# Patient Record
Sex: MALE | Race: WHITE | Employment: OTHER | URBAN - METROPOLITAN AREA
[De-identification: names, ages, dates, MRNs, and addresses within clinical notes are randomized per-mention and may not be internally consistent; named-entity substitution may affect disease eponyms.]

---

## 2017-04-13 ENCOUNTER — OFFICE VISIT (OUTPATIENT)
Dept: NEUROLOGY | Age: 72
End: 2017-04-13

## 2017-04-13 VITALS
SYSTOLIC BLOOD PRESSURE: 110 MMHG | DIASTOLIC BLOOD PRESSURE: 72 MMHG | HEIGHT: 71 IN | TEMPERATURE: 96.6 F | BODY MASS INDEX: 36.68 KG/M2 | RESPIRATION RATE: 18 BRPM | HEART RATE: 80 BPM | WEIGHT: 262 LBS | OXYGEN SATURATION: 98 %

## 2017-04-13 DIAGNOSIS — F79 MR (MENTAL RETARDATION): ICD-10-CM

## 2017-04-13 DIAGNOSIS — G40.209 PARTIAL SYMPTOMATIC EPILEPSY WITH COMPLEX PARTIAL SEIZURES, NOT INTRACTABLE, WITHOUT STATUS EPILEPTICUS (HCC): ICD-10-CM

## 2017-04-13 DIAGNOSIS — R56.9 SEIZURE (HCC): Primary | ICD-10-CM

## 2017-04-13 RX ORDER — ASPIRIN 325 MG
TABLET, DELAYED RELEASE (ENTERIC COATED) ORAL
COMMUNITY
End: 2018-01-23 | Stop reason: SDUPTHER

## 2017-04-13 RX ORDER — PHENYTOIN SODIUM 100 MG/1
CAPSULE, EXTENDED RELEASE ORAL
COMMUNITY
End: 2017-04-13 | Stop reason: SDUPTHER

## 2017-04-13 RX ORDER — LANOLIN ALCOHOL/MO/W.PET/CERES
400 CREAM (GRAM) TOPICAL DAILY
Status: ON HOLD | COMMUNITY
End: 2017-12-22

## 2017-04-13 NOTE — PROGRESS NOTES
Neurology Progress Note    NAME:  Nichelle Robison   :   1945   MRN:   [id number]     Date/Time:  2017  Subjective:      Kimberly Lane is a 81 y.o. male here today for follow up. No seizure reported. Patient still on multiple antiepileptic drug even when he has been seizure free for more than 2 years. Review of Systems:  Per HPI - Otherwise 12 point ROS was negative     []Unable to obtain  ROS due to  []mental status change  []sedated   []intubated    Medications reviewed:  Current Outpatient Prescriptions   Medication Sig Dispense Refill    magnesium oxide (MAG-OX) 400 mg tablet Take 400 mg by mouth daily.  Cholecalciferol, Vitamin D3, 50,000 unit cap Take  by mouth every thirty (30) days.  lacosamide (VIMPAT) 100 mg tab tablet Take 1 Tab by mouth two (2) times a day. Max Daily Amount: 200 mg. 60 Tab 1    metoprolol (LOPRESSOR) 25 mg tablet Take 0.5 Tabs by mouth two (2) times a day. 60 Tab 3    bumetanide (BUMEX) 2 mg tablet Take 2 mg by mouth daily.  EUCALYP/ME-SALICYLATE/MEN/THYM (LISTERINE ANTISEPTIC MM) by Mucous Membrane route two (2) times a day.  acetaminophen (TYLENOL) 325 mg tablet Take 650 mg by mouth every eight (8) hours as needed for Pain.  potassium chloride (K-DUR, KLOR-CON) 20 mEq tablet Take 40 mEq by mouth two (2) times a day.  oxyCODONE-acetaminophen (PERCOCET) 5-325 mg per tablet Take 1 Tab by mouth every four (4) hours as needed. Max Daily Amount: 4 Tabs. 20 Tab 0        Objective:   Vitals:  Vitals:    17 1123   BP: 110/72   Pulse: 80   Resp: 18   Temp: 96.6 °F (35.9 °C)   TempSrc: Oral   SpO2: 98%   Weight: 262 lb (118.8 kg)   Height: 5' 11\" (1.803 m)   PainSc:   0 - No pain       PHYSICAL EXAM:  General:    Alert, cooperative, no distress, appears stated age. Head:   Normocephalic, without obvious abnormality, atraumatic. Eyes:   Conjunctivae/corneas clear.   PERRLA  Nose:  Nares normal. No drainage or sinus tenderness. Throat:    Lips, mucosa, and tongue normal.  No Thrush  Neck:  Supple, symmetrical,  no adenopathy, thyroid: non tender    no carotid bruit and no JVD. Back:    Symmetric,  No CVA tenderness. Lungs:   Clear to auscultation bilaterally. No Wheezing or Rhonchi. No rales. Chest wall:  No tenderness or deformity. No Accessory muscle use. Heart:   Regular rate and rhythm,  no murmur, rub or gallop. Abdomen:   Soft, non-tender. Not distended. Bowel sounds normal. No masses  Extremities: Extremities normal, atraumatic, No cyanosis. No edema. No clubbing  Skin:     Texture, turgor normal. No rashes or lesions. Not Jaundiced  Lymph nodes: Cervical, supraclavicular normal.  Psych:  Good insight. Not depressed. Not anxious or agitated. NEUROLOGICAL EXAM:  Appearance: The patient is well developed, well nourished,  and is in no acute distress. Mental Status: Oriented to time, place and person. Mood and affect appropriate. Cranial Nerves:   Intact visual fields. Fundi are benign. ANGELINA, EOM's full, no nystagmus, no ptosis. Facial sensation is normal. Corneal reflexes are intact. Facial movement is symmetric. Hearing is normal bilaterally. Palate is midline with normal sternocleidomastoid and trapezius muscles are normal. Tongue is midline. Motor:  5/5 strength in upper and lower proximal and distal muscles. Normal bulk and tone. No fasciculations. Reflexes:   Deep tendon reflexes 2+/4 and symmetrical.   Sensory:   Normal to touch, pinprick and vibration. Gait:  Slightly unsteady gait. Tremor:   Mild tremor noted. Cerebellar:  No cerebellar signs present. Neurovascular:  Normal heart sounds and regular rhythm, peripheral pulses intact, and no carotid bruits. Lab Data Reviewed:    No visits with results within 3 Month(s) from this visit.   Latest known visit with results is:    Admission on 11/14/2015, Discharged on 11/14/2015   Component Date Value Ref Range Status    Levetiracetam (Keppra) 11/14/2015 None detected  10.0 - 40.0 ug/mL Final    Comment: (NOTE)  Performed At: [address] [id number]  Paco Velasquez MD [id number]      Sodium 11/14/2015 136  136 - 145 mmol/L Final    Potassium 11/14/2015 3.3* 3.5 - 5.1 mmol/L Final    Chloride 11/14/2015 102  97 - 108 mmol/L Final    CO2 11/14/2015 28  21 - 32 mmol/L Final    Anion gap 11/14/2015 6  5 - 15 mmol/L Final    Glucose 11/14/2015 107* 65 - 100 mg/dL Final    BUN 11/14/2015 16  6 - 20 MG/DL Final    Creatinine 11/14/2015 0.86  0.70 - 1.30 MG/DL Final    ** Note new reference range and method **    BUN/Creatinine ratio 11/14/2015 19  12 - 20   Final    GFR est AA 11/14/2015 >60  >60 ml/min/1.73m2 Final    GFR est non-AA 11/14/2015 >60  >60 ml/min/1.73m2 Final    Comment: Estimated GFR is calculated using the IDMS-traceable Modification of Diet in Renal Disease (MDRD) Study equation, reported for both  Americans (GFRAA) and non- Americans (GFRNA), and normalized to 1.73m2 body surface area. The physician must decide which value applies to the patient. The MDRD study equation should only be used in individuals age 25 or older. It has not been validated for the following: pregnant women, patients with serious comorbid conditions, or on certain medications, or persons with extremes of body size, muscle mass, or nutritional status.  Calcium 11/14/2015 8.0* 8.5 - 10.1 MG/DL Final    Bilirubin, total 11/14/2015 0.4  0.2 - 1.0 MG/DL Final    ALT (SGPT) 11/14/2015 12  12 - 78 U/L Final    AST (SGOT) 11/14/2015 12* 15 - 37 U/L Final    Alk.  phosphatase 11/14/2015 123* 45 - 117 U/L Final    Protein, total 11/14/2015 7.4  6.4 - 8.2 g/dL Final    Albumin 11/14/2015 3.2* 3.5 - 5.0 g/dL Final    Globulin 11/14/2015 4.2* 2.0 - 4.0 g/dL Final    A-G Ratio 11/14/2015 0.8* 1.1 - 2.2   Final    Color 11/14/2015 YELLOW/STRAW    Final    Color Reference Range: Straw, Yellow or Dark Yellow    Appearance 11/14/2015 CLEAR  CLEAR   Final    Specific gravity 11/14/2015 1.013  1.003 - 1.030   Final    pH (UA) 11/14/2015 6.0  5.0 - 8.0   Final    Protein 11/14/2015 NEGATIVE   NEG mg/dL Final    Glucose 11/14/2015 NEGATIVE   NEG mg/dL Final    Ketone 11/14/2015 NEGATIVE   NEG mg/dL Final    Bilirubin 11/14/2015 NEGATIVE   NEG   Final    Blood 11/14/2015 TRACE* NEG   Final    Urobilinogen 11/14/2015 0.2  0.2 - 1.0 EU/dL Final    Nitrites 11/14/2015 NEGATIVE   NEG   Final    Leukocyte Esterase 11/14/2015 NEGATIVE   NEG   Final    WBC 11/14/2015 5-10  0 - 4 /hpf Final    RBC 11/14/2015 0-5  0 - 5 /hpf Final    Epithelial cells 11/14/2015 FEW  FEW /lpf Final    Epithelial cell category consists of squamous cells and /or transitional urothelial cells. Renal tubular cells, if present, are separately identified as such.  Bacteria 11/14/2015 1+* NEG /hpf Final    Spermatozoa 11/14/2015 PRESENT    Final    Ventricular Rate 11/14/2015 107  BPM Final    Atrial Rate 11/14/2015 107  BPM Final    P-R Interval 11/14/2015 134  ms Final    QRS Duration 11/14/2015 190  ms Final    Q-T Interval 11/14/2015 420  ms Final    QTC Calculation (Bezet) 11/14/2015 560  ms Final    Calculated P Axis 11/14/2015 59  degrees Final    Calculated R Axis 11/14/2015 -96  degrees Final    Calculated T Axis 11/14/2015 80  degrees Final    Diagnosis 11/14/2015    Final                    Value:Atrial-sensed ventricular-paced rhythm  When compared with ECG of 24-OCT-2015 05:32,  Electronic ventricular pacemaker has replaced Sinus rhythm  No obvious pacer malfunction   Confirmed by Wanda Putnam ([id number]) on 11/15/2015 4:53:55 PM         CT Results (recent):    Results from [place] encounter on 10/17/15   CT MAXILLOFACIAL WO CONT   Narrative **Final Report**      ICD Codes / Adm. Diagnosis: 97  276.1 / Seizure  Seizure  Examination:  CT MAXILLOFACIAL WO CON  - [id number] - Oct 17 2015 10: 33AM  Accession No:  [id number]  Reason:  head trauma after seizure      REPORT:  INDICATION:  head trauma after seizure    EXAM: CT MAXILLOFACIAL STRUCTURES WITHOUT CONTRAST. Comparison: CT head 10/22/2008. PROCEDURE: Sequential axial images of the maxillofacial bones were   performed, using bone algorithm. Soft tissue and bone windows were examined. Post-processing for coronal reformatting was performed. Contrast was not   administered. FINDINGS: No fracture is obvious. The orbital rims and floors of orbits are   intact. The nasal bone is intact, with a flattened configuration which is   stable since older head CTs. . The bony structures of the mandible and   maxilla show no acute abnormality. There are no air-fluid levels in the   paranasal sinuses, and no soft tissue swelling is noted focally. Minimal   mucoperiosteal thickening. No obvious mass or abscess. Normal sized lymph   nodes scattered throughout the cervical chains. IMPRESSION: No acute bony abnormality of the maxillofacial structures. Signing/Reading Doctor: Julieta Almanzar ([id number])    Approved: Julieta Almanzar ([id number])  Oct 17 2015 10:53AM                                MRI Results (recent):  No results found for this or any previous visit. IR Results (recent):  No results found for this or any previous visit. VAS/US Results (recent):  No results found for this or any previous visit.           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 Call if there is any problem. Will continue with Vimpat. ICD-10-CM ICD-9-CM    1. Seizure ([facility] R56.9 780.39    2. Partial symptomatic epilepsy with complex partial seizures, not intractable, without status epilepticus ([facility] G40.209 345.40    3. MR (mental retardation) F79 319      Follow-up Disposition:  Return in about 1 year (around 4/13/2018).          ___________________________________________________    Total time spent with patient:  []15   []25   []35   [] __ minutes    Care Plan discussed with:    []Patient   []Family    []Care Manager   []Consultant/Specialist :    ___________________________________________________    Attending Physician: Lyndsey Fischer MD

## 2017-04-13 NOTE — MR AVS SNAPSHOT
Visit Information Date & Time Provider Department Dept. Phone Encounter #  
 4/13/2017 11:00 AM Flynn Puga MD [facility] at [address] [place] [address] Dr [id number] Follow-up Instructions Return in about 1 year (around 4/13/2018). Upcoming Health Maintenance Date Due Hepatitis C Screening 1945 FOBT Q 1 YEAR AGE 50-75 1/16/1997 ZOSTER VACCINE AGE 60> 1/16/2007 GLAUCOMA SCREENING Q2Y 1/16/2012 MEDICARE YEARLY EXAM 1/16/2012 INFLUENZA AGE 9 TO ADULT 8/1/2016 Pneumococcal 65+ Low/Medium Risk (2 of 2 - PCV13) 10/18/2016 DTaP/Tdap/Td series (2 - Td) 10/17/2025 Allergies as of 4/13/2017  Review Complete On: 4/13/2017 By: Deyanira Wong LPN No Known Allergies Current Immunizations  Never Reviewed Name Date Pneumococcal Polysaccharide (PPSV-23) 10/18/2015 10:22 AM  
 Tdap 10/17/2015 11:04 AM  
  
 Not reviewed this visit You Were Diagnosed With   
  
 Codes Comments Seizure ([facility]    -  Primary ICD-10-CM: R56.9 ICD-9-CM: 780.39 Partial symptomatic epilepsy with complex partial seizures, not intractable, without status epilepticus ([facility]     ICD-10-CM: U48.499 ICD-9-CM: 345.40 MR (mental retardation)     ICD-10-CM: B06 
ICD-9-CM: 033 Vitals BP Pulse Temp Resp Height(growth percentile) Weight(growth percentile) 110/72 (BP 1 Location: Right arm, BP Patient Position: Sitting) 80 96.6 °F (35.9 °C) (Oral) 18 5' 11\" (1.803 m) 262 lb (118.8 kg) SpO2 BMI Smoking Status 98% 36.54 kg/m2 Never Smoker Vitals History BMI and BSA Data Body Mass Index Body Surface Area  
 36.54 kg/m 2 2.44 m 2 Preferred Pharmacy Pharmacy Name Phone [facility] [phone] Your Updated Medication List  
  
   
This list is accurate as of: 4/13/17 11:50 AM.  Always use your most recent med list.  
  
  
  
  
 acetaminophen 325 mg tablet Commonly known as:  TYLENOL Take 650 mg by mouth every eight (8) hours as needed for Pain. bumetanide 2 mg tablet Commonly known as:  Benítez Abide Take 2 mg by mouth daily. Cholecalciferol (Vitamin D3) 50,000 unit Cap Take  by mouth every thirty (30) days. lacosamide 100 mg Tab tablet Commonly known as:  VIMPAT Take 1 Tab by mouth two (2) times a day. Max Daily Amount: 200 mg. LISTERINE ANTISEPTIC MM  
by Mucous Membrane route two (2) times a day. magnesium oxide 400 mg tablet Commonly known as:  MAG-OX Take 400 mg by mouth daily. metoprolol tartrate 25 mg tablet Commonly known as:  LOPRESSOR Take 0.5 Tabs by mouth two (2) times a day. oxyCODONE-acetaminophen 5-325 mg per tablet Commonly known as:  PERCOCET Take 1 Tab by mouth every four (4) hours as needed. Max Daily Amount: 4 Tabs. potassium chloride 20 mEq tablet Commonly known as:  K-DUR, KLOR-CON Take 40 mEq by mouth two (2) times a day. Follow-up Instructions Return in about 1 year (around 4/13/2018). Introducing [facility]! [facility] introduces [employer] patient portal. Now you can access parts of your medical record, email your doctor's office, and request medication refills online. 1. In your internet browser, go to [email or web address] 2. Click on the First Time User? Click Here link in the Sign In box. You will see the New Member Sign Up page. 3. Enter your [employer] Access Code exactly as it appears below. You will not need to use this code after youve completed the sign-up process. If you do not sign up before the expiration date, you must request a new code. · [employer] Access Code: [email or web address] Expires: 7/12/2017 11:18 AM 
 
4. Enter the last four digits of your Social Security Number (xxxx) and Date of Birth (mm/dd/yyyy) as indicated and click Submit.  You will be taken to the next sign-up page. 5. Create a dentaZOOM ID. This will be your [employer]aZOOM login ID and cannot be changed, so think of one that is secure and easy to remember. 6. Create a [employer]ZOOM password. You can change your password at any time. 7. Enter your Password Reset Question and Answer. This can be used at a later time if you forget your password. 8. Enter your e-mail address. You will receive e-mail notification when new information is available in [address] 9. Click Sign Up. You can now view and download portions of your medical record. 10. Click the Download Summary menu link to download a portable copy of your medical information. If you have questions, please visit the Frequently Asked Questions section of the [employer]ZO[employer] website. Remember, [employer] is NOT to be used for urgent needs. For medical emergencies, dial 911. Now available from your iPhone and Android! Please provide this summary of care documentation to your next provider. Your primary care clinician is listed as Prabhakar Oconnell. If you have any questions after today's visit, please call [phone].

## 2017-12-13 ENCOUNTER — APPOINTMENT (OUTPATIENT)
Dept: GENERAL RADIOLOGY | Age: 72
End: 2017-12-13
Attending: EMERGENCY MEDICINE
Payer: MEDICARE

## 2017-12-13 ENCOUNTER — HOSPITAL ENCOUNTER (EMERGENCY)
Age: 72
Discharge: HOME | End: 2017-12-13
Attending: EMERGENCY MEDICINE | Admitting: EMERGENCY MEDICINE
Payer: MEDICARE

## 2017-12-13 VITALS
HEIGHT: 76 IN | SYSTOLIC BLOOD PRESSURE: 134 MMHG | RESPIRATION RATE: 18 BRPM | OXYGEN SATURATION: 96 % | HEART RATE: 85 BPM | BODY MASS INDEX: 34.7 KG/M2 | TEMPERATURE: 98.2 F | DIASTOLIC BLOOD PRESSURE: 63 MMHG | WEIGHT: 285 LBS

## 2017-12-13 DIAGNOSIS — S52.531A CLOSED COLLES' FRACTURE OF RIGHT RADIUS, INITIAL ENCOUNTER: Primary | ICD-10-CM

## 2017-12-13 DIAGNOSIS — S52.614A CLOSED NONDISPLACED FRACTURE OF STYLOID PROCESS OF RIGHT ULNA, INITIAL ENCOUNTER: ICD-10-CM

## 2017-12-13 PROCEDURE — 75810000053 HC SPLINT APPLICATION

## 2017-12-13 PROCEDURE — 99283 EMERGENCY DEPT VISIT LOW MDM: CPT

## 2017-12-13 PROCEDURE — 73110 X-RAY EXAM OF WRIST: CPT

## 2017-12-13 PROCEDURE — 73130 X-RAY EXAM OF HAND: CPT

## 2017-12-13 PROCEDURE — 73090 X-RAY EXAM OF FOREARM: CPT

## 2017-12-13 PROCEDURE — 74011250637 HC RX REV CODE- 250/637: Performed by: EMERGENCY MEDICINE

## 2017-12-13 RX ORDER — PHENOBARBITAL 30 MG/1
30 TABLET ORAL 2 TIMES DAILY
Status: ON HOLD | COMMUNITY
End: 2017-12-22

## 2017-12-13 RX ORDER — IBUPROFEN 600 MG/1
600 TABLET ORAL
Status: COMPLETED | OUTPATIENT
Start: 2017-12-13 | End: 2017-12-13

## 2017-12-13 RX ORDER — HYDROCODONE BITARTRATE AND ACETAMINOPHEN 5; 325 MG/1; MG/1
1 TABLET ORAL
Qty: 8 TAB | Refills: 0 | Status: SHIPPED | OUTPATIENT
Start: 2017-12-13 | End: 2017-12-22

## 2017-12-13 RX ORDER — NAPROXEN 500 MG/1
500 TABLET ORAL 2 TIMES DAILY WITH MEALS
Qty: 20 TAB | Refills: 0 | Status: SHIPPED | OUTPATIENT
Start: 2017-12-13 | End: 2019-06-06

## 2017-12-13 RX ORDER — LEVETIRACETAM 1000 MG/1
1000 TABLET ORAL 2 TIMES DAILY
COMMUNITY
End: 2017-12-28 | Stop reason: CLARIF

## 2017-12-13 RX ADMIN — IBUPROFEN 600 MG: 600 TABLET, FILM COATED ORAL at 15:15

## 2017-12-13 NOTE — DISCHARGE INSTRUCTIONS
Colles Fracture: Care Instructions  Your Care Instructions    A Colles (say \"CALL-us\" or \"CALL-eez\") fracture is a specific type of broken wrist. In this type of fracture, the broken bone in the wrist tilts upward when the hand is palm down. The break may happen when you throw out a hand to protect yourself in a fall. Your treatment depends on how bad the break is. Your doctor may have put your wrist in a cast or splint. This will help keep your wrist stable and keep the bone in the right position. Sometimes surgery is needed to help keep the bone in position for good healing. It will take several weeks to a few months for your wrist to heal. You can help it heal with care at home. You heal best when you take good care of yourself. Eat a variety of healthy foods, and don't smoke. You may have had a sedative to help you relax. You may be unsteady after having sedation. It can take a few hours for the medicine's effects to wear off. Common side effects include nausea, vomiting, and feeling sleepy or tired. The doctor has checked you carefully, but problems can develop later. If you notice any problems or new symptoms, get medical treatment right away. Follow-up care is a key part of your treatment and safety. Be sure to make and go to all appointments, and call your doctor if you are having problems. It's also a good idea to know your test results and keep a list of the medicines you take. How can you care for yourself at home? · If your doctor gave you a sedative:  ¨ For 24 hours, don't do anything that requires attention to detail. It takes time for the medicine's effects to completely wear off. ¨ For your safety, do not drive or operate any machinery that could be dangerous. Wait until the medicine wears off. You need to be able to think clearly and react easily. · Be safe with medicines. Take pain medicines exactly as directed.   ¨ If the doctor gave you a prescription medicine for pain, take it as prescribed. ¨ If you are not taking a prescription pain medicine, ask your doctor if you can take an over-the-counter medicine. · Put ice or a cold pack on your wrist for 10 to 20 minutes at a time. Try to do this every 1 to 2 hours for the next 3 days (when you are awake). Put a thin cloth between the ice and your cast or splint. Keep your cast or splint dry. · Follow the splint or cast care instructions that your doctor gives you. If you have a splint, do not take it off unless your doctor tells you to. Be careful not to put the splint on too tight. · Prop up your arm on pillows when you sit or lie down in the first few days after the injury. Keep your wrist higher than the level of your heart. This will help reduce swelling. · Move your fingers often to reduce swelling and stiffness. But don't use that hand to grab or carry anything. · Follow instructions for exercises to keep your arm strong. When should you call for help? Call 911 anytime you think you may need emergency care. For example, call if:  ? · You have trouble breathing. ? · You passed out (lost consciousness). ?Call your doctor now or seek immediate medical care if:  ? · You have new or worse nausea or vomiting. ? · You have new or worse pain. ? · Your hand or fingers are cool or pale or change color. ? · Your cast or splint feels too tight. ? · You have tingling, weakness, or numbness in your hand or fingers. ? Watch closely for changes in your health, and be sure to contact your doctor if:  ? · You have problems with your cast or splint. ? · You do not get better as expected. Where can you learn more? Go to http://mandi-marylou.info/. Enter F496 in the search box to learn more about \"Colles Fracture: Care Instructions. \"  Current as of: March 21, 2017  Content Version: 11.4  © 4557-5705 [employer].  Care instructions adapted under license by [employer] (which disclaims liability or warranty for this information). If you have questions about a medical condition or this instruction, always ask your healthcare professional. [place] [address] any warranty or liability for your use of this information.

## 2017-12-13 NOTE — ED PROVIDER NOTES
EMERGENCY DEPARTMENT HISTORY AND PHYSICAL EXAM      Date: 12/13/2017  Patient Name: Ki Quinn    History of Presenting Illness     Chief Complaint   Patient presents with    Fall      30 min prior to arrive    Hand Pain     from fall       History Provided By: Patient and caregiver from home    HPI: Mariah Mccurdy, 81 y.o. male with PMHx significant for htn, mild mental retardation, and epilepsy, presents ambulatory to the ED with cc of fall 30 min PTA. Pt reports tripping over left foot and landing on right hand. [address] Denies numbness/tingling. Reports pain and swelling. PCP: Marya Ramirez MD    There are no other complaints, changes, or physical findings at this time. Current Outpatient Prescriptions   Medication Sig Dispense Refill    PHENYTOIN PO Take  by mouth.  levETIRAcetam 1,000 mg tablet Take 1,000 mg by mouth two (2) times a day.  HYDROcodone-acetaminophen (NORCO) 5-325 mg per tablet Take 1 Tab by mouth every six (6) hours as needed for Pain. Max Daily Amount: 4 Tabs. 8 Tab 0    naproxen (NAPROSYN) 500 mg tablet Take 1 Tab by mouth two (2) times daily (with meals). 20 Tab 0    magnesium oxide (MAG-OX) 400 mg tablet Take 400 mg by mouth daily.  Cholecalciferol, Vitamin D3, 50,000 unit cap Take  by mouth every thirty (30) days.  lacosamide (VIMPAT) 100 mg tab tablet Take 1 Tab by mouth two (2) times a day. Max Daily Amount: 200 mg. 60 Tab 1    metoprolol (LOPRESSOR) 25 mg tablet Take 0.5 Tabs by mouth two (2) times a day. 60 Tab 3    bumetanide (BUMEX) 2 mg tablet Take 2 mg by mouth daily.  potassium chloride (K-DUR, KLOR-CON) 20 mEq tablet Take 20 mEq by mouth two (2) times a day.  PHENobarbital (LUMINAL) 30 mg tablet Take 30 mg by mouth two (2) times a day.  acetaminophen (TYLENOL) 325 mg tablet Take 650 mg by mouth every eight (8) hours as needed for Pain.          Past History     Past Medical History:  Past Medical History:   Diagnosis Date    Epile[facility] [address]     Heart disease     Hypertension     Incontinence     Leg swelling     Mental retardation, 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)     Other ill-defined conditions(799.89)     edema legs    S/P biventricular cardiac pacemaker procedure 10/23/2015    10/23/15 [employer] biventricular pacemaker inmplant    Screen for colon cancer 9/27/2012       Past Surgical History:  Past Surgical History:   Procedure Laterality Date    HX COLONOSCOPY  04/05/2017       Family History:  Family History   Problem Relation Age of Onset    Other Other      unable to obtain due to mental status    No Known Problems Sister        Social History:  Social History   Substance Use Topics    Smoking status: Never Smoker    Smokeless tobacco: Never Used    Alcohol use No       Allergies:  No Known Allergies      Review of Systems   Review of Systems   Constitutional: Negative for activity change, appetite change, chills and fever. HENT: Negative for facial swelling. Eyes: Negative for photophobia and visual disturbance. Respiratory: Negative for chest tightness and shortness of breath. Cardiovascular: Negative for chest pain. Gastrointestinal: Negative for abdominal pain, nausea and vomiting. Genitourinary: Negative for flank pain. Musculoskeletal: Negative for back pain, myalgias and neck pain. Right hand swelling and pain   Skin: Negative for color change, pallor, rash and wound. Neurological: Negative for dizziness, weakness, light-headedness and headaches. All other systems reviewed and are negative. Physical Exam   Physical Exam   Constitutional: He is oriented to person, place, and time. He appears well-developed and well-nourished. No distress. HENT:   Head: Normocephalic and atraumatic. Eyes: Conjunctivae are normal.   Cardiovascular: Normal rate, regular rhythm and normal heart sounds. Pulmonary/Chest: Effort normal and breath sounds normal. No respiratory distress. Abdominal: Soft. Bowel sounds are normal. He exhibits no distension. There is no tenderness. Musculoskeletal:        Right elbow: Normal.       Right wrist: He exhibits tenderness, bony tenderness (radial styloid) and swelling. He exhibits normal range of motion and no effusion. Right forearm: He exhibits tenderness and bony tenderness (radius). He exhibits no swelling, no edema, no deformity and no laceration. Arms:       Right hand: He exhibits tenderness, bony tenderness and swelling. He exhibits normal range of motion, normal two-point discrimination, normal capillary refill, no deformity and no laceration. Normal sensation noted. Normal strength noted. Hands:  Neurological: He is alert and oriented to person, place, and time. No cranial nerve deficit. Skin: Skin is warm. No rash noted. No erythema. Psychiatric: He has a normal mood and affect. His behavior is normal.   Nursing note and vitals reviewed. Diagnostic Study Results     Labs -   No results found for this or any previous visit (from the past 12 hour(s)). Radiologic Studies -   XR HAND RT MIN 3 V   Final Result      XR FOREARM RT AP/LAT   Final Result      XR WRIST RT AP/LAT/OBL MIN 3V   Final Result        CT Results  (Last 48 hours)    None        CXR Results  (Last 48 hours)    None            Medical Decision Making   I am the first provider for this patient. I reviewed the vital signs, available nursing notes, past medical history, past surgical history, family history and social history. Vital Signs-Reviewed the patient's vital signs. Patient Vitals for the past 12 hrs:   Temp Pulse Resp BP SpO2   12/13/17 1453 98.2 °F (36.8 °C) 85 18 134/63 96 %       Records Reviewed: Nursing Notes and Old Medical Records    Provider Notes (Medical Decision Making):   DDx: Hand fracture vs sprain, radius fracture vs sprain, ulnar fracture vs sprain      ED Course:   Initial assessment performed.  The patients presenting problems have been discussed, and they are in agreement with the care plan formulated and outlined with them. I have encouraged them to ask questions as they arise throughout their visit. Splint evaluated. Pt continues to have < 3 sec cap refill. Good skin color and tone. Discussed signs and sx of compartment syndrome. Return if experience those sx. Disposition:  4:38 PM  Discussed lab and imaging results with pt along with dx and treatment plan. Discussed importance of prompt ortho follow up. All questions answered. Pt voiced they understood. Return if sx worsen. PLAN:  1. Current Discharge Medication List      START taking these medications    Details   HYDROcodone-acetaminophen (NORCO) 5-325 mg per tablet Take 1 Tab by mouth every six (6) hours as needed for Pain. Max Daily Amount: 4 Tabs. Qty: 8 Tab, Refills: 0      naproxen (NAPROSYN) 500 mg tablet Take 1 Tab by mouth two (2) times daily (with meals). Qty: 20 Tab, Refills: 0           2. Follow-up Information     Follow up With Details Comments Vincent Santos Schedule an appointment as soon as possible for a visit today for wrist fracture follow up [address]  [address] [address]    Yessica Lehman MD Schedule an appointment as soon as possible for a visit today for wrist fracture follow up [address] [facility]  [address] [phone]          Return to ED if worse     Diagnosis     Clinical Impression:   1. Closed Colles' fracture of right radius, initial encounter    2.  Closed nondisplaced fracture of styloid process of right ulna, initial encounter

## 2017-12-13 NOTE — ED NOTES
PA at bedside updating patient and staff member from [address] [place] [address] on x-ray results, necessity of follow up with orthopedic within a week and possible need for surgery.

## 2017-12-13 NOTE — ED NOTES
Emergency Department Nursing Plan of Care       The Nursing Plan of Care is developed from the Nursing assessment and Emergency Department Attending provider initial evaluation. The plan of care may be reviewed in the ED Provider note. The Plan of Care was developed with the following considerations:   Patient / Family readiness to learn indicated by:verbalized understanding  Persons(s) to be included in education: patient and staff member from group home. Barriers to Learning/Limitations: patient is mildly mentally retarded and is here with a staff member from [address] [place] [address].     Signed     Issa Kuo RN    12/13/2017   4:20 PM

## 2017-12-13 NOTE — ED TRIAGE NOTES
Pt fell at work prior to 30 mins on arrival. Denies hit his head,loc. Hans Mates on his rt hand,c/o rt hand pain from fall.

## 2017-12-13 NOTE — ED NOTES
Discharged by provider. Provider wrote specific instructions in addition to discharge paperwork for staff at group home, at the request of the attentive staff member that is here with him today.

## 2017-12-21 ENCOUNTER — ANESTHESIA EVENT (OUTPATIENT)
Dept: SURGERY | Age: 72
End: 2017-12-21
Payer: MEDICARE

## 2017-12-21 NOTE — PERIOP NOTES
Patient with PMH of HTN, heart disease, pacemaker, and epilepsy has been added to the surgery schedule for tomorrow, 12/22/2017. The last encounter with a cardiologist in our system was back October of 2015. Left a VM keshav Thibodeaux at Dr. Martinez Level office informing her that this patient should come through PAT, but since surgery is tomorrow a pacemaker plan has been faxed to there office requesting that they follow up on this issue. Requested she return my call regarding this. Faxed the pacemaker plan to Dr. Michelle Grace office as urgent.   DOS: 12/22/2017

## 2017-12-22 ENCOUNTER — ANESTHESIA (OUTPATIENT)
Dept: SURGERY | Age: 72
End: 2017-12-22
Payer: MEDICARE

## 2017-12-22 ENCOUNTER — APPOINTMENT (OUTPATIENT)
Dept: GENERAL RADIOLOGY | Age: 72
End: 2017-12-22
Attending: ORTHOPAEDIC SURGERY
Payer: MEDICARE

## 2017-12-22 ENCOUNTER — HOSPITAL ENCOUNTER (OUTPATIENT)
Age: 72
Setting detail: OUTPATIENT SURGERY
Discharge: HOME | End: 2017-12-22
Attending: ORTHOPAEDIC SURGERY | Admitting: ORTHOPAEDIC SURGERY
Payer: MEDICARE

## 2017-12-22 VITALS
SYSTOLIC BLOOD PRESSURE: 121 MMHG | HEART RATE: 83 BPM | WEIGHT: 274.69 LBS | TEMPERATURE: 99.1 F | HEIGHT: 75 IN | BODY MASS INDEX: 34.15 KG/M2 | OXYGEN SATURATION: 97 % | RESPIRATION RATE: 17 BRPM | DIASTOLIC BLOOD PRESSURE: 65 MMHG

## 2017-12-22 DIAGNOSIS — S52.531A CLOSED COLLES' FRACTURE OF RIGHT RADIUS, INITIAL ENCOUNTER: Primary | ICD-10-CM

## 2017-12-22 PROCEDURE — 76030000020 HC AMB SURG 1.5 TO 2 HR INTENSV-TIER 1: Performed by: ORTHOPAEDIC SURGERY

## 2017-12-22 PROCEDURE — 77030003601 HC NDL NRV BLK BBMI -A

## 2017-12-22 PROCEDURE — 77030035360 HC BIT DRL SKEL -B: Performed by: ORTHOPAEDIC SURGERY

## 2017-12-22 PROCEDURE — 77030031388 HC WRE K SKEL -B: Performed by: ORTHOPAEDIC SURGERY

## 2017-12-22 PROCEDURE — 77030000032 HC CUF TRNQT ZIMM -B: Performed by: ORTHOPAEDIC SURGERY

## 2017-12-22 PROCEDURE — C1713 ANCHOR/SCREW BN/BN,TIS/BN: HCPCS | Performed by: ORTHOPAEDIC SURGERY

## 2017-12-22 PROCEDURE — 77030032435

## 2017-12-22 PROCEDURE — 76210000034 HC AMBSU PH I REC 0.5 TO 1 HR: Performed by: ORTHOPAEDIC SURGERY

## 2017-12-22 PROCEDURE — 74011250636 HC RX REV CODE- 250/636

## 2017-12-22 PROCEDURE — 85018 HEMOGLOBIN: CPT

## 2017-12-22 PROCEDURE — 77030020782 HC GWN BAIR PAWS FLX 3M -B

## 2017-12-22 PROCEDURE — 74011250636 HC RX REV CODE- 250/636: Performed by: ORTHOPAEDIC SURGERY

## 2017-12-22 PROCEDURE — 76060000063 HC AMB SURG ANES 1.5 TO 2 HR: Performed by: ORTHOPAEDIC SURGERY

## 2017-12-22 PROCEDURE — 77030035361 HC BIT DRL SLD PA GEMNS SKEL -B: Performed by: ORTHOPAEDIC SURGERY

## 2017-12-22 PROCEDURE — 77030031139 HC SUT VCRL2 J&J -A: Performed by: ORTHOPAEDIC SURGERY

## 2017-12-22 PROCEDURE — L4398 FOOT DROP SPLINT PRE OTS: HCPCS | Performed by: ORTHOPAEDIC SURGERY

## 2017-12-22 PROCEDURE — 76000 FLUOROSCOPY <1 HR PHYS/QHP: CPT

## 2017-12-22 PROCEDURE — 77030002933 HC SUT MCRYL J&J -A: Performed by: ORTHOPAEDIC SURGERY

## 2017-12-22 PROCEDURE — 76210000046 HC AMBSU PH II REC FIRST 0.5 HR: Performed by: ORTHOPAEDIC SURGERY

## 2017-12-22 PROCEDURE — 77030021122 HC SPLNT MAT FST BSNM -A: Performed by: ORTHOPAEDIC SURGERY

## 2017-12-22 PROCEDURE — 74011250636 HC RX REV CODE- 250/636: Performed by: ANESTHESIOLOGY

## 2017-12-22 PROCEDURE — 77030013079 HC BLNKT BAIR HGGR 3M -A: Performed by: ANESTHESIOLOGY

## 2017-12-22 DEVICE — SCREW BONE L15MM DIA3.5MM CORT DSTL RAD VOLAR TI NONLOCKING: Type: IMPLANTABLE DEVICE | Site: WRIST | Status: FUNCTIONAL

## 2017-12-22 DEVICE — SCREW BNE L13MM DIA3.5MM CORT DST RAD VOLAR TI NONLOCKING: Type: IMPLANTABLE DEVICE | Site: WRIST | Status: FUNCTIONAL

## 2017-12-22 DEVICE — PEG BNE FIX L21MM DIA2MM DST RAD TI SMOOTH FOR VOLAR: Type: IMPLANTABLE DEVICE | Site: WRIST | Status: FUNCTIONAL

## 2017-12-22 DEVICE — SCREW BNE L14MM DIA3.5MM CORT DST RAD VOLAR TI NONLOCKING: Type: IMPLANTABLE DEVICE | Site: WRIST | Status: FUNCTIONAL

## 2017-12-22 DEVICE — PLATE BNE 3 H R DST RAD VOLAR TI STD FOR 3.5MM SCR GEMINUS: Type: IMPLANTABLE DEVICE | Site: WRIST | Status: FUNCTIONAL

## 2017-12-22 DEVICE — PEG BNE FIX L22MM DIA2.3MM DST RAD TI THRD LOK FOR VOLAR: Type: IMPLANTABLE DEVICE | Site: WRIST | Status: FUNCTIONAL

## 2017-12-22 DEVICE — IMPLANTABLE DEVICE: Type: IMPLANTABLE DEVICE | Site: WRIST | Status: FUNCTIONAL

## 2017-12-22 RX ORDER — LIDOCAINE HYDROCHLORIDE 10 MG/ML
0.1 INJECTION, SOLUTION EPIDURAL; INFILTRATION; INTRACAUDAL; PERINEURAL AS NEEDED
Status: DISCONTINUED | OUTPATIENT
Start: 2017-12-22 | End: 2017-12-22 | Stop reason: HOSPADM

## 2017-12-22 RX ORDER — SODIUM CHLORIDE 0.9 % (FLUSH) 0.9 %
5-10 SYRINGE (ML) INJECTION AS NEEDED
Status: DISCONTINUED | OUTPATIENT
Start: 2017-12-22 | End: 2017-12-22 | Stop reason: HOSPADM

## 2017-12-22 RX ORDER — SODIUM CHLORIDE 0.9 % (FLUSH) 0.9 %
5-10 SYRINGE (ML) INJECTION EVERY 8 HOURS
Status: DISCONTINUED | OUTPATIENT
Start: 2017-12-22 | End: 2017-12-22 | Stop reason: HOSPADM

## 2017-12-22 RX ORDER — DIPHENHYDRAMINE HYDROCHLORIDE 50 MG/ML
12.5 INJECTION, SOLUTION INTRAMUSCULAR; INTRAVENOUS AS NEEDED
Status: DISCONTINUED | OUTPATIENT
Start: 2017-12-22 | End: 2017-12-22 | Stop reason: HOSPADM

## 2017-12-22 RX ORDER — SODIUM CHLORIDE, SODIUM LACTATE, POTASSIUM CHLORIDE, CALCIUM CHLORIDE 600; 310; 30; 20 MG/100ML; MG/100ML; MG/100ML; MG/100ML
100 INJECTION, SOLUTION INTRAVENOUS CONTINUOUS
Status: DISCONTINUED | OUTPATIENT
Start: 2017-12-22 | End: 2017-12-22 | Stop reason: HOSPADM

## 2017-12-22 RX ORDER — ONDANSETRON 8 MG/1
8 TABLET, ORALLY DISINTEGRATING ORAL
Qty: 20 TAB | Refills: 2 | Status: SHIPPED | OUTPATIENT
Start: 2017-12-22 | End: 2019-06-06

## 2017-12-22 RX ORDER — OXYCODONE AND ACETAMINOPHEN 5; 325 MG/1; MG/1
TABLET ORAL
Qty: 80 TAB | Refills: 0 | Status: SHIPPED | OUTPATIENT
Start: 2017-12-22 | End: 2019-06-06

## 2017-12-22 RX ORDER — FENTANYL CITRATE 50 UG/ML
INJECTION, SOLUTION INTRAMUSCULAR; INTRAVENOUS AS NEEDED
Status: DISCONTINUED | OUTPATIENT
Start: 2017-12-22 | End: 2017-12-22 | Stop reason: HOSPADM

## 2017-12-22 RX ORDER — AMMONIUM LACTATE 12 G/100G
LOTION TOPICAL DAILY
COMMUNITY
End: 2019-06-06

## 2017-12-22 RX ORDER — ROPIVACAINE HYDROCHLORIDE 5 MG/ML
INJECTION, SOLUTION EPIDURAL; INFILTRATION; PERINEURAL AS NEEDED
Status: DISCONTINUED | OUTPATIENT
Start: 2017-12-22 | End: 2017-12-22 | Stop reason: HOSPADM

## 2017-12-22 RX ORDER — ONDANSETRON 2 MG/ML
4 INJECTION INTRAMUSCULAR; INTRAVENOUS AS NEEDED
Status: DISCONTINUED | OUTPATIENT
Start: 2017-12-22 | End: 2017-12-22 | Stop reason: HOSPADM

## 2017-12-22 RX ORDER — PROPOFOL 10 MG/ML
INJECTION, EMULSION INTRAVENOUS
Status: DISCONTINUED | OUTPATIENT
Start: 2017-12-22 | End: 2017-12-22 | Stop reason: HOSPADM

## 2017-12-22 RX ORDER — MIDAZOLAM HYDROCHLORIDE 1 MG/ML
INJECTION, SOLUTION INTRAMUSCULAR; INTRAVENOUS AS NEEDED
Status: DISCONTINUED | OUTPATIENT
Start: 2017-12-22 | End: 2017-12-22 | Stop reason: HOSPADM

## 2017-12-22 RX ORDER — ASCORBIC ACID 500 MG
500 TABLET ORAL DAILY
Qty: 42 TAB | Refills: 0 | Status: SHIPPED | OUTPATIENT
Start: 2017-12-22 | End: 2018-02-02

## 2017-12-22 RX ORDER — SODIUM CHLORIDE, SODIUM LACTATE, POTASSIUM CHLORIDE, CALCIUM CHLORIDE 600; 310; 30; 20 MG/100ML; MG/100ML; MG/100ML; MG/100ML
125 INJECTION, SOLUTION INTRAVENOUS CONTINUOUS
Status: DISCONTINUED | OUTPATIENT
Start: 2017-12-22 | End: 2017-12-22 | Stop reason: HOSPADM

## 2017-12-22 RX ORDER — HYDROMORPHONE HYDROCHLORIDE 2 MG/ML
.25-1 INJECTION, SOLUTION INTRAMUSCULAR; INTRAVENOUS; SUBCUTANEOUS
Status: DISCONTINUED | OUTPATIENT
Start: 2017-12-22 | End: 2017-12-22 | Stop reason: HOSPADM

## 2017-12-22 RX ADMIN — FENTANYL CITRATE 50 MCG: 50 INJECTION, SOLUTION INTRAMUSCULAR; INTRAVENOUS at 12:39

## 2017-12-22 RX ADMIN — SODIUM CHLORIDE, POTASSIUM CHLORIDE, SODIUM LACTATE AND CALCIUM CHLORIDE: 600; 310; 30; 20 INJECTION, SOLUTION INTRAVENOUS at 12:14

## 2017-12-22 RX ADMIN — MIDAZOLAM HYDROCHLORIDE 2 MG: 1 INJECTION, SOLUTION INTRAMUSCULAR; INTRAVENOUS at 12:39

## 2017-12-22 RX ADMIN — PROPOFOL 25 MCG/KG/MIN: 10 INJECTION, EMULSION INTRAVENOUS at 13:20

## 2017-12-22 RX ADMIN — MIDAZOLAM HYDROCHLORIDE 1 MG: 1 INJECTION, SOLUTION INTRAMUSCULAR; INTRAVENOUS at 13:14

## 2017-12-22 RX ADMIN — MIDAZOLAM HYDROCHLORIDE 2 MG: 1 INJECTION, SOLUTION INTRAMUSCULAR; INTRAVENOUS at 11:39

## 2017-12-22 RX ADMIN — FENTANYL CITRATE 50 MCG: 50 INJECTION, SOLUTION INTRAMUSCULAR; INTRAVENOUS at 11:39

## 2017-12-22 RX ADMIN — ROPIVACAINE HYDROCHLORIDE 30 ML: 5 INJECTION, SOLUTION EPIDURAL; INFILTRATION; PERINEURAL at 11:45

## 2017-12-22 RX ADMIN — CEFAZOLIN 3 G: 1 INJECTION, POWDER, FOR SOLUTION INTRAMUSCULAR; INTRAVENOUS; PARENTERAL at 12:33

## 2017-12-22 NOTE — H&P
Orthopedic Admission History and Physical        NAME: Jevon Haynes       :  1945       MRN:  [id number]      Subjective:     Patient is a 81 y.o. male who presents with history of R DRF. Presents today for surgical treatment. Patient Active Problem List    Diagnosis Date Noted    S/P biventricular cardiac pacemaker procedure 10/23/2015    Seizure disorder ([facility] [address] 10/20/2015     Class: Chronic    Mental retardation, 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) 10/20/2015     Class: Chronic    Advance care planning 10/20/2015    Hypertension 10/19/2015     Class: Chronic    Mobitz type II incomplete atrioventricular block 10/19/2015    Bilateral lower extremity edema 10/17/2015    Screen for colon cancer 2012    Bradycardia 2012     Past Medical History:   Diagnosis Date    Epilepsy ([facility] [address]     Heart disease     Hypertension     Incontinence     Leg swelling     Mental retardation, 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)     Other ill-defined conditions(799.89)     edema legs    S/P biventricular cardiac pacemaker procedure 10/23/2015    10/23/15 [employer] biventricular pacemaker inmplant    Screen for colon cancer 2012      Past Surgical History:   Procedure Laterality Date    HX COLONOSCOPY  2017    HX PACEMAKER  2015    bi ventricular pacemaker       Prior to Admission medications    Medication Sig Start Date End Date Taking? Authorizing Provider   ammonium lactate (LAC-HYDRIN) 12 % lotion Apply  to affected area daily. rub in to affected area well   Yes Historical Provider   PHENYTOIN PO Take 100 mg by mouth nightly. Yes Mack Harding MD   levETIRAcetam 1,000 mg tablet Take 1,000 mg by mouth two (2) times a day. Yes Mack Harding MD   naproxen (NAPROSYN) 500 mg tablet Take 1 Tab by mouth two (2) times daily (with meals). 17  Yes MONISHA Charles   Cholecalciferol, Vitamin D3, 50,000 unit cap Take  by mouth every thirty (30) days.    Yes Historical Provider   lacosamide (VIMPAT) 100 mg tab tablet Take 1 Tab by mouth two (2) times a day. Max Daily Amount: 200 mg. 11/14/15  Yes Alysia Verde MD   metoprolol (LOPRESSOR) 25 mg tablet Take 0.5 Tabs by mouth two (2) times a day. 10/24/15  Yes Victor Manuel Deshpande MD   bumetanide (BUMEX) 2 mg tablet Take 2 mg by mouth daily. Yes Mack Harding MD   potassium chloride (K-DUR, KLOR-CON) 20 mEq tablet Take 20 mEq by mouth two (2) times a day. Yes Mack Harding MD   HYDROcodone-acetaminophen (NORCO) 5-325 mg per tablet Take 1 Tab by mouth every six (6) hours as needed for Pain. Max Daily Amount: 4 Tabs. 12/13/17   MONISHA Alexander   acetaminophen (TYLENOL) 325 mg tablet Take 650 mg by mouth every eight (8) hours as needed for Pain.     Mack Harding MD     Current Facility-Administered Medications   Medication Dose Route Frequency    lidocaine (PF) (XYLOCAINE) 10 mg/mL (1 %) injection 0.1 mL  0.1 mL SubCUTAneous PRN    lactated Ringers infusion  100 mL/hr IntraVENous CONTINUOUS    sodium chloride (NS) flush 5-10 mL  5-10 mL IntraVENous Q8H    sodium chloride (NS) flush 5-10 mL  5-10 mL IntraVENous PRN    sodium chloride (NS) flush 5-10 mL  5-10 mL IntraVENous Q8H    sodium chloride (NS) flush 5-10 mL  5-10 mL IntraVENous PRN    lidocaine (PF) (XYLOCAINE) 10 mg/mL (1 %) injection 0.1 mL  0.1 mL SubCUTAneous PRN     Facility-Administered Medications Ordered in Other Encounters   Medication Dose Route Frequency    midazolam (VERSED) injection   IntraVENous PRN    fentaNYL citrate (PF) injection    PRN    ropivacaine (PF) (NAROPIN) 5 mg/mL (0.5 %) injection   Peripheral Nerve Block PRN      No Known Allergies   Social History   Substance Use Topics    Smoking status: Never Smoker    Smokeless tobacco: Never Used    Alcohol use No      Family History   Problem Relation Age of Onset    Other Other      unable to obtain due to mental status    No Known Problems Sister         Review of Systems  A comprehensive review of systems was negative except for that written in the HPI. Objective:     Patient Vitals for the past 8 hrs:   BP Temp Pulse Resp SpO2 Height Weight   17 1059 128/42 97.9 °F (36.6 °C) 90 18 98 % 6' 3\" (1.905 m) 124.6 kg (274 lb 11.1 oz)     Temp (24hrs), Av.9 °F (36.6 °C), Min:97.9 °F (36.6 °C), Max:97.9 °F (36.6 °C)      Physical Exam:  General appearance: alert, cooperative, no distress, appears stated age  Lungs: No use of accessory breathing muscles. Breathing unlabored. Cardiac: Regular rate. Abdomen: soft, non-tender, non-distended  Extremities: As per prior exam.       Labs: No results found for this or any previous visit (from the past 24 hour(s)). Assessment:   No medical contraindications to proceeding with planned surgery. Please see initial office note for full discussion of risks, benefits, and alternatives to surgery. Patient Active Problem List    Diagnosis Date Noted    S/P biventricular cardiac pacemaker procedure 10/23/2015    Seizure disorder ([facility] [address] 10/20/2015     Class: Chronic    Mental retardation, 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) 10/20/2015     Class: Chronic    Advance care planning 10/20/2015    Hypertension 10/19/2015     Class: Chronic    Mobitz type II incomplete atrioventricular block 10/19/2015    Bilateral lower extremity edema 10/17/2015    Screen for colon cancer 2012    Bradycardia 2012         Plan:   Proceed with surgery  Pt. stable  Pt.  NPO x meds

## 2017-12-22 NOTE — IP AVS SNAPSHOT
Waldemar Chu 
 
 
 [address] [facility] 
[phone] Patient: Suha Quinn MRN: [id number] TFO:[id number] About your hospitalization You were admitted on:  December 22, 2017 You last received care in the:  [facility] PACU You were discharged on:  December 22, 2017 Why you were hospitalized Your primary diagnosis was:  Not on File Things You Need To Do (next 8 weeks) Follow up with Tere Naik MD  
  
Phone:  [phone] Where:  [address] [facility], [address] [facility] [address] Follow up with Carmen Hernandez MD in 1 week(s) Phone:  [phone] Where:  [address], [address] [facility] Thursday Dec 28, 2017 Follow Up with Sumit Don MD at  8:40 AM  
Where: [facility]) Discharge Orders None A check maggie indicates which time of day the medication should be taken. My Medications STOP taking these medications HYDROcodone-acetaminophen 5-325 mg per tablet Commonly known as:  [address] these medications as instructed Instructions Each Dose to Equal  
 Morning Noon Evening Bedtime  
 acetaminophen 325 mg tablet Commonly known as:  TYLENOL Your last dose was: Your next dose is: Take 650 mg by mouth every eight (8) hours as needed for Pain. 650 mg  
    
   
   
   
  
 ammonium lactate 12 % lotion Commonly known as:  LAC-HYDRIN Your last dose was: Your next dose is:    
   
   
 Apply  to affected area daily. rub in to affected area well  
     
   
   
   
  
 ascorbic acid (vitamin C) 500 mg tablet Commonly known as:  VITAMIN C Your last dose was: Your next dose is: Take 1 Tab by mouth daily for 42 days. 500 mg  
    
   
   
   
  
 bumetanide 2 mg tablet Commonly known as:  Mir Nadeem Your last dose was: Your next dose is: Take 2 mg by mouth daily. 2 mg Cholecalciferol (Vitamin D3) 50,000 unit Cap Your last dose was: Your next dose is: Take  by mouth every thirty (30) days. docusate sodium 50 mg capsule Commonly known as:  Rosa Milligan Your last dose was: Your next dose is: Take two tabs twice daily for two weeks, then twice a day as needed thereafter. Hold for loose stools. lacosamide 100 mg Tab tablet Commonly known as:  VIMPAT Your last dose was: Your next dose is: Take 1 Tab by mouth two (2) times a day. Max Daily Amount: 200 mg.  
 100 mg  
    
   
   
   
  
 levETIRAcetam 1,000 mg tablet Your last dose was: Your next dose is: Take 1,000 mg by mouth two (2) times a day. 1000 mg  
    
   
   
   
  
 metoprolol tartrate 25 mg tablet Commonly known as:  LOPRESSOR Your last dose was: Your next dose is: Take 0.5 Tabs by mouth two (2) times a day. 12.5 mg  
    
   
   
   
  
 naproxen 500 mg tablet Commonly known as:  NAPROSYN Your last dose was: Your next dose is: Take 1 Tab by mouth two (2) times daily (with meals). 500 mg  
    
   
   
   
  
 ondansetron 8 mg disintegrating tablet Commonly known as:  ZOFRAN ODT Your last dose was: Your next dose is: Take 1 Tab by mouth every eight (8) hours as needed for Nausea. 8 mg  
    
   
   
   
  
 oxyCODONE-acetaminophen 5-325 mg per tablet Commonly known as:  PERCOCET Your last dose was: Your next dose is:    
   
   
 1-2 tabs every 4hrs as needed for pain. Maximum daily dose 12 tablets. PHENYTOIN PO Your last dose was: Your next dose is: Take 100 mg by mouth nightly. 100 mg  
    
   
   
   
  
 potassium chloride 20 mEq tablet Commonly known as:  K-DUR, KLOR-CON Your last dose was: Your next dose is: Take 20 mEq by mouth two (2) times a day. 20 mEq Where to Get Your Medications Information on where to get these meds will be given to you by the nurse or doctor. ! Ask your nurse or doctor about these medications  
  ascorbic acid (vitamin C) 500 mg tablet  
 docusate sodium 50 mg capsule  
 ondansetron 8 mg disintegrating tablet  
 oxyCODONE-acetaminophen 5-325 mg per tablet Discharge Instructions Upper Extremity Surgery Discharge Instructions Dr. Brii Rasmussen Please take the time to review the following instructions before you leave the hospital and use them as guidelines during your recovery from surgery. If you have any questions, you may contact my office at [phone]. Wound Care / Dressing Change Do NOT remove your dressing or get them wet. Rola Martinez / Violetta Grayrtshante May bathe/shower as long as dressing/splint/cast is kept dry. Sling You are not required to wear a sling and should do so only as needed for comfort. You may remove your sling once the block wears off, which may be anywhere from 8-48 hrs after surgery. Activity Please begin using fingers immediately after surgery, working to improve motion of straightening and flexing your fingers several times per day. No lifting with your affected hand. Otherwise, you may proceed with activity as tolerated. No driving until you receive further notice otherwise. Ice and Elevation Keep your hand elevated continuously for 48 hours after surgery using the pillow provided. Your hand/wrist should always be above the level of your heart. Sleep with your arm elevated to minimize swelling and discomfort. Continue ice consistently for 48 hours after surgery. After 48 hours, you should ice 3 times per day for 20 minutes at a time for the next 5 days. After 1 week from surgery, you may use ice as needed for pain. Diet You may advance your regular diet as tolerated. Increase your clear liquid intake for the next 2-3 days. Medications 1. You will be given prescriptions for pain medication, and nausea when you are discharged from the hospital. Please use the medications as prescribed. Pain medications may cause constipation  over the counter Colace or Milk of Magnesia may be used as needed. Other possible side effects of pain medications are dizziness, headache, nausea, vomiting, and urinary retention. Discontinue the pain medication if you develop itching, rash, shortness of breath, or difficulties swallowing. If these symptoms become severe or arent relieved by discontinuing the medication, you should seek immediate medical attention. 2. Refills of pain medication are authorized during office hours only (8AM  5PM Monday through Friday) 3. If you were prescribed Percocet/Oxycodone or Dilaudid/Hydromorphone you must have a written prescription. These medications cannot be leagally called into the pharmacy. 4. Do not take Tylenol/Acetaminophen in addition to your pain medication, as most pain medications already contain this. Do not exceed 4000mg of Tylenol/Acetaminophen per day. 5. You may resume the medication you were taking prior to your surgery. Pain medication may change the effects of any antidepressant medication you may be taking. If you have any questions about possible interactions between your regular medication and the pain medication, you should consult the physician who prescribes your regular medications. 6. You were prescribed a nausea medication. It is only necessary to fill this if you are experiencing nausea. Please call the office at [phone] if you have any increasing numbness or tingling, increasing drainage on your dressing, fever greater than 100.5 degrees F or pain not controlled by medications. If you are experiencing chest pain or shortness of breath, please alert your primary care physician immediately. Going Home After A Peripheral Nerve Block Patient Information The anesthesiology team has provided for your pain control through a technique called regional anesthesia. As the name implies, anesthesia (decreased or no pain, sensation, or motor control) has been provided to a specific region, whether that be an arm or a leg. How does this happen?  you might ask. While you were sleepy, the anesthesia provider provided medicine to a specific group of nerves either in the neck/shoulder region or in the groin and/or buttock region, similar to the way a dentist might numb a tooth (teeth.)  They typically use an ultrasound machine to know where the medicine is placed in relation to the nerves they wish to numb up or block.   What this means is that for the next few hours, you should expect to have a numb limb. Below are some things we wish for you to read and be familiar with concerning your anesthetized limb. Caring For a Blocked Body Part General Considerations: ? The numbness may last up to 24 hours ? You must protect your arm or leg. The blocked extremity is numb, weak, and difficult for your brain to locate and communicate with. To do this you should: 
o Pay attention to the position of the blocked limb at all times. o Be very careful when placing hot or cod items on a numb limb. You could cause tissue damage like burns or frostbite if you are unable to feel temperature. Carefully follow your discharge instructions regarding the use of these therapies in you post-operative care. o Carefully pad the affected limb.   Normally we continually move and adjust the position of our bodies without thinking about it. This movement and continuous repositioning helps to prevent injuries from immobility. When a limb is numb it still requires this care 
o Be extremely careful not to bump or hit the numb body part. This can result in an unrecognized injury, at lease until the blocked limb wakes up. It can also result in worse pain of your already post-surgical limb. Arm/Shoulder Blocks: 
? You may experience a droopy eyelid, nasal stuffiness, and redness of the eye after receiving an arm/shoulder block. This is called Adolphs Syndrome, and is very common. There is no need for concern. You may also experience mild hoarseness, but all of these symptoms should resolve within 24 hours. ? Some patients may experience mild shortness of breath. A sitting position will help alleviate this and it should resolve within 24 hours. If you experience significant or progressive worsening of the shortness of breath, seek medical attention immediately. Knee/Foot Blocks: 
? DO NOT use the blocked leg to walk, balance or support yourself. Your leg will not be able to balance your weight properly while any part of your leg is numb. You are at a RISK for [address]. ? Be careful not to drag your foot along the ground or stub your toes. Contact Phone Numbers CALL 911 IN CASE OF AN EMERGENCY. For all other non-emergency inquiries call the [facility]  at [phone] and ask for the anesthesiologist on call to be paged. DISCHARGE SUMMARY from your Nurse PATIENT INSTRUCTIONS After general anesthesia or intravenous sedation, for 24 hours or while taking prescription Narcotics: · Limit your activities · Do not drive and operate hazardous machinery · Do not make important personal or business decisions · Do  not drink alcoholic beverages · If you have not urinated within 8 hours after discharge, please contact your surgeon on call. Report the following to your surgeon: 
· Excessive pain, swelling, redness or odor of or around the surgical area · Temperature over 100.5 · Nausea and vomiting lasting longer than 4 hours or if unable to take medications · Any signs of decreased circulation or nerve impairment to extremity: change in color, persistent  numbness, tingling, coldness or increase pain · Any questions [address] Breathing deeply and coughing are very important exercises to do after surgery. Deep breathing and coughing open the little air tubes and air sacks in your lungs. You take deep breaths every day. You may not even notice - it is just something you do when you sigh or yawn. It is a natural exercise you do to keep these air passages open. After surgery, take deep breaths and cough, on purpose. DIRECTIONS: 
· Take 10 to 15 slow deep breaths every hour while awake. · Breathe in deeply, and hold it for 2 seconds. · Exhale slowly through puckered lips, like blowing up a balloon. · After every 4th or 5th deep breath, hug your pillow to your chest or belly and give a hard, deep cough. Yes, it will probably hurt. But doing this exercise is a very important part of healing after surgery. Take your pain medicine to help you do this exercise without too much pain. Coughing and deep breathing help prevent bronchitis and pneumonia after surgery. If you had chest or belly surgery, use a pillow as a \"hug buddy\" and hold it tightly to your chest or belly when you cough. ANKLE PUMPS Ankle pumps increase the circulation of oxygenated blood to your lower extremities and decrease your risk for circulation problems such as blood clots. They also stretch the muscles, tendons and ligaments in your foot and ankle, and prevent joint contracture in the ankle and foot, especially after surgeries on the legs. It is important to do ankle pump exercises regularly after surgery because immobility increases your risk for developing a blood clot. Your doctor may also have you take an Aspirin for the next few days as well. If your doctor did not ask you to take an Aspirin, consult with him before starting Aspirin therapy on your own. The exercise is quite simple. · Slowly point your foot forward, feeling the muscles on the top of your lower leg stretch, and hold this position for 5 seconds. · Next, pull your foot back toward you as far as possible, stretching the calf muscles, and hold that position for 5 seconds. · Repeat with the other foot. · Perform 10 repetitions every hour while awake for both ankles if possible (down and then up with the foot once is one repetition). You should feel gentle stretching of the muscles in your lower leg when doing this exercise. If you feel pain, or your range of motion is limited, don't push too hard. Only go the limit your joint and muscles will let you go. If you have increasing pain, progressively worsening leg warmth or swelling, STOP the exercise and call your doctor. MEDICATION AND  
SIDE EFFECT GUIDE The [address] [place], [place] [address]d EFFECT GUIDE was provided to the PATIENT AND CARE PROVIDER. Information provided includes instruction about drug purpose and common side effects for the following medications:  
· OXYCODONE/ACETAMINOPHEN 
· DOCUSATE · ZOFRAN ODT · VITAMIN C These are general instructions for a healthy lifestyle: *   Please give a list of your current medications to your Primary Care Provider. *   Please update this list whenever your medications are discontinued, doses are changed, or new medications (including over-the-counter products) are added. *   Please carry medication information at all times in case of emergency situations. About Smoking No smoking / No tobacco products Avoid exposure to second hand smoke Surgeon General's Warning:  Quitting smoking now greatly reduces serious risk to your health. Obesity, smoking, and sedentary lifestyle greatly increases your risk for illness and disease. A healthy diet, regular physical exercise & weight monitoring are important for maintaining a healthy lifestyle. Congestive Heart Failure You may be retaining fluid if you have a history of heart failure or if you experience any of the following symptoms:  Weight gain of 3 pounds or more overnight or 5 pounds in a week, increased swelling in your hands or feet or shortness of breath while lying flat in bed. Please call your doctor as soon as you notice any of these symptoms; do not wait until your next office visit. Recognize signs and symptoms of STROKE: 
F -  Face looks uneven A -  Arms unable to move or move evenly S -  Speech slurred or non-existent T -  Time-call 911 as soon as signs and symptoms begin-DO NOT go  
       back to bed or wait to see if you get better-TIME IS BRAIN. Warning Signs of HEART ATTACK Call 911 if you have these symptoms: 
 
? Chest discomfort. Most heart attacks involve discomfort in the center of the chest that lasts more than a few minutes, or that goes away and comes back. It can feel like uncomfortable pressure, squeezing, fullness, or pain. ? Discomfort in other areas of the upper body. Symptoms can include pain or discomfort in one or both arms, the back, neck, jaw, or stomach. ? Shortness of breath with or without chest discomfort. ? Other signs may include breaking out in a cold sweat, nausea, or lightheadedness. Don't wait more than five minutes to call [address]! Fast action can save your life. Calling 911 is almost always the fastest way to get lifesaving treatment. Emergency Medical Services staff can begin treatment when they arrive  up to an hour sooner than if someone gets to the hospital by car. The discharge information has been reviewed with the patient and caregiver. Any questions and concerns from the patient and caregiver have been addressed. The patient and caregiver verbalized understanding. Other information in your discharge envelope: PRESCRIPTIONS PHYSICAL THERAPY PRESCRIPTION 
     APPOINTMENT CARDS Regional Anesthesia Pamphlet for block or block with On-Q Catheter from   Anesthesia Service Medical device information sheets/pamphlets from their  School/work excuse note. /parent work excuse note. The following personal items collected during your admission are returned to you:  
Dental Appliance: Dental Appliances: None Vision: Visual Aid: None Hearing Aid:   
Jewelry: Jewelry: None Clothing: Clothing: Pants, Undergarments, Shirt, Footwear, Socks Other Valuables: Other Valuables: Other (comment) (sling) Valuables sent to safe: Personal Items Sent to Safe: none Introducing [facility]! Kulwant Mcelroy introduces rel[employer]ality patient portal. Now you can access parts of your medical record, email your doctor's office, and request medication refills online. 1. In your internet browser, go to [email or web address] 2. Click on the First Time User? Click Here link in the Sign In box. You will see the New Member Sign Up page. 3. Enter your relocality Access Code exactly as it appears below. You will not need to use this code after youve completed the sign-up process. If you do not sign up before the expiration date, you must request a new code. · relocality Access Code: [email or web address] Expires: 3/13/2018  4:26 PM 
 
4. Enter the last four digits of your Social Security Number (xxxx) and Date of Birth (mm/dd/yyyy) as indicated and click Submit. You will be taken to the next sign-up page. 5. Create a relocality ID.  This will be your relocality login ID and cannot be changed, so think of one that is secure and easy to remember. 6. Create a [employer] password. You can change your password at any time. 7. Enter your Password Reset Question and Answer. This can be used at a later time if you forget your password. 8. Enter your e-mail address. You will receive e-mail notification when new information is available in [address] 9. Click Sign Up. You can now view and download portions of your medical record. 10. Click the Download Summary menu link to download a portable copy of your medical information. If you have questions, please visit the Frequently Asked Questions section of the [employer] website. Remember, [employer] is NOT to be used for urgent needs. For medical emergencies, dial 911. Now available from your iPhone and Android! Unresulted Labs-Please follow up with your PCP about these lab tests Order Current Status XR FLUOROSCOPY UNDER 60 MINUTES In process Providers Seen During Your Hospitalization Provider Specialty Primary office phone Twila Goldstein MD Orthopedic Surgery [phone] Your Primary Care Physician (PCP) Primary Care Physician Office Phone Office Fax Michael Dejah [phone] You are allergic to the following No active allergies Recent Documentation Height Weight BMI Smoking Status 1.905 m 124.6 kg 34.33 kg/m2 Never Smoker Emergency Contacts Name Discharge Info Relation Home Work Mobile [address] CAREGIVER [3] Sister [23] [phone] Gavino Fields DISCHARGE CAREGIVER [3] Caregiver [13] [phone] Patient Belongings The following personal items are in your possession at time of discharge: 
  Dental Appliances: None  Visual Aid: None      Home Medications: None   Jewelry: None  Clothing: Pants, Undergarments, Shirt, Footwear, Socks    Other Valuables: Other (comment) (sling)  Personal Items Sent to Safe: none Please provide this summary of care documentation to your next provider. Signatures-by signing, you are acknowledging that this After Visit Summary has been reviewed with you and you have received a copy. Patient Signature:  ____________________________________________________________ Date:  ____________________________________________________________  
  
Kim Melissa Provider Signature:  ____________________________________________________________ Date:  ____________________________________________________________

## 2017-12-22 NOTE — ANESTHESIA POSTPROCEDURE EVALUATION
Post-Anesthesia Evaluation and Assessment    Patient: Irlanda Armando MRN: [id number]  SSN: [id number]    YOB: 1945  Age: 81 y.o. Sex: male       Cardiovascular Function/Vital Signs  Visit Vitals    /48    Pulse 68    Temp 37.3 °C (99.1 °F)    Resp 12    Ht 6' 3\" (1.905 m)    Wt 124.6 kg (274 lb 11.1 oz)    SpO2 100%    BMI 34.33 kg/m2       Patient is status post regional anesthesia for Procedure(s):  ORIF RIGHT DISTAL RADIUS  . Nausea/Vomiting: None    Postoperative hydration reviewed and adequate. Pain:  Pain Scale 1: Adult Nonverbal Pain Scale (12/22/17 1406)  Pain Intensity 1: 0 (12/22/17 1406)   Managed    Neurological Status:   Neuro (WDL): Exceptions to WDL (12/22/17 1406)  Neuro  Neurologic State: Drowsy (12/22/17 1406)  LUE Motor Response: Weak (12/22/17 1406)  LLE Motor Response: Weak (12/22/17 1406)  RUE Motor Response: Numbness; No movement to any stimulus (12/22/17 1406)  RLE Motor Response: Weak (12/22/17 1406)   At baseline    Mental Status and Level of Consciousness: Arousable    Pulmonary Status:   O2 Device: CO2 nasal cannula (12/22/17 1406)   Adequate oxygenation and airway patent    Complications related to anesthesia: None    Post-anesthesia assessment completed.  No concerns    Signed By: Win Robison MD     December 22, 2017

## 2017-12-22 NOTE — OP NOTES
OPERATIVE NOTE     PREOPERATIVE DIAGNOSIS:  Displaced right distal radius fracture     POSTOPERATIVE DIAGNOSIS:  Displaced right distal radius fracture     PROCEDURE:   1. Open reduction internal fixation of distal radius fracture, right  2. Intraoperative use of fluoroscopy     IMPLANT: [employer] Geminus system     SURGEON: Saima Gomez MD     ANESTHESIA:  Regional     BLOOD LOSS: Minimal.      COMPLICATIONS: None.      SPECIMENS: None.     INDICATIONS: Chantel Pelayo is a 81 y.o. male who presented with a right distal radius fracture that was unstable based on criteria addressed in history and physical. After discussion of risks and benefits, including risks of bleeding, infection, damage to surrounding structures, failure to heal, further fracture, persistent pain, stiffness, and loss of function, risks of anesthesia, and other risks, the patient  elected to proceed with the above procedure and signed operative consent.     DESCRIPTION OF PROCEDURE:  The patient was seen and identified in the preanesthesia care unit. The operative site was marked. Preoperative questions were invited and answered. Risks and benefits of the procedure were again reviewed. The patient was then evaluated by the anesthesia team and brought to the operative suite on a stretcher and transferred to the operating room table in the supine position. Light sedation was induced. A well padded tourniquet was then placed high on the patient's operative extremity. The patient was then prepped and draped in the usual sterile fashion. Preoperative Ancef was given. A timeout was completed confirming the appropriate site, side, and procedure. DVT prophylaxis was not needed intraoperatively secondary to the duration of the case. The operative extremity was then exsanguinated using an Esmarch bandage, and the tourniquet was inflated to 250 mmHg. A standard volar approach to the wrist was performed.  A longitudinal incision was made over the FCR tendon, angled distally toward the radial styloid. The FCR sheath was identified, and this was divided longitudinally with care taken to place incision over the radial aspect of the tendon sheath. The FCR tendon was retracted ulnarly, and the floor of the sheath was similarly divided. Flexor tendons and median nerve were retracted ulnarly and protected. Pronator quadratus muscle was released from distal radius in standard L fashion. Fracture lines were visualized. Fracture was reduced and provisionally stabilized . Satisfactory reduction was then confirmed using fluoroscopy. An appropriately sized volar locking plate was applied to the volar surface of the distal radius and after appropriate position was confirmed after placement of the distal K wires through the plate/K-wire guide, this was secured to the plate using a cortical screw. After appropriate position and trajectory of the distal locking fixation was confirmed using the distal jig and K-wires, the distal fixation was inserted. The remaining diaphyseal fixation was then inserted. Provisional fixation was then removed. Satisfactory reduction and position of the hardware was then confirmed using fluoroscopy, and multiple views were taken including 20 degree lateral view confirming absence of screws in the radiocarpal and distal radioulnar joints. The distal radial ulnar joint was then assessed clinically and noted to be stable. Tourniquet deflated, and hemostasis was achieved using bipolar cautery. The wound was irrigated. The pronator quadratus was repaired as much as possible with 3-0 Vicryl. Incision closed with Vicryl in the subcutaneous layer and Monocryl in the skin. Sterile dressing was applied. Wrist immobilized in a well padded plaster splint. The patient was transferred to the recovery room in stable condition after all counts were correct.     POSTOPERATIVE PLAN: The patient will return for wound check and transition to a removable splint. Anticipate beginning wrist ROM at one week post-op. In the meantime, the patient will starting aggressive finger range of motion exercises without resistance. Vitamin C for six weeks post-operatively.

## 2017-12-22 NOTE — ANESTHESIA PROCEDURE NOTES
Peripheral Block    Start time: 12/22/2017 11:39 AM  End time: 12/22/2017 11:44 AM  Performed by: Camden Medeiros  Authorized by: Deisy Rodgers:    Indications: at surgeon's request and primary anesthetic    Preanesthetic Checklist: patient identified, risks and benefits discussed, site marked, timeout performed, anesthesia consent given and patient being monitored    Timeout Time: 11:39          Block Type:   Block Type:  Supraclavicular  Laterality:  Right  Monitoring:  Continuous pulse ox, frequent vital sign checks, heart rate, responsive to questions and oxygen  Injection Technique:  Single shot  Procedures: ultrasound guided and nerve stimulator    Patient Position: supine  Prep: chlorhexidine    Location:  Supraclavicular  Needle Type:  Stimuplex  Needle Gauge:  22 G  Needle Localization:  Anatomical landmarks and ultrasound guidance  Medication Injected:  0.5%  ropivacaine  Volume (mL):  30    Assessment:  Number of attempts:  1  Injection Assessment:  Incremental injection every 5 mL, local visualized surrounding nerve on ultrasound, negative aspiration for blood, no paresthesia and no intravascular symptoms  Patient tolerance:  Patient tolerated the procedure well with no immediate complications

## 2017-12-22 NOTE — ANESTHESIA PREPROCEDURE EVALUATION
Anesthetic History   No history of anesthetic complications            Review of Systems / Medical History  Patient summary reviewed, nursing notes reviewed and pertinent labs reviewed    Pulmonary  Within defined limits                 Neuro/Psych     seizures        Comments: Mild MR Cardiovascular  Within defined limits  Hypertension        Dysrhythmias   Pacemaker (BiV paced)         GI/Hepatic/Renal  Within defined limits              Endo/Other  Within defined limits           Other Findings              Physical Exam    Airway  Mallampati: II  TM Distance: 4 - 6 cm  Neck ROM: normal range of motion   Mouth opening: Normal     Cardiovascular    Rhythm: regular  Rate: normal         Dental  No notable dental hx       Pulmonary  Breath sounds clear to auscultation               Abdominal         Other Findings            Anesthetic Plan    ASA: 2  Anesthesia type: regional - supraclavicular block

## 2017-12-22 NOTE — DISCHARGE INSTRUCTIONS
Upper Extremity Surgery Discharge Instructions  Dr. Brii Rasmussen    Please take the time to review the following instructions before you leave the hospital and use them as guidelines during your recovery from surgery. If you have any questions, you may contact my office at [phone]. Wound Care / Dressing Change    Do NOT remove your dressing or get them wet. [employer] / Bathing    May bathe/shower as long as dressing/splint/cast is kept dry. Sling    You are not required to wear a sling and should do so only as needed for comfort. You may remove your sling once the block wears off, which may be anywhere from 8-48 hrs after surgery. Activity    Please begin using fingers immediately after surgery, working to improve motion of straightening and flexing your fingers several times per day. No lifting with your affected hand. Otherwise, you may proceed with activity as tolerated. No driving until you receive further notice otherwise. Ice and Elevation    Keep your hand elevated continuously for 48 hours after surgery using the pillow provided. Your hand/wrist should always be above the level of your heart. Sleep with your arm elevated to minimize swelling and discomfort. Continue ice consistently for 48 hours after surgery. After 48 hours, you should ice 3 times per day for 20 minutes at a time for the next 5 days. After 1 week from surgery, you may use ice as needed for pain. Diet    You may advance your regular diet as tolerated. Increase your clear liquid intake for the next 2-3 days. Medications    1. You will be given prescriptions for pain medication, and nausea when you are discharged from the hospital. Please use the medications as prescribed. Pain medications may cause constipation - over the counter Colace or Milk of Magnesia may be used as needed.  Other possible side effects of pain medications are dizziness, headache, nausea, vomiting, and urinary retention. Discontinue the pain medication if you develop itching, rash, shortness of breath, or difficulties swallowing. If these symptoms become severe or arent relieved by discontinuing the medication, you should seek immediate medical attention. 2. Refills of pain medication are authorized during office hours only (8AM - 5PM Monday through Friday)  3. If you were prescribed Percocet/Oxycodone or Dilaudid/Hydromorphone you must have a written prescription. These medications cannot be leagally called into the pharmacy. 4. Do not take Tylenol/Acetaminophen in addition to your pain medication, as most pain medications already contain this. Do not exceed 4000mg of Tylenol/Acetaminophen per day. 5. You may resume the medication you were taking prior to your surgery. Pain medication may change the effects of any antidepressant medication you may be taking. If you have any questions about possible interactions between your regular medication and the pain medication, you should consult the physician who prescribes your regular medications. 6. You were prescribed a nausea medication. It is only necessary to fill this if you are experiencing nausea. Please call the office at [phone] if you have any increasing numbness or tingling, increasing drainage on your dressing, fever greater than 100.5 degrees F or pain not controlled by medications. If you are experiencing chest pain or shortness of breath, please alert your primary care physician immediately. Going Home After A  Peripheral Nerve Block    Patient Information    The anesthesiology team has provided for your pain control through a technique called regional anesthesia. As the name implies, anesthesia (decreased or no pain, sensation, or motor control) has been provided to a specific region, whether that be an arm or a leg. How does this happen?  you might ask.     While you were sleepy, the anesthesia provider provided medicine to a specific group of nerves either in the neck/shoulder region or in the groin and/or buttock region, similar to the way a dentist might numb a tooth (teeth.)  They typically use an ultrasound machine to know where the medicine is placed in relation to the nerves they wish to numb up or block.   What this means is that for the next few hours, you should expect to have a numb limb. Below are some things we wish for you to read and be familiar with concerning your anesthetized limb. Caring For a Blocked Body Part    General Considerations:   The numbness may last up to 24 hours   You must protect your arm or leg. The blocked extremity is numb, weak, and difficult for your brain to locate and communicate with. To do this you should:  o Pay attention to the position of the blocked limb at all times. o Be very careful when placing hot or cod items on a numb limb. You could cause tissue damage like burns or frostbite if you are unable to feel temperature. Carefully follow your discharge instructions regarding the use of these therapies in you post-operative care. o Carefully pad the affected limb. Normally we continually move and adjust the position of our bodies without thinking about it. This movement and continuous repositioning helps to prevent injuries from immobility. When a limb is numb it still requires this care  o Be extremely careful not to bump or hit the numb body part. This can result in an unrecognized injury, at lease until the blocked limb wakes up. It can also result in worse pain of your already post-surgical limb. Arm/Shoulder Blocks:   You may experience a droopy eyelid, nasal stuffiness, and redness of the eye after receiving an arm/shoulder block. This is called Adolphs Syndrome, and is very common. There is no need for concern. You may also experience mild hoarseness, but all of these symptoms should resolve within 24 hours.    Some patients may experience mild shortness of breath. A sitting position will help alleviate this and it should resolve within 24 hours. If you experience significant or progressive worsening of the shortness of breath, seek medical attention immediately. Knee/Foot Blocks:   DO NOT use the blocked leg to walk, balance or support yourself. Your leg will not be able to balance your weight properly while any part of your leg is numb. You are at a RISK for [address].  Be careful not to drag your foot along the ground or stub your toes. Contact Phone Numbers    CALL 911 IN CASE OF AN EMERGENCY. For all other non-emergency inquiries call the [facility]  at [phone] and ask for the anesthesiologist on call to be paged. DISCHARGE SUMMARY from your Nurse      PATIENT INSTRUCTIONS    After general anesthesia or intravenous sedation, for 24 hours or while taking prescription Narcotics:  · Limit your activities  · Do not drive and operate hazardous machinery  · Do not make important personal or business decisions  · Do  not drink alcoholic beverages  · If you have not urinated within 8 hours after discharge, please contact your surgeon on call. Report the following to your surgeon:  · Excessive pain, swelling, redness or odor of or around the surgical area  · Temperature over 100.5  · Nausea and vomiting lasting longer than 4 hours or if unable to take medications  · Any signs of decreased circulation or nerve impairment to extremity: change in color, persistent  numbness, tingling, coldness or increase pain  · Any questions      COUGH AND DEEP BREATHE    Breathing deeply and coughing are very important exercises to do after surgery. Deep breathing and coughing open the little air tubes and air sacks in your lungs. You take deep breaths every day. You may not even notice - it is just something you do when you sigh or yawn.   It is a natural exercise you do to keep these air passages open. After surgery, take deep breaths and cough, on purpose. DIRECTIONS:  · Take 10 to 15 slow deep breaths every hour while awake. · Breathe in deeply, and hold it for 2 seconds. · Exhale slowly through puckered lips, like blowing up a balloon. · After every 4th or 5th deep breath, hug your pillow to your chest or belly and give a hard, deep cough. Yes, it will probably hurt. But doing this exercise is a very important part of healing after surgery. Take your pain medicine to help you do this exercise without too much pain. Coughing and deep breathing help prevent bronchitis and pneumonia after surgery. If you had chest or belly surgery, use a pillow as a \"hug rcoky\" and hold it tightly to your chest or belly when you cough. ANKLE PUMPS    Ankle pumps increase the circulation of oxygenated blood to your lower extremities and decrease your risk for circulation problems such as blood clots. They also stretch the muscles, tendons and ligaments in your foot and ankle, and prevent joint contracture in the ankle and foot, especially after surgeries on the legs. It is important to do ankle pump exercises regularly after surgery because immobility increases your risk for developing a blood clot. Your doctor may also have you take an Aspirin for the next few days as well. If your doctor did not ask you to take an Aspirin, consult with him before starting Aspirin therapy on your own. The exercise is quite simple. · Slowly point your foot forward, feeling the muscles on the top of your lower leg stretch, and hold this position for 5 seconds. · Next, pull your foot back toward you as far as possible, stretching the calf muscles, and hold that position for 5 seconds. · Repeat with the other foot. · Perform 10 repetitions every hour while awake for both ankles if possible (down and then up with the foot once is one repetition).     You should feel gentle stretching of the muscles in your lower leg when doing this exercise. If you feel pain, or your range of motion is limited, don't push too hard. Only go the limit your joint and muscles will let you go. If you have increasing pain, progressively worsening leg warmth or swelling, STOP the exercise and call your doctor. MEDICATION AND   SIDE EFFECT GUIDE    The [facility] MEDICATION AND SIDE EFFECT GUIDE was provided to the PATIENT AND CARE PROVIDER. Information provided includes instruction about drug purpose and common side effects for the following medications:   · OXYCODONE/ACETAMINOPHEN  · DOCUSATE  · ZOFRAN ODT  · VITAMIN C          These are general instructions for a healthy lifestyle:    *   Please give a list of your current medications to your Primary Care Provider. *   Please update this list whenever your medications are discontinued, doses are changed, or new medications (including over-the-counter products) are added. *   Please carry medication information at all times in case of emergency situations. About Smoking  No smoking / No tobacco products  Avoid exposure to second hand smoke     Surgeon General's Warning:  Quitting smoking now greatly reduces serious risk to your health. Obesity, smoking, and sedentary lifestyle greatly increases your risk for illness and disease. A healthy diet, regular physical exercise & weight monitoring are important for maintaining a healthy lifestyle. Congestive Heart Failure  You may be retaining fluid if you have a history of heart failure or if you experience any of the following symptoms:  Weight gain of 3 pounds or more overnight or 5 pounds in a week, increased swelling in your hands or feet or shortness of breath while lying flat in bed. Please call your doctor as soon as you notice any of these symptoms; do not wait until your next office visit.       Recognize signs and symptoms of STROKE:  F -  Face looks uneven  A - Arms unable to move or move evenly  S -  Speech slurred or non-existent  T -  Time-call 911 as soon as signs and symptoms begin-DO NOT go          back to bed or wait to see if you get better-TIME IS BRAIN. Warning Signs of HEART ATTACK   Call 911 if you have these symptoms:     Chest discomfort. Most heart attacks involve discomfort in the center of the chest that lasts more than a few minutes, or that goes away and comes back. It can feel like uncomfortable pressure, squeezing, fullness, or pain.  Discomfort in other areas of the upper body. Symptoms can include pain or discomfort in one or both arms, the back, neck, jaw, or stomach.  Shortness of breath with or without chest discomfort.  Other signs may include breaking out in a cold sweat, nausea, or lightheadedness. Don't wait more than five minutes to call 911 - MINUTES MATTER! Fast action can save your life. Calling 911 is almost always the fastest way to get lifesaving treatment. Emergency Medical Services staff can begin treatment when they arrive -- up to an hour sooner than if someone gets to the hospital by car. The discharge information has been reviewed with the patient and caregiver. Any questions and concerns from the patient and caregiver have been addressed. The patient and caregiver verbalized understanding. Other information in your discharge envelope:  []     PRESCRIPTIONS  []     PHYSICAL THERAPY PRESCRIPTION  []     APPOINTMENT CARDS  []     Regional Anesthesia Pamphlet for block or block with On-Q Catheter from   Anesthesia Service  []     Medical device information sheets/pamphlets from their    []     School/work excuse note. []     /parent work excuse note.         The following personal items collected during your admission are returned to you:   Dental Appliance: Dental Appliances: None  Vision: Visual Aid: None  Hearing Aid:    Jewelry: Jewelry: None  Clothing: Clothing: Pants, Undergarments, Shirt, Footwear, Socks  Other Valuables: Other Valuables:  Other (comment) (sling)  Valuables sent to safe: Personal Items Sent to Safe: none

## 2017-12-27 LAB — HGB BLD-MCNC: 12.7 G/DL (ref 12.1–17)

## 2017-12-28 ENCOUNTER — OFFICE VISIT (OUTPATIENT)
Dept: NEUROLOGY | Age: 72
End: 2017-12-28

## 2017-12-28 VITALS
DIASTOLIC BLOOD PRESSURE: 72 MMHG | WEIGHT: 271.8 LBS | SYSTOLIC BLOOD PRESSURE: 124 MMHG | HEIGHT: 75 IN | BODY MASS INDEX: 33.8 KG/M2 | OXYGEN SATURATION: 98 % | HEART RATE: 65 BPM

## 2017-12-28 DIAGNOSIS — R20.2 PARESTHESIA: ICD-10-CM

## 2017-12-28 DIAGNOSIS — G40.209 PARTIAL SYMPTOMATIC EPILEPSY WITH COMPLEX PARTIAL SEIZURES, NOT INTRACTABLE, WITHOUT STATUS EPILEPTICUS (HCC): Primary | ICD-10-CM

## 2017-12-28 DIAGNOSIS — F79 MR (MENTAL RETARDATION): ICD-10-CM

## 2017-12-28 RX ORDER — ERGOCALCIFEROL 1.25 MG/1
50000 CAPSULE ORAL
COMMUNITY
End: 2019-02-11 | Stop reason: SDUPTHER

## 2017-12-28 NOTE — PROGRESS NOTES
Neurology Progress Note    NAME:  Leanne Quiñonez   :   1945   MRN:   [id number]     Date/Time:  2017  Subjective:      Leanne Quiñonez is a 81 y.o. male here today for  follow up for seizure and neuropathy. Patient was accompanied by  his  care giver. Patient came in with arm string, according to patient's care giver , he fell at his job and broke his hand, however, there was no loss of consciousness or seizure activity . On reviewing patient's medication, I noticed that he was still taking the anti epileptic medications that I discontinued. As I stated previously , patient did not need the AEDs that he was put on, needs only one medication to maintain his seizure. Apparently , different physicans were prescribing different medications at different times. I am once again discontinuing Keppra,and Dilantin, patient should be only on Vimpat. Patient has not had seizure in a long time.   Review of Systems - General ROS: negative for - fatigue or sleep disturbance  Psychological ROS: negative for - anxiety, depression or sleep disturbances  Ophthalmic ROS: negative for - blurry vision, double vision, loss of vision, photophobia or uses glasses  ENT ROS: positive for - epistaxis, headaches, vertigo and visual changes  negative for - tinnitus  Allergy and Immunology ROS: negative  Hematological and Lymphatic ROS: negative  Endocrine ROS: negative  Respiratory ROS: no cough, shortness of breath, or wheezing  Cardiovascular ROS: no chest pain or dyspnea on exertion  Gastrointestinal ROS: no abdominal pain, change in bowel habits, or black or bloody stools  Genito-Urinary ROS: no dysuria, trouble voiding, or hematuria  Musculoskeletal ROS: positive for - joint pain and joint stiffness  Neurological ROS: positive for - numbness/tingling and tremors  Dermatological ROS: negative    Medications reviewed:  Current Outpatient Prescriptions   Medication Sig Dispense Refill    ergocalciferol (VITAMIN D2) 50,000 unit capsule Take 50,000 Units by mouth.  ammonium lactate (LAC-HYDRIN) 12 % lotion Apply  to affected area daily. rub in to affected area well      docusate sodium (COLACE) 50 mg capsule Take two tabs twice daily for two weeks, then twice a day as needed thereafter. Hold for loose stools. 60 Cap 2    ondansetron (ZOFRAN ODT) 8 mg disintegrating tablet Take 1 Tab by mouth every eight (8) hours as needed for Nausea. 20 Tab 2    oxyCODONE-acetaminophen (PERCOCET) 5-325 mg per tablet 1-2 tabs every 4hrs as needed for pain. Maximum daily dose 12 tablets. 80 Tab 0    ascorbic acid, vitamin C, (VITAMIN C) 500 mg tablet Take 1 Tab by mouth daily for 42 days. 42 Tab 0    PHENYTOIN PO Take 100 mg by mouth nightly.  levETIRAcetam 1,000 mg tablet Take 1,000 mg by mouth two (2) times a day.  lacosamide (VIMPAT) 100 mg tab tablet Take 1 Tab by mouth two (2) times a day. Max Daily Amount: 200 mg. 60 Tab 1    metoprolol (LOPRESSOR) 25 mg tablet Take 0.5 Tabs by mouth two (2) times a day. 60 Tab 3    bumetanide (BUMEX) 2 mg tablet Take 2 mg by mouth daily.  acetaminophen (TYLENOL) 325 mg tablet Take 650 mg by mouth every eight (8) hours as needed for Pain.  potassium chloride (K-DUR, KLOR-CON) 20 mEq tablet Take 20 mEq by mouth two (2) times a day.  naproxen (NAPROSYN) 500 mg tablet Take 1 Tab by mouth two (2) times daily (with meals). 20 Tab 0    Cholecalciferol, Vitamin D3, 50,000 unit cap Take  by mouth every thirty (30) days.           Objective:   Vitals:  Vitals:    12/28/17 0855   BP: 124/72   Pulse: 65   SpO2: 98%   Weight: 271 lb 12.8 oz (123.3 kg)   Height: 6' 3\" (1.905 m)   PainSc:   1               Lab Data Reviewed:  Lab Results   Component Value Date/Time    WBC 5.6 10/23/2015 04:43 AM    HCT 41.2 10/23/2015 04:43 AM    HGB 13.7 10/23/2015 04:43 AM    PLATELET 739 77/04/2643 04:43 AM       Lab Results   Component Value Date/Time    Sodium 136 11/14/2015 10:58 AM    Potassium 3.3 11/14/2015 10:58 AM    Chloride 102 11/14/2015 10:58 AM    CO2 28 11/14/2015 10:58 AM    Glucose 107 11/14/2015 10:58 AM    BUN 16 11/14/2015 10:58 AM    Creatinine 0.86 11/14/2015 10:58 AM    Calcium 8.0 11/14/2015 10:58 AM       No components found for: TROPQUANT    No results found for: JOSE      No results found for: HBA1C, HGBE8, NIQ5XOUH, BPI1DYSV     No results found for: B12LT, FOL, RBCF    No results found for: JOSE, ANARX, ANAIGG, XBANA    No results found for: CHOL, CHOLPOCT, CHOLX, CHLST, CHOLV, HDL, HDLPOC, LDL, LDLCPOC, LDLC, DLDLP, VLDLC, VLDL, TGLX, TRIGL, TRIGP, TGLPOCT, CHHD, CHHDX      CT Results (recent):    Results from Hospital Encounter encounter on 10/17/15   CT MAXILLOFACIAL WO CONT   Narrative **Final Report**      ICD Codes / Adm. Diagnosis: 97  276.1 / Seizure  Seizure  Examination:  CT MAXILLOFACIAL WO CON  - [id number] - Oct 17 2015 10:33AM  Accession No:  [id number]  Reason:  head trauma after seizure      REPORT:  INDICATION:  head trauma after seizure    EXAM: CT MAXILLOFACIAL STRUCTURES WITHOUT CONTRAST. Comparison: CT head 10/22/2008. PROCEDURE: Sequential axial images of the maxillofacial bones were   performed, using bone algorithm. Soft tissue and bone windows were examined. Post-processing for coronal reformatting was performed. Contrast was not   administered. FINDINGS: No fracture is obvious. The orbital rims and floors of orbits are   intact. The nasal bone is intact, with a flattened configuration which is   stable since older head CTs. . The bony structures of the mandible and   maxilla show no acute abnormality. There are no air-fluid levels in the   paranasal sinuses, and no soft tissue swelling is noted focally. Minimal   mucoperiosteal thickening. No obvious mass or abscess. Normal sized lymph   nodes scattered throughout the cervical chains. IMPRESSION: No acute bony abnormality of the maxillofacial structures.            Signing/Reading Doctor: Kim Colon [place] ([id number])    Lexi Au ([id number])  Oct 17 2015 10:53AM                                MRI Results (recent):  No results found for this or any previous visit. IR Results (recent):  No results found for this or any previous visit. VAS/US Results (recent):  No results found for this or any previous visit. PHYSICAL EXAM:  General:    Alert, cooperative, no distress, appears stated age. Head:   Normocephalic, without obvious abnormality, atraumatic. Eyes:   Conjunctivae/corneas clear. PERRLA  Nose:  Nares normal. No drainage or sinus tenderness. Throat:    Lips, mucosa, and tongue normal.  No Thrush  Neck:  Supple, symmetrical,  no adenopathy, thyroid: non tender    no carotid bruit and no JVD. Back:    Symmetric,  No CVA tenderness. Lungs:   Clear to auscultation bilaterally. No Wheezing or Rhonchi. No rales. Chest wall:  No tenderness or deformity. No Accessory muscle use. Heart:   Regular rate and rhythm,  no murmur, rub or gallop. Abdomen:   Soft, non-tender. Not distended. Bowel sounds normal. No masses  Extremities: Extremities normal, atraumatic, No cyanosis. No edema. No clubbing. In right arm string. Skin:     Texture, turgor normal. No rashes or lesions. Not Jaundiced  Lymph nodes: Cervical, supraclavicular normal.  Psych:  Good insight. Not depressed. Not anxious or agitated. NEUROLOGICAL EXAM:  Appearance: The patient is well developed, well nourished, provides a coherent history and is in no acute distress. Mental Status: Oriented to time, place and person. Mood and affect appropriate. Cranial Nerves:   Intact visual fields. Fundi are benign. ANGELINA, EOM's full, no nystagmus, no ptosis. Facial sensation is normal. Corneal reflexes are intact. Facial movement is symmetric. Hearing is normal bilaterally. Palate is midline with normal sternocleidomastoid and trapezius muscles are normal. Tongue is midline.    Motor:  5/5 strength in upper and lower proximal and distal muscles. Normal bulk and tone. No fasciculations. Reflexes:   Deep tendon reflexes 2+/4 and symmetrical.   Sensory:   Dysesthesia to touch, pinprick and vibration. Gait:  Normal gait. Tremor:   Tremor noted. Cerebellar:  No cerebellar signs present. Neurovascular:  Normal heart sounds and regular rhythm, peripheral pulses intact, and no carotid bruits. Assesment  1. Partial symptomatic epilepsy with complex partial seizures, not intractable, without status epilepticus ([facility])  Stable  Discontinue Keppra and Dilantin. Continue Vimpat 100mg p.o. bid    2. Paresthesia  Stable    3. MR (mental retardation)  Mild MR    ___________________________________________________  PLAN:D/c Keppra  D/c Dilantin  Continue Vimpat. ICD-10-CM ICD-9-CM    1. Partial symptomatic epilepsy with complex partial seizures, not intractable, without status epilepticus ([facility] [address] G40.209 345.40    2. Paresthesia R20.2 782.0    3. MR (mental retardation) F79 319      Follow-up Disposition:  Return in about 1 year (around 12/28/2018).            ___________________________________________________    Total time spent with patient:  []15   []25   [x]35   [] __ minutes    Care Plan discussed with:    [x]Patient   []Family    [x]Care Manager   []Consultant/Specialist :    ___________________________________________________    Attending Physician: Ricki Libman, MD

## 2017-12-28 NOTE — MR AVS SNAPSHOT
Visit Information Date & Time Provider Department Dept. Phone Encounter #  
 12/28/2017  8:40 AM Flynn Pastor MD [facility] [phone] [id number] Follow-up Instructions Return in about 1 year (around 12/28/2018). Your Appointments 2/13/2018  8:45 AM  
PACEMAKER with PACEMAKER, [facility] Beatrice Mcginnis) Appt Note: . [address]  
[phone] [address]  
  
    
 2/13/2018  9:30 AM  
ANNUAL with MD [facility] Appt Note: . [address]  
[phone] [address] Upcoming Health Maintenance Date Due Hepatitis C Screening 1945 FOBT Q 1 YEAR AGE 50-75 1/16/1997 ZOSTER VACCINE AGE 60> 11/16/2006 GLAUCOMA SCREENING Q2Y 1/16/2012 MEDICARE YEARLY EXAM 1/16/2012 Pneumococcal 65+ Low/Medium Risk (2 of 2 - PCV13) 10/18/2016 Influenza Age 5 to Adult 8/1/2017 DTaP/Tdap/Td series (2 - Td) 10/17/2025 Allergies as of 12/28/2017  Review Complete On: 12/28/2017 By: Cachorro Naidu MD  
 No Known Allergies Current Immunizations  Never Reviewed Name Date Pneumococcal Polysaccharide (PPSV-23) 10/18/2015 10:22 AM  
 Tdap 10/17/2015 11:04 AM  
  
 Not reviewed this visit You Were Diagnosed With   
  
 Codes Comments Partial symptomatic epilepsy with complex partial seizures, not intractable, without status epilepticus ([facility] [address]    -  Primary ICD-10-CM: L31.431 ICD-9-CM: 345.40 Paresthesia     ICD-10-CM: R20.2 ICD-9-CM: 782.0 MR (mental retardation)     ICD-10-CM: C22 
ICD-9-CM: 197 Vitals BP Pulse Height(growth percentile) Weight(growth percentile) SpO2 BMI 124/72 65 6' 3\" (1.905 m) 271 lb 12.8 oz (123.3 kg) 98% 33.97 kg/m2 Smoking Status Never Smoker BMI and BSA Data Body Mass Index Body Surface Area  
 33.97 kg/m 2 2.55 m 2 Preferred Pharmacy Pharmacy Name Phone [facility] [phone] Your Updated Medication List  
  
   
This list is accurate as of: 12/28/17  9:22 AM.  Always use your most recent med list.  
  
  
  
  
 acetaminophen 325 mg tablet Commonly known as:  TYLENOL Take 650 mg by mouth every eight (8) hours as needed for Pain. ammonium lactate 12 % lotion Commonly known as:  LAC-HYDRIN Apply  to affected area daily. rub in to affected area well  
  
 ascorbic acid (vitamin C) 500 mg tablet Commonly known as:  VITAMIN C Take 1 Tab by mouth daily for 42 days. bumetanide 2 mg tablet Commonly known as:  Priest [place] citizen Take 2 mg by mouth daily. Cholecalciferol (Vitamin D3) 50,000 unit Cap Take  by mouth every thirty (30) days. docusate sodium 50 mg capsule Commonly known as:  Bell Dilling Take two tabs twice daily for two weeks, then twice a day as needed thereafter. Hold for loose stools. lacosamide 100 mg Tab tablet Commonly known as:  VIMPAT Take 1 Tab by mouth two (2) times a day. Max Daily Amount: 200 mg.  
  
 metoprolol tartrate 25 mg tablet Commonly known as:  LOPRESSOR Take 0.5 Tabs by mouth two (2) times a day. naproxen 500 mg tablet Commonly known as:  NAPROSYN Take 1 Tab by mouth two (2) times daily (with meals). ondansetron 8 mg disintegrating tablet Commonly known as:  ZOFRAN ODT Take 1 Tab by mouth every eight (8) hours as needed for Nausea. oxyCODONE-acetaminophen 5-325 mg per tablet Commonly known as:  PERCOCET  
1-2 tabs every 4hrs as needed for pain. Maximum daily dose 12 tablets. potassium chloride 20 mEq tablet Commonly known as:  K-DUR, KLOR-CON Take 20 mEq by mouth two (2) times a day. VITAMIN D2 50,000 unit capsule Generic drug:  ergocalciferol Take 50,000 Units by mouth. Follow-up Instructions Return in about 1 year (around 12/28/2018). Patient Instructions A Healthy Lifestyle: Care Instructions Your Care Instructions A healthy lifestyle can help you feel good, stay at a healthy weight, and have plenty of energy for both work and play. A healthy lifestyle is something you can share with your whole family. A healthy lifestyle also can lower your risk for serious health problems, such as high blood pressure, heart disease, and diabetes. You can follow a few steps listed below to improve your health and the health of your family. Follow-up care is a key part of your treatment and safety. Be sure to make and go to all appointments, and call your doctor if you are having problems. It's also a good idea to know your test results and keep a list of the medicines you take. How can you care for yourself at home? · Do not eat too much sugar, fat, or fast foods. You can still have dessert and treats now and then. The goal is moderation. · Start small to improve your eating habits. Pay attention to portion sizes, drink less juice and soda pop, and eat more fruits and vegetables. ¨ Eat a healthy amount of food. A 3-ounce serving of meat, for example, is about the size of a deck of cards. Fill the rest of your plate with vegetables and whole grains. ¨ Limit the amount of soda and sports drinks you have every day. Drink more water when you are thirsty. ¨ Eat at least 5 servings of fruits and vegetables every day. It may seem like a lot, but it is not hard to reach this goal. A serving or helping is 1 piece of fruit, 1 cup of vegetables, or 2 cups of leafy, raw vegetables.  Have an apple or some carrot sticks as an afternoon snack instead of a candy bar. Try to have fruits and/or vegetables at every meal. 
· Make exercise part of your daily routine. You may want to start with simple activities, such as walking, bicycling, or slow swimming. Try to be active 30 to 60 minutes every day. You do not need to do all 30 to 60 minutes all at once. For example, you can exercise 3 times a day for 10 or 20 minutes. Moderate exercise is safe for most people, but it is always a good idea to talk to your doctor before starting an exercise program. 
· Keep moving. Delice Goltz the lawn, work in the garden, or [employer]. Take the stairs instead of the elevator at work. · If you smoke, quit. People who smoke have an increased risk for heart attack, stroke, cancer, and other lung illnesses. Quitting is hard, but there are ways to boost your chance of quitting tobacco for good. ¨ Use nicotine gum, patches, or lozenges. ¨ Ask your doctor about stop-smoking programs and medicines. ¨ Keep trying. In addition to reducing your risk of diseases in the future, you will notice some benefits soon after you stop using tobacco. If you have shortness of breath or asthma symptoms, they will likely get better within a few weeks after you quit. · Limit how much alcohol you drink. Moderate amounts of alcohol (up to 2 drinks a day for men, 1 drink a day for women) are okay. But drinking too much can lead to liver problems, high blood pressure, and other health problems. Family health If you have a family, there are many things you can do together to improve your health. · Eat meals together as a family as often as possible. · Eat healthy foods. This includes fruits, vegetables, lean meats and dairy, and whole grains. · Include your family in your fitness plan. Most people think of activities such as jogging or tennis as the way to fitness, but there are many ways you and your family can be more active.  Anything that makes you breathe hard and gets your heart pumping is exercise. Here are some tips: 
¨ Walk to do errands or to take your child to school or the bus. ¨ Go for a family bike ride after dinner instead of watching TV. Where can you learn more? Go to http://mandi-marylou.info/. Enter B035 in the search box to learn more about \"A Healthy Lifestyle: Care Instructions. \" Current as of: May 12, 2017 Content Version: 11.4 © 2824-1344 [employer]. Care instructions adapted under license by [employer] (which disclaims liability or warranty for this information). If you have questions about a medical condition or this instruction, always ask your healthcare professional. [address] any warranty or liability for your use of this information. Introducing [facility]! [employer] introduces JOOR patient portal. Now you can access parts of your medical record, email your doctor's office, and request medication refills online. 1. In your internet browser, go to [email or web address]! 2. Click on the First Time User? Click Here link in the Sign In box. You will see the New Member Sign Up page. 3. Enter your JOOR Access Code exactly as it appears below. You will not need to use this code after youve completed the sign-up process. If you do not sign up before the expiration date, you must request a new code. · JOOR Access Code: [email or web address] Expires: 3/13/2018  4:26 PM 
 
4. Enter the last four digits of your Social Security Number (xxxx) and Date of Birth (mm/dd/yyyy) as indicated and click Submit. You will be taken to the next sign-up page. 5. Create a JOOR ID. This will be your JOOR login ID and cannot be changed, so think of one that is secure and easy to remember. 6. Create a JOOR password. You can change your password at any time. 7. Enter your Password Reset Question and Answer.  This can be used at a later time if you forget your password. 8. Enter your e-mail address. You will receive e-mail notification when new information is available in [address] 9. Click Sign Up. You can now view and download portions of your medical record. 10. Click the Download Summary menu link to download a portable copy of your medical information. If you have questions, please visit the Frequently Asked Questions section of the [employer] website. Remember, [employer] is NOT to be used for urgent needs. For medical emergencies, dial 911. Now available from your iPhone and Android! Please provide this summary of care documentation to your next provider. Your primary care clinician is listed as Jr Gray. If you have any questions after today's visit, please call [phone].

## 2017-12-28 NOTE — PATIENT INSTRUCTIONS

## 2018-01-02 ENCOUNTER — TELEPHONE (OUTPATIENT)
Dept: NEUROLOGY | Age: 73
End: 2018-01-02

## 2018-01-02 NOTE — TELEPHONE ENCOUNTER
Spoke with [employer] She was made aware patient's information cannot be discussed to due she is not listed on the patient's HPI form. She hung the phone up.

## 2018-01-23 ENCOUNTER — OFFICE VISIT (OUTPATIENT)
Dept: NEUROLOGY | Age: 73
End: 2018-01-23

## 2018-01-23 VITALS
SYSTOLIC BLOOD PRESSURE: 105 MMHG | TEMPERATURE: 97.2 F | WEIGHT: 266.6 LBS | RESPIRATION RATE: 18 BRPM | BODY MASS INDEX: 33.15 KG/M2 | DIASTOLIC BLOOD PRESSURE: 64 MMHG | OXYGEN SATURATION: 98 % | HEIGHT: 75 IN | HEART RATE: 77 BPM

## 2018-01-23 DIAGNOSIS — G40.909 SEIZURE DISORDER (HCC): Primary | Chronic | ICD-10-CM

## 2018-01-23 DIAGNOSIS — G40.919 BREAKTHROUGH SEIZURE (HCC): ICD-10-CM

## 2018-01-23 DIAGNOSIS — F71 MR (MENTAL RETARDATION), MODERATE: ICD-10-CM

## 2018-01-23 RX ORDER — LEVETIRACETAM 1000 MG/1
TABLET ORAL 2 TIMES DAILY
COMMUNITY
End: 2018-01-23 | Stop reason: SDUPTHER

## 2018-01-23 NOTE — MR AVS SNAPSHOT
Riley Jeans [address] 
[phone] Patient: Fran Hernandez Enroughty MRN: [id number] CFT:[id number]/[id number] Visit Information Date & Time Provider Department Dept. Phone Encounter #  
 1/23/2018 10:20 AM MD [facility] [place] [phone] [id number] Follow-up Instructions Return in about 1 year (around 1/23/2019). Your Appointments 2/20/2018  3:45 PM  
PACEMAKER with PACEMAKER, [facility]) Appt Note: .; .  
 [address]  
[phone] [address] [place] [address] [phone] [id number]  
  
    
 2/20/2018  4:00 PM  
ESTABLISHED PATIENT with Aparna Keen MD  
[facility] [facility]) Appt Note: . [address]  
[phone] [address]  
  
    
 12/26/2018  1:40 PM  
Follow Up with MD [facility]) Appt Note: fuv  
 [address] [facility] Upcoming Health Maintenance Date Due Hepatitis C Screening 1945 FOBT Q 1 YEAR AGE 50-75 1/16/1997 ZOSTER VACCINE AGE 60> 11/16/2006 GLAUCOMA SCREENING Q2Y 1/16/2012 MEDICARE YEARLY EXAM 1/16/2012 Pneumococcal 65+ Low/Medium Risk (2 of 2 - PCV13) 10/18/2016 Influenza Age 5 to Adult 8/1/2017 DTaP/Tdap/Td series (2 - Td) 10/17/2025 Allergies as of 1/23/2018  Review Complete On: 1/23/2018 By: Silviano Ambriz MD  
 No Known Allergies Current Immunizations  Never Reviewed Name Date Pneumococcal Polysaccharide (PPSV-23) 10/18/2015 10:22 AM  
 Tdap 10/17/2015 11:04 AM  
  
 Not reviewed this visit You Were Diagnosed With   
  
 Codes Comments Seizure disorder ([facility]    -  Primary ICD-10-CM: G13.067 ICD-9-CM: 345.90 Breakthrough seizure ([facility]     ICD-10-CM: R53.228 ICD-9-CM: 345.91   
 MR (mental retardation), moderate     ICD-10-CM: F71 
ICD-9-CM: 318.0 Vitals BP Pulse Temp Resp Height(growth percentile) 105/64 (BP 1 Location: Left arm, BP Patient Position: Sitting) 77 97.2 °F (36.2 °C) (Temporal) 18 6' 3\" (1.905 m) Weight(growth percentile) SpO2 BMI Smoking Status 266 lb 9.6 oz (120.9 kg) 98% 33.32 kg/m2 Never Smoker BMI and BSA Data Body Mass Index Body Surface Area  
 33.32 kg/m 2 2.53 m 2 Preferred Pharmacy Pharmacy Name Phone [address] [place] [address] [phone] Your Updated Medication List  
  
   
This list is accurate as of: 1/23/18 11:11 AM.  Always use your most recent med list.  
  
  
  
  
 acetaminophen 325 mg tablet Commonly known as:  TYLENOL Take 650 mg by mouth every eight (8) hours as needed for Pain. ammonium lactate 12 % lotion Commonly known as:  LAC-HYDRIN Apply  to affected area daily. rub in to affected area well  
  
 ascorbic acid (vitamin C) 500 mg tablet Commonly known as:  VITAMIN C Take 1 Tab by mouth daily for 42 days. bumetanide 2 mg tablet Commonly known as:  Brinda Slough Take 2 mg by mouth daily. DOC-Q-LACE 100 mg capsule Generic drug:  docusate sodium  
two (2) times daily as needed. lacosamide 100 mg Tab tablet Commonly known as:  VIMPAT Take 1 Tab by mouth two (2) times a day. Max Daily Amount: 200 mg.  
  
 levETIRAcetam 1,000 mg tablet Take  by mouth two (2) times a day. metoprolol tartrate 25 mg tablet Commonly known as:  LOPRESSOR Take 0.5 Tabs by mouth two (2) times a day. naproxen 500 mg tablet Commonly known as:  NAPROSYN Take 1 Tab by mouth two (2) times daily (with meals). ondansetron 8 mg disintegrating tablet Commonly known as:  ZOFRAN ODT Take 1 Tab by mouth every eight (8) hours as needed for Nausea. oxyCODONE-acetaminophen 5-325 mg per tablet Commonly known as:  PERCOCET  
1-2 tabs every 4hrs as needed for pain. Maximum daily dose 12 tablets. potassium chloride 20 mEq tablet Commonly known as:  K-DUR, KLOR-CON Take 20 mEq by mouth two (2) times a day. VITAMIN D2 50,000 unit capsule Generic drug:  ergocalciferol Take 50,000 Units by mouth every thirty (30) days. Follow-up Instructions Return in about 1 year (around 1/23/2019). Introducing [facility]! Carmelita Fothergill introduces [employer] patient portal. Now you can access parts of your medical record, email your doctor's office, and request medication refills online. 1. In your internet browser, go to [email or web address] 2. Click on the First Time User? Click Here link in the Sign In box. You will see the New Member Sign Up page. 3. Enter your [employer] Access Code exactly as it appears below. You will not need to use this code after youve completed the sign-up process. If you do not sign up before the expiration date, you must request a new code. · [employer] Access Code: [email or web address] Expires: 3/13/2018  4:26 PM 
 
4. Enter the last four digits of your Social Security Number (xxxx) and Date of Birth (mm/dd/yyyy) as indicated and click Submit. You will be taken to the next sign-up page. 5. Create a [employer] ID. This will be your [employer] login ID and cannot be changed, so think of one that is secure and easy to remember. 6. Create a [employer] password. You can change your password at any time. 7. Enter your Password Reset Question and Answer. This can be used at a later time if you forget your password. 8. Enter your e-mail address. You will receive e-mail notification when new information is available in [address] 9. Click Sign Up. You can now view and download portions of your medical record. 10. Click the Download Summary menu link to download a portable copy of your medical information. If you have questions, please visit the Frequently Asked Questions section of the [employer] website. Remember, [employer] is NOT to be used for urgent needs. For medical emergencies, dial 911. Now available from your iPhone and Android! Please provide this summary of care documentation to your next provider. Your primary care clinician is listed as Naga Nielson. If you have any questions after today's visit, please call [phone].

## 2018-01-23 NOTE — PROGRESS NOTES
Chief Complaint   Patient presents with    Seizure    Medication Refill     1. Have you been to the ER, urgent care clinic since your last visit? Hospitalized since your last visit? [facility] ED    2. Have you seen or consulted any other health care providers outside of the [address] [place] [address] since your last visit? Include any pap smears or colon screening. [facility] and [facility] need an order to discontinue the Dilantin. [address] [place] [address] , spoke with Be Rivera, verbal order per  Dr. Jenny Melvin discontinue Dilantin.

## 2018-01-31 RX ORDER — LACOSAMIDE 100 MG/1
100 TABLET ORAL 2 TIMES DAILY
Qty: 60 TAB | Refills: 11 | Status: SHIPPED | OUTPATIENT
Start: 2018-01-31 | End: 2018-09-10 | Stop reason: SDUPTHER

## 2018-01-31 RX ORDER — LEVETIRACETAM 1000 MG/1
1000 TABLET ORAL 2 TIMES DAILY
Qty: 60 TAB | Refills: 11 | Status: SHIPPED | OUTPATIENT
Start: 2018-01-31 | End: 2018-12-26

## 2018-02-20 ENCOUNTER — OFFICE VISIT (OUTPATIENT)
Dept: CARDIOLOGY CLINIC | Age: 73
End: 2018-02-20

## 2018-02-20 ENCOUNTER — CLINICAL SUPPORT (OUTPATIENT)
Dept: CARDIOLOGY CLINIC | Age: 73
End: 2018-02-20

## 2018-02-20 DIAGNOSIS — I44.2 CHB (COMPLETE HEART BLOCK) (HCC): ICD-10-CM

## 2018-02-20 DIAGNOSIS — Z95.0 S/P BIVENTRICULAR CARDIAC PACEMAKER PROCEDURE: Primary | ICD-10-CM

## 2018-02-20 DIAGNOSIS — I44.1: ICD-10-CM

## 2018-02-20 DIAGNOSIS — F79 MENTAL RETARDATION: ICD-10-CM

## 2018-02-20 DIAGNOSIS — I10 ESSENTIAL HYPERTENSION: Primary | ICD-10-CM

## 2018-02-20 NOTE — PROGRESS NOTES
Subjective:      Tena Mas is a 72 y.o. male is here for device check. The patient denies chest pain/ shortness of breath, orthopnea, PND, LE edema, palpitations, syncope, presyncope or fatigue.        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)     Other ill-defined conditions(799.89)     edema legs    S/P biventricular cardiac pacemaker procedure 10/23/2015    10/23/15 [employer] biventricular pacemaker inmplant    Screen for colon cancer 9/27/2012      Past Surgical History:   Procedure Laterality Date    HX COLONOSCOPY  04/05/2017    HX ORTHOPAEDIC Right 12/22/2017    ORIF right distal radius    HX PACEMAKER  2015    bi ventricular pacemaker      No Known Allergies   Family History   Problem Relation Age of Onset    Other Other      unable to obtain due to mental status    No Known Problems Sister     negative for cardiac disease  Social History     Social History    Marital status: SINGLE     Spouse name: N/A    Number of children: N/A    Years of education: N/A     Social History Main Topics    Smoking status: Never Smoker    Smokeless tobacco: Never Used    Alcohol use No    Drug use: No    Sexual activity: Not on file     Other Topics Concern    Not on file     Social History Narrative     Current Outpatient Prescriptions   Medication Sig    lacosamide (VIMPAT) 100 mg tab tablet Take 1 Tab by mouth two (2) times a day. Max Daily Amount: 200 mg.  levETIRAcetam 1,000 mg tablet Take 1 Tab by mouth two (2) times a day.  DOC-Q-LACE 100 mg capsule two (2) times daily as needed.  ergocalciferol (VITAMIN D2) 50,000 unit capsule Take 50,000 Units by mouth every thirty (30) days.  ammonium lactate (LAC-HYDRIN) 12 % lotion Apply  to affected area daily. rub in to affected area well    ondansetron (ZOFRAN ODT) 8 mg disintegrating tablet Take 1 Tab by mouth every eight (8) hours as needed for Nausea.  oxyCODONE-acetaminophen (PERCOCET) 5-325 mg per tablet 1-2 tabs every 4hrs as needed for pain. Maximum daily dose 12 tablets.  naproxen (NAPROSYN) 500 mg tablet Take 1 Tab by mouth two (2) times daily (with meals).  metoprolol (LOPRESSOR) 25 mg tablet Take 0.5 Tabs by mouth two (2) times a day.  bumetanide (BUMEX) 2 mg tablet Take 2 mg by mouth daily.  acetaminophen (TYLENOL) 325 mg tablet Take 650 mg by mouth every eight (8) hours as needed for Pain.  potassium chloride (K-DUR, KLOR-CON) 20 mEq tablet Take 20 mEq by mouth two (2) times a day. No current facility-administered medications for this visit. There were no vitals filed for this visit. I have reviewed the nurses notes, vitals, problem list, allergy list, medical history, family, social history and medications. Review of Symptoms:    General: Pt denies excessive weight gain or loss. Pt is able to conduct ADL's  HEENT: Denies blurred vision, headaches, epistaxis and difficulty swallowing. Respiratory: Denies shortness of breath, SCHWARZ, wheezing or stridor.   Cardiovascular: Denies precordial pain, palpitations, edema or PND  Gastrointestinal: Denies poor appetite, indigestion, abdominal pain or blood in stool  Urinary: Denies dysuria, pyuria  Musculoskeletal: Denies pain or swelling from muscles or joints  Neurologic: Denies tremor, paresthesias, or sensory motor disturbance  Skin: Denies rash, itching or texture change. Psych: Denies depression      Physical Exam:      General: Well developed, in no acute distress. HEENT: Eyes - PERRL, no jvd  Heart:  Normal S1/S2 negative S3 or S4. Regular, no murmur, gallop or rub.   Respiratory: Clear bilaterally x 4, no wheezing or rales  Abdomen:   Soft, non-tender, bowel sounds are active.   Extremities:  No edema, normal cap refill, no cyanosis. Musculoskeletal: No clubbing  Neuro: A&Ox3, speech clear, gait stable. Skin: Skin color is normal. No rashes or lesions.  Non diaphoretic  Vascular: 2+ pulses symmetric in all extremities    Cardiographics    Pacer - 90% v paced    Results for orders placed or performed during the hospital encounter of 11/14/15   EKG, 12 LEAD, INITIAL   Result Value Ref Range    Ventricular Rate 107 BPM    Atrial Rate 107 BPM    P-R Interval 134 ms    QRS Duration 190 ms    Q-T Interval 420 ms    QTC Calculation (Bezet) 560 ms    Calculated P Axis 59 degrees    Calculated R Axis -96 degrees    Calculated T Axis 80 degrees    Diagnosis       Atrial-sensed ventricular-paced rhythm  When compared with ECG of 24-OCT-2015 05:32,  Electronic ventricular pacemaker has replaced Sinus rhythm  No obvious pacer malfunction   Confirmed by Kelin Wyatt ([id number]) on 11/15/2015 4:53:55 PM           Lab Results   Component Value Date/Time    WBC 5.6 10/23/2015 04:43 AM    Hemoglobin (POC) 12.7 12/22/2017 11:23 AM    HGB 13.7 10/23/2015 04:43 AM    HCT 41.2 10/23/2015 04:43 AM    PLATELET 230 25/13/1824 04:43 AM    MCV 99.0 10/23/2015 04:43 AM      Lab Results   Component Value Date/Time    Sodium 136 11/14/2015 10:58 AM    Potassium 3.3 (L) 11/14/2015 10:58 AM    Chloride 102 11/14/2015 10:58 AM    CO2 28 11/14/2015 10:58 AM    Anion gap 6 11/14/2015 10:58 AM    Glucose 107 (H) 11/14/2015 10:58 AM    BUN 16 11/14/2015 10:58 AM    Creatinine 0.86 11/14/2015 10:58 AM BUN/Creatinine ratio 19 11/14/2015 10:58 AM    GFR est AA >60 11/14/2015 10:58 AM    GFR est non-AA >60 11/14/2015 10:58 AM    Calcium 8.0 (L) 11/14/2015 10:58 AM    Bilirubin, total 0.4 11/14/2015 10:58 AM    AST (SGOT) 12 (L) 11/14/2015 10:58 AM    Alk. phosphatase 123 (H) 11/14/2015 10:58 AM    Protein, total 7.4 11/14/2015 10:58 AM    Albumin 3.2 (L) 11/14/2015 10:58 AM    Globulin 4.2 (H) 11/14/2015 10:58 AM    A-G Ratio 0.8 (L) 11/14/2015 10:58 AM    ALT (SGPT) 12 11/14/2015 10:58 AM         Assessment:     Assessment:        ICD-10-CM ICD-9-CM    1. Essential hypertension I10 401.9 CANCELED: AMB POC EKG ROUTINE W/ 12 LEADS, INTER & REP   2. CHB (complete heart block) (HCC) I44.2 426.0    3. Mental retardation F79 319      No orders of the defined types were placed in this encounter. Plan:   Ms Jso Guido is V paced 90% for high grade av block. He is on med rx for htn. He will follow up in the device clinic per routine and with me in one year. Continue medical management for htn, av block. Thank you for allowing me to participate in Mireya Vela 's care.     Sara Kimble MD, Cliff Drummond

## 2018-08-21 ENCOUNTER — CLINICAL SUPPORT (OUTPATIENT)
Dept: CARDIOLOGY CLINIC | Age: 73
End: 2018-08-21

## 2018-08-21 DIAGNOSIS — Z95.0 S/P BIVENTRICULAR CARDIAC PACEMAKER PROCEDURE: Primary | ICD-10-CM

## 2018-08-21 DIAGNOSIS — R00.1 BRADYCARDIA: ICD-10-CM

## 2018-08-21 DIAGNOSIS — I44.1: ICD-10-CM

## 2018-09-10 DIAGNOSIS — G40.909 SEIZURE DISORDER (HCC): Chronic | ICD-10-CM

## 2018-09-10 RX ORDER — LACOSAMIDE 100 MG/1
100 TABLET ORAL 2 TIMES DAILY
Qty: 60 TAB | Refills: 11 | Status: CANCELLED | OUTPATIENT
Start: 2018-09-10

## 2018-09-10 RX ORDER — LACOSAMIDE 100 MG/1
100 TABLET ORAL 2 TIMES DAILY
Qty: 60 TAB | Refills: 11 | Status: SHIPPED | OUTPATIENT
Start: 2018-09-10 | End: 2018-09-13 | Stop reason: SDUPTHER

## 2018-09-13 RX ORDER — LACOSAMIDE 100 MG/1
100 TABLET ORAL 2 TIMES DAILY
Qty: 60 TAB | Refills: 11 | Status: SHIPPED | OUTPATIENT
Start: 2018-09-13 | End: 2018-12-26 | Stop reason: SDUPTHER

## 2018-10-08 ENCOUNTER — TELEPHONE (OUTPATIENT)
Dept: NEUROLOGY | Age: 73
End: 2018-10-08

## 2018-10-09 NOTE — TELEPHONE ENCOUNTER
Spoke with Kaushik Pompa at [facility] ID of the patient verified times 2. Seizure protocol will be faxed after signature obtained. Kaushik Pompa at [facility] verbalized understanding.

## 2018-10-15 ENCOUNTER — OFFICE VISIT (OUTPATIENT)
Dept: FAMILY MEDICINE CLINIC | Age: 73
End: 2018-10-15

## 2018-10-15 ENCOUNTER — HOSPITAL ENCOUNTER (OUTPATIENT)
Dept: LAB | Age: 73
Discharge: HOME | End: 2018-10-15
Payer: MEDICARE

## 2018-10-15 VITALS
WEIGHT: 257 LBS | DIASTOLIC BLOOD PRESSURE: 56 MMHG | HEIGHT: 75 IN | BODY MASS INDEX: 31.95 KG/M2 | HEART RATE: 78 BPM | OXYGEN SATURATION: 94 % | SYSTOLIC BLOOD PRESSURE: 97 MMHG | TEMPERATURE: 99.4 F | RESPIRATION RATE: 16 BRPM

## 2018-10-15 DIAGNOSIS — F79 INTELLECTUAL DISABILITY: ICD-10-CM

## 2018-10-15 DIAGNOSIS — Z23 ENCOUNTER FOR IMMUNIZATION: ICD-10-CM

## 2018-10-15 DIAGNOSIS — E53.8 VITAMIN B12 DEFICIENCY: ICD-10-CM

## 2018-10-15 DIAGNOSIS — Z87.81 HISTORY OF CLOSED COLLES' FRACTURE: ICD-10-CM

## 2018-10-15 DIAGNOSIS — G40.909 SEIZURE DISORDER (HCC): ICD-10-CM

## 2018-10-15 DIAGNOSIS — Z86.79 H/O MOBITZ TYPE II BLOCK: ICD-10-CM

## 2018-10-15 DIAGNOSIS — I89.0 LYMPHEDEMA OF BOTH LOWER EXTREMITIES: ICD-10-CM

## 2018-10-15 DIAGNOSIS — Z95.0 PRESENCE OF BIVENTRICULAR CARDIAC PACEMAKER: ICD-10-CM

## 2018-10-15 DIAGNOSIS — E55.9 VITAMIN D DEFICIENCY: ICD-10-CM

## 2018-10-15 DIAGNOSIS — Z13.1 DIABETES MELLITUS SCREENING: ICD-10-CM

## 2018-10-15 DIAGNOSIS — I10 ESSENTIAL HYPERTENSION: Primary | ICD-10-CM

## 2018-10-15 DIAGNOSIS — Z76.89 ENCOUNTER TO ESTABLISH CARE: ICD-10-CM

## 2018-10-15 PROCEDURE — 36415 COLL VENOUS BLD VENIPUNCTURE: CPT

## 2018-10-15 PROCEDURE — 82607 VITAMIN B-12: CPT

## 2018-10-15 PROCEDURE — 82306 VITAMIN D 25 HYDROXY: CPT

## 2018-10-15 PROCEDURE — 83036 HEMOGLOBIN GLYCOSYLATED A1C: CPT

## 2018-10-15 PROCEDURE — 80053 COMPREHEN METABOLIC PANEL: CPT

## 2018-10-15 RX ORDER — FUROSEMIDE 20 MG/1
TABLET ORAL AS NEEDED
COMMUNITY
End: 2019-07-17

## 2018-10-15 NOTE — MR AVS SNAPSHOT
[address] [phone] Patient: Moise Quinn MRN: [id number] Visit Information Date & Time Provider Department Dept. Phone Encounter #  
 10/15/2018  2:00 PM Neto Lilli, [address] [phone] [id number] Follow-up Instructions Return if symptoms worsen or fail to improve. Your Appointments 12/26/2018  1:40 PM  
Follow Up with Flynn Singh MD  
[facility]) Appt Note: fuv  
 [address] [facility]  
  
    
 2/28/2019  2:15 PM  
PROCEDURE with PACEMAKER, [facility] [address] [facility]) Appt Note: BSC BiVpm 6mo check, annual  
 [address] [facility]  
[phone] [address] [facility]  
  
    
 2/28/2019  2:30 PM  
ESTABLISHED PATIENT with Kyra Leiva, ANP [facility] [address] [facility]) Appt Note: BSC BiVpm 6mo check, annual  
 [address] [facility]  
[phone] [address] [facility] Upcoming Health Maintenance Date Due Hepatitis C Screening 1945 Shingrix Vaccine Age 50> (1 of 2) 1/16/1997 FOBT Q 1 YEAR AGE 50-75 1/16/1997 GLAUCOMA SCREENING Q2Y 1/16/2012 Pneumococcal 65+ Low/Medium Risk (2 of 2 - PCV13) 10/18/2016 MEDICARE YEARLY EXAM 3/14/2018 Influenza Age 5 to Adult 8/1/2018 DTaP/Tdap/Td series (2 - Td) 10/17/2025 Allergies as of 10/15/2018  Review Complete On: 10/15/2018 By: Neto Reeder MD  
 No Known Allergies Current Immunizations  Never Reviewed Name Date Influenza Vaccine (Quad) PF 10/15/2018  Pneumococcal Polysaccharide (PPSV-23) 10/18/2015 10:22 AM  
 Tdap 10/17/2015 11:04 AM  
  
 Not reviewed this visit You Were Diagnosed With   
  
 Codes Comments Essential hypertension    -  Primary ICD-10-CM: I10 
ICD-9-CM: 401.9 Seizure disorder ([address]     ICD-10-CM: G40.909 ICD-9-CM: 345.90 Lymphedema of both lower extremities     ICD-10-CM: I89.0 ICD-9-CM: 617.0 Presence of biventricular cardiac pacemaker     ICD-10-CM: Z95.0 ICD-9-CM: V45.01   
 H/O Mobitz type II block     ICD-10-CM: Z86.79 
ICD-9-CM: V12.59 History of closed Colles' fracture     ICD-10-CM: Z87.81 ICD-9-CM: V15.51 Intellectual disability     ICD-10-CM: F79 
ICD-9-CM: 532 Encounter for immunization     ICD-10-CM: D05 ICD-9-CM: V03.89 Vitals BP Pulse Temp Resp Height(growth percentile) Weight(growth percentile) 97/56 78 99.4 °F (37.4 °C) 16 6' 3\" (1.905 m) 257 lb (116.6 kg) SpO2 BMI Smoking Status 94% 32.12 kg/m2 Never Smoker Vitals History BMI and BSA Data Body Mass Index Body Surface Area  
 32.12 kg/m 2 2.48 m 2 Preferred Pharmacy Pharmacy Name Phone [facility], [address] [phone] Your Updated Medication List  
  
   
This list is accurate as of 10/15/18  3:12 PM.  Always use your most recent med list.  
  
  
  
  
 acetaminophen 325 mg tablet Commonly known as:  TYLENOL Take 650 mg by mouth every eight (8) hours as needed for Pain. ammonium lactate 12 % lotion Commonly known as:  LAC-HYDRIN Apply  to affected area daily. rub in to affected area well  
  
 bumetanide 2 mg tablet Commonly known as:  [place] Edward Take 2 mg by mouth daily. DOC-Q-LACE 100 mg capsule Generic drug:  docusate sodium  
two (2) times daily as needed. furosemide 20 mg tablet Commonly known as:  LASIX Take  by mouth daily. lacosamide 100 mg Tab tablet Commonly known as:  VIMPAT Take 1 Tab by mouth two (2) times a day. Max Daily Amount: 200 mg.  
  
 levETIRAcetam 1,000 mg tablet Take 1 Tab by mouth two (2) times a day. metoprolol tartrate 25 mg tablet Commonly known as:  LOPRESSOR Take 0.5 Tabs by mouth two (2) times a day. naproxen 500 mg tablet Commonly known as:  NAPROSYN Take 1 Tab by mouth two (2) times daily (with meals). ondansetron 8 mg disintegrating tablet Commonly known as:  ZOFRAN ODT Take 1 Tab by mouth every eight (8) hours as needed for Nausea. oxyCODONE-acetaminophen 5-325 mg per tablet Commonly known as:  PERCOCET  
1-2 tabs every 4hrs as needed for pain. Maximum daily dose 12 tablets. potassium chloride 20 mEq tablet Commonly known as:  K-DUR, KLOR-CON Take 20 mEq by mouth two (2) times a day. VITAMIN D2 50,000 unit capsule Generic drug:  ergocalciferol Take 50,000 Units by mouth every thirty (30) days. We Performed the Following INFLUENZA VIRUS VAC QUAD,SPLIT,PRESV FREE SYRINGE IM [id number] CPT(R)] AK IMMUNIZ ADMIN,1 SINGLE/COMB VAC/TOXOID [id number] CPT(R)] Follow-up Instructions Return if symptoms worsen or fail to improve. Patient Instructions Well Visit, Over 72: Care Instructions Your Care Instructions Physical exams can help you stay healthy. Your doctor has checked your overall health and may have suggested ways to take good care of yourself. He or she also may have recommended tests. At home, you can help prevent illness with healthy eating, regular exercise, and other steps. Follow-up care is a key part of your treatment and safety. Be sure to make and go to all appointments, and call your doctor if you are having problems. It's also a good idea to know your test results and keep a list of the medicines you take. How can you care for yourself at home? · Reach and stay at a healthy weight.  This will lower your risk for many problems, such as obesity, diabetes, heart disease, and high blood pressure. · Get at least 30 minutes of exercise on most days of the week. Walking is a good choice. You also may want to do other activities, such as running, swimming, cycling, or playing tennis or team sports. · Do not smoke. Smoking can make health problems worse. If you need help quitting, talk to your doctor about stop-smoking programs and medicines. These can increase your chances of quitting for good. · Protect your skin from too much sun. When you're outdoors from 10 a.m. to 4 p.m., stay in the shade or cover up with clothing and a hat with a wide brim. Wear sunglasses that block UV rays. Even when it's cloudy, put broad-spectrum sunscreen (SPF 30 or higher) on any exposed skin. · See a dentist one or two times a year for checkups and to have your teeth cleaned. · Wear a seat belt in the car. · Limit alcohol to 2 drinks a day for men and 1 drink a day for women. Too much alcohol can cause health problems. Follow your doctor's advice about when to have certain tests. These tests can spot problems early. For men and women · Cholesterol. Your doctor will tell you how often to have this done based on your overall health and other things that can increase your risk for heart attack and stroke. · Blood pressure. Have your blood pressure checked during a routine doctor visit. Your doctor will tell you how often to check your blood pressure based on your age, your blood pressure results, and other factors. · Diabetes. Ask your doctor whether you should have tests for diabetes. · Vision. Experts recommend that you have yearly exams for glaucoma and other age-related eye problems. · Hearing. Tell your doctor if you notice any change in your hearing. You can have tests to find out how well you hear. · Colon cancer tests. Keep having colon cancer tests as your doctor recommends. You can have one of several types of tests. · Heart attack and stroke risk. At least every 4 to 6 years, you should have your risk for heart attack and stroke assessed. Your doctor uses factors such as your age, blood pressure, cholesterol, and whether you smoke or have diabetes to show what your risk for a heart attack or stroke is over the next 10 years. · Osteoporosis. Talk to your doctor about whether you should have a bone density test to find out whether you have thinning bones. Also ask your doctor about whether you should take calcium and vitamin D supplements. For women · Pap test and pelvic exam. You may no longer need a Pap test. Talk with your doctor about whether to stop or continue to have Pap tests. · Breast exam and mammogram. Ask how often you should have a mammogram, which is an X-ray of your breasts. A mammogram can spot breast cancer before it can be felt and when it is easiest to treat. · Thyroid disease. Talk to your doctor about whether to have your thyroid checked as part of a regular physical exam. Women have an increased chance of a thyroid problem. For men · Prostate exam. Talk to your doctor about whether you should have a blood test (called a PSA test) for prostate cancer. Experts disagree on whether men should have this test. Some experts recommend that you discuss the benefits and risks of the test with your doctor. · Abdominal aortic aneurysm. Ask your doctor whether you should have a test to check for an aneurysm. You may need a test if you ever smoked or if your parent, brother, sister, or child has had an aneurysm. When should you call for help? Watch closely for changes in your health, and be sure to contact your doctor if you have any problems or symptoms that concern you. Where can you learn more? Go to http://mandi-marylou.info/. Enter A038 in the search box to learn more about \"Well Visit, Over 65: Care Instructions. \" Current as of: March 29, 2018 Content Version: 11.8 © 5914-1417 Healthwise, Incorporated. Care instructions adapted under license by [employer] (which disclaims liability or warranty for this information). If you have questions about a medical condition or this instruction, always ask your healthcare professional. [address] any warranty or liability for your use of this information. Introducing [facility] & HEALTH SERVICES! Austin Tellez introduces [employer] patient portal. Now you can access parts of your medical record, email your doctor's office, and request medication refills online. 1. In your internet browser, go to [email or web address] 2. Click on the First Time User? Click Here link in the Sign In box. You will see the New Member Sign Up page. 3. Enter your [employer] Access Code exactly as it appears below. You will not need to use this code after youve completed the sign-up process. If you do not sign up before the expiration date, you must request a new code. · [employer] Access Code: [email or web address] Expires: 11/19/2018  1:35 PM 
 
4. Enter the last four digits of your Social Security Number (xxxx) and Date of Birth (mm/dd/yyyy) as indicated and click Submit. You will be taken to the next sign-up page. 5. Create a [employer] ID. This will be your [employer] login ID and cannot be changed, so think of one that is secure and easy to remember. 6. Create a [employer] password. You can change your password at any time. 7. Enter your Password Reset Question and Answer. This can be used at a later time if you forget your password. 8. Enter your e-mail address. You will receive e-mail notification when new information is available in [address] 9. Click Sign Up. You can now view and download portions of your medical record. 10. Click the Download Summary menu link to download a portable copy of your medical information.  
 
If you have questions, please visit the Frequently Asked Questions section of the [employer]. Remember, [employer] is NOT to be used for urgent needs. For medical emergencies, dial 911. Now available from your iPhone and Android! Please provide this summary of care documentation to your next provider. Your primary care clinician is listed as Brian Anguiano. If you have any questions after today's visit, please call [phone].

## 2018-10-15 NOTE — PROGRESS NOTES
[address] [facility] Rina Banks    Office [phone], Fax [phone]    Subjective:     Chief Complaint   Patient presents with    New Patient     Presents with no complaints     History provided by patient     HPI:  Frank Cyr is a 72 y.o. WHITE [place]  male presents for establish care. Significant history includes seizure disorder, HTN, lymphedema, morbitz type II s/p bipacer, Vit D/B12 def. Establish care  - Lives at group home at [facility] since 2009. Used to go at [place] but wanted to be seen somewhere closer    HTN   - Metoprolol 25mg BID, Bumex 2mg, lasix 20mg (added for short time), potassium. BP /80s  - Eats balanced diet, minimal salt intake, lots of water   - Denies HA, vision changes    Lymphedema   - Bumex 2 mg and wears daily salma hoses  - Given lasix for 30 days to help with acute swelling at [place] [address] (to follow up with them). Seizure disorder   - Last seizure was last year - happened when Dr Adi Ann (Neurologist) was making changes to his medications. No seizures since then. Due for check up in December 2018. - Currently on Vimpat 100mg BID and keprra 1000mg BID    Hx of closed colles fracture s/p rode  - Treated by Dr Raheem Gannon. No joint complaints. Morbitz type II s/p Pacemaker (2016)  - Last seen sept 2018 for pacemaker check up. Functioning well. - Denies cp, palpitation, sob. Health maintenance  - Eye checkup - this year, has order for glasses  - Dental checkup- due in December   - Cancer screening - colonoscopy in 2017, due in 10 years  - Immunization: tdap/pneumonia unsure (will get records), flu shot today    Medication reviewed. Allergy reviewed.     SocHx  - Smoking: never  - Alcohol: no  - Drugs: no   - Sexually active: no  - Occupation:     ROS (bolded are positive):   General Negative for fever, chills, changes in weight, changes in appetite   HEENT Negative for hearing loss, earache, congestion, sore throat CV Negative for chest pain, palpitations, edema   Respiratory Negative for cough, shortness of breath, wheezing   GI Negative for change in bowel habits, abdominal pain, black or bloody stools, nausea or vomiting    Negative for frequency, dysuria, hematuria, penile discharge   MSK Negative for back pain, joint pain, muscle pain   Skin Negative for itching, rash, hives   Neuro Negative for dizziness, headache, confusion, weakness   Psych Negative for anxiety, depression, change in mood     Objective:   Vitals - reviewed  Visit Vitals    BP 97/56    Pulse 78    Temp 99.4 °F (37.4 °C)    Resp 16    Ht 6' 3\" (1.905 m)    Wt 257 lb (116.6 kg)    SpO2 94%    BMI 32.12 kg/m2        Physical exam:   GEN: NAD. Alert. Obese  EYES:  Conjunctiva clear; PERRLA. extraocular movements are intact. EAR: External ears are normal. Tympanic membranes are clear and without effusion. NOSE: Turbinates are within normal limits. No drainage  OROPHYARYNX: No oral lesions or exudates. NECK:  Supple; no masses; thyroid normal           LUNGS: Respirations unlabored; CTAB. no wheeze, rales, rhonchi   CARDIOVASCULAR: Regular, rate, and rhythm without murmurs, gallops or rubs   ABDOMEN: Soft; NT, ND. +bowel sounds; no rebound tenderness, no guarding. no masses or organomegaly  NEUROLOGIC:  No focal neurologic deficits. Strength and sensation grossly intact. MSK: FROM in all extremities (both passive and active). Good tone. No vertebral tenderness. EXT: Well perfused. B/l moderate lymphedema. No erythema. PSYCH: appropriate mood and affect. Baseline mod intellectual disability, Cooperative. SKIN: No obvious rashes or lesions. Pertinent Labs/Studies: recent labs in [facility] record is from 2015, will get records from [address] [place] [address] and orders:       ICD-10-CM ICD-9-CM    1.  Essential hypertension V98 078.7 METABOLIC PANEL, COMPREHENSIVE      HEMOGLOBIN A1C WITH EAG   2. Seizure disorder ([facility] [address] G40.901 715.56 METABOLIC PANEL, COMPREHENSIVE   3. Lymphedema of both lower extremities I89.0 457.1    4. Presence of biventricular cardiac pacemaker Z95.0 V45.01    5. H/O Mobitz type II block Z86.79 V12.59    6. Vitamin D deficiency E55.9 268.9 VITAMIN D, 25 HYDROXY   7. Vitamin B12 deficiency E53.8 266.2 VITAMIN B12   8. History of closed Colles' fracture Z87.81 V15.51    9. Intellectual disability F79 319    10. Diabetes mellitus screening Z13.1 V77.1 HEMOGLOBIN A1C WITH EAG   11. Encounter for immunization Z23 V03.89 INFLUENZA VIRUS VAC QUAD,SPLIT,PRESV FREE SYRINGE IM      AL IMMUNIZ ADMIN,1 SINGLE/COMB VAC/TOXOID   12. Encounter to establish care Z76.89 V65.8      Diagnoses and all orders for this visit:    1. Essential hypertension. Well controlled. Well balanced diet with low salt intake. Pt is active with [employer] work. Continue metoprolol 25mg BID, bumex 2mg daily.   -     METABOLIC PANEL, COMPREHENSIVE  -     HEMOGLOBIN A1C WITH EAG    2. Seizure disorder ([address] Follows with Dr Lillian Garcia yearly. No seizure for year. Well controlled. -     METABOLIC PANEL, COMPREHENSIVE    3. Lymphedema of both lower extremities. Wears salma hoses. Continue Bumex 2mg daily. Had lasix 20mg for acute swelling, to be stopped soon with follow up with provider at [facility]. 4. Presence of biventricular cardiac pacemaker. Follows with Dr Alondra Armstrong. Recent normal check up for pacemaker functionality. 5. H/O Mobitz type II block. Refer to #4. 6. Vitamin D deficiency. F/u labs. Asymptomatic.   -     VITAMIN D, 25 HYDROXY    7. Vitamin B12 deficiency. F/u labs. Asymptomatic.   -     VITAMIN B12    8. History of closed Colles' fracture. No limitation in activity. 9. Intellectual disability. Lives at the group home, has appropriate supervision. 10. Diabetes mellitus screening. Given hx of HTN and obesity, will screen.   -     HEMOGLOBIN A1C WITH EAG    11. Encounter for immunization.  Getting records for tdap and pneumonia shot. -     INFLUENZA VIRUS VACCINE QUADRIVALENT, PRESERVATIVE FREE SYRINGE (36944)  -     CO IMMUNIZ ADMIN,1 SINGLE/COMB VAC/TOXOID    12. Encounter to establish care. Overall doing well as described above. Will need records for immunization. Uptodate on cancer screening. Unremarkable SocHx. Follow-up Disposition:  Return in about 1 year (around 10/15/2019), or if symptoms worsen or fail to improve. Pt was discussed with Dr Veronica Santiago (attending physician). I have reviewed patient medical and social history and medications. I have reviewed pertinent labs results and other data. I have discussed the diagnosis with the patient and the intended plan as seen in the above orders. The patient has received an after-visit summary and questions were answered concerning future plans. I have discussed medication side effects and warnings with the patient as well.     Letty Han MD  Resident [address] [place] [address]  10/15/18

## 2018-10-15 NOTE — PATIENT INSTRUCTIONS

## 2018-10-15 NOTE — LETTER
10/26/2018 7:02 PM 
 
[facility] [id number] Dear Carlos Quinn: 
 
Please find your most recent results below. Resulted Orders METABOLIC PANEL, COMPREHENSIVE Result Value Ref Range Glucose 118 (H) 65 - 99 mg/dL BUN 20 8 - 27 mg/dL Creatinine 0.87 0.76 - 1.27 mg/dL GFR est non-AA 87 >59 mL/min/1.73 GFR est  >59 mL/min/1.73  
 BUN/Creatinine ratio 23 10 - 24 Sodium 144 134 - 144 mmol/L Potassium 3.7 3.5 - 5.2 mmol/L Chloride 103 96 - 106 mmol/L  
 CO2 21 20 - 29 mmol/L Calcium 9.1 8.6 - 10.2 mg/dL Protein, total 7.3 6.0 - 8.5 g/dL Albumin 3.9 3.5 - 4.8 g/dL GLOBULIN, TOTAL 3.4 1.5 - 4.5 g/dL A-G Ratio 1.1 (L) 1.2 - 2.2 Bilirubin, total 0.2 0.0 - 1.2 mg/dL Alk. phosphatase 115 39 - 117 IU/L  
 AST (SGOT) 18 0 - 40 IU/L  
 ALT (SGPT) 7 0 - 44 IU/L Narrative Performed at:  [address]  [id number] : Pam Jj MD, Phone:  [phone] HEMOGLOBIN A1C WITH EAG Result Value Ref Range Hemoglobin A1c 5.3 4.8 - 5.6 % Comment:  
            Prediabetes: 5.7 - 6.4 Diabetes: >6.4 Glycemic control for adults with diabetes: <7.0 Estimated average glucose 105 mg/dL Narrative Performed at:  [address]  [id number] : Pam Jj MD, Phone:  [phone] VITAMIN D, 25 HYDROXY Result Value Ref Range VITAMIN D, 25-HYDROXY 32.3 30.0 - 100.0 ng/mL Comment:  
   Vitamin D deficiency has been defined by the [facility] practice guideline as a 
level of serum 25-OH vitamin D less than 20 ng/mL (1,2). The Endocrine Society went on to further define vitamin D 
insufficiency as a level between 21 and 29 ng/mL (2). 1. IOM ([place] of Medicine). 2010. Dietary reference 
   intakes for calcium and D. [facility]:  The 
 [employer]. 2. Ny MF, Pool NC, Neetu GARCIA, et al. 
   Evaluation, treatment, and prevention of vitamin D 
   deficiency: an Endocrine Society clinical practice 
   guideline. JCEM. 2011 Jul; 96(7):1911-30. Narrative Performed at:  [address]  [id number] : Arminda Arthur MD, Phone:  [phone] VITAMIN B12 Result Value Ref Range Vitamin B12 771 232 - 1,245 pg/mL Narrative Performed at:  [address]  [id number] : Arminda Arthur MD, Phone:  [phone] RECOMMENDATIONS: 
Your labs look good. Continue to make the efforts to eat healthy and incorporate healthy lifestyle. Please call me if you have any questions: [phone] Sincerely, 
 
 
Pattie Vazquez MD

## 2018-10-15 NOTE — LETTER
10/21/2018 6:50 AM 
 
Mr. Carlos Quinn [place] [id number] Dear Carlos Quinn: 
 
Please find your most recent results below. Resulted Orders METABOLIC PANEL, COMPREHENSIVE Result Value Ref Range Glucose 118 (H) 65 - 99 mg/dL BUN 20 8 - 27 mg/dL Creatinine 0.87 0.76 - 1.27 mg/dL GFR est non-AA 87 >59 mL/min/1.73 GFR est  >59 mL/min/1.73  
 BUN/Creatinine ratio 23 10 - 24 Sodium 144 134 - 144 mmol/L Potassium 3.7 3.5 - 5.2 mmol/L Chloride 103 96 - 106 mmol/L  
 CO2 21 20 - 29 mmol/L Calcium 9.1 8.6 - 10.2 mg/dL Protein, total 7.3 6.0 - 8.5 g/dL Albumin 3.9 3.5 - 4.8 g/dL GLOBULIN, TOTAL 3.4 1.5 - 4.5 g/dL A-G Ratio 1.1 (L) 1.2 - 2.2 Bilirubin, total 0.2 0.0 - 1.2 mg/dL Alk. phosphatase 115 39 - 117 IU/L  
 AST (SGOT) 18 0 - 40 IU/L  
 ALT (SGPT) 7 0 - 44 IU/L Narrative Performed at:  [address]  [id number] : Pam Jj MD, Phone:  [phone] HEMOGLOBIN A1C WITH EAG Result Value Ref Range Hemoglobin A1c 5.3 4.8 - 5.6 % Comment:  
            Prediabetes: 5.7 - 6.4 Diabetes: >6.4 Glycemic control for adults with diabetes: <7.0 Estimated average glucose 105 mg/dL Narrative Performed at:  [address]  [id number] : Pam Jj MD, Phone:  [phone] VITAMIN D, 25 HYDROXY Result Value Ref Range VITAMIN D, 25-HYDROXY 32.3 30.0 - 100.0 ng/mL Comment:  
   Vitamin D deficiency has been defined by the [facility] practice guideline as a 
level of serum 25-OH vitamin D less than 20 ng/mL (1,2). The Endocrine Society went on to further define vitamin D 
insufficiency as a level between 21 and 29 ng/mL (2). 1. IOM ([place] of Medicine). 2010. Dietary reference 
   intakes for calcium and D. [address] [facility]:  The 
 [employer]. 2. Ny MF, Pool CARPIO, Neetu GARCIA, et al. 
   Evaluation, treatment, and prevention of vitamin D 
   deficiency: an Endocrine Society clinical practice 
   guideline. JCEM. 2011 Jul; 96(7):1911-30. Narrative Performed at:  [address]  [id number] : Jenny Meza MD, Phone:  [phone] VITAMIN B12 Result Value Ref Range Vitamin B12 771 232 - 1,245 pg/mL Narrative Performed at:  [address]  [id number] : Jenny Meza MD, Phone:  [phone] RECOMMENDATIONS: 
Your labs were normal. Your kidney and liver functions were normal. You are not diabetic (hemoglobin A1c 5.3). Your Vitamin D and B12 are normal.  
 
Please call me if you have any questions: [phone] Sincerely, 
 
 
Marilee Johnson MD

## 2018-10-16 LAB
25(OH)D3+25(OH)D2 SERPL-MCNC: 32.3 NG/ML (ref 30–100)
ALBUMIN SERPL-MCNC: 3.9 G/DL (ref 3.5–4.8)
ALBUMIN/GLOB SERPL: 1.1 {RATIO} (ref 1.2–2.2)
ALP SERPL-CCNC: 115 IU/L (ref 39–117)
ALT SERPL-CCNC: 7 IU/L (ref 0–44)
AST SERPL-CCNC: 18 IU/L (ref 0–40)
BILIRUB SERPL-MCNC: 0.2 MG/DL (ref 0–1.2)
BUN SERPL-MCNC: 20 MG/DL (ref 8–27)
BUN/CREAT SERPL: 23 (ref 10–24)
CALCIUM SERPL-MCNC: 9.1 MG/DL (ref 8.6–10.2)
CHLORIDE SERPL-SCNC: 103 MMOL/L (ref 96–106)
CO2 SERPL-SCNC: 21 MMOL/L (ref 20–29)
CREAT SERPL-MCNC: 0.87 MG/DL (ref 0.76–1.27)
EST. AVERAGE GLUCOSE BLD GHB EST-MCNC: 105 MG/DL
GLOBULIN SER CALC-MCNC: 3.4 G/DL (ref 1.5–4.5)
GLUCOSE SERPL-MCNC: 118 MG/DL (ref 65–99)
HBA1C MFR BLD: 5.3 % (ref 4.8–5.6)
POTASSIUM SERPL-SCNC: 3.7 MMOL/L (ref 3.5–5.2)
PROT SERPL-MCNC: 7.3 G/DL (ref 6–8.5)
SODIUM SERPL-SCNC: 144 MMOL/L (ref 134–144)
VIT B12 SERPL-MCNC: 771 PG/ML (ref 232–1245)

## 2018-11-05 ENCOUNTER — OFFICE VISIT (OUTPATIENT)
Dept: FAMILY MEDICINE CLINIC | Age: 73
End: 2018-11-05

## 2018-11-05 VITALS
WEIGHT: 255 LBS | HEIGHT: 75 IN | DIASTOLIC BLOOD PRESSURE: 61 MMHG | TEMPERATURE: 97.6 F | OXYGEN SATURATION: 98 % | SYSTOLIC BLOOD PRESSURE: 121 MMHG | BODY MASS INDEX: 31.71 KG/M2 | RESPIRATION RATE: 16 BRPM | HEART RATE: 63 BPM

## 2018-11-05 DIAGNOSIS — Z00.00 MEDICARE ANNUAL WELLNESS VISIT, SUBSEQUENT: Primary | ICD-10-CM

## 2018-11-05 DIAGNOSIS — Z13.220 SCREENING, LIPID: ICD-10-CM

## 2018-11-05 DIAGNOSIS — Z23 NEED FOR SHINGLES VACCINE: ICD-10-CM

## 2018-11-05 DIAGNOSIS — Z11.1 SCREENING-PULMONARY TB: ICD-10-CM

## 2018-11-05 NOTE — PATIENT INSTRUCTIONS
TB gold vs ppd     Learning About Frailty  What is frailty? Older adults may tire quickly and move more slowly as they age. Everyday activities, like shopping or even getting dressed, can become hard to do. This may be a health problem called frailty. Experts think frailty develops because of changes in how your body works. These changes can be caused by aging, a disease, or both. Your organs may not do their jobs as well, and you may lose muscle. Frailty also involves:  · Changes in your body's endocrine system, which releases the hormones, or chemicals, that affect how your body functions. · Inflammation, or damage to the cells and tissues in your body. What are the symptoms? You may be frail if you have lost weight, are weak, or feel like you have low energy. The way you feel when you're frail may make you more likely to have depression. What happens when you become frail? When you are frail, you may have trouble doing everyday tasks, like getting dressed, eating, bathing, getting in or out of bed, and using the toilet. You may feel weak and off balance and worry about falling. If you also have another health problem, your frailty may get worse quickly. How can you care for yourself? If you think you are becoming frail, see your doctor. There are things you and your doctor can do to prevent frailty or slow it down. If frailty is caused or made worse by another health problem, you and your doctor can treat the problem. Talk to your doctor about any medicines you're taking that might be making you feel tired. Many medicines, such as cold and allergy medicines, often cause fatigue. Eat healthy  Food gives you calories, which provide energy and can help stop weight loss. Here are some tips for eating well:  · Eat more. Getting calories may be more important than avoiding fat for other reasons. But talk to your doctor about any changes you'd like to make in what you eat. · Eat more protein.  This may help you keep your muscles strong. · Try high-energy drinks, such as Boost, Ensure, or instant breakfast drinks. Smoothies, milk shakes, and milk also may help you limit weight loss. · If it's easier for you, eat smaller meals several times a day, rather than larger meals 3 times a day. Do not skip meals, especially breakfast.  Stay active  Talk to your doctor about exercises to help build your strength and balance. Examples include:  · Resistance exercises, such as pushing against a wall or sitting in a chair and raising your legs. These are an easy way to tone your muscles. · Walking, which can be good for your body. But ask your doctor how often and how much walking is best for you. · Rickey chi, which is moving in a slow, rhythmic way. This can help with muscle tone and balance. Prevent falls  If you are worried about falling, here are some things you can do:  · Keep your bones strong. Talk to your doctor to be sure you are getting enough vitamin D and calcium. · Take out raised doorway thresholds, and get rid of throw rugs. · If stairs are a problem, put in handrails or a ramp. · Arrange your furniture and electric cords to keep them out of walking paths. · Keep your house well lit, especially Alissa Kerry, and outside walkways. Use night-lights in hallways and bathrooms. · Use shower chairs and bath benches. Stay connected  When you feel tired, it's often easier to stay home and not see people. But it is important to connect with others and stay positive. Being with other people can help you feel good and may help you stay healthier as you age. Where can you learn more? Go to http://mandi-marylou.info/. Enter J677 in the search box to learn more about \"Learning About Frailty. \"  Current as of: March 16, 2018  Content Version: 11.8  © 1199-8082 [employer], [employer].  Care instructions adapted under license by [employer] (which disclaims liability or warranty for this information). If you have questions about a medical condition or this instruction, always ask your healthcare professional. [place] [address] any warranty or liability for your use of this information.

## 2018-11-05 NOTE — PROGRESS NOTES
[id number]  [address] [facility] Rina [place]   [phone]             Date of visit: 11/5/2018       This is a Subsequent Medicare Annual Wellness Visit (AWV), (Performed more than 12 months after effective date of Medicare Part B enrollment and 12 months after last preventive visit, Once in a lifetime)    I have reviewed the patient's medical history in detail and updated the computerized patient record. Miriam Gan is a 72 y.o. male   History obtained from: manager of [facility] [place] and the patient. Concerns today   (Patient understands that medical problems addressed today may incur additional cost as this is a preventive visit)    Significant history includes seizure disorder, HTN, lymphedema, morbitz type II s/p bipacer, Vit D/B12 def. Lives at [facility] since [facility]. Used to go at [facility] but wanted to be seen somewhere closer    No concerns today. Need PPD. [facility] unsure if pt has obtained PCV 13 or shingrix.  Need to contact PCP for medical records.       History     Patient Active Problem List   Diagnosis Code    Bradycardia R00.1    Screen for colon cancer Z12.11    Seizure disorder ([facility] [address] G40.909    Bilateral lower extremity edema R60.0    Hypertension I10    Mobitz type II incomplete atrioventricular block I44.1    Intellectual disability F79    Advance care planning Z71.89    S/P biventricular cardiac pacemaker procedure Z95.0    Lymphedema of both lower extremities I89.0    Vitamin D deficiency E55.9    Vitamin B12 deficiency E53.8    History of closed Colles' fracture Z87.81    H/O Mobitz type II block Z86.79     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)     Other ill-defined conditions(799.89)     edema legs    S/P biventricular cardiac pacemaker procedure 10/23/2015 10/23/15 [employer] biventricular pacemaker inmplant    Screen for colon cancer 9/27/2012      Past Surgical History:   Procedure Laterality Date    HX COLONOSCOPY  04/05/2017    HX ORTHOPAEDIC Right 12/22/2017    ORIF right distal radius    HX PACEMAKER  2015    bi ventricular pacemaker      No Known Allergies  Current Outpatient Medications   Medication Sig Dispense Refill    varicella-zoster recombinant, PF, (SHINGRIX, PF,) 50 mcg/0.5 mL susr injection 0.5 mL by IntraMUSCular route once for 1 dose. 0.5 mL 0    furosemide (LASIX) 20 mg tablet Take  by mouth daily.  lacosamide (VIMPAT) 100 mg tab tablet Take 1 Tab by mouth two (2) times a day. Max Daily Amount: 200 mg. 60 Tab 11    levETIRAcetam 1,000 mg tablet Take 1 Tab by mouth two (2) times a day. 60 Tab 11    DOC-Q-LACE 100 mg capsule two (2) times daily as needed.  ergocalciferol (VITAMIN D2) 50,000 unit capsule Take 50,000 Units by mouth every thirty (30) days.  ammonium lactate (LAC-HYDRIN) 12 % lotion Apply  to affected area daily. rub in to affected area well      ondansetron (ZOFRAN ODT) 8 mg disintegrating tablet Take 1 Tab by mouth every eight (8) hours as needed for Nausea. 20 Tab 2    oxyCODONE-acetaminophen (PERCOCET) 5-325 mg per tablet 1-2 tabs every 4hrs as needed for pain. Maximum daily dose 12 tablets. 80 Tab 0    naproxen (NAPROSYN) 500 mg tablet Take 1 Tab by mouth two (2) times daily (with meals). 20 Tab 0    metoprolol (LOPRESSOR) 25 mg tablet Take 0.5 Tabs by mouth two (2) times a day. 60 Tab 3    bumetanide (BUMEX) 2 mg tablet Take 2 mg by mouth daily.  acetaminophen (TYLENOL) 325 mg tablet Take 650 mg by mouth every eight (8) hours as needed for Pain.  potassium chloride (K-DUR, KLOR-CON) 20 mEq tablet Take 20 mEq by mouth two (2) times a day.        Family History   Problem Relation Age of Onset    Other Other         unable to obtain due to mental status    No Known Problems Sister Social History     Tobacco Use    Smoking status: Never Smoker    Smokeless tobacco: Never Used   Substance Use Topics    Alcohol use: No       Specialists/Care Team   Bulmaro Zamora has established care with the following healthcare providers:  Patient Care Team:  Andra Pinto MD as PCP - General (Family Practice)  Agustin Hi MD as Physician (Cardiology)     Health Risk Assessment     Demographics   male  72 y.o. General Health Questions   -During the past 4 weeks:   -how would you rate your health in general? Good   -how often have you been bothered by feeling dizzy when standing up? never   -how much have you been bothered by bodily pain? not   -Have you noticed any hearing difficulties? no   -has your physical and emotional health limited your social activities with family or friends? no    Emotional Health Questions   -Do you have a history of depression, anxiety, or emotional problems? no  -Over the past 2 weeks, have you felt down, depressed or hopeless? no  -Over the past 2 weeks, have you felt little interest or pleasure in doing things? no    Health Habits   Please describe your diet habits: well balanced  Do you get 5 servings of fruits or vegetables daily? yes  Do you exercise regularly? yes    Activities of Daily Living and Functional Status   -Do you need help with eating, walking, dressing, bathing, toileting, the phone, transportation, shopping, preparing meals, housework, laundry, medications or managing money? no  -In the past four weeks, was someone available to help you if you needed and wanted help with anything? yes  -Are you confident are you that you can control and manage most of your health problems? yes  -Have you been given information to help you keep track of your medications? yes  -How often do you have trouble taking your medications as prescribed? never    Fall Risk and Home Safety   Have you fallen 2 or more times in the past year?  no  Does your home have rugs in the hallway, lack grab bars in the bathroom, lack handrails on the stairs or have poor lighting? no  Do you have smoke detectors and check them regularly? yes  Do you have difficulties driving a car? no  Do you always fasten your seat belt when you are in a car? yes    Physical Examination     Vitals:    11/05/18 1311   BP: 121/61   Pulse: 63   Resp: 16   Temp: 97.6 °F (36.4 °C)   SpO2: 98%   Weight: 255 lb (115.7 kg)   Height: 6' 3\" (1.905 m)     Body mass index is 31.87 kg/m². No exam data present  Was the patient's timed Up & Go test unsteady or longer than 30 seconds? no    Evaluation of Cognitive Function   Mood/affect:  happy  Orientation: Person, Place, Time and Situation  Appearance: age appropriate  Family member/caregiver input: yes, no concern. He is the healthiest person in group home    Additional exam if indicated for problems addressed today:  NAD  RRR no m/r/g  CTABl  1+ pitting edema b/l LE    Advice/Referrals/Counseling (as indicated)   Education and counseling provided for any problems identified above: pt needs to wear his LE compression stocking    Preventive Services     Discussed today Done Previously Not Needed    Unsure if had from last PCP   Pneumococcal vaccines    X 10/15/2015  Flu vaccine    No risks for hep B  Hepatitis B vaccine (if at risk)   Unsure if had from last PCP   Shingles vaccine    X 2015  TDAP vaccine    X done by urology 5/23 negativ  Digital rectal exam    X 2015 next in 10 years  Colorectal cancer screening     x Low-dose CT for lung cancer screening     x Bone density test    X 5/22/18   Negative. Needs readuing glasses  Glaucoma screening   x   Cholesterol test    X A1C 5.3 10/15  Diabetes screening test    x X today  PPD     Discussion of Advance Directive   Discussed with Kristen Quinn his ability to prepare and advance directive in the case that an injury or illness causes him to be unable to make health care decisions.      Gallo Raphael Everton Matute  [phone]  Full code    Assessment/Plan       ICD-10-CM ICD-9-CM    1. Medicare annual wellness visit, subsequent Z00.00 V70.0 CBC W/O DIFF   2. Screening, lipid Z13.220 V77.91 LIPID PANEL   3. Need for shingles vaccine Z23 V04.89 varicella-zoster recombinant, PF, (SHINGRIX, PF,) 50 mcg/0.5 mL susr injection   4. Screening-pulmonary TB Z11.1 V74.1 AMB POC TUBERCULOSIS, INTRADERMAL (SKIN TEST)     -PPD today. RTC for reading.  -Pt to contact old PCP for updated vaccine schedule. Will update based on the records.   -discussed diet and exercise.  -Lipid panel ordered.  -completed paperwork for group home. Orders Placed This Encounter    LIPID PANEL    CBC W/O DIFF    AMB POC TUBERCULOSIS, INTRADERMAL (SKIN TEST)    varicella-zoster recombinant, PF, (SHINGRIX, PF,) 50 mcg/0.5 mL susr injection       Follow-up Disposition:  Return in about 2 days (around 11/7/2018) for PPD reading.     Nanette Moreno,

## 2018-11-05 NOTE — PROGRESS NOTES
I reviewed with the resident the medical history and the resident's findings on the physical examination. I discussed with the resident the patient's diagnosis and concur with the plan. Medicare wellness visit completed this visit. No concerns today.

## 2018-11-07 ENCOUNTER — CLINICAL SUPPORT (OUTPATIENT)
Dept: FAMILY MEDICINE CLINIC | Age: 73
End: 2018-11-07

## 2018-11-07 DIAGNOSIS — Z11.1 ENCOUNTER FOR PPD SKIN TEST READING: Primary | ICD-10-CM

## 2018-11-07 LAB
MM INDURATION POC: 0 MM (ref 0–5)
PPD POC: NEGATIVE NEGATIVE

## 2018-11-07 NOTE — PROGRESS NOTES
Chief Complaint   Patient presents with    PPD Reading     PPD Negative     1. Have you been to the ER, urgent care clinic since your last visit? Hospitalized since your last visit? No    2. Have you seen or consulted any other health care providers outside of the [address] [place] [address] since your last visit? Include any pap smears or colon screening. No     Reviewed record in preparation for visit and have obtained necessary documentation.

## 2018-11-19 NOTE — TELEPHONE ENCOUNTER
[phone]  John Rojas, of the residential group home, called for this refill. She did not know if an appointment was required at this time or not.

## 2018-11-21 RX ORDER — FUROSEMIDE 20 MG/1
TABLET ORAL DAILY
OUTPATIENT
Start: 2018-11-21

## 2018-11-22 NOTE — TELEPHONE ENCOUNTER
Pt was given lasix for a month supply by his prior PCP. Will need to access clinically, if still needs to be on it.

## 2018-12-05 NOTE — PROGRESS NOTES
Subjective:      Regina Kaba is a 72 y.o. male who presents for medication refill for lymphedema. Comes in with [facility] representative. Does also wear daily salma hoses. He is currently on Bumex 2 mg daily. He has not missed any does. Tolerating medication well, has not had any side effects. In the past he had been given a 30 day supply for Lasix due to worsening swelling per chart review. Per [facility] representative, swelling is at baseline. Needs labs today. Review of Systems   Constitutional: Negative for chills and fever. Respiratory: Negative for cough and shortness of breath. Cardiovascular: Negative for chest pain and palpitations. Gastrointestinal: Negative for nausea and vomiting. PMHx:  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)     Other ill-defined conditions(799.89)     edema legs    S/P biventricular cardiac pacemaker procedure 10/23/2015    10/23/15 [employer] biventricular pacemaker inmplant    Screen for colon cancer 9/27/2012       Meds:   Current Outpatient Medications   Medication Sig Dispense Refill    lacosamide (VIMPAT) 100 mg tab tablet Take 1 Tab by mouth two (2) times a day. Max Daily Amount: 200 mg. 60 Tab 11    levETIRAcetam 1,000 mg tablet Take 1 Tab by mouth two (2) times a day. 60 Tab 11    DOC-Q-LACE 100 mg capsule two (2) times daily as needed.  ergocalciferol (VITAMIN D2) 50,000 unit capsule Take 50,000 Units by mouth every thirty (30) days.  ondansetron (ZOFRAN ODT) 8 mg disintegrating tablet Take 1 Tab by mouth every eight (8) hours as needed for Nausea. 20 Tab 2    metoprolol (LOPRESSOR) 25 mg tablet Take 0.5 Tabs by mouth two (2) times a day. 60 Tab 3    bumetanide (BUMEX) 2 mg tablet Take 2 mg by mouth daily.       acetaminophen (TYLENOL) 325 mg tablet Take 650 mg by mouth every eight (8) hours as needed for Pain.  potassium chloride (K-DUR, KLOR-CON) 20 mEq tablet Take 20 mEq by mouth two (2) times a day.  furosemide (LASIX) 20 mg tablet Take  by mouth daily.  ammonium lactate (LAC-HYDRIN) 12 % lotion Apply  to affected area daily. rub in to affected area well      oxyCODONE-acetaminophen (PERCOCET) 5-325 mg per tablet 1-2 tabs every 4hrs as needed for pain. Maximum daily dose 12 tablets. 80 Tab 0    naproxen (NAPROSYN) 500 mg tablet Take 1 Tab by mouth two (2) times daily (with meals). 20 Tab 0       Allergies:   No Known Allergies    Smoker:  Social History     Tobacco Use   Smoking Status Never Smoker   Smokeless Tobacco Never Used       ETOH:   Social History     Substance and Sexual Activity   Alcohol Use No       FH:   Family History   Problem Relation Age of Onset    Other Other         unable to obtain due to mental status    No Known Problems Sister          Objective:     Visit Vitals  /60 (BP 1 Location: Right arm, BP Patient Position: Sitting)   Pulse 71   Temp 97.5 °F (36.4 °C) (Oral)   Ht 6' 3\" (1.905 m)   Wt 256 lb (116.1 kg)   SpO2 97%   BMI 32.00 kg/m²     Physical Exam   Constitutional: He is well-developed, well-nourished, and in no distress. Cardiovascular: Normal rate, regular rhythm and normal heart sounds. Bilateral moderate lymphedema   Pulmonary/Chest: Effort normal and breath sounds normal. No respiratory distress. He has no wheezes. Neurological: He is alert. Skin: Skin is warm and dry. Assessment:       ICD-10-CM ICD-9-CM    1.  Lymphedema of both lower extremities I89.0 457.1 CBC W/O DIFF      METABOLIC PANEL, BASIC      LIPID PANEL   2. Class 1 obesity with body mass index (BMI) of 32.0 to 32.9 in adult, unspecified obesity type, unspecified whether serious comorbidity present  E66.9 278.00 LIPID PANEL    Z68.32 V85.32            Plan:     Continue Bumex 2mg daily   Continue salma hoses and leg elevation   Follow up labs including CMP and lipid panel   Counseled on DASH diet and exercise  Follow up as scheduled     Patient is counseled to return to the office if symptoms do not improve as expected. Urgent consultation with the nearest Emergency Department is strongly recommended if condition worsens. Patient is counseled to follow up as recommended and to inform the office if any changes in treatment are recommended.         Signed By:  Amy Castellanos MD    Family Medicine Resident

## 2018-12-06 ENCOUNTER — HOSPITAL ENCOUNTER (OUTPATIENT)
Dept: LAB | Age: 73
Discharge: HOME | End: 2018-12-06
Payer: MEDICARE

## 2018-12-06 ENCOUNTER — OFFICE VISIT (OUTPATIENT)
Dept: FAMILY MEDICINE CLINIC | Age: 73
End: 2018-12-06

## 2018-12-06 VITALS
DIASTOLIC BLOOD PRESSURE: 60 MMHG | HEART RATE: 71 BPM | HEIGHT: 75 IN | SYSTOLIC BLOOD PRESSURE: 107 MMHG | BODY MASS INDEX: 31.83 KG/M2 | TEMPERATURE: 97.5 F | OXYGEN SATURATION: 97 % | WEIGHT: 256 LBS

## 2018-12-06 DIAGNOSIS — E66.9 CLASS 1 OBESITY WITH BODY MASS INDEX (BMI) OF 32.0 TO 32.9 IN ADULT, UNSPECIFIED OBESITY TYPE, UNSPECIFIED WHETHER SERIOUS COMORBIDITY PRESENT: ICD-10-CM

## 2018-12-06 DIAGNOSIS — I89.0 LYMPHEDEMA OF BOTH LOWER EXTREMITIES: Primary | ICD-10-CM

## 2018-12-06 PROCEDURE — 80048 BASIC METABOLIC PNL TOTAL CA: CPT

## 2018-12-06 PROCEDURE — 85027 COMPLETE CBC AUTOMATED: CPT

## 2018-12-06 PROCEDURE — 80061 LIPID PANEL: CPT

## 2018-12-06 PROCEDURE — 36415 COLL VENOUS BLD VENIPUNCTURE: CPT

## 2018-12-06 NOTE — PROGRESS NOTES
Chief Complaint   Patient presents with    Medication Refill     lasix     1. Have you been to the ER, urgent care clinic since your last visit? Hospitalized since your last visit? No    2. Have you seen or consulted any other health care providers outside of the [address] [place] [address] since your last visit? Include any pap smears or colon screening.  No

## 2018-12-07 LAB
BUN SERPL-MCNC: 21 MG/DL (ref 8–27)
BUN/CREAT SERPL: 18 (ref 10–24)
CALCIUM SERPL-MCNC: 9.2 MG/DL (ref 8.6–10.2)
CHLORIDE SERPL-SCNC: 99 MMOL/L (ref 96–106)
CHOLEST SERPL-MCNC: 158 MG/DL (ref 100–199)
CO2 SERPL-SCNC: 27 MMOL/L (ref 20–29)
CREAT SERPL-MCNC: 1.14 MG/DL (ref 0.76–1.27)
ERYTHROCYTE [DISTWIDTH] IN BLOOD BY AUTOMATED COUNT: 13.3 % (ref 12.3–15.4)
GLUCOSE SERPL-MCNC: 81 MG/DL (ref 65–99)
HCT VFR BLD AUTO: 39.5 % (ref 37.5–51)
HDLC SERPL-MCNC: 66 MG/DL
HGB BLD-MCNC: 13.3 G/DL (ref 13–17.7)
INTERPRETATION, 910389: NORMAL
LDLC SERPL CALC-MCNC: 76 MG/DL (ref 0–99)
MCH RBC QN AUTO: 33.1 PG (ref 26.6–33)
MCHC RBC AUTO-ENTMCNC: 33.7 G/DL (ref 31.5–35.7)
MCV RBC AUTO: 98 FL (ref 79–97)
PLATELET # BLD AUTO: 182 X10E3/UL (ref 150–379)
POTASSIUM SERPL-SCNC: 4.3 MMOL/L (ref 3.5–5.2)
RBC # BLD AUTO: 4.02 X10E6/UL (ref 4.14–5.8)
SODIUM SERPL-SCNC: 144 MMOL/L (ref 134–144)
TRIGL SERPL-MCNC: 82 MG/DL (ref 0–149)
VLDLC SERPL CALC-MCNC: 16 MG/DL (ref 5–40)
WBC # BLD AUTO: 5.2 X10E3/UL (ref 3.4–10.8)

## 2018-12-26 ENCOUNTER — OFFICE VISIT (OUTPATIENT)
Dept: NEUROLOGY | Age: 73
End: 2018-12-26

## 2018-12-26 VITALS
RESPIRATION RATE: 12 BRPM | OXYGEN SATURATION: 98 % | HEART RATE: 84 BPM | DIASTOLIC BLOOD PRESSURE: 70 MMHG | WEIGHT: 264 LBS | SYSTOLIC BLOOD PRESSURE: 118 MMHG | BODY MASS INDEX: 33 KG/M2

## 2018-12-26 DIAGNOSIS — E53.8 VITAMIN B12 DEFICIENCY: ICD-10-CM

## 2018-12-26 DIAGNOSIS — G40.909 SEIZURE DISORDER (HCC): Primary | ICD-10-CM

## 2018-12-26 DIAGNOSIS — F79 INTELLECTUAL DISABILITY: ICD-10-CM

## 2018-12-26 RX ORDER — LACOSAMIDE 100 MG/1
100 TABLET ORAL 2 TIMES DAILY
Qty: 60 TAB | Refills: 11 | Status: SHIPPED | OUTPATIENT
Start: 2018-12-26 | End: 2019-06-28 | Stop reason: SDUPTHER

## 2018-12-26 RX ORDER — LEVETIRACETAM 1000 MG/1
1000 TABLET ORAL 2 TIMES DAILY
Qty: 60 TAB | Refills: 11 | Status: SHIPPED | OUTPATIENT
Start: 2018-12-26 | End: 2019-12-05 | Stop reason: SDUPTHER

## 2018-12-26 RX ORDER — LEVETIRACETAM 1000 MG/1
TABLET ORAL
COMMUNITY
End: 2018-12-26 | Stop reason: SDUPTHER

## 2018-12-26 NOTE — PROGRESS NOTES
Neurology Progress Note    NAME:  Shaji Cadena   :   1945   MRN:   [id number]     Date/Time:  2018  Subjective:    Shaji Cadena is a 72 y.o. male here today for  follow up for seizure and neuropathy. Patient was accompanied by  his  care giver. No seizure has been reported since the last visit. Patient had a breakthrough seizure about a year ago when I tried to keep patient on monotherapy as he was on multiple medications after the breakthrough Keppra was reintroduced and since then patient has not had any more seizure. He is said to be doing fairly well. I will keep this patient on Vimpat and Keppra as he has done well for about a year  Review of Systems - General ROS: negative for - fatigue or sleep disturbance  Psychological ROS: negative for - anxiety, depression or sleep disturbances  Ophthalmic ROS: negative for - blurry vision, double vision, loss of vision, photophobia or uses glasses  ENT ROS: positive for - epistaxis, headaches, vertigo and visual changes  negative for - tinnitus  Allergy and Immunology ROS: negative  Hematological and Lymphatic ROS: negative  Endocrine ROS: negative  Respiratory ROS: no cough, shortness of breath, or wheezing  Cardiovascular ROS: no chest pain or dyspnea on exertion  Gastrointestinal ROS: no abdominal pain, change in bowel habits, or black or bloody stools  Genito-Urinary ROS: no dysuria, trouble voiding, or hematuria  Musculoskeletal ROS: positive for - joint pain and joint stiffness  Neurological ROS: positive for - numbness/tingling and tremors  Dermatological ROS: negative      Medications reviewed:  Current Outpatient Medications   Medication Sig Dispense Refill    levETIRAcetam 1,000 mg tablet levetiracetam 1,000 mg tablet   Take 1 tablet twice a day by oral route.  lacosamide (VIMPAT) 100 mg tab tablet Take 1 Tab by mouth two (2) times a day.  Max Daily Amount: 200 mg. 60 Tab 11    ergocalciferol (VITAMIN D2) 50,000 unit capsule Take 50,000 Units by mouth every thirty (30) days.  metoprolol (LOPRESSOR) 25 mg tablet Take 0.5 Tabs by mouth two (2) times a day. 60 Tab 3    bumetanide (BUMEX) 2 mg tablet Take 2 mg by mouth daily.  acetaminophen (TYLENOL) 325 mg tablet Take 650 mg by mouth every eight (8) hours as needed for Pain.  potassium chloride (K-DUR, KLOR-CON) 20 mEq tablet Take 20 mEq by mouth two (2) times a day.  furosemide (LASIX) 20 mg tablet Take  by mouth daily.  DOC-Q-LACE 100 mg capsule two (2) times daily as needed.  ammonium lactate (LAC-HYDRIN) 12 % lotion Apply  to affected area daily. rub in to affected area well      ondansetron (ZOFRAN ODT) 8 mg disintegrating tablet Take 1 Tab by mouth every eight (8) hours as needed for Nausea. 20 Tab 2    oxyCODONE-acetaminophen (PERCOCET) 5-325 mg per tablet 1-2 tabs every 4hrs as needed for pain. Maximum daily dose 12 tablets. 80 Tab 0    naproxen (NAPROSYN) 500 mg tablet Take 1 Tab by mouth two (2) times daily (with meals).  20 Tab 0        Objective:   Vitals:  Vitals:    12/26/18 1332   BP: 118/70   Pulse: 84   Resp: 12   SpO2: 98%   Weight: 264 lb (119.7 kg)   PainSc:   0 - No pain               Lab Data Reviewed:  Lab Results   Component Value Date/Time    WBC 5.2 12/06/2018 04:06 PM    HCT 39.5 12/06/2018 04:06 PM    HGB 13.3 12/06/2018 04:06 PM    PLATELET 392 48/94/3955 04:06 PM       Lab Results   Component Value Date/Time    Sodium 144 12/06/2018 04:06 PM    Potassium 4.3 12/06/2018 04:06 PM    Chloride 99 12/06/2018 04:06 PM    CO2 27 12/06/2018 04:06 PM    Glucose 81 12/06/2018 04:06 PM    BUN 21 12/06/2018 04:06 PM    Creatinine 1.14 12/06/2018 04:06 PM    Calcium 9.2 12/06/2018 04:06 PM       No components found for: TROPQUANT    No results found for: JOSE      Lab Results   Component Value Date/Time    Hemoglobin A1c 5.3 10/15/2018 03:48 PM        Lab Results   Component Value Date/Time    Vitamin B12 771 10/15/2018 03:48 PM       No results found for: Paco GARCIA Reasons    Lab Results   Component Value Date/Time    Cholesterol, total 158 12/06/2018 04:06 PM    HDL Cholesterol 66 12/06/2018 04:06 PM    LDL, calculated 76 12/06/2018 04:06 PM    VLDL, calculated 16 12/06/2018 04:06 PM    Triglyceride 82 12/06/2018 04:06 PM         CT Results (recent):  Results from [place] encounter on 10/17/15   CT MAXILLOFACIAL WO CONT    Narrative **Final Report**       ICD Codes / Adm. Diagnosis: 97  276.1 / Seizure  Seizure  Examination:  CT MAXILLOFACIAL WO CON  - [id number] - Oct 17 2015 10:33AM  Accession No:  [id number]  Reason:  head trauma after seizure      REPORT:  INDICATION:  head trauma after seizure    EXAM: CT MAXILLOFACIAL STRUCTURES WITHOUT CONTRAST. Comparison: CT head 10/22/2008. PROCEDURE: Sequential axial images of the maxillofacial bones were   performed, using bone algorithm. Soft tissue and bone windows were examined. Post-processing for coronal reformatting was performed. Contrast was not   administered. FINDINGS: No fracture is obvious. The orbital rims and floors of orbits are   intact. The nasal bone is intact, with a flattened configuration which is   stable since older head CTs. . The bony structures of the mandible and   maxilla show no acute abnormality. There are no air-fluid levels in the   paranasal sinuses, and no soft tissue swelling is noted focally. Minimal   mucoperiosteal thickening. No obvious mass or abscess. Normal sized lymph   nodes scattered throughout the cervical chains. IMPRESSION: No acute bony abnormality of the maxillofacial structures. Signing/Reading Doctor: Josephine Blanchard ([id number])    Approved: Josephine Blanchard ([id number])  Oct 17 2015 10:53AM                                MRI Results (recent):  No results found for this or any previous visit. IR Results (recent):  No results found for this or any previous visit.     VAS/US Results (recent):  No results found for this or any previous visit. PHYSICAL EXAM:  General:    Alert, cooperative, no distress, appears stated age. Head:   Normocephalic, without obvious abnormality, atraumatic. Eyes:   Conjunctivae/corneas clear. PERRLA  Nose:  Nares normal. No drainage or sinus tenderness. Throat:    Lips, mucosa, and tongue normal.  No Thrush  Neck:  Supple, symmetrical,  no adenopathy, thyroid: non tender    no carotid bruit and no JVD. Back:    Symmetric,  No CVA tenderness. Lungs:   Clear to auscultation bilaterally. No Wheezing or Rhonchi. No rales. Chest wall:  No tenderness or deformity. No Accessory muscle use. Heart:   Regular rate and rhythm,  no murmur, rub or gallop. Abdomen:   Soft, non-tender. Not distended. Bowel sounds normal. No masses  Extremities: Extremities normal, atraumatic, No cyanosis. No edema. No clubbing  Skin:     Texture, turgor normal. No rashes or lesions. Not Jaundiced  Lymph nodes: Cervical, supraclavicular normal.  Psych:  Good insight. Not depressed. Not anxious or agitated. NEUROLOGICAL EXAM:  Appearance: The patient is well developed, well nourished, provides a coherent history and is in no acute distress. Mental Status: Oriented to time, place and person. Mood and affect appropriate. Cranial Nerves:   Intact visual fields. Fundi are benign. ANGELINA, EOM's full, no nystagmus, no ptosis. Facial sensation is normal. Corneal reflexes are intact. Facial movement is symmetric. Hearing is normal bilaterally. Palate is midline with normal sternocleidomastoid and trapezius muscles are normal. Tongue is midline. Motor:  5/5 strength in upper and lower proximal and distal muscles. Normal bulk and tone. No fasciculations. Reflexes:   Deep tendon reflexes 2+/4 and symmetrical.   Sensory:    Dysesthesia to touch, pinprick and vibration. Gait:   Unsteady gait. Tremor:   No tremor noted. Cerebellar:  No cerebellar signs present.    Neurovascular:  Normal heart sounds and regular rhythm, peripheral pulses intact, and no carotid bruits. Assesment  1. Seizure disorder ([address]  Stable    2. Intellectual disability  Stable    3. Vitamin B12 deficiency  Lab for B12 level    ___________________________________________________  PLAN: Medication and plan reviewed with patient and caregiver      ICD-10-CM ICD-9-CM    1. Seizure disorder ([facility] [address] G40.909 345.90    2. Intellectual disability F79 319    3.  Vitamin B12 deficiency E53.8 266.2      Follow-up Disposition:  Return in about 1 year (around 12/26/2019).         ___________________________________________________    Total time spent with patient:  []15   []25   []35   [] __ minutes    Care Plan discussed with:    [x]Patient   []Family    [x]Care Manager   []Consultant/Specialist :    ___________________________________________________    Attending Physician: Guanaco Isaac MD

## 2018-12-26 NOTE — PROGRESS NOTES
No seizures in over a year, meds updated in system. Needs form filled out for adult services, and medication refills. No new sx.

## 2019-02-19 RX ORDER — ERGOCALCIFEROL 1.25 MG/1
50000 CAPSULE ORAL
Qty: 1 CAP | Refills: 6 | Status: SHIPPED | OUTPATIENT
Start: 2019-02-19 | End: 2019-08-29 | Stop reason: SDUPTHER

## 2019-04-10 NOTE — PROGRESS NOTES
Subjective:  
  
Samira Royal is a 69 y.o. male who presents for *** 
 
ROS: 
General/Constitutional:   No headache, fever, fatigue, weight loss or weight gain Eyes:   No redness, pruritis, pain, visual changes, swelling, or discharge Ears:    No pain, loss or changes in hearing Neck:   No swelling, masses, stiffness, pain, or limited movement Cardiac:    No chest pain Respiratory:   No cough or shortness of breath GI:   No nausea/vomiting, diarrhea, abdominal pain, bloody or dark stools :   No dysuria or  hematuria Neurological:   No loss of consciousness, dizziness, seizures, dysarthria, cognitive changes, memory changes,  problems with balance, or unilateral weakness Skin: No rash PMHx: 
Past Medical History:  
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   
 edema legs  S/P biventricular cardiac pacemaker procedure 10/23/2015  
 10/23/15 [facility] [employer] biventricular pacemaker inmplant  Screen for colon cancer 9/27/2012 Meds:  
Current Outpatient Medications Medication Sig Dispense Refill  ergocalciferol (VITAMIN D2) 50,000 unit capsule Take 1 Cap by mouth every thirty (30) days. 1 Cap 6  levETIRAcetam 1,000 mg tablet Take 1 Tab by mouth two (2) times a day. 60 Tab 11  
 lacosamide (VIMPAT) 100 mg tab tablet Take 1 Tab by mouth two (2) times a day. Max Daily Amount: 200 mg. 60 Tab 11  
 furosemide (LASIX) 20 mg tablet Take  by mouth daily.  DOC-Q-LACE 100 mg capsule two (2) times daily as needed.  ammonium lactate (LAC-HYDRIN) 12 % lotion Apply  to affected area daily. rub in to affected area well  ondansetron (ZOFRAN ODT) 8 mg disintegrating tablet Take 1 Tab by mouth every eight (8) hours as needed for Nausea.  20 Tab 2  
  oxyCODONE-acetaminophen (PERCOCET) 5-325 mg per tablet 1-2 tabs every 4hrs as needed for pain. Maximum daily dose 12 tablets. 80 Tab 0  
 naproxen (NAPROSYN) 500 mg tablet Take 1 Tab by mouth two (2) times daily (with meals). 20 Tab 0  
 metoprolol (LOPRESSOR) 25 mg tablet Take 0.5 Tabs by mouth two (2) times a day. 60 Tab 3  
 bumetanide (BUMEX) 2 mg tablet Take 2 mg by mouth daily.  acetaminophen (TYLENOL) 325 mg tablet Take 650 mg by mouth every eight (8) hours as needed for Pain.  potassium chloride (K-DUR, KLOR-CON) 20 mEq tablet Take 20 mEq by mouth two (2) times a day. Allergies:  
No Known Allergies Smoker: 
Social History Tobacco Use Smoking Status Never Smoker Smokeless Tobacco Never Used ETOH:  
Social History Substance and Sexual Activity Alcohol Use No  
 
 
FH:  
Family History Problem Relation Age of Onset  Other Other   
     unable to obtain due to mental status  No Known Problems Sister Objective: There were no vitals taken for this visit. Physical Examination:  
GEN: No apparent distress. Alert and oriented and responds to all questions appropriately. EYES:  Conjunctiva clear; pupils round and reactive to light; extraocular movements are intact. EAR: External ears are normal.  Tympanic membranes are clear and without effusion. NOSE: Turbinates are within normal limits. No drainage OROPHYARYNX: No oral lesions or exudates. NECK:  Supple; no masses; thyroid normal          
LUNGS: Respirations unlabored; clear to auscultation bilaterally CARDIOVASCULAR: Regular, rate, and rhythm without murmurs, gallops or rubs ABDOMEN: Soft; nontender; nondistended; normoactive bowel sounds; no masses or organomegaly NEUROLOGIC:  No focal neurologic deficits. Strength and sensation grossly intact. Coordination and gait grossly intact. EXT: Well perfused. No edema. SKIN: No obvious rashes. Assessment:  
{No Diagnosis Found} *** 
 
Plan:  
 
*** Patient is counseled to return to the office if symptoms do not improve as expected. Urgent consultation with the nearest Emergency Department is strongly recommended if condition worsens. Patient is counseled to follow up as recommended and to inform the office if any changes in treatment are recommended. Patient *** with Dr. Darren Mcdonald Signed By:  Fili Knight MD  
 Family Medicine Resident

## 2019-04-11 ENCOUNTER — OFFICE VISIT (OUTPATIENT)
Dept: FAMILY MEDICINE CLINIC | Age: 74
End: 2019-04-11

## 2019-04-11 VITALS
BODY MASS INDEX: 31.83 KG/M2 | OXYGEN SATURATION: 100 % | HEART RATE: 59 BPM | RESPIRATION RATE: 18 BRPM | TEMPERATURE: 97.6 F | SYSTOLIC BLOOD PRESSURE: 120 MMHG | HEIGHT: 75 IN | WEIGHT: 256 LBS | DIASTOLIC BLOOD PRESSURE: 76 MMHG

## 2019-04-11 DIAGNOSIS — I89.0 LYMPHEDEMA OF BOTH LOWER EXTREMITIES: Primary | ICD-10-CM

## 2019-04-11 DIAGNOSIS — E66.9 CLASS 1 OBESITY WITH BODY MASS INDEX (BMI) OF 32.0 TO 32.9 IN ADULT, UNSPECIFIED OBESITY TYPE, UNSPECIFIED WHETHER SERIOUS COMORBIDITY PRESENT: ICD-10-CM

## 2019-04-11 NOTE — PROGRESS NOTES
1. Have you been to the ER, urgent care clinic since your last visit? Hospitalized since your last visit? No    2. Have you seen or consulted any other health care providers outside of the [address] [place] [address] since your last visit? Include any pap smears or colon screening. No    Chief Complaint   Patient presents with    Foot Swelling     bilateral     Patient accompanied by caregiver. Caregiver states swelling is worse in left foot. Blood pressure 120/76, pulse (!) 59, temperature 97.6 °F (36.4 °C), temperature source Oral, resp. rate 18, height 6' 3\" (1.905 m), weight 256 lb (116.1 kg), SpO2 100 %.

## 2019-04-11 NOTE — PROGRESS NOTES
Subjective:      Marianna Wing is a 69 y.o. male who presents for follow up of lymphedema. Comes in with caregiver from residential home. Reports lymphedema is at baseline. Does wear compression stockings and keeps legs elevated after work. Denies any redness, pain or new rashes of his skin. Does not need any medication refills. Has no other concerns today. Review of Systems   Constitutional: Negative for chills and fever. Respiratory: Negative for cough and shortness of breath. Cardiovascular: Positive for leg swelling. Negative for chest pain and palpitations. 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 1 Cap 6    levETIRAcetam 1,000 mg tablet Take 1 Tab by mouth two (2) times a day. 60 Tab 11    lacosamide (VIMPAT) 100 mg tab tablet Take 1 Tab by mouth two (2) times a day. Max Daily Amount: 200 mg. 60 Tab 11    furosemide (LASIX) 20 mg tablet Take  by mouth as needed.  metoprolol (LOPRESSOR) 25 mg tablet Take 0.5 Tabs by mouth two (2) times a day. 60 Tab 3    bumetanide (BUMEX) 2 mg tablet Take 2 mg by mouth daily.  acetaminophen (TYLENOL) 325 mg tablet Take 650 mg by mouth every eight (8) hours as needed for Pain.  potassium chloride (K-DUR, KLOR-CON) 20 mEq tablet Take 20 mEq by mouth two (2) times a day.  DOC-Q-LACE 100 mg capsule two (2) times daily as needed.       ammonium lactate (LAC-HYDRIN) 12 % lotion Apply  to affected area daily. rub in to affected area well      ondansetron (ZOFRAN ODT) 8 mg disintegrating tablet Take 1 Tab by mouth every eight (8) hours as needed for Nausea. 20 Tab 2    oxyCODONE-acetaminophen (PERCOCET) 5-325 mg per tablet 1-2 tabs every 4hrs as needed for pain. Maximum daily dose 12 tablets. 80 Tab 0    naproxen (NAPROSYN) 500 mg tablet Take 1 Tab by mouth two (2) times daily (with meals). 20 Tab 0       Allergies: Allergies   Allergen Reactions    Sulfa (Sulfonamide Antibiotics) Rash       Smoker:  Social History     Tobacco Use   Smoking Status Never Smoker   Smokeless Tobacco Never Used       ETOH:   Social History     Substance and Sexual Activity   Alcohol Use No       FH:   Family History   Problem Relation Age of Onset    Other Other         unable to obtain due to mental status    No Known Problems Sister          Objective:     Visit Vitals  /76 (BP 1 Location: Right arm, BP Patient Position: Sitting)   Pulse (!) 59   Temp 97.6 °F (36.4 °C) (Oral)   Resp 18   Ht 6' 3\" (1.905 m)   Wt 256 lb (116.1 kg)   SpO2 100%   BMI 32.00 kg/m²       Physical Examination:   GEN: No apparent distress. Alert and oriented and responds to questions. LUNGS: Respirations unlabored; clear to auscultation bilaterally  CARDIOVASCULAR: Regular, rate, and rhythm without murmurs, gallops or rubs   EXT: Moderate edema of lower extremiies bilaterally. Scab noted on left dorsum, No erythema or tenderness to palpation         Assessment:       ICD-10-CM ICD-9-CM    1. Lymphedema of both lower extremities I89.0 457.1    2.  Class 1 obesity with body mass index (BMI) of 32.0 to 32.9 in adult, unspecified obesity type, unspecified whether serious comorbidity present  E66.9 278.00     Z68.32 V85.32          Plan:     Continue compression stockings   Avoid prolonged dependent positioning   Continue applying emollients to skin  Encourage wt loss    Counseled on diet and exercise   Follow up for annual physical   Filled out [address] form     Patient is counseled to return to the office if symptoms do not improve as expected. Urgent consultation with the nearest Emergency Department is strongly recommended if condition worsens. Patient is counseled to follow up as recommended and to inform the office if any changes in treatment are recommended.           Signed By:  Heraclio Conrad MD    Family Medicine Resident

## 2019-06-06 ENCOUNTER — CLINICAL SUPPORT (OUTPATIENT)
Dept: CARDIOLOGY CLINIC | Age: 74
End: 2019-06-06

## 2019-06-06 ENCOUNTER — OFFICE VISIT (OUTPATIENT)
Dept: CARDIOLOGY CLINIC | Age: 74
End: 2019-06-06

## 2019-06-06 VITALS
HEIGHT: 75 IN | WEIGHT: 257 LBS | DIASTOLIC BLOOD PRESSURE: 78 MMHG | SYSTOLIC BLOOD PRESSURE: 120 MMHG | OXYGEN SATURATION: 97 % | BODY MASS INDEX: 31.95 KG/M2 | HEART RATE: 70 BPM

## 2019-06-06 DIAGNOSIS — I10 ESSENTIAL HYPERTENSION: ICD-10-CM

## 2019-06-06 DIAGNOSIS — I44.2 CHB (COMPLETE HEART BLOCK) (HCC): ICD-10-CM

## 2019-06-06 DIAGNOSIS — Z95.0 S/P BIVENTRICULAR CARDIAC PACEMAKER PROCEDURE: ICD-10-CM

## 2019-06-06 DIAGNOSIS — R00.1 BRADYCARDIA: ICD-10-CM

## 2019-06-06 DIAGNOSIS — Z45.018 PACEMAKER REPROGRAMMING/CHECK: ICD-10-CM

## 2019-06-06 DIAGNOSIS — I48.19 PERSISTENT ATRIAL FIBRILLATION (HCC): Primary | ICD-10-CM

## 2019-06-06 DIAGNOSIS — I44.1: ICD-10-CM

## 2019-06-06 DIAGNOSIS — Z95.0 CARDIAC PACEMAKER IN SITU: Primary | ICD-10-CM

## 2019-06-06 RX ORDER — METOPROLOL TARTRATE 25 MG/1
12.5 TABLET, FILM COATED ORAL 2 TIMES DAILY
Qty: 60 TAB | Refills: 3 | Status: CANCELLED | OUTPATIENT
Start: 2019-06-06

## 2019-06-06 NOTE — PROGRESS NOTES
Subjective:      Minh Martinez is a 69 y.o. male is here for EP follow up. The patient denies chest pain/ shortness of breath, orthopnea, PND,  palpitations, syncope, presyncope or fatigue. Reports lower extremity edema which is unchanged.        Patient Active Problem List    Diagnosis Date Noted    Hypertension 10/19/2015     Priority: 3 - Three     Class: Chronic    Lymphedema of both lower extremities 10/15/2018    Vitamin D deficiency 10/15/2018    Vitamin B12 deficiency 10/15/2018    History of closed Colles' fracture 10/15/2018    H/O Mobitz type II block 10/15/2018    S/P biventricular cardiac pacemaker procedure 10/23/2015    Seizure disorder ([facility] 10/20/2015     Class: Chronic    Intellectual disability 10/20/2015     Class: Chronic    Advance care planning 10/20/2015    Mobitz type II incomplete atrioventricular block 10/19/2015    Bilateral lower extremity edema 10/17/2015    Screen for colon cancer 09/27/2012    Bradycardia 08/27/2012      Mahesh Edwards MD  Past Medical History:   Diagnosis Date    Epilepsy ([facility]     Heart disease     Hypertension     Incontinence     Leg swelling     Mental retardation, 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)     Other ill-defined conditions(799.89)     edema legs    S/P biventricular cardiac pacemaker procedure 10/23/2015    10/23/15 [employer] biventricular pacemaker inmplant    Screen for colon cancer 9/27/2012      Past Surgical History:   Procedure Laterality Date    HX COLONOSCOPY  04/05/2017    HX ORTHOPAEDIC Right 12/22/2017    ORIF right distal radius    HX PACEMAKER  2015    bi ventricular pacemaker      Allergies   Allergen Reactions    Sulfa (Sulfonamide Antibiotics) Rash      Family History   Problem Relation Age of Onset    Other Other         unable to obtain due to mental status    No Known Problems Sister     negative for cardiac disease  Social History     Socioeconomic History    Marital status: SINGLE     Spouse name: Not on file    Number of children: Not on file    Years of education: Not on file    Highest education level: Not on file   Tobacco Use    Smoking status: Never Smoker    Smokeless tobacco: Never Used   Substance and Sexual Activity    Alcohol use: No    Drug use: No     Current Outpatient Medications   Medication Sig    apixaban (ELIQUIS) 5 mg tablet Take 1 Tab by mouth two (2) times a day.  ergocalciferol (VITAMIN D2) 50,000 unit capsule Take 1 Cap by mouth every thirty (30) days.  levETIRAcetam 1,000 mg tablet Take 1 Tab by mouth two (2) times a day.  lacosamide (VIMPAT) 100 mg tab tablet Take 1 Tab by mouth two (2) times a day. Max Daily Amount: 200 mg.    metoprolol (LOPRESSOR) 25 mg tablet Take 0.5 Tabs by mouth two (2) times a day.  bumetanide (BUMEX) 2 mg tablet Take 2 mg by mouth daily.  furosemide (LASIX) 20 mg tablet Take  by mouth as needed. No current facility-administered medications for this visit. Vitals:    06/06/19 1314   BP: 120/78   Pulse: 70   SpO2: 97%   Weight: 257 lb (116.6 kg)   Height: 6' 3\" (1.905 m)       I have reviewed the nurses notes, vitals, problem list, allergy list, medical history, family, social history and medications. Review of Symptoms:    General: Pt denies excessive weight gain or loss. Pt is able to conduct ADL's  HEENT: Denies blurred vision, headaches, epistaxis and difficulty swallowing. Respiratory: Denies shortness of breath, SCHWARZ, wheezing or stridor. Cardiovascular: Denies precordial pain, palpitations, Reports edema. Gastrointestinal: Denies poor appetite, indigestion, abdominal pain or blood in stool  Urinary: Denies dysuria, pyuria  Musculoskeletal: Denies pain or swelling from muscles or joints  Neurologic: Denies tremor, paresthesias, or sensory motor disturbance  Skin: Denies rash, itching or texture change. Psych: Denies depression      Physical Exam:      General: Well developed, in no acute distress.   HEENT: Eyes - PERRL, no jvd  Heart:  irreg irreg, S1/S2 negative S3 or S4. Regular, no murmur, gallop or rub. Respiratory: Clear bilaterally x 4, no wheezing or rales  Extremities: ++ edema, normal cap refill, no cyanosis. Musculoskeletal: No clubbing  Neuro: A&Ox3, speech clear, gait stable. Skin: Skin color is normal. No rashes or lesions. Non diaphoretic  Vascular: 2+ pulses symmetric in all extremities    Cardiographics    Ekg: atrial fib,LBBB    Results for orders placed or performed during the hospital encounter of 11/14/15   EKG, 12 LEAD, INITIAL   Result Value Ref Range    Ventricular Rate 107 BPM    Atrial Rate 107 BPM    P-R Interval 134 ms    QRS Duration 190 ms    Q-T Interval 420 ms    QTC Calculation (Bezet) 560 ms    Calculated P Axis 59 degrees    Calculated R Axis -96 degrees    Calculated T Axis 80 degrees    Diagnosis       Atrial-sensed ventricular-paced rhythm  When compared with ECG of 24-OCT-2015 05:32,  Electronic ventricular pacemaker has replaced Sinus rhythm  No obvious pacer malfunction   Confirmed by Chey Bryan ([id number]) on 11/15/2015 4:53:55 PM           Lab Results   Component Value Date/Time    WBC 5.2 12/06/2018 04:06 PM    Hemoglobin (POC) 12.7 12/22/2017 11:23 AM    HGB 13.3 12/06/2018 04:06 PM    HCT 39.5 12/06/2018 04:06 PM    PLATELET 340 26/45/3831 04:06 PM    MCV 98 (H) 12/06/2018 04:06 PM      Lab Results   Component Value Date/Time    Sodium 144 12/06/2018 04:06 PM    Potassium 4.3 12/06/2018 04:06 PM    Chloride 99 12/06/2018 04:06 PM    CO2 27 12/06/2018 04:06 PM    Anion gap 6 11/14/2015 10:58 AM    Glucose 81 12/06/2018 04:06 PM    BUN 21 12/06/2018 04:06 PM    Creatinine 1.14 12/06/2018 04:06 PM    BUN/Creatinine ratio 18 12/06/2018 04:06 PM    GFR est AA 74 12/06/2018 04:06 PM    GFR est non-AA 64 12/06/2018 04:06 PM    Calcium 9.2 12/06/2018 04:06 PM    Bilirubin, total 0.2 10/15/2018 03:48 PM    AST (SGOT) 18 10/15/2018 03:48 PM    Alk.  phosphatase 115 10/15/2018 03:48 PM    Protein, total 7.3 10/15/2018 03:48 PM    Albumin 3.9 10/15/2018 03:48 PM    Globulin 4.2 (H) 11/14/2015 10:58 AM    A-G Ratio 1.1 (L) 10/15/2018 03:48 PM    ALT (SGPT) 7 10/15/2018 03:48 PM      Lab Results   Component Value Date/Time    TSH 0.50 10/18/2015 03:29 AM        Assessment:           ICD-10-CM ICD-9-CM    1. Persistent atrial fibrillation (HCC) I48.1 427.31 ECHO ADULT COMPLETE   2. Pacemaker reprogramming/check Z45.018 V53.31 AMB POC EKG ROUTINE W/ 12 LEADS, INTER & REP      ECHO ADULT COMPLETE   3. Bradycardia R00.1 427.89 ECHO ADULT COMPLETE   4. CHB (complete heart block) (HCC) I44.2 426.0 ECHO ADULT COMPLETE   5. Essential hypertension I10 401.9 ECHO ADULT COMPLETE   6. S/P biventricular cardiac pacemaker procedure Z95.0 V45.01 ECHO ADULT COMPLETE     Orders Placed This Encounter    AMB POC EKG ROUTINE W/ 12 LEADS, INTER & REP     Order Specific Question:   Reason for Exam:     Answer:   routine    apixaban (ELIQUIS) 5 mg tablet     Sig: Take 1 Tab by mouth two (2) times a day. Dispense:  60 Tab     Refill:  12        Plan:     Mr Eric Rodriguez is  Here for annual follow up and device check. He is V paced 92% for high grade av block and 18% AP. Last echo 2015 with EF 60%. Will repeat. He is on med rx for htn. Normotensive this visit. New onset AF since early May, in progress. Asymptomatic. Will initiate eliquis and have him follow up in 2 weeks with Dr Bridget Major. Discussed [facility], AF ablation vs AAD. He will follow up in the device clinic remotely and with me in one year.     Continue medical management for htn, av block. Thank you for allowing me to participate in Chelsey Oliva 's care.       Bandar Yi NP

## 2019-06-06 NOTE — PROGRESS NOTES
1. Have you been to the ER, urgent care clinic since your last visit? Hospitalized since your last visit? No    2. Have you seen or consulted any other health care providers outside of the [address] [place] [address] since your last visit? Include any pap smears or colon screening. No    Chief Complaint   Patient presents with    Pacemaker Check     6 mo appt. Denied cardiac symptoms.

## 2019-06-06 NOTE — TELEPHONE ENCOUNTER
----- Message from Kelsi Course sent at 6/6/2019  9:42 AM EDT -----  Regarding: Dr. Kay Petit: [phone]  Ms. Woodard Litten, from [facility], is requesting a refill on the following prescriptions: Bumetanide 2 mg, Mutoprolol 25 mg, Potassium 20 MEQ.       [phone] Ext [phone]     Fax: [phone]

## 2019-06-08 RX ORDER — BUMETANIDE 2 MG/1
2 TABLET ORAL DAILY
Qty: 90 TAB | Refills: 0 | Status: SHIPPED | OUTPATIENT
Start: 2019-06-08 | End: 2019-08-28 | Stop reason: SDUPTHER

## 2019-06-08 RX ORDER — POTASSIUM CHLORIDE 20 MEQ/1
20 TABLET, EXTENDED RELEASE ORAL 2 TIMES DAILY
Qty: 180 TAB | Refills: 0 | Status: SHIPPED | OUTPATIENT
Start: 2019-06-08 | End: 2019-06-11 | Stop reason: SDUPTHER

## 2019-06-10 NOTE — TELEPHONE ENCOUNTER
Deisy Wood from [address] called stating the Potassium 20 MEQ is normally taken 1 daily but was sent to them as 2x daily and they would like to make sure the dosage was supposed to be changed. She also states they did not received the refill for Mutoprolol 25 mg 2x a day.     Deisy Wood Phone [phone] Ext

## 2019-06-11 RX ORDER — POTASSIUM CHLORIDE 20 MEQ/1
20 TABLET, EXTENDED RELEASE ORAL 2 TIMES DAILY
Qty: 180 TAB | Refills: 0 | Status: SHIPPED | OUTPATIENT
Start: 2019-06-11 | End: 2019-08-29 | Stop reason: SDUPTHER

## 2019-06-11 RX ORDER — METOPROLOL TARTRATE 25 MG/1
12.5 TABLET, FILM COATED ORAL 2 TIMES DAILY
Qty: 60 TAB | Refills: 3 | Status: SHIPPED | OUTPATIENT
Start: 2019-06-11 | End: 2019-09-30 | Stop reason: SDUPTHER

## 2019-06-11 NOTE — TELEPHONE ENCOUNTER
----- Message from Tiffanie Kuo sent at 6/11/2019  3:27 PM EDT -----  Regarding: Dr. Lorenzo Thrasher [facility]) is requesting a call back regarding resident hasn't received his refills for Potassium 20 MICHAEL, Metoprolol 25mg at [facility] on file). Resident will be out of Rx as of 06/14/2019. Best contact is [phone].

## 2019-06-12 ENCOUNTER — TELEPHONE (OUTPATIENT)
Dept: FAMILY MEDICINE CLINIC | Age: 74
End: 2019-06-12

## 2019-06-12 NOTE — TELEPHONE ENCOUNTER
----- Message from Ruddy Castle sent at 6/12/2019  8:23 AM EDT -----  Regarding: Ageze/telephone  Nena Russo with [id number] is requesting for you to call in regard to two different Rx for potassium and she wanted to make sure it was right. [facility] number is [phone].

## 2019-06-13 DIAGNOSIS — I10 ESSENTIAL HYPERTENSION: Primary | ICD-10-CM

## 2019-06-13 DIAGNOSIS — E87.6 HYPOKALEMIA: ICD-10-CM

## 2019-06-13 NOTE — TELEPHONE ENCOUNTER
Michell Wallace with [facility] is now calling about this same message and asking this be addressed asap

## 2019-06-13 NOTE — TELEPHONE ENCOUNTER
[facility] with [address] is calling again and states this is her 3rd time calling and notes if issue not addressed today then patient will be out of medication. Please call as there is issues with directions on medication which call for clarification.     Call her at [phone]  Ext [phone]    thanks

## 2019-06-13 NOTE — TELEPHONE ENCOUNTER
Talked to Sonya Renee, patient is taking it differently. Clarified and gave orders to be on the same dose he was (metoprolol 1mg BID and potassium 20 daily). Pt will need to come for lab testing for BMP. Staff to assist in calling.

## 2019-06-28 ENCOUNTER — DOCUMENTATION ONLY (OUTPATIENT)
Dept: NEUROLOGY | Age: 74
End: 2019-06-28

## 2019-06-28 DIAGNOSIS — G40.909 SEIZURE DISORDER (HCC): ICD-10-CM

## 2019-06-28 RX ORDER — LACOSAMIDE 100 MG/1
100 TABLET ORAL 2 TIMES DAILY
Qty: 60 TAB | Refills: 11 | Status: SHIPPED | OUTPATIENT
Start: 2019-06-28 | End: 2019-12-02 | Stop reason: SDUPTHER

## 2019-06-28 NOTE — PROGRESS NOTES
lacosamide (VIMPAT) 100 mg tab tablet [[id number]]     Order Details   Dose: 100 mg Route: Oral Frequency: 2 TIMES DAILY   Dispense Quantity: 60 Tab Refills: 11 Fills remaining: --           Sig: Take 1 Tab by mouth two (2) times a day.  Max Daily Amount: 200 mg.          Written Date: 06/28/19 Expiration Date: --     Start Date: 06/28/19 End Date: --                 Faxed script for Vimpat to [address] [place] [address]

## 2019-07-17 ENCOUNTER — HOSPITAL ENCOUNTER (OUTPATIENT)
Dept: VASCULAR SURGERY | Age: 74
Discharge: HOME | End: 2019-07-17
Attending: STUDENT IN AN ORGANIZED HEALTH CARE EDUCATION/TRAINING PROGRAM
Payer: MEDICARE

## 2019-07-17 ENCOUNTER — OFFICE VISIT (OUTPATIENT)
Dept: FAMILY MEDICINE CLINIC | Age: 74
End: 2019-07-17

## 2019-07-17 VITALS
HEART RATE: 74 BPM | SYSTOLIC BLOOD PRESSURE: 109 MMHG | HEIGHT: 75 IN | BODY MASS INDEX: 32.08 KG/M2 | TEMPERATURE: 98.5 F | WEIGHT: 258 LBS | DIASTOLIC BLOOD PRESSURE: 68 MMHG | RESPIRATION RATE: 20 BRPM | OXYGEN SATURATION: 95 %

## 2019-07-17 DIAGNOSIS — M79.89 SWELLING OF RIGHT LOWER EXTREMITY: ICD-10-CM

## 2019-07-17 DIAGNOSIS — I89.0 LYMPHEDEMA OF BOTH LOWER EXTREMITIES: Primary | ICD-10-CM

## 2019-07-17 PROCEDURE — 93971 EXTREMITY STUDY: CPT

## 2019-07-17 NOTE — PROGRESS NOTES
Chief Complaint   Patient presents with    Follow-up     lymphedema BLE     1. Have you been to the ER, urgent care clinic since your last visit? Hospitalized since your last visit? No    2. Have you seen or consulted any other health care providers outside of the [address] [place] [address] since your last visit? Include any pap smears or colon screening.  No     Health Maintenance Due   Topic Date Due    Hepatitis C Screening  1945    Shingrix Vaccine Age 50> (1 of 2) 01/16/1997    FOBT Q 1 YEAR AGE 50-75  01/16/1997    GLAUCOMA SCREENING Q2Y  01/16/2012    Pneumococcal 65+ years (2 of 2 - PCV13) 10/18/2016

## 2019-07-17 NOTE — PATIENT INSTRUCTIONS
Lymphedema: Care Instructions  Your Care Instructions    Lymphedema is fluid that builds up in the arms or legs. It is often caused by surgery to remove lymph nodes during cancer treatment, especially breast cancer surgery, which can cause fluid to build up in the arm. It can happen after radiation treatment to an area that involves lymph nodes. It also can be caused by a fractured bone or surgery to fix a fracture. And some medicines also can cause lymphedema. Some people get it for unknown reasons. Normally, lymph nodes trap bacteria and other substances as fluid flows through them. Then, the white cells in the body's defense, or immune, system can destroy the substances. But if there are few or no lymph nodes--or if the lymph system in an arm or leg has been damaged--fluid can build up in the affected arm or leg. You can take simple steps at home to help treat or prevent fluid buildup. Treatment may include raising the arm or leg to let gravity drain the fluid. You also can wear compression stockings or sleeves. Follow-up care is a key part of your treatment and safety. Be sure to make and go to all appointments, and call your doctor if you are having problems. It's also a good idea to know your test results and keep a list of the medicines you take. How can you care for yourself at home? · Wear a compression stocking or sleeve as your doctor suggests. It can help keep fluid from pooling in an arm or leg. Wear it during air travel. · Prop up the swollen arm or leg on a pillow anytime you sit or lie down. Try to keep it above the level of your heart. This will help reduce swelling. · Avoid crossing your legs if your legs are swollen. · Get some exercise on most days of the week. Increase the intensity of exercise slowly. Water aerobics can help reduce swelling by helping fluid move around. Wear your compression stocking or sleeve during exercise, but not during water exercise.   · See a physical therapist. He or she can teach you how to do self-massage to help fluid move around. You also can learn what activities would be best for you. · Keep your feet clean and wear clean socks or stockings every day. Check your feet often for signs of infection, such as redness or heat. Do not walk barefoot. · If you have had lymph nodes removed from under your arm:  ? Do not have blood drawn from the arm on the side of the lymph node surgery. ? Do not allow a blood pressure cuff to be placed on that arm. If you are in the hospital, make sure your nurse and other hospital staff know of your condition. ? Wear gloves when gardening or doing other activities that may lead to cuts on your fingers or hands. · If you have had lymph nodes removed from your groin:  ? Bathe your feet daily in lukewarm, not hot, water. Use a mild soap that has a moisturizer, or use a moisturizer separately. ? Check your feet for blisters or cuts. ? Wear comfortable and supportive shoes that fit properly. ? Wear the correct size of panty hose and stockings. Avoid garters or knee-high or thigh-high stockings. · Ask your doctor how to treat any cuts, scratches, insect bites, or other injuries that may occur. · Use sunscreen and insect repellent when outdoors to protect your skin from sunburn and insect bites. · Wear medical alert jewelry that says you have lymphedema. You can buy these at most drugstores and on the Internet. When should you call for help? Call your doctor now or seek immediate medical care if:    · You have signs of infection, such as:  ? Increased pain, swelling, warmth, or redness. ? Red streaks leading from the area. ? Pus draining from the area. ? A fever.    Watch closely for changes in your health, and be sure to contact your doctor if:    · You have new or worse symptoms from lymphedema.     · You do not get better as expected. Where can you learn more?   Go to http://mandi-marylou.info/. Enter V398 in the search box to learn more about \"Lymphedema: Care Instructions. \"  Current as of: March 27, 2018  Content Version: 11.9  © 8948-4542 [employer]. Care instructions adapted under license by [employer] (which disclaims liability or warranty for this information). If you have questions about a medical condition or this instruction, always ask your healthcare professional. [place] [address] any warranty or liability for your use of this information.

## 2019-07-17 NOTE — PROGRESS NOTES
Fozia Parrish is a 69 y.o. male who had concerns including Follow-up (lymphedema BLE). Patient presents today for b/l lower extremity lymphedema. Comes in with caregiver from residential home. States that the swelling  has remained stable from previous clinic visit. Wearing compression stockings and is trying to keep is legs elevated after work. Denies redness, drainage, or rashes. He is followed by cardiology, Chrissy Crow. He is taking Bumex. He has never been to lymphedema clinic. Denies reashes, wounds, or skin break down. ROS: (positive in bold)  General: wt loss, fever, chills, fatigue   Skin: rashes or suspicious skin lesions  HEENT: changes in vision,sore throat, runny nose  Cardiac: chest pain, palpitations, SCHWARZ, edema   Pul: SOB, dyspnea, wheezing, cough, hemoptysis  MS: joint pain, swelling, myalgia, back pain  Psych: anxiety, depression    Past Medical History:  Past Medical History:   Diagnosis Date    Epile[facility]     Heart disease     Hypertension     Incontinence     Leg swelling     Mental retardation, 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)     Other ill-defined conditions(799.89)     edema legs    S/P biventricular cardiac pacemaker procedure 10/23/2015    10/23/15 [employer] biventricular pacemaker inmplant    Screen for colon cancer 9/27/2012       Past Surgical History:  Past Surgical History:   Procedure Laterality Date    HX COLONOSCOPY  04/05/2017    HX ORTHOPAEDIC Right 12/22/2017    ORIF right distal radius    HX PACEMAKER  2015    bi ventricular pacemaker        Family History:  Family History   Problem Relation Age of Onset    Other Other         unable to obtain due to mental status    No Known Problems Sister        Allergies: Allergies   Allergen Reactions    Sulfa (Sulfonamide Antibiotics) Rash       Social History:  Social History     Tobacco Use    Smoking status: Never Smoker    Smokeless tobacco: Never Used   Substance Use Topics    Alcohol use: No    Drug use:  No Current Meds:  Current Outpatient Medications on File Prior to Visit   Medication Sig Dispense Refill    lacosamide (VIMPAT) 100 mg tab tablet Take 1 Tab by mouth two (2) times a day. Max Daily Amount: 200 mg. 60 Tab 11    potassium chloride (K-DUR, KLOR-CON) 20 mEq tablet Take 1 Tab by mouth two (2) times a day. 180 Tab 0    metoprolol tartrate (LOPRESSOR) 25 mg tablet Take 0.5 Tabs by mouth two (2) times a day. 60 Tab 3    bumetanide (BUMEX) 2 mg tablet Take 1 Tab by mouth daily. 90 Tab 0    apixaban (ELIQUIS) 5 mg tablet Take 1 Tab by mouth two (2) times a day. 60 Tab 12    ergocalciferol (VITAMIN D2) 50,000 unit capsule Take 1 Cap by mouth every thirty (30) days. 1 Cap 6    levETIRAcetam 1,000 mg tablet Take 1 Tab by mouth two (2) times a day. 60 Tab 11     No current facility-administered medications on file prior to visit. Visit Vitals  /68 (BP 1 Location: Left arm, BP Patient Position: Sitting)   Pulse 74   Temp 98.5 °F (36.9 °C) (Oral)   Resp 20   Ht 6' 3\" (1.905 m)   Wt 258 lb (117 kg)   SpO2 95%   BMI 32.25 kg/m²       Gen:  Well developed, well nourished male in no acute distress  HEENT: normocephalic/atraumatic;   Skin:  Erythema to right lower extremity. No rashes or wounds. Card:  RRR, no m/r/g  Chest:  CTAB, no w/r/r  Abd:  BS+, Soft, nontender/nondistended  Extr:  2+ pulses BL, 2+ LE edema on left. 3+ LE edema on right. Right calf measurers 53cm. Left calf measures 41 cm. Trace erythema on right. No warmth. Psych:  Nl mood and affect     Assessment/Plan:      ICD-10-CM ICD-9-CM    1. Lymphedema of both lower extremities I89.0 457.1 REFERRAL TO LYMPHEDEMA CLINIC   2. Swelling of right lower extremity M79.89 729.81 DUPLEX LOWER EXT VENOUS RIGHT      B/L lower extremity lymphedema. Right leg is measuring 53cm in diameter on the right  and 41cm on the left. Concern for possible DVT. There is slight erythema on the right w/o warmth. Non TTP.  No obvious wounds or skin breakdown. Will get stat lower extremity doppler to r/o DVT. VSS. Continue compression socks on a daily basis. He has not been evaluated in lymphedema clinic. Will refer to lymphedema clinic. Discussed with group home caregiver and patient who agree and understand above plan. I have discussed the diagnosis with the patient and the intended plan as seen in the above orders. The patient has received an after-visit summary and questions were answered concerning future plans. I have discussed medication side effects and warnings with the patient as well. The patient agrees and understands above plan. Follow-up and Dispositions    · Return in about 2 days (around 7/19/2019) for Leg swelling. Patient discussed with supervising attending, Dr. Garen Duane.     Yesica Blackwell DO

## 2019-07-19 ENCOUNTER — TELEPHONE (OUTPATIENT)
Dept: FAMILY MEDICINE CLINIC | Age: 74
End: 2019-07-19

## 2019-07-19 NOTE — TELEPHONE ENCOUNTER
Called and discussed recent lower extremity duplex with patient caregiver. No evidence of DVT. Multiple large lymphnodes. Patient to follow-up with lymphedema clinic.      Chelsey London DO

## 2019-08-08 ENCOUNTER — HOSPITAL ENCOUNTER (OUTPATIENT)
Dept: LAB | Age: 74
Discharge: HOME | End: 2019-08-08
Payer: MEDICARE

## 2019-08-08 ENCOUNTER — LAB ONLY (OUTPATIENT)
Dept: FAMILY MEDICINE CLINIC | Age: 74
End: 2019-08-08

## 2019-08-08 PROCEDURE — 80048 BASIC METABOLIC PNL TOTAL CA: CPT

## 2019-08-08 PROCEDURE — 36415 COLL VENOUS BLD VENIPUNCTURE: CPT

## 2019-08-09 LAB
BUN SERPL-MCNC: 19 MG/DL (ref 8–27)
BUN/CREAT SERPL: 24 (ref 10–24)
CALCIUM SERPL-MCNC: 9.1 MG/DL (ref 8.6–10.2)
CHLORIDE SERPL-SCNC: 98 MMOL/L (ref 96–106)
CO2 SERPL-SCNC: 26 MMOL/L (ref 20–29)
CREAT SERPL-MCNC: 0.8 MG/DL (ref 0.76–1.27)
GLUCOSE SERPL-MCNC: 61 MG/DL (ref 65–99)
POTASSIUM SERPL-SCNC: 4 MMOL/L (ref 3.5–5.2)
SODIUM SERPL-SCNC: 140 MMOL/L (ref 134–144)

## 2019-08-20 ENCOUNTER — HOSPITAL ENCOUNTER (OUTPATIENT)
Dept: LAB | Age: 74
Discharge: HOME | End: 2019-08-20
Payer: MEDICARE

## 2019-08-20 ENCOUNTER — OFFICE VISIT (OUTPATIENT)
Dept: CARDIOLOGY CLINIC | Age: 74
End: 2019-08-20

## 2019-08-20 VITALS
DIASTOLIC BLOOD PRESSURE: 60 MMHG | WEIGHT: 247.8 LBS | BODY MASS INDEX: 30.81 KG/M2 | HEIGHT: 75 IN | OXYGEN SATURATION: 97 % | HEART RATE: 70 BPM | SYSTOLIC BLOOD PRESSURE: 102 MMHG | RESPIRATION RATE: 16 BRPM

## 2019-08-20 DIAGNOSIS — R00.1 BRADYCARDIA: ICD-10-CM

## 2019-08-20 DIAGNOSIS — I48.19 PERSISTENT ATRIAL FIBRILLATION (HCC): ICD-10-CM

## 2019-08-20 DIAGNOSIS — Z95.0 S/P BIVENTRICULAR CARDIAC PACEMAKER PROCEDURE: ICD-10-CM

## 2019-08-20 DIAGNOSIS — Z45.018 PACEMAKER REPROGRAMMING/CHECK: ICD-10-CM

## 2019-08-20 DIAGNOSIS — I44.2 CHB (COMPLETE HEART BLOCK) (HCC): ICD-10-CM

## 2019-08-20 DIAGNOSIS — I10 ESSENTIAL HYPERTENSION: Primary | ICD-10-CM

## 2019-08-20 DIAGNOSIS — I42.0 DILATED CARDIOMYOPATHY (HCC): ICD-10-CM

## 2019-08-20 PROCEDURE — 85027 COMPLETE CBC AUTOMATED: CPT

## 2019-08-20 PROCEDURE — 36415 COLL VENOUS BLD VENIPUNCTURE: CPT

## 2019-08-20 PROCEDURE — 85610 PROTHROMBIN TIME: CPT

## 2019-08-20 PROCEDURE — 80053 COMPREHEN METABOLIC PANEL: CPT

## 2019-08-20 NOTE — PROGRESS NOTES
Subjective:      Erica Smith is a 69 y.o. male is here for EP consult. The patient denies chest pain/ shortness of breath, orthopnea, PND, palpitations, syncope, presyncope or fatigue. He has lymphedema with increasing lower extremity edema recently however his weight is down and is trying to lose weight.       Patient Active Problem List    Diagnosis Date Noted    Hypertension 10/19/2015     Priority: 3 - Three     Class: Chronic    Lymphedema of both lower extremities 10/15/2018    Vitamin D deficiency 10/15/2018    Vitamin B12 deficiency 10/15/2018    History of closed Colles' fracture 10/15/2018    H/O Mobitz type II block 10/15/2018    S/P biventricular cardiac pacemaker procedure 10/23/2015    Seizure disorder ([facility] [address] 10/20/2015     Class: Chronic    Intellectual disability 10/20/2015     Class: Chronic    Advance care planning 10/20/2015    Mobitz type II incomplete atrioventricular block 10/19/2015    Bilateral lower extremity edema 10/17/2015    Screen for colon cancer 09/27/2012    Bradycardia 08/27/2012      Babar Christine MD  Past Medical History:   Diagnosis Date    Epilepsy ([facility] [address]     Heart disease     Hypertension     Incontinence     Leg swelling     Mental retardation, 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)     Other ill-defined conditions(799.89)     edema legs    S/P biventricular cardiac pacemaker procedure 10/23/2015    10/23/15 [employer] biventricular pacemaker inmplant    Screen for colon cancer 9/27/2012      Past Surgical History:   Procedure Laterality Date    HX COLONOSCOPY  04/05/2017    HX ORTHOPAEDIC Right 12/22/2017    ORIF right distal radius    HX PACEMAKER  2015    bi ventricular pacemaker      Allergies   Allergen Reactions    Sulfa (Sulfonamide Antibiotics) Rash      Family History   Problem Relation Age of Onset    Other Other         unable to obtain due to mental status    No Known Problems Sister     negative for cardiac disease  Social History Socioeconomic History    Marital status: SINGLE     Spouse name: Not on file    Number of children: Not on file    Years of education: Not on file    Highest education level: Not on file   Tobacco Use    Smoking status: Never Smoker    Smokeless tobacco: Never Used   Substance and Sexual Activity    Alcohol use: No    Drug use: No     Current Outpatient Medications   Medication Sig    lacosamide (VIMPAT) 100 mg tab tablet Take 1 Tab by mouth two (2) times a day. Max Daily Amount: 200 mg.  potassium chloride (K-DUR, KLOR-CON) 20 mEq tablet Take 1 Tab by mouth two (2) times a day.  metoprolol tartrate (LOPRESSOR) 25 mg tablet Take 0.5 Tabs by mouth two (2) times a day.  bumetanide (BUMEX) 2 mg tablet Take 1 Tab by mouth daily.  apixaban (ELIQUIS) 5 mg tablet Take 1 Tab by mouth two (2) times a day.  ergocalciferol (VITAMIN D2) 50,000 unit capsule Take 1 Cap by mouth every thirty (30) days.  levETIRAcetam 1,000 mg tablet Take 1 Tab by mouth two (2) times a day. No current facility-administered medications for this visit. Vitals:    08/20/19 1302   BP: 102/60   Pulse: 70   Resp: 16   SpO2: 97%   Weight: 247 lb 12.8 oz (112.4 kg)   Height: 6' 3\" (1.905 m)       I have reviewed the nurses notes, vitals, problem list, allergy list, medical history, family, social history and medications. Review of Symptoms:    General: Pt denies excessive weight gain or loss. Pt is able to conduct ADL's  HEENT: Denies blurred vision, headaches, epistaxis and difficulty swallowing. Respiratory: Denies shortness of breath, SCHWARZ, wheezing or stridor.   Cardiovascular: Denies precordial pain, palpitations, edema or PND  Gastrointestinal: Denies poor appetite, indigestion, abdominal pain or blood in stool  Urinary: Denies dysuria, pyuria  Musculoskeletal: Denies pain or swelling from muscles or joints  Neurologic: Denies tremor, paresthesias, or sensory motor disturbance  Skin: Denies rash, itching or texture change. Psych: Denies depression    Physical Exam:      General: Well developed, in no acute distress. HEENT: Eyes - PERRL, no jvd  Heart:  Normal S1/S2 negative S3 or S4. Regular, no murmur, gallop or rub. Respiratory: Clear bilaterally x 4, no wheezing or rales  Extremities:  No edema, normal cap refill, no cyanosis. Musculoskeletal: No clubbing  Neuro: A&Ox3, speech clear, gait stable. Skin: Skin color is normal. No rashes or lesions. Non diaphoretic  Vascular: 2+ pulses symmetric in all extremities    Cardiographics    Ekg: V paced.      Results for orders placed or performed during the hospital encounter of 11/14/15   EKG, 12 LEAD, INITIAL   Result Value Ref Range    Ventricular Rate 107 BPM    Atrial Rate 107 BPM    P-R Interval 134 ms    QRS Duration 190 ms    Q-T Interval 420 ms    QTC Calculation (Bezet) 560 ms    Calculated P Axis 59 degrees    Calculated R Axis -96 degrees    Calculated T Axis 80 degrees    Diagnosis       Atrial-sensed ventricular-paced rhythm  When compared with ECG of 24-OCT-2015 05:32,  Electronic ventricular pacemaker has replaced Sinus rhythm  No obvious pacer malfunction   Confirmed by Favio Fitzgerald ([id number]) on 11/15/2015 4:53:55 PM           Lab Results   Component Value Date/Time    WBC 5.2 12/06/2018 04:06 PM    Hemoglobin (POC) 12.7 12/22/2017 11:23 AM    HGB 13.3 12/06/2018 04:06 PM    HCT 39.5 12/06/2018 04:06 PM    PLATELET 302 42/32/4907 04:06 PM    MCV 98 (H) 12/06/2018 04:06 PM      Lab Results   Component Value Date/Time    Sodium 140 08/08/2019 02:21 PM    Potassium 4.0 08/08/2019 02:21 PM    Chloride 98 08/08/2019 02:21 PM    CO2 26 08/08/2019 02:21 PM    Anion gap 6 11/14/2015 10:58 AM    Glucose 61 (L) 08/08/2019 02:21 PM    BUN 19 08/08/2019 02:21 PM    Creatinine 0.80 08/08/2019 02:21 PM    BUN/Creatinine ratio 24 08/08/2019 02:21 PM    GFR est  08/08/2019 02:21 PM    GFR est non-AA 89 08/08/2019 02:21 PM    Calcium 9.1 08/08/2019 02:21 PM Bilirubin, total 0.2 10/15/2018 03:48 PM    AST (SGOT) 18 10/15/2018 03:48 PM    Alk. phosphatase 115 10/15/2018 03:48 PM    Protein, total 7.3 10/15/2018 03:48 PM    Albumin 3.9 10/15/2018 03:48 PM    Globulin 4.2 (H) 11/14/2015 10:58 AM    A-G Ratio 1.1 (L) 10/15/2018 03:48 PM    ALT (SGPT) 7 10/15/2018 03:48 PM      Lab Results   Component Value Date/Time    TSH 0.50 10/18/2015 03:29 AM        Assessment:             ICD-10-CM ICD-9-CM    1. Essential hypertension I10 401.9 AMB POC EKG ROUTINE W/ 12 LEADS, INTER & REP      METABOLIC PANEL, COMPREHENSIVE      CBC W/O DIFF      PROTHROMBIN TIME + INR   2. Bradycardia V13.4 574.23 METABOLIC PANEL, COMPREHENSIVE      CBC W/O DIFF      PROTHROMBIN TIME + INR   3. Pacemaker reprogramming/check V23.415 J98.08 METABOLIC PANEL, COMPREHENSIVE      CBC W/O DIFF      PROTHROMBIN TIME + INR   4. CHB (complete heart block) (HCC) H54.4 064.8 METABOLIC PANEL, COMPREHENSIVE      CBC W/O DIFF      PROTHROMBIN TIME + INR   5. S/P biventricular cardiac pacemaker procedure F00.9 L24.29 METABOLIC PANEL, COMPREHENSIVE      CBC W/O DIFF      PROTHROMBIN TIME + INR   6. Persistent atrial fibrillation (HCC) I48.1 427.31    7. Dilated cardiomyopathy ([facility] [address] I42.0 425.4      Orders Placed This Encounter    METABOLIC PANEL, COMPREHENSIVE    CBC W/O DIFF    PROTHROMBIN TIME + INR    AMB POC EKG ROUTINE W/ 12 LEADS, INTER & REP     Order Specific Question:   Reason for Exam:     Answer:   ROUTINE        Plan:     Mr Jose E De León is here for results from his last visit in June. At that time he was V paced 92% for high grade av block and 18% AP. Echo 2015 with EF 60%, EF now 45-50%. New onset AF since early May, in progress. on eliquis.     He will follow up in the device clinic remotely and with me in one year. Thank you for allowing me to participate in Remi Cuba 's care. Kelly Bosch NP    Patient seen and examined. All pertinent data reviewed.  I have reviewed detailed note as outlined by Yayo Suarez NP. Case discussed with Nursing/medical assistant staff and Yayo Suarez NP. Plans as outlined. New onset AF on eliquis. Now with increased lower ext edema and a drop in his lvef to 45%. HE is a candidate for an afib ablation. I discussed the risks/benefits/alternatives of the procedure with the patient. Risks include (but are not limited to) bleeding, heart block, infection, cva/mi/tamponade/esophageal perforation/pv stenosis/death. The patient understands that there is a 3-7% major complication rate and agrees to proceed. Thank you for this interesting consultation.       Kraly Gracia MD, Aiyana Piña

## 2019-08-20 NOTE — H&P (VIEW-ONLY)
Subjective:      Anna Shepherd is a 69 y.o. male is here for EP consult. The patient denies chest pain/ shortness of breath, orthopnea, PND, palpitations, syncope, presyncope or fatigue. He has lymphedema with increasing lower extremity edema recently however his weight is down and is trying to lose weight.       Patient Active Problem List    Diagnosis Date Noted    Hypertension 10/19/2015     Priority: 3 - Three     Class: Chronic    Lymphedema of both lower extremities 10/15/2018    Vitamin D deficiency 10/15/2018    Vitamin B12 deficiency 10/15/2018    History of closed Colles' fracture 10/15/2018    H/O Mobitz type II block 10/15/2018    S/P biventricular cardiac pacemaker procedure 10/23/2015    Seizure disorder ([facility] [address] 10/20/2015     Class: Chronic    Intellectual disability 10/20/2015     Class: Chronic    Advance care planning 10/20/2015    Mobitz type II incomplete atrioventricular block 10/19/2015    Bilateral lower extremity edema 10/17/2015    Screen for colon cancer 09/27/2012    Bradycardia 08/27/2012      Gerson Bundy MD  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)     Other ill-defined conditions(799.89)     edema legs    S/P biventricular cardiac pacemaker procedure 10/23/2015    10/23/15 [employer] biventricular pacemaker inmplant    Screen for colon cancer 9/27/2012      Past Surgical History:   Procedure Laterality Date    HX COLONOSCOPY  04/05/2017    HX ORTHOPAEDIC Right 12/22/2017    ORIF right distal radius    HX PACEMAKER  2015    bi ventricular pacemaker      Allergies   Allergen Reactions    Sulfa (Sulfonamide Antibiotics) Rash      Family History   Problem Relation Age of Onset    Other Other         unable to obtain due to mental status    No Known Problems Sister     negative for cardiac disease  Social History Socioeconomic History    Marital status: SINGLE     Spouse name: Not on file    Number of children: Not on file    Years of education: Not on file    Highest education level: Not on file   Tobacco Use    Smoking status: Never Smoker    Smokeless tobacco: Never Used   Substance and Sexual Activity    Alcohol use: No    Drug use: No     Current Outpatient Medications   Medication Sig    lacosamide (VIMPAT) 100 mg tab tablet Take 1 Tab by mouth two (2) times a day. Max Daily Amount: 200 mg.  potassium chloride (K-DUR, KLOR-CON) 20 mEq tablet Take 1 Tab by mouth two (2) times a day.  metoprolol tartrate (LOPRESSOR) 25 mg tablet Take 0.5 Tabs by mouth two (2) times a day.  bumetanide (BUMEX) 2 mg tablet Take 1 Tab by mouth daily.  apixaban (ELIQUIS) 5 mg tablet Take 1 Tab by mouth two (2) times a day.  ergocalciferol (VITAMIN D2) 50,000 unit capsule Take 1 Cap by mouth every thirty (30) days.  levETIRAcetam 1,000 mg tablet Take 1 Tab by mouth two (2) times a day. No current facility-administered medications for this visit. Vitals:    08/20/19 1302   BP: 102/60   Pulse: 70   Resp: 16   SpO2: 97%   Weight: 247 lb 12.8 oz (112.4 kg)   Height: 6' 3\" (1.905 m)       I have reviewed the nurses notes, vitals, problem list, allergy list, medical history, family, social history and medications. Review of Symptoms:    General: Pt denies excessive weight gain or loss. Pt is able to conduct ADL's  HEENT: Denies blurred vision, headaches, epistaxis and difficulty swallowing. Respiratory: Denies shortness of breath, SCHWARZ, wheezing or stridor.   Cardiovascular: Denies precordial pain, palpitations, edema or PND  Gastrointestinal: Denies poor appetite, indigestion, abdominal pain or blood in stool  Urinary: Denies dysuria, pyuria  Musculoskeletal: Denies pain or swelling from muscles or joints  Neurologic: Denies tremor, paresthesias, or sensory motor disturbance  Skin: Denies rash, itching or texture change. Psych: Denies depression    Physical Exam:      General: Well developed, in no acute distress. HEENT: Eyes - PERRL, no jvd  Heart:  Normal S1/S2 negative S3 or S4. Regular, no murmur, gallop or rub. Respiratory: Clear bilaterally x 4, no wheezing or rales  Extremities:  No edema, normal cap refill, no cyanosis. Musculoskeletal: No clubbing  Neuro: A&Ox3, speech clear, gait stable. Skin: Skin color is normal. No rashes or lesions. Non diaphoretic  Vascular: 2+ pulses symmetric in all extremities    Cardiographics    Ekg: V paced.      Results for orders placed or performed during the hospital encounter of 11/14/15   EKG, 12 LEAD, INITIAL   Result Value Ref Range    Ventricular Rate 107 BPM    Atrial Rate 107 BPM    P-R Interval 134 ms    QRS Duration 190 ms    Q-T Interval 420 ms    QTC Calculation (Bezet) 560 ms    Calculated P Axis 59 degrees    Calculated R Axis -96 degrees    Calculated T Axis 80 degrees    Diagnosis       Atrial-sensed ventricular-paced rhythm  When compared with ECG of 24-OCT-2015 05:32,  Electronic ventricular pacemaker has replaced Sinus rhythm  No obvious pacer malfunction   Confirmed by Jairo England ([id number]) on 11/15/2015 4:53:55 PM           Lab Results   Component Value Date/Time    WBC 5.2 12/06/2018 04:06 PM    Hemoglobin (POC) 12.7 12/22/2017 11:23 AM    HGB 13.3 12/06/2018 04:06 PM    HCT 39.5 12/06/2018 04:06 PM    PLATELET 685 32/72/6428 04:06 PM    MCV 98 (H) 12/06/2018 04:06 PM      Lab Results   Component Value Date/Time    Sodium 140 08/08/2019 02:21 PM    Potassium 4.0 08/08/2019 02:21 PM    Chloride 98 08/08/2019 02:21 PM    CO2 26 08/08/2019 02:21 PM    Anion gap 6 11/14/2015 10:58 AM    Glucose 61 (L) 08/08/2019 02:21 PM    BUN 19 08/08/2019 02:21 PM    Creatinine 0.80 08/08/2019 02:21 PM    BUN/Creatinine ratio 24 08/08/2019 02:21 PM    GFR est  08/08/2019 02:21 PM    GFR est non-AA 89 08/08/2019 02:21 PM    Calcium 9.1 08/08/2019 02:21 PM Bilirubin, total 0.2 10/15/2018 03:48 PM    AST (SGOT) 18 10/15/2018 03:48 PM    Alk. phosphatase 115 10/15/2018 03:48 PM    Protein, total 7.3 10/15/2018 03:48 PM    Albumin 3.9 10/15/2018 03:48 PM    Globulin 4.2 (H) 11/14/2015 10:58 AM    A-G Ratio 1.1 (L) 10/15/2018 03:48 PM    ALT (SGPT) 7 10/15/2018 03:48 PM      Lab Results   Component Value Date/Time    TSH 0.50 10/18/2015 03:29 AM        Assessment:             ICD-10-CM ICD-9-CM    1. Essential hypertension I10 401.9 AMB POC EKG ROUTINE W/ 12 LEADS, INTER & REP      METABOLIC PANEL, COMPREHENSIVE      CBC W/O DIFF      PROTHROMBIN TIME + INR   2. Bradycardia S54.5 160.98 METABOLIC PANEL, COMPREHENSIVE      CBC W/O DIFF      PROTHROMBIN TIME + INR   3. Pacemaker reprogramming/check F37.676 Y56.92 METABOLIC PANEL, COMPREHENSIVE      CBC W/O DIFF      PROTHROMBIN TIME + INR   4. CHB (complete heart block) (HCC) H22.4 261.8 METABOLIC PANEL, COMPREHENSIVE      CBC W/O DIFF      PROTHROMBIN TIME + INR   5. S/P biventricular cardiac pacemaker procedure H97.4 O72.32 METABOLIC PANEL, COMPREHENSIVE      CBC W/O DIFF      PROTHROMBIN TIME + INR   6. Persistent atrial fibrillation (HCC) I48.1 427.31    7. Dilated cardiomyopathy ([facility] [address] I42.0 425.4      Orders Placed This Encounter    METABOLIC PANEL, COMPREHENSIVE    CBC W/O DIFF    PROTHROMBIN TIME + INR    AMB POC EKG ROUTINE W/ 12 LEADS, INTER & REP     Order Specific Question:   Reason for Exam:     Answer:   ROUTINE        Plan:     Mr Anika Johnson is here for results from his last visit in June. At that time he was V paced 92% for high grade av block and 18% AP. Echo 2015 with EF 60%, EF now 45-50%. New onset AF since early May, in progress. on eliquis.     He will follow up in the device clinic remotely and with me in one year. Thank you for allowing me to participate in Italia Tarango 's care. Je Em, NP    Patient seen and examined. All pertinent data reviewed.  I have reviewed detailed note as outlined by Lou Mcgrath NP. Case discussed with Nursing/medical assistant staff and Lou Mcgrath NP. Plans as outlined. New onset AF on eliquis. Now with increased lower ext edema and a drop in his lvef to 45%. HE is a candidate for an afib ablation. I discussed the risks/benefits/alternatives of the procedure with the patient. Risks include (but are not limited to) bleeding, heart block, infection, cva/mi/tamponade/esophageal perforation/pv stenosis/death. The patient understands that there is a 3-7% major complication rate and agrees to proceed. Thank you for this interesting consultation.       Brenna Grant MD, Eusebia Delgado

## 2019-08-20 NOTE — PROGRESS NOTES
1. Have you been to the ER, urgent care clinic since your last visit? Hospitalized since your last visit? NO    2. Have you seen or consulted any other health care providers outside of the [address] [place] [address] since your last visit? Include any pap smears or colon screening. NO    NO CARDIAC C/O.

## 2019-08-21 LAB
ALBUMIN SERPL-MCNC: 4.1 G/DL (ref 3.5–4.8)
ALBUMIN/GLOB SERPL: 1.6 {RATIO} (ref 1.2–2.2)
ALP SERPL-CCNC: 93 IU/L (ref 39–117)
ALT SERPL-CCNC: 7 IU/L (ref 0–44)
AST SERPL-CCNC: 18 IU/L (ref 0–40)
BILIRUB SERPL-MCNC: 0.8 MG/DL (ref 0–1.2)
BUN SERPL-MCNC: 21 MG/DL (ref 8–27)
BUN/CREAT SERPL: 22 (ref 10–24)
CALCIUM SERPL-MCNC: 9.1 MG/DL (ref 8.6–10.2)
CHLORIDE SERPL-SCNC: 99 MMOL/L (ref 96–106)
CO2 SERPL-SCNC: 26 MMOL/L (ref 20–29)
CREAT SERPL-MCNC: 0.95 MG/DL (ref 0.76–1.27)
ERYTHROCYTE [DISTWIDTH] IN BLOOD BY AUTOMATED COUNT: 12.2 % (ref 12.3–15.4)
GLOBULIN SER CALC-MCNC: 2.6 G/DL (ref 1.5–4.5)
GLUCOSE SERPL-MCNC: 94 MG/DL (ref 65–99)
HCT VFR BLD AUTO: 37.7 % (ref 37.5–51)
HGB BLD-MCNC: 12.5 G/DL (ref 13–17.7)
INR PPP: 1.1 (ref 0.8–1.2)
MCH RBC QN AUTO: 32.3 PG (ref 26.6–33)
MCHC RBC AUTO-ENTMCNC: 33.2 G/DL (ref 31.5–35.7)
MCV RBC AUTO: 97 FL (ref 79–97)
PLATELET # BLD AUTO: 144 X10E3/UL (ref 150–450)
POTASSIUM SERPL-SCNC: 3.9 MMOL/L (ref 3.5–5.2)
PROT SERPL-MCNC: 6.7 G/DL (ref 6–8.5)
PROTHROMBIN TIME: 11.8 SEC (ref 9.1–12)
RBC # BLD AUTO: 3.87 X10E6/UL (ref 4.14–5.8)
SODIUM SERPL-SCNC: 141 MMOL/L (ref 134–144)
WBC # BLD AUTO: 5.4 X10E3/UL (ref 3.4–10.8)

## 2019-08-26 ENCOUNTER — TELEPHONE (OUTPATIENT)
Dept: CARDIOLOGY CLINIC | Age: 74
End: 2019-08-26

## 2019-08-26 NOTE — TELEPHONE ENCOUNTER
Call [phone] Ion j[facility]. They have questions on meds prior to his procedure. Please call.  Thanks

## 2019-09-05 RX ORDER — POTASSIUM CHLORIDE 20 MEQ/1
TABLET, EXTENDED RELEASE ORAL
Qty: 90 TAB | Refills: 3 | Status: SHIPPED | OUTPATIENT
Start: 2019-09-05 | End: 2020-08-01 | Stop reason: SDUPTHER

## 2019-09-05 RX ORDER — ERGOCALCIFEROL 1.25 MG/1
CAPSULE ORAL
Qty: 3 CAP | Refills: 4 | Status: SHIPPED | OUTPATIENT
Start: 2019-09-05 | End: 2019-11-27 | Stop reason: DRUGHIGH

## 2019-09-09 ENCOUNTER — APPOINTMENT (OUTPATIENT)
Dept: CARDIAC CATH/INVASIVE PROCEDURES | Age: 74
End: 2019-09-09
Attending: INTERNAL MEDICINE

## 2019-09-09 ENCOUNTER — OFFICE VISIT (OUTPATIENT)
Dept: FAMILY MEDICINE CLINIC | Age: 74
End: 2019-09-09

## 2019-09-09 ENCOUNTER — HOSPITAL ENCOUNTER (OUTPATIENT)
Age: 74
Discharge: HOME HEALTH | End: 2019-09-09
Attending: INTERNAL MEDICINE | Admitting: INTERNAL MEDICINE

## 2019-09-09 ENCOUNTER — TELEPHONE (OUTPATIENT)
Dept: FAMILY MEDICINE CLINIC | Age: 74
End: 2019-09-09

## 2019-09-09 VITALS
RESPIRATION RATE: 18 BRPM | OXYGEN SATURATION: 99 % | HEART RATE: 72 BPM | DIASTOLIC BLOOD PRESSURE: 73 MMHG | SYSTOLIC BLOOD PRESSURE: 117 MMHG | TEMPERATURE: 98.2 F

## 2019-09-09 VITALS
RESPIRATION RATE: 16 BRPM | HEART RATE: 70 BPM | TEMPERATURE: 98 F | HEIGHT: 75 IN | DIASTOLIC BLOOD PRESSURE: 69 MMHG | SYSTOLIC BLOOD PRESSURE: 112 MMHG | BODY MASS INDEX: 29.47 KG/M2 | OXYGEN SATURATION: 98 % | WEIGHT: 237 LBS

## 2019-09-09 DIAGNOSIS — I48.91 ATRIAL FIBRILLATION, UNSPECIFIED TYPE (HCC): ICD-10-CM

## 2019-09-09 DIAGNOSIS — L03.116 CELLULITIS OF LEFT FOOT: Primary | ICD-10-CM

## 2019-09-09 RX ORDER — CEPHALEXIN 500 MG/1
500 CAPSULE ORAL 4 TIMES DAILY
Qty: 20 CAP | Refills: 0 | Status: SHIPPED | OUTPATIENT
Start: 2019-09-09 | End: 2019-09-14

## 2019-09-09 NOTE — PROGRESS NOTES
[address] [facility] [address]   Office [phone], Fax [phone]      Subjective:     Chief Complaint   Patient presents with    Foot Ulcer      both feet        HPI:  Stuart Chery is a 69 y.o. WHITE OR  male  With history of lymphedema of both lower extremities presenting for open wounds in both feet    Patient was referred from EP  today due to open wounds in lower extremity. Patient with intellectual disability and lives in group home. History provided by the care giver. She states that she noticed the open wound this morning when he went to see his cardiologist. Does not remember when it appeared. Reports chronic lymphedema and healing wounds that they have been wrapping and putting in neosporin. Patient denies any fever,itching, pain or drainage from the wound. Patient does not have diabetes. Patient has not been going to lymphedema clinic. Review of Systems   Constitutional: Negative for chills and fever. Respiratory: Negative for shortness of breath. Cardiovascular: Positive for leg swelling (chronic ). Negative for chest pain. Gastrointestinal: Negative for nausea and vomiting. Skin: Negative for itching and rash.        Past Medical History:   Diagnosis Date    Epilepsy ([facility] [address]     Heart disease     Hypertension     Incontinence     Leg swelling     Mental retardation, 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)     Other ill-defined conditions(799.89)     edema legs    S/P biventricular cardiac pacemaker procedure 10/23/2015    10/23/15 [employer] biventricular pacemaker inmplant    Screen for colon cancer 9/27/2012       Current Outpatient Medications on File Prior to Visit   Medication Sig Dispense Refill    bumetanide (BUMEX) 2 mg tablet TAKE 1 TABLET BY MOUTH DAILY 90 Tab 3    potassium chloride (K-DUR, KLOR-CON) 20 mEq tablet TAKE 1 TABLET BY MOUTH DAILY 90 Tab 3    VITAMIN D2 50,000 unit capsule TAKE 1 CAPSULE BY MOUTH ONCE MONTHLY 3 Cap 4    lacosamide (VIMPAT) 100 mg tab tablet Take 1 Tab by mouth two (2) times a day. Max Daily Amount: 200 mg. 60 Tab 11    metoprolol tartrate (LOPRESSOR) 25 mg tablet Take 0.5 Tabs by mouth two (2) times a day. 60 Tab 3    apixaban (ELIQUIS) 5 mg tablet Take 1 Tab by mouth two (2) times a day. 60 Tab 12    levETIRAcetam 1,000 mg tablet Take 1 Tab by mouth two (2) times a day. 60 Tab 11     No current facility-administered medications on file prior to visit.         Allergies   Allergen Reactions    Sulfa (Sulfonamide Antibiotics) Rash       Past Surgical History:   Procedure Laterality Date    HX COLONOSCOPY  04/05/2017    HX ORTHOPAEDIC Right 12/22/2017    ORIF right distal radius    HX PACEMAKER  2015    bi ventricular pacemaker        Social History     Socioeconomic History    Marital status: SINGLE     Spouse name: Not on file    Number of children: Not on file    Years of education: Not on file    Highest education level: Not on file   Occupational History    Not on file   Social Needs    Financial resource strain: Not on file    Food insecurity:     Worry: Not on file     Inability: Not on file    Transportation needs:     Medical: Not on file     Non-medical: Not on file   Tobacco Use    Smoking status: Never Smoker    Smokeless tobacco: Never Used   Substance and Sexual Activity    Alcohol use: No    Drug use: No    Sexual activity: Not on file   Lifestyle    Physical activity:     Days per week: Not on file     Minutes per session: Not on file    Stress: Not on file   Relationships    Social connections:     Talks on phone: Not on file     Gets together: Not on file     Attends Hinduism service: Not on file     Active member of club or organization: Not on file     Attends meetings of clubs or organizations: Not on file     Relationship status: Not on file    Intimate partner violence:     Fear of current or ex partner: Not on file     Emotionally abused: Not on file Physically abused: Not on file     Forced sexual activity: Not on file   Other Topics Concern    Not on file   Social History Narrative    Not on file       Patient Active Problem List   Diagnosis Code    Bradycardia R00.1    Screen for colon cancer Z12.11    Seizure disorder ([facility] [address] G40.909    Bilateral lower extremity edema R60.0    Hypertension I10    Mobitz type II incomplete atrioventricular block I44.1    Intellectual disability F79    Advance care planning Z71.89    S/P biventricular cardiac pacemaker procedure Z95.0    Lymphedema of both lower extremities I89.0    Vitamin D deficiency E55.9    Vitamin B12 deficiency E53.8    History of closed Colles' fracture Z87.81    H/O Mobitz type II block Z86.79         Objective:   Vitals - reviewed  Visit Vitals  /69 (BP 1 Location: Left arm, BP Patient Position: Sitting)   Pulse 70   Temp 98 °F (36.7 °C) (Oral)   Resp 16   Ht 6' 3\" (1.905 m)   Wt 237 lb (107.5 kg)   SpO2 98%   BMI 29.62 kg/m²        Physical Exam   Constitutional: He appears well-developed. Cardiovascular: Normal rate and regular rhythm. Pulmonary/Chest: Effort normal and breath sounds normal. No respiratory distress. Musculoskeletal: He exhibits edema. Extensive 3+ pitting edema in lower extremity b/l  Faint but palpable DP in R. Foot  Palpable DP pulse in left foot  Left foot: red, warm to touch in the dorsal surface from anklee extending to thr mid foot. Small 0.5 cmx 1cm skin breakdown in the medial side of the left ankle. Assessment and orders:       ICD-10-CM ICD-9-CM    1. Cellulitis of left foot L03.116 682.7 REFERRAL TO PODIATRY     Diagnoses and all orders for this visit:    1. Cellulitis of left foot: with skin breakdown in the medial side of the ankle. Patient not diabetic. Palpable DP pulses b/l, faint in the right side. Will treat with Keflex and refer to Podiatry to rule out extensive infection given chronic lymphedema with recurring wounds. Follow up in 3 days  If unable to get appointment with podiatry.  -     REFERRAL TO PODIATRY    Other orders  -     cephALEXin (KEFLEX) 500 mg capsule; Take 1 Cap by mouth four (4) times daily for 5 days. Pt was seen and  discussed with Dr. Lelo Patino (attending physician). I have reviewed patient medical and social history and medications. I have reviewed pertinent labs results and other data. I have discussed the diagnosis with the patient and the intended plan as seen in the above orders. The patient has received an after-visit summary and questions were answered concerning future plans. I have discussed medication side effects and warnings with the patient as well.     Nadege Lezama MD- [address] [facility]  09/09/19

## 2019-09-09 NOTE — PATIENT INSTRUCTIONS

## 2019-09-09 NOTE — INTERVAL H&P NOTE
H&P Update:  Sue Ewing was seen and examined. History and physical has been reviewed. Significant clinical changes have occurred as noted:  Pt with cuts on his foot and oozing from his ankle. Will cancel the procedure.  Pt will be rescheduled once cuts have been dealt with

## 2019-09-09 NOTE — ROUTINE PROCESS
4829: Pt with bilateral lower extremity open wounds on top of feet. Pt states it is from wearing shoes that are too small. Feet are red and hot to touch. Informed cardiology. 0920: MD at bedside, pt's procedure is cancelled. Pt to go to his PCP now for management of foot wound/ possible infection. 0935: Pt discharged with escort of caregiver. I offered to discharge him via wheelchair, but when I came back with chair, he and his caregiver had walked out.

## 2019-09-09 NOTE — PROGRESS NOTES
Chief Complaint   Patient presents with    Foot Ulcer      both feet     1. Have you been to the ER, urgent care clinic since your last visit? Hospitalized since your last visit? No    2. Have you seen or consulted any other health care providers outside of the [address] [place] [address] since your last visit? Include any pap smears or colon screening.  No

## 2019-09-09 NOTE — TELEPHONE ENCOUNTER
Evelin Munson called asking for a note to release the patient back to work as of 9/16/19. They are going to keep the patient out of work until the medication is built up in his system.        Please fax note to Evelin Munson at    [place] [phone]

## 2019-09-10 NOTE — TELEPHONE ENCOUNTER
----- Message from Cait Ruelas sent at 9/10/2019  5:01 PM EDT -----  Regarding: /Telephone  General Message/Vendor Calls    Caller's first and last name:Vasu ,Caregiver       Reason for call:Vasu requesting a Dr note stating the pt return to work on 09/16/19 to be fax to [email or web address] at [phone]. Coby Cisneros stated this is the 2nd attempt. Callback required yes/no and why:Yes      Best contact number(s): [phone]      Details to clarify the request: Mikie Suarez stated contact her once note is fax.       Kamala Downey

## 2019-09-12 ENCOUNTER — OFFICE VISIT (OUTPATIENT)
Dept: FAMILY MEDICINE CLINIC | Age: 74
End: 2019-09-12

## 2019-09-12 VITALS
HEIGHT: 75 IN | WEIGHT: 231 LBS | OXYGEN SATURATION: 98 % | RESPIRATION RATE: 18 BRPM | SYSTOLIC BLOOD PRESSURE: 104 MMHG | HEART RATE: 70 BPM | BODY MASS INDEX: 28.72 KG/M2 | DIASTOLIC BLOOD PRESSURE: 66 MMHG | TEMPERATURE: 97.5 F

## 2019-09-12 DIAGNOSIS — L03.90 CELLULITIS, UNSPECIFIED CELLULITIS SITE: Primary | ICD-10-CM

## 2019-09-12 DIAGNOSIS — I89.0 LYMPHEDEMA: ICD-10-CM

## 2019-09-12 NOTE — LETTER
NOTIFICATION RETURN TO WORK / SCHOOL 
 
9/12/2019 1:55 PM 
 
Mr. Ray Quinn [facility] [address] To Whom It May Concern: 
 
Ray Quinn is currently under the care of [facility]. He will return to work on 09/16/2019 If there are questions or concerns please have the patient contact our office.  
 
 
 
Sincerely, 
 
 
Julio César Villa MD

## 2019-09-12 NOTE — PATIENT INSTRUCTIONS
Lymphedema: Care Instructions  Your Care Instructions    Lymphedema is fluid that builds up in the arms or legs. It is often caused by surgery to remove lymph nodes during cancer treatment, especially breast cancer surgery, which can cause fluid to build up in the arm. It can happen after radiation treatment to an area that involves lymph nodes. It also can be caused by a fractured bone or surgery to fix a fracture. And some medicines also can cause lymphedema. Some people get it for unknown reasons. Normally, lymph nodes trap bacteria and other substances as fluid flows through them. Then, the white cells in the body's defense, or immune, system can destroy the substances. But if there are few or no lymph nodes--or if the lymph system in an arm or leg has been damaged--fluid can build up in the affected arm or leg. You can take simple steps at home to help treat or prevent fluid buildup. Treatment may include raising the arm or leg to let gravity drain the fluid. You also can wear compression stockings or sleeves. Follow-up care is a key part of your treatment and safety. Be sure to make and go to all appointments, and call your doctor if you are having problems. It's also a good idea to know your test results and keep a list of the medicines you take. How can you care for yourself at home? · Wear a compression stocking or sleeve as your doctor suggests. It can help keep fluid from pooling in an arm or leg. Wear it during air travel. · Prop up the swollen arm or leg on a pillow anytime you sit or lie down. Try to keep it above the level of your heart. This will help reduce swelling. · Avoid crossing your legs if your legs are swollen. · Get some exercise on most days of the week. Increase the intensity of exercise slowly. Water aerobics can help reduce swelling by helping fluid move around. Wear your compression stocking or sleeve during exercise, but not during water exercise.   · See a physical therapist. He or she can teach you how to do self-massage to help fluid move around. You also can learn what activities would be best for you. · Keep your feet clean and wear clean socks or stockings every day. Check your feet often for signs of infection, such as redness or heat. Do not walk barefoot. · If you have had lymph nodes removed from under your arm:  ? Do not have blood drawn from the arm on the side of the lymph node surgery. ? Do not allow a blood pressure cuff to be placed on that arm. If you are in the hospital, make sure your nurse and other hospital staff know of your condition. ? Wear gloves when gardening or doing other activities that may lead to cuts on your fingers or hands. · If you have had lymph nodes removed from your groin:  ? Bathe your feet daily in lukewarm, not hot, water. Use a mild soap that has a moisturizer, or use a moisturizer separately. ? Check your feet for blisters or cuts. ? Wear comfortable and supportive shoes that fit properly. ? Wear the correct size of panty hose and stockings. Avoid garters or knee-high or thigh-high stockings. · Ask your doctor how to treat any cuts, scratches, insect bites, or other injuries that may occur. · Use sunscreen and insect repellent when outdoors to protect your skin from sunburn and insect bites. · Wear medical alert jewelry that says you have lymphedema. You can buy these at most drugstores and on the Internet. When should you call for help? Call your doctor now or seek immediate medical care if:    · You have signs of infection, such as:  ? Increased pain, swelling, warmth, or redness. ? Red streaks leading from the area. ? Pus draining from the area. ? A fever.    Watch closely for changes in your health, and be sure to contact your doctor if:    · You have new or worse symptoms from lymphedema.     · You do not get better as expected. Where can you learn more?   Go to http://mandi-marylou.info/. Enter V398 in the search box to learn more about \"Lymphedema: Care Instructions. \"  Current as of: December 19, 2018  Content Version: 12.1  © 7316-4670 [employer]. Care instructions adapted under license by [employer] (which disclaims liability or warranty for this information). If you have questions about a medical condition or this instruction, always ask your healthcare professional. [place] [address] any warranty or liability for your use of this information.

## 2019-09-12 NOTE — PROGRESS NOTES
[address] [facility] [address]   Office [phone], Fax [phone]      Subjective:     Chief Complaint   Patient presents with    Foot Ulcer     follow up        HPI:  Erica Smith is a 69 y.o. WHITE OR  male  presenting for follow up of cellulitis of the left foot. He was brought by care giver from group home. Caregiver stated that she does not know much about him and history was obtained from patient. Patient states that the swelling and redness  Is better. He has been taking Kefflex for 3 days. Had tried scheduling an appointment with podiatry and earliest appointment they could find was on 10/3. Has an appointment with podiatrist on 10/3. Review of Systems   Constitutional: Negative for chills and fever. Musculoskeletal: Negative for falls. 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 20 Cap 0    bumetanide (BUMEX) 2 mg tablet TAKE 1 TABLET BY MOUTH DAILY 90 Tab 3    potassium chloride (K-DUR, KLOR-CON) 20 mEq tablet TAKE 1 TABLET BY MOUTH DAILY 90 Tab 3    VITAMIN D2 50,000 unit capsule TAKE 1 CAPSULE BY MOUTH ONCE MONTHLY 3 Cap 4    lacosamide (VIMPAT) 100 mg tab tablet Take 1 Tab by mouth two (2) times a day. Max Daily Amount: 200 mg. 60 Tab 11    metoprolol tartrate (LOPRESSOR) 25 mg tablet Take 0.5 Tabs by mouth two (2) times a day.  61 Tab 3    apixaban (ELIQUIS) 5 mg tablet Take 1 Tab by mouth two (2) times a day. 60 Tab 12    levETIRAcetam 1,000 mg tablet Take 1 Tab by mouth two (2) times a day. 60 Tab 11     No current facility-administered medications on file prior to visit.         Allergies   Allergen Reactions    Sulfa (Sulfonamide Antibiotics) Rash       Past Surgical History:   Procedure Laterality Date    HX COLONOSCOPY  04/05/2017    HX ORTHOPAEDIC Right 12/22/2017    ORIF right distal radius    HX PACEMAKER  2015    bi ventricular pacemaker        Social History     Socioeconomic History    Marital status: SINGLE     Spouse name: Not on file    Number of children: Not on file    Years of education: Not on file    Highest education level: Not on file   Occupational History    Not on file   Social Needs    Financial resource strain: Not on file    Food insecurity:     Worry: Not on file     Inability: Not on file    Transportation needs:     Medical: Not on file     Non-medical: Not on file   Tobacco Use    Smoking status: Never Smoker    Smokeless tobacco: Never Used   Substance and Sexual Activity    Alcohol use: No    Drug use: No    Sexual activity: Not on file   Lifestyle    Physical activity:     Days per week: Not on file     Minutes per session: Not on file    Stress: Not on file   Relationships    Social connections:     Talks on phone: Not on file     Gets together: Not on file     Attends Alevism service: Not on file     Active member of club or organization: Not on file     Attends meetings of clubs or organizations: Not on file     Relationship status: Not on file    Intimate partner violence:     Fear of current or ex partner: Not on file     Emotionally abused: Not on file     Physically abused: Not on file     Forced sexual activity: Not on file   Other Topics Concern    Not on file   Social History Narrative    Not on file       Patient Active Problem List   Diagnosis Code    Bradycardia R00.1  Screen for colon cancer Z12.11    Seizure disorder ([facility] [address] G40.909    Bilateral lower extremity edema R60.0    Hypertension I10    Mobitz type II incomplete atrioventricular block I44.1    Intellectual disability F79    Advance care planning Z71.89    S/P biventricular cardiac pacemaker procedure Z95.0    Lymphedema of both lower extremities I89.0    Vitamin D deficiency E55.9    Vitamin B12 deficiency E53.8    History of closed Colles' fracture Z87.81    H/O Mobitz type II block Z86.79         Objective:   Vitals - reviewed  Visit Vitals  /66 (BP 1 Location: Right arm, BP Patient Position: Sitting)   Pulse 70   Temp 97.5 °F (36.4 °C) (Oral)   Resp 18   Ht 6' 3\" (1.905 m)   Wt 231 lb (104.8 kg)   SpO2 98%   BMI 28.87 kg/m²        Physical Exam   Constitutional: He appears well-developed and well-nourished. Cardiovascular: Normal rate and regular rhythm. Pulmonary/Chest: Breath sounds normal. No respiratory distress. Musculoskeletal: He exhibits edema. 3+ pitting edema in lower extremities b/l  Left foot erythema improving and no longer warm to touch. Stable Small 0.5 cmx 1cm skin breakdown in the medial side of the left ankle.        Skin: Skin is warm. 3+ pitting edema in lower extremity. L>R. Left lower extremity redness much improved. Assessment and orders:       ICD-10-CM ICD-9-CM    1. Cellulitis, unspecified cellulitis site L03.90 682.9    2. Lymphedema I89.0 457.1 REFERRAL TO LYMPHEDEMA CLINIC     Diagnoses and all orders for this visit:    1. Cellulitis, unspecified cellulitis site: Redness improved. No longer warm to touch. Improving slowly. Continue Keflex for a total of 5 days. Follow up with podiatry. Appointment made with podiatry for 09/16/2019    2. Lymphedema:referral placed again for lymphedema and care instruction given   -     REFERRAL TO [facility]655         ER precautions given.      Pt was discussed with Dr. Yina Land  (attending physician). I have reviewed patient medical and social history and medications. I have reviewed pertinent labs results and other data. I have discussed the diagnosis with the patient and the intended plan as seen in the above orders. The patient has received an after-visit summary and questions were answered concerning future plans. I have discussed medication side effects and warnings with the patient as well.     Estella Alvares MD- [address] [facility]  09/12/19

## 2019-09-12 NOTE — PROGRESS NOTES
Left foot cellulitis follow up    Has chronic lymphedema    Lives in a group home  Care giver states that foot is still edematous    +3 edema    Referred to Lymphedema Clinic    I reviewed with the resident the medical history and the resident's findings on the physical examination. I discussed with the resident the patient's diagnosis and concur with the plan.

## 2019-09-12 NOTE — PROGRESS NOTES
Identified Patient with two Patient identifiers (Name and ). Two Patient Identifiers confirmed. Reviewed record in preparation for visit and have obtained necessary documentation. Chief Complaint   Patient presents with    Foot Ulcer     follow up       Visit Vitals  /66 (BP 1 Location: Right arm, BP Patient Position: Sitting)   Pulse 70   Temp 97.5 °F (36.4 °C) (Oral)   Resp 18   Ht 6' 3\" (1.905 m)   Wt 231 lb (104.8 kg)   SpO2 98%   BMI 28.87 kg/m²       1. Have you been to the ER, urgent care clinic since your last visit? Hospitalized since your last visit? No    2. Have you seen or consulted any other health care providers outside of the [address] [place] [address] since your last visit? Include any pap smears or colon screening.  No    Appointment scheduled with Dr. Eunice Brown at [address] [place] [address] 19 at 8:00AM.

## 2019-10-01 ENCOUNTER — HOSPITAL ENCOUNTER (OUTPATIENT)
Dept: WOUND CARE | Age: 74
Discharge: HOME | End: 2019-10-01
Payer: MEDICARE

## 2019-10-01 VITALS
HEART RATE: 70 BPM | TEMPERATURE: 98.8 F | HEIGHT: 75 IN | BODY MASS INDEX: 31.58 KG/M2 | WEIGHT: 254 LBS | DIASTOLIC BLOOD PRESSURE: 56 MMHG | SYSTOLIC BLOOD PRESSURE: 106 MMHG | RESPIRATION RATE: 18 BRPM

## 2019-10-01 PROCEDURE — 74011000250 HC RX REV CODE- 250: Performed by: PODIATRIST

## 2019-10-01 PROCEDURE — 99203 OFFICE O/P NEW LOW 30 MIN: CPT | Performed by: PODIATRIST

## 2019-10-01 RX ADMIN — Medication: at 14:01

## 2019-10-01 NOTE — WOUND CARE
10/01/19 1353   Wound Foot Left;Dorsal   Date First Assessed/Time First Assessed: 10/01/19 1353   Present on Hospital Admission: Yes  Location: Foot  Wound Location Orientation: Left;Dorsal   Dressing Status Removed   Dressing Type Adhesive wound dressing (Band-Aid)   Wound Length (cm) 3.5 cm  (clustered)   Wound Width (cm) 4.3 cm  (clustered)   Wound Depth (cm) 0.3 cm   Wound Volume (cm^3) 4.51 cm^3   Condition of Base Pink;Slough   Drainage Amount Small   Drainage Color Serosanguinous   Wound Odor None   Cleansing and Cleansing Agents  Normal saline   Wound Foot Dorsal;Right   Date First Assessed/Time First Assessed: 10/01/19 1353   Present on Hospital Admission: Yes  Location: Foot  Wound Location Orientation: Dorsal;Right   Dressing Status Removed   Dressing Type Adhesive wound dressing (Band-Aid)   Wound Length (cm) 1.5 cm  (clustered)   Wound Width (cm) 4.4 cm  (clustered)   Wound Depth (cm) 0.2 cm   Wound Volume (cm^3) 1.32 cm^3   Drainage Amount Small   Drainage Color Serosanguinous   Wound Odor None   Cleansing and Cleansing Agents  Normal saline     Visit Vitals  /56 (BP 1 Location: Left arm, BP Patient Position: Sitting)   Pulse 70   Temp 98.8 °F (37.1 °C)   Resp 18   Ht 6' 3\" (1.905 m)   Wt 115.2 kg (254 lb)   BMI 31.75 kg/m²

## 2019-10-01 NOTE — WOUND CARE
Clinic Level of Care Assessment    NAME:  Ramez Quinn  YOB: 1945 GENDER: male  MEDICAL RECORD NUMBER:  [id number]   DATE:  10/1/2019      Wound Count Document in LDA  Number of Wounds Assessed Points   No Wounds/Ulcers []   0   Less than Three Wounds/Ulcers []   1   3-6 Wounds/Ulcers [x]   2   Greater than 6 Wounds/Ulcers []   3     Ambulation Status Document in Coord/JASS/Mobility tab  Status Definition Points   Independent Independently able to ambulate. Fully able (without any assistance) to get on/off exam table/chair. [x]   0   Minimal Physical Assistance Requires physical assistance of one person to ambulate and/or position patient to be examined. Includes necessary physical assistance to position lower extremities on/off stool. []   1   Moderate Physical Assistance Requires at least one staff member to physically assist patient in ambulating into treatment room, and on/off exam table. []   2   Full Assistance Requires assistance of at least two staff members to transfer patient into treatment room and/or on/off exam table/chair. \"Total Transfer\". []   3     Dressing Complexity Document in LDA and Write Appropriate Order  Complexity Definition Points   No Dressing  []   0   Simple Minimal, simple dressing. i.e. Band-aid, gauze, simple wrap. []   1   Intermediate Moderately complicated requiring licensed personnel to apply i.e. collagen matrix, ointments, gels, alginates. [x]   2   Complex Complicated requiring licensed personnel to apply dressings 6 or more wounds. []   3     Teaching Effort Document in Education Tab   Effort Definition Points   No Teaching  []   0   Simple Reinforce two or less topics. Document in Education navigator.  []   1   Intermediate Reinforce three to five topics and/or one additional   new topic. Document in Education navigator. [x]   2   Complex Teach more than one new topic.  New patient information   packet reviewed and/or reinforce more than three topics. Document in Education navigator. HBO initial instruction. []   3       Patient Assessment and Planning  Planning Definition Points   Simple Multiple System Simple: Simple follow-up with routine assessment and planning. If Discharged, instructions and long term/follow-up care given to patient/caregiver. Discharged, instructions and/or After Visit Summary given to patient/caregiver and instructions completed. []   1   Intermediate Multiple System Intermediate: Contact with outside resources; i.e. Telephone calls to home health, [facility]. May include filling out forms and writing letters, arranging transportation, communication with insurance , vendors, etc.  Discharged, instructions and/or After Visit Summary given to patient/caregiver and instructions completed. [x]   2   Complex Multiple System Complex: Full, comprehensive assessment and planning. Follow the entire navigator under Wound Visit charting filling out each tab which includes OP Adm Database Screening, Education and CarePlan  HBO risk assessment completed. Discharged, instructions and/or After Visit Summary given to patient/caregiver and instructions completed.    [x]   3           Is this the Patient's First Visit to the [facility] [address]  Yes      Is this Patient Established @ [facility]  No             Clinical Level of Care      Points  0-2  Level 1 []     Points  3-5  Level 2 []     Points  6-9  Level 3 [x]     Points  10-12  Level 4 []     Points  13-15  Level 5 []       Electronically signed by Nir Butler RN on 10/1/2019 at 2:23 PM

## 2019-10-01 NOTE — WOUND CARE
Kylee Douglass DPM - [place] [facility], [address] - H&P     Assessment/Plan:    Venous insufficiency with ulceration and inflammation right lower extremity (I87.331)  Venous insufficiency with ulceration and inflammation left lower extremity (T13.003)  Non pressure chronic ulcer right leg to the level of fat (X63.291)  Non pressure chronic ulcer left leg to the level of fat (L97.222)    - Pt evaluated and treated. - Wounds small and superficial, no s/s acute infection, non palpable pulses. Will get JEFF / PVR to get more aggressive with compression. He will f/u with cardiology to better get cardiac issues under control. To elevate LE when at rest.  - Will do double tubigrips, Aquacel Ag, GV.  - Completed paperwork for group home. - Rx given for new compression stockings.  - F/U 1 week. Subjective:  Pt complains of wounds to b/l anterior ankles. Previous tx include nothing. Negative for fever, chills, nausea, vomiting, chest pain, shortness of breath. HPI: 69 y.o. Male PMH CAD s/p pacemaker placement, mild MR, HTN presents to [facility] with staff from group home where he lives for evaluation and management of b/l anterior ankle ulcerations. Unsure when they developed but have been present for a few weeks. Apparently not painful. Pt lives in group home and works as a . He is unable to provide much history. Staff thinks issue started when his compression stockings rolled down. ROS:  Consitutional: no weight loss, night sweats, fatigue / malaise / lethargy. Musculoskeletal: no joint / extremity pain, misalignment, stiffness, decreased ROM, crepitus. Integument: No pruritis, rashes, lesions, b/l LE wounds.   Psychiatric: No depression, anxiety, paranoia      History:  Wound Care  Allergies   Allergen Reactions    Sulfa (Sulfonamide Antibiotics) Rash     Family History   Problem Relation Age of Onset    Other Other         unable to obtain due to mental status    No Known Problems Sister       Past Medical History:   Diagnosis Date    Epilepsy ([facility] [address]     Heart disease     Hypertension     Incontinence     Leg swelling     Mental retardation, 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)     Other ill-defined conditions(799.89)     edema legs    S/P biventricular cardiac pacemaker procedure 10/23/2015    10/23/15 [employer] biventricular pacemaker inmplant    Screen for colon cancer 9/27/2012     Past Surgical History:   Procedure Laterality Date    HX COLONOSCOPY  04/05/2017    HX ORTHOPAEDIC Right 12/22/2017    ORIF right distal radius    HX PACEMAKER  2015    bi ventricular pacemaker      Social History     Tobacco Use    Smoking status: Never Smoker    Smokeless tobacco: Never Used   Substance Use Topics    Alcohol use: No       Social History     Substance and Sexual Activity   Alcohol Use No     Social History     Substance and Sexual Activity   Drug Use No      Social History     Tobacco Use   Smoking Status Never Smoker   Smokeless Tobacco Never Used     Current Outpatient Medications   Medication Sig    metoprolol tartrate (LOPRESSOR) 25 mg tablet TAKE 1 TABLET BY MOUTH TWICE A DAY    bumetanide (BUMEX) 2 mg tablet TAKE 1 TABLET BY MOUTH DAILY    potassium chloride (K-DUR, KLOR-CON) 20 mEq tablet TAKE 1 TABLET BY MOUTH DAILY    VITAMIN D2 50,000 unit capsule TAKE 1 CAPSULE BY MOUTH ONCE MONTHLY    lacosamide (VIMPAT) 100 mg tab tablet Take 1 Tab by mouth two (2) times a day. Max Daily Amount: 200 mg.  apixaban (ELIQUIS) 5 mg tablet Take 1 Tab by mouth two (2) times a day.  levETIRAcetam 1,000 mg tablet Take 1 Tab by mouth two (2) times a day. No current facility-administered medications for this encounter.          Objective:  Visit Vitals  /56 (BP 1 Location: Left arm, BP Patient Position: Sitting)   Pulse 70   Temp 98.8 °F (37.1 °C)   Resp 18   Ht 6' 3\" (1.905 m)   Wt 115.2 kg (254 lb)   BMI 31.75 kg/m²       Vascular:  B/L LE  DP 0/4; PT 0/4  capillary fill time brisk, 2+ pitting edema is present, skin temperature is cool, varicosities are present. Dermatological:    Wound: 1  Location: LT anterior ankle  Measurements: per RN note  Margins: no erythema/edema  Drainage: minimal serous  Odor: none  Wound base: granular  Lymphangitic streaking? No.  Undermining? No.  Sinus tracts? No.  Exposed bone? No.  Subcutaneous crepitation on palpation? No.    Wound: 2  Location: RT anterior ankle  Measurements: per RN note  Margins: no erythema/edema  Drainage: minimal serous  Odor: none  Wound base: granular  Lymphangitic streaking? No.  Undermining? No.  Sinus tracts? No.  Exposed bone? No.  Subcutaneous crepitation on palpation? No.    Nails are thickened, elongated, discolored, painful to palpation, 2mm thick, with subungual debris. There are extensive skin cracks at both heels. Skin is dry and scaly, exhibits hemosiderin deposition. There is no maceration of the interspaces of the feet b/l. Neurological:  DTR are present, protective sensation per 5.07 [place] Eric monofilament is present, patient is AAOx3, mood is normal. Epicritic sensation is intact. Orthopedic:  B/L LE are symmetric, ROM of ankle, STJ, 1st MTPJ is limited, MMT 5 out of 5 for B/L LE. Constitutional: Pt is a well developed, average, elderly  male. Lymphatics: negative tenderness to palpation of neck/axillary/inguinal nodes.     Imaging / Labs / Cx / Px:  JEFF/PVR ordered

## 2019-10-07 PROCEDURE — 99203 OFFICE O/P NEW LOW 30 MIN: CPT | Performed by: PODIATRIST

## 2019-10-09 ENCOUNTER — HOSPITAL ENCOUNTER (OUTPATIENT)
Dept: WOUND CARE | Age: 74
Discharge: HOME | End: 2019-10-09
Payer: MEDICARE

## 2019-10-09 VITALS
HEART RATE: 70 BPM | RESPIRATION RATE: 16 BRPM | TEMPERATURE: 97.4 F | DIASTOLIC BLOOD PRESSURE: 77 MMHG | SYSTOLIC BLOOD PRESSURE: 140 MMHG

## 2019-10-09 PROCEDURE — 97597 DBRDMT OPN WND 1ST 20 CM/<: CPT | Performed by: PODIATRIST

## 2019-10-09 NOTE — DISCHARGE INSTRUCTIONS
Discharge Instructions for  [facility]  [address] [place], [address] [facility]  Telephone: [phone]    NAME:  Kimmie Quinn  YOB: 1945  DATE:  10/1/2019    [x] Home Healthcare: TO: PADMINI     Wound Cleansing:   Do not scrub or use excessive force. Cleanse wound prior to applying a clean dressing with:    [x] Normal Saline   [] Keep Wound Dry in Shower      [] Wound Cleanser   [] Cleanse wound with Mild Soap & Water    [] May Shower at Discharge   [] Do not shower  [] cleanse with baby shampoo and rinse      Topical Treatments:  Do not apply lotions, creams, or ointments to wound bed unless directed. [x] Apply moisturizing lotion A&D ointment to skin surrounding the wound prior to dressing change.  [] Bactroban/Mupirocin  [] Gentamicin ointment    [x] Other: gentian violet to wound bed and periwound at clinic  [x] Other: BETADINE    Dressings:           Wound Location LEFT AND RIGHT ANT ANKLES    Apply Primary Dressing:        [x] Alginate with Silver (Aquacel AG)     [x]Cover and Secure with:    [x] Gauze [] ABD  [] exudry       [x] Magy     [x] Roll Tape      Change dressing:   [] Daily      [] Every Other Day   [] Three times per week  [] Once a week   [] Do Not Change Dressing     [x] Other: FRIDAY    Compression: Do not get leg(s) with wrap wet. If wraps become too tight call the center or completely remove the wrap. Apply: DOUBLE TUBI  BILATERAL LEGS take off at night      [x] Elevate leg(s) above the level of the heart when sitting. [x] Avoid prolonged standing in one place.     Dietary:  [x] Diet as tolerated: [] Calorie Diabetic Diet: [] No Added Salt:  [x] Increase Protein: [] Other:     Activity:  [x] Activity as tolerated: Return Appointment:     [x] Return Appointment: With Dr. Dr. Obie Fabry 1 week             Electronically signed on 10/9/2019 at 8:28 EVANGELINA Armstrong 8 Information: Should you experience any significant changes in your wound(s) or have questions about your wound care, please contact the [facility] at [address] [place] [address] 8:00 am - 4:30. If you need help with your wound outside these hours and cannot wait until we are again available, contact your PCP or go to the hospital emergency room. PLEASE NOTE: IF YOU ARE UNABLE TO OBTAIN WOUND SUPPLIES, CONTINUE TO USE THE SUPPLIES YOU HAVE AVAILABLE UNTIL YOU ARE ABLE TO REACH US. IT IS MOST IMPORTANT TO KEEP THE WOUND COVERED AT ALL TIMES.      Physician Signature:_______________________  Dr. Danyel Chaudhry

## 2019-10-09 NOTE — WOUND CARE
Nick Kwong DPM - Sonya Wilhelm, [address] [facility] - Progress Note     Assessment/Plan:    Venous insufficiency with ulceration and inflammation right lower extremity (I87.331)  Venous insufficiency with ulceration and inflammation left lower extremity (T85.615)  Non pressure chronic ulcer right leg to the level of fat (L97.212)  Non pressure chronic ulcer left leg to the level of fat (L97.222)    - Pt evaluated and treated. - Wounds small and superficial, no s/s acute infection, non palpable pulses. Some have healed but new water blisters due to poor compression. To elevate LE when at rest.  - Will do double tubigrips, Aquacel Ag, GV.  - Completed paperwork for group home. - Will work on getting [facility] out.  - F/U 1 week. Subjective:  Pt complains of wounds to b/l anterior ankles. Previous tx include nothing. Negative for fever, chills, nausea, vomiting, chest pain, shortness of breath. Notes HH did not visit. HPI: 69 y.o. Male PMH CAD s/p pacemaker placement, mild MR, HTN presents to [facility] with staff from group home where he lives for evaluation and management of b/l anterior ankle ulcerations. Unsure when they developed but have been present for a few weeks. Apparently not painful. Pt lives in group home and works as a . He is unable to provide much history. Staff thinks issue started when his compression stockings rolled down. ROS:  Consitutional: no weight loss, night sweats, fatigue / malaise / lethargy. Musculoskeletal: no joint / extremity pain, misalignment, stiffness, decreased ROM, crepitus. Integument: No pruritis, rashes, lesions, b/l LE wounds.   Psychiatric: No depression, anxiety, paranoia      History:  Wound Care  Allergies   Allergen Reactions    Sulfa (Sulfonamide Antibiotics) Rash     Family History   Problem Relation Age of Onset    Other Other         unable to obtain due to mental status    No Known Problems Sister       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)     Other ill-defined conditions(799.89)     edema legs    S/P biventricular cardiac pacemaker procedure 10/23/2015    10/23/15 [employer] biventricular pacemaker inmplant    Screen for colon cancer 9/27/2012     Past Surgical History:   Procedure Laterality Date    HX COLONOSCOPY  04/05/2017    HX ORTHOPAEDIC Right 12/22/2017    ORIF right distal radius    HX PACEMAKER  2015    bi ventricular pacemaker      Social History     Tobacco Use    Smoking status: Never Smoker    Smokeless tobacco: Never Used   Substance Use Topics    Alcohol use: No       Social History     Substance and Sexual Activity   Alcohol Use No     Social History     Substance and Sexual Activity   Drug Use No      Social History     Tobacco Use   Smoking Status Never Smoker   Smokeless Tobacco Never Used     Current Outpatient Medications   Medication Sig    metoprolol tartrate (LOPRESSOR) 25 mg tablet TAKE 1 TABLET BY MOUTH TWICE A DAY    bumetanide (BUMEX) 2 mg tablet TAKE 1 TABLET BY MOUTH DAILY    potassium chloride (K-DUR, KLOR-CON) 20 mEq tablet TAKE 1 TABLET BY MOUTH DAILY    VITAMIN D2 50,000 unit capsule TAKE 1 CAPSULE BY MOUTH ONCE MONTHLY    lacosamide (VIMPAT) 100 mg tab tablet Take 1 Tab by mouth two (2) times a day. Max Daily Amount: 200 mg.  apixaban (ELIQUIS) 5 mg tablet Take 1 Tab by mouth two (2) times a day.  levETIRAcetam 1,000 mg tablet Take 1 Tab by mouth two (2) times a day. No current facility-administered medications for this encounter.          Objective:  Visit Vitals  /77 (BP 1 Location: Left arm, BP Patient Position: Sitting)   Pulse 70   Temp 97.4 °F (36.3 °C)   Resp 16       Vascular:  B/L LE  DP 0/4; PT 0/4  capillary fill time brisk, 2+ pitting edema is present, skin temperature is cool, varicosities are present. Dermatological:    Wound: 1  Location: LT anterior ankle  Measurements: per RN note  Margins: no erythema/edema  Drainage: minimal serous  Odor: none  Wound base: granular  Lymphangitic streaking? No.  Undermining? No.  Sinus tracts? No.  Exposed bone? No.  Subcutaneous crepitation on palpation? No.    New associated water blisters with serous drainage and granular base distal to current ulcerations. No associated erythema/edema/malodor. Serous drainage. Wound: 2  Location: RT anterior ankle  Measurements: per RN note  Margins: no erythema/edema  Drainage: minimal serous  Odor: none  Wound base: granular  Lymphangitic streaking? No.  Undermining? No.  Sinus tracts? No.  Exposed bone? No.  Subcutaneous crepitation on palpation? No.    Nails are thickened, elongated, discolored, painful to palpation, 2mm thick, with subungual debris. There are extensive skin cracks at both heels. Skin is dry and scaly, exhibits hemosiderin deposition. There is no maceration of the interspaces of the feet b/l. Neurological:  DTR are present, protective sensation per 5.07 [place] Eric monofilament is present, patient is AAOx3, mood is normal. Epicritic sensation is intact. Orthopedic:  B/L LE are symmetric, ROM of ankle, STJ, 1st MTPJ is limited, MMT 5 out of 5 for B/L LE. Constitutional: Pt is a well developed, average, elderly  male. Lymphatics: negative tenderness to palpation of neck/axillary/inguinal nodes. Imaging / Labs / Cx / Px:  JEFF/PVR ordered    Procedure Note:  Excisional debridement through level of skin. Location / Ulcer: LT foot  Indication: to remove non-viable tissue from wound bed. Consent in chart.   Anesthesia: topical lidocaine  Instrument: scissors/pickup  Residual necrosis: none  Bleeding: minimal  Hemostasis: pressure  Pre-Procedure Pain: 0  Post-Procedure Pain: 2  Area debrided < 20 cm sq. Pre-Debridement measurements: see nursing notes  Post-Debridement measurements: see nursing notes  This is part of a series of staged procedures in an attempt at limb salvage.

## 2019-10-09 NOTE — WOUND CARE
10/09/19 1127   Wound Foot Left;Dorsal   Date First Assessed/Time First Assessed: 10/01/19 1353   Present on Hospital Admission: Yes  Location: Foot  Wound Location Orientation: Left;Dorsal   Dressing Status Removed   Dressing Type Dry dressing  (double tubi)   Wound Length (cm) 0.3 cm   Wound Width (cm) 0.1 cm   Wound Depth (cm) 0.1 cm   Wound Volume (cm^3) 0 cm^3   Condition of Base [place];Granulation   Drainage Amount Moderate   Drainage Color Serous   Wound Odor None   Lauren-wound Assessment Edema   Cleansing and Cleansing Agents  Soap and water   Wound Foot Dorsal;Right   Date First Assessed/Time First Assessed: 10/01/19 1353   Present on Hospital Admission: Yes  Location: Foot  Wound Location Orientation: Dorsal;Right   Dressing Status Removed   Dressing Type Dry dressing  (double tubi)   Wound Length (cm) 2.2 cm   Wound Width (cm) 2 cm   Wound Depth (cm) 0.1 cm   Wound Volume (cm^3) 0.44 cm^3   Condition of Base Granulation;Epithelializing;Pink   Drainage Amount Moderate   Drainage Color Serosanguinous   Wound Odor None   Lauren-wound Assessment Edema   Cleansing and Cleansing Agents  Soap and water           Visit Vitals  /77 (BP 1 Location: Left arm, BP Patient Position: Sitting)   Pulse 70   Temp 97.4 °F (36.3 °C)   Resp 16

## 2019-10-09 NOTE — WOUND CARE
10/09/19 1215   Wound Foot Left;Dorsal   Date First Assessed/Time First Assessed: 10/01/19 1353   Present on Hospital Admission: Yes  Location: Foot  Wound Location Orientation: Left;Dorsal   Dressing Type Applied Alginate;Gauze;Gauze wrap (nina)  (double tubi)   Procedure Tolerated Well   Wound Foot Dorsal;Right   Date First Assessed/Time First Assessed: 10/01/19 1353   Present on Hospital Admission: Yes  Location: Foot  Wound Location Orientation: Dorsal;Right   Dressing Type Applied Alginate;Gauze;Gauze wrap (nina)  (double tubi)   Procedure Tolerated Well   Discharge Condition: Stable     Pain: 0    Ambulatory Status: Walking    Discharge Destination: Group Home     Transportation: Car    Accompanied by: Family/Caregiver     Discharge instructions reviewed with Family/Caregiver  and copy or written instructions have been provided. All questions/concerns have been addressed at this time.

## 2019-10-16 ENCOUNTER — HOSPITAL ENCOUNTER (OUTPATIENT)
Dept: PHYSICAL THERAPY | Age: 74
Discharge: HOME | End: 2019-10-16
Payer: MEDICARE

## 2019-10-16 NOTE — PROGRESS NOTES
[address] [place] [address] [facility]  [address]      Lymphedema Therapy      10/16/2019:  Devin Dey Kai arrives to evaluation with caregiver, who states that patient is receiving home health for wound care weekly and seeing Wound center weekly. Therapist contacted Merritt López, , regarding coverage of visit at this clinic while patient is under the care of home health. Advised that patient will likely receive a bill for evaluation due to receiving home health. Patient/caregiver advised, with both making the decision to defer evaluation until our schedule accommodates seeing patient 2x/week for treatment. Appointments scheduled beginning week of 11/11/2019. Patient provided with bandaging list to purchase to allow treatment to begin at time of evaluation. Patient/caregiver to call clinic with any concerns prior to next scheduled appt.     Ayana Nose, PT, CLT  10:00-10:50 am  No charge

## 2019-10-22 ENCOUNTER — HOSPITAL ENCOUNTER (OUTPATIENT)
Dept: WOUND CARE | Age: 74
Discharge: HOME | End: 2019-10-22
Payer: MEDICARE

## 2019-10-22 VITALS
TEMPERATURE: 98.2 F | SYSTOLIC BLOOD PRESSURE: 108 MMHG | DIASTOLIC BLOOD PRESSURE: 66 MMHG | RESPIRATION RATE: 16 BRPM | HEART RATE: 70 BPM

## 2019-10-22 PROCEDURE — 74011000250 HC RX REV CODE- 250: Performed by: PODIATRIST

## 2019-10-22 PROCEDURE — 11042 DBRDMT SUBQ TIS 1ST 20SQCM/<: CPT | Performed by: PODIATRIST

## 2019-10-22 RX ADMIN — LIDOCAINE HYDROCHLORIDE: 4 OINTMENT TOPICAL at 15:00

## 2019-10-22 NOTE — DISCHARGE INSTRUCTIONS
Discharge Instructions for  [facility]  [address], [address] [facility]  Telephone: [phone]    NAME:  Cat Quinn  YOB: 1945  DATE:  10/1/2019    [x] Home Healthcare: TO: PADMINI     Wound Cleansing:   Do not scrub or use excessive force. Cleanse wound prior to applying a clean dressing with:    [x] Normal Saline   [] Keep Wound Dry in Shower      [] Wound Cleanser   [] Cleanse wound with Mild Soap & Water    [] May Shower at Discharge   [] Do not shower  [] cleanse with baby shampoo and rinse      Topical Treatments:  Do not apply lotions, creams, or ointments to wound bed unless directed. [] Apply moisturizing lotion {bundy lotions :47250} to skin surrounding the wound prior to dressing change.  [] Bactroban/Mupirocin  [] Gentamicin ointment    [] Other: gentian violet to wound bed and periwound  [x] Other: BETADINE    Dressings:           Wound Location LEFT AND RIGHT ANT ANKLES    Apply Primary Dressing:          [x] Alginate with Silver (Aquacel AG) [] Alginate    []Cover and Secure with:    [x] Gauze [] ABD  [] exudry       [x] Mat Amble [] Kerlix          []  [] Ace Wrap [] Other:     [x] Roll Tape               Change dressing:   [] Daily      [] Every Other Day   [] Three times per week  [] Once a week   [] Do Not Change Dressing     [x] Other: Friday  Apply:     DOUBLE TUBI  BILATERAL LEGS   [x] Elevate leg(s) above the level of the heart when sitting. [x] Avoid prolonged standing in one place.         Dietary:  [x] Diet as tolerated: [] Calorie Diabetic Diet: [] No Added Salt:  [x] Increase Protein: [] Other:     Activity:  [x] Activity as tolerated:  [] Patient has no activity restrictions     [] Strict Bedrest: [] Remain off Work:     [] May return to full duty work:                                   [] Return to work with restrictions:             Return Appointment:    [] Return Appointment: With  Dr. Tk Hinton     []     [facility] Information: Should you experience any significant changes in your wound(s) or have questions about your wound care, please contact the [address] at [address] [place] [address] 8:00 am - 4:30. If you need help with your wound outside these hours and cannot wait until we are again available, contact your PCP or go to the hospital emergency room. PLEASE NOTE: IF YOU ARE UNABLE TO OBTAIN WOUND SUPPLIES, CONTINUE TO USE THE SUPPLIES YOU HAVE AVAILABLE UNTIL YOU ARE ABLE TO REACH US. IT IS MOST IMPORTANT TO KEEP THE WOUND COVERED AT ALL TIMES.      Physician Signature:_______________________  Dr. Tk Hinton

## 2019-10-22 NOTE — DISCHARGE INSTRUCTIONS
Discharge Instructions for  [facility]  [address] [place], [address] [facility]  Telephone: [phone]    NAME:  Kelin Quinn  YOB: 1945  DATE:  10/1/2019    [] Wound and dressing supply provider: {DME provider:81464}    [x] Home Healthcare: TO: [facility]     Wound Cleansing:   Do not scrub or use excessive force. Cleanse wound prior to applying a clean dressing with:    [x] Normal Saline   [] Keep Wound Dry in Shower      [] Wound Cleanser   [] Cleanse wound with Mild Soap & Water    [] May Shower at Discharge   [] Do not shower  [] cleanse with baby shampoo and rinse      Topical Treatments:  Do not apply lotions, creams, or ointments to wound bed unless directed. [] Apply moisturizing lotion {bundy lotions :30845} to skin surrounding the wound prior to dressing change.  [] Bactroban/Mupirocin  [] Gentamicin ointment    [x] Other: gentian violet to wound bed and periwound  [x] Other: BETADINE        Dressings:           Wound Location LEFT AND RIGHT ANT ANKLES    Apply Primary Dressing:      [] MediHoney Gel [] Medihoney CA Alginate    [x] Alginate with Silver (Aquacel AG) [] Alginate    []Cover and Secure with:    [x] Gauze [] ABD  [] exudry       [x] Magy [] Kerlix          [] Mepilex Border {mepilex foam with boarder :56129:::1}    [] Ace Wrap [] Other:     [x] Roll Tape               Change dressing:   [] Daily      [] Every Other Day   [] Three times per week  [] Once a week   [] Do Not Change Dressing     [x] Other: FRIDAY    Compression: Do not get leg(s) with wrap wet. If wraps become too tight call the center or completely remove the wrap.     Apply: DOUBLE TUBI  BILATERAL LEGS     Three Layer Compression Wrap Applied in Clinic  []RightLeg []Left Leg    [] Four layer Compression Wrap Applied in Clinic  []RightLeg []Left Leg     []  Unna's boot   []RightLeg []Left Leg        [x] Elevate leg(s) above the level of the heart when sitting. [x] Avoid prolonged standing in one place. Dietary:  [x] Diet as tolerated: [] Calorie Diabetic Diet: [] No Added Salt:  [x] Increase Protein: [] Other:     Activity:  [x] Activity as tolerated:  [] Patient has no activity restrictions     [] Strict Bedrest: [] Remain off Work:     [] May return to full duty work:                                   [] Return to work with restrictions:             Return Appointment:  [] nurse visit at wound center in *** days     [] Return Appointment: With Dr. Dr. Cony Rodriguez     [] Ordered tests: JEFF TBI BILATERAL LEGS           Electronically signed on 10/1/2019 at 8:28 EVANGELINA Armstrong 8 Information: Should you experience any significant changes in your wound(s) or have questions about your wound care, please contact the [facility] at [address] [place] [address] 8:00 am - 4:30. If you need help with your wound outside these hours and cannot wait until we are again available, contact your PCP or go to the hospital emergency room. PLEASE NOTE: IF YOU ARE UNABLE TO OBTAIN WOUND SUPPLIES, CONTINUE TO USE THE SUPPLIES YOU HAVE AVAILABLE UNTIL YOU ARE ABLE TO REACH US. IT IS MOST IMPORTANT TO KEEP THE WOUND COVERED AT ALL TIMES.      Physician Signature:_______________________  Dr. Cony Rodriguez

## 2019-10-22 NOTE — WOUND CARE
10/22/19 1507   Wound Foot Left;Dorsal;Distal   Date First Assessed/Time First Assessed: 10/22/19 1440   Present on Hospital Admission: Yes  Location: Foot  Wound Location Orientation: Left;Dorsal;Distal   Dressing Type Applied Other (Comment); Silver products;Gauze;Gauze wrap (nina)  (betadine, double tubi)   Wound Foot Left;Dorsal   Date First Assessed/Time First Assessed: 10/01/19 1353   Present on Hospital Admission: Yes  Location: Foot  Wound Location Orientation: Left;Dorsal   Dressing Type Applied   (betadine, double tubi)   Discharge Condition: Stable     Pain: 0    Ambulatory Status: Walking    Discharge Destination: Home     Transportation: Car    Accompanied by: Self  and Family/Caregiver     Discharge instructions reviewed with Patient and Family/Caregiver  and copy or written instructions have been provided. All questions/concerns have been addressed at this time.

## 2019-10-22 NOTE — WOUND CARE
10/22/19 1431   Wound Foot Left;Dorsal;Distal   Date First Assessed/Time First Assessed: 10/22/19 1440   Present on Hospital Admission: Yes  Location: Foot  Wound Location Orientation: Left;Dorsal;Distal   Dressing Status Removed   Dressing Type Gauze;Gauze wrap (nina)   Wound Length (cm) 4 cm  (clustered)   Wound Width (cm) 6.5 cm  (clustered)   Wound Depth (cm) 0.1 cm   Wound Volume (cm^3) 2.6 cm^3   Condition of Base Pink   Condition of Edges Open   Drainage Amount Small   Drainage Color Serous   Wound Odor None   Lauren-wound Assessment Maceration   Cleansing and Cleansing Agents  Soap and water   Wound Foot Dorsal;Right   Date First Assessed/Time First Assessed: 10/01/19 1353   Present on Hospital Admission: Yes  Location: Foot  Wound Location Orientation: Dorsal;Right   Dressing Status Removed   Dressing Type Gauze;Gauze wrap (nina)   Wound Length (cm) 0.1 cm   Wound Width (cm) 0.1 cm   Wound Depth (cm) 0.1 cm   Wound Volume (cm^3) 0 cm^3   Condition of Base Pink   Condition of Edges Closed   Drainage Amount None   Wound Odor None   Cleansing and Cleansing Agents  Normal saline   Wound Foot Left;Dorsal   Date First Assessed/Time First Assessed: 10/01/19 1353   Present on Hospital Admission: Yes  Location: Foot  Wound Location Orientation: Left;Dorsal   Dressing Status Removed   Dressing Type Aquacel;Gauze;Gauze wrap (nina)   Wound Length (cm) 0.1 cm   Wound Width (cm) 0.1 cm   Wound Depth (cm) 0.1 cm   Wound Volume (cm^3) 0 cm^3   Condition of Base Pink   Condition of Edges Closed   Drainage Amount None   Wound Odor None   Cleansing and Cleansing Agents  Soap and water     Visit Vitals  /66 (BP 1 Location: Left arm, BP Patient Position: Sitting)   Pulse 70   Temp 98.2 °F (36.8 °C)   Resp 16

## 2019-10-22 NOTE — WOUND CARE
Mable Zambrano DPM - [facility] K. Samantha Herman, [address] - Progress Note     Assessment/Plan:    Venous insufficiency with ulceration and inflammation right lower extremity (I87.331)  Venous insufficiency with ulceration and inflammation left lower extremity (Q21.112)  Non pressure chronic ulcer right leg to the level of fat (L97.212)  Non pressure chronic ulcer left leg to the level of fat (L97.222)    - Pt evaluated and treated. - Wounds small and superficial, no s/s acute infection, non palpable pulses. Some have healed but new water blisters due to poor compression. To elevate LE when at rest.  RLE fully resolved. - Will do double tubigrips, Aquacel Ag, GV.  - Reinforced with pt and facility staff to not let him do additional compression.  - F/U 1 week. Subjective:  Pt complains of wounds to b/l anterior ankles. Previous tx include nothing. Negative for fever, chills, nausea, vomiting, chest pain, shortness of breath. HH has been coming, RLE healed but LLE new lesions, had been applying compression stockings which bunched up and caused new ulcers. HPI: 69 y.o. Male PMH CAD s/p pacemaker placement, mild MR, HTN presents to [facility] with staff from group home where he lives for evaluation and management of b/l anterior ankle ulcerations. Unsure when they developed but have been present for a few weeks. Apparently not painful. Pt lives in group home and works as a . He is unable to provide much history. Staff thinks issue started when his compression stockings rolled down. ROS:  Consitutional: no weight loss, night sweats, fatigue / malaise / lethargy. Musculoskeletal: no joint / extremity pain, misalignment, stiffness, decreased ROM, crepitus. Integument: No pruritis, rashes, lesions, b/l LE wounds.   Psychiatric: No depression, anxiety, paranoia      History:  Lymphedema, not elsewhere classified [I89.0]  Allergies   Allergen Reactions    Sulfa (Sulfonamide Antibiotics) Rash     Family History   Problem Relation Age of Onset    Other Other         unable to obtain due to mental status    No Known Problems Sister       Past Medical History:   Diagnosis Date    Epilepsy ([facility] [address]     Heart disease     Hypertension     Incontinence     Leg swelling     Mental retardation, 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)     Other ill-defined conditions(799.89)     edema legs    S/P biventricular cardiac pacemaker procedure 10/23/2015    10/23/15 [employer] biventricular pacemaker inmplant    Screen for colon cancer 9/27/2012     Past Surgical History:   Procedure Laterality Date    HX COLONOSCOPY  04/05/2017    HX ORTHOPAEDIC Right 12/22/2017    ORIF right distal radius    HX PACEMAKER  2015    bi ventricular pacemaker      Social History     Tobacco Use    Smoking status: Never Smoker    Smokeless tobacco: Never Used   Substance Use Topics    Alcohol use: No       Social History     Substance and Sexual Activity   Alcohol Use No     Social History     Substance and Sexual Activity   Drug Use No      Social History     Tobacco Use   Smoking Status Never Smoker   Smokeless Tobacco Never Used     Current Outpatient Medications   Medication Sig    metoprolol tartrate (LOPRESSOR) 25 mg tablet TAKE 1 TABLET BY MOUTH TWICE A DAY    bumetanide (BUMEX) 2 mg tablet TAKE 1 TABLET BY MOUTH DAILY    potassium chloride (K-DUR, KLOR-CON) 20 mEq tablet TAKE 1 TABLET BY MOUTH DAILY    VITAMIN D2 50,000 unit capsule TAKE 1 CAPSULE BY MOUTH ONCE MONTHLY    lacosamide (VIMPAT) 100 mg tab tablet Take 1 Tab by mouth two (2) times a day. Max Daily Amount: 200 mg.  apixaban (ELIQUIS) 5 mg tablet Take 1 Tab by mouth two (2) times a day.  levETIRAcetam 1,000 mg tablet Take 1 Tab by mouth two (2) times a day.      No current facility-administered medications for this encounter. Objective:  Visit Vitals  /66 (BP 1 Location: Left arm, BP Patient Position: Sitting)   Pulse 70   Temp 98.2 °F (36.8 °C)   Resp 16       Vascular:  B/L LE  DP 0/4; PT 0/4  capillary fill time brisk, 2+ pitting edema is present, skin temperature is cool, varicosities are present. Dermatological:    Wound: 1  Location: LT anterior ankle  Measurements: per RN note  Margins: no erythema/edema  Drainage: minimal serous  Odor: none  Wound base: granular  Lymphangitic streaking? No.  Undermining? No.  Sinus tracts? No.  Exposed bone? No.  Subcutaneous crepitation on palpation? No.    New associated water blisters with serous drainage and granular base distal to current ulcerations. No associated erythema/edema/malodor. Serous drainage. Wound: 2  Location: RT anterior ankle  Measurements: healed    Nails are thickened, elongated, discolored, painful to palpation, 2mm thick, with subungual debris. There are extensive skin cracks at both heels. Skin is dry and scaly, exhibits hemosiderin deposition. There is no maceration of the interspaces of the feet b/l. Neurological:  DTR are present, protective sensation per 5.07 [place] monofilament is present, patient is AAOx3, mood is normal. Epicritic sensation is intact. Orthopedic:  B/L LE are symmetric, ROM of ankle, STJ, 1st MTPJ is limited, MMT 5 out of 5 for B/L LE. Constitutional: Pt is a well developed, average, elderly  male. Lymphatics: negative tenderness to palpation of neck/axillary/inguinal nodes. Imaging / Labs / Cx / Px:  JEFF/PVR ordered    Procedure Note:  Excisional debridement through level of fat  Location / Ulcer: LT foot  Indication: to remove non-viable tissue from wound bed. Consent in chart.   Anesthesia: topical lidocaine  Instrument: scissors/pickup  Residual necrosis: none  Bleeding: minimal  Hemostasis: pressure  Pre-Procedure Pain: 0  Post-Procedure Pain: 2  Area debrided < 20 nathan sq.  Pre-Debridement measurements: see nursing notes  Post-Debridement measurements: see nursing notes  This is part of a series of staged procedures in an attempt at limb salvage.

## 2019-11-04 RX ORDER — METOPROLOL TARTRATE 25 MG/1
TABLET, FILM COATED ORAL
Qty: 62 TAB | OUTPATIENT
Start: 2019-11-04

## 2019-11-05 ENCOUNTER — HOSPITAL ENCOUNTER (OUTPATIENT)
Dept: WOUND CARE | Age: 74
Discharge: HOME | End: 2019-11-05
Payer: MEDICARE

## 2019-11-05 VITALS
HEART RATE: 71 BPM | TEMPERATURE: 98.8 F | SYSTOLIC BLOOD PRESSURE: 109 MMHG | DIASTOLIC BLOOD PRESSURE: 66 MMHG | RESPIRATION RATE: 18 BRPM

## 2019-11-05 NOTE — WOUND CARE
[address]      Your treatment has been completed at [facility] outpatient wound clinic on 11/5/2019  , per Dr. Leodan Vigil. We know patients have several options when choosing a health care provider. We would like to express our sincere appreciation for having had the chance to be yours. More importantly, we enjoy having you as part of our family. We also hope your experience with us has surpassed your expectations and that you have been pleased with our service. We appreciate the confidence you've shown in selecting us as your health care provider and we will continue or commitment to provide the highest quality of service. Again, thank you for choosing us for your health care needs. If you have any further questions or concerns, please call [phone]. It has been our pleasure serving you and we hope to continue our relationship in the future, if the need occurs.        Sincerely,    [address]

## 2019-11-05 NOTE — WOUND CARE
Thelma Miranda DPM - Claudia Mcgraw, [address] - Progress Note     Assessment/Plan:    Venous insufficiency with ulceration and inflammation right lower extremity (I87.331)  Venous insufficiency with ulceration and inflammation left lower extremity (I92.750)  Non pressure chronic ulcer right leg to the level of fat (L97.212)  Non pressure chronic ulcer left leg to the level of fat (L97.222)    - Pt evaluated and treated. - Wounds fully healed. - To f/u with lymphedema clinic.  - F/U prn. Subjective:  Pt complains of wounds to b/l anterior ankles. Previous tx include nothing. Negative for fever, chills, nausea, vomiting, chest pain, shortness of breath. HH has been coming, RLE healed but LLE new lesions, had been applying compression stockings, thinks they are healed. HPI: 69 y.o. Male PMH CAD s/p pacemaker placement, mild MR, HTN presents to [facility] with staff from group home where he lives for evaluation and management of b/l anterior ankle ulcerations. Unsure when they developed but have been present for a few weeks. Apparently not painful. Pt lives in group home and works as a . He is unable to provide much history. Staff thinks issue started when his compression stockings rolled down. ROS:  Consitutional: no weight loss, night sweats, fatigue / malaise / lethargy. Musculoskeletal: no joint / extremity pain, misalignment, stiffness, decreased ROM, crepitus. Integument: No pruritis, rashes, lesions, b/l LE wounds.   Psychiatric: No depression, anxiety, paranoia      History:  Lymphedema, not elsewhere classified [I89.0]  Allergies   Allergen Reactions    Sulfa (Sulfonamide Antibiotics) Rash     Family History   Problem Relation Age of Onset    Other Other         unable to obtain due to mental status    No Known Problems Sister       Past Medical History:   Diagnosis Date    Ep[facility] [address]     Heart disease     Hypertension     Incontinence     Leg swelling     Mental retardation, 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)     Other ill-defined conditions(799.89)     edema legs    S/P biventricular cardiac pacemaker procedure 10/23/2015    10/23/15 [employer] biventricular pacemaker inmplant    Screen for colon cancer 9/27/2012     Past Surgical History:   Procedure Laterality Date    HX COLONOSCOPY  04/05/2017    HX ORTHOPAEDIC Right 12/22/2017    ORIF right distal radius    HX PACEMAKER  2015    bi ventricular pacemaker      Social History     Tobacco Use    Smoking status: Never Smoker    Smokeless tobacco: Never Used   Substance Use Topics    Alcohol use: No       Social History     Substance and Sexual Activity   Alcohol Use No     Social History     Substance and Sexual Activity   Drug Use No      Social History     Tobacco Use   Smoking Status Never Smoker   Smokeless Tobacco Never Used     Current Outpatient Medications   Medication Sig    metoprolol tartrate (LOPRESSOR) 25 mg tablet TAKE 1 TABLET BY MOUTH TWICE A DAY    bumetanide (BUMEX) 2 mg tablet TAKE 1 TABLET BY MOUTH DAILY    potassium chloride (K-DUR, KLOR-CON) 20 mEq tablet TAKE 1 TABLET BY MOUTH DAILY    VITAMIN D2 50,000 unit capsule TAKE 1 CAPSULE BY MOUTH ONCE MONTHLY    lacosamide (VIMPAT) 100 mg tab tablet Take 1 Tab by mouth two (2) times a day. Max Daily Amount: 200 mg.  apixaban (ELIQUIS) 5 mg tablet Take 1 Tab by mouth two (2) times a day.  levETIRAcetam 1,000 mg tablet Take 1 Tab by mouth two (2) times a day. No current facility-administered medications for this encounter. Objective:  Visit Vitals  /66   Pulse 71   Temp 98.8 °F (37.1 °C)   Resp 18       Vascular:  B/L LE  DP 0/4; PT 0/4  capillary fill time brisk, 2+ pitting edema is present, skin temperature is cool, varicosities are present.     Dermatological:    Wound: 1  Location: LT anterior ankle  Measurements: per RN note  Margins: no erythema/edema  Drainage: minimal serous  Odor: none  Wound base: granular  Lymphangitic streaking? No.  Undermining? No.  Sinus tracts? No.  Exposed bone? No.  Subcutaneous crepitation on palpation? No.    New associated water blisters with serous drainage and granular base distal to current ulcerations. No associated erythema/edema/malodor. Serous drainage. Wound: 2  Location: RT anterior ankle  Measurements: healed    Nails are thickened, elongated, discolored, painful to palpation, 2mm thick, with subungual debris. There are extensive skin cracks at both heels. Skin is dry and scaly, exhibits hemosiderin deposition. There is no maceration of the interspaces of the feet b/l. Neurological:  DTR are present, protective sensation per 5.07 [place] Eric monofilament is present, patient is AAOx3, mood is normal. Epicritic sensation is intact. Orthopedic:  B/L LE are symmetric, ROM of ankle, STJ, 1st MTPJ is limited, MMT 5 out of 5 for B/L LE. Constitutional: Pt is a well developed, average, elderly  male. Lymphatics: negative tenderness to palpation of neck/axillary/inguinal nodes.     Imaging / Labs / Cx / Px:  JEFF/PVR ordered

## 2019-11-05 NOTE — WOUND CARE
Clinic Level of Care Assessment    NAME:  Brooks Quinn  YOB: 1945 GENDER: male  MEDICAL RECORD NUMBER:  [id number]   DATE:  11/5/2019      Wound Count Document in LDA  Number of Wounds Assessed Points   No Wounds/Ulcers [x]   0   Less than Three Wounds/Ulcers []   1   3-6 Wounds/Ulcers []   2   Greater than 6 Wounds/Ulcers []   3     Ambulation Status Document in Coord/JASS/Mobility tab  Status Definition Points   Independent Independently able to ambulate. Fully able (without any assistance) to get on/off exam table/chair. [x]   0   Minimal Physical Assistance Requires physical assistance of one person to ambulate and/or position patient to be examined. Includes necessary physical assistance to position lower extremities on/off stool. []   1   Moderate Physical Assistance Requires at least one staff member to physically assist patient in ambulating into treatment room, and on/off exam table. []   2   Full Assistance Requires assistance of at least two staff members to transfer patient into treatment room and/or on/off exam table/chair. \"Total Transfer\". []   3     Dressing Complexity Document in LDA and Write Appropriate Order  Complexity Definition Points   No Dressing  [x]   0   Simple Minimal, simple dressing. i.e. Band-aid, gauze, simple wrap. []   1   Intermediate Moderately complicated requiring licensed personnel to apply i.e. collagen matrix, ointments, gels, alginates. []   2   Complex Complicated requiring licensed personnel to apply dressings 6 or more wounds. []   3     Teaching Effort Document in Education Tab   Effort Definition Points   No Teaching  []   0   Simple Reinforce two or less topics. Document in Education navigator. [x]   1   Intermediate Reinforce three to five topics and/or one additional   new topic. Document in Education navigator. []   2   Complex Teach more than one new topic.  New patient information   packet reviewed and/or reinforce more than three topics. Document in Education navigator. HBO initial instruction. []   3       Patient Assessment and Planning  Planning Definition Points   Simple Multiple System Simple: Simple follow-up with routine assessment and planning. If Discharged, instructions and long term/follow-up care given to patient/caregiver. Discharged, instructions and/or After Visit Summary given to patient/caregiver and instructions completed. [x]   1   Intermediate Multiple System Intermediate: Contact with outside resources; i.e. Telephone calls to [place] health, [facility]. May include filling out forms and writing letters, arranging transportation, communication with insurance , vendors, etc.  Discharged, instructions and/or After Visit Summary given to patient/caregiver and instructions completed. []   2   Complex Multiple System Complex: Full, comprehensive assessment and planning. Follow the entire navigator under Wound Visit charting filling out each tab which includes OP Adm Database Screening, Education and CarePlan  HBO risk assessment completed. Discharged, instructions and/or After Visit Summary given to patient/caregiver and instructions completed.    []   3           Is this the Patient's First Visit to the [address] [place] [address] Road  No      Is this Patient Established @ [facility]  Yes             Clinical Level of Care      Points  0-2  Level 1 [x]     Points  3-5  Level 2 []     Points  6-9  Level 3 []     Points  10-12  Level 4 []     Points  13-15  Level 5 []       Electronically signed by Devin Peñaloza RN on 11/5/2019 at 1:20 PM

## 2019-11-11 RX ORDER — METOPROLOL TARTRATE 25 MG/1
TABLET, FILM COATED ORAL
Qty: 60 TAB | Refills: 0 | Status: SHIPPED | OUTPATIENT
Start: 2019-11-11 | End: 2019-12-11 | Stop reason: SDUPTHER

## 2019-11-11 NOTE — TELEPHONE ENCOUNTER
Yue Bennett called to schedule the patients wellness exam and to ask for a refill of this medication to last him until that appointment. Please call to notify if an emergency supply can be provided.

## 2019-11-12 ENCOUNTER — TELEPHONE (OUTPATIENT)
Dept: FAMILY MEDICINE CLINIC | Age: 74
End: 2019-11-12

## 2019-11-13 PROCEDURE — 99211 OFF/OP EST MAY X REQ PHY/QHP: CPT | Performed by: PODIATRIST

## 2019-11-14 ENCOUNTER — HOSPITAL ENCOUNTER (OUTPATIENT)
Dept: PHYSICAL THERAPY | Age: 74
Discharge: HOME | End: 2019-11-14
Payer: MEDICARE

## 2019-11-14 VITALS — HEART RATE: 70 BPM | SYSTOLIC BLOOD PRESSURE: 116 MMHG | OXYGEN SATURATION: 98 % | DIASTOLIC BLOOD PRESSURE: 79 MMHG

## 2019-11-14 PROCEDURE — 97530 THERAPEUTIC ACTIVITIES: CPT

## 2019-11-14 PROCEDURE — 97161 PT EVAL LOW COMPLEX 20 MIN: CPT

## 2019-11-14 NOTE — PROGRESS NOTES
[address] [address]      INITIAL EVALUATION    NAME: Yo Quinn AGE: 69 y.o. GENDER: male  DATE: 11/14/2019  REFERRING PHYSICIAN: John Dutton MD; CC: Gloria Holland DPM  HISTORY AND BACKGROUND:  Patient referred to clinic for evaluation and treatment of bilateral LE chronic edema with open wounds. Chart indicated LE edema is greater than 4 years in duration, with patient a poor historian and unable to pin point exact time of onset of LE edema. Open wounds treated by home health nursing, which has been discharged, and [facility]. Patient arrives to appointment without compression in place. See further medical history as noted below. Primary Diagnosis:  · Primary lymphedema, Tarda (Q82.0)  Other Treatment Diagnoses:   Abnormality of gait (other abnormalities of gait and mobility R26.89)   Swelling not relieved by elevation ( R60.9 edema)   Open wound RLE S81.801S; Open wound LLE S81.801S   LE Cellulitis  (L03.119)  Morbid Obesity (E66.01)  Date of Onset: prior to 2015, patient unable to provide specific time of onset. Present Symptoms and Functional Limitations: Patient presents to appt today with c/o open wound R LE, heavy and tight lower legs, difficulty donning shoes and work boots.     Lymphedema Life Impact Scale: 4/68; 6%  Past Medical History:   Past Medical History:   Diagnosis Date    Epilepsy ([facility] [address]     Heart disease     Hypertension     Incontinence     Leg swelling     Mental retardation, 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)     Other ill-defined conditions(799.89)     edema legs    S/P biventricular cardiac pacemaker procedure 10/23/2015    10/23/15 [employer] biventricular pacemaker inmplant    Screen for colon cancer 9/27/2012     Past Surgical History:   Procedure Laterality Date    HX COLONOSCOPY  04/05/2017    HX ORTHOPAEDIC Right 12/22/2017    ORIF right distal radius    HX PACEMAKER  2015    bi ventricular pacemaker      Current Medications:    Current Outpatient Medications   Medication Sig    metoprolol tartrate (LOPRESSOR) 25 mg tablet TAKE 1 TABLET BY MOUTH TWICE A DAY    bumetanide (BUMEX) 2 mg tablet TAKE 1 TABLET BY MOUTH DAILY    potassium chloride (K-DUR, KLOR-CON) 20 mEq tablet TAKE 1 TABLET BY MOUTH DAILY    VITAMIN D2 50,000 unit capsule TAKE 1 CAPSULE BY MOUTH ONCE MONTHLY    lacosamide (VIMPAT) 100 mg tab tablet Take 1 Tab by mouth two (2) times a day. Max Daily Amount: 200 mg.  apixaban (ELIQUIS) 5 mg tablet Take 1 Tab by mouth two (2) times a day.  levETIRAcetam 1,000 mg tablet Take 1 Tab by mouth two (2) times a day. No current facility-administered medications for this encounter. Allergies: Allergies   Allergen Reactions    Sulfa (Sulfonamide Antibiotics) Rash      Prior Level of Function/Social/Work History/Personal factors and/or comorbidities impacting plan of care: Reside in group home. Patient has long history of LE edema, however, unable to determine specific length of time. Works 18 hours/week [employer], [employer] during the day Th-Sunday. Patient reports recent difficulty donning work boots due to swelling. Living Situation: group home      Trainable Caregiver?: caregivers at group home   Self-care/ADLs: indep     Mobility: indep without device, however, gait pattern impaired as noted below. Would benefit from use of AD for gait. Sleeping Arrangement:  bed   Adaptive Equipment Owned: non  Previous Therapy:  Home health nursing/wound care center treatments  Compression/Lymphedema Equipment:  none    SUBJECTIVE:   Patient arrives with caregiver. Neither aware of bandaging order being completed as previously recommended. Caregiver reports home health nursing was discharged 11/5/2019.   Patient reports c/o bilateral LE swelling, difficulty finding shoes or work boots that fit. Patient is agreeable to proceeding with evaluation today. Patients goals for therapy: Reduce swelling foot/leg to allow for better fit of shoes. OBJECTIVE DATA SUMMARY:   EXAMINATION/PRESENTATION/DECISION MAKING:   Pain:  Pain Scale 1: Numeric (0 - 10)  Pain Intensity 1: 4  Pain Location 1: Leg    Skin and Tissue Assessment:  Dermal Status:  []   Intact [x]  Dry   [x]  Tenuous [x] Flaky   [x]  Wound/lesion present: R proximal foot, dorsal aspect. , partial thickness, 0.4 x 0.7 cm []  Scars   []  Dermatitis Scabbing from scratching bilateral lower legs. Texture/Consistency:  [x]  Boggy: bilateral LE, foot/ankle/calf [x]  Pitting Edema: posterior calf. []  Brawny []  Combination   [x]  Fibrotic/Woody: ankle/dorsal foot/calf    Pigmentation/Color Change:  []  Normal []  Hemosiderin   []  Red []  Erythematous   [x]  Hyperpigmented: see photo below []  Hyperlipodermatosclerosis   Anomalies:  []  Lymphorrhea []  Vesicles   []  Petechiae []  Warty Vercusis   []  Bullae []  Papilloma   Circulatory:  []  JEFF []  Varicosities:   [x]  Pulses: dorsal pedal/posterior tibialis pulses located with doppler [x]  Vascular studies ruled out DVT in past: 7/17/2019   []  DVT History    Nails:  []   Normal  [x]   Fungus  Stemmers Sign: positive bilateral LE  Photos:    Height:   6'1 Weight:   237.4 lbs   BMI:   31  (36 or greater: adversely affecting lymphedema)  Volumetric Measurements:   Right:  9536.79 mL Left:  9150.60 mL   % Difference: 4.22%    (See scanned graph)  Range of Motion: bilateral LE grossly WFL  Strength: bilateral LE grossly 5/5 with MMT. Sensation:  Intact to light touch  Mobility:  Bed/Chair Mobility:  Independent  Transfers:  Independent    Sitting Balance:  goos Standing Balance:  Good-   Gait:  Independent; narrow KASSIE, decreased stride length bilateral LE. Patient advised to use of walker or cane during bandaging phase of treatment is recommended.  Wheelchair Mobility:  (Other) na Endurance:  Good, working part time; ambulates community distances. Stairs:  (Other) na         Safety:  Patient is alert and oriented: To person, place, time. Patient has difficulty recalling information about medical history. Safety awareness:  Intact for mobility   Fall Risk?:  Low-mod, gait abnormalities as noted above. Patient given written fall prevention handout: Yes   Precautions:  Standard lymphedema precautions to include avoiding blood pressure readings, injections and IVs or other procedures/acts that could lead to broken skin on affected area, and avoiding excessive heat, resistive activity or altitude without compression garment    Functional Measure:   LLIS      Physical Therapy Evaluation Charge Determination   History Examination Presentation Decision-Making   MEDIUM  Complexity : 1-2 comorbidities / personal factors will impact the outcome/ POC  MEDIUM Complexity : 3 Standardized tests and measures addressing body structure, function, activity limitation and / or participation in recreation  MEDIUM Complexity : Evolving with changing characteristics  Other outcome measures LLIS  LOW       Based on the above components, the patient evaluation is determined to be of the following complexity level: LOW     Evaluation Time: 11:10-11:31 am  21 minutes    TREATMENT PROVIDED:   1. Treatment description: The patient was educated regarding the role and function of the lymphatic system, pathophysiology of lymphedema, and instructed in the lymphedema management protocol of complete decongestive therapy (CDT). This includes skin care to prevent breakdown or infection, lymphedema exercises, custom compression therapy options (bandaging, compression garments, compression pump, Perri RamosViPak, [employer] Toño[employer]Nael, etc. as needed), and decongestion with manual lymphatic drainage as indicated. We discussed the need for consistent compression system for lymphedema management.   Diaphragmatic breathing instruction and skin care education was performed,applying low pH lotion to extremity using upward strokes to stimulate lymphatic vessels. Patient provided with written information regarding recommended anti-itch lotion due to patient scratching legs due to itching. G Tubigrip applied knee high bilateral LE, with patient provided with home management as noted above. Pt was given information regarding obtaining bandaging supplies. Patient informed that he will require larger shoe during bandaging phase of treatment. Tubigrip Instructions    1. Apply tubigrip in the morning. You may fold over at the foot, do not fold over at the top of tubigrip. 2. Check tubigrip periodically during the day, adjusting where it may crease in the joint regions. 3. Remove tubigrip prior to bedtime and wash out with soap and water. You can also launder tubigrip (NO fabric softener, bleach or Woolite). You may dry on low heat. DO NOT SLEEP IN TUBIGRIP.  4. Perform skin care to the extremity, applying low pH lotion using upward strokes. Don't lotion between toes. 5. Re-apply in the morning as noted above. 6. Perform your exercises with tubi- compression on.  7. Remove tubi  with any of the following symptoms: A. Sensation changes that are different from when you experience without tubigrip  B. Decreased circulation-check toes/fingers by applying mild pressure until skin blanches or whitens, then release the pressure with skin returning to normal color indicating good circulation. C. Signs of infection including redness, warmth, flu-like symptoms, fever; new onset of pain. Treatment time:  11:33-12:22 Minutes: 49 minutes    Therapeutic activity 3      ASSESSMENT:   Quinton Valladares is a 69 y.o. male who presents with stage III lymphedema with open wounds bilateral LE, having received care from [address] [place] [address] wound center. Patient has been discharged from home health to allow him to proceed with treatment at this clinic. Patient continues with weekly visits at [facility]. Note open wound R LE as noted above. Patient presents with foot/ankle/calf involvement. Patient will benefit from complete decongestive therapy (CDT) including manual lymphatic drainage (MLD) technique, short-stretch textile bandages/compression system to decongest limb, and kinesiotaping as appropriate. Patient will receive instruction in proper skin care to recognize signs/symptoms of and prevent infection, therapeutic exercise, and self-MLD for independent home program and restorative lymphatic performance. Due to pattern of swelling, doppler study not indicated CVI, positive stemmer's sign bilater, and asymmetrical limb size, lymphedema thought to be primary in nature. Pattern of involvement indicates that patient will require custom flat knit stocking vs velcro compression system for effective home management after bilateral LE decongestive phase completed. Vasopneumatic pump for home use may be medically necessary based on response to treatment. This care is medically necessary due to the infection risk with lymphedema, and to improve functional activities. CDT is necessary to resolve swelling to allow patient to return to wearing normal clothes/footwear, and prevent worsening of symptoms, such as venous stasis ulcerations, infections, or hospitalizations. Patient will be independent with home program strategies to allow improved ADL ability and mobility and to allow patient to return to greatest functional independence. Rehabilitation potential is considered to be Good with caregiver support. Factors which may influence rehabilitation potential include patient will require caregiver support for phase II CDT noting diagosed low IQ in medical chart. Patient will benefit from 10-14 physical therapy visits over 12 weeks to optimize improvement in these areas.     PLAN OF CARE:   Recommendations and Planned Interventions:  Manual lymph drainage/complete decongestive therapy  Multi-layer compression bandaging (short-stretch)  Compression garment fitting/provision  Lymphedema therapeutic exercise  Self-care training  Functional mobility training  Education in skin care and lymphedema precautions  Self-bandaging education per home program  Caregiver education as needed  Wound care as needed     GOALS  Short term goals  Time frame: 4-8 weeks  1. Instruct the patient to be independent with proper skin care to prevent future skin breakdown and decrease the potential risk for infections that are associated with Lymphedema. 2. Patient will be independent with a personal lymphedema exercise program to assist with the lymphatic flow and reduce limb volume by 200-500 mL R/L.  3. Patient will understand the signs and symptoms of acute infection. Long term goals  Time frame: 8-12 weeks  1. Patient will have knowledge of the compression options and acquire a safe and  appropriate daytime and night time compression system to prevent  re-accumulation of fluid. 2.  Patient or family will be able to don/doff garments independently. Garment  system effectiveness will be evaluated prior to discharge for better long-term  management and outcomes. 3.  Improvement in functional outcomes measure by 2% to allow for overall improvement in mobility with reduced pain. 4.   To transition patient to independent restorative phase of CDT. Patient has participated in goal setting and agrees to work toward plan of care. Patient was instructed to call if questions or concerns arise. Thank you for this referral.  Kailey Rich PT, CLT   Time Calculation: 72 mins    TREATMENT PLAN EFFECTIVE DATES:   11/14/2019 TO 2/10/2020  I have read the above plan of care for Krikle. I certify the above prescribed services are required by this patient and are medically necessary.   The above plan of care has been developed in conjunction with the lymphedema/physical therapist.       Physician Signature: ____________________________________Date:______________

## 2019-11-26 ENCOUNTER — OFFICE VISIT (OUTPATIENT)
Dept: FAMILY MEDICINE CLINIC | Age: 74
End: 2019-11-26

## 2019-11-26 ENCOUNTER — HOSPITAL ENCOUNTER (OUTPATIENT)
Dept: LAB | Age: 74
Discharge: HOME | End: 2019-11-26

## 2019-11-26 VITALS
SYSTOLIC BLOOD PRESSURE: 106 MMHG | RESPIRATION RATE: 16 BRPM | DIASTOLIC BLOOD PRESSURE: 57 MMHG | WEIGHT: 239 LBS | HEIGHT: 75 IN | HEART RATE: 70 BPM | OXYGEN SATURATION: 98 % | TEMPERATURE: 98.5 F | BODY MASS INDEX: 29.72 KG/M2

## 2019-11-26 DIAGNOSIS — E55.9 VITAMIN D DEFICIENCY, UNSPECIFIED: ICD-10-CM

## 2019-11-26 DIAGNOSIS — Z23 ENCOUNTER FOR IMMUNIZATION: ICD-10-CM

## 2019-11-26 DIAGNOSIS — R56.9 SEIZURES (HCC): ICD-10-CM

## 2019-11-26 DIAGNOSIS — R79.89 LOW VITAMIN D LEVEL: ICD-10-CM

## 2019-11-26 DIAGNOSIS — Z11.1 SCREENING FOR TUBERCULOSIS: ICD-10-CM

## 2019-11-26 DIAGNOSIS — Z00.00 MEDICARE ANNUAL WELLNESS VISIT, SUBSEQUENT: Primary | ICD-10-CM

## 2019-11-26 DIAGNOSIS — I45.9 HEART BLOCK: ICD-10-CM

## 2019-11-26 LAB
MM INDURATION POC: NORMAL (ref 0–5)
PPD POC: NORMAL

## 2019-11-26 NOTE — PROGRESS NOTES
Date of visit: 11/26/2019     This is a Subsequent Medicare Annual Wellness Visit (AWV), (Performed more than 12 months after effective date of Medicare Part B enrollment and 12 months after last preventive visit, Once in a lifetime)    I have reviewed the patient's medical history in detail and updated the computerized patient record. Inocencio Quinn is a 69 y.o. male   History obtained from: caregiver. Concerns today   No concerns today. Seizures  Followed by Dr. Danis Haynes. Sees yearly. Last seen in Summer. Doing well from Neuro standpoint. Heart Block  S/p PM. Was put on Eliquis to prevent stroke by Cards? Also on BB? Questionable hx of A Fib. Need records. Has Cards appt with [address]  [facility] in [facility].      Treatment Team:  Dr. Danis Haynes - Neurologist, treats his seizure, sees yearly   [address] [facility] - treats his heart block, pacemaker, placed him on his Eliquis    History     Patient Active Problem List   Diagnosis Code    Bradycardia R00.1    Seizure disorder ([facility] [address] G40.909    Bilateral lower extremity edema R60.0    Hypertension I10    Mobitz type II incomplete atrioventricular block I44.1    Intellectual disability F79    Advance care planning Z71.89    S/P biventricular cardiac pacemaker procedure Z95.0    Lymphedema of both lower extremities I89.0    Vitamin D deficiency E55.9    Vitamin B12 deficiency E53.8    History of closed Colles' fracture Z87.81    H/O Mobitz type II block Z86.79     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)     Other ill-defined conditions(799.89)     edema legs    S/P biventricular cardiac pacemaker procedure 10/23/2015    10/23/15 [employer] biventricular pacemaker inmplant    Screen for colon cancer 9/27/2012      Past Surgical History:   Procedure Laterality Date    HX COLONOSCOPY  04/05/2017    HX ORTHOPAEDIC Right 12/22/2017    ORIF right distal radius    HX PACEMAKER  2015    bi ventricular pacemaker      Allergies   Allergen Reactions    Sulfa (Sulfonamide Antibiotics) Rash     Current Outpatient Medications   Medication Sig Dispense Refill    varicella-zoster recombinant, PF, (SHINGRIX, PF,) 50 mcg/0.5 mL susr injection 0.5 mL by IntraMUSCular route once for 1 dose. 0.5 mL 0    metoprolol tartrate (LOPRESSOR) 25 mg tablet TAKE 1 TABLET BY MOUTH TWICE A DAY 60 Tab 0    bumetanide (BUMEX) 2 mg tablet TAKE 1 TABLET BY MOUTH DAILY 90 Tab 3    potassium chloride (K-DUR, KLOR-CON) 20 mEq tablet TAKE 1 TABLET BY MOUTH DAILY 90 Tab 3    VITAMIN D2 50,000 unit capsule TAKE 1 CAPSULE BY MOUTH ONCE MONTHLY 3 Cap 4    lacosamide (VIMPAT) 100 mg tab tablet Take 1 Tab by mouth two (2) times a day. Max Daily Amount: 200 mg. 60 Tab 11    apixaban (ELIQUIS) 5 mg tablet Take 1 Tab by mouth two (2) times a day. 60 Tab 12    levETIRAcetam 1,000 mg tablet Take 1 Tab by mouth two (2) times a day. 61 Tab 11     Family History   Problem Relation Age of Onset    Other Other         unable to obtain due to mental status    No Known Problems Sister      Social History     Tobacco Use    Smoking status: Never Smoker    Smokeless tobacco: Never Used   Substance Use Topics    Alcohol use: No       Specialists/Care Team   Loni Tran has established care with the following healthcare providers:  Patient Care Team:  Lana Hagen MD as PCP - General (Family Practice)  Noe Jimenez MD as Physician (Cardiology)  RUSSEL Schmid as Nurse Practitioner (Nurse Practitioner)  Health Risk Assessment     Demographics   male  69 y.o.     General Health Questions   -During the past 4 weeks:   -how would you rate your health in general? Good   -how often have you been bothered by feeling dizzy when standing up? never   -how much have you been bothered by bodily pain? not   -Have you noticed any hearing difficulties? no   -has your physical and emotional health limited your social activities with family or friends? no    Emotional Health Questions   -Do you have a history of depression, anxiety, or emotional problems? no  -Over the past 2 weeks, have you felt down, depressed or hopeless? no  -Over the past 2 weeks, have you felt little interest or pleasure in doing things? no    Health Habits   Diet habits: Unrestricted diet  Do you get 5 servings of fruits or vegetables daily? yes  Do you exercise regularly? no    Activities of Daily Living and Functional Status   -Do you need help with eating, walking, dressing, bathing, toileting, the phone, transportation, shopping, preparing meals, housework, laundry, medications or managing money? yes  -In the past four weeks, was someone available to help you if you needed and wanted help with anything? yes  -Are you confident are you that you can control and manage most of your health problems? no  -How often do you have trouble taking your medications as prescribed? never    Fall Risk and Home Safety   Have you fallen 2 or more times in the past year? no  Does your home have rugs in the hallway, lack grab bars in the bathroom, lack handrails on the stairs or have poor lighting? no  Do you have smoke detectors and check them regularly? yes  Do you have difficulties driving a car? Does not drive  Do you always fasten your seat belt when you are in a car? yes    Physical Examination     Vitals:    11/26/19 1320   BP: 106/57   Pulse: 70   Resp: 16   Temp: 98.5 °F (36.9 °C)   TempSrc: Oral   SpO2: 98%   Weight: 239 lb (108.4 kg)   Height: 6' 3\" (1.905 m)     Body mass index is 29.87 kg/m².    No exam data present  Was the patient's timed Up & Go test unsteady or longer than 30 seconds? no    Evaluation of Cognitive Function   Mood/affect:  happy  Appearance: age appropriate, casually dressed and overweight  Family member/caregiver input: doing well, healthy    WNWD NAD  RRR, no murmurs  CTA no wheezes/ ronchi/ rales  TMs WNL, moist mucus membranes  No cervical LAD  Compressions stockings on    Advice/Referrals/Counseling (as indicated)   Education and counseling provided for any problems identified above: Discussed to look up to see if patient has had Prevnar. Preventive Services     Discussed today Done Previously Not Needed     X PPSV23 2015  Pneumococcal vaccines   X   Flu vaccine     X Hepatitis B vaccine (if at risk)   X   Shingles vaccine    X 2015  TDAP vaccine    X Done by Urology 5/18 neg  Digital rectal exam    X 2015, next 10 years  Colorectal cancer screening     X Low-dose CT for lung cancer screening     X Bone density test    X 5/18 neg  Glaucoma screening    X 2018 normal  Cholesterol test    X 2018 normal  Diabetes screening test      X Diabetes self-management class     X Nutritionist referral for diabetes or renal disease     Discussion of Advance Directive   Discussed with Jen Hardy his ability to prepare and advance directive in the case that an injury or illness causes him to be unable to make health care decisions. Not on file  Makes his own medical decisions    Assessment/Plan       ICD-10-CM ICD-9-CM    1. Medicare annual wellness visit, subsequent Z00.00 V70.0    2. Vitamin D deficiency, unspecified  E55.9 268.9 VITAMIN D, 25 HYDROXY   3. Lives in group home Z59.3 V60.6 AMB POC TUBERCULOSIS, INTRADERMAL (SKIN TEST)   4. Screening for tuberculosis Z11.1 V74.1 AMB POC TUBERCULOSIS, INTRADERMAL (SKIN TEST)   5. Seizures (HCC) R56.9 780.39    6.  Heart block I45.9 426.9        Orders Placed This Encounter    VITAMIN D, 25 HYDROXY    AMB POC TUBERCULOSIS, INTRADERMAL (SKIN TEST)    varicella-zoster recombinant, PF, (SHINGRIX, PF,) 50 mcg/0.5 mL susr injection     Patient seen/ discussed with Dr. Emily West (Attending)    Follow up in 48-72 hours for PPD reading    Shaquille Newberry DO

## 2019-11-26 NOTE — PROGRESS NOTES
Chief Complaint   Patient presents with   [facility] Annual Wellness Visit     1. Have you been to the ER, urgent care clinic since your last visit? Hospitalized since your last visit? No    2. Have you seen or consulted any other health care providers outside of the [address] [place] [address] since your last visit? Include any pap smears or colon screening. no      General Health Questions   -During the past 4 weeks:   -how would you rate your health in general? Good   -how often have you been bothered by feeling dizzy when standing up? N/A   -how much have you been bothered by bodily pain? Not   -Have you noticed any hearing difficulties? No   -has your physical and emotional health limited your social activities with family or friends? No    Emotional Health Questions   -Do you have a history of depression, anxiety, or emotional problems? No  -Over the past 2 weeks, have you felt down, depressed or hopeless? No  -Over the past 2 weeks, have you felt little interest or pleasure in doing things? No    Health Habits   Please describe your diet habits: N/A  Do you get 5 servings of fruits or vegetables daily? Yes  Do you exercise regularly? No    Activities of Daily Living and Functional Status   -Do you need help with eating, walking, dressing, bathing, toileting, the phone, transportation, shopping, preparing meals, housework, laundry, medications or managing money? Yes  -In the past four weeks, was someone available to help you if you needed and wanted help with anything? Yes  -Are you confident are you that you can control and manage most of your health problems? No  -Have you been given information to help you keep track of your medications? N/A  -How often do you have trouble taking your medications as prescribed? None    Fall Risk and Home Safety   Have you fallen 2 or more times in the past year? No  Does your home have rugs in the hallways? No  Do you have grab bars in the bathrooms?   Yes   Does your home have handrails on the stairs? Yes, Do you have adequate lighting in your home? Yes  Do you have smoke detectors and check them regularly? Yes  Do you have difficulties driving a car/vehicle? N/A  Do you always fasten your seat belt when you are in a car? Yes

## 2019-11-27 ENCOUNTER — TELEPHONE (OUTPATIENT)
Dept: FAMILY MEDICINE CLINIC | Age: 74
End: 2019-11-27

## 2019-11-27 LAB — 25(OH)D3 SERPL-MCNC: 17.8 NG/ML (ref 30–100)

## 2019-11-27 RX ORDER — MELATONIN
1000 DAILY
Qty: 30 TAB | Refills: 2 | Status: SHIPPED | OUTPATIENT
Start: 2019-11-27 | End: 2020-02-04 | Stop reason: SDUPTHER

## 2019-11-27 NOTE — TELEPHONE ENCOUNTER
11/27/2019 4:44 PM    Left VM to call back regarding medication change. If his nursing supervisor calls back please read them the following message:    \"Mr. Quinn's Vit D is still low. I would like to stop his current Vitamin D2 medication and switch him to a daily Vitamin D3 1000 units daily. He should return in 6 weeks for a recheck to see if this is working. \"    Mariaa Reilly, DO

## 2019-11-29 ENCOUNTER — CLINICAL SUPPORT (OUTPATIENT)
Dept: FAMILY MEDICINE CLINIC | Age: 74
End: 2019-11-29

## 2019-11-29 DIAGNOSIS — Z11.1 ENCOUNTER FOR PPD SKIN TEST READING: Primary | ICD-10-CM

## 2019-11-29 NOTE — TELEPHONE ENCOUNTER
Forwarding to nurse. Dr. Mcdowell Branch: 2 days ago   [address] [place] [address], [address] [place] [address] with [address] missed a call back from the practice and request for another attempt to be made. Best contact number is [phone].

## 2019-11-29 NOTE — TELEPHONE ENCOUNTER
Spoke with Ramona Fitzgerald, relayed message from Dr. Rj Adam. Ramona Fitzgerald will schedule follow up appointment for 6 weeks.

## 2019-11-29 NOTE — PROGRESS NOTES
Chief Complaint   Patient presents with    PPD Reading     1. Have you been to the ER, urgent care clinic since your last visit? Hospitalized since your last visit? No    2. Have you seen or consulted any other health care providers outside of the [address] [place] [address] since your last visit? Include any pap smears or colon screening.  No

## 2019-12-02 DIAGNOSIS — G40.909 SEIZURE DISORDER (HCC): ICD-10-CM

## 2019-12-02 RX ORDER — LACOSAMIDE 100 MG/1
100 TABLET ORAL 2 TIMES DAILY
Qty: 60 TAB | Refills: 11 | Status: SHIPPED | OUTPATIENT
Start: 2019-12-02 | End: 2020-01-14 | Stop reason: SDUPTHER

## 2019-12-06 RX ORDER — LEVETIRACETAM 1000 MG/1
1000 TABLET ORAL 2 TIMES DAILY
Qty: 60 TAB | Refills: 0 | Status: SHIPPED | OUTPATIENT
Start: 2019-12-06 | End: 2020-01-06 | Stop reason: SDUPTHER

## 2019-12-11 NOTE — TELEPHONE ENCOUNTER
Tatiana Zhao [facility] Front Office             Pt request for a call back from the practice in regards to his \"Metoperol\" 25mg. It is has not been refilled as of yet. Best contact number is [phone].

## 2019-12-12 RX ORDER — METOPROLOL TARTRATE 25 MG/1
TABLET, FILM COATED ORAL
Qty: 60 TAB | Refills: 6 | OUTPATIENT
Start: 2019-12-12

## 2019-12-12 RX ORDER — METOPROLOL TARTRATE 25 MG/1
TABLET, FILM COATED ORAL
Qty: 60 TAB | Refills: 5 | Status: SHIPPED | OUTPATIENT
Start: 2019-12-12 | End: 2020-01-03 | Stop reason: SDUPTHER

## 2019-12-19 ENCOUNTER — CLINICAL SUPPORT (OUTPATIENT)
Dept: CARDIOLOGY CLINIC | Age: 74
End: 2019-12-19

## 2019-12-19 DIAGNOSIS — Z95.0 CARDIAC PACEMAKER IN SITU: Primary | ICD-10-CM

## 2019-12-19 DIAGNOSIS — I44.2 CHB (COMPLETE HEART BLOCK) (HCC): ICD-10-CM

## 2019-12-30 ENCOUNTER — HOSPITAL ENCOUNTER (OUTPATIENT)
Dept: PHYSICAL THERAPY | Age: 74
Discharge: HOME | End: 2019-12-30
Payer: MEDICARE

## 2019-12-30 PROCEDURE — 97161 PT EVAL LOW COMPLEX 20 MIN: CPT

## 2019-12-30 PROCEDURE — 97530 THERAPEUTIC ACTIVITIES: CPT

## 2019-12-30 NOTE — PROGRESS NOTES
[address] [facility]  [address] [place] [address] [facility]      Re-EVALUATION    NAME: Rony Quinn AGE: 69 y.o. GENDER: male  DATE: 12/30/2019  REFERRING PHYSICIAN: Kee Hernandez MD; CC: Radha Chau DPM  HISTORY AND BACKGROUND:  Patient initially referred to clinic for evaluation and treatment of bilateral LE chronic edema with open wounds. Chart indicated LE edema is greater than 4 years in duration, with patient a poor historian and unable to pin point exact time of onset of LE edema. Open wounds treated by home health nursing, which has been discharged, and [facility]. Patient arrives to appointment with bandaids in place over wounds proximal dorsal foot bilateral, tubigrip in appropriately placed ankle to mid calf, rolling creating indentations. See photos below. Primary Diagnosis:  · Primary lymphedema, Tarda (Q82.0)  Other Treatment Diagnoses:   Abnormality of gait (other abnormalities of gait and mobility R26.89)   Swelling not relieved by elevation ( R60.9 edema)   Open wound RLE S81.801S; Open wound LLE S81.801S   LE Cellulitis  (L03.119)  Morbid Obesity (E66.01)  Date of Onset: prior to 2015, patient unable to provide specific time of onset. Present Symptoms and Functional Limitations: Patient presents to appt today with c/o open wound bilateral LE, heavy and tight lower legs, difficulty donning shoes and work boots. Reports lymphedema impacts movement, is aware of increased size bilateral LE. Reports feelings of frustration, needing to rely on others, and lack of understanding in management of lymphedema. States lymphedema impairs ability to perform daily care and leisure activities, and finding appropriate fitting shoes/pants.   Lymphedema Life Impact Scale: 21/68; 31%  Past Medical History:   Past Medical History:   Diagnosis Date    Epilepsy ([address]     Heart disease     Hypertension     Incontinence     Leg swelling     Mental retardation, 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)     Other ill-defined conditions(799.89)     edema legs    S/P biventricular cardiac pacemaker procedure 10/23/2015    10/23/15 [employer] biventricular pacemaker inmplant    Screen for colon cancer 9/27/2012     Past Surgical History:   Procedure Laterality Date    HX COLONOSCOPY  04/05/2017    HX ORTHOPAEDIC Right 12/22/2017    ORIF right distal radius    HX PACEMAKER  2015    bi ventricular pacemaker      Current Medications:    Current Outpatient Medications   Medication Sig    metoprolol tartrate (LOPRESSOR) 25 mg tablet TAKE 1 TABLET BY MOUTH TWICE A DAY    levETIRAcetam 1,000 mg tablet Take 1 Tab by mouth two (2) times a day.  lacosamide (VIMPAT) 100 mg tab tablet Take 1 Tab by mouth two (2) times a day. Max Daily Amount: 200 mg.  cholecalciferol (VITAMIN D3) (1000 Units /25 mcg) tablet Take 1 Tab by mouth daily.  bumetanide (BUMEX) 2 mg tablet TAKE 1 TABLET BY MOUTH DAILY    potassium chloride (K-DUR, KLOR-CON) 20 mEq tablet TAKE 1 TABLET BY MOUTH DAILY    apixaban (ELIQUIS) 5 mg tablet Take 1 Tab by mouth two (2) times a day. No current facility-administered medications for this encounter. Allergies: Allergies   Allergen Reactions    Sulfa (Sulfonamide Antibiotics) Rash      Prior Level of Function/Social/Work History/Personal factors and/or comorbidities impacting plan of care: Reside in group home. Patient has long history of LE edema, however, unable to determine specific length of time. Works 18 hours/week M-W, [employer] during the day Th-Sunday. Patient reports recent difficulty donning work boots due to swelling. Does state that he has to wear boots to work. Patient advised that, during bandaging phase of treatment he may not be able to work due to bulkiness of bandaging limiting ability to don boots.    Living Situation: group home      Trainable Caregiver?: caregivers at group home, accompanied today. Self-care/ADLs: indep     Mobility: indep without device, however, gait pattern impaired as noted below. Would benefit from use of AD for gait. Sleeping Arrangement:  bed   Adaptive Equipment Owned: non  Previous Therapy:  Home health nursing/wound care center treatments. Initial evaluation in clinic 11/14/2019. Compression/Lymphedema Equipment:  Tubigrip. Caregiver reports bandaging supplies ordered and arrived to group home, however, unable to find to bring to this appt. SUBJECTIVE:   Patient arrives with caregiver. Reports compression bandaging supplies were ordered, however, did not bring to clinic. Caregiver reports need to locate them. Patient reports c/o bilateral LE swelling, difficulty finding shoes or work boots that fit. States wounds on feet worse that when previously seen. Patient is agreeable to proceeding with evaluation today. Patients goals for therapy: Reduce swelling foot/leg to allow for better fit of shoes. OBJECTIVE DATA SUMMARY:   EXAMINATION/PRESENTATION/DECISION MAKING:   Pain:             Skin and Tissue Assessment:  Dermal Status:  []   Intact [x]  Dry   [x]  Tenuous:   [] Flaky   [x]  Wound/lesion present: See photos below. Eschar L wound, min drainage. R wound moderate drainage. []  Scars   []  Dermatitis Scabbing from scratching bilateral lower legs. Texture/Consistency:  [x]  Boggy: bilateral LE, foot/ankle/calf: indentations noted below from rolling of tubigrip. [x]  Pitting Edema: posterior calf.    []  Brawny []  Combination   [x]  Fibrotic/Woody: ankle/dorsal foot/calf    Pigmentation/Color Change:  []  Normal []  Hemosiderin   []  Red []  Erythematous   [x]  Hyperpigmented: see photo below []  Hyperlipodermatosclerosis   Anomalies:  [x]  Lymphorrhea: R dorsal foot/anterior ankle open wound []  Vesicles   []  Petechiae []  Warty Vercusis   []  Bullae []  Papilloma   Circulatory:  [] JEFF []  Varicosities:   []  Pulses: [x]  Vascular studies ruled out DVT in past: 7/17/2019   []  DVT History    Nails:  []   Normal  [x]   Fungus  Stemmers Sign: positive bilateral LE  Photos: Initial evaluation    Photos today:      Height:   6'1 Weight:   237.4 lbs   BMI:   31  (36 or greater: adversely affecting lymphedema)  Volumetric Measurements:   Right: 9080. 13 mL today;  9536.79 mL  11/14/2019 Left:  9045. 13 mL today; 9150.60 mL 11/14/2019   % Difference: 0.39% today;  4.22% 11/14/2019    (See scanned graph)  Range of Motion: bilateral LE grossly WFL  Strength: bilateral LE grossly 5/5 with MMT. Sensation:  Intact to light touch  Mobility:  Bed/Chair Mobility:  Independent  Transfers:  Independent    Sitting Balance:  goos Standing Balance:  Good-   Gait:  Independent; narrow KASSIE, decreased stride length bilateral LE. Patient advised to use of walker or cane during bandaging phase of treatment is recommended. Wheelchair Mobility:  (Other) na   Endurance:  Good, working part time; ambulates community distances. Stairs:  (Other) na         Safety:  Patient is alert and oriented: To person, place, time. Patient has difficulty recalling information about medical history. Reports no falls past 6 months. Safety awareness:  Intact for mobility   Fall Risk?:  Low-mod, gait abnormalities as noted above.    Patient given written fall prevention handout: Yes   Precautions:  Standard lymphedema precautions to include avoiding blood pressure readings, injections and IVs or other procedures/acts that could lead to broken skin on affected area, and avoiding excessive heat, resistive activity or altitude without compression garment    Functional Measure:   LLIS      Physical Therapy Evaluation Charge Determination   History Examination Presentation Decision-Making   MEDIUM  Complexity : 1-2 comorbidities / personal factors will impact the outcome/ POC  MEDIUM Complexity : 3 Standardized tests and measures addressing body structure, function, activity limitation and / or participation in recreation  MEDIUM Complexity : Evolving with changing characteristics  Other outcome measures LLIS  LOW       Based on the above components, the patient evaluation is determined to be of the following complexity level: LOW     Evaluation Time: 12:55-1:14 pm  19 minutes    TREATMENT PROVIDED:   1. Treatment description: The patient was educated again regarding the role and function of the lymphatic system, pathophysiology of lymphedema, and instructed in the lymphedema management protocol of complete decongestive therapy (CDT). This includes skin care to prevent breakdown or infection, lymphedema exercises, custom compression therapy options (bandaging, compression garments, compression pump, Geraldean Arrow, JoViPak, The Toño-Nael, etc. as needed), and decongestion with manual lymphatic drainage as indicated. We discussed the need for consistent compression system for lymphedema management. Legs/wounds cleansed with foam wash. Skin care was performed,applying Eucerin mixed with Sarna bilateral LE using upward strokes to stimulate lymphatic vessels. Mepilex Ag borders applied to each wound. Profore knee high four layer bandaging applied knee high bilateral LE, with patient and caregiver instructed in home management of Profore as noted. Applied H tubigrip over Profore to allow for easier donning/doffing of pants/shoes. R LE foot fit into boot today. L LE covered with non-skid sock as boot did not fit over Profore. Patient/caregiver provided with information regarding obtaining XL cast shoe vs larger tennis shoe for wear over bandaging. Patient advised that wearing work boots will not likely be possible during bandaging phase of treatment. Patient advised that plan is to proceed with custom flat knit compression stockings after bilateral LE decongested, which will allow patient to return to wear of work boots.     Compression Profore instructions:   Compression Profore will be applied once a week by therapist in clinic, with adjustments to be made at home as indicated if bandaging becomes loose or uncomfortable. If tolerated, remain wrapped between appointments with therapist, removing under the following conditions--DO NOT WAIT FOR A RETURN PHONE CALL FROM CLINIC:    -Numbness/tingling in extremity different from what you have experienced without the bandages in place.  -Compromise in circulation, monitoring blood refill into toes after applying gentle pressure to fingers.  -Onset of pain in extremity that is sudden and severe in nature. -Redness, warmth in limb, and/or fever, flu-like symptoms, which may indicate infection. If this occurs, call your doctor right away. If you note a sudden increase in swelling in extremity, with or without redness/warmth, go to the emergency room as soon as possible to have blood clot ruled out. Compression wrap is disposable and needs to be kept dry. Treatment time:  1:15-2:15 pm Minutes: 60 minutes    Therapeutic activity 4      ASSESSMENT:   Christie Hein is a 69 y.o. male who presents with stage III lymphedema with open wounds bilateral LE, having received care from [address] [place] [address] wound center. Patient has been discharged from home health to allow him to proceed with treatment at this clinic, however, has been unable to return to clinic for treatment until today. Patient continues with weekly visits at [facility]. Note bilateral open wounds more involved than when previously seen. Patient presents with foot/ankle/calf involvement. Patient will benefit from complete decongestive therapy (CDT) including manual lymphatic drainage (MLD) technique, short-stretch textile bandages/compression system to decongest limb, and kinesiotaping as appropriate.   Patient will receive instruction in proper skin care to recognize signs/symptoms of and prevent infection, therapeutic exercise, and self-MLD for independent home program and restorative lymphatic performance. Due to pattern of swelling, doppler study not indicated CVI, positive stemmer's sign bilater, and asymmetrical limb size, lymphedema thought to be primary in nature. Pattern of involvement indicates that patient will require custom flat knit stocking vs velcro compression system for effective home management after bilateral LE decongestive phase completed. Question patient success with velcro garments due to potential difficulty with patient/caregiver donning garments correctly. Tubigrip application upon arrival today was concerning. Vasopneumatic pump for home use may be medically necessary based on response to treatment. Evaluation completed secondary to 6 weeks since initial evaluation. This care is medically necessary due to the infection risk with lymphedema, and to improve functional activities. CDT is necessary to resolve swelling to allow patient to return to wearing normal clothes/footwear, and prevent worsening of symptoms, such as venous stasis ulcerations, infections, or hospitalizations. Patient will be independent with home program strategies to allow improved ADL ability and mobility and to allow patient to return to greatest functional independence. Rehabilitation potential is considered to be Good with effective caregiver support. Factors which may influence rehabilitation potential include patient will require caregiver support for phase II CDT noting diagosed low IQ in medical chart. Patient will benefit from 10-14 physical therapy visits over 12 weeks to optimize improvement in these areas.     PLAN OF CARE:   Recommendations and Planned Interventions:  Manual lymph drainage/complete decongestive therapy  Multi-layer compression bandaging (short-stretch)  Compression garment fitting/provision  Lymphedema therapeutic exercise  Self-care training  Functional mobility training  Education in skin care and lymphedema precautions  Self-bandaging education per home program  Caregiver education as needed  Wound care as needed     GOALS  Short term goals  Time frame: 4-8 weeks  1. Instruct the patient to be independent with proper skin care to prevent future skin breakdown and decrease the potential risk for infections that are associated with Lymphedema. 2. Patient will be independent with a personal lymphedema exercise program to assist with the lymphatic flow and reduce limb volume by 100 mL R/L.  3. Patient will understand the signs and symptoms of acute infection. Long term goals  Time frame: 8-12 weeks  1. Patient will have knowledge of the compression options and acquire a safe and  appropriate daytime and night time compression system to prevent  re-accumulation of fluid. 2.  Patient or family will be able to don/doff garments independently. Garment  system effectiveness will be evaluated prior to discharge for better long-term  management and outcomes. 3.  Improvement in functional outcomes measure by 5-10% to allow for overall improvement in mobility with reduced pain. 4. Wounds healed 100% with appropriate foot wear identified. 5.   To transition patient to independent restorative phase of CDT. Patient has participated in goal setting and agrees to work toward plan of care. Patient was instructed to call if questions or concerns arise. Thank you for this referral.  Franci Bautista, PT, CLT   Time Calculation: 80 mins    TREATMENT PLAN EFFECTIVE DATES:   12/30/2019 TO 3/25/2020  I have read the above plan of care for [employer]. I certify the above prescribed services are required by this patient and are medically necessary.   The above plan of care has been developed in conjunction with the lymphedema/physical therapist.       Physician Signature: ____________________________________Date:______________

## 2020-01-02 ENCOUNTER — APPOINTMENT (OUTPATIENT)
Dept: PHYSICAL THERAPY | Age: 75
End: 2020-01-02
Payer: MEDICARE

## 2020-01-02 ENCOUNTER — HOSPITAL ENCOUNTER (OUTPATIENT)
Dept: PHYSICAL THERAPY | Age: 75
Discharge: HOME | End: 2020-01-02
Payer: MEDICARE

## 2020-01-02 PROCEDURE — 97140 MANUAL THERAPY 1/> REGIONS: CPT

## 2020-01-02 PROCEDURE — 97530 THERAPEUTIC ACTIVITIES: CPT

## 2020-01-02 NOTE — PROGRESS NOTES
_                                    [facility]  [address] [facility] [address]      LYMPHEDEMA THERAPY  VISIT: 2    [x]        Daily note               []       30 day/10th visit progress note    NAME: Susanne Cuba  DATE: 1/2/2020    GOALS  Short term goals  Time frame: 4-8 weeks  1. Instruct the patient to be independent with proper skin care to prevent future skin breakdown and decrease the potential risk for infections that are associated with Lymphedema. 2. Patient will be independent with a personal lymphedema exercise program to assist with the lymphatic flow and reduce limb volume by 100 mL R/L.  3. Patient will understand the signs and symptoms of acute infection. Long term goals  Time frame: 8-12 weeks  1. Patient will have knowledge of the compression options and acquire a safe and       appropriate daytime and night time compression system to prevent             re-accumulation of fluid. 2.  Patient or family will be able to don/doff garments independently. Garment   system effectiveness will be evaluated prior to discharge for better long-term  management and outcomes. 3.  Improvement in functional outcomes measure by 5-10% to allow for overall improvement in mobility with reduced pain. 4. Wounds healed 100% with appropriate foot wear identified. 5.   To transition patient to independent restorative phase of CDT. SUBJECTIVE REPORT:Patient reports tolerated Profore wrap well bilateral LE. Brings bandaging supplies to appt today, and is agreeable to proceeding with treatment. Patient worn tennis shoes into clinic, providing improved accommodation of added bulk of bandaging. Pain: 3-4/10, LE heaviness           Gait: Independent gait without any assistive device for community distances    ADLs:  Simple home management Supervision  Group home staff assists patient as needed.     Treatment Response:   Patient reviewed packet received at evaluation. Patient completed home program as prescribed. Patient brought in all required supplies for treatment. Patient brought in shoes to wear with bandaging as requested. Function: Patient's ADLs are requiring less assistance. Began MLD (manual lymphatic drainage) techniques with lotioning and skin care and range of motion exercise routine without props. TREATMENT AND OBJECTIVE DATA SUMMARY:   Patient/Family Education:      Educated in skin care: Skin care products  Prevention of cellulitis     Educated in exercise: ROM routine ankle pumps/circumduction/toe flex and ext. Instructed in self MLD: Instructed to apply lotion from knee to groin nightly using upward strokes. Therapeutic Exercise/Procedure              Free exercises/ROM: Reviewed remedial lymphedema home exercise program with patient. and Patient requires cueing/assistance to complete remedial home exercise program.   Home program: Patient to perform daily to BID:  Skin care, Exercise routine, Compression bandage, Self manual lymph draining (MLD) and Bring supplies to each therapy visit   Rationale: Exercise will increase the lymph angiomotoricity and tissue pressure of the skin and thus decrease swelling. Therapeutic activity: 10:09-10:52 43 minutes     Area to decongest: LE: Bilateral and Trunk   Sequence used and effectiveness: Self MLD sequence/techniques/hand strokes today. To progress with MLD in clinic next 1-2 visits. Compression bandaging: Wounds cleansed, dried. Applied Mepilex AG foam to wounds. L wound closed, however, tenuous. R wound remains open.           Upper/Lower extremity compression:  bilateral  lower extremity: below knee     The following multi-layer bandages were applied:  Stockinette  Transelast for toe wraps    Padding layer:  Fleece    Foam:  12 cm roll    Short stretch bandages:   6 cm  8 cm  10 cm    Home management instructions as follows:  Compression bandaging instructions:  1. Compression bandaging will be applied twice a week by therapist in clinic, with adjustments to be made at home as indicated if bandaging becomes loose or uncomfortable. 2. If tolerated, remain bandaged between appointments with therapist, removing under the following conditions--DO NOT WAIT FOR A RETURN PHONE CALL FROM CLINIC:    -Numbness/tingling in extremity different from what you have experienced without the bandages in place.  -Compromise in circulation, monitoring blood refill into toes after applying gentle pressure to toes.  -Onset of pain in extremity that is sudden and severe in nature. -Redness, warmth in limb, and/or fever, flu-like symptoms, which may indicate infection. If this occurs, call your doctor right away. If you note a sudden increase in swelling in extremity, with or without redness/warmth, go to the emergency room as soon as possible to have blood clot ruled out. 3. Compression bandaging supplies that can be laundered are the brown compression wraps and any foam applied for padding. Launder in washer/dryer with NO fabric softener, bleach, woolite. Dry on a low heat. 4. Once compression garments are ordered, compression bandaging will be continued until garments arrive for fitting. This process can take several weeks. Girth/Volume measurement: Reviewed results of volumetric measurements taken on evaluation. Girth measurements as follows:  Affected Side: bilateral   Left (cm) Right (cm)   5th Tuberosity 28.7    27.6      Ankle 32.6    32.4      Calf 40.3    41.3      Mid Knee             Thigh             Groin             Length                  TOTAL TREATMENT 43 mins       ASSESSMENT:   Treatment effectiveness and tolerance: Patient tolerated Profore well previous treatment. Initiated compression bandaging, with patient without c/o after application. Progress toward goals: Ongoing.      PLAN OF CARE:   Changes to the plan of care: Continue compression bandaging. Implement MLD as tolerated next 1-2 visits. Progress HEP. Continue education regarding lymphedema and importance of skin care.    Frequency: 2 times a week           Estrellita Snellen, PT, CLT

## 2020-01-03 RX ORDER — METOPROLOL TARTRATE 25 MG/1
25 TABLET, FILM COATED ORAL 2 TIMES DAILY
Qty: 60 TAB | Refills: 5 | Status: SHIPPED | OUTPATIENT
Start: 2020-01-03 | End: 2020-06-02

## 2020-01-06 ENCOUNTER — HOSPITAL ENCOUNTER (OUTPATIENT)
Dept: PHYSICAL THERAPY | Age: 75
Discharge: HOME | End: 2020-01-06
Payer: MEDICARE

## 2020-01-06 PROCEDURE — 97110 THERAPEUTIC EXERCISES: CPT

## 2020-01-06 PROCEDURE — 97530 THERAPEUTIC ACTIVITIES: CPT

## 2020-01-06 PROCEDURE — 97597 DBRDMT OPN WND 1ST 20 CM/<: CPT

## 2020-01-06 RX ORDER — LEVETIRACETAM 1000 MG/1
1000 TABLET ORAL 2 TIMES DAILY
Qty: 60 TAB | Refills: 0 | Status: SHIPPED | OUTPATIENT
Start: 2020-01-06 | End: 2020-01-14 | Stop reason: SDUPTHER

## 2020-01-06 NOTE — PROGRESS NOTES
_                                    [facility]  [address] [place] [address].  [address]  [facility] [place]      LYMPHEDEMA THERAPY  VISIT: 3    [x]        Daily note               []       30 day/10th visit progress note    NAME: Pastora Marrero  DATE: 1/6/2020    GOALS  Short term goals  Time frame: 4-8 weeks  1. Instruct the patient to be independent with proper skin care to prevent future skin breakdown and decrease the potential risk for infections that are associated with Lymphedema. 2. Patient will be independent with a personal lymphedema exercise program to assist with the lymphatic flow and reduce limb volume by 100 mL R/L.  3. Patient will understand the signs and symptoms of acute infection. Long term goals  Time frame: 8-12 weeks  1. Patient will have knowledge of the compression options and acquire a safe and       appropriate daytime and night time compression system to prevent             re-accumulation of fluid. 2.  Patient or family will be able to don/doff garments independently. Garment   system effectiveness will be evaluated prior to discharge for better long-term  management and outcomes. 3.  Improvement in functional outcomes measure by 5-10% to allow for overall improvement in mobility with reduced pain. 4. Wounds healed 100% with appropriate foot wear identified. 5.   To transition patient to independent restorative phase of CDT. SUBJECTIVE REPORT:Patient reports tolerated bandaging well bilateral LE. States he has been able to return to work wearing his tennis shoes. Brings bandaging supplies to appt today, and is agreeable to proceeding with treatment. Pain: 3/10, LE heaviness           Gait: Independent gait without any assistive device for community distances    ADLs:  Simple home management Supervision  Group home staff assists patient as needed.     Treatment Response:   Patient reviewed packet received at evaluation. Patient completed home program as prescribed. Patient brought in all required supplies for treatment. Patient brought in shoes to wear with bandaging as requested. Function: Patient's ADLs are requiring less assistance. TREATMENT AND OBJECTIVE DATA SUMMARY:   Patient/Family Education:      Educated in skin care: Skin care products  Prevention of cellulitis     Educated in exercise: ROM routine ankle pumps/circumduction/toe flex and ext. Instructed in self MLD: Instructed to apply lotion from knee to groin nightly using upward strokes. Therapeutic Exercise/Procedure: 1:40-1:50 pm  10 minutes             Free exercises/ROM: Reviewed remedial lymphedema home exercise program with patient. and Patient requires cueing/assistance to complete remedial home exercise program.      Patient performs the following with assist of therapist today:  In supine: toe flex/ext; ankle pumps; ankle circumduction. Patient has difficulty with ankle pumps in supine. In sitting: marching in place; full knee extension; rocking forward onto toes; rocking back onto heels. Patient demonstrates improvement in ankle dorsiflexion/plantar flexion when exercise performed in sitting, with patient in agreement. Patient instructed to continue exercises 2x/day, 10 repetitions. Home program: Patient to perform daily to BID:  Skin care, Exercise routine, Compression bandage, Self manual lymph draining (MLD) and Bring supplies to each therapy visit   Rationale: Exercise will increase the lymph angiomotoricity and tissue pressure of the skin and thus decrease swelling. Therapeutic activity: 1:09-1:39 minutes  Wound care, less than 20 cm: 12:55-1:08 pm 30 minutes  12 minutes     Area to decongest: LE: Bilateral and Trunk   Sequence used and effectiveness: Self MLD sequence/techniques/hand strokes today. To progress with MLD in clinic next 1-2 visits. Patient advised to bring shorts to next appt. Compression bandaging: Wounds cleansed, dried. Removed non-adherent tissue dorsal foot, bilaterally. Note new skin tear L dorsal foot uncertain of cause, clean, with Medi honey applied. Applied Mepilex transfer to area. Applied Mepilex AG foam to wound R dorsal foot. Upper/Lower extremity compression:  bilateral  lower extremity: below knee     The following multi-layer bandages were applied:  Stockinette  Transelast for toe wraps  Komprex II medial/lateral ankle region L    Padding layer:  Fleece    Foam:  12 cm roll    Short stretch bandages:   6 cm  8 cm  10 cm     H tubigrip over bandaging, proximal aspect to cover tape; F tubigrip applied foot/ankle to allow for easier don/doff of shoes. Home management instructions as follows:  Compression bandaging instructions:  1. Compression bandaging will be applied twice a week by therapist in clinic, with adjustments to be made at home as indicated if bandaging becomes loose or uncomfortable. 2. If tolerated, remain bandaged between appointments with therapist, removing under the following conditions--DO NOT WAIT FOR A RETURN PHONE CALL FROM CLINIC:    -Numbness/tingling in extremity different from what you have experienced without the bandages in place.  -Compromise in circulation, monitoring blood refill into toes after applying gentle pressure to toes.  -Onset of pain in extremity that is sudden and severe in nature. -Redness, warmth in limb, and/or fever, flu-like symptoms, which may indicate infection. If this occurs, call your doctor right away. If you note a sudden increase in swelling in extremity, with or without redness/warmth, go to the emergency room as soon as possible to have blood clot ruled out. 3. Compression bandaging supplies that can be laundered are the brown compression wraps and any foam applied for padding. Launder in washer/dryer with NO fabric softener, bleach, woolite. Dry on a low heat.     4. Once compression garments are ordered, compression bandaging will be continued until garments arrive for fitting. This process can take several weeks. Girth/Volume measurement: Reviewed results of volumetric measurements taken on evaluation. Girth measurements as follows:  Affected Side: bilateral   Left (cm) Right (cm)   5th Tuberosity 29    29.1      Ankle 30.5    30.2      Calf 39.7    40.5      Mid Knee             Thigh             Groin             Length                  TOTAL TREATMENT 55 mins       ASSESSMENT:   Treatment effectiveness and tolerance: Patient tolerated bandaging well previous treatment. Continued compression bandaging, with patient without c/o after application. Added Komprex II medial/lateral ankle L. Progressed HEP. Progress toward goals: Ongoing. PLAN OF CARE:   Changes to the plan of care: Continue compression bandaging. Implement MLD as tolerated next 1-2 visits. Assess understanding of HEP. Continue education regarding lymphedema and importance of skin care.    Frequency: 2 times a week           Eliza Griffin, PT, CLT

## 2020-01-10 ENCOUNTER — HOSPITAL ENCOUNTER (OUTPATIENT)
Dept: PHYSICAL THERAPY | Age: 75
Discharge: HOME | End: 2020-01-10
Payer: MEDICARE

## 2020-01-10 PROCEDURE — 97140 MANUAL THERAPY 1/> REGIONS: CPT

## 2020-01-10 PROCEDURE — 97530 THERAPEUTIC ACTIVITIES: CPT

## 2020-01-10 NOTE — PROGRESS NOTES
_                                    [facility]  [address] [place] [address]  [address] [place] [address],[address]  [facility]      LYMPHEDEMA THERAPY  VISIT: 4    [x]        Daily note               []       30 day/10th visit progress note    NAME: Trevon Cerda  DATE: 1/10/2020    GOALS  Short term goals  Time frame: 4-8 weeks  1. Instruct the patient to be independent with proper skin care to prevent future skin breakdown and decrease the potential risk for infections that are associated with Lymphedema. 2. Patient will be independent with a personal lymphedema exercise program to assist with the lymphatic flow and reduce limb volume by 100 mL R/L.  3. Patient will understand the signs and symptoms of acute infection. Long term goals  Time frame: 8-12 weeks  1. Patient will have knowledge of the compression options and acquire a safe and       appropriate daytime and night time compression system to prevent             re-accumulation of fluid. 2.  Patient or family will be able to don/doff garments independently. Garment   system effectiveness will be evaluated prior to discharge for better long-term  management and outcomes. 3.  Improvement in functional outcomes measure by 5-10% to allow for overall improvement in mobility with reduced pain. 4. Wounds healed 100% with appropriate foot wear identified. 5.   To transition patient to independent restorative phase of CDT. SUBJECTIVE REPORT:Patient reports tolerated bandaging well bilateral LE. States he has been able to return to work wearing his tennis shoes. Brings bandaging supplies to appt today, and is agreeable to proceeding with treatment, including MLD. Pain: 3/10, LE heaviness           Gait: Independent gait without any assistive device for community distances    ADLs:  Simple home management Supervision  Group home staff assists patient as needed.     Treatment Response:   Patient reviewed packet received at evaluation. Patient completed home program as prescribed. Patient brought in all required supplies for treatment. Patient brought in shoes to wear with bandaging as requested. Function: Patient's ADLs are requiring less assistance. TREATMENT AND OBJECTIVE DATA SUMMARY:   Patient/Family Education:      Educated in skin care: Skin care products  Prevention of cellulitis     Educated in exercise: ROM routine ankle pumps/circumduction/toe flex and ext. Instructed in self MLD: Instructed to apply lotion from knee to groin nightly using upward strokes. Therapeutic Exercise/Procedure:                Free exercises/ROM: Reviewed remedial lymphedema home exercise program with patient. and Patient requires cueing/assistance to complete remedial home exercise program.      Patient to continue the following exercises 2x/day. In supine: toe flex/ext; ankle pumps; ankle circumduction. Patient has difficulty with ankle pumps in supine. In sitting: marching in place; full knee extension; rocking forward onto toes; rocking back onto heels. Patient demonstrates improvement in ankle dorsiflexion/plantar flexion when exercise performed in sitting, with patient in agreement. Patient instructed to continue exercises 2x/day, 10 repetitions. Home program: Patient to perform daily to BID:  Skin care, Exercise routine, Compression bandage, Self manual lymph draining (MLD) and Bring supplies to each therapy visit   Rationale: Exercise will increase the lymph angiomotoricity and tissue pressure of the skin and thus decrease swelling. Therapeutic activity: 1:42-2:10 minutes  Manual therapy: 12:55-1:41 pm 28 minutes  46 minutes     Area to decongest: LE: Bilateral and Trunk   Sequence used and effectiveness: Therapist performed bilateral LE primary lymphedema sequence. After clearing proximally, additional time spent on bilateral ankle/calf region. Patient tolerated well. Compression bandaging: Wounds cleansed with wound foam, dried R foot. Slight odor noted when wound dressing removed, resolved after cleansing. Removed non-adherent tissue dorsal foot, bilaterally. L foot scabbing, no open areas currently. Applied MediHoney and Mepilex transfer to wounds R dorsal foot. Upper/Lower extremity compression:  bilateral  lower extremity: below knee     The following multi-layer bandages were applied:  Stockinette  Transelast for toe wraps  Komprex II medial/lateral ankle region L    Padding layer:  Fleece    Foam:  12 cm roll    Short stretch bandages:   6 cm  8 cm  10 cm     H tubigrip over bandaging, proximal aspect to cover tape; F tubigrip applied foot/ankle to allow for easier don/doff of shoes. Home management instructions as follows:  Compression bandaging instructions:  1. Compression bandaging will be applied twice a week by therapist in clinic, with adjustments to be made at home as indicated if bandaging becomes loose or uncomfortable. 2. If tolerated, remain bandaged between appointments with therapist, removing under the following conditions--DO NOT WAIT FOR A RETURN PHONE CALL FROM CLINIC:    -Numbness/tingling in extremity different from what you have experienced without the bandages in place.  -Compromise in circulation, monitoring blood refill into toes after applying gentle pressure to toes.  -Onset of pain in extremity that is sudden and severe in nature. -Redness, warmth in limb, and/or fever, flu-like symptoms, which may indicate infection. If this occurs, call your doctor right away. If you note a sudden increase in swelling in extremity, with or without redness/warmth, go to the emergency room as soon as possible to have blood clot ruled out. 3. Compression bandaging supplies that can be laundered are the brown compression wraps and any foam applied for padding. Launder in washer/dryer with NO fabric softener, bleach, woolite.   Dry on a low heat. 4. Once compression garments are ordered, compression bandaging will be continued until garments arrive for fitting. This process can take several weeks. Girth/Volume measurement: Reviewed results of volumetric measurements taken on evaluation. Girth measurements as follows:  Affected Side: bilateral   Left (cm) Right (cm)   5th Tuberosity 28.3    28      Ankle 30.8    30.2      Calf 40    40.2      Mid Knee             Thigh             Groin             Length                  TOTAL TREATMENT 75 mins       ASSESSMENT:   Treatment effectiveness and tolerance: Patient tolerated bandaging well previous treatment. Continued compression bandaging, with patient without c/o after application. Added Komprex II medial/lateral ankle L. Progressed HEP previous visit. Progress toward goals: Ongoing. PLAN OF CARE:   Changes to the plan of care: Continue compression bandaging. Continue MLD. Repeat limb volume measurements. Assess understanding of HEP. Continue education regarding lymphedema and importance of skin care.    Frequency: 2 times a week           Estrellita Snellen, PT, CLT

## 2020-01-13 ENCOUNTER — HOSPITAL ENCOUNTER (OUTPATIENT)
Dept: PHYSICAL THERAPY | Age: 75
Discharge: HOME | End: 2020-01-13
Payer: MEDICARE

## 2020-01-13 PROCEDURE — 97530 THERAPEUTIC ACTIVITIES: CPT

## 2020-01-13 PROCEDURE — 97140 MANUAL THERAPY 1/> REGIONS: CPT

## 2020-01-13 NOTE — PROGRESS NOTES
_                                    [facility]  [address] [place] [address]  [address] [place] [address],[address]  [facility]      LYMPHEDEMA THERAPY  VISIT: 5    [x]        Daily note               []       30 day/10th visit progress note    NAME: Lisa Amanda  DATE: 1/13/2020    GOALS  Short term goals  Time frame: 4-8 weeks  1. Instruct the patient to be independent with proper skin care to prevent future skin breakdown and decrease the potential risk for infections that are associated with Lymphedema. 2. Patient will be independent with a personal lymphedema exercise program to assist with the lymphatic flow and reduce limb volume by 100 mL R/L.  3. Patient will understand the signs and symptoms of acute infection. Long term goals  Time frame: 8-12 weeks  1. Patient will have knowledge of the compression options and acquire a safe and       appropriate daytime and night time compression system to prevent             re-accumulation of fluid. 2.  Patient or family will be able to don/doff garments independently. Garment   system effectiveness will be evaluated prior to discharge for better long-term  management and outcomes. 3.  Improvement in functional outcomes measure by 5-10% to allow for overall improvement in mobility with reduced pain. 4. Wounds healed 100% with appropriate foot wear identified. 5.   To transition patient to independent restorative phase of CDT. SUBJECTIVE REPORT: No c/o and tolerating the LE bandaging. States he has been able to return to work wearing his tennis shoes. Brings bandaging supplies to appt today, and is agreeable to proceeding with treatment, including MLD. Pain: 3/10, LE heaviness           Gait: Independent gait without any assistive device for community distances    ADLs:  Simple home management Supervision  Group home staff assists patient as needed.     Treatment Response:   Patient reviewed packet received at evaluation. Patient completed home program as prescribed. Patient brought in all required supplies for treatment. Patient brought in shoes to wear with bandaging as requested. Function: Patient's ADLs are requiring less assistance. TREATMENT AND OBJECTIVE DATA SUMMARY:   Patient/Family Education: To avoid scratching with nails or sharp objects     Educated in skin care: Skin care products  Prevention of cellulitis     Educated in exercise: ROM routine ankle pumps/circumduction/toe flex and ext. Instructed in self MLD: Instructed to apply lotion from knee to groin nightly using upward strokes. Therapeutic Exercise/Procedure:                Free exercises/ROM: Reviewed remedial lymphedema home exercise program with patient. and Patient requires cueing/assistance to complete remedial home exercise program.      Patient to continue the following exercises 2x/day. In supine: toe flex/ext; ankle pumps; ankle circumduction. Patient has difficulty with ankle pumps in supine. In sitting: marching in place; full knee extension; rocking forward onto toes; rocking back onto heels. Patient demonstrates improvement in ankle dorsiflexion/plantar flexion when exercise performed in sitting, with patient in agreement. Patient instructed to continue exercises 2x/day, 10 repetitions. Home program: Patient to perform daily to BID:  Skin care, Exercise routine, Compression bandage, Self manual lymph draining (MLD) and Bring supplies to each therapy visit   Rationale: Exercise will increase the lymph angiomotoricity and tissue pressure of the skin and thus decrease swelling. Therapeutic activity: 9463-1931 minutes  Manual therapy: 12: pm 25 minutes  30 minutes     Area to decongest: LE: Bilateral and Trunk   Sequence used and effectiveness: Therapist performed bilateral LE primary lymphedema sequence. After clearing proximally, additional time spent on bilateral ankle/calf region. Patient tolerated well. Compression bandaging: Wounds cleansed with wound foam, dried R foot. Slight odor noted when wound dressing removed, resolved after cleansing. Noted L foot bullae and skin protectant lotion applied aarti bullea and covered with mepilex transfer. Scabbing areas b/l dorsal feet, no open areas currently. Mepilex transfer to wounds areas for fragile skin and protection of dorsal foot. Upper/Lower extremity compression:  bilateral  lower extremity: below knee     The following multi-layer bandages were applied:  Stockinette  Transelast for toe wraps b/l toes 1-4  Komprex II medial/lateral ankle region L    Padding layer:  Cast padding x 1 each    Foam:  12 cm roll    Short stretch bandages: 1 each LE  6 cm  8 cm  10 cm     H tubigrip over bandaging, proximal aspect to cover tape; F tubigrip applied foot/ankle to allow for easier don/doff of shoes. Home management instructions as follows:  Compression bandaging instructions:  1. Compression bandaging will be applied twice a week by therapist in clinic, with adjustments to be made at home as indicated if bandaging becomes loose or uncomfortable. 2. If tolerated, remain bandaged between appointments with therapist, removing under the following conditions--DO NOT WAIT FOR A RETURN PHONE CALL FROM CLINIC:    -Numbness/tingling in extremity different from what you have experienced without the bandages in place.  -Compromise in circulation, monitoring blood refill into toes after applying gentle pressure to toes.  -Onset of pain in extremity that is sudden and severe in nature. -Redness, warmth in limb, and/or fever, flu-like symptoms, which may indicate infection. If this occurs, call your doctor right away. If you note a sudden increase in swelling in extremity, with or without redness/warmth, go to the emergency room as soon as possible to have blood clot ruled out.       3. Compression bandaging supplies that can be laundered are the brown compression wraps and any foam applied for padding. Launder in washer/dryer with NO fabric softener, bleach, woolite. Dry on a low heat. 4. Once compression garments are ordered, compression bandaging will be continued until garments arrive for fitting. This process can take several weeks. Girth/Volume measurement: Reviewed results of volumetric measurements taken on evaluation. Girth measurements as follows:     Affected Side: B/L   Left (cm) Right (cm)   5th Tuberosity 21.8    29      Ankle 31.3    31.5      Calf 40    40.5      Mid Knee 44    49      Thigh             Groin             Length               Observation:  B/L anterior legs   Left foot                   Right foot        TOTAL TREATMENT 55 mins       ASSESSMENT:   Treatment effectiveness and tolerance: Noted bullea L dorsal foot. Monitor closely. All other wound areas closed and healing with scabs. Patient tolerated bandaging well previous treatment. Continued compression bandaging, with patient without c/o after application. Progressed HEP previous visit. Progress toward goals: Ongoing. PLAN OF CARE:   Changes to the plan of care: 1. Continue compression bandaging. 2. Continue MLD. 3. Repeat limb volume measurements. 4. Assess understanding of HEP. Continue education regarding lymphedema and importance of skin care.    Frequency: 2 times a week         Rios Call DPT, CLT

## 2020-01-14 ENCOUNTER — OFFICE VISIT (OUTPATIENT)
Dept: NEUROLOGY | Age: 75
End: 2020-01-14

## 2020-01-14 VITALS
WEIGHT: 232.4 LBS | RESPIRATION RATE: 16 BRPM | HEART RATE: 70 BPM | OXYGEN SATURATION: 96 % | HEIGHT: 75 IN | TEMPERATURE: 98.6 F | BODY MASS INDEX: 28.89 KG/M2 | SYSTOLIC BLOOD PRESSURE: 98 MMHG | DIASTOLIC BLOOD PRESSURE: 60 MMHG

## 2020-01-14 DIAGNOSIS — G40.209 PARTIAL SYMPTOMATIC EPILEPSY WITH COMPLEX PARTIAL SEIZURES, NOT INTRACTABLE, WITHOUT STATUS EPILEPTICUS (HCC): Primary | ICD-10-CM

## 2020-01-14 DIAGNOSIS — R25.1 TREMOR: ICD-10-CM

## 2020-01-14 DIAGNOSIS — Z79.891 USE OF OPIATES FOR THERAPEUTIC PURPOSES: ICD-10-CM

## 2020-01-14 DIAGNOSIS — R26.9 GAIT DISORDER: ICD-10-CM

## 2020-01-14 DIAGNOSIS — G40.909 SEIZURE DISORDER (HCC): ICD-10-CM

## 2020-01-14 RX ORDER — LEVETIRACETAM 1000 MG/1
1000 TABLET ORAL 2 TIMES DAILY
Qty: 60 TAB | Refills: 0 | Status: SHIPPED | OUTPATIENT
Start: 2020-01-14 | End: 2020-03-03

## 2020-01-14 RX ORDER — LACOSAMIDE 100 MG/1
100 TABLET ORAL 2 TIMES DAILY
Qty: 60 TAB | Refills: 11 | Status: SHIPPED | OUTPATIENT
Start: 2020-01-14 | End: 2020-08-05 | Stop reason: SDUPTHER

## 2020-01-14 NOTE — PROGRESS NOTES
Neurology Progress Note    NAME:  Janay Casas   :   1945   MRN:   [id number]     Date/Time:  2020  Subjective:     Xochitl Quinn is a 70 y.o. male here today for  follow up for seizure and neuropathy. Patient was accompanied by  his  care giver. No seizure has been reported since the last visit. He is said to be doing fairly well. Patient continues to do his work without any interruption. No fall reported. I am not making any change today in terms of his medications. Review of Systems - General ROS: negative for - fatigue or sleep disturbance  Psychological ROS: negative for - anxiety, depression or sleep disturbances  Ophthalmic ROS: negative for - blurry vision, double vision, loss of vision, photophobia or uses glasses  ENT ROS: positive for - epistaxis, headaches, vertigo and visual changes  negative for - tinnitus  Allergy and Immunology ROS: negative  Hematological and Lymphatic ROS: negative  Endocrine ROS: negative  Respiratory ROS: no cough, shortness of breath, or wheezing  Cardiovascular ROS: no chest pain or dyspnea on exertion  Gastrointestinal ROS: no abdominal pain, change in bowel habits, or black or bloody stools  Genito-Urinary ROS: no dysuria, trouble voiding, or hematuria  Musculoskeletal ROS: positive for - joint pain and joint stiffness  Neurological ROS: positive for - numbness/tingling and tremors  Dermatological ROS: negative       Medications reviewed:  Current Outpatient Medications   Medication Sig Dispense Refill    levETIRAcetam 1,000 mg tablet Take 1 Tab by mouth two (2) times a day. 60 Tab 0    metoprolol tartrate (LOPRESSOR) 25 mg tablet Take 1 Tab by mouth two (2) times a day. 60 Tab 5    lacosamide (VIMPAT) 100 mg tab tablet Take 1 Tab by mouth two (2) times a day. Max Daily Amount: 200 mg. 60 Tab 11    cholecalciferol (VITAMIN D3) (1000 Units /25 mcg) tablet Take 1 Tab by mouth daily.  30 Tab 2    bumetanide (BUMEX) 2 mg tablet TAKE 1 TABLET BY MOUTH DAILY 90 Tab 3    potassium chloride (K-DUR, KLOR-CON) 20 mEq tablet TAKE 1 TABLET BY MOUTH DAILY 90 Tab 3    apixaban (ELIQUIS) 5 mg tablet Take 1 Tab by mouth two (2) times a day. 60 Tab 12        Objective:   Vitals:  Vitals:    01/14/20 1322   BP: 98/60   Pulse: 70   Resp: 16   Temp: 98.6 °F (37 °C)   TempSrc: Temporal   SpO2: 96%   Weight: 232 lb 6.4 oz (105.4 kg)   Height: 6' 3\" (1.905 m)   PainSc:   0 - No pain       Lab Data Reviewed:  Lab Results   Component Value Date/Time    WBC 5.4 08/20/2019 03:19 PM    HCT 37.7 08/20/2019 03:19 PM    HGB 12.5 (L) 08/20/2019 03:19 PM    PLATELET 886 (L) 22/86/2854 03:19 PM       Lab Results   Component Value Date/Time    Sodium 141 08/20/2019 03:19 PM    Potassium 3.9 08/20/2019 03:19 PM    Chloride 99 08/20/2019 03:19 PM    CO2 26 08/20/2019 03:19 PM    Glucose 94 08/20/2019 03:19 PM    BUN 21 08/20/2019 03:19 PM    Creatinine 0.95 08/20/2019 03:19 PM    Calcium 9.1 08/20/2019 03:19 PM       No components found for: TROPQUANT    No results found for: JOSE      Lab Results   Component Value Date/Time    Hemoglobin A1c 5.3 10/15/2018 03:48 PM        Lab Results   Component Value Date/Time    Vitamin B12 771 10/15/2018 03:48 PM       No results found for: JOSE, Lum Livings, XBANA    Lab Results   Component Value Date/Time    Cholesterol, total 158 12/06/2018 04:06 PM    HDL Cholesterol 66 12/06/2018 04:06 PM    LDL, calculated 76 12/06/2018 04:06 PM    VLDL, calculated 16 12/06/2018 04:06 PM    Triglyceride 82 12/06/2018 04:06 PM         CT Results (recent):  Results from [place] encounter on 10/17/15   CT MAXILLOFACIAL WO CONT    Narrative **Final Report**       ICD Codes / Adm. Diagnosis: 97  276.1 / Seizure  Seizure  Examination:  CT MAXILLOFACIAL WO CON  - [id number] - Oct 17 2015 10:33AM  Accession No:  [id number]  Reason:  head trauma after seizure      REPORT:  INDICATION:  head trauma after seizure    EXAM: CT MAXILLOFACIAL STRUCTURES WITHOUT CONTRAST. Comparison: CT head 10/22/2008. PROCEDURE: Sequential axial images of the maxillofacial bones were   performed, using bone algorithm. Soft tissue and bone windows were examined. Post-processing for coronal reformatting was performed. Contrast was not   administered. FINDINGS: No fracture is obvious. The orbital rims and floors of orbits are   intact. The nasal bone is intact, with a flattened configuration which is   stable since older head CTs. . The bony structures of the mandible and   maxilla show no acute abnormality. There are no air-fluid levels in the   paranasal sinuses, and no soft tissue swelling is noted focally. Minimal   mucoperiosteal thickening. No obvious mass or abscess. Normal sized lymph   nodes scattered throughout the cervical chains. IMPRESSION: No acute bony abnormality of the maxillofacial structures. Signing/Reading Doctor: Ian Diane ([id number])    Approved: Ian Diane ([id number])  Oct 17 2015 10:53AM                                MRI Results (recent):  No results found for this or any previous visit. IR Results (recent):  No results found for this or any previous visit. VAS/US Results (recent):  No results found for this or any previous visit. PHYSICAL EXAM:  General:    Alert, cooperative, no distress, appears stated age. Head:   Normocephalic, without obvious abnormality, atraumatic. Eyes:   Conjunctivae/corneas clear. PERRLA  Nose:  Nares normal. No drainage or sinus tenderness. Throat:    Lips, mucosa, and tongue normal.  No Thrush  Neck:  Supple, symmetrical,  no adenopathy, thyroid: non tender    no carotid bruit and no JVD. Back:    Symmetric,  No CVA tenderness. Lungs:   Clear to auscultation bilaterally. No Wheezing or Rhonchi. No rales. Chest wall:  No tenderness or deformity. No Accessory muscle use. Heart:   Regular rate and rhythm,  no murmur, rub or gallop. Abdomen:   Soft, non-tender. Not distended. Bowel sounds normal. No masses  Extremities: Extremities normal, atraumatic, No cyanosis. No edema. No clubbing  Skin:     Texture, turgor normal. No rashes or lesions. Not Jaundiced  Lymph nodes: Cervical, supraclavicular normal.  Psych:  Good insight. Not depressed. Not anxious or agitated. NEUROLOGICAL EXAM:  Appearance: The patient is well developed, well nourished, provides a coherent history and is in no acute distress. Mental Status: Oriented to time, place and person. Mood and affect appropriate. Cranial Nerves:   Intact visual fields. Fundi are benign. ANGELINA, EOM's full, no nystagmus, no ptosis. Facial sensation is normal. Corneal reflexes are intact. Facial movement is symmetric. Hearing is normal bilaterally. Palate is midline with normal sternocleidomastoid and trapezius muscles are normal. Tongue is midline. Motor:  5/5 strength in upper and lower proximal and distal muscles. Normal bulk and tone. No fasciculations. Reflexes:   Deep tendon reflexes 2+/4 and symmetrical.   Sensory:   Normal to touch, pinprick and vibration. Gait:   Slightly unsteady gait. Tremor:    Mild tremor noted. Cerebellar:  No cerebellar signs present. Neurovascular:  Normal heart sounds and regular rhythm, peripheral pulses intact, and no carotid bruits. Assesment  1. Seizure disorder (HCC)    - lacosamide (VIMPAT) 100 mg tab tablet; Take 1 Tab by mouth two (2) times a day. Max Daily Amount: 200 mg. Dispense: 60 Tab; Refill: 11    2. Partial symptomatic epilepsy with complex partial seizures, not intractable, without status epilepticus (HCC)  Stable    3. Gait disorder  Stable    4. Tremor  Stable    ___________________________________________________  PLAN: Medication plan discussed with patient's caregiver and patient      ICD-10-CM ICD-9-CM    1. Partial symptomatic epilepsy with complex partial seizures, not intractable, without status epilepticus ([facility] G40.209 345.40    2.  Seizure disorder ([facility] G40.909 345.90 lacosamide (VIMPAT) 100 mg tab tablet   3. Gait disorder R26.9 781.2    4. Tremor R25.1 781.0      Follow-up and Dispositions    · Return in about 1 year (around 1/14/2021).              ___________________________________________________    Attending Physician: Anayeli Montoya MD

## 2020-01-16 ENCOUNTER — HOSPITAL ENCOUNTER (OUTPATIENT)
Dept: PHYSICAL THERAPY | Age: 75
Discharge: HOME | End: 2020-01-16
Payer: MEDICARE

## 2020-01-16 PROCEDURE — 97140 MANUAL THERAPY 1/> REGIONS: CPT

## 2020-01-16 PROCEDURE — 97597 DBRDMT OPN WND 1ST 20 CM/<: CPT

## 2020-01-16 NOTE — PROGRESS NOTES
_                                    [facility]  [address] [place] [address]  [address] [place] [address],[address]  D[facility] Russell      LYMPHEDEMA THERAPY  VISIT: 6    [x]        Daily note               []       30 day/10th visit progress note    NAME: Christie Hein  DATE: 1/16/2020    GOALS  Short term goals  Time frame: 4-8 weeks  1. Instruct the patient to be independent with proper skin care to prevent future skin breakdown and decrease the potential risk for infections that are associated with Lymphedema. 2. Patient will be independent with a personal lymphedema exercise program to assist with the lymphatic flow and reduce limb volume by 100 mL R/L.  3. Patient will understand the signs and symptoms of acute infection. Long term goals  Time frame: 8-12 weeks  1. Patient will have knowledge of the compression options and acquire a safe and       appropriate daytime and night time compression system to prevent             re-accumulation of fluid. 2.  Patient or family will be able to don/doff garments independently. Garment   system effectiveness will be evaluated prior to discharge for better long-term  management and outcomes. 3.  Improvement in functional outcomes measure by 5-10% to allow for overall improvement in mobility with reduced pain. 4. Wounds healed 100% with appropriate foot wear identified. 5.   To transition patient to independent restorative phase of CDT. SUBJECTIVE REPORT: No c/o and tolerating the LE bandaging. States he continues wearing tennis shoes. Reports no discomfort with wear of shoes over compression bandaging. .  Brings bandaging supplies to appt today, and is agreeable to proceeding with treatment, including MLD.     Pain: 3/10, LE heaviness           Gait: Independent gait without any assistive device for community distances    ADLs:  Simple home management Supervision  Group home staff assists patient as needed. Treatment Response:   Patient reviewed packet received at evaluation. Patient completed home program as prescribed. Patient brought in all required supplies for treatment. Patient brought in shoes to wear with bandaging as requested. Function: Patient's ADLs are requiring less assistance. TREATMENT AND OBJECTIVE DATA SUMMARY:   Patient/Family Education: To avoid scratching with nails or sharp objects     Educated in skin care: Skin care products  Prevention of cellulitis     Educated in exercise: ROM routine ankle pumps/circumduction/toe flex and ext. Instructed in self MLD: Instructed to apply lotion from knee to groin nightly using upward strokes. Therapeutic Exercise/Procedure:                Free exercises/ROM: Reviewed remedial lymphedema home exercise program with patient. and Patient requires cueing/assistance to complete remedial home exercise program.      Patient to continue the following exercises 2x/day. In supine: toe flex/ext; ankle pumps; ankle circumduction. Patient has difficulty with ankle pumps in supine. In sitting: marching in place; full knee extension; rocking forward onto toes; rocking back onto heels. Patient demonstrates improvement in ankle dorsiflexion/plantar flexion when exercise performed in sitting, with patient in agreement. Patient instructed to continue exercises 2x/day, 10 repetitions. Home program: Patient to perform daily to BID:  Skin care, Exercise routine, Compression bandage, Self manual lymph draining (MLD) and Bring supplies to each therapy visit   Rationale: Exercise will increase the lymph angiomotoricity and tissue pressure of the skin and thus decrease swelling. Wound care: 1:15-1:32 pm  Manual therapy: 1:33-2:30 pm 14 minutes  57 minutes     Area to decongest: LE: Bilateral and Trunk   Sequence used and effectiveness: Therapist performed bilateral LE primary lymphedema sequence.   After clearing proximally, additional time spent on bilateral ankle/calf region. Patient tolerated well. Avoided open wounds. Compression bandaging: Wounds cleansed with wound foam. Note vesicle dorsal R foot, 2.5 x 3.0 cm, closed. Open wound L dorsal foot appearing to be open vesicle, 2.5 x 2.3 cm, with wound clean. Blunt debridement non-adherent tissue bilateral dorsal foot. Applied Mepilex Ag foam dressing to dorsal foot bilateral covering wounds. Upper/Lower extremity compression:  bilateral  lower extremity: below knee     The following multi-layer bandages were applied:  Stockinette  Transelast toe wraps deferred  Komprex II medial/lateral ankle region L    Padding layer:  Fleece x 1 each    Foam:  12 cm roll    Short stretch bandages: 1 each LE  6 cm  8 cm  10 cm     H tubigrip over bandaging, proximal aspect to cover tape; F tubigrip applied foot/ankle to allow for easier don/doff of shoes. Question if patient's shoes too shallow to appropriately accommodate compression bandaging. Decision made to remove single layer insole and assess fit and comfort of shoes. Patient reports shoe fit and comfort improved with removal of insole. Insoles placed in patient's bag to return home with him. Patient advised to notify clinic of any discomfort dorsal foot area prior to next scheduled appt. Home management instructions as follows:  Compression bandaging instructions:  1. Compression bandaging will be applied twice a week by therapist in clinic, with adjustments to be made at home as indicated if bandaging becomes loose or uncomfortable.     2. If tolerated, remain bandaged between appointments with therapist, removing under the following conditions--DO NOT WAIT FOR A RETURN PHONE Tom 69:    -Numbness/tingling in extremity different from what you have experienced without the bandages in place.  -Compromise in circulation, monitoring blood refill into toes after applying gentle pressure to toes.  -Onset of pain in extremity that is sudden and severe in nature. -Redness, warmth in limb, and/or fever, flu-like symptoms, which may indicate infection. If this occurs, call your doctor right away. If you note a sudden increase in swelling in extremity, with or without redness/warmth, go to the emergency room as soon as possible to have blood clot ruled out. 3. Compression bandaging supplies that can be laundered are the brown compression wraps and any foam applied for padding. Launder in washer/dryer with NO fabric softener, bleach, woolite. Dry on a low heat. 4. Once compression garments are ordered, compression bandaging will be continued until garments arrive for fitting. This process can take several weeks. Girth/Volume measurement: Reviewed results of volumetric measurements taken on evaluation. Girth measurements as follows:     Affected Side: B/L   Left (cm) Right (cm)   5th Tuberosity 28.3    27.2      Ankle 31.7    30.7      Calf 40    40      Mid Knee             Thigh             Groin             Length                 TOTAL TREATMENT 78 mins       ASSESSMENT:   Treatment effectiveness and tolerance: Noted vesicle L dorsal foot, open vesicle R dorsl foot. Measurements as noted above. Monitor closely. All other wound areas closed and healing with scabs. Patient tolerated bandaging well previous treatment. Continued compression bandaging, with patient without c/o after application. Progressed HEP previous visit. Progress toward goals: Ongoing. PLAN OF CARE:   Changes to the plan of care: 1. Continue compression bandaging. 2. Continue MLD. 3. Repeat limb volume measurements. 4. Assess understanding of HEP. Continue education regarding lymphedema and importance of skin care. 5. Complete measurements for custom flat knit knee high stockings for daytime wear, custom Solar[employer] Tribute to allow patient to manage his own compression garments.    Frequency: 2 times a week Doug Ifeoma, PT, CLT

## 2020-01-20 ENCOUNTER — HOSPITAL ENCOUNTER (OUTPATIENT)
Dept: PHYSICAL THERAPY | Age: 75
Discharge: HOME | End: 2020-01-20
Payer: MEDICARE

## 2020-01-20 PROCEDURE — 97530 THERAPEUTIC ACTIVITIES: CPT

## 2020-01-20 PROCEDURE — 97140 MANUAL THERAPY 1/> REGIONS: CPT

## 2020-01-20 NOTE — PROGRESS NOTES
_                                    [facility]  [address] [place] [address],[address]  Dixon Russell      LYMPHEDEMA THERAPY  Outcomes measure: 12/30/2019  VISIT: 7    [x]        Daily note               []       30 day/10th visit progress note    NAME: Gregary Severe  DATE: 1/20/2020    GOALS  Short term goals  Time frame: 4-8 weeks  1. Instruct the patient to be independent with proper skin care to prevent future skin breakdown and decrease the potential risk for infections that are associated with Lymphedema. 2. Patient will be independent with a personal lymphedema exercise program to assist with the lymphatic flow and reduce limb volume by 100 mL R/L.  3. Patient will understand the signs and symptoms of acute infection. Long term goals  Time frame: 8-12 weeks  1. Patient will have knowledge of the compression options and acquire a safe and       appropriate daytime and night time compression system to prevent             re-accumulation of fluid. 2.  Patient or family will be able to don/doff garments independently. Garment   system effectiveness will be evaluated prior to discharge for better long-term  management and outcomes. 3.  Improvement in functional outcomes measure by 5-10% to allow for overall improvement in mobility with reduced pain. 4. Wounds healed 100% with appropriate foot wear identified. 5.   To transition patient to independent restorative phase of CDT. SUBJECTIVE REPORT: No c/o and tolerating the LE bandaging. States he continues wearing tennis shoes. Reports no discomfort with wear of shoes over compression bandaging. Brings bandaging supplies to appt today, and is agreeable to proceeding with treatment, including MLD.     Pain: 3/10, LE heaviness           Gait: Independent gait without any assistive device for community distances    ADLs:  Simple home management Supervision  Group home staff assists patient as needed. Treatment Response:   Patient reviewed packet received at evaluation. Patient completed home program as prescribed. Patient brought in all required supplies for treatment. Patient brought in shoes to wear with bandaging as requested. Function: Patient's ADLs are requiring less assistance. TREATMENT AND OBJECTIVE DATA SUMMARY:   Patient/Family Education: To avoid scratching with nails or sharp objects     Educated in skin care: Skin care products  Prevention of cellulitis     Educated in exercise: ROM routine ankle pumps/circumduction/toe flex and ext. Instructed in self MLD: Instructed to apply lotion from knee to groin nightly using upward strokes. Therapeutic Exercise/Procedure:                Free exercises/ROM: Reviewed remedial lymphedema home exercise program with patient. and Patient requires cueing/assistance to complete remedial home exercise program.      Patient to continue the following exercises 2x/day. In supine: toe flex/ext; ankle pumps; ankle circumduction. Patient has difficulty with ankle pumps in supine. In sitting: marching in place; full knee extension; rocking forward onto toes; rocking back onto heels. Patient demonstrates improvement in ankle dorsiflexion/plantar flexion when exercise performed in sitting, with patient in agreement. Patient instructed to continue exercises 2x/day, 10 repetitions. Home program: Patient to perform daily to BID:  Skin care, Exercise routine, Compression bandage, Self manual lymph draining (MLD) and Bring supplies to each therapy visit   Rationale: Exercise will increase the lymph angiomotoricity and tissue pressure of the skin and thus decrease swelling. Therapeutic Activity: 1:08-1:22 pm  Manual therapy: 1:22-2:18 pm 14 minutes  56 minutes     Therapeutic Activity:  Completed measurements for custom flat knit [employer] [id number] knee highs, Solaris  Tribute knee high garments bilateral LE.   Decision made to proceed with silver garments for daytime wear   secondary to history of open wounds and cellulitis. Patient in agreement with proceeding with   garment measurements today. Area to decongest: LE: Bilateral and Trunk   Sequence used and effectiveness: Therapist performed bilateral L LE primary lymphedema sequence. After clearing proximally, additional time spent on bilateral ankle/calf region. Patient tolerated well. Avoided open wounds. Vasopneumatic pump trial R LE performed concurrent with MLD sequence L LE. Patient tolerated well without c/o. Compression bandaging: Wounds cleansed with wound foam. Note vesicle dorsal R foot, 2.5 x 3.0 cm, drained with opening proximal aspect. Skin prep spray applied intact skin surrounding broken vesicle. Applied Mepilex Ag border. Wound L dorsal foot closed . Applied Mepilex Transfer dressing due to skin tenuous. Upper/Lower extremity compression:  bilateral  lower extremity: below knee     The following multi-layer bandages were applied:  Stockinette  Transelast toe wraps deferred  Komprex II medial/lateral ankle region L    Padding layer:  Fleece x 1 each    Foam:  12 cm roll    Short stretch bandages: 1 each LE  6 cm  8 cm  10 cm     H tubigrip over bandaging, proximal aspect to cover tape; F tubigrip applied foot/ankle to allow for easier don/doff of shoes. Patient reports shoe fit and comfort improved with removal of insole previous visit. States he has noted shoes no longer tight across front of foot. Patient advised to notify clinic of any discomfort dorsal foot area prior to next scheduled appt. Home management instructions as follows:  Compression bandaging instructions:  1. Compression bandaging will be applied twice a week by therapist in clinic, with adjustments to be made at home as indicated if bandaging becomes loose or uncomfortable.     2. If tolerated, remain bandaged between appointments with therapist, removing under the following conditions--DO NOT WAIT FOR A RETURN PHONE CALL FROM CLINIC:    -Numbness/tingling in extremity different from what you have experienced without the bandages in place.  -Compromise in circulation, monitoring blood refill into toes after applying gentle pressure to toes.  -Onset of pain in extremity that is sudden and severe in nature. -Redness, warmth in limb, and/or fever, flu-like symptoms, which may indicate infection. If this occurs, call your doctor right away. If you note a sudden increase in swelling in extremity, with or without redness/warmth, go to the emergency room as soon as possible to have blood clot ruled out. 3. Compression bandaging supplies that can be laundered are the brown compression wraps and any foam applied for padding. Launder in washer/dryer with NO fabric softener, bleach, woolite. Dry on a low heat. 4. Once compression garments are ordered, compression bandaging will be continued until garments arrive for fitting. This process can take several weeks. Girth/Volume measurement: Reviewed results of volumetric measurements taken on evaluation. Girth measurements as follows:     TOTAL TREATMENT 70 mins       ASSESSMENT:   Treatment effectiveness and tolerance: Noted vesicle L dorsal foot open, R dorsal foot skin intact, tenuous. Measurements for day/night time compression garments completed today. Benefits to be [facility] to [facility] to determine coverage for vasopneumatic pump, which is medically indicated for treatment of lymphedema with open wounds, hyperpigmentation of skin, fibrotic tissue changes noted bilateral LE. Patient tolerated bandaging well previous treatment. Continued compression bandaging, with patient without c/o after application. Progress toward goals: Ongoing. PLAN OF CARE:   Changes to the plan of care: 1. Continue compression bandaging. 2. Continue MLD. 3. Repeat limb volume measurements.     4. Assess understanding of HEP. Continue education regarding lymphedema and importance of skin care. 5. Fit custom flat knit knee high stockings for daytime wear, custom Solar[employer] Tribute to allow patient to manage his own compression garments.   6. Continue vasopneumatic pump trial.   Frequency: 2 times a week         Sohail Garsia, PT, CLT

## 2020-01-22 ENCOUNTER — OFFICE VISIT (OUTPATIENT)
Dept: FAMILY MEDICINE CLINIC | Age: 75
End: 2020-01-22

## 2020-01-22 VITALS
TEMPERATURE: 98.4 F | HEART RATE: 71 BPM | BODY MASS INDEX: 29.34 KG/M2 | RESPIRATION RATE: 17 BRPM | WEIGHT: 236 LBS | SYSTOLIC BLOOD PRESSURE: 112 MMHG | HEIGHT: 75 IN | DIASTOLIC BLOOD PRESSURE: 68 MMHG | OXYGEN SATURATION: 99 %

## 2020-01-22 DIAGNOSIS — E55.9 VITAMIN D INSUFFICIENCY: Primary | ICD-10-CM

## 2020-01-22 DIAGNOSIS — I89.0 LYMPHEDEMA OF BOTH LOWER EXTREMITIES: ICD-10-CM

## 2020-01-22 LAB — DRUGS UR: NORMAL

## 2020-01-22 NOTE — PROGRESS NOTES
Chief Complaint   Patient presents with    Follow-up     lab results     Blood pressure 112/68, pulse 71, temperature 98.4 °F (36.9 °C), temperature source Oral, resp. rate 17, height 6' 3\" (1.905 m), weight 236 lb (107 kg), SpO2 99 %. 1. Have you been to the ER, urgent care clinic since your last visit? Hospitalized since your last visit? No    2. Have you seen or consulted any other health care providers outside of the [address] [place] [address] since your last visit? Include any pap smears or colon screening.  No

## 2020-01-22 NOTE — PROGRESS NOTES
Gloria Disla is a 70 y.o. male    Issues discussed today include:    Chief Complaint   Patient presents with    Follow-up     lab results       1) Vit D deficiency:  Had lab at last appt here to check Vit D level. Would like to review. Feels well overall. Caretaker says he is one of her healthiest clients. 2) Lymphedema:  Has been going to lymphedema clinic twice weekly and pt/caretaker feel like it is helping, his legs look better than ever. Currently in compression wraps. Denies leg pain. Is ambulatory. ROS: Denies CP, palpitations, bleeding    Data reviewed or ordered today:       Other problems include:  Patient Active Problem List   Diagnosis Code    Bradycardia R00.1    Seizure disorder ([facility] [address] G40.909    Bilateral lower extremity edema R60.0    Hypertension I10    Mobitz type II incomplete atrioventricular block I44.1    Intellectual disability F79    Advance care planning Z71.89    S/P biventricular cardiac pacemaker procedure Z95.0    Lymphedema of both lower extremities I89.0    Vitamin D deficiency E55.9    Vitamin B12 deficiency E53.8    History of closed Colles' fracture Z87.81    H/O Mobitz type II block Z86.79       Medications:  Current Outpatient Medications   Medication Sig Dispense Refill    levETIRAcetam 1,000 mg tablet Take 1 Tab by mouth two (2) times a day. 60 Tab 0    lacosamide (VIMPAT) 100 mg tab tablet Take 1 Tab by mouth two (2) times a day. Max Daily Amount: 200 mg. 60 Tab 11    metoprolol tartrate (LOPRESSOR) 25 mg tablet Take 1 Tab by mouth two (2) times a day. 60 Tab 5    cholecalciferol (VITAMIN D3) (1000 Units /25 mcg) tablet Take 1 Tab by mouth daily. 30 Tab 2    bumetanide (BUMEX) 2 mg tablet TAKE 1 TABLET BY MOUTH DAILY 90 Tab 3    potassium chloride (K-DUR, KLOR-CON) 20 mEq tablet TAKE 1 TABLET BY MOUTH DAILY 90 Tab 3    apixaban (ELIQUIS) 5 mg tablet Take 1 Tab by mouth two (2) times a day. 60 Tab 12       Allergies:   Allergies   Allergen Reactions    Sulfa (Sulfonamide Antibiotics) Rash       LMP:  No LMP for male patient.     Social History     Socioeconomic History    Marital status: SINGLE     Spouse name: Not on file    Number of children: Not on file    Years of education: Not on file    Highest education level: Not on file   Occupational History    Not on file   Social Needs    Financial resource strain: Not on file    Food insecurity:     Worry: Not on file     Inability: Not on file    Transportation needs:     Medical: Not on file     Non-medical: Not on file   Tobacco Use    Smoking status: Never Smoker    Smokeless tobacco: Never Used   Substance and Sexual Activity    Alcohol use: No    Drug use: No    Sexual activity: Not Currently   Lifestyle    Physical activity:     Days per week: Not on file     Minutes per session: Not on file    Stress: Not on file   Relationships    Social connections:     Talks on phone: Not on file     Gets together: Not on file     Attends Judaism service: Not on file     Active member of club or organization: Not on file     Attends meetings of clubs or organizations: Not on file     Relationship status: Not on file    Intimate partner violence:     Fear of current or ex partner: Not on file     Emotionally abused: Not on file     Physically abused: Not on file     Forced sexual activity: Not on file   Other Topics Concern     Service Not Asked    Blood Transfusions Not Asked    Caffeine Concern Not Asked    Occupational Exposure Not Asked   Rodolph New Hazards Not Asked    Sleep Concern Not Asked    Stress Concern Not Asked    Weight Concern Not Asked    Special Diet Not Asked    Back Care Not Asked    Exercise Not Asked    Bike Helmet Not Asked    Seat Belt Not Asked    Self-Exams Not Asked   Social History Narrative    Not on file       Family History   Problem Relation Age of Onset    Other Other         unable to obtain due to mental status    No Known Problems Sister          Physical Exam   Visit Vitals  /68   Pulse 71   Temp 98.4 °F (36.9 °C) (Oral)   Resp 17   Ht 6' 3\" (1.905 m)   Wt 236 lb (107 kg)   SpO2 99%   BMI 29.50 kg/m²      BP Readings from Last 3 Encounters:   01/22/20 112/68   01/14/20 98/60   11/26/19 106/57     Constitutional: Appears well,  No acute distress, Vitals noted  Psychiatric:  Affect normal, Alert and Oriented to person/place/time  Eyes:  Conjunctiva clear, no drainage  ENT:  External ears and nose normal, Mucous membranes moist  Neck:  General inspection normal. Supple. Heart:  Normal HR, Normal S1 and S2,  Regular rhythm. No murmurs, rubs or gallops. Lungs:  Clear to auscultation, good respiratory effort, no wheezes, rales or rhonchi  Extremities: In compression dressing/ACE wrap  Skin:  Warm to palpation, without rashes      Assessment/Plan:      ICD-10-CM ICD-9-CM    1. Vitamin D insufficiency E55.9 268.9    2. Lymphedema of both lower extremities I89.0 457.1        Pt doing well, no acute concerns  Discussed most recent lab, Vit D level now in insufficient range - can continue D3 1000 international units daily  Continue fu with lymphedema clinic  Recently saw Neurology (bon kenan), no changes in AEDs  Also saw Cardiology (outside office), release of records signed today to get their notes ? why on eliquis  Tolerating [address] without bleeding    Follow-up and Dispositions    · Return for 8-9 months for annual wellness exam.       Zackary Mathur MD

## 2020-01-23 ENCOUNTER — HOSPITAL ENCOUNTER (OUTPATIENT)
Dept: PHYSICAL THERAPY | Age: 75
Discharge: HOME | End: 2020-01-23
Payer: MEDICARE

## 2020-01-23 PROCEDURE — 97140 MANUAL THERAPY 1/> REGIONS: CPT

## 2020-01-23 NOTE — PROGRESS NOTES
_                                    [facility]  [address] [place] [address]  [address] [place] [address],[address]  Dixon Russell      LYMPHEDEMA THERAPY  Outcomes measure: 12/30/2019  VISIT: 8    [x]        Daily note               []       30 day/10th visit progress note    NAME: Jaiden Thomson  DATE: 1/23/2020    GOALS  Short term goals  Time frame: 4-8 weeks  1. Instruct the patient to be independent with proper skin care to prevent future skin breakdown and decrease the potential risk for infections that are associated with Lymphedema. 2. Patient will be independent with a personal lymphedema exercise program to assist with the lymphatic flow and reduce limb volume by 100 mL R/L.  1/23/2020 goal met, R LE reduced by 910.24 mL, L LE reduced by 251.56 mL since initial evaluation. 3. Patient will understand the signs and symptoms of acute infection. Long term goals  Time frame: 8-12 weeks  1. Patient will have knowledge of the compression options and acquire a safe and       appropriate daytime and night time compression system to prevent             re-accumulation of fluid. 1/23/2020 measurements completed for custom day/night time compression garments. 2.  Patient or family will be able to don/doff garments independently. Garment   system effectiveness will be evaluated prior to discharge for better long-term  management and outcomes. 3.  Improvement in functional outcomes measure by 5-10% to allow for overall improvement in mobility with reduced pain. 4. Wounds healed 100% with appropriate foot wear identified. 1/23/2020 ongoing  5. To transition patient to independent restorative phase of CDT. SUBJECTIVE REPORT: No c/o and tolerating the LE bandaging. States he continues wearing tennis shoes with insert removed. Reports no discomfort with wear of shoes over compression bandaging.   Brings bandaging supplies to appt today, and is agreeable to proceeding with treatment, including MLD. Pain: 2/10, LE heaviness           Gait: Independent gait without any assistive device for community distances    ADLs:  Simple home management Supervision  Group home staff assists patient as needed. Treatment Response:   Patient reviewed packet received at evaluation. Patient completed home program as prescribed. Patient brought in all required supplies for treatment. Patient brought in shoes to wear with bandaging as requested. Function: Patient's ADLs are requiring less assistance. TREATMENT AND OBJECTIVE DATA SUMMARY:   Patient/Family Education: To avoid scratching with nails or sharp objects     Educated in skin care: Skin care products  Prevention of cellulitis     Educated in exercise: ROM routine ankle pumps/circumduction/toe flex and ext. Instructed in self MLD: Instructed to apply lotion from knee to groin nightly using upward strokes. Therapeutic Exercise/Procedure:                Free exercises/ROM: Reviewed remedial lymphedema home exercise program with patient. and Patient requires cueing/assistance to complete remedial home exercise program.      Patient to continue the following exercises 2x/day. In supine: toe flex/ext; ankle pumps; ankle circumduction. Patient has difficulty with ankle pumps in supine. In sitting: marching in place; full knee extension; rocking forward onto toes; rocking back onto heels. Patient demonstrates improvement in ankle dorsiflexion/plantar flexion when exercise performed in sitting, with patient in agreement. Patient instructed to continue exercises 2x/day, 10 repetitions. Home program: Patient to perform daily to BID:  Skin care, Exercise routine, Compression bandage, Self manual lymph draining (MLD) and Bring supplies to each therapy visit   Rationale: Exercise will increase the lymph angiomotoricity and tissue pressure of the skin and thus decrease swelling.        Manual therapy: 12:45-1:45 pm 60 minutes       Area to decongest: LE: Bilateral and Trunk   Sequence used and effectiveness: Therapist performed bilateral L LE primary lymphedema sequence to knee. Patient tolerated well. Avoided open wounds. Compression bandaging: Wounds cleansed with wound foam. Note vesicle dorsal R foot, 4.5x3.5 cm, areas of epithelial tissue noted--see photo below. Aquaphor applied intact skin surrounding broken vesicle. Applied MediHoney and Mepilex transfer. Wound L dorsal foot closed . Applied Mepilex Transfer dressing due to skin tenuous. Upper/Lower extremity compression:  bilateral  lower extremity: below knee     The following multi-layer bandages were applied:  Stockinette  Transelast toe wraps deferred  Komprex II medial/lateral ankle region L    Padding layer:  Fleece x 1 each    Foam:  12 cm roll    Short stretch bandages: 1 each LE  6 cm  8 cm  10 cm     H tubigrip over bandaging, proximal aspect to cover tape; F tubigrip applied foot/ankle to allow for easier don/doff of shoes. Patient reports shoe fit and comfort improved with removal of insole previous visit. States he has noted shoes no longer tight across front of foot. Patient advised to notify clinic of any discomfort dorsal foot area prior to next scheduled appt. Home management instructions as follows:  Compression bandaging instructions:  1. Compression bandaging will be applied twice a week by therapist in clinic, with adjustments to be made at home as indicated if bandaging becomes loose or uncomfortable.     2. If tolerated, remain bandaged between appointments with therapist, removing under the following conditions--DO NOT WAIT FOR A RETURN PHONE CALL FROM CLINIC:    -Numbness/tingling in extremity different from what you have experienced without the bandages in place.  -Compromise in circulation, monitoring blood refill into toes after applying gentle pressure to toes.  -Onset of pain in extremity that is sudden and severe in nature. -Redness, warmth in limb, and/or fever, flu-like symptoms, which may indicate infection. If this occurs, call your doctor right away. If you note a sudden increase in swelling in extremity, with or without redness/warmth, go to the emergency room as soon as possible to have blood clot ruled out. 3. Compression bandaging supplies that can be laundered are the brown compression wraps and any foam applied for padding. Launder in washer/dryer with NO fabric softener, bleach, woolite. Dry on a low heat. 4. Once compression garments are ordered, compression bandaging will be continued until garments arrive for fitting. This process can take several weeks. Girth/Volume measurement: Repeated limb volume measurements today, with R LE reduced by 910.24 mL, L LE reduced by 251.56 mL. Photos: Today                                                                                                    Evaluation    TOTAL TREATMENT 60 mins       ASSESSMENT:   Treatment effectiveness and tolerance: Noted vesicle L dorsal foot open, R dorsal foot skin intact, tenuous. Measurements for day/night time compression garments completed prior visit, with benefits to be sent to [facility] to determine coverage for vasopneumatic pump. This medically indicated for treatment of lymphedema with open wounds, hyperpigmentation of skin, fibrotic tissue changes noted bilateral LE. Patient tolerated bandaging well previous treatment. Continued compression bandaging, with patient without c/o after application. Significant improvement in limb volume measurements and overall appearance of bilateral LE's since treatment initiated--see photo above. Progress toward goals: Ongoing. See updated goals. PLAN OF CARE:   Changes to the plan of care: 1. Continue compression bandaging. 2. Continue MLD. 3. Repeat limb volume measurements next 2-3 visits. 4. Assess understanding of HEP. Continue education regarding lymphedema and importance of skin care. 5. Fit custom flat knit knee high stockings for daytime wear, custom Solaris Tribute to allow patient to manage his own compression garments.   6. Continue vasopneumatic pump trial.   Frequency: 2 times a week         Lorenzo Don PT, CLT

## 2020-01-27 ENCOUNTER — HOSPITAL ENCOUNTER (OUTPATIENT)
Dept: PHYSICAL THERAPY | Age: 75
Discharge: HOME | End: 2020-01-27
Payer: MEDICARE

## 2020-01-27 PROCEDURE — 97140 MANUAL THERAPY 1/> REGIONS: CPT

## 2020-01-27 PROCEDURE — 97110 THERAPEUTIC EXERCISES: CPT

## 2020-01-27 NOTE — PROGRESS NOTES
_                                    [facility]  [address] [place] [address]  [address] [place] [address],[address]  Dixon Russell      LYMPHEDEMA THERAPY  Outcomes measure: 12/30/2019  VISIT: 9    [x]        Daily note               []       30 day/10th visit progress note    NAME: Jevon Haynes  DATE: 1/27/2020    GOALS  Short term goals  Time frame: 4-8 weeks  1. Instruct the patient to be independent with proper skin care to prevent future skin breakdown and decrease the potential risk for infections that are associated with Lymphedema. 2. Patient will be independent with a personal lymphedema exercise program to assist with the lymphatic flow and reduce limb volume by 100 mL R/L.  1/23/2020 goal met, R LE reduced by 910.24 mL, L LE reduced by 251.56 mL since initial evaluation. 3. Patient will understand the signs and symptoms of acute infection. Long term goals  Time frame: 8-12 weeks  1. Patient will have knowledge of the compression options and acquire a safe and       appropriate daytime and night time compression system to prevent             re-accumulation of fluid. 1/23/2020 measurements completed for custom day/night time compression garments. 2.  Patient or family will be able to don/doff garments independently. Garment   system effectiveness will be evaluated prior to discharge for better long-term  management and outcomes. 3.  Improvement in functional outcomes measure by 5-10% to allow for overall improvement in mobility with reduced pain. 4. Wounds healed 100% with appropriate foot wear identified. 1/23/2020 ongoing  5. To transition patient to independent restorative phase of CDT. SUBJECTIVE REPORT: No c/o and tolerating the LE bandaging. States he continues wearing tennis shoes with insert removed. Reports no discomfort with wear of shoes over compression bandaging.   Brings bandaging supplies to appt today, and is agreeable to proceeding with treatment. Caregiver states recliner has been purchased for patient, and that patient is being encouraged to elevate legs when sitting. Pain: 2/10, LE heaviness           Gait: Independent gait without any assistive device for community distances    ADLs:  Simple home management Supervision  Group home staff assists patient as needed. Treatment Response:   Patient reviewed packet received at evaluation. Patient completed home program as prescribed. Patient brought in all required supplies for treatment. Patient brought in shoes to wear with bandaging as requested. Function: Patient's ADLs are requiring less assistance. TREATMENT AND OBJECTIVE DATA SUMMARY:   Patient/Family Education: To avoid scratching with nails or sharp objects     Educated in skin care: Skin care products  Prevention of cellulitis     Educated in exercise: ROM routine ankle pumps/circumduction/toe flex and ext. Instructed in self MLD: Instructed to apply lotion from knee to groin nightly using upward strokes. Therapeutic Exercise/Procedure: 1:44-1:53  9 minutes             Free exercises/ROM: Reviewed remedial lymphedema home exercise program with patient. and Patient requires cueing/assistance to complete remedial home exercise program.      Patient to continue the following exercises with assist of therapist today. In sitting:  toe flex/ext; ankle pumps; ankle circumduction: marching in place; full knee extension; hip add; diaphragmatic exercises x 10 reps each. Patient demonstrates improvement in performance of exercises today. Patient instructed to continue exercises 2x/day, 10 repetitions. Reinforcement continues to promote carryover at home. Spoke with caregiver who accompanies patient to appt regarding increasing activity/exercise during the day. States they do go to the mall for walking, outdoors when weather permits.   Advised of redness/scabbing noted proximal thighs, which she will inspect upon returning home. Home program: Patient to perform daily to BID:  Skin care, Exercise routine, Compression bandage, Self manual lymph draining (MLD) and Bring supplies to each therapy visit   Rationale: Exercise will increase the lymph angiomotoricity and tissue pressure of the skin and thus decrease swelling. Manual therapy: 12:55-1:43 pm 49 minutes       Area to decongest: LE: Bilateral and Trunk   Sequence used and effectiveness: Therapist performed bilateral L LE primary lymphedema sequence to knee. Patient tolerated well. Avoided open wounds. Compression bandaging: Wounds closed today. Applied Mepilex transfer L dorsal foot to protect area prior to bandaging. Wound L dorsal foot closed . Eucerin Intensive repair applied bilateral LE using upward strokes. Calmoceptine applied to areas of irritation/scabbing proximal thighs, with patient denying scratching area. Notified caregiver as noted above. Upper/Lower extremity compression:  bilateral  lower extremity: below knee     The following multi-layer bandages were applied:  Stockinette  Transelast toe wraps deferred  Komprex II medial/lateral ankle region L    Padding layer:  Fleece x 1 each    Foam:  12 cm roll    Short stretch bandages: 1 each LE  6 cm  8 cm  10 cm     H tubigrip over bandaging, proximal aspect to cover tape; F tubigrip applied foot/ankle to allow for easier don/doff of shoes. Patient reports shoe fit and comfort improved with removal of insole previous visit. States he has noted shoes no longer tight across front of foot. Patient advised to notify clinic of any discomfort dorsal foot area prior to next scheduled appt. Home management instructions as follows:  Compression bandaging instructions:  1. Compression bandaging will be applied twice a week by therapist in clinic, with adjustments to be made at home as indicated if bandaging becomes loose or uncomfortable.     2. If tolerated, remain bandaged between appointments with therapist, removing under the following conditions--DO NOT WAIT FOR A RETURN PHONE CALL FROM CLINIC:    -Numbness/tingling in extremity different from what you have experienced without the bandages in place.  -Compromise in circulation, monitoring blood refill into toes after applying gentle pressure to toes.  -Onset of pain in extremity that is sudden and severe in nature. -Redness, warmth in limb, and/or fever, flu-like symptoms, which may indicate infection. If this occurs, call your doctor right away. If you note a sudden increase in swelling in extremity, with or without redness/warmth, go to the emergency room as soon as possible to have blood clot ruled out. 3. Compression bandaging supplies that can be laundered are the brown compression wraps and any foam applied for padding. Launder in washer/dryer with NO fabric softener, bleach, woolite. Dry on a low heat. 4. Once compression garments are ordered, compression bandaging will be continued until garments arrive for fitting. This process can take several weeks. Girth/Volume measurement: Deferred       TOTAL TREATMENT 58 mins       ASSESSMENT:   Treatment effectiveness and tolerance: Noted vesicle L dorsal foot closed, R dorsal foot skin intact, tenuous. Measurements for day/night time compression garments completed previous visit, with benefits to be sent to [facility] to determine coverage for vasopneumatic pump. This medically indicated for treatment of lymphedema with open wounds, hyperpigmentation of skin, fibrotic tissue changes noted bilateral LE. Patient tolerated bandaging well previous treatment. Continued compression bandaging, with patient without c/o after application. Significant improvement in limb volume measurements and overall appearance of bilateral LE's since treatment initiated--see photo above.   Caregiver instructed in benefit of active exercise/walking with compression in place. Progress toward goals: Ongoing. PLAN OF CARE:   Changes to the plan of care: 1. Continue compression bandaging. 2. Continue MLD. 3. Repeat limb volume measurements next 1-2 visits. 4. Assess understanding of HEP. Continue education regarding lymphedema and importance of skin care. 5. Fit custom flat knit knee high stockings for daytime wear, custom Solar[employer] Tribute to allow patient to manage his own compression garments.   6. Continue vasopneumatic pump trial.   Frequency: 2 times a week         Leslie Howell, PT, CLT

## 2020-01-30 ENCOUNTER — APPOINTMENT (OUTPATIENT)
Dept: PHYSICAL THERAPY | Age: 75
End: 2020-01-30
Payer: MEDICARE

## 2020-02-03 ENCOUNTER — HOSPITAL ENCOUNTER (OUTPATIENT)
Dept: PHYSICAL THERAPY | Age: 75
Discharge: HOME | End: 2020-02-03
Payer: MEDICARE

## 2020-02-03 PROCEDURE — 97016 VASOPNEUMATIC DEVICE THERAPY: CPT

## 2020-02-03 PROCEDURE — 97140 MANUAL THERAPY 1/> REGIONS: CPT

## 2020-02-03 NOTE — PROGRESS NOTES
_                                    [facility]  [address] [facility]  [address]  [place]      LYMPHEDEMA THERAPY  Last Outcomes measure: 2/8/89  Re-cert due 7/89/02  VISIT: 10    []        Daily note               [x]       30 day/10th visit progress note    NAME: Damari Quinn  DATE: 2/3/2020    GOALS  Short term goals  Time frame: 4-8 weeks  1. Instruct the patient to be independent with proper skin care to prevent future skin breakdown and decrease the potential risk for infections that are associated with Lymphedema. 2. Patient will be independent with a personal lymphedema exercise program to assist with the lymphatic flow and reduce limb volume by 100 mL R/L.  1/23/2020 goal met, R LE reduced by 910.24 mL, L LE reduced by 251.56 mL since initial evaluation. 3. Patient will understand the signs and symptoms of acute infection. Long term goals  Time frame: 8-12 weeks  1. Patient will have knowledge of the compression options and acquire a safe and       appropriate daytime and night time compression system to prevent             re-accumulation of fluid. 1/23/2020 measurements completed for custom day/night time compression garments. 2.  Patient or family will be able to don/doff garments independently. Garment   system effectiveness will be evaluated prior to discharge for better long-term  management and outcomes. 3.  Improvement in functional outcomes measure by 5-10% to allow for overall improvement in mobility with reduced pain. 4. Wounds healed 100% with appropriate foot wear identified. 1/23/2020 ongoing  5. To transition patient to independent restorative phase of CDT. SUBJECTIVE REPORT:  No c/o today. States he did not get to work today. States no problems with the bandages despite them being down to below calf.  Doesn't recall why they were pushed down and had no discomfort with them like that.    Brings bandaging supplies to appt today, and is agreeable to proceeding with treatment. Caregiver states recliner has been purchased for patient, and that patient is being encouraged to elevate legs when sitting. Pain: 2/10, LE heaviness           Gait: Independent gait without any assistive device for community distances    ADLs:  Simple home management Supervision  Group home staff assists patient as needed. Treatment Response:   Patient reviewed packet received at evaluation. Patient completed home program as prescribed. Patient brought in all required supplies for treatment. Patient brought in shoes to wear with bandaging as requested. Function: Patient's ADLs are requiring less assistance. Functional Measure:  LLIS 7 /68, 10% Impairment    TREATMENT AND OBJECTIVE DATA SUMMARY:   Patient/Family Education: To avoid scratching with nails or sharp objects. To remove MLB if the bandage if it does not stay up high on the leg. Do not cut, unroll the bandage     Educated in skin care: Skin care products  Prevention of cellulitis     Educated in exercise: ROM routine ankle pumps/circumduction/toe flex and ext. Instructed in self MLD: Instructed to apply lotion from knee to groin nightly using upward strokes. Therapeutic Exercise/Procedure:   minutes             Free exercises/ROM: Reviewed remedial lymphedema home exercise program with patient. and Patient requires cueing/assistance to complete remedial home exercise program.    Patient to continue the following exercises as part of HEP:  In sitting:  toe flex/ext; ankle pumps; ankle circumduction: marching in place; full knee extension; hip add; diaphragmatic exercises x 10 reps each. Patient demonstrates improvement in performance of exercises today. Patient instructed to continue exercises 2x/day, 10 repetitions. Reinforcement continues to promote carryover at home.      Home program: Patient to perform daily to BID:  Skin care, Exercise routine, Compression bandage, Self manual lymph draining (MLD) and Bring supplies to each therapy visit   Rationale: Exercise will increase the lymph angiomotoricity and tissue pressure of the skin and thus decrease swelling. Manual therapy: 6765-9829 and 9240-8907 pm 45 minutes       Area to decongest: LE: Bilateral and Trunk   Sequence used and effectiveness: Therapist performed bilateral L LE primary lymphedema sequence to trunk for B/L LE and applied pump to LE's. Compression bandaging: Wounds closed today. Applied Mepilex transfer L and R dorsal foot to protect small red area on anterior dorsal foot prior to bandaging. Wound L dorsal foot closed . Anti itch cream applied to upper legs, Eucerin Intensive repair applied entire bilateral LE using upward strokes. Upper/Lower extremity compression:  bilateral  lower extremity: below knee     The following multi-layer bandages were applied:  Juzo glue used under stockinette to help keep MLB up on the leg    Stockinette  Transelast toe wraps 1-4 b/l feet  Komprex II dorsal ankle b/l    Padding layer:  Fleece x 1 each  Cast padding over foam to increase bulk at indurated areas. Foam:  12 cm roll       Short stretch bandages: 1 each LE  6 cm  8 cm  10 cm     H tubigrip over bandaging, proximal aspect to cover tape; F tubigrip applied foot/ankle to allow for easier don/doff of shoes. Patient reports shoe fit and comfort improved with removal of insole previous visit. States he has noted shoes no longer tight across front of foot. Patient advised to notify clinic of any discomfort dorsal foot area prior to next scheduled appt. Home management instructions as follows:  Compression bandaging instructions:  1. Compression bandaging will be applied twice a week by therapist in clinic, with adjustments to be made at home as indicated if bandaging becomes loose or uncomfortable.     2. If tolerated, remain bandaged between appointments with therapist, removing under the following conditions--DO NOT WAIT FOR A RETURN PHONE CALL FROM CLINIC:    -Numbness/tingling in extremity different from what you have experienced without the bandages in place.  -Compromise in circulation, monitoring blood refill into toes after applying gentle pressure to toes.  -Onset of pain in extremity that is sudden and severe in nature. -Redness, warmth in limb, and/or fever, flu-like symptoms, which may indicate infection. If this occurs, call your doctor right away. If you note a sudden increase in swelling in extremity, with or without redness/warmth, go to the emergency room as soon as possible to have blood clot ruled out. 3. Compression bandaging supplies that can be laundered are the brown compression wraps and any foam applied for padding. Launder in washer/dryer with NO fabric softener, bleach, woolite. Dry on a low heat. 4. Once compression garments are ordered, compression bandaging will be continued until garments arrive for fitting. This process can take several weeks. Vasopneumatic pump: 2358-5232 45 minutes  Flexitouch Entre  pump x 45 min with b/L LE medium pressure. Girth/Volume measurement: See below     Affected Side: B/L   Left (cm) Right (cm)   5th Tuberosity 28pre  29.5post 28.7pre  29post   Ankle 29pre  30post 28.8pre  29.5post   Calf 44.1pre  44.3post 47. 8pre  47post   Mid Knee 46.5pre  46.5post 45. 8pre  45post   Thigh 54pre  54post 53pre  53post   Groin             Length               Observation:  Arrives to clinic with   bandages like this:          B/L LE post MLB removal                B/L LE post Pump      TOTAL TREATMENT 90 mins       ASSESSMENT:   Treatment effectiveness and tolerance: Patient has been receiving CDT since 12/2019 and is still in the bandaging phase at this time. He was just measured for final compression options and awaiting delivery.   He has tolerated bandaging well with improvements noted in size, shape, and condition of skin with healing wound areas. Today we had a set back (see photo) due to MLB being pushed down to the mid calf region which created a narrow area and bulbous area above which collected fluid. Initiated the pump today to assist with normalizing the shape of the LE. Although the numbers do not reflect much change today comparing pre and post,  the subjective appearance of the LE's are improved. There is greater tissue turgor post use of pump vs. Pre. The patient was not sure how the MLB came to be like this and did not have any discomfort. PT educating patient to remove the MLB should it do this again. Patient indicates understanding. Caregiver was not present post treatment for me to educate her on what to watch for. Patient will need to re-reduce the LE's prior to garment fitting. Continued compression bandaging, with patient without c/o after application. Progress toward goals: Ongoing. PLAN OF CARE:   Changes to the plan of care: 1. Continue compression bandaging. 2. Continue MLD. 3. Repeat limb volume measurements next 1-2 visits. 4. Assess understanding of HEP. Continue education regarding lymphedema and importance of skin care. 5. Fit custom flat knit knee high stockings for daytime wear, custom Solaris Tribute to allow patient to manage his own compression garments.   6. Continue Vasopneumatic pump trial.   Frequency: 2 times a week         Raquel Irwin, JOAQUINT, CLT

## 2020-02-04 RX ORDER — MELATONIN
Qty: 31 TAB | Status: SHIPPED | OUTPATIENT
Start: 2020-02-04 | End: 2020-11-30 | Stop reason: SDUPTHER

## 2020-02-06 ENCOUNTER — APPOINTMENT (OUTPATIENT)
Dept: PHYSICAL THERAPY | Age: 75
End: 2020-02-06
Payer: MEDICARE

## 2020-02-10 ENCOUNTER — HOSPITAL ENCOUNTER (OUTPATIENT)
Dept: PHYSICAL THERAPY | Age: 75
Discharge: HOME | End: 2020-02-10
Payer: MEDICARE

## 2020-02-10 PROCEDURE — 97016 VASOPNEUMATIC DEVICE THERAPY: CPT

## 2020-02-10 PROCEDURE — 97140 MANUAL THERAPY 1/> REGIONS: CPT

## 2020-02-10 NOTE — PROGRESS NOTES
_                                    [facility]  [address] [facility].  [address]  [place]      LYMPHEDEMA THERAPY  Last Outcomes measure: 6/8/85  Re-cert due 5/94/36  VISIT: 11    []        Daily note               [x]       30 day/10th visit progress note    NAME: Salvador Quinn  DATE: 2/10/2020    GOALS  Short term goals  Time frame: 4-8 weeks  1. Instruct the patient to be independent with proper skin care to prevent future skin breakdown and decrease the potential risk for infections that are associated with Lymphedema. 2. Patient will be independent with a personal lymphedema exercise program to assist with the lymphatic flow and reduce limb volume by 100 mL R/L.  1/23/2020 goal met, R LE reduced by 910.24 mL, L LE reduced by 251.56 mL since initial evaluation. 3. Patient will understand the signs and symptoms of acute infection. Long term goals  Time frame: 8-12 weeks  1. Patient will have knowledge of the compression options and acquire a safe and       appropriate daytime and night time compression system to prevent             re-accumulation of fluid. 1/23/2020 measurements completed for custom day/night time compression garments. 2.  Patient or family will be able to don/doff garments independently. Garment   system effectiveness will be evaluated prior to discharge for better long-term  management and outcomes. 3.  Improvement in functional outcomes measure by 5-10% to allow for overall improvement in mobility with reduced pain. 4. Wounds healed 100% with appropriate foot wear identified. 1/23/2020 ongoing  5. To transition patient to independent restorative phase of CDT. SUBJECTIVE REPORT:  No c/o today. States he tolerated bandaging well. Was unaware of L proximal tubigrip rolling to mid leg creating indentation. Without c/o.       Brings bandaging supplies to appt today, and is agreeable to proceeding with treatment including vasopneumatic pump trial with [employer] rep. Caregiver states recliner has been purchased for patient, and that patient is being encouraged to elevate legs when sitting. Pain: 2/10, LE heaviness           Gait: Independent gait without any assistive device for community distances    ADLs:  Simple home management Supervision  Group home staff assists patient as needed. Treatment Response:   Patient reviewed packet received at evaluation. Patient completed home program as prescribed. Patient brought in all required supplies for treatment. Patient brought in shoes to wear with bandaging as requested. Function: Patient's ADLs are requiring less assistance. Functional Measure:  LLIS 7 /68, 10% Impairment    TREATMENT AND OBJECTIVE DATA SUMMARY:   Patient/Family Education: To avoid scratching with nails or sharp objects. To remove MLB if the bandage if it does not stay up high on the leg. Do not cut, unroll the bandage     Educated in skin care: Skin care products  Prevention of cellulitis     Educated in exercise: ROM routine ankle pumps/circumduction/toe flex and ext. Instructed in self MLD: Instructed to apply lotion from knee to groin nightly using upward strokes. Therapeutic Exercise/Procedure:   minutes             Free exercises/ROM: Reviewed remedial lymphedema home exercise program with patient. and Patient requires cueing/assistance to complete remedial home exercise program.    Patient to continue the following exercises as part of HEP:  In sitting:  toe flex/ext; ankle pumps; ankle circumduction: marching in place; full knee extension; hip add; diaphragmatic exercises x 10 reps each. Patient demonstrates improvement in performance of exercises today. Patient instructed to continue exercises 2x/day, 10 repetitions. Reinforcement continues to promote carryover at home.     Supine heel slides/hip abd x 10 reps with patient to add to home exercise program.   Home program: Patient to perform daily to BID:  Skin care, Exercise routine, Compression bandage, Self manual lymph draining (MLD) and Bring supplies to each therapy visit   Rationale: Exercise will increase the lymph angiomotoricity and tissue pressure of the skin and thus decrease swelling. Manual therapy: 1:42-2:25 pm  Vaspneumatic pump: 12:50-1:40 pm 43 minutes  50 minutes     Area to decongest: LE: Bilateral and Trunk   Sequence used and effectiveness:  Vasopneumatic pump Vasopneumatic pump trial with [place]'. Patient tolerated trial well. Compression bandaging:  Manual therapy Wounds closed today. Eucerin Intensive repair applied entire bilateral LE using upward strokes. Upper/Lower extremity compression:  bilateral  lower extremity: below knee     The following multi-layer bandages were applied:    Stockinette  Velfoam anterior ankle  Transelast toe wraps 1-4 b/l feet    Padding layer:  Cast padding foot/ankle    Foam:  12 cm roll       Short stretch bandages: 1 each LE  6 cm  8 cm  10 cm x 2    H tubigrip, longer length, over bandaging, proximal aspect to cover tape; F tubigrip applied foot/ankle to allow for easier don/doff of shoes. Patient advised to monitor placement of tubigrip to prevent proximal piece from rolling down resulting in indentation. Patient reports shoe fit and comfort improved with removal of insole previous visit. States he has noted shoes no longer tight across front of foot. Patient advised to notify clinic of any discomfort dorsal foot area prior to next scheduled appt. Home management instructions as follows:  Compression bandaging instructions:  1. Compression bandaging will be applied twice a week by therapist in clinic, with adjustments to be made at home as indicated if bandaging becomes loose or uncomfortable.     2. If tolerated, remain bandaged between appointments with therapist, removing under the following conditions--DO NOT WAIT FOR A RETURN PHONE CALL FROM CLINIC:    -Numbness/tingling in extremity different from what you have experienced without the bandages in place.  -Compromise in circulation, monitoring blood refill into toes after applying gentle pressure to toes.  -Onset of pain in extremity that is sudden and severe in nature. -Redness, warmth in limb, and/or fever, flu-like symptoms, which may indicate infection. If this occurs, call your doctor right away. If you note a sudden increase in swelling in extremity, with or without redness/warmth, go to the emergency room as soon as possible to have blood clot ruled out. 3. Compression bandaging supplies that can be laundered are the brown compression wraps and any foam applied for padding. Launder in washer/dryer with NO fabric softener, bleach, woolite. Dry on a low heat. 4. Once compression garments are ordered, compression bandaging will be continued until garments arrive for fitting. This process can take several weeks. Girth/Volume measurement: defer       TOTAL TREATMENT 95 mins       ASSESSMENT:   Treatment effectiveness and tolerance: Patient has been receiving CDT since 12/2019 and is still in the bandaging phase at this time. He was just measured for final compression options and awaiting delivery. He has tolerated bandaging well with improvements noted in size, shape, and condition of skin with healing wound areas. Completed vasopneumatic pump trial with [employer] rep. The patient verbalizes understanding of appropriate placement of proximal tubigrip. PT educating patient to remove the MLB if it moves distally on LE. Continued compression bandaging, with patient without c/o after application. Progress toward goals: Ongoing. PLAN OF CARE:   Changes to the plan of care: 1. Continue compression bandaging. 2. Continue MLD. 3. Repeat limb volume measurements next 1-2 visits.     4. Assess understanding of HEP.  Continue education regarding lymphedema and importance of skin care. 5. Fit custom flat knit knee high stockings for daytime wear, custom Solaris Tribute to allow patient to manage his own compression garments.   6. Continue Vasopneumatic pump trial.   Frequency: 2 times a week         Wil Jimenez PT, CLT

## 2020-02-13 ENCOUNTER — HOSPITAL ENCOUNTER (OUTPATIENT)
Dept: PHYSICAL THERAPY | Age: 75
Discharge: HOME | End: 2020-02-13
Payer: MEDICARE

## 2020-02-13 PROCEDURE — 97140 MANUAL THERAPY 1/> REGIONS: CPT

## 2020-02-13 NOTE — PROGRESS NOTES
_                                    [facility] [address] [facility]  [address] [facility].  [address]  [place]      LYMPHEDEMA THERAPY  Last Outcomes measure: 1/5/46  Re-cert due 5/98/45  VISIT: 12    [x]        Daily note               []       30 day/10th visit progress note    NAME: Rony Quinn  DATE: 2/13/2020    GOALS  Short term goals  Time frame: 4-8 weeks  1. Instruct the patient to be independent with proper skin care to prevent future skin breakdown and decrease the potential risk for infections that are associated with Lymphedema. 2. Patient will be independent with a personal lymphedema exercise program to assist with the lymphatic flow and reduce limb volume by 100 mL R/L.  1/23/2020 goal met, R LE reduced by 910.24 mL, L LE reduced by 251.56 mL since initial evaluation. 3. Patient will understand the signs and symptoms of acute infection. Long term goals  Time frame: 8-12 weeks  1. Patient will have knowledge of the compression options and acquire a safe and       appropriate daytime and night time compression system to prevent             re-accumulation of fluid. 1/23/2020 measurements completed for custom day/night time compression garments. 2.  Patient or family will be able to don/doff garments independently. Garment   system effectiveness will be evaluated prior to discharge for better long-term  management and outcomes. 3.  Improvement in functional outcomes measure by 5-10% to allow for overall improvement in mobility with reduced pain. 4. Wounds healed 100% with appropriate foot wear identified. 1/23/2020 ongoing  5. To transition patient to independent restorative phase of CDT. SUBJECTIVE REPORT:  No c/o today. States he tolerated bandaging well. Brings bandaging supplies to appt today, and is agreeable to proceeding with treatment.   Caregiver states recliner has been purchased for patient, and that patient is being encouraged to elevate legs when sitting. Pain: 2/10, LE heaviness           Gait: Independent gait without any assistive device for community distances    ADLs:  Simple home management Supervision  Group home staff assists patient as needed. Treatment Response:   Patient reviewed packet received at evaluation. Patient completed home program as prescribed. Patient brought in all required supplies for treatment. Patient brought in shoes to wear with bandaging as requested. Function: Patient's ADLs are requiring less assistance. Functional Measure:  LLIS 7 /68, 10% Impairment    TREATMENT AND OBJECTIVE DATA SUMMARY:   Patient/Family Education: To avoid scratching with nails or sharp objects. To remove MLB if the bandage if it does not stay up high on the leg. Do not cut, unroll the bandage     Educated in skin care: Skin care products  Prevention of cellulitis     Educated in exercise: ROM routine ankle pumps/circumduction/toe flex and ext. Instructed in self MLD: Instructed to apply lotion from knee to groin nightly using upward strokes. Therapeutic Exercise/Procedure:   minutes             Free exercises/ROM: Reviewed remedial lymphedema home exercise program with patient. and Patient requires cueing/assistance to complete remedial home exercise program.    Patient to continue the following exercises as part of HEP:  In sitting:  toe flex/ext; ankle pumps; ankle circumduction: marching in place; full knee extension; hip add; diaphragmatic exercises x 10 reps each. Patient demonstrates improvement in performance of exercises today. Patient instructed to continue exercises 2x/day, 10 repetitions. Reinforcement continues to promote carryover at home.     Supine heel slides/hip abd x 10 reps with patient to add to home exercise program.   Home program: Patient to perform daily to BID:  Skin care, Exercise routine, Compression bandage, Self manual lymph draining (MLD) and Bring supplies to each therapy visit   Rationale: Exercise will increase the lymph angiomotoricity and tissue pressure of the skin and thus decrease swelling. Manual therapy: 1:30 - 2:10 pm 40 minutes     Area to decongest: LE: Bilateral and Trunk   Sequence used and effectiveness:   Vasopneumatic pump trial with Venkat Sjogren' - deferred today. Compression bandaging:  Manual therapy Wounds closed today. Eucerin Intensive repair applied entire bilateral LE using upward strokes. Aquaphor to dry, cracked areas around heels. Upper/Lower extremity compression:  bilateral  lower extremity: below knee     The following multi-layer bandages were applied:    Stockinette  Velfoam anterior ankle  Transelast toe wraps 1-4 b/l feet    Padding layer:  Cast padding foot/ankle    Foam:  12 cm roll       Short stretch bandages: 1 each LE  6 cm  8 cm  10 cm     H tubigrip, longer length, over bandaging, proximal aspect to cover tape; F tubigrip applied foot/ankle to allow for easier don/doff of shoes. Patient advised to monitor placement of tubigrip to prevent proximal piece from rolling down resulting in indentation. Patient reports shoe fit and comfort improved with removal of insole previous visit. States he has noted shoes no longer tight across front of foot. Patient advised to notify clinic of any discomfort dorsal foot area prior to next scheduled appt. Home management instructions as follows:  Compression bandaging instructions:  1. Compression bandaging will be applied twice a week by therapist in clinic, with adjustments to be made at home as indicated if bandaging becomes loose or uncomfortable.     2. If tolerated, remain bandaged between appointments with therapist, removing under the following conditions--DO NOT WAIT FOR A RETURN PHONE CALL FROM CLINIC:    -Numbness/tingling in extremity different from what you have experienced without the bandages in place.  -Compromise in circulation, monitoring blood refill into toes after applying gentle pressure to toes.  -Onset of pain in extremity that is sudden and severe in nature. -Redness, warmth in limb, and/or fever, flu-like symptoms, which may indicate infection. If this occurs, call your doctor right away. If you note a sudden increase in swelling in extremity, with or without redness/warmth, go to the emergency room as soon as possible to have blood clot ruled out. 3. Compression bandaging supplies that can be laundered are the brown compression wraps and any foam applied for padding. Launder in washer/dryer with NO fabric softener, bleach, woolite. Dry on a low heat. 4. Once compression garments are ordered, compression bandaging will be continued until garments arrive for fitting. This process can take several weeks. Girth/Volume measurement: Repeat volumes:                                  Right                    Left  2/13/2020             8041.40 mL        8199.46 mL  1/23/2020             8626.52 mL        8899.04 mL  12/30/19               9080. 13 mL        9045. 13 mL  % difference: 1.93%       TOTAL TREATMENT 40 mins       ASSESSMENT:   Treatment effectiveness and tolerance: Patient has been receiving CDT since 12/2019 and is still in the bandaging phase at this time. He was measured for final compression options and awaiting delivery. He has tolerated bandaging well with improvements noted in size, shape, and condition of skin with healing wound areas, reduction in limb volumes as above. Completed vasopneumatic pump trial with [employer] rep. The patient verbalizes understanding of appropriate placement of proximal tubigrip. PT educating patient to remove the MLB if it moves distally on LE. Continued compression bandaging, with patient without c/o after application. Progress toward goals: Ongoing. PLAN OF CARE:   Changes to the plan of care: 1. Continue compression bandaging. 2. Continue MLD.       3. Assess understanding of HEP. Continue education regarding lymphedema and importance of skin care. 4. Fit custom flat knit knee high stockings for daytime wear, custom Solaris Tribute to allow patient to manage his own compression garments. 5. Continue Vasopneumatic pump trial.   Frequency: 2 times a week         Jolie Guerra, PT, DPT, CLT

## 2020-02-20 ENCOUNTER — HOSPITAL ENCOUNTER (OUTPATIENT)
Dept: PHYSICAL THERAPY | Age: 75
Discharge: HOME | End: 2020-02-20
Payer: MEDICARE

## 2020-02-20 PROCEDURE — 97140 MANUAL THERAPY 1/> REGIONS: CPT

## 2020-02-20 NOTE — PROGRESS NOTES
_                                    [facility] [address] [facility]  [address] [facility].  [address]  [place]      LYMPHEDEMA THERAPY  Last Outcomes measure: 3/1/83  Re-cert due 0/51/16  KX modifier initiated  VISIT: 13    [x]        Daily note               []       30 day/10th visit progress note    NAME: Mary Garrison  DATE: 2/20/2020    GOALS  Short term goals  Time frame: 4-8 weeks  1. Instruct the patient to be independent with proper skin care to prevent future skin breakdown and decrease the potential risk for infections that are associated with Lymphedema. 2. Patient will be independent with a personal lymphedema exercise program to assist with the lymphatic flow and reduce limb volume by 100 mL R/L.  1/23/2020 goal met, R LE reduced by 910.24 mL, L LE reduced by 251.56 mL since initial evaluation. 3. Patient will understand the signs and symptoms of acute infection. Long term goals  Time frame: 8-12 weeks  1. Patient will have knowledge of the compression options and acquire a safe and       appropriate daytime and night time compression system to prevent             re-accumulation of fluid. 1/23/2020 measurements completed for custom day/night time compression garments. 2.  Patient or family will be able to don/doff garments independently. Garment   system effectiveness will be evaluated prior to discharge for better long-term  management and outcomes. 3.  Improvement in functional outcomes measure by 5-10% to allow for overall improvement in mobility with reduced pain. 4. Wounds healed 100% with appropriate foot wear identified. 1/23/2020 ongoing  5. To transition patient to independent restorative phase of CDT. SUBJECTIVE REPORT:  No c/o today. States he tolerated bandaging well. Brings bandaging supplies to appt today, and is agreeable to proceeding with treatment.   Caregiver states recliner has been purchased for patient, and that patient is being encouraged to elevate legs when sitting. Pt still waiting on arrival of garments. Pain: 2/10, LE heaviness           Gait: Independent gait without any assistive device for community distances    ADLs:  Simple home management Supervision  Group home staff assists patient as needed. Treatment Response:   Patient reviewed packet received at evaluation. Patient completed home program as prescribed. Patient brought in all required supplies for treatment. Patient brought in shoes to wear with bandaging as requested. Function: Patient's ADLs are requiring less assistance. Functional Measure:  LLIS 7 /68, 10% Impairment    TREATMENT AND OBJECTIVE DATA SUMMARY:   Patient/Family Education: To avoid scratching with nails or sharp objects. To remove MLB if the bandage if it does not stay up high on the leg. Do not cut, unroll the bandage     Educated in skin care: Skin care products  Prevention of cellulitis     Educated in exercise: ROM routine ankle pumps/circumduction/toe flex and ext. Instructed in self MLD: Instructed to apply lotion from knee to groin nightly using upward strokes. Therapeutic Exercise/Procedure:   minutes             Free exercises/ROM: Reviewed remedial lymphedema home exercise program with patient. and Patient requires cueing/assistance to complete remedial home exercise program.    Patient to continue the following exercises as part of HEP:  In sitting:  toe flex/ext; ankle pumps; ankle circumduction: marching in place; full knee extension; hip add; diaphragmatic exercises x 10 reps each. Patient demonstrates improvement in performance of exercises today. Patient instructed to continue exercises 2x/day, 10 repetitions. Reinforcement continues to promote carryover at home.     Supine heel slides/hip abd x 10 reps with patient to add to home exercise program.   Home program: Patient to perform daily to BID:  Skin care, Exercise routine, Compression bandage, Self manual lymph draining (MLD) and Bring supplies to each therapy visit   Rationale: Exercise will increase the lymph angiomotoricity and tissue pressure of the skin and thus decrease swelling. Manual therapy: 1:10 - 1:55 pm 45 minutes     Area to decongest: LE: Bilateral and Trunk   Sequence used and effectiveness:   Vasopneumatic pump trial with Darrold Farris' - deferred today. Compression bandaging:  Manual therapy Wound on dorsum of left foot: 0.75 x 1.0 cm, no drainage (see pictures below): applied medihoney mepilex transfer to cover. Eucerin Intensive repair applied entire bilateral LE using upward strokes. Aquaphor to dry, cracked areas around heels. mepilex transfer applied to red area over ant tibialis tendon on both ankles for added protection. Upper/Lower extremity compression:  bilateral  lower extremity: below knee     The following multi-layer bandages were applied:    Stockinette  Velfoam anterior ankle  Transelast toe wraps 1-3 b/l feet    Padding layer:  Cast padding foot/ankle    Foam:  12 cm roll       Short stretch bandages: 1 each LE  6 cm  8 cm  10 cm x 2    H tubigrip, longer length, over bandaging, proximal aspect to cover tape; F tubigrip applied foot/ankle to allow for easier don/doff of shoes. Patient advised to monitor placement of tubigrip to prevent proximal piece from rolling down resulting in indentation. Patient reports shoe fit and comfort improved with removal of insole previous visit. States he has noted shoes no longer tight across front of foot. Patient advised to notify clinic of any discomfort dorsal foot area prior to next scheduled appt. Home management instructions as follows:  Compression bandaging instructions:  1. Compression bandaging will be applied twice a week by therapist in clinic, with adjustments to be made at home as indicated if bandaging becomes loose or uncomfortable.     2. If tolerated, remain bandaged between appointments with therapist, removing under the following conditions--DO NOT WAIT FOR A RETURN PHONE CALL FROM CLINIC:    -Numbness/tingling in extremity different from what you have experienced without the bandages in place.  -Compromise in circulation, monitoring blood refill into toes after applying gentle pressure to toes.  -Onset of pain in extremity that is sudden and severe in nature. -Redness, warmth in limb, and/or fever, flu-like symptoms, which may indicate infection. If this occurs, call your doctor right away. If you note a sudden increase in swelling in extremity, with or without redness/warmth, go to the emergency room as soon as possible to have blood clot ruled out. 3. Compression bandaging supplies that can be laundered are the brown compression wraps and any foam applied for padding. Launder in washer/dryer with NO fabric softener, bleach, woolite. Dry on a low heat. 4. Once compression garments are ordered, compression bandaging will be continued until garments arrive for fitting. This process can take several weeks. Girth/Volume measurement: Repeat volumes:                                  Right                    Left  2/13/2020             8041.40 mL        8199.46 mL  1/23/2020             8626.52 mL        8899.04 mL  12/30/19               9080. 13 mL        9045. 13 mL  % difference: 1.93%       TOTAL TREATMENT 45 mins          B LEs           Left foot    ASSESSMENT:   Treatment effectiveness and tolerance: Patient has been receiving CDT since 12/2019 and is still in the bandaging phase at this time. He was measured for final compression options and awaiting delivery. He has tolerated bandaging well with improvements noted in size, shape, and condition of skin with healing wound areas, reduction in limb volumes as above. Completed vasopneumatic pump trial with [employer] rep.   The patient verbalizes understanding of appropriate placement of proximal tubigrip. PT educating patient to remove the MLB if it moves distally on LE. Continued compression bandaging, with patient without c/o after application. Progress toward goals: Ongoing. PLAN OF CARE:   Changes to the plan of care: 1. Continue compression bandaging. 2. Continue MLD. 3. Assess understanding of HEP. Continue education regarding lymphedema and importance of skin care. 4. Fit custom flat knit knee high stockings for daytime wear, custom [employer] Tribute to allow patient to manage his own compression garments. 5. Continue Vasopneumatic pump trial.   Frequency: 2 times a week         Jolie Guerra, PT, DPT, CLT

## 2020-02-26 ENCOUNTER — HOSPITAL ENCOUNTER (OUTPATIENT)
Dept: PHYSICAL THERAPY | Age: 75
Discharge: HOME | End: 2020-02-26
Payer: MEDICARE

## 2020-02-26 PROCEDURE — 97140 MANUAL THERAPY 1/> REGIONS: CPT

## 2020-02-26 NOTE — PROGRESS NOTES
_                                    [address] [facility] [address]  [place]      LYMPHEDEMA THERAPY  Last Outcomes measure: 5/1/85  Re-cert due 1/28/50  KX modifier initiated  VISIT: 14    [x]        Daily note               []       30 day/10th visit progress note    NAME: Salvador Quinn  DATE: 2/26/2020    GOALS  Short term goals  Time frame: 4-8 weeks  1. Instruct the patient to be independent with proper skin care to prevent future skin breakdown and decrease the potential risk for infections that are associated with Lymphedema. 2. Patient will be independent with a personal lymphedema exercise program to assist with the lymphatic flow and reduce limb volume by 100 mL R/L.  1/23/2020 goal met, R LE reduced by 910.24 mL, L LE reduced by 251.56 mL since initial evaluation. 3. Patient will understand the signs and symptoms of acute infection. Long term goals  Time frame: 8-12 weeks  1. Patient will have knowledge of the compression options and acquire a safe and       appropriate daytime and night time compression system to prevent             re-accumulation of fluid. 1/23/2020 measurements completed for custom day/night time compression garments. 2.  Patient or family will be able to don/doff garments independently. Garment   system effectiveness will be evaluated prior to discharge for better long-term  management and outcomes. 3.  Improvement in functional outcomes measure by 5-10% to allow for overall improvement in mobility with reduced pain. 4. Wounds healed 100% with appropriate foot wear identified. 1/23/2020 ongoing  5. To transition patient to independent restorative phase of CDT. SUBJECTIVE REPORT:  No c/o today. States he tolerated bandaging well. Pt still waiting on arrival of garments.     Pain: 2/10, LE heaviness           Gait: Independent gait without any assistive device for community distances    ADLs:  Simple home management Supervision  Group home staff assists patient as needed. Treatment Response:   Patient reviewed packet received at evaluation. Patient completed home program as prescribed. Patient brought in all required supplies for treatment. Patient brought in shoes to wear with bandaging as requested. Function: Patient's ADLs are requiring less assistance. Functional Measure:  LLIS 7 /68, 10% Impairment    TREATMENT AND OBJECTIVE DATA SUMMARY:   Patient/Family Education: To avoid scratching with nails or sharp objects. To remove MLB if the bandage if it does not stay up high on the leg. Do not cut, unroll the bandage     Educated in skin care: Skin care products  Prevention of cellulitis     Educated in exercise: ROM routine ankle pumps/circumduction/toe flex and ext. Instructed in self MLD: Instructed to apply lotion from knee to groin nightly using upward strokes. Therapeutic Exercise/Procedure:   minutes             Free exercises/ROM: Reviewed remedial lymphedema home exercise program with patient. and Patient requires cueing/assistance to complete remedial home exercise program.    Patient to continue the following exercises as part of HEP:  In sitting:  toe flex/ext; ankle pumps; ankle circumduction: marching in place; full knee extension; hip add; diaphragmatic exercises x 10 reps each. Patient demonstrates improvement in performance of exercises today. Patient instructed to continue exercises 2x/day, 10 repetitions. Reinforcement continues to promote carryover at home. Supine heel slides/hip abd x 10 reps with patient to add to home exercise program.   Home program: Patient to perform daily to BID:  Skin care, Exercise routine, Compression bandage, Self manual lymph draining (MLD) and Bring supplies to each therapy visit   Rationale: Exercise will increase the lymph angiomotoricity and tissue pressure of the skin and thus decrease swelling. Manual therapy: 1:10 - 1:50 pm 40 minutes     Area to decongest: LE: Bilateral and Trunk   Sequence used and effectiveness:   Vasopneumatic pump trial with Biju Doshi' - deferred today. Compression bandaging:  Manual therapy Pt with new vesicle on dorsum of left foot, fluid-filled, 3.0 x 2.5 cm (see pictures below). Pt denies any pain. Covered with mepilex transfer and ABD pad to absorb any drainage should vesicle open. Eucerin Intensive repair applied entire bilateral LE using upward strokes. Aquaphor to dry, cracked areas around heels. mepilex transfer applied to red area over ant tibialis tendon on both ankles for added protection. Upper/Lower extremity compression:  bilateral  lower extremity: below knee     The following multi-layer bandages were applied:    Stockinette  Velfoam anterior ankle  Transelast toe wraps 1-4 b/l feet    Padding layer:  Cast padding foot/ankle    Foam:  12 cm roll   komprex II to medial and lateral malleoli on left foot, medial malleolus on right foot       Short stretch bandages: 1 each LE  6 cm  8 cm  10 cm     H tubigrip, longer length, over bandaging, proximal aspect to cover tape; F tubigrip applied foot/ankle to allow for easier don/doff of shoes. Patient advised to monitor placement of tubigrip to prevent proximal piece from rolling down resulting in indentation. Patient reports shoe fit and comfort improved with removal of insole previous visit. States he has noted shoes no longer tight across front of foot. Patient advised to notify clinic of any discomfort dorsal foot area prior to next scheduled appt. Home management instructions as follows:  Compression bandaging instructions:  1. Compression bandaging will be applied twice a week by therapist in clinic, with adjustments to be made at home as indicated if bandaging becomes loose or uncomfortable.     2. If tolerated, remain bandaged between appointments with therapist, removing under the following conditions--DO NOT WAIT FOR A RETURN PHONE CALL FROM CLINIC:    -Numbness/tingling in extremity different from what you have experienced without the bandages in place.  -Compromise in circulation, monitoring blood refill into toes after applying gentle pressure to toes.  -Onset of pain in extremity that is sudden and severe in nature. -Redness, warmth in limb, and/or fever, flu-like symptoms, which may indicate infection. If this occurs, call your doctor right away. If you note a sudden increase in swelling in extremity, with or without redness/warmth, go to the emergency room as soon as possible to have blood clot ruled out. 3. Compression bandaging supplies that can be laundered are the brown compression wraps and any foam applied for padding. Launder in washer/dryer with NO fabric softener, bleach, woolite. Dry on a low heat. 4. Once compression garments are ordered, compression bandaging will be continued until garments arrive for fitting. This process can take several weeks. Girth/Volume measurement: Repeat volumes:                                  Right                    Left  2/13/2020             8041.40 mL        8199.46 mL  1/23/2020             8626.52 mL        8899.04 mL  12/30/19               9080. 13 mL        9045. 13 mL  % difference: 1.93%       TOTAL TREATMENT 40 mins            ASSESSMENT:   Treatment effectiveness and tolerance: Patient has been receiving CDT since 12/2019 and is still in the bandaging phase at this time. He was measured for final compression options and awaiting delivery. He has tolerated bandaging well with improvements noted in size, shape, and condition of skin with healing wound areas, reduction in limb volumes as above. Completed vasopneumatic pump trial with [employer] rep. The patient verbalizes understanding of appropriate placement of proximal tubigrip. PT educating patient to remove the MLB if it moves distally on LE. Continued compression bandaging, with patient without c/o after application. New vesicle on dorsum of left foot today - covered with mepilex and ABD pad to absorb any drainage. Progress toward goals: Ongoing. PLAN OF CARE:   Changes to the plan of care: 1. Continue compression bandaging. 2. Continue MLD. 3. Assess understanding of HEP. Continue education regarding lymphedema and importance of skin care. 4. Fit custom flat knit knee high stockings for daytime wear, custom Solaris Tribute to allow patient to manage his own compression garments. 5. Continue Vasopneumatic pump trial.   Frequency: 2 times a week         Jolie Guerra, PT, DPT, CLT

## 2020-03-03 RX ORDER — LEVETIRACETAM 1000 MG/1
TABLET ORAL
Qty: 58 TAB | Status: SHIPPED | OUTPATIENT
Start: 2020-03-03 | End: 2020-03-04 | Stop reason: SDUPTHER

## 2020-03-04 ENCOUNTER — HOSPITAL ENCOUNTER (OUTPATIENT)
Dept: PHYSICAL THERAPY | Age: 75
Discharge: HOME | End: 2020-03-04
Payer: MEDICARE

## 2020-03-04 PROCEDURE — 97140 MANUAL THERAPY 1/> REGIONS: CPT

## 2020-03-04 RX ORDER — LEVETIRACETAM 1000 MG/1
1000 TABLET ORAL 2 TIMES DAILY
Qty: 58 TAB | Refills: 3 | Status: SHIPPED | OUTPATIENT
Start: 2020-03-04 | End: 2021-01-30

## 2020-03-04 NOTE — PROGRESS NOTES
_                                    [address] [address]  [place]      LYMPHEDEMA THERAPY  Last Outcomes measure: 2/4/84  Re-cert due 2/33/75  KX modifier initiated  VISIT: 15    [x]        Daily note               []       30 day/10th visit progress note    NAME: Britta Epley Enroughty  DATE: 3/4/2020    GOALS  Short term goals  Time frame: 4-8 weeks  1. Instruct the patient to be independent with proper skin care to prevent future skin breakdown and decrease the potential risk for infections that are associated with Lymphedema. 2. Patient will be independent with a personal lymphedema exercise program to assist with the lymphatic flow and reduce limb volume by 100 mL R/L.  1/23/2020 goal met, R LE reduced by 910.24 mL, L LE reduced by 251.56 mL since initial evaluation. 3. Patient will understand the signs and symptoms of acute infection. Long term goals  Time frame: 8-12 weeks  1. Patient will have knowledge of the compression options and acquire a safe and       appropriate daytime and night time compression system to prevent             re-accumulation of fluid. 1/23/2020 measurements completed for custom day/night time compression garments. 2.  Patient or family will be able to don/doff garments independently. Garment   system effectiveness will be evaluated prior to discharge for better long-term  management and outcomes. 3.  Improvement in functional outcomes measure by 5-10% to allow for overall improvement in mobility with reduced pain. 4. Wounds healed 100% with appropriate foot wear identified. 1/23/2020 ongoing  5. To transition patient to independent restorative phase of CDT. SUBJECTIVE REPORT:  No c/o today. States he tolerated bandaging well. Pt still waiting on arrival of garments.     Pain: 2/10, LE heaviness           Gait: Independent gait without any assistive device for community distances    ADLs:  Simple home management Supervision  Group home staff assists patient as needed. Treatment Response:   Patient reviewed packet received at evaluation. Patient completed home program as prescribed. Patient brought in all required supplies for treatment. Patient brought in shoes to wear with bandaging as requested. Function: Patient's ADLs are requiring less assistance. Functional Measure:  LLIS 7 /68, 10% Impairment    TREATMENT AND OBJECTIVE DATA SUMMARY:   Patient/Family Education: To avoid scratching with nails or sharp objects. To remove MLB if the bandage if it does not stay up high on the leg. Do not cut, unroll the bandage     Educated in skin care: Skin care products  Prevention of cellulitis     Educated in exercise: ROM routine ankle pumps/circumduction/toe flex and ext. Instructed in self MLD: Instructed to apply lotion from knee to groin nightly using upward strokes. Therapeutic Exercise/Procedure:   minutes             Free exercises/ROM: Reviewed remedial lymphedema home exercise program with patient. and Patient requires cueing/assistance to complete remedial home exercise program.    Patient to continue the following exercises as part of HEP:  In sitting:  toe flex/ext; ankle pumps; ankle circumduction: marching in place; full knee extension; hip add; diaphragmatic exercises x 10 reps each. Patient demonstrates improvement in performance of exercises today. Patient instructed to continue exercises 2x/day, 10 repetitions. Reinforcement continues to promote carryover at home. Supine heel slides/hip abd x 10 reps with patient to add to home exercise program.   Home program: Patient to perform daily to BID:  Skin care, Exercise routine, Compression bandage, Self manual lymph draining (MLD) and Bring supplies to each therapy visit   Rationale: Exercise will increase the lymph angiomotoricity and tissue pressure of the skin and thus decrease swelling. Manual therapy: 1:00-1:40 pm 40 minutes     Area to decongest: LE: Bilateral and Trunk   Sequence used and effectiveness:   Vasopneumatic pump trial with Aydee Esters' - deferred today. Compression bandaging:  Manual therapy Pt with new vesicle on dorsum of left foot, drained today 5 x 4 cm. Pt denies any pain. Optifoam Gentle dressing. No s/s of infection noted. Eucerin Intensive repair applied entire bilateral LE using upward strokes. Lotrimin antifungal spray toes bilateral.       Upper/Lower extremity compression:  bilateral  lower extremity: below knee     The following multi-layer bandages were applied:    Stockinette  Velfoam anterior ankle  Transelast toe wraps--deferred    Padding layer:  Cast padding foot/ankle  Gray foam pad, beveled edges, dorsal foot L    Foam:  12 cm roll   komprex II to medial and lateral malleoli on left foot, fullness noted. Short stretch bandages: 1 each LE  6 cm  8 cm  10 cm  R LE; x 2 L LE    H tubigrip, longer length, over bandaging, proximal aspect to cover tape; F tubigrip applied foot/ankle to allow for easier don/doff of shoes. Patient advised to monitor placement of tubigrip to prevent proximal piece from rolling down resulting in indentation. Patient reports shoe fit and comfort improved with removal of insole previous visit. States he has noted shoes no longer tight across front of foot. Patient advised to notify clinic of any discomfort dorsal foot area prior to next scheduled appt. Home management instructions as follows:  Compression bandaging instructions:  1. Compression bandaging will be applied twice a week by therapist in clinic, with adjustments to be made at home as indicated if bandaging becomes loose or uncomfortable.     2. If tolerated, remain bandaged between appointments with therapist, removing under the following conditions--DO NOT WAIT FOR A RETURN PHONE CALL FROM CLINIC:    -Numbness/tingling in extremity different from what you have experienced without the bandages in place.  -Compromise in circulation, monitoring blood refill into toes after applying gentle pressure to toes.  -Onset of pain in extremity that is sudden and severe in nature. -Redness, warmth in limb, and/or fever, flu-like symptoms, which may indicate infection. If this occurs, call your doctor right away. If you note a sudden increase in swelling in extremity, with or without redness/warmth, go to the emergency room as soon as possible to have blood clot ruled out. 3. Compression bandaging supplies that can be laundered are the brown compression wraps and any foam applied for padding. Launder in washer/dryer with NO fabric softener, bleach, woolite. Dry on a low heat. 4. Once compression garments are ordered, compression bandaging will be continued until garments arrive for fitting. This process can take several weeks. Girth/Volume measurement: Repeat volumes:                                  Right                    Left  2/13/2020             8041.40 mL        8199.46 mL  1/23/2020             8626.52 mL        8899.04 mL  12/30/19               9080. 13 mL        9045. 13 mL  % difference: 1.93%       TOTAL TREATMENT 40 mins     ASSESSMENT:   Treatment effectiveness and tolerance: Patient has been receiving CDT since 12/2019 and is still in the bandaging phase at this time. He was measured for final compression options and awaiting delivery. He has tolerated bandaging well with improvements noted in size, shape, and condition of skin with healing wound areas, reduction in limb volumes as above. Completed vasopneumatic pump trial with [employer] rep. The patient verbalizes understanding of appropriate placement of proximal tubigrip. PT educating patient to remove the MLB if it moves distally on LE. Continued compression bandaging, with patient without c/o after application.  Open vesicle on dorsum of left foot today - covered with Optifoam Gentle dressing. Progress toward goals: Ongoing. PLAN OF CARE:   Changes to the plan of care: 1. Continue compression bandaging. 2. Assess understanding of HEP. Continue education regarding lymphedema and importance of skin care. 3. Fit custom flat knit knee high stockings for daytime wear, custom [employer] Tribute to allow patient to manage his own compression garments. 4. Continue Vasopneumatic pump trial as time permits.    Frequency: 2 times a week         Vince Barraza, PT, CLT

## 2020-03-12 ENCOUNTER — HOSPITAL ENCOUNTER (OUTPATIENT)
Dept: PHYSICAL THERAPY | Age: 75
Discharge: HOME | End: 2020-03-12
Payer: MEDICARE

## 2020-03-12 PROCEDURE — 97760 ORTHOTIC MGMT&TRAING 1ST ENC: CPT

## 2020-03-12 PROCEDURE — 97140 MANUAL THERAPY 1/> REGIONS: CPT

## 2020-03-12 NOTE — PROGRESS NOTES
_                                    [facility] [address] [facility]  [address] [facility].  [address]  [place]      LYMPHEDEMA THERAPY  Last Outcomes measure: 2/6/52  Re-cert due 4/12/41  KX modifier initiated  VISIT: 16    [x]        Daily note               []       30 day/10th visit progress note    NAME: Josh Newsomeroughty  DATE: 3/12/2020    GOALS  Short term goals  Time frame: 4-8 weeks  1. Instruct the patient to be independent with proper skin care to prevent future skin breakdown and decrease the potential risk for infections that are associated with Lymphedema. 2. Patient will be independent with a personal lymphedema exercise program to assist with the lymphatic flow and reduce limb volume by 100 mL R/L.  1/23/2020 goal met, R LE reduced by 910.24 mL, L LE reduced by 251.56 mL since initial evaluation. 3. Patient will understand the signs and symptoms of acute infection. Long term goals  Time frame: 8-12 weeks  1. Patient will have knowledge of the compression options and acquire a safe and       appropriate daytime and night time compression system to prevent             re-accumulation of fluid. 1/23/2020 measurements completed for custom day/night time compression garments. 2.  Patient or family will be able to don/doff garments independently. Garment   system effectiveness will be evaluated prior to discharge for better long-term  management and outcomes. 3.  Improvement in functional outcomes measure by 5-10% to allow for overall improvement in mobility with reduced pain. 4. Wounds healed 100% with appropriate foot wear identified. 1/23/2020 ongoing  5. To transition patient to independent restorative phase of CDT. SUBJECTIVE REPORT:  No c/o today. Pt received daytime garments and brought for fitting today.  Per caregiver, pt's insurance denied nighttime garment but says they have filed an appeal.    Pain: 2/10, LE heaviness           Gait: Independent gait without any assistive device for community distances    ADLs:  Simple home management Supervision  Group home staff assists patient as needed. Treatment Response:   Patient reviewed packet received at evaluation. Patient completed home program as prescribed. Patient brought in all required supplies for treatment. Patient brought in shoes to wear with bandaging as requested. Function: Patient's ADLs are requiring less assistance. Functional Measure:  LLIS 7 /68, 10% Impairment    TREATMENT AND OBJECTIVE DATA SUMMARY:   Patient/Family Education: Compression Garment Instructions  1. Perform your skin care and wound care, cleansing skin daily with a soap-free product, such as Dove Sensitive Skin Body Wash, and using low pH lotion, upward strokes to stimulate lymphatic vessels. If you do it in the morning, wait 20 minutes to allow lotion to absorb before applying your stockings. 2. Apply compression stockings to clean, dry legs first thing in the morning, EVERY MORNING. 3. Adjust garments FREQUENTLY throughout the day, checking for creases and folds behind the knees, at front of ankle, smoothing garment in problem areas regularly. Use rubber gloves to do this task to protect your garment from tears. 4. Launder garment nightly either by hand or washing machine on a delicate setting in a garment bag or pillowcase. Use mild detergent with NO FABRIC SOFTENER, NO BLEACH, NO WOOLITE. Wash in cool/warm water. 5. Dry garment in dryer on low heat in garment bag or pillowcase. 6. When you remove your stockings, observe your toes for any areas of redness, particularly along the sides. You may want to wear a diabetic sock over your stocking in your shoe to protect it. 7. Perform leg exercises daily when you are wearing your compression garment during the day. 8. Sleep with your legs elevated.       Remove stockings under the following conditions:  -numbness/tingling in the extremity   - A compromise in circulation: feet feel cold   -onset of pain in the leg that is sudden and severe in nature  -Redness, warmth in the limb and/or fever, flu-like symptoms, which may indicate an infection. If this occurs, call your doctor right away and discontinue wearing the compression stockings. Return to the clinic in 1-2 weeks with your compression stockings on for reassessment of fit and effectiveness of the garments. Educated in skin care: Skin care products  Prevention of cellulitis     Educated in exercise: ROM routine ankle pumps/circumduction/toe flex and ext. Instructed in self MLD: Instructed to apply lotion from knee to groin nightly using upward strokes. Therapeutic Exercise/Procedure:   minutes             Free exercises/ROM: Reviewed remedial lymphedema home exercise program with patient. and Patient requires cueing/assistance to complete remedial home exercise program.    Patient to continue the following exercises as part of HEP:  In sitting:  toe flex/ext; ankle pumps; ankle circumduction: marching in place; full knee extension; hip add; diaphragmatic exercises x 10 reps each. Patient demonstrates improvement in performance of exercises today. Patient instructed to continue exercises 2x/day, 10 repetitions. Reinforcement continues to promote carryover at home. Supine heel slides/hip abd x 10 reps with patient to add to home exercise program.   Home program: Patient to perform daily to BID:  Skin care, Exercise routine, Compression bandage, Self manual lymph draining (MLD) and Bring supplies to each therapy visit   Rationale: Exercise will increase the lymph angiomotoricity and tissue pressure of the skin and thus decrease swelling.        Manual therapy: 1:05 - 1:35 pm  Orthotic management: 1:35 - 2:05 pm 30 minutes  30 minutes     Area to decongest: LE: Bilateral and Trunk   Sequence used and effectiveness:   Vasopneumatic pump trial with Manuelita Metro' - deferred today.                  Compression bandaging: Deferred due to garment fitting. Skin care: Pt with healing blister on left dorsal foot, not open today. Covered with optifoam gentle dressing for protection. Pt's skin dry. Recommend daily lotion at night after doffing stockings. Orthotic management:  Pt fit today with Juzo silver 6534 custom knee high stockings, closed toe. Therapist first donned stockings; good fit noted. Had pt attempt to don/doff stockings on his own, however pt had difficulty and required max verbal cues for instruction. Pt attempted donning in tailor sitting position or with legs crossed. Instructed pt to work stocking on little by little and to avoid \"bunching\" of fabric creating tourniquet and making stocking more difficult to don. Also tried dycem on floor to assist with placement of heel, however pt with difficulty following instructions. Caregiver not present during visit for instruction. Discussed recommendations with caregiver at the end of visit including need for daily assistance with don/doffing at this time, recommend daily washing/drying in dryer, instructed in skin care routine and precautions. Pt is not to ever sleep with stockings on; will reassess need for nighttime garment next visit. Provided pt/caregiver with written instructions as well. Girth/Volume measurement:  Manual therapy  Repeat volumes:                                  Right                    Left  3/12/2020             8375. 41 mL        8271.05 mL  2/13/2020             8041.40 mL        8199.46 mL  1/23/2020             8626.52 mL        8899.04 mL  12/30/19               9080. 13 mL        9045. 13 mL  % difference: 1.26%       TOTAL TREATMENT 60 mins     ASSESSMENT:   Treatment effectiveness and tolerance: Patient fit today with Juzo silver 9640 custom knee high stockings, good fit noted. Caregiver not present during visit for instruction.  Discussed recommendations with caregiver at the end of visit including need for daily assistance with don/doffing at this time, recommend daily washing/drying in dryer, instructed in skin care routine and precautions. Pt is not to ever sleep with stockings on; will reassess need for nighttime garment next visit. Provided pt/caregiver with written instructions as well. Progress toward goals: Ongoing. PLAN OF CARE:   Changes to the plan of care: 1. Recheck volumes. 2. Determine need for nighttime garment? 3. Have pt practice don/doffing as needed. 4. Order second pair of stockings. Frequency: 2 times a week         Jolie Guerra, PT, DPT, CLT

## 2020-03-18 ENCOUNTER — HOSPITAL ENCOUNTER (OUTPATIENT)
Dept: PHYSICAL THERAPY | Age: 75
Discharge: HOME | End: 2020-03-18
Payer: MEDICARE

## 2020-03-18 ENCOUNTER — TELEPHONE (OUTPATIENT)
Dept: FAMILY MEDICINE CLINIC | Age: 75
End: 2020-03-18

## 2020-03-18 ENCOUNTER — OFFICE VISIT (OUTPATIENT)
Dept: FAMILY MEDICINE CLINIC | Age: 75
End: 2020-03-18

## 2020-03-18 VITALS
TEMPERATURE: 97.7 F | BODY MASS INDEX: 30.71 KG/M2 | WEIGHT: 247 LBS | DIASTOLIC BLOOD PRESSURE: 73 MMHG | RESPIRATION RATE: 16 BRPM | SYSTOLIC BLOOD PRESSURE: 125 MMHG | HEART RATE: 70 BPM | OXYGEN SATURATION: 99 % | HEIGHT: 75 IN

## 2020-03-18 DIAGNOSIS — L03.818 CELLULITIS OF OTHER SPECIFIED SITE: Primary | ICD-10-CM

## 2020-03-18 PROCEDURE — 97140 MANUAL THERAPY 1/> REGIONS: CPT

## 2020-03-18 PROCEDURE — 97760 ORTHOTIC MGMT&TRAING 1ST ENC: CPT

## 2020-03-18 RX ORDER — CEPHALEXIN 500 MG/1
500 CAPSULE ORAL 2 TIMES DAILY
Qty: 20 CAP | Refills: 0 | Status: SHIPPED | OUTPATIENT
Start: 2020-03-18 | End: 2020-03-28

## 2020-03-18 NOTE — TELEPHONE ENCOUNTER
----- Message from Nawaf Morejon sent at 3/18/2020  1:42 PM EDT -----  Regarding: Dr. Johnny Wilkerson (if not patient):      Relationship of caller (if not patient):      Best contact number(s):      Name of medication and dosage if known:      Is patient out of this medication (yes/no):      Pharmacy name:    Pharmacy listed in chart? (yes/no):  Pharmacy phone number:      Details to clarify the request:      Nawaf Sherpaagalo       Medication Refill    Caller (if not patient):      Relationship of caller (if not patient):Daily bailey [facility] contact number(s):[phone]      Name of medication and dosage if known:      Is patient out of this medication (yes/no):yes      Pharmacy name: [facility] listed in chart? (yes/no):yes [phone]  Pharmacy phone number:      Details to clarify the request:Prescription for an open wound sent to [id number] [place] [address]

## 2020-03-18 NOTE — LETTER
NOTIFICATION RETURN TO WORK / SCHOOL 
 
3/18/2020 2:53 PM 
 
Mr. Zackary Quinn [facility] [id number] To Whom It May Concern: 
 
Zackary Quinn is currently under the care of [facility]. Please excuse his absence from 3/18/2020-3/28/2020 for medical reasons. He will return to work/school on: 3/29/2020 If there are questions or concerns please have the patient contact our office.  
 
 
 
Sincerely, 
 
 
Minnie Francois MD

## 2020-03-18 NOTE — PATIENT INSTRUCTIONS

## 2020-03-18 NOTE — PROGRESS NOTES
_                                    [facility] [facility]  [address] [facility]  [address]      LYMPHEDEMA THERAPY  Last Outcomes measure: 8/2/47  Re-cert due 5/01/43  KX modifier continued  VISIT: 17    [x]        Daily note               []       30 day/10th visit progress note    NAME: Claudene Rend Enroughty  DATE: 3/18/2020    GOALS  Short term goals  Time frame: 4-8 weeks  1. Instruct the patient to be independent with proper skin care to prevent future skin breakdown and decrease the potential risk for infections that are associated with Lymphedema. 2. Patient will be independent with a personal lymphedema exercise program to assist with the lymphatic flow and reduce limb volume by 100 mL R/L.  1/23/2020 goal met, R LE reduced by 910.24 mL, L LE reduced by 251.56 mL since initial evaluation. 3. Patient will understand the signs and symptoms of acute infection. Long term goals  Time frame: 8-12 weeks  1. Patient will have knowledge of the compression options and acquire a safe and       appropriate daytime and night time compression system to prevent             re-accumulation of fluid. 1/23/2020 measurements completed for custom day/night time compression garments. 2.  Patient or family will be able to don/doff garments independently. Garment   system effectiveness will be evaluated prior to discharge for better long-term  management and outcomes. 3.  Improvement in functional outcomes measure by 5-10% to allow for overall improvement in mobility with reduced pain. 4. Wounds healed 100% with appropriate foot wear identified. 1/23/2020 ongoing  5. To transition patient to independent restorative phase of CDT. SUBJECTIVE REPORT:  Pt arrives today wearing custom stockings on both legs. Pt says he is able to don garments in the morning, but needs assistance with removing them before bedtime.  Per caregiver, pt's insurance denied nighttime garment but says they have filed an appeal. Pt says he has not yet received the pump. Pain: 2/10, LE heaviness           Gait: Independent gait without any assistive device for community distances    ADLs:  Simple home management Supervision  Group home staff assists patient as needed. Treatment Response:   Patient reviewed packet received at evaluation. Patient completed home program as prescribed. Patient brought in all required supplies for treatment. Patient brought in shoes to wear with bandaging as requested. Function: Patient's ADLs are requiring less assistance. Functional Measure:  LLIS 7 /68, 10% Impairment    Temp 97.8    TREATMENT AND OBJECTIVE DATA SUMMARY:   Patient/Family Education: Reviewed signs of infection with pt and caregiver today. Pt exhibiting signs (redness, warmth, pain, increased swelling) and therapist recommends evaluation. Pt's caregiver called PCP to schedule appt for today. Advised caregiver that compression is contraindicated until infection treated so pt is not to wear stockings until he is treated with antibiotics. Pt may then require resuming MLB to reduce limb volumes before returning to garment use. Educated in skin care: Skin care products  Prevention of cellulitis     Educated in exercise: ROM routine ankle pumps/circumduction/toe flex and ext. Instructed in self MLD: Instructed to apply lotion from knee to groin nightly using upward strokes. Therapeutic Exercise/Procedure:   minutes             Free exercises/ROM: Reviewed remedial lymphedema home exercise program with patient. and Patient requires cueing/assistance to complete remedial home exercise program.    Patient to continue the following exercises as part of HEP:  In sitting:  toe flex/ext; ankle pumps; ankle circumduction: marching in place; full knee extension; hip add; diaphragmatic exercises x 10 reps each.  Patient demonstrates improvement in performance of exercises today. Patient instructed to continue exercises 2x/day, 10 repetitions. Reinforcement continues to promote carryover at home. Supine heel slides/hip abd x 10 reps with patient to add to home exercise program.   Home program: Patient to perform daily to BID:  Skin care, Exercise routine, Compression bandage, Self manual lymph draining (MLD) and Bring supplies to each therapy visit   Rationale: Exercise will increase the lymph angiomotoricity and tissue pressure of the skin and thus decrease swelling. Manual therapy: 1:10 - 1:40 pm  Orthotic management: 12:40 - 1:10 pm 30 minutes  30 minutes     Area to decongest: LE: Bilateral and Trunk   Sequence used and effectiveness:   Vasopneumatic pump trial with Annette Ahumada' - deferred today. Compression bandaging: Deferred due to signs of infection. Skin care:  Manual therapy  Pt presents today with increased redness at both feet, right worse than left. Noted warmth, pain, and increased swelling as well. Discussed concerns for infection with pt and caregiver and recommend pt go to MD or urgent care center for evaluation. Pt also with skin breakdown at anterior ankle crease. Open sore noted on right ankle with oozing drainage. Wound cleansed and covered with mepilex border today. Encouraged pt/caregiver to keep wound covered. Orthotic management:  Reassessed fit of new custom stockings today. Stockings not donned properly upon arrival - pulled up too high with heel seam around the ankle and proximal silicone border folded over in popliteal crease. Seam of right stocking also situated on medial side of leg vs straight up the back. Garments appear stretched out. Therapist first adjusted stockings to show pt/caregiver proper placement. Therapist then removed garments and noticed skin breakdown at ankle creases and new wound on right ankle crease with drainage.     Girth/Volume measurement:  Manual therapy  Repeat volumes: Right                    Left  3/18/2020             8888. 42 mL        8482. 21 mL  3/12/2020             8375. 41 mL        8271.05 mL  2/13/2020             8041.40 mL        8199.46 mL  1/23/2020             8626.52 mL        8899.04 mL  12/30/19               9080. 13 mL        9045. 13 mL  % difference: 4.79%   Observation:          TOTAL TREATMENT 60 mins     ASSESSMENT:   Treatment effectiveness and tolerance: Patient seen today for garment recheck, however pt presents with signs of infection and was sent to MD for evaluation. If pt diagnosed with cellulitis, pt to remain out of compression until treated. Pt to return for possible MLB or resuming garment use after treatment completed. Pt/caregiver will need further education on proper don/doffing of garments. Pt will also need nighttime garment; will discuss garment options if pt willing to purchase since insurance did not approve tribute. Progress toward goals: Ongoing. PLAN OF CARE:   Changes to the plan of care: 1. Recheck volumes. Resume MLB as indicated. 2. Determine need for nighttime garment? 3. Have pt practice don/doffing as needed. 4. Order second pair of stockings. Frequency: 2 times a week         Jolie Guerra, PT, DPT, CLT

## 2020-03-18 NOTE — PROGRESS NOTES
Chief Complaint   Patient presents with    Skin Infection     was at lymphedema clinic today---right foot possible cellulitis     1. Have you been to the ER, urgent care clinic since your last visit? Hospitalized since your last visit? No    2. Have you seen or consulted any other health care providers outside of the [address] [place] [address] since your last visit? Include any pap smears or colon screening.  No

## 2020-03-18 NOTE — PROGRESS NOTES
Talib Quinn  70 y.o. male  1945  [address] [facility]  [id number]   [address]: Progress Note  Miranda James MD       Encounter Date: 3/18/2020    Chief Complaint   Patient presents with    Skin Infection     was at lymphedema clinic today---right foot possible cellulitis     History of Present Illness   Olga Hernandez is a 70 y.o. male who presents to clinic today for concerns for LE cellulitis. Was seen at lymphedema clinic today and was noted to have worsening erythema and warmth to this right foot. Denies fevers or chills. Review of Systems   Review of Systems   Skin: Positive for rash. Vitals/Objective:     Vitals:    03/18/20 1433   BP: 125/73   Pulse: 70   Resp: 16   Temp: 97.7 °F (36.5 °C)   TempSrc: Oral   SpO2: 99%   Weight: 247 lb (112 kg)   Height: 6' 3\" (1.905 m)     Body mass index is 30.87 kg/m². General: Patient alert and oriented and in NAD  Heart: Regular rate and rhythm, No murmurs, rubs or gallops. 2+ peripheral pulses  Lungs: Clear to auscultation bilaterally, no wheezing, rales or rhonchi  Skin: Lymphedema bilateral LE. Increased erythema and warmth to the right LE. Assessment and Plan:   1. Cellulitis of other specified site  Concern for possible cellulitis in the setting of lymphedema. Will treat with keflex x 10 days. Continue follow-up with Lymphedema clinic. RTC if sx worsen or fail to improve. - cephALEXin (KEFLEX) 500 mg capsule; Take 1 Cap by mouth two (2) times a day for 10 days. Dispense: 20 Cap; Refill: 0      I have discussed the diagnosis with the patient and the intended plan as seen in the above orders. he has expressed understanding. The patient has received an after-visit summary and questions were answered concerning future plans. I have discussed medication side effects and warnings with the patient as well.        Follow-up and Dispositions  ·   Return if symptoms worsen or fail to improve. Electronically Signed: Jaiden Squires MD     History   Patients past medical, surgical and family histories were reviewed and updated.     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)     Other ill-defined conditions(799.89)     edema legs    S/P biventricular cardiac pacemaker procedure 10/23/2015    10/23/15 [employer] biventricular pacemaker inmplant    Screen for colon cancer 9/27/2012     Past Surgical History:   Procedure Laterality Date    HX COLONOSCOPY  04/05/2017    HX ORTHOPAEDIC Right 12/22/2017    ORIF right distal radius    HX PACEMAKER  2015    bi ventricular pacemaker      Family History   Problem Relation Age of Onset    Other Other         unable to obtain due to mental status    No Known Problems Sister      Social History     Socioeconomic History    Marital status: SINGLE     Spouse name: Not on file    Number of children: Not on file    Years of education: Not on file    Highest education level: Not on file   Occupational History    Not on file   Social Needs    Financial resource strain: Not on file    Food insecurity     Worry: Not on file     Inability: Not on file    Transportation needs     Medical: Not on file     Non-medical: Not on file   Tobacco Use    Smoking status: Never Smoker    Smokeless tobacco: Never Used   Substance and Sexual Activity    Alcohol use: No    Drug use: No    Sexual activity: Not Currently   Lifestyle    Physical activity     Days per week: Not on file     Minutes per session: Not on file    Stress: Not on file   Relationships    Social connections     Talks on phone: Not on file     Gets together: Not on file     Attends [employer] service: Not on file     Active member of club or organization: Not on file     Attends meetings of clubs or organizations: Not on file     Relationship status: Not on file    Intimate partner violence     Fear of current or ex partner: Not on file     Emotionally abused: Not on file     Physically abused: Not on file     Forced sexual activity: Not on file   Other Topics Concern     Service Not Asked    Blood Transfusions Not Asked    Caffeine Concern Not Asked    Occupational Exposure Not Asked   Twyla Tafoya Hazards Not Asked    Sleep Concern Not Asked    Stress Concern Not Asked    Weight Concern Not Asked    Special Diet Not Asked    Back Care Not Asked    Exercise Not Asked    Bike Helmet Not Asked   [address] Not Asked    Self-Exams Not Asked   Social History Narrative    Not on file            Current Medications/Allergies     Current Outpatient Medications   Medication Sig Dispense Refill    levETIRAcetam 1,000 mg tablet Take 1 Tab by mouth two (2) times a day. 58 Tab 3    cholecalciferol (VITAMIN D3) (1000 Units /25 mcg) tablet TAKE 1 TABLET BY MOUTH DAILY 31 Tab PRN    lacosamide (VIMPAT) 100 mg tab tablet Take 1 Tab by mouth two (2) times a day. Max Daily Amount: 200 mg. 60 Tab 11    metoprolol tartrate (LOPRESSOR) 25 mg tablet Take 1 Tab by mouth two (2) times a day. 60 Tab 5    bumetanide (BUMEX) 2 mg tablet TAKE 1 TABLET BY MOUTH DAILY 90 Tab 3    potassium chloride (K-DUR, KLOR-CON) 20 mEq tablet TAKE 1 TABLET BY MOUTH DAILY 90 Tab 3    apixaban (ELIQUIS) 5 mg tablet Take 1 Tab by mouth two (2) times a day.  60 Tab 12     Allergies   Allergen Reactions    Sulfa (Sulfonamide Antibiotics) Rash

## 2020-03-19 NOTE — TELEPHONE ENCOUNTER
I called and spoke with pharmacy and they did in fact receive medication and has been dispensed to patient.

## 2020-03-19 NOTE — TELEPHONE ENCOUNTER
Will call pharmacy as they dont open until 9:00. Will make sure I take care of issue as soon as they open.

## 2020-03-23 ENCOUNTER — HOSPITAL ENCOUNTER (OUTPATIENT)
Dept: PHYSICAL THERAPY | Age: 75
Discharge: HOME | End: 2020-03-23
Payer: MEDICARE

## 2020-03-23 PROCEDURE — 97140 MANUAL THERAPY 1/> REGIONS: CPT

## 2020-03-23 NOTE — PROGRESS NOTES
_                                    [facility]  [address] [facility]  [address]      LYMPHEDEMA THERAPY  Last Outcomes measure: 4/2/74  Re-cert due 8/51/63  KX modifier continued  VISIT: 18    [x]        Daily note               []       30 day/10th visit progress note    NAME: Joselyn Beal  DATE: 3/23/2020    GOALS  Short term goals  Time frame: 4-8 weeks  1. Instruct the patient to be independent with proper skin care to prevent future skin breakdown and decrease the potential risk for infections that are associated with Lymphedema. 2. Patient will be independent with a personal lymphedema exercise program to assist with the lymphatic flow and reduce limb volume by 100 mL R/L.  1/23/2020 goal met, R LE reduced by 910.24 mL, L LE reduced by 251.56 mL since initial evaluation. 3. Patient will understand the signs and symptoms of acute infection. Long term goals  Time frame: 8-12 weeks  1. Patient will have knowledge of the compression options and acquire a safe and       appropriate daytime and night time compression system to prevent             re-accumulation of fluid. 1/23/2020 measurements completed for custom day/night time compression garments. 2.  Patient or family will be able to don/doff garments independently. Garment   system effectiveness will be evaluated prior to discharge for better long-term  management and outcomes. 3.  Improvement in functional outcomes measure by 5-10% to allow for overall improvement in mobility with reduced pain. 4. Wounds healed 100% with appropriate foot wear identified. 1/23/2020 ongoing  5. To transition patient to independent restorative phase of CDT. SUBJECTIVE REPORT:  Pt arrives today wearing RM stockings on both legs. Pt went to [facility] after last visit and was diagnosed with cellulitis. Pt remains on antibiotics for a few more days per caregiver.  Pt denies pain today and brought bandaging supplies to resume MLB. Per caregiver, pt's insurance denied nighttime garment but says they have filed an appeal. Pt says he has not yet received the pump. Pain: 2/10, LE heaviness           Gait: Independent gait without any assistive device for community distances    ADLs:  Simple home management Supervision  Group home staff assists patient as needed. Treatment Response:   Patient reviewed packet received at evaluation. Patient completed home program as prescribed. Patient brought in all required supplies for treatment. Patient brought in shoes to wear with bandaging as requested. Function: Patient's ADLs are requiring less assistance. Functional Measure:  LLIS 7 /68, 10% Impairment    TREATMENT AND OBJECTIVE DATA SUMMARY:   Patient/Family Education: Reviewed signs of infection with pt and caregiver today. Reviewed MLB precautions. Educated in skin care: Skin care products  Prevention of cellulitis     Educated in exercise: ROM routine ankle pumps/circumduction/toe flex and ext. Instructed in self MLD: Instructed to apply lotion from knee to groin nightly using upward strokes. Therapeutic Exercise/Procedure:   minutes             Free exercises/ROM: Reviewed remedial lymphedema home exercise program with patient. and Patient requires cueing/assistance to complete remedial home exercise program.    Patient to continue the following exercises as part of HEP:  In sitting:  toe flex/ext; ankle pumps; ankle circumduction: marching in place; full knee extension; hip add; diaphragmatic exercises x 10 reps each. Patient demonstrates improvement in performance of exercises today. Patient instructed to continue exercises 2x/day, 10 repetitions. Reinforcement continues to promote carryover at home.     Supine heel slides/hip abd x 10 reps with patient to add to home exercise program.   Home program: Patient to perform daily to BID:  Skin care, Exercise routine, Compression bandage, Self manual lymph draining (MLD) and Bring supplies to each therapy visit   Rationale: Exercise will increase the lymph angiomotoricity and tissue pressure of the skin and thus decrease swelling. Manual therapy: 2:15 - 3:00 pm 45 minutes     Area to decongest: LE: Bilateral and Trunk   Sequence used and effectiveness:   Vasopneumatic pump trial with Aydee Esters' - deferred today. Skin care:  Manual therapy       Redness improved today (see pictures below). Pt with rash-like appearance on right lateral calf. Pt reports itching. Applied sarna anti-itch/aquaphor mix to bilateral lower legs. Wound on right ankle with oozing drainage, small wound also on left anterior ankle without drainage. Wound cleansed, applied medihoney, and covered with mepilex border today. Compression bandaging: The following multi-layer bandages were applied:     Stockinette  Transelast toe wraps--deferred     Padding layer:  Fleece   Cast padding foot/ankle     Foam:  12 cm roll       Short stretch bandages:   6 cm  8 cm  10 cm x 2 B LE     H tubigrip, longer length, over bandaging, proximal aspect to cover tape; F tubigrip applied foot/ankle to allow for easier don/doff of shoes. Patient advised to monitor placement of tubigrip to prevent proximal piece from rolling down resulting in indentation.      Patient reports shoe fit and comfort improved with removal of insole previous visit. States he has noted shoes no longer tight across front of foot. Patient advised to notify clinic of any discomfort dorsal foot area prior to next scheduled appt. Orthotic management:  Pt to discontinue wear of custom stockings until volumes reduced with MLB. Girth/Volume measurement:  Manual therapy  Repeat volumes:                                  Right                    Left  3/23/2020             9684. 57 mL        8908. 63 mL  3/18/2020             8888. 42 mL        8482. 21 mL  3/12/2020 8375.41 mL        8271.05 mL  2/13/2020             8041.40 mL        8199.46 mL  1/23/2020             8626.52 mL        8899.04 mL  12/30/19               9080. 13 mL        9045. 13 mL  % difference: 8.71%   Observation:              3/18/2020    3/23/2020: B lower legs                  Right foot                    Left foot    TOTAL TREATMENT 45 mins     ASSESSMENT:   Treatment effectiveness and tolerance: Patient with reduced redness after starting on antibiotics last week for cellulitis. Pt denies pain. Limb volumes increased bilaterally and MLB resumed today. Pt also with wounds on both ankles. Pt will require continued MLB until volumes reduced, wounds healed, and pt able to return to use of custom stockings. At that time, pt/caregiver will need additional training on proper donning and wear schedule. Pt will also require addition of nighttime garment. Progress toward goals: Ongoing. PLAN OF CARE:   Changes to the plan of care: 1. Continue MLB until volumes reduced and pt able to return to garments. 2. Order nighttime garment as indicated. 3. Follow up on status of pump. Frequency: 2 times a week         Jolie Guerra, PT, DPT, CLT

## 2020-03-26 ENCOUNTER — HOSPITAL ENCOUNTER (OUTPATIENT)
Dept: PHYSICAL THERAPY | Age: 75
Discharge: HOME | End: 2020-03-26
Payer: MEDICARE

## 2020-03-26 PROCEDURE — 97140 MANUAL THERAPY 1/> REGIONS: CPT

## 2020-03-26 NOTE — PROGRESS NOTES
_                                    [facility]  [address] [facility]  [address]  [place]      LYMPHEDEMA THERAPY  Last Outcomes measure: 43  Re-cert due   KX modifier continued  VISIT: 19    [x]        Daily note               []       30 day/ visit progress note    NAME: Victor Hugo Ames  DATE: 3/26/2020    GOALS  Short term goals  Time frame: 4-8 weeks  1. Instruct the patient to be independent with proper skin care to prevent future skin breakdown and decrease the potential risk for infections that are associated with Lymphedema. 2. Patient will be independent with a personal lymphedema exercise program to assist with the lymphatic flow and reduce limb volume by 100 mL R/L.  2020 goal met, R LE reduced by 910.24 mL, L LE reduced by 251.56 mL since initial evaluation. 3. Patient will understand the signs and symptoms of acute infection. Long term goals  Time frame: 8-12 weeks  1. Patient will have knowledge of the compression options and acquire a safe and       appropriate daytime and night time compression system to prevent             re-accumulation of fluid. 2020 measurements completed for custom day/night time compression garments. 2.  Patient or family will be able to don/doff garments independently. Garment   system effectiveness will be evaluated prior to discharge for better long-term  management and outcomes. 3.  Improvement in functional outcomes measure by 5-10% to allow for overall improvement in mobility with reduced pain. 4. Wounds healed 100% with appropriate foot wear identified. 2020 ongoing  5. To transition patient to independent restorative phase of CDT. SUBJECTIVE REPORT:  Pt arrives today with MLB intact bilateral LEs though tubigrip slid down and bunching at proximal calf. Pt c/o itching; noted scratch marks at proximal lower legs.      Per caregiver, pt's insurance denied nighttime garment but says they have filed an appeal. Pt says he has not yet received the pump. Pain: 2/10, LE heaviness           Gait: Independent gait without any assistive device for community distances    ADLs:  Simple home management Supervision  Group home staff assists patient as needed. Treatment Response:   Patient reviewed packet received at evaluation. Patient completed home program as prescribed. Patient brought in all required supplies for treatment. Patient brought in shoes to wear with bandaging as requested. Function: Patient's ADLs are requiring less assistance. Functional Measure:  LLIS 7 /68, 10% Impairment    TREATMENT AND OBJECTIVE DATA SUMMARY:   Patient/Family Education: Reviewed signs of infection with pt and caregiver today. Reviewed MLB precautions. Educated in skin care: Skin care products  Prevention of cellulitis     Educated in exercise: ROM routine ankle pumps/circumduction/toe flex and ext. Instructed in self MLD: Instructed to apply lotion from knee to groin nightly using upward strokes. Therapeutic Exercise/Procedure:   minutes             Free exercises/ROM: Reviewed remedial lymphedema home exercise program with patient. and Patient requires cueing/assistance to complete remedial home exercise program.    Patient to continue the following exercises as part of HEP:  In sitting:  toe flex/ext; ankle pumps; ankle circumduction: marching in place; full knee extension; hip add; diaphragmatic exercises x 10 reps each. Patient demonstrates improvement in performance of exercises today. Patient instructed to continue exercises 2x/day, 10 repetitions. Reinforcement continues to promote carryover at home.     Supine heel slides/hip abd x 10 reps with patient to add to home exercise program.   Home program: Patient to perform daily to BID:  Skin care, Exercise routine, Compression bandage, Self manual lymph draining (MLD) and Bring supplies to each therapy visit   Rationale: Exercise will increase the lymph angiomotoricity and tissue pressure of the skin and thus decrease swelling. Manual therapy: 2:30 - 3:10 pm 40 minutes     Area to decongest: LE: Bilateral and Trunk   Sequence used and effectiveness:   Vasopneumatic pump trial with [employer] Form' - deferred today. Skin care:  Manual therapy       Redness improved. Pt with rash-like appearance on right lateral calf. Pt reports itching. Applied sarna anti-itch/aquaphor mix to bilateral lower legs. Added calmoseptine to proximal lower legs and left medial calf where pt c/o itching. Wound on right ankle without drainage today, small wound also on left anterior ankle without drainage. Wound cleansed, applied medihoney, and covered with mepilex border. Compression bandaging: The following multi-layer bandages were applied:     Stockinette  Transelast toe wraps to toes 1-4 bilaterally     Padding layer:  Fleece   Cast padding foot/ankle  komprex II to lateral malleolus on left, medial malleolus on right      Foam:  12 cm roll       Short stretch bandages:   6 cm  8 cm  10 cm B LE     stockinette rolled down over bandages to cover tape at proximal portion of bandaging - applied instead of tubigrip to prevent sliding/bunching;  F tubigrip applied foot/ankle to allow for easier don/doff of shoes. Patient advised to monitor placement of tubigrip to prevent proximal piece from rolling down resulting in indentation.     Patient reports shoe fit and comfort improved with removal of insole previous visit. States he has noted shoes no longer tight across front of foot. Patient advised to notify clinic of any discomfort dorsal foot area prior to next scheduled appt. Orthotic management:  Pt to discontinue wear of custom stockings until volumes reduced with MLB.     Girth/Volume measurement:  Manual therapy  Repeat volumes:                                  Right Left  3/23/2020             9684. 57 mL        8908. 63 mL  3/18/2020             8888. 42 mL        8482. 21 mL  3/12/2020             8375. 41 mL        8271.05 mL  2/13/2020             8041.40 mL        8199.46 mL  1/23/2020             8626.52 mL        8899.04 mL  12/30/19               9080. 13 mL        9045. 13 mL  % difference: 8.71%       TOTAL TREATMENT 40 mins     ASSESSMENT:   Treatment effectiveness and tolerance: Patient with reduced redness after starting on antibiotics last week for cellulitis. Pt denies pain. Limb volumes increased bilaterally and MLB resumed. Pt also with wounds on both ankles. Pt will require continued MLB until volumes reduced, wounds healed, and pt able to return to use of custom stockings. At that time, pt/caregiver will need additional training on proper donning and wear schedule. Pt will also require addition of nighttime garment. Progress toward goals: Ongoing. PLAN OF CARE:   Changes to the plan of care: 1. Continue MLB until volumes reduced and pt able to return to garments. 2. Order nighttime garment as indicated. 3. Follow up on status of pump. Frequency: 2 times a week         Jolie Guerra, PT, DPT, CLT

## 2020-03-31 ENCOUNTER — TELEPHONE (OUTPATIENT)
Dept: FAMILY MEDICINE CLINIC | Age: 75
End: 2020-03-31

## 2020-03-31 ENCOUNTER — HOSPITAL ENCOUNTER (OUTPATIENT)
Dept: PHYSICAL THERAPY | Age: 75
Discharge: HOME | End: 2020-03-31
Payer: MEDICARE

## 2020-03-31 PROCEDURE — 97140 MANUAL THERAPY 1/> REGIONS: CPT

## 2020-03-31 NOTE — LETTER
RECOMMENDATION REGARDING WORK / SCHOOL 
 
3/31/2020 10:15 PM 
 
Mr. Femi Quinn [address] [facility] [address] To Whom It May Concern: 
 
Femi Quinn is currently under the care of [facility]. Due to chis age andchronic medical conditions Mr. Miguel Angel Fuller is at high-risk for complications of the coronavirus, including significant illness and death. In agreement with the recommendations from the [place] [address] for [address], I recommend that Mr. Miguel Angel Fuller be given accommodations to allow for appropriate social distancing, including working from home. If this is not an option, I would recommend that his employer allow him to stop working during the [address] pandemic. He will return to work/school when given clearance from his physicians and based upon further developments and public health recommendations If there are questions or concerns please have the patient contact our office.  
 
 
 
Sincerely, 
 
 
Larry Orozco MD

## 2020-03-31 NOTE — TELEPHONE ENCOUNTER
----- Message from Lyly Acevedo sent at 3/31/2020  2:24 PM EDT -----  Regarding: Dr. Levine [facility]: [phone]  Message: Kahlil Munson with [facility], is requesting a letter stating PT's underlining health conditions for PT's employer to allow him to stop working during [phone]. Kahlil Munson is requesting letter to be faxed to [phone].   Best Contact:[phone]

## 2020-03-31 NOTE — TELEPHONE ENCOUNTER
Last seen at Wednesday, March 18, 2020 02:40 PM 3 [facility]-MAIN OFFICE, [facility], Routine Care, 20min.   possible cellulitis in right foot

## 2020-03-31 NOTE — PROGRESS NOTES
_                                    [address] [address]  [place]      LYMPHEDEMA THERAPY  Last Outcomes measure: 7/1/99  Re-cert due 4/41/18  KX modifier continued  VISIT: 20    [x]        Daily note               [x]       30 day/10th visit progress note    NAME: Ebenezer Grace  DATE: 3/31/2020    GOALS  Short term goals  Time frame: 4-8 weeks  1. Instruct the patient to be independent with proper skin care to prevent future skin breakdown and decrease the potential risk for infections that are associated with Lymphedema. 2. Patient will be independent with a personal lymphedema exercise program to assist with the lymphatic flow and reduce limb volume by 100 mL R/L.  1/23/2020 goal met, R LE reduced by 910.24 mL, L LE reduced by 251.56 mL since initial evaluation. 3. Patient will understand the signs and symptoms of acute infection. Long term goals  Time frame: 8-12 weeks  1. Patient will have knowledge of the compression options and acquire a safe and       appropriate daytime and night time compression system to prevent             re-accumulation of fluid. 1/23/2020 measurements completed for custom day/night time compression garments. 2.  Patient or family will be able to don/doff garments independently. Garment   system effectiveness will be evaluated prior to discharge for better long-term  management and outcomes. 3.  Improvement in functional outcomes measure by 5-10% to allow for overall improvement in mobility with reduced pain. 4. Wounds healed 100% with appropriate foot wear identified. 1/23/2020 ongoing  5. To transition patient to independent restorative phase of CDT. SUBJECTIVE REPORT:  Pt arrives today with MLB intact bilateral LEs though foam and bandages unraveling at the proximal portion of MLB on both legs.  Pt reports improvement in itching after adding calmoseptine last visit. Per caregiver, pt's insurance denied nighttime garment but says they have filed an appeal. Pt says he has not yet received the pump. Pain: 2/10, LE heaviness           Gait: Independent gait without any assistive device for community distances    ADLs:  Simple home management Supervision  Group home staff assists patient as needed. Treatment Response:   Patient reviewed packet received at evaluation. Patient completed home program as prescribed. Patient brought in all required supplies for treatment. Patient brought in shoes to wear with bandaging as requested. Function: Patient's ADLs are requiring less assistance. Functional Measure:  LLIS 7 /68, 10% Impairment    TREATMENT AND OBJECTIVE DATA SUMMARY:   Patient/Family Education: Reviewed signs of infection with pt and caregiver today. Reviewed MLB precautions. Educated in skin care: Skin care products  Prevention of cellulitis     Educated in exercise: ROM routine ankle pumps/circumduction/toe flex and ext. Instructed in self MLD: Instructed to apply lotion from knee to groin nightly using upward strokes. Therapeutic Exercise/Procedure:   minutes             Free exercises/ROM: Reviewed remedial lymphedema home exercise program with patient. and Patient requires cueing/assistance to complete remedial home exercise program.    Patient to continue the following exercises as part of HEP:  In sitting:  toe flex/ext; ankle pumps; ankle circumduction: marching in place; full knee extension; hip add; diaphragmatic exercises x 10 reps each. Patient demonstrates improvement in performance of exercises today. Patient instructed to continue exercises 2x/day, 10 repetitions. Reinforcement continues to promote carryover at home.     Supine heel slides/hip abd x 10 reps with patient to add to home exercise program.   Home program: Patient to perform daily to BID:  Skin care, Exercise routine, Compression bandage, Self manual lymph draining (MLD) and Bring supplies to each therapy visit   Rationale: Exercise will increase the lymph angiomotoricity and tissue pressure of the skin and thus decrease swelling. Manual therapy: 2:15 - 2:55 pm 40 minutes     Area to decongest: LE: Bilateral and Trunk   Sequence used and effectiveness:   Vasopneumatic pump trial with [facility]' - deferred today. Skin care:  Manual therapy       Redness improved. Pt with rash-like appearance on right lateral calf - improved today. Pt reports itching improved. Applied sarna anti-itch/aquaphor mix to bilateral lower legs. Added calmoseptine to proximal lower legs and left medial calf where pt c/o itching. Wound on right ankle without drainage today, small wound also on left anterior ankle without drainage. Wound cleansed, applied medihoney, and covered with mepilex border. Compression bandaging: The following multi-layer bandages were applied:     Stockinette  Transelast toe wraps to toes 1-4 bilaterally     Padding layer:  Fleece   Cast padding foot/ankle  komprex II to lateral malleolus on left, medial malleolus on right      Foam:  12 cm roll       Short stretch bandages:   6 cm  8 cm  10 cm x 2 B LE     stockinette rolled down over bandages to cover tape at proximal portion of bandaging - applied instead of tubigrip to prevent sliding/bunching;  F tubigrip applied foot/ankle to allow for easier don/doff of shoes. Patient advised to monitor placement of tubigrip to prevent proximal piece from rolling down resulting in indentation.     Patient reports shoe fit and comfort improved with removal of insole previous visit. States he has noted shoes no longer tight across front of foot. Patient advised to notify clinic of any discomfort dorsal foot area prior to next scheduled appt. Orthotic management:  Pt to discontinue wear of custom stockings until volumes reduced with MLB.     Girth/Volume measurement:  Manual therapy  Repeat volumes:                                  Right                    Left  3/23/2020             9684. 57 mL        8908. 63 mL  3/18/2020             8888. 42 mL        8482. 21 mL  3/12/2020             8375. 41 mL        8271.05 mL  2/13/2020             8041.40 mL        8199.46 mL  1/23/2020             8626.52 mL        8899.04 mL  12/30/19               9080. 13 mL        9045. 13 mL  % difference: 8.71%       TOTAL TREATMENT 40 mins     ASSESSMENT:   Treatment effectiveness and tolerance: Patient with reduced redness after starting on antibiotics last week for cellulitis. Pt denies pain. Limb volumes increased bilaterally and MLB resumed. Pt also with wounds on both ankles. Pt will require continued MLB until volumes reduced, wounds healed, and pt able to return to use of custom stockings. At that time, pt/caregiver will need additional training on proper donning and wear schedule. Pt will also require addition of nighttime garment. Progress toward goals: Ongoing. PLAN OF CARE:   Changes to the plan of care: 1. Continue MLB until volumes reduced and pt able to return to garments. 2. Order nighttime garment as indicated. 3. Follow up on status of pump. Frequency: 2 times a week         Jolie Guerra, PT, DPT, CLT

## 2020-04-02 ENCOUNTER — HOSPITAL ENCOUNTER (OUTPATIENT)
Dept: PHYSICAL THERAPY | Age: 75
Discharge: HOME | End: 2020-04-02
Payer: MEDICARE

## 2020-04-02 PROCEDURE — 97140 MANUAL THERAPY 1/> REGIONS: CPT

## 2020-04-02 NOTE — PROGRESS NOTES
[address] [facility]  [address]  [address]      Re-EVALUATION    NAME: Ovidio Quinn AGE: 70 y.o. GENDER: male  DATE: 4/2/2020  REFERRING PHYSICIAN: Esteban De Paz MD; CC: Liudmila Middleton DPM  HISTORY AND BACKGROUND:  Pt seen today for re-certification and continuation of current plan of care. Pt had previously completed phase I CDT and was fitted with daytime custom flat knit stockings, however pt then developed acute cellulitis. Decision made to return to bandaging phase of treatment after pt treated with antibiotics due to increased swelling bilateral LEs, R>L and onset of new wounds at anterior ankle crease from improper donning of stockings. Once LEs decongested again, will likely need to order nighttime velcro garments and provide further pt/caregiver training on proper management of daytime stockings. Pt will be out of pocket for nighttime garments since insurance [facility] denied [facility] nighttime garments. Pt also should receive vasopneumatic pump soon for home use. From prior evaluation: Patient initially referred to clinic for evaluation and treatment of bilateral LE chronic edema with open wounds. Chart indicated LE edema is greater than 4 years in duration, with patient a poor historian and unable to pin point exact time of onset of LE edema. Open wounds treated by home health nursing, which has been discharged, and [facility]. Patient arrives to appointment with bandaids in place over wounds proximal dorsal foot bilateral, tubigrip in appropriately placed ankle to mid calf, rolling creating indentations. See photos below.      Primary Diagnosis:  · Primary lymphedema, Tarda (Q82.0)  Other Treatment Diagnoses:   Abnormality of gait (other abnormalities of gait and mobility R26.89)   Swelling not relieved by elevation ( R60.9 edema)   Open wound RLE S81.801S; Open wound LLE S81.801S   LE Cellulitis  (L03.119)  Morbid Obesity (E66.01)  Date of Onset: prior to 2015, patient unable to provide specific time of onset. Present Symptoms and Functional Limitations: Patient presents to appt today with c/o heavy and tight lower legs, difficulty donning shoes and work boots. Reports lymphedema impacts movement, is aware of increased size bilateral LE. Reports feelings of frustration, needing to rely on others, and lack of understanding in management of lymphedema. States lymphedema impairs ability to perform daily care and leisure activities, and finding appropriate fitting shoes/pants. 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  cholecalciferol (VITAMIN D3) (1000 Units /25 mcg) tablet TAKE 1 TABLET BY MOUTH DAILY    lacosamide (VIMPAT) 100 mg tab tablet Take 1 Tab by mouth two (2) times a day. Max Daily Amount: 200 mg.    metoprolol tartrate (LOPRESSOR) 25 mg tablet Take 1 Tab by mouth two (2) times a day.     bumetanide (BUMEX) 2 mg tablet TAKE 1 TABLET BY MOUTH DAILY    potassium chloride (K-DUR, KLOR-CON) 20 mEq tablet TAKE 1 TABLET BY MOUTH DAILY    apixaban (ELIQUIS) 5 mg tablet Take 1 Tab by mouth two (2) times a day. No current facility-administered medications for this encounter. Allergies: Allergies   Allergen Reactions    Sulfa (Sulfonamide Antibiotics) Rash      Prior Level of Function/Social/Work History/Personal factors and/or comorbidities impacting plan of care: Reside in group home. Patient has long history of LE edema, however, unable to determine specific length of time. Works 18 hours/week [email or web address] center during the day Th-Sunday. Patient reports recent difficulty donning work boots due to swelling. Does state that he has to wear boots to work. Patient advised that, during bandaging phase of treatment he may not be able to work due to bulkiness of bandaging limiting ability to don boots. Living Situation: group home      Trainable Caregiver?: caregivers at group home, accompanied today. Self-care/ADLs: indep     Mobility: indep without device, however, gait pattern impaired as noted below. Would benefit from use of AD for gait. Sleeping Arrangement:  bed   Adaptive Equipment Owned: none  Previous Therapy:  Home health nursing/wound care center treatments. Initial evaluation in clinic 11/14/2019, phase I CDT Dec - present. Compression/Lymphedema Equipment:  That{img} 3022 custom knee high stockings. SUBJECTIVE: Pt arrives with MLB intact B LEs, reports good tolerance to bandages. Patients goals for therapy: Reduce swelling foot/leg to allow for better fit of shoes. OBJECTIVE DATA SUMMARY:   EXAMINATION/PRESENTATION/DECISION MAKING:   Pain:   pt denies pain           Skin and Tissue Assessment:      B LEs anterior view     Dermal Status:  []   Intact [x]  Dry   [x]  Tenuous:   [] Flaky   [x]  Wound/lesion present: healing wound on bilateral anterior ankle crease, closed today, no drainage []  Scars   []  Dermatitis Scabbing from scratching bilateral lower legs. Texture/Consistency:  [x]  Boggy: bilateral LE [x]  Pitting Edema: posterior calf.    [] Brawny []  Combination   [x]  Fibrotic/Woody: ankle/dorsal foot/calf    Pigmentation/Color Change:  []  Normal []  Hemosiderin   []  Red []  Erythematous   [x]  Hyperpigmented: see photo above  []  Hyperlipodermatosclerosis   Anomalies:  [x]  Lymphorrhea: R dorsal foot/anterior ankle open wound []  Vesicles   []  Petechiae []  Warty Vercusis   []  Bullae []  Papilloma   Circulatory:  []  JEFF []  Varicosities:   []  Pulses: [x]  Vascular studies ruled out DVT in past: 7/17/2019   []  DVT History    Nails:  []   Normal  [x]   Fungus  Stemmers Sign: positive bilateral LE  Height:   6'1 Weight:   237.4 lbs   BMI:   31  (36 or greater: adversely affecting lymphedema)  Volumetric Measurements:   Right: 8699.91 mL (9684.57 on 3/23/20, 9080.13 on 12/30/19) Left:  8331. 13 mL (8908.63 on 3/23/20, 9045.13 on 12/30/19)   % Difference: 4.43%    (See scanned graph)  Range of Motion: bilateral LE grossly WFL  Strength: bilateral LE grossly 5/5 with MMT. Sensation:  Intact to light touch  Mobility:  Bed/Chair Mobility:  Independent  Transfers:  Independent    Sitting Balance:  goos Standing Balance:  Good-   Gait:  Independent; narrow KASSIE, decreased stride length bilateral LE. Patient advised to use of walker or cane during bandaging phase of treatment is recommended. Wheelchair Mobility:  (Other) na   Endurance:  Good, working part time; ambulates community distances. Stairs:  (Other) na         Safety:  Patient is alert and oriented: To person, place, time. Patient has difficulty recalling information about medical history. Reports no falls past 6 months. Safety awareness:  Intact for mobility   Fall Risk?:  Low-mod, gait abnormalities as noted above.    Patient given written fall prevention handout: Yes   Precautions:  Standard lymphedema precautions to include avoiding blood pressure readings, injections and IVs or other procedures/acts that could lead to broken skin on affected area, and avoiding excessive heat, resistive activity or altitude without compression garment      Physical Therapy Evaluation Charge Determination   History Examination Presentation Decision-Making   MEDIUM  Complexity : 1-2 comorbidities / personal factors will impact the outcome/ POC  MEDIUM Complexity : 3 Standardized tests and measures addressing body structure, function, activity limitation and / or participation in recreation  MEDIUM Complexity : Evolving with changing characteristics  Other outcome measures LLIS  LOW       Based on the above components, the patient evaluation is determined to be of the following complexity level: LOW       TREATMENT PROVIDED:   1. Treatment description: Manual therapy x 3  Performed B LE knee high MLB as follows: stockinette, transelast toe wraps 1-4, fleece, cast padding x 1, komprex II foam medial malleolus on left, lateral malleolus on right, comprifoam 12 cm, comprilan 6 8 10 cm. tubigrip at proximal portion of MLB and at foot to prevent sliding of bandages. Treatment time: 1:20 - 2:00 pm    40 minutes      ASSESSMENT:   Jean-Pierre Calderon is a 70 y.o. male who presents for re-evaluation of stage II/III lymphedema. Due to pattern of swelling, doppler study not indicated CVI, positive stemmer's sign bilater, and asymmetrical limb size, lymphedema thought to be primary in nature. Pt had previously completed phase I CDT and was fitted with daytime custom flat knit stockings, however pt then developed acute cellulitis. Decision made to return to bandaging phase of treatment after pt treated with antibiotics due to increased swelling bilateral LEs, R>L and onset of new wounds at anterior ankle crease from improper donning of stockings. Once LEs decongested again, will likely need to order nighttime velcro garments and provide further pt/caregiver training on proper management of daytime stockings.  Pt will be out of pocket for nighttime garments since insurance originally denied [employer] nighttime garments. Pt also should receive vasopneumatic pump soon for home use. Advised pt/caregiver to bring custom stockings next visit to reassess fit. Patient will benefit from continuation of complete decongestive therapy (CDT) including manual lymphatic drainage (MLD) technique, short-stretch textile bandages/compression system to decongest limb, and kinesiotaping as appropriate. Patient will receive instruction in proper skin care to recognize signs/symptoms of and prevent infection, therapeutic exercise, and self-MLD for independent home program and restorative lymphatic performance. This care is medically necessary due to the infection risk with lymphedema, and to improve functional activities. CDT is necessary to resolve swelling to allow patient to return to wearing normal clothes/footwear, and prevent worsening of symptoms, such as venous stasis ulcerations, infections, or hospitalizations. Patient will be independent with home program strategies to allow improved ADL ability and mobility and to allow patient to return to greatest functional independence. Rehabilitation potential is considered to be Good with effective caregiver support. Factors which may influence rehabilitation potential include patient will require caregiver support for phase II CDT noting diagosed low IQ in medical chart. Patient will benefit from 6-10 physical therapy visits over 12 weeks to optimize improvement in these areas. PLAN OF CARE:   Recommendations and Planned Interventions:  Manual lymph drainage/complete decongestive therapy  Multi-layer compression bandaging (short-stretch)  Compression garment fitting/provision  Lymphedema therapeutic exercise  Self-care training  Functional mobility training  Education in skin care and lymphedema precautions  Self-bandaging education per home program  Caregiver education as needed  Wound care as needed     GOALS  Short term goals  Time frame: 6 weeks  1.  Instruct the patient/caregiver to be independent with proper skin care to prevent future skin breakdown and decrease the potential risk for infections that are associated with Lymphedema. 2. Patient/caregiver will understand the signs and symptoms of acute infection. Long term goals  Time frame: 12 weeks  1. Patient will have knowledge of the compression options and acquire a safe and  appropriate daytime and night time compression system to prevent  re-accumulation of fluid. 2.  Patient/caregiver will be able to don/doff garments independently. Garment  system effectiveness will be evaluated prior to discharge for better long-term  management and outcomes. 3.  Improvement in functional outcomes measure by 5-10% to allow for overall improvement in mobility with reduced pain. 4. Wounds healed 100% with appropriate foot wear identified. 5.   To transition patient to independent restorative phase of CDT. Patient has participated in goal setting and agrees to work toward plan of care. Patient was instructed to call if questions or concerns arise. Thank you for this referral.  Cass Alcaraz. Mari, PT, DPT, CLT    Time Calculation: 40 mins    TREATMENT PLAN EFFECTIVE DATES:   4/2/2020 TO 6/25/2020  I have read the above plan of care for [employer]. I certify the above prescribed services are required by this patient and are medically necessary.   The above plan of care has been developed in conjunction with the lymphedema/physical therapist.       Physician Signature: ____________________________________Date:______________

## 2020-04-07 ENCOUNTER — HOSPITAL ENCOUNTER (OUTPATIENT)
Dept: PHYSICAL THERAPY | Age: 75
Discharge: HOME | End: 2020-04-07
Payer: MEDICARE

## 2020-04-07 PROCEDURE — 97140 MANUAL THERAPY 1/> REGIONS: CPT

## 2020-04-07 NOTE — PROGRESS NOTES
_                                    [facility] [address] [facility]  [address] [place] [address].  [address] [facility] [address]  [place]        LYMPHEDEMA THERAPY  VISIT: 2  KX Modifier continued    [x]                  Daily note               []                 30 day/10th visit progress note    NAME: Judy Garcia  DATE: 4/7/2020    GOALS  Short term goals  Time frame: 6 weeks  1. Instruct the patient/caregiver to be independent with proper skin care to prevent future skin breakdown and decrease the potential risk for infections that are associated with Lymphedema. 2. Patient/caregiver will understand the signs and symptoms of acute infection.     Long term goals  Time frame: 12 weeks  1. Patient will have knowledge of the compression options and acquire a safe and appropriate daytime and night time compression system to prevent re-accumulation of fluid. 2.  Patient/caregiver will be able to don/doff garments independently. Garment system effectiveness will be evaluated prior to discharge for better long-term  management and outcomes. 3.  Improvement in functional outcomes measure by 5-10% to allow for overall improvement in mobility with reduced pain. 4. Wounds healed 100% with appropriate foot wear identified. 5.   To transition patient to independent restorative phase of CDT. SUBJECTIVE REPORT:  Pt arrives to appt with MLB intact B LEs. Pt reports good tolerance to bandages and improved itching. Pt's caregiver, Nicky Olmedo, says she has left 2 messages with pt's family member in charge of pt's finances to inquire about pt purchasing velcro garments for nighttime but has not yet heard back. Nicky Olmedo says pt should be receiving pump today or tomorrow.     Pain:   pt denies pain                              Gait:   [x]  Independent gait without any assistive device for community distances  ADLs:  Simple home management Supervision  Group home staff assists patient as needed. Treatment Response:  Good response to B LE MLB  [x]   Patient reviewed packet received at evaluation  []   Patient completed home program as prescribed  []   Patient not fully compliant with home program to date  []   Patient waiting on compression garment arrival  Function: limited by LE swelling    []   Patient requiring less assistance with completion of home program  []   ADLs are requiring less assistance   []   Patient able to return to work/leisure activities    Lymphedema Life Impact Scale: NT  Weight: NT  TREATMENT AND OBJECTIVE DATA SUMMARY:   Patient/Family Education: Compression bandaging instructions:  1. Compression bandaging will be applied twice a week by therapist in clinic, with adjustments to be made at home as indicated if bandaging becomes loose or uncomfortable. 2. If tolerated, remain bandaged between appointments with therapist, removing under the following conditions--DO NOT WAIT FOR A RETURN PHONE CALL FROM CLINIC:  -Numbness/tingling in extremity different from what you have experienced without the bandages in place.  -Compromise in circulation, monitoring blood refill into toes after applying gentle pressure to toes.  -Onset of pain in extremity that is sudden and severe in nature. -Redness, warmth in limb, and/or fever, flu-like symptoms, which may indicate infection. If this occurs, call your doctor right away. If you note a sudden increase in swelling in extremity, with or without redness/warmth, go to the emergency room as soon as possible to have blood clot ruled out. 3. Compression bandaging supplies that can be laundered are the brown compression wraps and any foam applied for padding. Launder in washer/dryer with NO fabric softener, bleach, woolite. Dry on a low heat. 4. Once compression garments are ordered, compression bandaging will be continued until garments arrive for fitting. This process can take several weeks.      Educated in skin care: [x]   Skin care products  [x]   Hygiene  [x]  Prevention of cellulitis  [x]   Wound care     Educated in exercise: [x]   Walking program  []   Sasha ball routine  []   Stick routine  [x]   ROM routine     Instructed in self MLD:   Written sequence given and reviewed with patient as well as demonstration and instruction during MLD portion of the session. Instructed in don/doff of compression system:   [x]   Multi layer bandage (MLB) donning principles and wear precautions  []   Day garments  []   Night garments     Therapeutic Exercise/Procedure  minutes   Treatment time:   Stick exercise program: N/A   Free exercises/ROM: N/A   Home program: Patient to perform daily to BID:  [x]   Skin care  [x]   Deep abdominal breathing  [x]   Exercise routine  [x]   Walking program  []   Rest in supine   []   Compression bandage  []   Compression garments  []   Vasopneumatic device  []   Wound care  []   Self MLD  [x]   Bring supplies to each therapy visit  []   Purchase necessary supplies  []   Weight loss program  []   Follow up with       Rationale: Exercise will increase the lymph angiomotoricity and tissue pressure of the skin and thus decrease swelling. Modalities  minutes   Treatment time:   Vasopneumatic pump:      Manual Lymphatic Drainage (MLD) 55 minutes   Treatment time: 1:00 - 1:55 pm  Area to decongest:    [] UE          []  Right     []  Left      [] Trunk      [x] LE             [x]  Right     [x]  Left      [] Trunk         Sequence used and effectiveness: [] Secondary/Primary sequence for upper extremity with / without trunk involvement    [] Secondary/Primary sequence for lower extremities with / without trunk involvement     [] Modifications were made to manual lymph drainage sequence to exclude cervical techniques secondary to patient's age    [] Self MLD sequence/techniques/hand strokes   Skin/wound care/debridement: MLB removed and pt's LEs washed with hygienic wipes. Skin intact today.  Wound on right anterior ankle now healed with dry flaky skin. Applied sarna anti-itch/aquaphor mix to bilateral lower legs. Added calmoseptine to proximal lower leg due to complaints of itching in this area. Upper/Lower extremity compression: Applied multi-layer compression bandaging to: Below knee [x]  Thigh high  []   Upper Extremity []    Right []   Left []    Bilateral [x]    The following multi-layer bandages were applied:  [x]  4\" Protouch stockinette           []  Silver Stockinette             []  Tg soft  []  Comperm    [x]  Transelast to toes 1-4    Padding layer:  [x]  Cast padding x 1 to bulk out ankle         [x]  Fleece    Foam:  [] 10cm roll  [x] 12cm roll  [] 15cm roll  [] Gray foam  [] Komprex  [x] Komprex 2 to medial malleoli bilaterally       Short stretch bandages:   [] 4cm  [x] 6cm  [x] 8cm  [x] 10cm x 2 on right LE only   [] 12cm    Tubigrip applied at proximal aspect to secure taped areas. tubigrip also applied to feet to prevent sliding of bandages when donning shoes. Educated patient in multi-layer bandaging donning principles and precautions. Discussed compression garment options today. Pt previously fit with [employer] custom flat knit stockings, however will need nighttime garment due to previously failed trial of only daytime garments. Pt measured for tribute previously, however insurance denied garment. Pt shown Jade [employer] velcro calf and foot wrap and tried on sample in clinic. Pt's caregiver to find out if patient can pay out of pocket. Kinesiotaping:    Girth/Volume measurement: Reviewed results of volumetric measurements taken on evaluation. TOTAL TREATMENT 55 mins     Circumferential Limb Measurements:  Affected Side: B LEs, R>L   Left (cm) Right (cm)   Arch  27.8     26.8      Ankle 28.4    31      Calf 39.1    39.6      Length AD  49.0     49.0            ASSESSMENT:   Treatment effectiveness and tolerance: Good tolerance to continued MLB. Pt nearing time to transition to garments. Pt previously fit with [employer] custom flat knit stockings, however will need nighttime garment due to failed trial of only daytime garments. Pt measured for tribute previously, however insurance denied garment. Pt shown juzo velcro calf and foot wrap and tried on sample in clinic. Pt's caregiver to find out if patient can pay out of pocket for nighttime garments. Progress toward goals: Progressing towards goals. PLAN OF CARE:   Changes to the plan of care: 1. Continue MLB. 2. Measure for nighttime garments. Frequency: [x]  2 times a week  []  Weekly  []  Biweekly  []  Monthly   Other:        Venus Canas.  Mari, PT, DPT, CLT

## 2020-04-14 ENCOUNTER — DOCUMENTATION ONLY (OUTPATIENT)
Dept: FAMILY MEDICINE CLINIC | Age: 75
End: 2020-04-14

## 2020-04-14 ENCOUNTER — HOSPITAL ENCOUNTER (OUTPATIENT)
Dept: PHYSICAL THERAPY | Age: 75
Discharge: HOME | End: 2020-04-14
Payer: MEDICARE

## 2020-04-14 PROCEDURE — 97140 MANUAL THERAPY 1/> REGIONS: CPT

## 2020-04-14 NOTE — PROGRESS NOTES
_                                    [facility]  [address] [place] [address].  [address]  [address]        LYMPHEDEMA THERAPY  VISIT: 3  KX Modifier continued    [x]                  Daily note               []                 30 day/10th visit progress note    NAME: Mary Maya  DATE: 4/14/2020    GOALS  Short term goals  Time frame: 6 weeks  1. Instruct the patient/caregiver to be independent with proper skin care to prevent future skin breakdown and decrease the potential risk for infections that are associated with Lymphedema. 2. Patient/caregiver will understand the signs and symptoms of acute infection.     Long term goals  Time frame: 12 weeks  1. Patient will have knowledge of the compression options and acquire a safe and appropriate daytime and night time compression system to prevent re-accumulation of fluid. 2.  Patient/caregiver will be able to don/doff garments independently. Garment system effectiveness will be evaluated prior to discharge for better long-term  management and outcomes. 3.  Improvement in functional outcomes measure by 5-10% to allow for overall improvement in mobility with reduced pain. 4. Wounds healed 100% with appropriate foot wear identified. 5.   To transition patient to independent restorative phase of CDT. SUBJECTIVE REPORT:  Pt arrived to appt at the wrong appt time, but therapist was able to work pt into schedule for treatment today. Pt arrives to appt with MLB intact B LEs. Pt reports good tolerance to bandages and improved itching. Pt's caregiver, Alejandra Pond, says she has left 2 messages with pt's family member in charge of pt's finances to inquire about pt purchasing velcro garments for nighttime but has not yet heard back. Pt given care fund application today with instructions provided to caregiver to fill out and bring back next visit.     Pain:   pt denies pain Gait:   [x]  Independent gait without any assistive device for community distances  ADLs:  Simple home management Supervision  Group home staff assists patient as needed. Treatment Response:  Good response to B LE MLB  [x]   Patient reviewed packet received at evaluation  []   Patient completed home program as prescribed  []   Patient not fully compliant with home program to date  []   Patient waiting on compression garment arrival  Function: limited by LE swelling    []   Patient requiring less assistance with completion of home program  []   ADLs are requiring less assistance   []   Patient able to return to work/leisure activities    Lymphedema Life Impact Scale: NT  Weight: NT  TREATMENT AND OBJECTIVE DATA SUMMARY:   Patient/Family Education: Compression bandaging instructions:  1. Compression bandaging will be applied twice a week by therapist in clinic, with adjustments to be made at home as indicated if bandaging becomes loose or uncomfortable. 2. If tolerated, remain bandaged between appointments with therapist, removing under the following conditions--DO NOT WAIT FOR A RETURN PHONE CALL FROM CLINIC:  -Numbness/tingling in extremity different from what you have experienced without the bandages in place.  -Compromise in circulation, monitoring blood refill into toes after applying gentle pressure to toes.  -Onset of pain in extremity that is sudden and severe in nature. -Redness, warmth in limb, and/or fever, flu-like symptoms, which may indicate infection. If this occurs, call your doctor right away. If you note a sudden increase in swelling in extremity, with or without redness/warmth, go to the emergency room as soon as possible to have blood clot ruled out. 3. Compression bandaging supplies that can be laundered are the brown compression wraps and any foam applied for padding. Launder in washer/dryer with NO fabric softener, bleach, woolite. Dry on a low heat.     4. Once compression garments are ordered, compression bandaging will be continued until garments arrive for fitting. This process can take several weeks. Educated in skin care: [x]   Skin care products  [x]   Hygiene  [x]  Prevention of cellulitis  [x]   Wound care     Educated in exercise: [x]   Walking program  []   Sasha ball routine  []   Stick routine  [x]   ROM routine     Instructed in self MLD:   Written sequence given and reviewed with patient as well as demonstration and instruction during MLD portion of the session. Instructed in don/doff of compression system:   [x]   Multi layer bandage (MLB) donning principles and wear precautions  []   Day garments  []   Night garments     Therapeutic Exercise/Procedure  minutes   Treatment time:   Stick exercise program: N/A   Free exercises/ROM: N/A   Home program: Patient to perform daily to BID:  [x]   Skin care  [x]   Deep abdominal breathing  [x]   Exercise routine  [x]   Walking program  []   Rest in supine   []   Compression bandage  []   Compression garments  []   Vasopneumatic device  []   Wound care  []   Self MLD  [x]   Bring supplies to each therapy visit  []   Purchase necessary supplies  []   Weight loss program  []   Follow up with       Rationale: Exercise will increase the lymph angiomotoricity and tissue pressure of the skin and thus decrease swelling.      Modalities  minutes   Treatment time:   Vasopneumatic pump:      Manual Lymphatic Drainage (MLD) 30 minutes   Treatment time: 2:10 - 2:40 pm  Area to decongest:    [] UE          []  Right     []  Left      [] Trunk      [x] LE             [x]  Right     [x]  Left      [] Trunk         Sequence used and effectiveness: [] Secondary/Primary sequence for upper extremity with / without trunk involvement    [] Secondary/Primary sequence for lower extremities with / without trunk involvement     [] Modifications were made to manual lymph drainage sequence to exclude cervical techniques secondary to patient's age    [] Self MLD sequence/techniques/hand strokes   Skin/wound care/debridement: MLB removed and pt's LEs washed with hygienic wipes. Skin intact today. Wound on right anterior ankle now healed with dry flaky skin. Applied sarna anti-itch/aquaphor mix to bilateral lower legs. Added calmoseptine to proximal lower leg due to complaints of itching in this area. Upper/Lower extremity compression: Applied multi-layer compression bandaging to: Below knee [x]  Thigh high  []   Upper Extremity []    Right []   Left []    Bilateral [x]    The following multi-layer bandages were applied:  [x]  4\" Protouch stockinette           []  Silver Stockinette             []  Tg soft  []  Comperm    [x]  Transelast to toes 1-4    Padding layer:  [x]  Cast padding x 1 to bulk out ankle         [x]  Fleece    Foam:  [] 10cm roll  [x] 12cm roll  [] 15cm roll  [] Gray foam  [] Komprex  [x] Komprex 2 to medial and lateral malleoli on left      Short stretch bandages:   [] 4cm  [x] 6cm  [x] 8cm  [x] 10cm   [] 12cm    Tubigrip applied at proximal aspect to secure taped areas. tubigrip also applied to feet to prevent sliding of bandages when donning shoes. Educated patient in multi-layer bandaging donning principles and precautions. Discussed compression garment options today. Pt previously fit with [employer] custom flat knit stockings, however will need nighttime garment due to previously failed trial of only daytime garments. Pt measured for tribute previously, however insurance denied garment. Pt shown [employer] velcro calf and foot wrap and tried on sample in clinic. Pt's caregiver to find out if patient can pay out of pocket. Kinesiotaping:    Girth/Volume measurement: Reviewed results of volumetric measurements taken on evaluation. TOTAL TREATMENT 30 mins     Circumferential Limb Measurements:        ASSESSMENT:   Treatment effectiveness and tolerance: Good tolerance to continued MLB.  Pt nearing time to transition to garments. Pt previously fit with [employer] custom flat knit stockings, however will need nighttime garment due to failed trial of only daytime garments. Pt measured for tribute previously, however insurance denied garment. Pt shown juzo velcro calf and foot wrap and tried on sample in clinic. Pt's caregiver to find out if patient can pay out of pocket for nighttime garments. Also provided pt/caregiver with care fund application today to fill out and bring next visit. Pt would benefit from care fund to enable him to get tribute nighttime garment which would be beneficial to help reduce fibrosis, improve swelling, and be easier for pt to don/doff independently. Progress toward goals: Progressing towards goals. PLAN OF CARE:   Changes to the plan of care: 1. Continue MLB. 2. Measure for nighttime garments. Frequency: [x]  2 times a week  []  Weekly  []  Biweekly  []  Monthly   Other: 4/14/20: pt/caregiver provided with care fund application today. Niles Guerra, PT, DPT, CLT

## 2020-04-16 ENCOUNTER — HOSPITAL ENCOUNTER (OUTPATIENT)
Dept: PHYSICAL THERAPY | Age: 75
Discharge: HOME | End: 2020-04-16
Payer: MEDICARE

## 2020-04-16 PROCEDURE — 97140 MANUAL THERAPY 1/> REGIONS: CPT

## 2020-04-16 PROCEDURE — 97763 ORTHC/PROSTC MGMT SBSQ ENC: CPT

## 2020-04-16 NOTE — PROGRESS NOTES
_                                    [facility]  [address] [place] [address].  [address]  [address]        LYMPHEDEMA THERAPY  VISIT: 4  KX Modifier continued    [x]                  Daily note               []                 30 day/10th visit progress note    NAME: Brooke Mcgowan  DATE: 4/16/2020    GOALS  Short term goals  Time frame: 6 weeks  1. Instruct the patient/caregiver to be independent with proper skin care to prevent future skin breakdown and decrease the potential risk for infections that are associated with Lymphedema. MET 4/16/2020  2. Patient/caregiver will understand the signs and symptoms of acute infection. MET 4/16/2020     Long term goals  Time frame: 12 weeks  1. Patient will have knowledge of the compression options and acquire a safe and appropriate daytime and night time compression system to prevent re-accumulation of fluid. MET 4/16/2020 for daytime garments, insurance declined nighttime garment and pt unable to purchase   2. Patient/caregiver will be able to don/doff garments independently. Garment system effectiveness will be evaluated prior to discharge for better long-term  management and outcomes. MET 4/16/2020 with assist of caregiver   3. Improvement in functional outcomes measure by 5-10% to allow for overall improvement in mobility with reduced pain. 4. Wounds healed 100% with appropriate foot wear identified. MET 4/16/2020  5. To transition patient to independent restorative phase of CDT. SUBJECTIVE REPORT:  Pt arrives to appt with MLB intact B LEs. Pt's caregiver, Julieta Murray, says she has left 2 messages with pt's family member in charge of pt's finances to inquire about pt purchasing velcro garments for nighttime but has not yet heard back. Pt did receive pump and Selina says they are doing a telehealth visit with pump rep for home setup of pump tonight.  Pt brought stockings today to hopefully transition back to garments. Pain:   pt denies pain                              Gait:   [x]  Independent gait without any assistive device for community distances  ADLs:  Simple home management Supervision  Group home staff assists patient as needed. Treatment Response:  Good response to garment fitting   [x]   Patient reviewed packet received at evaluation  []   Patient completed home program as prescribed  []   Patient not fully compliant with home program to date  []   Patient waiting on compression garment arrival  Function: limited by LE swelling    []   Patient requiring less assistance with completion of home program  []   ADLs are requiring less assistance   [x]   Patient able to return to work/leisure activities    Lymphedema Life Impact Scale: NT  Weight: NT  TREATMENT AND OBJECTIVE DATA SUMMARY:   Patient/Family Education: Instructions for Mr. Alfreda Mckeon  1. Apply stockings FIRST thing in the morning. Cover any open areas with antibiotic ointment and band-aids as needed. 2. Patient should wear stockings until bedtime. 3. Put patient in pump for 1 hour every night before bedtime and after youve removed the stockings. 4. Apply lotion to patients legs after he showers. If in the morning, allow 20 min to dry before putting stockings on. Contact clinic with any questions or concerns [phone]. Educated in skin care: [x]   Skin care products  [x]   Hygiene  [x]  Prevention of cellulitis  [x]   Wound care     Educated in exercise: [x]   Walking program  []   Sasha ball routine  []   Stick routine  [x]   ROM routine     Instructed in self MLD:   Written sequence given and reviewed with patient as well as demonstration and instruction during MLD portion of the session.      Instructed in don/doff of compression system:   []   Multi layer bandage (MLB) donning principles and wear precautions  [x]   Day garments  []   Night garments     Therapeutic Exercise/Procedure  minutes   Treatment time: Stick exercise program: N/A   Free exercises/ROM: N/A   Home program: Patient to perform daily to BID:  [x]   Skin care  [x]   Deep abdominal breathing  [x]   Exercise routine  [x]   Walking program  []   Rest in supine   []   Compression bandage  [x]   Compression garments  [x]   Vasopneumatic device  []   Wound care  []   Self MLD  [x]   Bring supplies to each therapy visit  []   Purchase necessary supplies  []   Weight loss program  [x]   Follow up with family member in regards to purchasing nighttime garment        Rationale: Exercise will increase the lymph angiomotoricity and tissue pressure of the skin and thus decrease swelling. Modalities  minutes   Treatment time:   Vasopneumatic pump:      Manual therapy: 1:10 - 1:40 pm  Orthotic management: 1:40 - 1:55 pm 30 minutes  15 minutes     Area to decongest:    [] UE          []  Right     []  Left      [] Trunk      [x] LE             [x]  Right     [x]  Left      [] Trunk         Sequence used and effectiveness: [] Secondary/Primary sequence for upper extremity with / without trunk involvement    [] Secondary/Primary sequence for lower extremities with / without trunk involvement     [] Modifications were made to manual lymph drainage sequence to exclude cervical techniques secondary to patient's age    [] Self MLD sequence/techniques/hand strokes   Skin/wound care/debridement:  Manual therapy  MLB removed and pt's LEs washed with soap and water. Skin intact today. Wound on right anterior ankle now healed (see pictures below). Reviewed skin care routine for home and encouraged pt/caregiver to apply lotion daily for decreasing dryness and reducing risk of infection. Advised caregiver to monitor skin daily for any areas of skin breakdown and cover as needed before applying stockings. Upper/Lower extremity compression: deferred due to garment fitting  Applied multi-layer compression bandaging to:     Below knee []  Thigh high  []   Upper Extremity []    Right []   Left []    Bilateral []    The following multi-layer bandages were applied:  []  4\" Protouch stockinette           []  Silver Stockinette             []  Tg soft  []  Comperm    []  Transelast to toes 1-4    Padding layer:  []  Cast padding x 1 to bulk out ankle         []  Fleece    Foam:  [] 10cm roll  [] 12cm roll  [] 15cm roll  [] Gray foam  [] Komprex  [] Komprex 2 to medial and lateral malleoli on left      Short stretch bandages:   [] 4cm  [] 6cm  [] 8cm  [] 10cm   [] 12cm    Tubigrip applied at proximal aspect to secure taped areas. tubigrip also applied to feet to prevent sliding of bandages when donning shoes. Educated patient in multi-layer bandaging donning principles and precautions. Discussed compression garment options today. Pt previously fit with [employer] custom flat knit stockings, however will need nighttime garment due to previously failed trial of only daytime garments. Pt measured for tribute previously, however insurance denied garment. Pt shown juzo velcro calf and foot wrap and tried on sample in clinic. Pt's caregiver to find out if patient can pay out of pocket. Orthotic management:  Pt able to resume use of custom flat knit knee high stockings this date. Good fit noted. Verbally reviewed proper donning instructions with pt including placement of silicone border 1-2 finger-widths below the crease of the knee and proper placement of y seam at the heel. Pt verbalized understanding, however advised caregiver that pt will need help daily to ensure proper donning. Caregiver in agreement to help with donning garments and making sure pt uses pump daily. Girth/Volume measurement: Reviewed results of volumetric measurements taken on evaluation. TOTAL TREATMENT 45 mins          Right anterior ankle          B LEs      ASSESSMENT:   Treatment effectiveness and tolerance: Skin intact today and volumes reduced.  Pt able to transition back to daytime stockings with good fit noted, however stressed to caregiver that pt will need daily assistance with donning garments and using pump. Pt previously failed trial of stockings due to improper donning and reoccurrence of wound at anterior ankle with increased swelling. Pt's insurance denied tribute [employer] and pt unable to pay for nighttime garment at this time. Caregiver going to follow up with pt's family member to see if they will be willing to pay for [employer] velcro garments for nighttime. Pt to return to clinic in 3-4 weeks for garment/volume recheck as able. Progress toward goals: Progressing towards goals. PLAN OF CARE:   Changes to the plan of care: 1. Reassess fit and effectiveness of custom stockings. 2. Recheck volumes. 3. Measure for nighttime garments? Frequency: []  2 times a week  []  Weekly  [x]  Biweekly  []  Monthly   Other: 4/14/20: pt/caregiver provided with care fund application today. Chalino Guerra, PT, DPT, CLT

## 2020-04-21 ENCOUNTER — APPOINTMENT (OUTPATIENT)
Dept: PHYSICAL THERAPY | Age: 75
End: 2020-04-21
Payer: MEDICARE

## 2020-04-22 ENCOUNTER — TELEPHONE (OUTPATIENT)
Dept: FAMILY MEDICINE CLINIC | Age: 75
End: 2020-04-22

## 2020-04-22 DIAGNOSIS — L03.818 CELLULITIS OF OTHER SPECIFIED SITE: Primary | ICD-10-CM

## 2020-04-22 NOTE — TELEPHONE ENCOUNTER
Medication is not on current medication list.          Dr. Guillermo Adhikari   Received: Augustine L[facility]pez, [address] [facility],[address]             Env Medication Refill     Caller (if not patient): Sandro Owen to Patient: Elizabeth Del Toro contact number(s): [phone]       Name of medication and dosage if known: Cephalexin 500mg       Is patient out of this medication (yes/no): Yes       Pharmacy name: [facility] listed in chart? (yes/no): Yes     Pharmacy phone number: [phone] (fax [phone])       Details to clarify the request: Cellulitis R foot

## 2020-04-23 ENCOUNTER — APPOINTMENT (OUTPATIENT)
Dept: PHYSICAL THERAPY | Age: 75
End: 2020-04-23
Payer: MEDICARE

## 2020-04-27 NOTE — TELEPHONE ENCOUNTER
I called and spoke with patients care giver and she says that the cellulitis is back and needs the antibiotic once more.  Thank you!!

## 2020-04-28 ENCOUNTER — APPOINTMENT (OUTPATIENT)
Dept: PHYSICAL THERAPY | Age: 75
End: 2020-04-28
Payer: MEDICARE

## 2020-04-28 RX ORDER — CEPHALEXIN 500 MG/1
500 CAPSULE ORAL 2 TIMES DAILY
Qty: 20 CAP | Refills: 0 | Status: SHIPPED | OUTPATIENT
Start: 2020-04-28 | End: 2020-05-08

## 2020-04-28 NOTE — TELEPHONE ENCOUNTER
Will attempt to schedule patient for telemedicine visit. Keflex sent to pharmacy to begin treatment until we can evaluate symptoms.

## 2020-04-30 ENCOUNTER — APPOINTMENT (OUTPATIENT)
Dept: PHYSICAL THERAPY | Age: 75
End: 2020-04-30
Payer: MEDICARE

## 2020-05-04 ENCOUNTER — VIRTUAL VISIT (OUTPATIENT)
Dept: FAMILY MEDICINE CLINIC | Age: 75
End: 2020-05-04

## 2020-05-04 DIAGNOSIS — I89.0 LYMPHEDEMA OF BOTH LOWER EXTREMITIES: ICD-10-CM

## 2020-05-04 DIAGNOSIS — L03.818 CELLULITIS OF OTHER SPECIFIED SITE: Primary | ICD-10-CM

## 2020-05-04 DIAGNOSIS — L98.499 SKIN ULCER, UNSPECIFIED ULCER STAGE (HCC): ICD-10-CM

## 2020-05-04 NOTE — PROGRESS NOTES
Meliza Quinn  70 y.o. male  1945  [address]  [id number]   [address]:    Telemedicine Progress Note  Jaiden Squires MD       Encounter Date and Time: May 4, 2020 at 1:21 PM    Consent:  He and/or the health care decision maker is aware that that he may receive a bill for this telephone service, depending on his insurance coverage, and has provided verbal consent to proceed: Yes    Chief Complaint   Patient presents with    Skin Infection     History of Present Illness   Jae Reyes is a 70 y.o. male was evaluated by synchronous (real-time) audio-video technology from home, through the [employer] Patient Portal.    Patient developed erythema and warmth surrounding a skin wound on the dorsum of his right  foot. Had been treated for cellulitis 3/18. That episode had improved. We started patient on 10 days of Keflex. He has taken this for 2 days and is starting to see improvement. Family report his is less angry, erythema has started to improve and it is not as warm. They have been doing wound care dressings at home and he has been elevated his feet as he can. He has not been able to go to the Lymphedema clinic due to [place]. Review of Systems   Review of Systems   Constitutional: Negative for fever. Skin:        Wound, erythema       Vitals/Objective:     General: alert, cooperative, no distress   Mental  status: mental status: Hx of  Currently at baseline   Resp: resp: normal effort and no respiratory distress   Neuro: neuro: no gross deficits   Skin: skin: LESIONS NOTED: ulceration to dorsum of right foot with associated eschar. Some surrounding erythema noted. Due to this being a TeleHealth evaluation, many elements of the physical examination are unable to be assessed. Assessment and Plan:     1. Cellulitis of other specified site  2.  Skin ulcer, unspecified ulcer stage (HCC)  3. Lymphedema of both lower extremities  Continue antibiotics to completion and wound care. Elevate legs. Follow-up of sx worsen. Time spent in direct conversation with the patient to include medical condition(s) discussed, assessment and treatment plan: Wound care       We discussed the expected course, resolution and complications of the diagnosis(es) in detail. Medication risks, benefits, costs, interactions, and alternatives were discussed as indicated. I advised him to contact the office if his condition worsens, changes or fails to improve as anticipated. He expressed understanding with the diagnosis(es) and plan. Patient understands that this encounter was a temporary measure, and the importance of further follow up and examination was emphasized. Patient verbalized understanding. Patient informed to follow up: PRN. Electronically Signed: Neli Villatoro MD    Providers location when delivering service (clinic, hospital, home): Clinic      Pursuant to the emergency declaration under the [facility] [address] waiver authority and the Coronavirus Preparedness and Response Supplemental Appropriations Act, this Virtual  Visit was conducted, with patient's consent, to reduce the patient's risk of exposure to COVID-19 and provide continuity of care for an established patient. Services were provided through a video synchronous discussion virtually to substitute for in-person clinic visit. History   Patients past medical, surgical and family histories were reviewed and updated.       Past Medical History:   Diagnosis Date    Epilepsy ([facility] [address]     Heart disease     Hypertension     Incontinence     Leg swelling     Mental retardation, 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)     Other ill-defined conditions(799.89)     edema legs    S/P biventricular cardiac pacemaker procedure 10/23/2015    10/23/15 [employer] biventricular pacemaker inmplant    Screen for colon cancer 9/27/2012     Past Surgical History:   Procedure Laterality Date    HX COLONOSCOPY  04/05/2017    HX ORTHOPAEDIC Right 12/22/2017    ORIF right distal radius    HX PACEMAKER  2015    bi ventricular pacemaker      Family History   Problem Relation Age of Onset    Other Other         unable to obtain due to mental status    No Known Problems Sister      Social History     Socioeconomic History    Marital status: SINGLE     Spouse name: Not on file    Number of children: Not on file    Years of education: Not on file    Highest education level: Not on file   Occupational History    Not on file   Social Needs    Financial resource strain: Not on file    Food insecurity     Worry: Not on file     Inability: Not on file    Transportation needs     Medical: Not on file     Non-medical: Not on file   Tobacco Use    Smoking status: Never Smoker    Smokeless tobacco: Never Used   Substance and Sexual Activity    Alcohol use: No    Drug use: No    Sexual activity: Not Currently   Lifestyle    Physical activity     Days per week: Not on file     Minutes per session: Not on file    Stress: Not on file   Relationships    Social connections     Talks on phone: Not on file     Gets together: Not on file     Attends Quaker service: Not on file     Active member of club or organization: Not on file     Attends meetings of clubs or organizations: Not on file     Relationship status: Not on file    Intimate partner violence     Fear of current or ex partner: Not on file     Emotionally abused: Not on file     Physically abused: Not on file     Forced sexual activity: Not on file   Other Topics Concern     Service Not Asked    Blood Transfusions Not Asked    Caffeine Concern Not Asked    Occupational Exposure Not Asked   [employer] Hazards Not Asked    Sleep Concern Not Asked    Stress Concern Not Asked    Weight Concern Not Asked    Special Diet Not Asked    Back Care Not Asked    Exercise Not Asked    Bike Helmet Not Asked   [address] [facility] Not Asked    Self-Exams Not Asked   Social History Narrative    Not on file     Patient Active Problem List   Diagnosis Code    Bradycardia R00.1    Seizure disorder ([facility] [address] G40.909    Bilateral lower extremity edema R60.0    Hypertension I10    Mobitz type II incomplete atrioventricular block I44.1    Intellectual disability F79    Advance care planning Z71.89    S/P biventricular cardiac pacemaker procedure Z95.0    Lymphedema of both lower extremities I89.0    Vitamin D deficiency E55.9    Vitamin B12 deficiency E53.8    History of closed Colles' fracture Z87.81    H/O Mobitz type II block Z86.79          Current Medications/Allergies   Medications and Allergies reviewed:    Current Outpatient Medications   Medication Sig Dispense Refill    cephALEXin (KEFLEX) 500 mg capsule Take 1 Cap by mouth two (2) times a day for 10 days. 20 Cap 0    levETIRAcetam 1,000 mg tablet Take 1 Tab by mouth two (2) times a day. 58 Tab 3    cholecalciferol (VITAMIN D3) (1000 Units /25 mcg) tablet TAKE 1 TABLET BY MOUTH DAILY 31 Tab PRN    lacosamide (VIMPAT) 100 mg tab tablet Take 1 Tab by mouth two (2) times a day. Max Daily Amount: 200 mg. 60 Tab 11    metoprolol tartrate (LOPRESSOR) 25 mg tablet Take 1 Tab by mouth two (2) times a day. 60 Tab 5    bumetanide (BUMEX) 2 mg tablet TAKE 1 TABLET BY MOUTH DAILY 90 Tab 3    potassium chloride (K-DUR, KLOR-CON) 20 mEq tablet TAKE 1 TABLET BY MOUTH DAILY 90 Tab 3    apixaban (ELIQUIS) 5 mg tablet Take 1 Tab by mouth two (2) times a day.  60 Tab 12     Allergies   Allergen Reactions    Sulfa (Sulfonamide Antibiotics) Rash

## 2020-05-05 ENCOUNTER — APPOINTMENT (OUTPATIENT)
Dept: PHYSICAL THERAPY | Age: 75
End: 2020-05-05

## 2020-06-25 ENCOUNTER — OFFICE VISIT (OUTPATIENT)
Dept: CARDIOLOGY CLINIC | Age: 75
End: 2020-06-25

## 2020-06-25 ENCOUNTER — CLINICAL SUPPORT (OUTPATIENT)
Dept: CARDIOLOGY CLINIC | Age: 75
End: 2020-06-25

## 2020-06-25 VITALS
BODY MASS INDEX: 28.57 KG/M2 | SYSTOLIC BLOOD PRESSURE: 98 MMHG | HEIGHT: 75 IN | OXYGEN SATURATION: 99 % | RESPIRATION RATE: 17 BRPM | HEART RATE: 70 BPM | WEIGHT: 229.8 LBS | DIASTOLIC BLOOD PRESSURE: 60 MMHG

## 2020-06-25 DIAGNOSIS — I42.0 DILATED CARDIOMYOPATHY (HCC): ICD-10-CM

## 2020-06-25 DIAGNOSIS — I44.2 CHB (COMPLETE HEART BLOCK) (HCC): Primary | ICD-10-CM

## 2020-06-25 DIAGNOSIS — I48.91 ATRIAL FIBRILLATION, UNSPECIFIED TYPE (HCC): ICD-10-CM

## 2020-06-25 DIAGNOSIS — Z95.0 S/P BIVENTRICULAR CARDIAC PACEMAKER PROCEDURE: ICD-10-CM

## 2020-06-25 DIAGNOSIS — I44.2 CHB (COMPLETE HEART BLOCK) (HCC): ICD-10-CM

## 2020-06-25 DIAGNOSIS — Z95.0 CARDIAC PACEMAKER IN SITU: Primary | ICD-10-CM

## 2020-06-25 NOTE — PROGRESS NOTES
Identified pt with two pt identifiers(name and ). Reviewed record in preparation for visit and have obtained necessary documentation. Neva Justin is a 70 y.o. male here today for:   Chief Complaint   Patient presents with    Hypertension     annual f/u    Irregular Heart Beat       Visit Vitals  BP 98/60 (BP 1 Location: Left arm, BP Patient Position: Sitting)   Pulse 70   Resp 17   Ht 6' 3\" (1.905 m)   Wt 229 lb 12.8 oz (104.2 kg)   SpO2 99%   BMI 28.72 kg/m²       Pain Scale: 0 - No pain/10    Health Maintenance Review: Patient reminded of \"due or due soon\" health maintenance. I have asked the patient to contact his/her primary care provider (PCP) for follow-up on his/her health maintenance. Coordination of Care Questionnaire:  :   1) Have you been to an emergency room, urgent care, or hospitalized since your last visit? If yes, where when, and reason for visit? no       2. Have seen or consulted any other health care provider since your last visit? If yes, where when, and reason for visit? NO      Patient is accompanied by self I have received verbal consent from Saleem Quinn to discuss any/all medical information while they are present in the room.

## 2020-06-25 NOTE — PROGRESS NOTES
Subjective:      Jae Reyes is a 70 y.o. male is here for EP follow up. The patient denies chest pain/ shortness of breath, orthopnea, PND, LE edema, palpitations, syncope, presyncope or fatigue. Is losing weight - walking and dietary changes. Has been followed by lymphedema clinic since last year. He feels well.      Patient Active Problem List    Diagnosis Date Noted    Hypertension 10/19/2015     Priority: 3 - Three     Class: Chronic    Lymphedema of both lower extremities 10/15/2018    Vitamin D deficiency 10/15/2018    Vitamin B12 deficiency 10/15/2018    History of closed Colles' fracture 10/15/2018    H/O Mobitz type II block 10/15/2018    S/P biventricular cardiac pacemaker procedure 10/23/2015    Seizure disorder ([address] 10/20/2015     Class: Chronic    Intellectual disability 10/20/2015     Class: Chronic    Advance care planning 10/20/2015    Mobitz type II incomplete atrioventricular block 10/19/2015    Bilateral lower extremity edema 10/17/2015    Bradycardia 08/27/2012      Rio Geiger MD  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)     Other ill-defined conditions(799.89)     edema legs    S/P biventricular cardiac pacemaker procedure 10/23/2015    10/23/15 [employer] biventricular pacemaker inmplant    Screen for colon cancer 9/27/2012      Past Surgical History:   Procedure Laterality Date    HX COLONOSCOPY  04/05/2017    HX ORTHOPAEDIC Right 12/22/2017    ORIF right distal radius    HX PACEMAKER  2015    bi ventricular pacemaker      Allergies   Allergen Reactions    Sulfa (Sulfonamide Antibiotics) Rash      Family History   Problem Relation Age of Onset    Other Other         unable to obtain due to mental status    No Known Problems Sister     negative for cardiac disease  Social History     Socioeconomic History    Marital status: SINGLE     Spouse name: Not on file    Number of children: Not on file    Years of education: Not on file    Highest education level: Not on file   Tobacco Use    Smoking status: Never Smoker    Smokeless tobacco: Never Used   Substance and Sexual Activity    Alcohol use: No    Drug use: No    Sexual activity: Not Currently   Other Topics Concern     Current Outpatient Medications   Medication Sig    Eliquis 5 mg tablet TAKE 1 TABLET BY MOUTH TWICE A DAY    metoprolol tartrate (LOPRESSOR) 25 mg tablet TAKE 1 TABLET BY MOUTH TWICE A DAY    levETIRAcetam 1,000 mg tablet Take 1 Tab by mouth two (2) times a day.  cholecalciferol (VITAMIN D3) (1000 Units /25 mcg) tablet TAKE 1 TABLET BY MOUTH DAILY    lacosamide (VIMPAT) 100 mg tab tablet Take 1 Tab by mouth two (2) times a day. Max Daily Amount: 200 mg.  bumetanide (BUMEX) 2 mg tablet TAKE 1 TABLET BY MOUTH DAILY    potassium chloride (K-DUR, KLOR-CON) 20 mEq tablet TAKE 1 TABLET BY MOUTH DAILY     No current facility-administered medications for this visit. Vitals:    06/25/20 1101   BP: 98/60   Pulse: 70   Resp: 17   SpO2: 99%   Weight: 229 lb 12.8 oz (104.2 kg)   Height: 6' 3\" (1.905 m)       I have reviewed the nurses notes, vitals, problem list, allergy list, medical history, family, social history and medications. Review of Symptoms:    General: Pt denies excessive weight gain or loss. Pt is able to conduct ADL's  HEENT: Denies blurred vision, headaches, epistaxis and difficulty swallowing. Respiratory: Denies shortness of breath, SCHWARZ, wheezing or stridor.   Cardiovascular: Denies precordial pain, palpitations, edema or PND  Gastrointestinal: Denies poor appetite, indigestion, abdominal pain or blood in stool  Urinary: Denies dysuria, pyuria  Musculoskeletal: Denies pain or swelling from muscles or joints  Neurologic: Denies tremor, paresthesias, or sensory motor disturbance  Skin: Denies rash, itching or texture change. Psych: Denies depression      Physical Exam:      General: Well developed, in no acute distress. HEENT: Eyes - PERRL, no jvd  Heart:  Normal S1/S2 negative S3 or S4. Regular, no murmur, gallop or rub. Respiratory: Clear bilaterally x 4, no wheezing or rales  Extremities:  No edema, normal cap refill, no cyanosis. Musculoskeletal: No clubbing  Neuro: A&Ox3, speech clear, gait stable. Skin: Skin color is normal. No rashes or lesions. Non diaphoretic  Vascular: 2+ pulses symmetric in all extremities    Cardiographics    Ekg:  V paced. Results for orders placed or performed during the hospital encounter of 11/14/15   EKG, 12 LEAD, INITIAL   Result Value Ref Range    Ventricular Rate 107 BPM    Atrial Rate 107 BPM    P-R Interval 134 ms    QRS Duration 190 ms    Q-T Interval 420 ms    QTC Calculation (Bezet) 560 ms    Calculated P Axis 59 degrees    Calculated R Axis -96 degrees    Calculated T Axis 80 degrees    Diagnosis       Atrial-sensed ventricular-paced rhythm  When compared with ECG of 24-OCT-2015 05:32,  Electronic ventricular pacemaker has replaced Sinus rhythm  No obvious pacer malfunction   Confirmed by Kathryn Rosales ([id number]) on 11/15/2015 4:53:55 PM        Echo 2019  · Left Ventricle: Normal wall thickness. Mildly dilated left ventricle. Low normal segmental systolic dysfunction. Estimated left ventricular ejection fraction is 46 - 50%. Biplane method used to measure ejection fraction. Abnormal left ventricular wall motion. · Right Ventricle: Pacing wire present and appears normal.  · Mitral Valve: Moderate mitral valve regurgitation.       Lab Results   Component Value Date/Time    WBC 5.4 08/20/2019 03:19 PM    Hemoglobin (POC) 12.7 12/22/2017 11:23 AM    HGB 12.5 (L) 08/20/2019 03:19 PM    HCT 37.7 08/20/2019 03:19 PM    PLATELET 518 (L) 24/35/2328 03:19 PM    MCV 97 08/20/2019 03:19 PM      Lab Results   Component Value Date/Time    Sodium 141 08/20/2019 03:19 PM    Potassium 3.9 08/20/2019 03:19 PM    Chloride 99 08/20/2019 03:19 PM    CO2 26 08/20/2019 03:19 PM    Anion gap 6 11/14/2015 10:58 AM    Glucose 94 08/20/2019 03:19 PM    BUN 21 08/20/2019 03:19 PM    Creatinine 0.95 08/20/2019 03:19 PM    BUN/Creatinine ratio 22 08/20/2019 03:19 PM    GFR est AA 92 08/20/2019 03:19 PM    GFR est non-AA 80 08/20/2019 03:19 PM    Calcium 9.1 08/20/2019 03:19 PM    Bilirubin, total 0.8 08/20/2019 03:19 PM    Alk. phosphatase 93 08/20/2019 03:19 PM    Protein, total 6.7 08/20/2019 03:19 PM    Albumin 4.1 08/20/2019 03:19 PM    Globulin 4.2 (H) 11/14/2015 10:58 AM    A-G Ratio 1.6 08/20/2019 03:19 PM    ALT (SGPT) 7 08/20/2019 03:19 PM      Lab Results   Component Value Date/Time    TSH 0.50 10/18/2015 03:29 AM        Assessment:           ICD-10-CM ICD-9-CM    1. CHB (complete heart block) ([facility]) I44.2 426.0 AMB POC EKG ROUTINE W/ 12 LEADS, INTER & REP   2. Atrial fibrillation, unspecified type (HCC) I48.91 427.31 AMB POC EKG ROUTINE W/ 12 LEADS, INTER & REP   3. Dilated cardiomyopathy (HCC) I42.0 425.4    4. S/P biventricular cardiac pacemaker procedure Z95.0 V45.01      Orders Placed This Encounter    AMB POC EKG ROUTINE W/ 12 LEADS, INTER & REP     Order Specific Question:   Reason for Exam:     Answer:   irregular heartbeat        Plan:     Mr Kimberly Manuel is  Here for annual follow up and device check. He is 81V paced for high grade av block. He has been in permanent AF for almost the last year, on eliquis. Last echo 2019 with EF 46-50%. Will repeat. He is on med rx for htn. Normotensive this visit. Was started on [address] last year for AF and Dr Yury Mack discussed AF ablation with pt. He was scheduled however it was cancelled the day of surgery due to ? Cellulitis bilat legs. I have ordered repeat echo to assess LA, EF. We discussed AF ablation vs hybrid ablation. I will have Dr Yury Mack review his chart and echo and discuss options with patient.      He will follow up in the device clinic remotely and with me in one year.     Continue medical management for htn, av block.     Thank you for allowing me to participate in Rica Cranker 's care.       Bin Gardner NP

## 2020-07-07 ENCOUNTER — TELEPHONE (OUTPATIENT)
Dept: FAMILY MEDICINE CLINIC | Age: 75
End: 2020-07-07

## 2020-08-05 DIAGNOSIS — G40.909 SEIZURE DISORDER (HCC): ICD-10-CM

## 2020-08-05 RX ORDER — LACOSAMIDE 100 MG/1
100 TABLET ORAL 2 TIMES DAILY
Qty: 60 TAB | Refills: 0 | Status: SHIPPED | OUTPATIENT
Start: 2020-08-05 | End: 2020-09-04 | Stop reason: SDUPTHER

## 2020-08-26 ENCOUNTER — OFFICE VISIT (OUTPATIENT)
Dept: CARDIOTHORACIC SURGERY | Age: 75
End: 2020-08-26
Payer: MEDICARE

## 2020-08-26 VITALS
HEART RATE: 70 BPM | RESPIRATION RATE: 16 BRPM | HEIGHT: 75 IN | TEMPERATURE: 98.5 F | BODY MASS INDEX: 28.47 KG/M2 | OXYGEN SATURATION: 97 % | DIASTOLIC BLOOD PRESSURE: 78 MMHG | SYSTOLIC BLOOD PRESSURE: 112 MMHG | WEIGHT: 229 LBS

## 2020-08-26 DIAGNOSIS — I48.11 LONGSTANDING PERSISTENT ATRIAL FIBRILLATION (HCC): Primary | ICD-10-CM

## 2020-08-26 PROCEDURE — 3017F COLORECTAL CA SCREEN DOC REV: CPT | Performed by: THORACIC SURGERY (CARDIOTHORACIC VASCULAR SURGERY)

## 2020-08-26 PROCEDURE — G8417 CALC BMI ABV UP PARAM F/U: HCPCS | Performed by: THORACIC SURGERY (CARDIOTHORACIC VASCULAR SURGERY)

## 2020-08-26 PROCEDURE — G8752 SYS BP LESS 140: HCPCS | Performed by: THORACIC SURGERY (CARDIOTHORACIC VASCULAR SURGERY)

## 2020-08-26 PROCEDURE — 1101F PT FALLS ASSESS-DOCD LE1/YR: CPT | Performed by: THORACIC SURGERY (CARDIOTHORACIC VASCULAR SURGERY)

## 2020-08-26 PROCEDURE — G8754 DIAS BP LESS 90: HCPCS | Performed by: THORACIC SURGERY (CARDIOTHORACIC VASCULAR SURGERY)

## 2020-08-26 PROCEDURE — G8427 DOCREV CUR MEDS BY ELIG CLIN: HCPCS | Performed by: THORACIC SURGERY (CARDIOTHORACIC VASCULAR SURGERY)

## 2020-08-26 PROCEDURE — 99204 OFFICE O/P NEW MOD 45 MIN: CPT | Performed by: THORACIC SURGERY (CARDIOTHORACIC VASCULAR SURGERY)

## 2020-08-26 PROCEDURE — G8432 DEP SCR NOT DOC, RNG: HCPCS | Performed by: THORACIC SURGERY (CARDIOTHORACIC VASCULAR SURGERY)

## 2020-08-26 PROCEDURE — G8536 NO DOC ELDER MAL SCRN: HCPCS | Performed by: THORACIC SURGERY (CARDIOTHORACIC VASCULAR SURGERY)

## 2020-08-26 NOTE — PROGRESS NOTES
CSS   History and Physical    Subjective:      Hailey Reardon is a 70 y.o. male with a history of mild intellectual disability, CHB s/p [employer]-V PPM (2015), persistent atrial fibrillation (on Eliquis), and seizure disorder (last prior to 2017) who was referred for cardiac surgery evaluation by Dr. Melendez Son. He presents today with a care giver. About a year ago, he was found to be in persistent atrial fibrillation. Further, it was noted that his EF had decreased. He was started on Eliquis and referred for ablation that was ultimately cancelled due to cellulitis/LE edema on his legs. This has greatly improved since he has been followed by a lymphedema clinic. Otherwise he denies any issues with shortness of breath, dizziness, or pre-syncope. Prior to March he was active and working as a . Currently he walks daily and is independent, but does live in a group home. Cardiac Testing    ECHO:  · LV: Normal cavity size and wall thickness. Mild global systolic function. Estimated left ventricular ejection fraction is 45 - 50%. · LA: Mildly dilated left atrium. · RA: Mildly dilated right atrium. · MV: Mild mitral valve regurgitation is present. · PA: Pulmonary arterial systolic pressure is 17 mmHg.     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)     Other ill-defined conditions(799.89)     edema legs    S/P biventricular cardiac pacemaker procedure 10/23/2015    10/23/15 [employer] biventricular pacemaker inmplant    Screen for colon cancer 9/27/2012     Past Surgical History:   Procedure Laterality Date    HX COLONOSCOPY  04/05/2017    HX ORTHOPAEDIC Right 12/22/2017    ORIF right distal radius    HX PACEMAKER  2015    bi ventricular pacemaker       Social History     Tobacco Use    Smoking status: Never Smoker    Smokeless tobacco: Never Used Substance Use Topics    Alcohol use: No      Family History   Problem Relation Age of Onset    Other Other         unable to obtain due to mental status    No Known Problems Sister      Prior to Admission medications    Medication Sig Start Date End Date Taking? Authorizing Provider   lacosamide (Vimpat) 100 mg tab tablet Take 1 Tab by mouth two (2) times a day. Max Daily Amount: 200 mg. 8/5/20   Flynn Dang MD   potassium chloride (K-DUR, KLOR-CON) 20 mEq tablet TAKE 1 TABLET BY MOUTH DAILY 8/4/20   Sweta Kennedy MD   bumetanide (BUMEX) 2 mg tablet TAKE 1 TABLET BY MOUTH DAILY 8/4/20   Sweta Kennedy MD   Eliquis 5 mg tablet TAKE 1 TABLET BY MOUTH TWICE A DAY 6/4/20   Ivet Hartley ANP   metoprolol tartrate (LOPRESSOR) 25 mg tablet TAKE 1 TABLET BY MOUTH TWICE A DAY 6/2/20   Corinda Public, DO   levETIRAcetam 1,000 mg tablet Take 1 Tab by mouth two (2) times a day. 3/4/20   Flynn Dang MD   cholecalciferol (VITAMIN D3) (1000 Units /25 mcg) tablet TAKE 1 TABLET BY MOUTH DAILY 2/4/20   Corinda Public, DO       Allergies   Allergen Reactions    Sulfa (Sulfonamide Antibiotics) Rash     Review of Systems:   Consititutional: Denies fever or chills. Eyes:  Denies use of glasses or vision problems(cataracts). ENT:  Denies hearing or swallowing difficulty. CV: Denies CP, claudication, HTN. Resp: Denies dyspnea, productive cough. : Denies dialysis or kidney problems. GI: Denies ulcers, esophageal strictures, liver problems. M/S: Denies joint or bone problems, or implanted artificial hardware. Skin: Denies varicose veins, edema. Neuro: Denies strokes, or TIAs. Psych: Denies anxiety or depression. Endocrine: Denies thyroid problems or diabetes. Heme/Lymphatic: Denies easy bruising or lymphedema.      Objective:     VS:   Visit Vitals  /78   Pulse 70   Temp 98.5 °F (36.9 °C)   Resp 16   Ht 6' 3\" (1.905 m)   Wt 229 lb (103.9 kg)   SpO2 97%   BMI 28.62 kg/m² Physical Exam:    General appearance: alert, cooperative, no distress  Head: normocephalic, without obvious abnormality; atraumatic  Eyes: conjunctivae/corneas clear; EOM's intact. Nose: nares normal; no drainage. Neck: no carotid bruit and no JVD  Lungs: clear to auscultation bilaterally  Heart: irregular rate and rhythm; no murmur  Abdomen: soft, non-tender; bowel sounds normal  Extremities: moves all extremities; no weakness. Skin: Skin color normal; No varicose veins or edema. Neurologic: Grossly normal      Assessment:     Patient Active Problem List   Diagnosis Code    Bradycardia R00.1    Seizure disorder ([facility] [address] G40.909    Bilateral lower extremity edema R60.0    Hypertension I10    Mobitz type II incomplete atrioventricular block I44.1    Intellectual disability F79    Advance care planning Z71.89    S/P biventricular cardiac pacemaker procedure Z95.0    Lymphedema of both lower extremities I89.0    Vitamin D deficiency E55.9    Vitamin B12 deficiency E53.8    History of closed Colles' fracture Z87.81    H/O Mobitz type II block Z86.79     Plan:   Treatment Plan:    1. Persistent Afib: On Eliquis. Even though he denies symptoms, EF and LE edema would likely improve if he was able to convert to NSR. Surgical planning per Dr. Neva Rossi. 2. Hx CHB s/p [address] Bi-V PPM 2015: Followed by Dr. Augustine Oscar  3. Hx seizure disorder: On lacosamide, levetiracetam  4. Lymphedema/LE edema: On bumex/compression stockings. Followed by lymphedema clinic  5. HTN: On BB  6. Intellectual disability: Mild, lives mostly independently in group home. Signed By: MONISHA Fitzgerald     August 26, 2020      Pt seen and examined. Agree with MONISHA Glover consultation note. He has a PPM but found to be in AFib 100% of the time on last pacemaker check. He also has an EF 45-50% and difficult to control lymphedema, for which he is seen by a lymphedema clinic. He has not had an ablation.  He lives in a home for intellectually disabled individuals and is accompanied by his caregiver there. I spoke with Dr. Francisco Salinas and Danielle Mccabe who are in agreement that a hybrid ablation offers the best chance to restore sinus rhythm which will hopefully improve his cardiac output and help alleviate his bilateral LE edema. I discussed the risks and benefits and expected hospital course following the surgery. He and his caregiver agreed to proceed. We will look to schedule in early October.

## 2020-08-31 ENCOUNTER — VIRTUAL VISIT (OUTPATIENT)
Dept: FAMILY MEDICINE CLINIC | Age: 75
End: 2020-08-31
Payer: MEDICARE

## 2020-08-31 DIAGNOSIS — L03.116 CELLULITIS OF LEFT LOWER EXTREMITY: Primary | ICD-10-CM

## 2020-08-31 DIAGNOSIS — L98.499 SKIN ULCER, UNSPECIFIED ULCER STAGE (HCC): ICD-10-CM

## 2020-08-31 DIAGNOSIS — I89.0 LYMPHEDEMA OF BOTH LOWER EXTREMITIES: ICD-10-CM

## 2020-08-31 PROCEDURE — G0463 HOSPITAL OUTPT CLINIC VISIT: HCPCS | Performed by: STUDENT IN AN ORGANIZED HEALTH CARE EDUCATION/TRAINING PROGRAM

## 2020-08-31 PROCEDURE — 99213 OFFICE O/P EST LOW 20 MIN: CPT | Performed by: STUDENT IN AN ORGANIZED HEALTH CARE EDUCATION/TRAINING PROGRAM

## 2020-08-31 RX ORDER — CEPHALEXIN 500 MG/1
500 CAPSULE ORAL 2 TIMES DAILY
Qty: 20 CAP | Refills: 0 | Status: SHIPPED | OUTPATIENT
Start: 2020-08-31 | End: 2020-09-10

## 2020-08-31 NOTE — PROGRESS NOTES
Saranya Quinn  70 y.o. male  1945  [address]  [id number]   [id number] [address]:    Telemedicine Progress Note  Annabelle Ball MD       Encounter Date and Time: August 31, 2020 at 1:14 PM    Consent:  He and/or the health care decision maker is aware that that he may receive a bill for this telephone service, depending on his insurance coverage, and has provided verbal consent to proceed: Yes    Chief Complaint   Patient presents with    Skin Infection     History of Present Illness   Shakir Clements is a 70 y.o. male was evaluated by synchronous (real-time) audio-video technology from home. Pt accompanied by caregiver who helps with pt's history. Pt reports 2-3 of redness around skin ulcer on L foot. Has had the same problem before that usually resolves with antibiotics, Keflex. Denies any pus, drainage, or red streaks. Denies any fever, chills, pain, N/V. Reports chronic lymphedema is stable at baseline. Denies any other complaints or concerns at this time. Review of Systems   Review of Systems   Constitutional: Negative for chills and fever. Respiratory: Negative for cough and shortness of breath. Gastrointestinal: Negative for abdominal pain, nausea and vomiting. Skin:        Ulcer and erythema L foot   Neurological: Negative for dizziness and headaches. Vitals/Objective:     General: alert, cooperative, no distress   Mental  status: mental status: normal mood, behavior, speech, dress, motor activity   Resp: resp: normal effort, no respiratory distress and not coughing   Neuro: neuro: no gross deficits   Skin: skin: small ulcer with surrounding redness on L dorsal foot about 1cm, no drainage noted, does not appear very deep   Due to this being a TeleHealth evaluation, many elements of the physical examination are unable to be assessed.       Assessment and Plan:   Time-based coding, delete if not needed: I spent at least 15 minutes with this established patient, and >50% of the time was spent counseling and/or coordinating care regarding cellulitis    Janice Murphy is a 70 y.o. male with cellulitis    1. Cellulitis of left lower extremity - no systemic symptoms, has resolved in the past with Keflex, will try Keflex  -START cephALEXin (KEFLEX) 500 mg capsule; Take 1 Cap by mouth two (2) times a day for 10 days. Dispense: 20 Cap; Refill: 0  -Return to clinic if symptoms worsen or do not improve    2. Lymphedema of both lower extremities - stable  -Continue to elevate legs as much as possible and use compression stockings as needed      Time spent in direct conversation with the patient to include medical condition(s) discussed, assessment and treatment plan:       We discussed the expected course, resolution and complications of the diagnosis(es) in detail. Medication risks, benefits, costs, interactions, and alternatives were discussed as indicated. I advised him to contact the office if his condition worsens, changes or fails to improve as anticipated. He expressed understanding with the diagnosis(es) and plan. Patient understands that this encounter was a temporary measure, and the importance of further follow up and examination was emphasized. Patient verbalized understanding. Patient informed to follow up: Follow-up and Dispositions  ·   Return if symptoms worsen or fail to improve. Electronically Signed: Oc Worthington MD    Providers location when delivering service (clinic, hospital, home): Home      ICD-10-CM ICD-9-CM    1. Cellulitis of left lower extremity  L03.116 682.6 cephALEXin (KEFLEX) 500 mg capsule   2. Lymphedema of both lower extremities  I89.0 457.1    3.  Skin ulcer, unspecified ulcer stage ([facility] [address]  L98.499 707.9            Pursuant to the emergency declaration under the [address]5 waiver authority and the [employer] Supplemental Appropriations Act, this Virtual  Visit was conducted, with patient's consent, to reduce the patient's risk of exposure to COVID-19 and provide continuity of care for an established patient. Services were provided through a video synchronous discussion virtually to substitute for in-person clinic visit. History   Patients past medical, surgical and family histories were reviewed.     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)     Other ill-defined conditions(799.89)     edema legs    S/P biventricular cardiac pacemaker procedure 10/23/2015    10/23/15 [employer] biventricular pacemaker inmplant    Screen for colon cancer 9/27/2012     Past Surgical History:   Procedure Laterality Date    HX COLONOSCOPY  04/05/2017    HX ORTHOPAEDIC Right 12/22/2017    ORIF right distal radius    HX PACEMAKER  2015    bi ventricular pacemaker      Family History   Problem Relation Age of Onset    Other Other         unable to obtain due to mental status    No Known Problems Sister      Social History     Socioeconomic History    Marital status: SINGLE     Spouse name: Not on file    Number of children: Not on file    Years of education: Not on file    Highest education level: Not on file   Occupational History    Not on file   Social Needs    Financial resource strain: Not on file    Food insecurity     Worry: Not on file     Inability: Not on file    Transportation needs     Medical: Not on file     Non-medical: Not on file   Tobacco Use    Smoking status: Never Smoker    Smokeless tobacco: Never Used   Substance and Sexual Activity    Alcohol use: No    Drug use: No    Sexual activity: Not Currently   Lifestyle    Physical activity     Days per week: Not on file     Minutes per session: Not on file    Stress: Not on file   Relationships    Social connections     Talks on phone: Not on file     Gets together: Not on file     Attends Nondenominational service: Not on file     Active member of club or organization: Not on file     Attends meetings of clubs or organizations: Not on file     Relationship status: Not on file    Intimate partner violence     Fear of current or ex partner: Not on file     Emotionally abused: Not on file     Physically abused: Not on file     Forced sexual activity: Not on file   Other Topics Concern     Service Not Asked    Blood Transfusions Not Asked    Caffeine Concern Not Asked    Occupational Exposure Not Asked    Hobby Hazards Not Asked    Sleep Concern Not Asked    Stress Concern Not Asked    Weight Concern Not Asked    Special Diet Not Asked    Back Care Not Asked    Exercise Not Asked    Bike Helmet Not Asked   [address] [facility] Not Asked    Self-Exams Not Asked   Social History Narrative    Not on file     Patient Active Problem List   Diagnosis Code    Bradycardia R00.1    Seizure disorder ([facility] [address] G40.909    Bilateral lower extremity edema R60.0    Hypertension I10    Mobitz type II incomplete atrioventricular block I44.1    Intellectual disability F79    Advance care planning Z71.89    S/P biventricular cardiac pacemaker procedure Z95.0    Lymphedema of both lower extremities I89.0    Vitamin D deficiency E55.9    Vitamin B12 deficiency E53.8    History of closed Colles' fracture Z87.81    H/O Mobitz type II block Z86.79          Current Medications/Allergies   Medications and Allergies reviewed:    Current Outpatient Medications   Medication Sig Dispense Refill    cephALEXin (KEFLEX) 500 mg capsule Take 1 Cap by mouth two (2) times a day for 10 days. 20 Cap 0    lacosamide (Vimpat) 100 mg tab tablet Take 1 Tab by mouth two (2) times a day.  Max Daily Amount: 200 mg. 60 Tab 0    potassium chloride (K-DUR, KLOR-CON) 20 mEq tablet TAKE 1 TABLET BY MOUTH DAILY 90 Tab 0    bumetanide (BUMEX) 2 mg tablet TAKE 1 TABLET BY MOUTH DAILY 90 Tab 0    Eliquis 5 mg tablet TAKE 1 TABLET BY MOUTH TWICE A DAY 62 Tab 1    metoprolol tartrate (LOPRESSOR) 25 mg tablet TAKE 1 TABLET BY MOUTH TWICE A DAY 60 Tab 1    levETIRAcetam 1,000 mg tablet Take 1 Tab by mouth two (2) times a day.  58 Tab 3    cholecalciferol (VITAMIN D3) (1000 Units /25 mcg) tablet TAKE 1 TABLET BY MOUTH DAILY 31 Tab PRN     Allergies   Allergen Reactions    Sulfa (Sulfonamide Antibiotics) Rash

## 2020-09-01 RX ORDER — APIXABAN 5 MG/1
TABLET, FILM COATED ORAL
Qty: 62 TAB | Refills: 11 | Status: SHIPPED | OUTPATIENT
Start: 2020-09-01 | End: 2020-10-02

## 2020-09-01 NOTE — PROGRESS NOTES
[address] Medicine Residency Attending Addendum:  Dr. Mason Martinez MD,  the patient and I were not physically present during this encounter. The resident and I are concurrently monitoring the patient care through appropriate telecommunication technology. I discussed the findings, assessment and plan with the resident and agree with the resident's findings and plan as documented in the resident's note.       Carissa Watson MD

## 2020-09-02 ENCOUNTER — TELEPHONE (OUTPATIENT)
Dept: CARDIOLOGY CLINIC | Age: 75
End: 2020-09-02

## 2020-09-02 RX ORDER — METOPROLOL TARTRATE 25 MG/1
TABLET, FILM COATED ORAL
Qty: 62 TAB | Refills: 11 | Status: SHIPPED | OUTPATIENT
Start: 2020-09-02 | End: 2021-08-30

## 2020-09-04 DIAGNOSIS — G40.909 SEIZURE DISORDER (HCC): ICD-10-CM

## 2020-09-04 RX ORDER — LACOSAMIDE 100 MG/1
100 TABLET ORAL 2 TIMES DAILY
Qty: 180 TAB | Refills: 1 | Status: SHIPPED | OUTPATIENT
Start: 2020-09-04 | End: 2021-03-03

## 2020-09-24 ENCOUNTER — HOSPITAL ENCOUNTER (OUTPATIENT)
Dept: GENERAL RADIOLOGY | Age: 75
Discharge: HOME | End: 2020-09-24
Attending: PHYSICIAN ASSISTANT
Payer: MEDICARE

## 2020-09-24 ENCOUNTER — HOSPITAL ENCOUNTER (OUTPATIENT)
Dept: PREADMISSION TESTING | Age: 75
Discharge: HOME | End: 2020-09-24
Attending: THORACIC SURGERY (CARDIOTHORACIC VASCULAR SURGERY)
Payer: MEDICARE

## 2020-09-24 VITALS
HEART RATE: 68 BPM | BODY MASS INDEX: 30.21 KG/M2 | WEIGHT: 227.96 LBS | SYSTOLIC BLOOD PRESSURE: 116 MMHG | HEIGHT: 73 IN | OXYGEN SATURATION: 100 % | TEMPERATURE: 98.5 F | RESPIRATION RATE: 16 BRPM | DIASTOLIC BLOOD PRESSURE: 62 MMHG

## 2020-09-24 LAB
ALBUMIN SERPL-MCNC: 4 G/DL (ref 3.5–5)
ALBUMIN/GLOB SERPL: 1 {RATIO} (ref 1.1–2.2)
ALP SERPL-CCNC: 105 U/L (ref 45–117)
ALT SERPL-CCNC: 14 U/L (ref 12–78)
ANION GAP SERPL CALC-SCNC: 2 MMOL/L (ref 5–15)
APPEARANCE UR: CLEAR
APTT PPP: 35.7 SEC (ref 22.1–32)
AST SERPL-CCNC: 21 U/L (ref 15–37)
BACTERIA URNS QL MICRO: NEGATIVE /HPF
BASOPHILS # BLD: 0 K/UL (ref 0–0.1)
BASOPHILS NFR BLD: 1 % (ref 0–1)
BILIRUB SERPL-MCNC: 0.9 MG/DL (ref 0.2–1)
BILIRUB UR QL: NEGATIVE
BNP SERPL-MCNC: 454 PG/ML
BUN SERPL-MCNC: 22 MG/DL (ref 6–20)
BUN/CREAT SERPL: 23 (ref 12–20)
CALCIUM SERPL-MCNC: 9.1 MG/DL (ref 8.5–10.1)
CHLORIDE SERPL-SCNC: 103 MMOL/L (ref 97–108)
CO2 SERPL-SCNC: 33 MMOL/L (ref 21–32)
COLOR UR: NORMAL
CREAT SERPL-MCNC: 0.94 MG/DL (ref 0.7–1.3)
DIFFERENTIAL METHOD BLD: ABNORMAL
EOSINOPHIL # BLD: 0.2 K/UL (ref 0–0.4)
EOSINOPHIL NFR BLD: 3 % (ref 0–7)
EPITH CASTS URNS QL MICRO: NORMAL /LPF
ERYTHROCYTE [DISTWIDTH] IN BLOOD BY AUTOMATED COUNT: 12.8 % (ref 11.5–14.5)
EST. AVERAGE GLUCOSE BLD GHB EST-MCNC: 97 MG/DL
GLOBULIN SER CALC-MCNC: 4.1 G/DL (ref 2–4)
GLUCOSE SERPL-MCNC: 85 MG/DL (ref 65–100)
GLUCOSE UR STRIP.AUTO-MCNC: NEGATIVE MG/DL
HBA1C MFR BLD: 5 % (ref 4–5.6)
HCT VFR BLD AUTO: 42 % (ref 36.6–50.3)
HGB BLD-MCNC: 13.7 G/DL (ref 12.1–17)
HGB UR QL STRIP: NEGATIVE
IMM GRANULOCYTES # BLD AUTO: 0 K/UL (ref 0–0.04)
IMM GRANULOCYTES NFR BLD AUTO: 0 % (ref 0–0.5)
INR PPP: 1.1 (ref 0.9–1.1)
KETONES UR QL STRIP.AUTO: NEGATIVE MG/DL
LEUKOCYTE ESTERASE UR QL STRIP.AUTO: NEGATIVE
LYMPHOCYTES # BLD: 1.9 K/UL (ref 0.8–3.5)
LYMPHOCYTES NFR BLD: 36 % (ref 12–49)
MAGNESIUM SERPL-MCNC: 2.2 MG/DL (ref 1.6–2.4)
MCH RBC QN AUTO: 32.8 PG (ref 26–34)
MCHC RBC AUTO-ENTMCNC: 32.6 G/DL (ref 30–36.5)
MCV RBC AUTO: 100.5 FL (ref 80–99)
MONOCYTES # BLD: 0.5 K/UL (ref 0–1)
MONOCYTES NFR BLD: 9 % (ref 5–13)
NEUTS SEG # BLD: 2.6 K/UL (ref 1.8–8)
NEUTS SEG NFR BLD: 51 % (ref 32–75)
NITRITE UR QL STRIP.AUTO: NEGATIVE
NRBC # BLD: 0 K/UL (ref 0–0.01)
NRBC BLD-RTO: 0 PER 100 WBC
PH UR STRIP: 6.5 [PH] (ref 5–8)
PLATELET # BLD AUTO: 159 K/UL (ref 150–400)
PMV BLD AUTO: 10.8 FL (ref 8.9–12.9)
POTASSIUM SERPL-SCNC: 3.8 MMOL/L (ref 3.5–5.1)
PROT SERPL-MCNC: 8.1 G/DL (ref 6.4–8.2)
PROT UR STRIP-MCNC: NEGATIVE MG/DL
PROTHROMBIN TIME: 11.5 SEC (ref 9–11.1)
RBC # BLD AUTO: 4.18 M/UL (ref 4.1–5.7)
RBC #/AREA URNS HPF: NORMAL /HPF (ref 0–5)
SODIUM SERPL-SCNC: 138 MMOL/L (ref 136–145)
SP GR UR REFRACTOMETRY: 1.01 (ref 1–1.03)
THERAPEUTIC RANGE,PTTT: ABNORMAL SECS (ref 58–77)
TSH SERPL DL<=0.05 MIU/L-ACNC: 1.21 UIU/ML (ref 0.36–3.74)
UA: UC IF INDICATED,UAUC: NORMAL
UROBILINOGEN UR QL STRIP.AUTO: 0.2 EU/DL (ref 0.2–1)
WBC # BLD AUTO: 5.1 K/UL (ref 4.1–11.1)
WBC URNS QL MICRO: NORMAL /HPF (ref 0–4)

## 2020-09-24 PROCEDURE — 81001 URINALYSIS AUTO W/SCOPE: CPT

## 2020-09-24 PROCEDURE — 83036 HEMOGLOBIN GLYCOSYLATED A1C: CPT

## 2020-09-24 PROCEDURE — 36415 COLL VENOUS BLD VENIPUNCTURE: CPT

## 2020-09-24 PROCEDURE — 84443 ASSAY THYROID STIM HORMONE: CPT

## 2020-09-24 PROCEDURE — 83880 ASSAY OF NATRIURETIC PEPTIDE: CPT

## 2020-09-24 PROCEDURE — 71046 X-RAY EXAM CHEST 2 VIEWS: CPT

## 2020-09-24 PROCEDURE — 85730 THROMBOPLASTIN TIME PARTIAL: CPT

## 2020-09-24 PROCEDURE — 85025 COMPLETE CBC W/AUTO DIFF WBC: CPT

## 2020-09-24 PROCEDURE — 80053 COMPREHEN METABOLIC PANEL: CPT

## 2020-09-24 PROCEDURE — 83735 ASSAY OF MAGNESIUM: CPT

## 2020-09-24 PROCEDURE — 86923 COMPATIBILITY TEST ELECTRIC: CPT

## 2020-09-24 PROCEDURE — 86900 BLOOD TYPING SEROLOGIC ABO: CPT

## 2020-09-24 PROCEDURE — 85610 PROTHROMBIN TIME: CPT

## 2020-09-24 RX ORDER — METOPROLOL TARTRATE 25 MG/1
25 TABLET, FILM COATED ORAL 2 TIMES DAILY
Status: CANCELLED | OUTPATIENT
Start: 2020-09-24

## 2020-09-24 RX ORDER — SODIUM CHLORIDE 0.9 % (FLUSH) 0.9 %
5-40 SYRINGE (ML) INJECTION AS NEEDED
Status: CANCELLED | OUTPATIENT
Start: 2020-09-24

## 2020-09-24 RX ORDER — SODIUM CHLORIDE, SODIUM LACTATE, POTASSIUM CHLORIDE, CALCIUM CHLORIDE 600; 310; 30; 20 MG/100ML; MG/100ML; MG/100ML; MG/100ML
25 INJECTION, SOLUTION INTRAVENOUS ONCE
Status: CANCELLED | OUTPATIENT
Start: 2020-10-02 | End: 2020-10-02

## 2020-09-24 RX ORDER — CEFAZOLIN SODIUM/WATER 2 G/20 ML
2 SYRINGE (ML) INTRAVENOUS ONCE
Status: CANCELLED | OUTPATIENT
Start: 2020-09-24 | End: 2020-09-24

## 2020-09-24 RX ORDER — AMIODARONE HYDROCHLORIDE 200 MG/1
400 TABLET ORAL EVERY 12 HOURS
Status: CANCELLED | OUTPATIENT
Start: 2020-09-24

## 2020-09-24 RX ORDER — GUAIFENESIN 100 MG/5ML
81 LIQUID (ML) ORAL DAILY
Status: CANCELLED | OUTPATIENT
Start: 2020-09-24

## 2020-09-24 RX ORDER — SODIUM CHLORIDE 0.9 % (FLUSH) 0.9 %
5-40 SYRINGE (ML) INJECTION EVERY 8 HOURS
Status: CANCELLED | OUTPATIENT
Start: 2020-09-24

## 2020-09-24 NOTE — PERIOP NOTES
Incentive Spirometer        Using the incentive spirometer helps expand the small air sacs of your lungs, helps you breathe deeply, and helps improve your lung function. Use your incentive spirometer twice a day (10 breaths each time) prior to surgery. How to Use Your Incentive Spirometer:  1. Hold the incentive spirometer in an upright position. 2. Breathe out as usual.   3. Place the mouthpiece in your mouth and seal your lips tightly around it. 4. Take a deep breath. Breathe in slowly and as deeply as possible. Keep the blue flow rate guide between the arrows. 5. Hold your breath as long as possible. Then exhale slowly and allow the piston to fall to the bottom of the column. 6. Rest for a few seconds and repeat steps one through five at least 10 times. PAT Tidal Volume________250________  x______3________  Date________9/24/2020_________    Sofya Graft THE INCENTIVE SPIROMETER WITH YOU TO THE HOSPITAL ON THE DAY OF YOUR SURGERY. Opportunity given to ask and answer questions as well as to observe return demonstration.     Patient signature_____________________________    Witness____________________________

## 2020-09-24 NOTE — PERIOP NOTES
[facility]  Preoperative Instructions        Surgery Date 10/2/2020          Time of Arrival [id number]  Contact# [phone]    1. On the day of your surgery, please report to the Surgical Services Registration Desk and sign in at your designated time. The Surgery Center is located to the right of the Emergency Room. 2. You must have someone with you to drive you home. You should not drive a car for 24 hours following surgery. Please make arrangements for a friend or family member to stay with you for the first 24 hours after your surgery. 3. Do not have anything to eat or drink (including water, gum, mints, coffee, juice) after midnight   10/1/2020  . ? This may not apply to medications prescribed by your physician. ?(Please note below the special instructions with medications to take the morning of your procedure.)    4. We recommend you do not drink any alcoholic beverages for 24 hours before and after your surgery. 5. Contact your surgeons office for instructions on the following medications: non-steroidal anti-inflammatory drugs (i.e. Advil, Aleve), vitamins, and supplements. (Some surgeons will want you to stop these medications prior to surgery and others may allow you to take them)  **If you are currently taking Plavix, Coumadin, Aspirin and/or other blood-thinning agents, contact your surgeon for instructions. ** Your surgeon will partner with the physician prescribing these medications to determine if it is safe to stop or if you need to continue taking. Please do not stop taking these medications without instructions from your surgeon    6. Wear comfortable clothes. Wear glasses instead of contacts. Do not bring any money or jewelry. Please bring picture ID, insurance card, and any prearranged co-payment or hospital payment. Do not wear make-up, particularly mascara the morning of your surgery.   Do not wear nail polish, particularly if you are having foot /hand surgery. Wear your hair loose or down, no ponytails, buns, estella pins or clips. All body piercings must be removed. Please shower with antibacterial soap for three consecutive days before and on the morning of surgery, but do not apply any lotions, powders or deodorants after the shower on the day of surgery. Please use a fresh towels after each shower. Please sleep in clean clothes and change bed linens the night before surgery. Please do not shave for 48 hours prior to surgery. Shaving of the face is acceptable. 7. You should understand that if you do not follow these instructions your surgery may be cancelled. If your physical condition changes (I.e. fever, cold or flu) please contact your surgeon as soon as possible. 8. It is important that you be on time. If a situation occurs where you may be late, please call [phone] (OR Holding Area). 9. If you have any questions and or problems, please call [phone] (Pre-admission Testing). 10. Your surgery time may be subject to change. You will receive a phone call the evening prior if your time changes. 11.  If having outpatient surgery, you must have someone to drive you here, stay with you during the duration of your stay, and to drive you home at time of discharge. Special Instructions: Follow all instructions your doctor has given you . Stop Eliquis 9/29/20  Use your incentive spirometer everyday 20x a day. Covid Testing 9/28/20 arrive between 7am - 11:30am [facility] ([address] [place] [address]) We recommend you self quarantine from time of testing til day of surgery. TAKE ALL MEDICATIONS DAY OF SURGERY EXCEPT:    Stop Eliquis 9/29/20         I understand a pre-operative phone call will be made to verify my surgery time. In the event that I am not available, I give permission for a message to be left on my answering service and/or with another person?   yes         ___________________      __________   _________    Tonia Shepard Patient)             (Witness)                (Date and Time)

## 2020-09-24 NOTE — PERIOP NOTES
PAT appointment with patient - reviewed all teaching and instructions with no further questions. Areli Wagner  In to see patient and consent reviewed and signed. Anesthesia in to see patient - procedure reviewed with patient with no further questions. Covid Testing 9/29/20 - set appointment for 11:45am - wrote on instructions arrive between 7am - 11:30am to give him time for his  to make arrangements.

## 2020-09-24 NOTE — H&P
[facility]   History and Physical    Subjective: This is an updated H&P, since his last visit, he developed cellulitis in his LE. This was treated with Keflex and looks much better today. Pt reports LE edema is at baseline today. Heather Mccoy is a 70 y.o. male with a history of mild intellectual disability, CHB s/p [employer] Bi-V PPM (2015), persistent atrial fibrillation (on Eliquis), and seizure disorder (last prior to 2017) who was referred for cardiac surgery evaluation by Dr. Lobo Gonzalez. He presents today with a care giver. About a year ago, he was found to be in persistent atrial fibrillation. Further, it was noted that his EF had decreased. He was started on Eliquis and referred for ablation that was ultimately cancelled due to cellulitis/LE edema on his legs. This has greatly improved since he has been followed by a lymphedema clinic. Otherwise he denies any issues with shortness of breath, dizziness, or pre-syncope.     Prior to March he was active and working as a . Currently he walks daily and is independent, but does live in a group home.      Cardiac Testing     ECHO:  · LV: Normal cavity size and wall thickness. Mild global systolic function. Estimated left ventricular ejection fraction is 45 - 50%. · LA: Mildly dilated left atrium. · RA: Mildly dilated right atrium. · MV: Mild mitral valve regurgitation is present. · PA: Pulmonary arterial systolic pressure is 17 mmHg.     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)     Other ill-defined conditions(799.89)     edema legs    S/P biventricular cardiac pacemaker procedure 10/23/2015    10/23/15 [employer] biventricular pacemaker inmplant    Screen for colon cancer 9/27/2012     Past Surgical History: Procedure Laterality Date    HX COLONOSCOPY  04/05/2017    HX ORTHOPAEDIC Right 12/22/2017    ORIF right distal radius    HX PACEMAKER  2015    bi ventricular pacemaker       Social History     Tobacco Use    Smoking status: Never Smoker    Smokeless tobacco: Never Used   Substance Use Topics    Alcohol use: No      Family History   Problem Relation Age of Onset    Other Other         unable to obtain due to mental status    Cancer Mother         unsure type    Heart Attack Father     Heart Disease Father     Lung Disease Father     No Known Problems Sister      Prior to Admission medications    Medication Sig Start Date End Date Taking? Authorizing Provider   lacosamide (Vimpat) 100 mg tab tablet Take 1 Tab by mouth two (2) times a day. Max Daily Amount: 200 mg. 9/4/20   Flynn Dang MD   metoprolol tartrate (LOPRESSOR) 25 mg tablet TAKE 1 TABLET BY MOUTH TWICE A DAY 9/2/20   Alida Kevin DO   Eliquis 5 mg tablet TAKE 1 TABLET BY MOUTH TWICE A DAY 9/1/20   Ivet Hartley, RUSSEL   potassium chloride (K-DUR, KLOR-CON) 20 mEq tablet TAKE 1 TABLET BY MOUTH DAILY 8/4/20   Jennifer Donahue MD   bumetanide (BUMEX) 2 mg tablet TAKE 1 TABLET BY MOUTH DAILY 8/4/20   Jennifer Donahue MD   levETIRAcetam 1,000 mg tablet Take 1 Tab by mouth two (2) times a day. 3/4/20   Flynn Dang MD   cholecalciferol (VITAMIN D3) (1000 Units /25 mcg) tablet TAKE 1 TABLET BY MOUTH DAILY 2/4/20   Teddy Woodward DO       Allergies   Allergen Reactions    Sulfa (Sulfonamide Antibiotics) Rash     Review of Systems:   Consititutional: Denies fever or chills. Eyes:  Denies use of glasses or vision problems(cataracts). ENT:  Denies hearing or swallowing difficulty. CV: Denies CP, HTN. Resp: Denies dyspnea, productive cough. : Denies dialysis or kidney problems. GI: Denies ulcers, esophageal strictures, liver problems. M/S: Denies joint or bone problems, or implanted artificial hardware.   Skin: See HPI  Neuro: Denies strokes, or TIAs. Psych: Denies anxiety or depression. Endocrine: Denies thyroid problems or diabetes. Heme/Lymphatic: Denies easy bruising or lymphedema. Objective:     VS:   BP: 116/62  WV: 68 bpm  Temperature: 98.5 F  Height: 6' 0.5\"  Weight: 227 lb 15.3 oz  SpO2: 100 %    Physical Exam:    General appearance: alert, cooperative, no distress  Head: normocephalic, without obvious abnormality; atraumatic  Eyes: conjunctivae/corneas clear; EOM's intact. Nose: nares normal; no drainage. Neck: no carotid bruit and no JVD  Lungs: clear to auscultation bilaterally  Heart: regular rate and rhythm; no murmur  Abdomen: soft, non-tender; bowel sounds normal  Extremities: moves all extremities; no weakness. Skin: Skin color normal; 2+ edema, mild erythema  Neurologic: Grossly normal      Labs:   Recent Labs     09/24/20  1039   WBC 5.1   HGB 13.7   HCT 42.0         K 3.8   BUN 22*   CREA 0.94   GLU 85   INR 1.1       Assessment:     Patient Active Problem List   Diagnosis Code    Bradycardia R00.1    Seizure disorder ([facility] [address] G40.909    Bilateral lower extremity edema R60.0    Hypertension I10    Mobitz type II incomplete atrioventricular block I44.1    Intellectual disability F79    Advance care planning Z71.89    S/P biventricular cardiac pacemaker procedure Z95.0    Lymphedema of both lower extremities I89.0    Vitamin D deficiency E55.9    Vitamin B12 deficiency E53.8    History of closed Colles' fracture Z87.81    H/O Mobitz type II block Z86.79     Plan:   The risk and benefit of surgery were reviewed with patient and family and all questions answered and the patient wishes to proceed. Risk include infection, bleeding, stroke, heart attack, irregular heart rhythm, kidney failure and death. The patient was given instructions and prescriptions for bactroban and peridex. The patient was instructed to stop eliquis. Surgery is scheduled for 10/2/20.     STS Risk Calculator unavailable    Treatment Plan:    1. Persistent Afib: On Eliquis. Even though he denies symptoms, EF and LE edema would likely improve if he was able to convert to NSR. Surgical planning per Dr. Kalyee Monahan. 2. Hx CHB s/p [employer] Bi-V PPM 2015: Followed by Dr. Martha Rodriguez  3. Hx seizure disorder: On lacosamide, levetiracetam  4. Lymphedema/LE edema: On bumex/compression stockings. Followed by lymphedema clinic  5. HTN: On BB  6. Intellectual disability: Mild, lives mostly independently in group home.       Signed By: MONISHA Hutchins     September 25, 2020

## 2020-09-25 LAB
BACTERIA SPEC CULT: ABNORMAL
BACTERIA SPEC CULT: ABNORMAL
SERVICE CMNT-IMP: ABNORMAL

## 2020-09-25 RX ORDER — CHLORHEXIDINE GLUCONATE 1.2 MG/ML
15 RINSE ORAL EVERY 12 HOURS
Qty: 473 ML | Refills: 0 | Status: SHIPPED | OUTPATIENT
Start: 2020-09-29 | End: 2020-10-02

## 2020-09-25 RX ORDER — MUPIROCIN 20 MG/G
OINTMENT TOPICAL 2 TIMES DAILY
Qty: 22 G | Refills: 0 | Status: SHIPPED | OUTPATIENT
Start: 2020-09-29 | End: 2020-10-02

## 2020-09-28 ENCOUNTER — HOSPITAL ENCOUNTER (OUTPATIENT)
Dept: PREADMISSION TESTING | Age: 75
Discharge: HOME | End: 2020-09-28
Payer: MEDICARE

## 2020-09-28 PROCEDURE — 87635 SARS-COV-2 COVID-19 AMP PRB: CPT

## 2020-09-29 LAB
HEALTH STATUS, XMCV2T: NORMAL
SARS-COV-2, COV2NT: NOT DETECTED
SOURCE, COVRS: NORMAL
SPECIMEN SOURCE, FCOV2M: NORMAL
SPECIMEN TYPE, XMCV1T: NORMAL

## 2020-10-01 RX ORDER — NITROGLYCERIN 20 MG/100ML
0-200 INJECTION INTRAVENOUS
Status: DISCONTINUED | OUTPATIENT
Start: 2020-10-02 | End: 2020-10-02 | Stop reason: HOSPADM

## 2020-10-01 RX ORDER — PHENYLEPHRINE 10 MG/250 ML(40 MCG/ML)IN 0.9 % SOD.CHLORIDE INTRAVENOUS
10-100
Status: DISCONTINUED | OUTPATIENT
Start: 2020-10-02 | End: 2020-10-02 | Stop reason: HOSPADM

## 2020-10-02 ENCOUNTER — ANESTHESIA (OUTPATIENT)
Dept: SURGERY | Age: 75
End: 2020-10-02
Payer: MEDICARE

## 2020-10-02 ENCOUNTER — ANESTHESIA EVENT (OUTPATIENT)
Dept: SURGERY | Age: 75
End: 2020-10-02
Payer: MEDICARE

## 2020-10-02 ENCOUNTER — APPOINTMENT (OUTPATIENT)
Dept: GENERAL RADIOLOGY | Age: 75
End: 2020-10-02
Attending: THORACIC SURGERY (CARDIOTHORACIC VASCULAR SURGERY)
Payer: MEDICARE

## 2020-10-02 ENCOUNTER — HOSPITAL ENCOUNTER (OUTPATIENT)
Age: 75
Discharge: HOME | End: 2020-10-02
Attending: THORACIC SURGERY (CARDIOTHORACIC VASCULAR SURGERY) | Admitting: THORACIC SURGERY (CARDIOTHORACIC VASCULAR SURGERY)
Payer: MEDICARE

## 2020-10-02 ENCOUNTER — HOSPITAL ENCOUNTER (OUTPATIENT)
Dept: NON INVASIVE DIAGNOSTICS | Age: 75
Discharge: HOME | End: 2020-10-02
Attending: THORACIC SURGERY (CARDIOTHORACIC VASCULAR SURGERY)

## 2020-10-02 VITALS
HEIGHT: 72 IN | OXYGEN SATURATION: 100 % | WEIGHT: 230.38 LBS | SYSTOLIC BLOOD PRESSURE: 112 MMHG | RESPIRATION RATE: 16 BRPM | TEMPERATURE: 98.5 F | HEART RATE: 68 BPM | DIASTOLIC BLOOD PRESSURE: 65 MMHG | BODY MASS INDEX: 31.2 KG/M2

## 2020-10-02 DIAGNOSIS — I48.91 ATRIAL FIBRILLATION, UNSPECIFIED TYPE (HCC): ICD-10-CM

## 2020-10-02 LAB
HISTORY CHECKED?,CKHIST: NORMAL
INR PPP: 1 (ref 0.9–1.1)
PROTHROMBIN TIME: 10.4 SEC (ref 9–11.1)

## 2020-10-02 PROCEDURE — 74011250636 HC RX REV CODE- 250/636: Performed by: ANESTHESIOLOGY

## 2020-10-02 PROCEDURE — 99218 PR INITIAL OBSERVATION CARE/DAY 30 MINUTES: CPT | Performed by: PHYSICIAN ASSISTANT

## 2020-10-02 PROCEDURE — 74011250636 HC RX REV CODE- 250/636: Performed by: NURSE ANESTHETIST, CERTIFIED REGISTERED

## 2020-10-02 PROCEDURE — 74011250637 HC RX REV CODE- 250/637: Performed by: THORACIC SURGERY (CARDIOTHORACIC VASCULAR SURGERY)

## 2020-10-02 PROCEDURE — 77030026438 HC STYL ET INTUB CARD -A: Performed by: NURSE ANESTHETIST, CERTIFIED REGISTERED

## 2020-10-02 PROCEDURE — 77030018729 HC ELECTRD DEFIB PAD CARD -B: Performed by: THORACIC SURGERY (CARDIOTHORACIC VASCULAR SURGERY)

## 2020-10-02 PROCEDURE — 76010000113 HC CV SURG 1 TO 1.5 HR: Performed by: THORACIC SURGERY (CARDIOTHORACIC VASCULAR SURGERY)

## 2020-10-02 PROCEDURE — 74011250636 HC RX REV CODE- 250/636: Performed by: NURSE PRACTITIONER

## 2020-10-02 PROCEDURE — 76060000033 HC ANESTHESIA 1 TO 1.5 HR: Performed by: THORACIC SURGERY (CARDIOTHORACIC VASCULAR SURGERY)

## 2020-10-02 PROCEDURE — 74011000250 HC RX REV CODE- 250: Performed by: THORACIC SURGERY (CARDIOTHORACIC VASCULAR SURGERY)

## 2020-10-02 PROCEDURE — 85610 PROTHROMBIN TIME: CPT

## 2020-10-02 PROCEDURE — 77030020061 HC IV BLD WRMR ADMIN SET 3M -B: Performed by: ANESTHESIOLOGY

## 2020-10-02 PROCEDURE — 77030034404 HC DEV ABLAT CARD EPISENSE ATRC -K3: Performed by: THORACIC SURGERY (CARDIOTHORACIC VASCULAR SURGERY)

## 2020-10-02 PROCEDURE — 90000 NO LOS: CPT | Performed by: THORACIC SURGERY (CARDIOTHORACIC VASCULAR SURGERY)

## 2020-10-02 PROCEDURE — 77030031139 HC SUT VCRL2 J&J -A: Performed by: THORACIC SURGERY (CARDIOTHORACIC VASCULAR SURGERY)

## 2020-10-02 PROCEDURE — 77030040504 HC DRN WND MDII -B: Performed by: THORACIC SURGERY (CARDIOTHORACIC VASCULAR SURGERY)

## 2020-10-02 PROCEDURE — 77030018846 HC SOL IRR STRL H20 ICUM -A: Performed by: THORACIC SURGERY (CARDIOTHORACIC VASCULAR SURGERY)

## 2020-10-02 PROCEDURE — 77030008771 HC TU NG SALEM SUMP -A: Performed by: NURSE ANESTHETIST, CERTIFIED REGISTERED

## 2020-10-02 PROCEDURE — C1751 CATH, INF, PER/CENT/MIDLINE: HCPCS | Performed by: NURSE ANESTHETIST, CERTIFIED REGISTERED

## 2020-10-02 PROCEDURE — 77030011264 HC ELECTRD BLD EXT COVD -A: Performed by: THORACIC SURGERY (CARDIOTHORACIC VASCULAR SURGERY)

## 2020-10-02 PROCEDURE — 77030016151 HC PROTCTR LNS DFOG COVD -B: Performed by: THORACIC SURGERY (CARDIOTHORACIC VASCULAR SURGERY)

## 2020-10-02 PROCEDURE — 77030010507 HC ADH SKN DERMBND J&J -B: Performed by: THORACIC SURGERY (CARDIOTHORACIC VASCULAR SURGERY)

## 2020-10-02 PROCEDURE — 36415 COLL VENOUS BLD VENIPUNCTURE: CPT

## 2020-10-02 PROCEDURE — 77030005513 HC CATH URETH FOL11 MDII -B: Performed by: THORACIC SURGERY (CARDIOTHORACIC VASCULAR SURGERY)

## 2020-10-02 PROCEDURE — 77030002996 HC SUT SLK J&J -A: Performed by: THORACIC SURGERY (CARDIOTHORACIC VASCULAR SURGERY)

## 2020-10-02 PROCEDURE — 2709999900 HC NON-CHARGEABLE SUPPLY: Performed by: THORACIC SURGERY (CARDIOTHORACIC VASCULAR SURGERY)

## 2020-10-02 PROCEDURE — 74011000250 HC RX REV CODE- 250: Performed by: NURSE ANESTHETIST, CERTIFIED REGISTERED

## 2020-10-02 PROCEDURE — 77030018843 HC SOL IRR SOD CL 9% SLSH BAXT -B: Performed by: THORACIC SURGERY (CARDIOTHORACIC VASCULAR SURGERY)

## 2020-10-02 PROCEDURE — 76210000016 HC OR PH I REC 1 TO 1.5 HR: Performed by: THORACIC SURGERY (CARDIOTHORACIC VASCULAR SURGERY)

## 2020-10-02 PROCEDURE — 77030008684 HC TU ET CUF COVD -B: Performed by: NURSE ANESTHETIST, CERTIFIED REGISTERED

## 2020-10-02 PROCEDURE — 76000 FLUOROSCOPY <1 HR PHYS/QHP: CPT

## 2020-10-02 PROCEDURE — 77030011220 HC DEV ELECSURG COVD -B: Performed by: THORACIC SURGERY (CARDIOTHORACIC VASCULAR SURGERY)

## 2020-10-02 PROCEDURE — 77030041076 HC DRSG AG OPTICELL MDII -A: Performed by: THORACIC SURGERY (CARDIOTHORACIC VASCULAR SURGERY)

## 2020-10-02 PROCEDURE — 82962 GLUCOSE BLOOD TEST: CPT

## 2020-10-02 PROCEDURE — 77030005401 HC CATH RAD ARRO -A: Performed by: NURSE ANESTHETIST, CERTIFIED REGISTERED

## 2020-10-02 PROCEDURE — 76210000021 HC REC RM PH II 0.5 TO 1 HR: Performed by: THORACIC SURGERY (CARDIOTHORACIC VASCULAR SURGERY)

## 2020-10-02 PROCEDURE — 77030003029 HC SUT VCRL J&J -B: Performed by: THORACIC SURGERY (CARDIOTHORACIC VASCULAR SURGERY)

## 2020-10-02 PROCEDURE — 77030013567 HC DRN WND RESERV BARD -A: Performed by: THORACIC SURGERY (CARDIOTHORACIC VASCULAR SURGERY)

## 2020-10-02 PROCEDURE — 77030018673: Performed by: THORACIC SURGERY (CARDIOTHORACIC VASCULAR SURGERY)

## 2020-10-02 PROCEDURE — 77030002933 HC SUT MCRYL J&J -A: Performed by: THORACIC SURGERY (CARDIOTHORACIC VASCULAR SURGERY)

## 2020-10-02 PROCEDURE — 74011250636 HC RX REV CODE- 250/636: Performed by: PHYSICIAN ASSISTANT

## 2020-10-02 PROCEDURE — 77030020263 HC SOL INJ SOD CL0.9% LFCR 1000ML: Performed by: THORACIC SURGERY (CARDIOTHORACIC VASCULAR SURGERY)

## 2020-10-02 PROCEDURE — 77030020256 HC SOL INJ NACL 0.9%  500ML: Performed by: THORACIC SURGERY (CARDIOTHORACIC VASCULAR SURGERY)

## 2020-10-02 RX ORDER — POTASSIUM CHLORIDE 20 MEQ/1
20 TABLET, EXTENDED RELEASE ORAL DAILY
Status: CANCELLED | OUTPATIENT
Start: 2020-10-03

## 2020-10-02 RX ORDER — ALBUMIN HUMAN 50 G/1000ML
12.5 SOLUTION INTRAVENOUS
Status: CANCELLED | OUTPATIENT
Start: 2020-10-02

## 2020-10-02 RX ORDER — POLYETHYLENE GLYCOL 3350 17 G/17G
17 POWDER, FOR SOLUTION ORAL DAILY
Status: CANCELLED | OUTPATIENT
Start: 2020-10-03

## 2020-10-02 RX ORDER — METOPROLOL TARTRATE 25 MG/1
25 TABLET, FILM COATED ORAL 2 TIMES DAILY
Status: DISCONTINUED | OUTPATIENT
Start: 2020-10-02 | End: 2020-10-02 | Stop reason: HOSPADM

## 2020-10-02 RX ORDER — POTASSIUM CHLORIDE 29.8 MG/ML
20 INJECTION INTRAVENOUS
Status: CANCELLED | OUTPATIENT
Start: 2020-10-02 | End: 2020-10-03

## 2020-10-02 RX ORDER — ONDANSETRON 2 MG/ML
INJECTION INTRAMUSCULAR; INTRAVENOUS AS NEEDED
Status: DISCONTINUED | OUTPATIENT
Start: 2020-10-02 | End: 2020-10-02 | Stop reason: HOSPADM

## 2020-10-02 RX ORDER — ACETAMINOPHEN 325 MG/1
650 TABLET ORAL EVERY 4 HOURS
Status: CANCELLED | OUTPATIENT
Start: 2020-10-02 | End: 2020-10-03

## 2020-10-02 RX ORDER — BUPIVACAINE HYDROCHLORIDE 5 MG/ML
30 INJECTION, SOLUTION EPIDURAL; INTRACAUDAL ONCE
Status: DISCONTINUED | OUTPATIENT
Start: 2020-10-02 | End: 2020-10-02 | Stop reason: HOSPADM

## 2020-10-02 RX ORDER — HYDROMORPHONE HYDROCHLORIDE 1 MG/ML
0.5 INJECTION, SOLUTION INTRAMUSCULAR; INTRAVENOUS; SUBCUTANEOUS
Status: CANCELLED | OUTPATIENT
Start: 2020-10-02

## 2020-10-02 RX ORDER — NALOXONE HYDROCHLORIDE 0.4 MG/ML
0.4 INJECTION, SOLUTION INTRAMUSCULAR; INTRAVENOUS; SUBCUTANEOUS AS NEEDED
Status: CANCELLED | OUTPATIENT
Start: 2020-10-02

## 2020-10-02 RX ORDER — MUPIROCIN 20 MG/G
OINTMENT TOPICAL 2 TIMES DAILY
Status: CANCELLED | OUTPATIENT
Start: 2020-10-02 | End: 2020-10-07

## 2020-10-02 RX ORDER — ROCURONIUM BROMIDE 10 MG/ML
INJECTION, SOLUTION INTRAVENOUS AS NEEDED
Status: DISCONTINUED | OUTPATIENT
Start: 2020-10-02 | End: 2020-10-02 | Stop reason: HOSPADM

## 2020-10-02 RX ORDER — AMOXICILLIN 250 MG
1 CAPSULE ORAL 2 TIMES DAILY
Status: CANCELLED | OUTPATIENT
Start: 2020-10-03

## 2020-10-02 RX ORDER — SODIUM CHLORIDE 450 MG/100ML
10 INJECTION, SOLUTION INTRAVENOUS CONTINUOUS
Status: CANCELLED | OUTPATIENT
Start: 2020-10-02

## 2020-10-02 RX ORDER — MIDAZOLAM HYDROCHLORIDE 1 MG/ML
1 INJECTION, SOLUTION INTRAMUSCULAR; INTRAVENOUS
Status: CANCELLED | OUTPATIENT
Start: 2020-10-02

## 2020-10-02 RX ORDER — PANTOPRAZOLE SODIUM 40 MG/1
40 TABLET, DELAYED RELEASE ORAL
Status: CANCELLED | OUTPATIENT
Start: 2020-10-03

## 2020-10-02 RX ORDER — OXYCODONE HYDROCHLORIDE 5 MG/1
5 TABLET ORAL
Status: CANCELLED | OUTPATIENT
Start: 2020-10-02

## 2020-10-02 RX ORDER — LACOSAMIDE 100 MG/1
100 TABLET ORAL 2 TIMES DAILY
Status: CANCELLED | OUTPATIENT
Start: 2020-10-02

## 2020-10-02 RX ORDER — LIDOCAINE HYDROCHLORIDE 20 MG/ML
INJECTION, SOLUTION EPIDURAL; INFILTRATION; INTRACAUDAL; PERINEURAL AS NEEDED
Status: DISCONTINUED | OUTPATIENT
Start: 2020-10-02 | End: 2020-10-02 | Stop reason: HOSPADM

## 2020-10-02 RX ORDER — MAGNESIUM SULFATE 1 G/100ML
1 INJECTION INTRAVENOUS AS NEEDED
Status: CANCELLED | OUTPATIENT
Start: 2020-10-02

## 2020-10-02 RX ORDER — SODIUM CHLORIDE 0.9 % (FLUSH) 0.9 %
5-40 SYRINGE (ML) INJECTION EVERY 8 HOURS
Status: CANCELLED | OUTPATIENT
Start: 2020-10-02

## 2020-10-02 RX ORDER — OXYCODONE HYDROCHLORIDE 5 MG/1
10 TABLET ORAL
Status: CANCELLED | OUTPATIENT
Start: 2020-10-02

## 2020-10-02 RX ORDER — BACITRACIN 500 UNIT/G
1 PACKET (EA) TOPICAL AS NEEDED
Status: CANCELLED | OUTPATIENT
Start: 2020-10-02

## 2020-10-02 RX ORDER — CHLORHEXIDINE GLUCONATE 1.2 MG/ML
10 RINSE ORAL EVERY 12 HOURS
Status: CANCELLED | OUTPATIENT
Start: 2020-10-02

## 2020-10-02 RX ORDER — FENTANYL CITRATE 50 UG/ML
INJECTION, SOLUTION INTRAMUSCULAR; INTRAVENOUS AS NEEDED
Status: DISCONTINUED | OUTPATIENT
Start: 2020-10-02 | End: 2020-10-02 | Stop reason: HOSPADM

## 2020-10-02 RX ORDER — LANOLIN ALCOHOL/MO/W.PET/CERES
400 CREAM (GRAM) TOPICAL 2 TIMES DAILY
Status: CANCELLED | OUTPATIENT
Start: 2020-10-03

## 2020-10-02 RX ORDER — HYDROMORPHONE HYDROCHLORIDE 1 MG/ML
1 INJECTION, SOLUTION INTRAMUSCULAR; INTRAVENOUS; SUBCUTANEOUS
Status: CANCELLED | OUTPATIENT
Start: 2020-10-02

## 2020-10-02 RX ORDER — METOPROLOL TARTRATE 5 MG/5ML
INJECTION INTRAVENOUS AS NEEDED
Status: DISCONTINUED | OUTPATIENT
Start: 2020-10-02 | End: 2020-10-02 | Stop reason: HOSPADM

## 2020-10-02 RX ORDER — SODIUM CHLORIDE 0.9 % (FLUSH) 0.9 %
5-40 SYRINGE (ML) INJECTION EVERY 8 HOURS
Status: DISCONTINUED | OUTPATIENT
Start: 2020-10-02 | End: 2020-10-02 | Stop reason: HOSPADM

## 2020-10-02 RX ORDER — ONDANSETRON 2 MG/ML
4 INJECTION INTRAMUSCULAR; INTRAVENOUS
Status: CANCELLED | OUTPATIENT
Start: 2020-10-02

## 2020-10-02 RX ORDER — MELATONIN
1000 DAILY
Status: CANCELLED | OUTPATIENT
Start: 2020-10-02

## 2020-10-02 RX ORDER — WARFARIN SODIUM 5 MG/1
5 TABLET ORAL
Qty: 30 TAB | Refills: 2 | Status: SHIPPED | OUTPATIENT
Start: 2020-10-02 | End: 2020-11-29

## 2020-10-02 RX ORDER — GUAIFENESIN 100 MG/5ML
81 LIQUID (ML) ORAL DAILY
Status: DISCONTINUED | OUTPATIENT
Start: 2020-10-02 | End: 2020-10-02 | Stop reason: HOSPADM

## 2020-10-02 RX ORDER — PROPOFOL 10 MG/ML
INJECTION, EMULSION INTRAVENOUS AS NEEDED
Status: DISCONTINUED | OUTPATIENT
Start: 2020-10-02 | End: 2020-10-02 | Stop reason: HOSPADM

## 2020-10-02 RX ORDER — ENOXAPARIN SODIUM 100 MG/ML
1 INJECTION SUBCUTANEOUS EVERY 12 HOURS
Qty: 14 SYRINGE | Refills: 2 | Status: SHIPPED | OUTPATIENT
Start: 2020-10-02 | End: 2020-10-29

## 2020-10-02 RX ORDER — ENOXAPARIN SODIUM 150 MG/ML
105 INJECTION SUBCUTANEOUS ONCE
Status: COMPLETED | OUTPATIENT
Start: 2020-10-02 | End: 2020-10-02

## 2020-10-02 RX ORDER — LEVETIRACETAM 500 MG/1
1000 TABLET ORAL 2 TIMES DAILY
Status: CANCELLED | OUTPATIENT
Start: 2020-10-02

## 2020-10-02 RX ORDER — FACIAL-BODY WIPES
10 EACH TOPICAL DAILY PRN
Status: CANCELLED | OUTPATIENT
Start: 2020-10-02

## 2020-10-02 RX ORDER — SODIUM CHLORIDE, SODIUM LACTATE, POTASSIUM CHLORIDE, CALCIUM CHLORIDE 600; 310; 30; 20 MG/100ML; MG/100ML; MG/100ML; MG/100ML
INJECTION, SOLUTION INTRAVENOUS
Status: DISCONTINUED | OUTPATIENT
Start: 2020-10-02 | End: 2020-10-02 | Stop reason: HOSPADM

## 2020-10-02 RX ORDER — BUMETANIDE 1 MG/1
2 TABLET ORAL DAILY
Status: CANCELLED | OUTPATIENT
Start: 2020-10-03

## 2020-10-02 RX ORDER — SODIUM CHLORIDE 0.9 % (FLUSH) 0.9 %
5-40 SYRINGE (ML) INJECTION AS NEEDED
Status: DISCONTINUED | OUTPATIENT
Start: 2020-10-02 | End: 2020-10-02 | Stop reason: HOSPADM

## 2020-10-02 RX ORDER — AMIODARONE HYDROCHLORIDE 200 MG/1
400 TABLET ORAL EVERY 12 HOURS
Status: DISCONTINUED | OUTPATIENT
Start: 2020-10-02 | End: 2020-10-02 | Stop reason: HOSPADM

## 2020-10-02 RX ORDER — LANOLIN ALCOHOL/MO/W.PET/CERES
3 CREAM (GRAM) TOPICAL
Status: CANCELLED | OUTPATIENT
Start: 2020-10-02

## 2020-10-02 RX ORDER — ALBUTEROL SULFATE 0.83 MG/ML
2.5 SOLUTION RESPIRATORY (INHALATION)
Status: CANCELLED | OUTPATIENT
Start: 2020-10-02

## 2020-10-02 RX ORDER — SODIUM CHLORIDE 9 MG/ML
250 INJECTION, SOLUTION INTRAVENOUS AS NEEDED
Status: DISCONTINUED | OUTPATIENT
Start: 2020-10-02 | End: 2020-10-02 | Stop reason: HOSPADM

## 2020-10-02 RX ORDER — SODIUM CHLORIDE 9 MG/ML
9 INJECTION, SOLUTION INTRAVENOUS CONTINUOUS
Status: CANCELLED | OUTPATIENT
Start: 2020-10-02

## 2020-10-02 RX ORDER — SUCCINYLCHOLINE CHLORIDE 20 MG/ML
INJECTION INTRAMUSCULAR; INTRAVENOUS AS NEEDED
Status: DISCONTINUED | OUTPATIENT
Start: 2020-10-02 | End: 2020-10-02 | Stop reason: HOSPADM

## 2020-10-02 RX ORDER — SODIUM CHLORIDE, SODIUM LACTATE, POTASSIUM CHLORIDE, CALCIUM CHLORIDE 600; 310; 30; 20 MG/100ML; MG/100ML; MG/100ML; MG/100ML
25 INJECTION, SOLUTION INTRAVENOUS ONCE
Status: COMPLETED | OUTPATIENT
Start: 2020-10-02 | End: 2020-10-02

## 2020-10-02 RX ORDER — DEXTROSE 50 % IN WATER (D50W) INTRAVENOUS SYRINGE
12.5-25 AS NEEDED
Status: CANCELLED | OUTPATIENT
Start: 2020-10-02

## 2020-10-02 RX ORDER — DIPHENHYDRAMINE HYDROCHLORIDE 50 MG/ML
25 INJECTION, SOLUTION INTRAMUSCULAR; INTRAVENOUS
Status: CANCELLED | OUTPATIENT
Start: 2020-10-02

## 2020-10-02 RX ORDER — DIPHENHYDRAMINE HCL 25 MG
25 CAPSULE ORAL
Status: CANCELLED | OUTPATIENT
Start: 2020-10-02

## 2020-10-02 RX ORDER — SODIUM CHLORIDE 0.9 % (FLUSH) 0.9 %
5-40 SYRINGE (ML) INJECTION AS NEEDED
Status: CANCELLED | OUTPATIENT
Start: 2020-10-02

## 2020-10-02 RX ORDER — GUAIFENESIN 100 MG/5ML
81 LIQUID (ML) ORAL DAILY
Status: CANCELLED | OUTPATIENT
Start: 2020-10-03

## 2020-10-02 RX ORDER — INSULIN LISPRO 100 [IU]/ML
INJECTION, SOLUTION INTRAVENOUS; SUBCUTANEOUS
Status: CANCELLED | OUTPATIENT
Start: 2020-10-02

## 2020-10-02 RX ORDER — MAGNESIUM SULFATE 100 %
4 CRYSTALS MISCELLANEOUS AS NEEDED
Status: CANCELLED | OUTPATIENT
Start: 2020-10-02

## 2020-10-02 RX ADMIN — Medication 3 AMPULE: at 06:43

## 2020-10-02 RX ADMIN — ENOXAPARIN SODIUM 105 MG: 120 INJECTION SUBCUTANEOUS at 11:29

## 2020-10-02 RX ADMIN — ONDANSETRON HYDROCHLORIDE 4 MG: 2 INJECTION, SOLUTION INTRAMUSCULAR; INTRAVENOUS at 08:43

## 2020-10-02 RX ADMIN — METOPROLOL TARTRATE 5 MG: 5 INJECTION, SOLUTION INTRAVENOUS at 08:56

## 2020-10-02 RX ADMIN — SODIUM CHLORIDE, SODIUM LACTATE, POTASSIUM CHLORIDE, AND CALCIUM CHLORIDE 25 ML/HR: 600; 310; 30; 20 INJECTION, SOLUTION INTRAVENOUS at 06:43

## 2020-10-02 RX ADMIN — FENTANYL CITRATE 100 MCG: 50 INJECTION, SOLUTION INTRAMUSCULAR; INTRAVENOUS at 08:14

## 2020-10-02 RX ADMIN — SUGAMMADEX 200 MG: 100 INJECTION, SOLUTION INTRAVENOUS at 08:50

## 2020-10-02 RX ADMIN — VANCOMYCIN HYDROCHLORIDE 1000 MG: 1 INJECTION, POWDER, LYOPHILIZED, FOR SOLUTION INTRAVENOUS at 07:16

## 2020-10-02 RX ADMIN — ROCURONIUM BROMIDE 5 MG: 10 INJECTION INTRAVENOUS at 08:16

## 2020-10-02 RX ADMIN — ROCURONIUM BROMIDE 45 MG: 10 INJECTION INTRAVENOUS at 08:25

## 2020-10-02 RX ADMIN — SUCCINYLCHOLINE CHLORIDE 80 MG: 20 INJECTION, SOLUTION INTRAMUSCULAR; INTRAVENOUS at 08:16

## 2020-10-02 RX ADMIN — SODIUM CHLORIDE, POTASSIUM CHLORIDE, SODIUM LACTATE AND CALCIUM CHLORIDE: 600; 310; 30; 20 INJECTION, SOLUTION INTRAVENOUS at 07:00

## 2020-10-02 RX ADMIN — PHENYLEPHRINE HYDROCHLORIDE 40 MCG/MIN: 10 INJECTION INTRAMUSCULAR; INTRAVENOUS; SUBCUTANEOUS at 08:18

## 2020-10-02 RX ADMIN — LIDOCAINE HYDROCHLORIDE 60 MG: 20 INJECTION, SOLUTION EPIDURAL; INFILTRATION; INTRACAUDAL; PERINEURAL at 08:16

## 2020-10-02 RX ADMIN — PROPOFOL 140 MG: 10 INJECTION, EMULSION INTRAVENOUS at 08:16

## 2020-10-02 NOTE — DISCHARGE SUMMARY
[facility]   Discharge Summary     Patient ID:  Prasad Pantoja  [id number]  63 y.o.  1945    Admit date: 10/2/2020    Discharge date: 10/2/2020     Admitting Physician: Erick Son MD     Referring Cardiologist:  Georgi Rogers    PCP:  Omar Paul MD    Admitting Diagnoses: Afib    Discharge Diagnoses: Afib - clot found in HARI on LILLIANA, procedure canceled    Discharged Condition: stable    Disposition: home, see patient instructions for treatment and plan    Procedures for this admission:  Procedure(s):  LILLIANA BY  [facility] -  [facility]    Discharge Medications:      My Medications      START taking these medications      Instructions Each Dose to Equal Morning Noon Evening Bedtime   enoxaparin 100 mg/mL  Commonly known as:  Lovenox    Your last dose was: Your next dose is:          100 mg by SubCUTAneous route every twelve (12) hours. 1 mg/kg                 warfarin 5 mg tablet  Commonly known as:  COUMADIN    Your last dose was: Your next dose is: Take 1 Tab by mouth Daily (before dinner). 5 mg                    CONTINUE taking these medications      Instructions Each Dose to Equal Morning Noon Evening Bedtime   bumetanide 2 mg tablet  Commonly known as:  [address]    Your last dose was: Your next dose is:          TAKE 1 TABLET BY MOUTH DAILY                  cholecalciferol (1000 Units /25 mcg) tablet  Commonly known as:  VITAMIN D3    Your last dose was: Your next dose is:          TAKE 1 TABLET BY MOUTH DAILY                  lacosamide 100 mg Tab tablet  Commonly known as:  Vimpat    Your last dose was: Your next dose is: Take 1 Tab by mouth two (2) times a day. Max Daily Amount: 200 mg.   100 mg                 levETIRAcetam 1,000 mg tablet    Your last dose was: Your next dose is: Take 1 Tab by mouth two (2) times a day.    1,000 mg                 metoprolol tartrate 25 mg tablet  Commonly known as:  LOPRESSOR    Your last dose was: Your next dose is:          TAKE 1 TABLET BY MOUTH TWICE A DAY                  potassium chloride 20 mEq tablet  Commonly known as:  K-DUR, KLOR-CON    Your last dose was: Your next dose is:          TAKE 1 TABLET BY MOUTH DAILY                     STOP taking these medications    chlorhexidine 0.12 % solution  Commonly known as:  Peridex        Eliquis 5 mg tablet  Generic drug:  apixaban        mupirocin 2 % ointment  Commonly known as:  Niru Harder              Where to Get Your Medications      Information on where to get these meds will be given to you by the nurse or doctor. Ask your nurse or doctor about these medications  · enoxaparin 100 mg/mL  · warfarin 5 mg tablet         INR TARGET- 2-3  INR LEVEL to be drawn- per Dr. Destiny Woo office  INR management per Dr. Destiny Woo office    HPI:  J Carlos Quinn is a 70 y. o. male with a history of mild intellectual disability, CHB s/p [employer] Bi-V PPM (2015), persistent atrial fibrillation (on Eliquis), and seizure disorder (last prior to 2017) who was referred for cardiac surgery evaluation by Dr. Berg Bur presents today with a care giver. About a year ago, he was found to be in persistent atrial fibrillation. Further, it was noted that his EF had decreased. He was started on Eliquis and referred for ablation that was ultimately cancelled due to cellulitis/LE edema on his legs. This has greatly improved since he has been followed by a lymphedema clinic. Otherwise he denies any issues with shortness of breath, dizziness, or pre-syncope.     Prior to March he was active and working as a . Currently he walks daily and is independent, but does live in a group home.     Hospital Course: patient was admitted electively for hybrid ablation. On intraop LILLIANA, a clot was found in his left atrial appendage and the procedure was aborted. The patient was admitted to PACU for full recovery and placed on lovenox/coumadin bridge.  He was discharged once he met PACU discharge criteria. Appropriate education was provided regarding the coumadin & lovenox. Will plan for repeat LILLIANA & surgery in 6 weeks time. Discharge Vital Signs:   Visit Vitals  /76 (BP 1 Location: Left arm, BP Patient Position: At rest)   Pulse 70   Temp 98.1 °F (36.7 °C)   Resp 18   Ht 6' (1.829 m)   Wt 230 lb 6.1 oz (104.5 kg)   SpO2 96%   BMI 31.25 kg/m²       Labs:   Recent Labs     10/02/20  0622   INR 1.0       Diagnostics:   CXR Results  (Last 48 hours)    None           Patient Instructions/Follow Up Care:  Discharge instructions were reviewed with the patient and family present. Questions were also answered at this time. Prescriptions and medications were reviewed. Will plan on preadmission for LILLIANA and then hybrid ablation in about 6 weeks time. The patient was also instructed to follow up with his primary care physician as needed. The patient and family were encouraged to call with any questions or concerns.        Signed:  MONISHA Ram  10/2/2020  9:10 AM

## 2020-10-02 NOTE — ANESTHESIA PROCEDURE NOTES
Arterial Line Placement    Start time: 10/2/2020 6:40 AM  End time: 10/2/2020 6:50 AM  Performed by: Myke Min MD  Authorized by: Myke Min MD     Pre-Procedure  Indications:  Arterial pressure monitoring and blood sampling  Preanesthetic Checklist: patient identified, risks and benefits discussed, anesthesia consent, site marked, patient being monitored, timeout performed and patient being monitored    Timeout Time: 06:41        Procedure:   Prep:  ChloraPrep and alcohol  Seldinger Technique?: Yes    Orientation:  Left  Location:  Radial artery  Catheter size:  20 G  Number of attempts:  1  Cont Cardiac Output Sensor: No      Assessment:   Post-procedure:  Line secured and sterile dressing applied  Patient Tolerance:  Patient tolerated the procedure well with no immediate complications

## 2020-10-02 NOTE — DISCHARGE INSTRUCTIONS
[facility]     [address]  [address]                                                           [address]                                       [place], [address] [facility]  Office- [phone]  Fax- [phone]        Office- [phone]  Fax- [phone]  _________________________________________________________________  Dr. Heriberto Carney, NP          Evangelina Cisneros, [place]  Dr. Paloma Alba, NP                  Estell Gaucher, MONISHA Dhillon, SANJIV Fiore, [place]                                                  Catherine Henson, SANJIV Garza, [place]                                                  Bridgett Mcgrgeor, SANJIV Banks, SANJIV                                                           Name:Ken Quinn     Surgery & Date: Procedure(s):  LILLIANA BY DR NEW [facility]    Discharge Date: No discharge date for patient encounter. MEDICATIONS:  Please refer to your After Visit Summary for your medication list. If you do not have a prescription for a new medication, you may purchase the medication over the counter. DO NOT TAKE ANY MEDICATIONS THAT ARE NOT ON THIS LIST    INR TARGET- 2-3  INR LEVEL to be drawn- per Dr. Brett Zambrano office  INR management per Dr. Brett Zambrano office        Hortensia Press  1. We will coordinate a new surgery date in approximately 6 weeks, with plans to admit you 1 day prior to do a new echocardiogram to look for clot. Our office will contact you with instructions. 2. You will follow up with Dr. Brett Zambrano office for your coumadin management. Please call his office with questions or concerns [phone]. 3. Consult you primary care physician regarding your influenza &   pneumovax vaccines.         5.   Please bring all medications (or a complete list) with you to your appointment. 6.   Do not hesitate to contact our office 24/7 with any questions or concerns. Signature:___________________________________________________          TO PREVENT AN INFECTION      1. [address] YOUR HANDS     To prevent infection, good handwashing is the most important thing you or your caregiver can do.  Wash your hands with soap and water or use the hand  we gave you before you touch any wounds. 2. SHOWER     Use the antibacterial soap we gave you when you take a shower.  Shower with this soap until your wounds are healed.  To reach all areas of your body, you may need someone to help you.  Dont forget to clean your belly button with every shower. 3.  USE CLEAN SHEETS     Use freshly cleaned sheets on your bed after surgery.  To keep the surgery site clean, do not allow pets to sleep with you while your wound is still healing. 4. STOP SMOKING     Stop smoking, or at least cut back on smoking     Smoking slows your healing. 5.  CONTROL YOUR BLOOD SUGAR     High blood sugars slow wound healing. If you are diabetic, control your blood sugar levels before and after your surgery. DISCHARGE SUMMARY from Nurse    The following personal items are in your possession at time of discharge:    Dental Appliances: None  Visual Aid: None  Home Medications: None  Jewelry: None  Clothing: None (left in in pt. room)  Other Valuables: None             PATIENT INSTRUCTIONS:    After general anesthesia or intravenous sedation, for 24 hours or while taking prescription Narcotics:  Limit your activities  Do not drive and operate hazardous machinery  Do not make important personal or business decisions  Do  not drink alcoholic beverages  If you have not urinated within 8 hours after discharge, please contact your surgeon on call.     Report the following to your surgeon:  Excessive pain, swelling, redness or odor of or around the surgical area  Temperature over 100.5  Nausea and vomiting lasting longer than 4 hours or if unable to take medications  Any signs of decreased circulation or nerve impairment to extremity: change in color, persistent  numbness, tingling, coldness or increase pain  Any questions    To prevent infection remember to refer to your handout on handwashing given to you by your nurse. *  Please give a list of your current medications to your Primary Care Provider. *  Please update this list whenever your medications are discontinued, doses are      changed, or new medications (including over-the-counter products) are added. *  Please carry medication information at all times in case of emergency situations. These are general instructions for a healthy lifestyle:    No smoking/ No tobacco products/ Avoid exposure to second hand smoke    Surgeon General's Warning:  Quitting smoking now greatly reduces serious risk to your health. Obesity, smoking, and sedentary lifestyle greatly increases your risk for illness    A healthy diet, regular physical exercise & weight monitoring are important for maintaining a healthy lifestyle    You may be retaining fluid if you have a history of heart failure or if you experience any of the following symptoms:  Weight gain of 3 pounds or more overnight or 5 pounds in a week, increased swelling in our hands or feet or shortness of breath while lying flat in bed. Please call your doctor as soon as you notice any of these symptoms; do not wait until your next office visit. Recognize signs and symptoms of STROKE:    F-face looks uneven    A-arms unable to move or move unevenly    S-speech slurred or non-existent    T-time-call 911 as soon as signs and symptoms begin-DO NOT go       Back to bed or wait to see if you get better-TIME IS BRAIN. The discharge information has been reviewed with the patient.   The patient verbalized understanding. Patient Education        Enoxaparin (Lovenox): Care Instructions  Your Care Instructions        Enoxaparin (Lovenox) is an anticoagulant medicine. It is one of a class of anticoagulants called low molecular weight heparin. Many people call these medicines blood thinners. They don't actually thin the blood, but they increase the time it takes a blood clot to form. This reduces the chance of a blood clot in the leg veins (deep vein thrombosis) or in the lungs (pulmonary embolism). Enoxaparin is a shot (injection). You or someone caring for you will inject it once or twice a day. Most people need shots for 5 to 10 days, but in some cases it can be longer. Your doctor will tell you how long you need to have the shots. Enoxaparin is used to:  Treat deep vein thrombosis (DVT), which is a blood clot in the legs, pelvis, or arms. Reduce the chance of getting blood clots after certain surgeries. For example, you may take enoxaparin after knee or hip replacement surgery. Reduce the chance of getting blood clots in people who are likely to get them and who are not active for a long period of time. For example, you may need enoxaparin if you need to stay in bed for a long time because of a health problem. Reduce the chance of blood clots when another blood thinner is stopped for a short time. For example, if you take warfarin and need surgery, your doctor may ask you to stop taking warfarin for a short time before the surgery. If you have a high risk of blood clots during this time, you may take enoxaparin before the surgery. After the surgery, your doctor will tell you when it is safe to start taking warfarin again. This is called bridge therapy. Follow-up care is a key part of your treatment and safety. Be sure to make and go to all appointments, and call your doctor if you are having problems. It's also a good idea to know your test results and keep a list of the medicines you take.   How can you care for yourself at home? How to inject enoxaparin  You will get a prescription for prefilled syringes. Inject the medicine at the same time every day unless your doctor gives you other instructions. Wash and dry your hands. Sit or lie in a position that lets you see your belly. Clean the injection site with an alcohol pad or swab, and let it dry. Choose a site on the right or left side of your belly, at least 2 inches from your belly button. Change the site each time you inject the medicine. Remove the needle cap by pulling it straight off. Don't twist it. Hold the syringe like a pencil in one hand. With the other hand, pinch an area of the injection site skin. You should have a \"fold\" in the skin. Insert the entire needle straight down into the fold of skin. Don't insert the needle at an angle. Press the plunger with your thumb until the syringe is empty. Pull the needle straight out and let go of the skin. Point the needle away from you and press down on the plunger. The needle will be covered. Take precautions  Don't rub the injection site. This could cause bruising. Don't push air bubbles out of the syringe unless your doctor tells you to. Each syringe comes with air bubbles. Don't stop taking enoxaparin without talking to your doctor. Be sure you get instructions about how to take your medicine safely. Blood thinners can cause serious bleeding problems. Talk to your doctor before you take any prescription medicines, over-the-counter medicines, antibiotics, vitamins, or herbal products. Don't take the following medicines unless your doctor says it's okay:  Aspirin, products like aspirin (salicylates), or products that contain aspirin  Nonsteroidal anti-inflammatory drugs (NSAIDs), such as ibuprofen (Advil, Motrin) and naproxen (Aleve)  Store enoxaparin at room temperature. Don't put it in the refrigerator or freezer. When should you call for help?    Call 911 anytime you think you may need emergency care. For example, call if:    You passed out (lost consciousness).     You have signs of severe bleeding, such as:  A severe headache that is different from past headaches. Vomiting blood or what looks like coffee grounds. Passing maroon or very bloody stools. Call your doctor now or seek immediate medical care if:    You have unexpected bleeding, including:  Blood in stools or black stools that look like tar. Blood in your urine. Bruises or blood spots under the skin.     You feel dizzy or lightheaded. Watch closely for changes in your health, and be sure to contact your doctor if:    You do not get better as expected. Where can you learn more? Go to http://www.gray.com/  Enter P266 in the search box to learn more about \"Enoxaparin (Lovenox): Care Instructions. \"  Current as of: December 16, 2019               Content Version: 12.6  © 0077-1011 [employer]. Care instructions adapted under license by [employer] (which disclaims liability or warranty for this information). If you have questions about a medical condition or this instruction, always ask your healthcare professional. [address] any warranty or liability for your use of this information.

## 2020-10-02 NOTE — ANESTHESIA POSTPROCEDURE EVALUATION
Post-Anesthesia Evaluation and Assessment    Patient: Miriam Hernandez MRN: [id number]  SSN: [id number]    YOB: 1945  Age: 70 y.o. Sex: male      I have evaluated the patient and they are stable and ready for discharge from the PACU. Cardiovascular Function/Vital Signs  Visit Vitals  /67 (BP 1 Location: Left arm, BP Patient Position: At rest)   Pulse 64   Temp 36.4 °C (97.5 °F)   Resp 11   Ht 6' (1.829 m)   Wt 104.5 kg (230 lb 6.1 oz)   SpO2 100%   BMI 31.25 kg/m²       Patient is status post General anesthesia for Procedure(s):  LILLIANA BY DR NEW [facility]. Nausea/Vomiting: None    Postoperative hydration reviewed and adequate. Pain:  Pain Scale 1: Numeric (0 - 10) (10/02/20 1015)  Pain Intensity 1: 0 (10/02/20 1015)   Managed    Neurological Status:   Neuro (WDL): Exceptions to [facility] [place] (10/02/20 5415)  Neuro  Neurologic State: Alert;Drowsy; Eyes open spontaneously (10/02/20 9581)  Orientation Level: Oriented to person;Oriented to place;Oriented to situation (10/02/20 0922)  Cognition: Follows commands (10/02/20 0922)  Speech: Clear (10/02/20 0922)  LUE Motor Response: Purposeful (10/02/20 0922)  LLE Motor Response: Purposeful (10/02/20 0922)  RUE Motor Response: Purposeful (10/02/20 4954)  RLE Motor Response: Purposeful (10/02/20 5827)   At baseline    Mental Status, Level of Consciousness: Alert and  oriented to person, place, and time    Pulmonary Status:   O2 Device: Room air (10/02/20 1015)   Adequate oxygenation and airway patent    Complications related to anesthesia: None    Post-anesthesia assessment completed. No concerns    Signed By: Anmol Braga MD     October 2, 2020              Procedure(s):  LILLIANA BY DR NEW [facility].     general    <BSHSIANPOST>    INITIAL Post-op Vital signs:   Vitals Value Taken Time   /67 10/2/2020 10:15 AM   Temp 36.4 °C (97.5 °F) 10/2/2020  9:11 AM   Pulse 70 10/2/2020 10:29 AM   Resp 11 10/2/2020 10:29 AM   SpO2 100 % 10/2/2020 10:29 AM   Vitals shown include unvalidated device data.

## 2020-10-02 NOTE — ANESTHESIA PREPROCEDURE EVALUATION
Relevant Problems   No relevant active problems       Anesthetic History   No history of anesthetic complications            Review of Systems / Medical History  Patient summary reviewed, nursing notes reviewed and pertinent labs reviewed    Pulmonary  Within defined limits                 Neuro/Psych     seizures         Cardiovascular  Within defined limits  Hypertension        Dysrhythmias : atrial fibrillation  Pacemaker    Exercise tolerance: <4 METS  Comments: EF 50%   GI/Hepatic/Renal  Within defined limits              Endo/Other  Within defined limits           Other Findings   Comments: Epilepsy ([address]        S/P biventricular cardiac pacemaker procedure            Physical Exam    Airway  Mallampati: II  TM Distance: > 6 cm  Neck ROM: normal range of motion   Mouth opening: Normal     Cardiovascular    Rhythm: irregular      Murmur: Grade 2, Mitral area     Dental  No notable dental hx       Pulmonary  Breath sounds clear to auscultation               Abdominal  GI exam deferred       Other Findings            Anesthetic Plan    ASA: 3  Anesthesia type: general    Monitoring Plan: Arterial line and CVP      Induction: Intravenous  Anesthetic plan and risks discussed with: Patient

## 2020-10-02 NOTE — PROGRESS NOTES
Patient discharged to home. Iv removed. Discharge instructions done with caregiver, Clementina Olivarez. Lovenox education/demo done with caregiver Clementina Olivarez.

## 2020-10-02 NOTE — INTERVAL H&P NOTE
Update History & Physical    The Patient's History and Physical of September 24, 2020 was reviewed with the patient and I examined the patient. There was no change. The surgical site was confirmed by the patient and me. Plan:  The risk, benefits, expected outcome, and alternative to the recommended procedure have been discussed with the patient. Patient understands and wants to proceed with the procedure.     Electronically signed by MONISHA Nielsen on 10/2/2020 at 7:17 AM

## 2020-10-02 NOTE — ANESTHESIA PROCEDURE NOTES
LILLIANA  Date/Time: 10/2/2020 8:24 AM  Ordering Provider: Sushant Gracia MD    Procedure Details: probe placement, image aquisition & interpretation    Site marked, Timeout performed, 08:25  Risks and benefits discussed with the patient and plans are to proceed    Procedure Note    Performed by: Lucia Lance MD  Authorized by: Lucia Lance MD       Modalities: 2D, CF, CWD, PWD  Probe Type: multiplane  Insertion: atraumatic  Patient Status: intubated and sedated    Echocardiographic and Doppler Measurements   Aorta  Size  Diam(cm)  Dissection PlaqueThick(mm)  Plaque Mobile    Ascending normal  No 0-3 No    Arch normal  No 0-3 No    Descending normal  No 0-3 No          Valves  Annulus  Stenosis  Area/Grad  Regurg  Leaflet   Morph  Leaflet   Motion    Aortic normal none  0 thickened normal    Mitral normal none  1+ thickened normal    Tricuspid normal none  0 normal normal          Atria  Size  SEC (smoke)  Thrombus  Tumor  Device    Rt Atrium dilated No No No Yes    Lt Atrium dilated Yes Yes No No     Interatrial Septum Morphology: normal    Interventricular Septum Morphology: normal    Ventricle  Cavity Size  Cavity Dimension Hypertrophy  Thrombus  Gloal FXN  EF    RV normal  No no mildly impaired     LV normal   No mildly impaired        Regional Function  (1 = normal, 2 = mildly hypokinetic, 3 = severely hypokinetic, 4 = akinetic, 5 = dyskinetic) LAV - [place]   ME LAV = 0  ME LAV = 90  ME LAV = 130   Basal Sept:2 Basal Ant:2 Basal Post:1   Mid Sept:1 Mid Ant:2 Mid Post:1   Apical Sept:2 Apical Ant:2 Basal Ant Sept:2   Basal Lat:1 Basal Inf:2 Mid Ant Sept:1   Mid Lat:1     Apical Lat:1 Apical Inf:1        Pericardium: normal  Effusion: normal    Post Intervention Follow-up Study  Ventricular Global Function: unchanged  Ventricular Regional Function: unchanged     Valve  Function  Regurgitation  Area    Aortic no change 0     Mitral no change 1+     Tricuspid no change 0     Prosthetic Complications: None  Comments: HARI thrombus w/SEC noted.

## 2020-10-02 NOTE — PERIOP NOTES
Handoff Report from Operating Room to PACU    Report received from Chary Hillman CRNA and Ivy Berkowitz RN regarding Kunal Quinn. Surgeon(s):  Kaia Glass MD  And Procedure(s) (LRB):  LILLIANA BY DR Kenton Martinez (N/A)  confirmed   with allergies, drains and dressings discussed. Anesthesia type, drugs, patient history, complications, estimated blood loss, vital signs, intake and output, and last pain medication, lines, reversal medications and temperature were reviewed. Procedure cancelled after LILLIANA due to clot visualized per anesthesia handoff.

## 2020-10-02 NOTE — ANESTHESIA PROCEDURE NOTES
Central Line Placement    Start time: 10/2/2020 6:51 AM  End time: 10/2/2020 7:11 AM  Performed by: Angel Elkins MD  Authorized by: Angel Elkins MD     Indications: vascular access and central pressure monitoring  Preanesthetic Checklist: patient identified, risks and benefits discussed, anesthesia consent, site marked, patient being monitored and timeout performed    Timeout Time: 06:51       Pre-procedure: All elements of maximal sterile barrier technique followed?  Yes    2% Chlorhexidine for cutaneous antisepsis, Hand hygiene performed prior to catheter insertion and Ultrasound guidance    Sterile Ultrasound Technique followed?: Yes            Procedure:   Prep:  Chlorhexidine and alcohol    Orientation:  Left  Patient position:  Trendelenburg  Catheter type:  Quad lumen  Catheter size:  8.5 Fr  Catheter length:  16 cm  Number of attempts:  1  Successful placement: Yes      Assessment:   Post-procedure:  Catheter secured and sterile dressing applied  Assessment:  Blood return through all ports, free fluid flow and guidewire removal verified  Insertion:  Uncomplicated  Patient tolerance:  Patient tolerated the procedure well with no immediate complications

## 2020-10-02 NOTE — INTERVAL H&P NOTE
Update History & Physical 
 
The Patient's History and Physical of September 4, 2020 was reviewed with the patient and I examined the patient. There was no change. The surgical site was confirmed by the patient and me. Plan:  The risk, benefits, expected outcome, and alternative to the recommended procedure have been discussed with the patient. Patient understands and wants to proceed with the procedure.  
 
Electronically signed by MONISHA Bermeo on 10/2/2020 at 7:16 AM

## 2020-10-04 LAB
GLUCOSE BLD STRIP.AUTO-MCNC: 80 MG/DL (ref 65–100)
SERVICE CMNT-IMP: NORMAL

## 2020-10-06 LAB
ABO + RH BLD: NORMAL
BLD PROD TYP BPU: NORMAL
BLOOD GROUP ANTIBODIES SERPL: NORMAL
BPU ID: NORMAL
CROSSMATCH RESULT,%XM: NORMAL
SPECIMEN EXP DATE BLD: NORMAL
STATUS OF UNIT,%ST: NORMAL
UNIT DIVISION, %UDIV: 0

## 2020-10-07 ENCOUNTER — TELEPHONE (OUTPATIENT)
Dept: CARDIOLOGY CLINIC | Age: 75
End: 2020-10-07

## 2020-10-07 NOTE — TELEPHONE ENCOUNTER
Spoke with Oneyda Garrido, patients caregiver, on PHI. Oneyda Garrido states some one called and that the LILLIANA is scheduled.

## 2020-10-07 NOTE — TELEPHONE ENCOUNTER
Patients care giver is calling in re to LILLIANA, she states that Dr. Tuyet Man office advised that we would reach out and set up. However, I do not see any notes stating that. I did advise caller that Dr. Jamal Coughlin  was on vacation but I would fwd to his nurse to see if more information was available.       Confirmed she is on Hippa       Thanks

## 2020-10-09 ENCOUNTER — ANTI-COAG VISIT (OUTPATIENT)
Dept: CARDIOLOGY CLINIC | Age: 75
End: 2020-10-09
Payer: MEDICARE

## 2020-10-09 DIAGNOSIS — I48.91 ATRIAL FIBRILLATION, UNSPECIFIED TYPE (HCC): ICD-10-CM

## 2020-10-09 DIAGNOSIS — Z79.01 LONG TERM (CURRENT) USE OF ANTICOAGULANTS: Primary | ICD-10-CM

## 2020-10-09 LAB
INR BLD: 1.8 (ref 1–1.5)
PT POC: 21 SECONDS (ref 9.1–12)
VALID INTERNAL CONTROL?: YES

## 2020-10-09 PROCEDURE — 85610 PROTHROMBIN TIME: CPT | Performed by: INTERNAL MEDICINE

## 2020-10-13 ENCOUNTER — DOCUMENTATION ONLY (OUTPATIENT)
Dept: CARDIOLOGY CLINIC | Age: 75
End: 2020-10-13

## 2020-10-13 NOTE — PROGRESS NOTES
From: Jazz Murillo   Sent: 10/7/2020   3:24 PM EDT   To: Shiva Villalba MD, *   Subject: RE: New date 11/13/2020                           Just spoke with Cathryn Bumpers after arranging 8am LILLIANA for 11/12/2020. Confirmed and will mail reminder. ----- Message -----   From: Zaira Yung   Sent: 10/7/2020  11:07 AM EDT   To: RUSSEL Linares, *   Subject: RE: New date 11/13/2020                           Good morning. I just received a call from Cathryn Bumpers, Mr. Miguel Quinn's caregiver who said she was never called about his LILLIANA. The plan was to do the LILLIANA on 11/12, admit him afterwards, then do his hybrid ablation on 11/13.  Could someone please call her with the details?  The cell# listed for Mr. Dmeian Luong is actually hers. Darlene Davalos.   ----- Message -----   From: Fang Garduno   Sent: 10/2/2020   1:44 PM EDT   To: RUSSEL Covarrubias, Yamel Springer, *   Subject: New date 11/13/2020                               Rosanna Atkins please have Dr. Deepa Ramos do this LILLIANA per Donnell Seen on 11/12 [facility]     ----- Message -----

## 2020-10-16 ENCOUNTER — ANTI-COAG VISIT (OUTPATIENT)
Dept: CARDIOLOGY CLINIC | Age: 75
End: 2020-10-16
Payer: MEDICARE

## 2020-10-16 DIAGNOSIS — I48.91 ATRIAL FIBRILLATION, UNSPECIFIED TYPE (HCC): ICD-10-CM

## 2020-10-16 DIAGNOSIS — Z79.01 LONG TERM (CURRENT) USE OF ANTICOAGULANTS: Primary | ICD-10-CM

## 2020-10-16 LAB
INR BLD: 1.7 (ref 1–1.5)
PT POC: 20.5 SECONDS (ref 9.1–12)
VALID INTERNAL CONTROL?: YES

## 2020-10-16 PROCEDURE — 85610 PROTHROMBIN TIME: CPT | Performed by: INTERNAL MEDICINE

## 2020-10-23 ENCOUNTER — ANTI-COAG VISIT (OUTPATIENT)
Dept: CARDIOLOGY CLINIC | Age: 75
End: 2020-10-23
Payer: MEDICARE

## 2020-10-23 DIAGNOSIS — Z79.01 LONG TERM (CURRENT) USE OF ANTICOAGULANTS: Primary | ICD-10-CM

## 2020-10-23 DIAGNOSIS — I48.91 ATRIAL FIBRILLATION, UNSPECIFIED TYPE (HCC): ICD-10-CM

## 2020-10-23 LAB
INR BLD: 1.7 (ref 1–1.5)
PT POC: 20.4 SECONDS (ref 9.1–12)
VALID INTERNAL CONTROL?: YES

## 2020-10-23 PROCEDURE — 85610 PROTHROMBIN TIME: CPT | Performed by: INTERNAL MEDICINE

## 2020-10-29 ENCOUNTER — VIRTUAL VISIT (OUTPATIENT)
Dept: FAMILY MEDICINE CLINIC | Age: 75
End: 2020-10-29
Payer: MEDICARE

## 2020-10-29 DIAGNOSIS — R58 ECCHYMOSIS: Primary | ICD-10-CM

## 2020-10-29 PROCEDURE — G0463 HOSPITAL OUTPT CLINIC VISIT: HCPCS | Performed by: STUDENT IN AN ORGANIZED HEALTH CARE EDUCATION/TRAINING PROGRAM

## 2020-10-29 PROCEDURE — 1101F PT FALLS ASSESS-DOCD LE1/YR: CPT | Performed by: STUDENT IN AN ORGANIZED HEALTH CARE EDUCATION/TRAINING PROGRAM

## 2020-10-29 PROCEDURE — 3017F COLORECTAL CA SCREEN DOC REV: CPT | Performed by: STUDENT IN AN ORGANIZED HEALTH CARE EDUCATION/TRAINING PROGRAM

## 2020-10-29 PROCEDURE — G8432 DEP SCR NOT DOC, RNG: HCPCS | Performed by: STUDENT IN AN ORGANIZED HEALTH CARE EDUCATION/TRAINING PROGRAM

## 2020-10-29 PROCEDURE — 99213 OFFICE O/P EST LOW 20 MIN: CPT | Performed by: STUDENT IN AN ORGANIZED HEALTH CARE EDUCATION/TRAINING PROGRAM

## 2020-10-29 PROCEDURE — G8756 NO BP MEASURE DOC: HCPCS | Performed by: STUDENT IN AN ORGANIZED HEALTH CARE EDUCATION/TRAINING PROGRAM

## 2020-10-29 PROCEDURE — G8427 DOCREV CUR MEDS BY ELIG CLIN: HCPCS | Performed by: STUDENT IN AN ORGANIZED HEALTH CARE EDUCATION/TRAINING PROGRAM

## 2020-10-29 RX ORDER — WARFARIN 2.5 MG/1
2.5 TABLET ORAL
COMMUNITY
Start: 2020-10-16 | End: 2021-10-01

## 2020-10-29 NOTE — PROGRESS NOTES
Jad Quinn  70 y.o. male  1945  [address]  [id number]   [address]:    Telemedicine Progress Note  Iban Casillas MD       Encounter Date and Time: October 29, 2020 at 10:40 AM    Consent:  He and/or the health care decision maker is aware that that he may receive a bill for this telephone service, depending on his insurance coverage, and has provided verbal consent to proceed: Yes    Chief Complaint   Patient presents with    Bleeding/Bruising     History of Present Illness   Stevan De La Vega is a 70 y.o. male was evaluated by synchronous (real-time) audio-video technology from home, through the [employer] Patient Portal.    Chronic Bruising:  - Since been on coumadin, worsened since increase to Coumadin past Friday for subtherapeutic INR  - Bruise on head that is improving  - Bruises very easily ever since starting Coumadin  - No hematoma  - No change in metation, patient at baseline  - No gross bleeding, melena/hematochezia, hematemesis, nosebleeds  - No neuro deficits or nausea/vomiting  - Coumadin therapy monitored and followed by Dr. Lily Lambert          - PT/INR every Friday          - next appt tomorrow 10/30      Review of Systems   Review of Systems   Constitutional: Negative for chills, fever and malaise/fatigue. HENT: Negative for congestion, nosebleeds, sinus pain and sore throat. Respiratory: Negative for cough and shortness of breath. Cardiovascular: Negative for chest pain and palpitations. Gastrointestinal: Negative for abdominal pain, blood in stool, constipation, diarrhea, melena, nausea and vomiting. Genitourinary: Negative for hematuria. Neurological: Negative for dizziness, focal weakness and headaches. Endo/Heme/Allergies: Bruises/bleeds easily.        Vitals/Objective:     General: alert, cooperative, no distress   Mental  status: mental status: alert, oriented to person, place, and time, normal mood, behavior, speech, dress, motor activity, and thought processes   Resp: resp: normal effort and no respiratory distress   Neuro: neuro: no gross deficits   Skin: skin: Large fading ecchymosis left hemisphere of head, w/o edema/hematoma. Due to this being a TeleHealth evaluation, many elements of the physical examination are unable to be assessed. Assessment and Plan:   Time-based coding, delete if not needed: I spent at least 25 minutes with this established patient, and >50% of the time was spent counseling and/or coordinating care regarding Bruising    1. Ecchymosis: 2/2 to Coumadin. Stable. Chronic since starting coumadin. Worsened over past week, but at time of visit, bruise improving. No red flag signs. No Neuro deficits, gross bleed, or n/v.  - ED precautions given  - Last week INR subtherapeutic at 1.7, therefore increased this week  - Has follow up tomorrow with Dr. Roxanne Escobar who manages pt's coumadin      Time spent in direct conversation with the patient to include medical condition(s) discussed, assessment and treatment plan:       We discussed the expected course, resolution and complications of the diagnosis(es) in detail. Medication risks, benefits, costs, interactions, and alternatives were discussed as indicated. I advised him to contact the office if his condition worsens, changes or fails to improve as anticipated. He expressed understanding with the diagnosis(es) and plan. Patient understands that this encounter was a temporary measure, and the importance of further follow up and examination was emphasized. Patient verbalized understanding. Patient informed to follow up: as needed.     Electronically Signed: Jocelyn Salazar MD    Providers location when delivering service: Clinic      Pursuant to the emergency declaration under the [facility], [address] [place] [address] authority and the [employer] Act, this Virtual  Visit was conducted, with patient's consent, to reduce the patient's risk of exposure to COVID-19 and provide continuity of care for an established patient. Services were provided through a video synchronous discussion virtually to substitute for in-person clinic visit. History   Patients past medical, surgical and family histories were reviewed and updated.       Past Medical History:   Diagnosis Date    A-fib [facility]) 2015    pacemaker L chest     CAD (coronary artery disease) 2015    Pacemaker    Epilepsy ([facility] [address]     Heart disease     Hypertension     Incontinence     Leg swelling     Mental retardation, 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)     Other ill-defined conditions(799.89)     edema legs    S/P biventricular cardiac pacemaker procedure 10/23/2015    10/23/15 [employer] biventricular pacemaker inmplant    Screen for colon cancer 9/27/2012     Past Surgical History:   Procedure Laterality Date    HX COLONOSCOPY  04/05/2017    HX ORTHOPAEDIC Right 12/22/2017    ORIF right distal radius    HX PACEMAKER  2015    bi ventricular pacemaker      Family History   Problem Relation Age of Onset    Other Other         unable to obtain due to mental status    Cancer Mother         unsure type    Heart Attack Father     Heart Disease Father     Lung Disease Father     No Known Problems Sister      Social History     Socioeconomic History    Marital status: SINGLE     Spouse name: Not on file    Number of children: Not on file    Years of education: Not on file    Highest education level: Not on file   Occupational History    Not on file   Social Needs    Financial resource strain: Not on file    Food insecurity     Worry: Not on file     Inability: Not on file    Transportation needs     Medical: Not on file     Non-medical: Not on file   Tobacco Use    Smoking status: Never Smoker    Smokeless tobacco: Never Used   Substance and Sexual Activity    Alcohol use: No    Drug use: No    Sexual activity: Not Currently   Lifestyle    Physical activity     Days per week: Not on file     Minutes per session: Not on file    Stress: Not on file   Relationships    Social connections     Talks on phone: Not on file     Gets together: Not on file     Attends Judaism service: Not on file     Active member of club or organization: Not on file     Attends meetings of clubs or organizations: Not on file     Relationship status: Not on file    Intimate partner violence     Fear of current or ex partner: Not on file     Emotionally abused: Not on file     Physically abused: Not on file     Forced sexual activity: Not on file   Other Topics Concern     Service Not Asked    Blood Transfusions Not Asked    Caffeine Concern Not Asked    Occupational Exposure Not Asked    Hobby Hazards Not Asked    Sleep Concern Not Asked    Stress Concern Not Asked    Weight Concern Not Asked    Special Diet Not Asked    Back Care Not Asked    Exercise Not Asked    Bike Helmet Not Asked   [address] [facility] [facility] Not Asked    Self-Exams Not Asked   Social History Narrative    Not on file     Patient Active Problem List   Diagnosis Code    Bradycardia R00.1    Seizure disorder ([facility] [address] G40.909    Bilateral lower extremity edema R60.0    Hypertension I10    Mobitz type II incomplete atrioventricular block I44.1    Intellectual disability F79    Advance care planning Z71.89    S/P biventricular cardiac pacemaker procedure Z95.0    Lymphedema of both lower extremities I89.0    Vitamin D deficiency E55.9    Vitamin B12 deficiency E53.8    History of closed Colles' fracture Z87.81    H/O Mobitz type II block Z86.79          Current Medications/Allergies   Medications and Allergies reviewed:    Current Outpatient Medications   Medication Sig Dispense Refill    warfarin (COUMADIN) 2.5 mg tablet       warfarin (COUMADIN) 5 mg tablet Take 1 Tab by mouth Daily (before dinner).  30 Tab 2    lacosamide (Vimpat) 100 mg tab tablet Take 1 Tab by mouth two (2) times a day. Max Daily Amount: 200 mg. 180 Tab 1    metoprolol tartrate (LOPRESSOR) 25 mg tablet TAKE 1 TABLET BY MOUTH TWICE A DAY 62 Tab 11    potassium chloride (K-DUR, KLOR-CON) 20 mEq tablet TAKE 1 TABLET BY MOUTH DAILY 90 Tab 0    bumetanide (BUMEX) 2 mg tablet TAKE 1 TABLET BY MOUTH DAILY 90 Tab 0    levETIRAcetam 1,000 mg tablet Take 1 Tab by mouth two (2) times a day.  58 Tab 3    cholecalciferol (VITAMIN D3) (1000 Units /25 mcg) tablet TAKE 1 TABLET BY MOUTH DAILY 31 Tab PRN     Allergies   Allergen Reactions    Sulfa (Sulfonamide Antibiotics) Rash

## 2020-10-30 ENCOUNTER — ANTI-COAG VISIT (OUTPATIENT)
Dept: CARDIOLOGY CLINIC | Age: 75
End: 2020-10-30
Payer: MEDICARE

## 2020-10-30 DIAGNOSIS — Z79.01 LONG TERM (CURRENT) USE OF ANTICOAGULANTS: Primary | ICD-10-CM

## 2020-10-30 DIAGNOSIS — I48.91 ATRIAL FIBRILLATION, UNSPECIFIED TYPE (HCC): ICD-10-CM

## 2020-10-30 LAB
INR BLD: 2.2 (ref 1–1.5)
PT POC: 26 SECONDS (ref 9.1–12)
VALID INTERNAL CONTROL?: YES

## 2020-10-30 PROCEDURE — 85610 PROTHROMBIN TIME: CPT | Performed by: INTERNAL MEDICINE

## 2020-11-02 DIAGNOSIS — R60.0 BILATERAL LOWER EXTREMITY EDEMA: ICD-10-CM

## 2020-11-02 DIAGNOSIS — E87.6 HYPOKALEMIA: ICD-10-CM

## 2020-11-03 ENCOUNTER — TELEPHONE (OUTPATIENT)
Dept: CARDIOTHORACIC SURGERY | Age: 75
End: 2020-11-03

## 2020-11-03 DIAGNOSIS — I48.11 LONGSTANDING PERSISTENT ATRIAL FIBRILLATION (HCC): Primary | ICD-10-CM

## 2020-11-03 RX ORDER — ENOXAPARIN SODIUM 100 MG/ML
100 INJECTION SUBCUTANEOUS EVERY 12 HOURS
Qty: 6 ML | Refills: 0 | Status: ON HOLD | OUTPATIENT
Start: 2020-11-09 | End: 2020-11-13

## 2020-11-03 NOTE — PROGRESS NOTES
Kenrick Conteh who handles patient's medications. Instructed to give last dose of coumadin 11/6, start lovenox BID 11/9-11/11. Prescription sent to pharmacy.

## 2020-11-03 NOTE — TELEPHONE ENCOUNTER
Josh Henry, who handles patient's medications. Instructed to take last dose of coumadin on 11/6 and start lovenox BID from 11/9-11/11. She understands.

## 2020-11-06 ENCOUNTER — ANTI-COAG VISIT (OUTPATIENT)
Dept: CARDIOLOGY CLINIC | Age: 75
End: 2020-11-06
Payer: MEDICARE

## 2020-11-06 DIAGNOSIS — I48.91 ATRIAL FIBRILLATION, UNSPECIFIED TYPE (HCC): ICD-10-CM

## 2020-11-06 DIAGNOSIS — Z79.01 LONG TERM (CURRENT) USE OF ANTICOAGULANTS: Primary | ICD-10-CM

## 2020-11-06 LAB
INR BLD: 2.3 (ref 1–1.5)
PT POC: 27.6 SECONDS (ref 9.1–12)
VALID INTERNAL CONTROL?: YES

## 2020-11-06 PROCEDURE — 85610 PROTHROMBIN TIME: CPT | Performed by: INTERNAL MEDICINE

## 2020-11-06 RX ORDER — BUMETANIDE 2 MG/1
TABLET ORAL
Qty: 31 TAB | Refills: 11 | Status: SHIPPED | OUTPATIENT
Start: 2020-11-06 | End: 2021-10-29

## 2020-11-06 RX ORDER — POTASSIUM CHLORIDE 20 MEQ/1
TABLET, EXTENDED RELEASE ORAL
Qty: 31 TAB | Refills: 11 | Status: SHIPPED | OUTPATIENT
Start: 2020-11-06 | End: 2021-10-29

## 2020-11-08 ENCOUNTER — HOSPITAL ENCOUNTER (OUTPATIENT)
Dept: PREADMISSION TESTING | Age: 75
Discharge: HOME | End: 2020-11-08
Payer: MEDICARE

## 2020-11-08 ENCOUNTER — HOSPITAL ENCOUNTER (OUTPATIENT)
Dept: PREADMISSION TESTING | Age: 75
Discharge: HOME | End: 2020-11-08

## 2020-11-08 PROCEDURE — 87635 SARS-COV-2 COVID-19 AMP PRB: CPT

## 2020-11-11 ENCOUNTER — TELEPHONE (OUTPATIENT)
Dept: CARDIOLOGY CLINIC | Age: 75
End: 2020-11-11

## 2020-11-11 NOTE — TELEPHONE ENCOUNTER
Ivet-I just found out about this transesophageal echo. I cannot do it because I have 12 patients and I have a meeting tomorrow morning during the scheduled time. Can you find an alternate doctor to do please? Next time please just send me a perfect serve if you want to schedule a procedure for me.

## 2020-11-12 ENCOUNTER — ANESTHESIA EVENT (OUTPATIENT)
Dept: CARDIOTHORACIC SURGERY | Age: 75
DRG: 229 | End: 2020-11-12
Payer: MEDICARE

## 2020-11-12 ENCOUNTER — HOSPITAL ENCOUNTER (INPATIENT)
Age: 75
LOS: 3 days | Discharge: HOME HEALTH | DRG: 229 | End: 2020-11-15
Attending: THORACIC SURGERY (CARDIOTHORACIC VASCULAR SURGERY) | Admitting: THORACIC SURGERY (CARDIOTHORACIC VASCULAR SURGERY)
Payer: MEDICARE

## 2020-11-12 ENCOUNTER — HOSPITAL ENCOUNTER (OUTPATIENT)
Dept: NON INVASIVE DIAGNOSTICS | Age: 75
Discharge: HOME | DRG: 229 | End: 2020-11-12
Attending: INTERNAL MEDICINE
Payer: MEDICARE

## 2020-11-12 VITALS
WEIGHT: 230 LBS | DIASTOLIC BLOOD PRESSURE: 57 MMHG | SYSTOLIC BLOOD PRESSURE: 111 MMHG | BODY MASS INDEX: 31.15 KG/M2 | OXYGEN SATURATION: 100 % | RESPIRATION RATE: 16 BRPM | HEART RATE: 70 BPM | HEIGHT: 72 IN

## 2020-11-12 DIAGNOSIS — I89.0 LYMPHEDEMA OF BOTH LOWER EXTREMITIES: ICD-10-CM

## 2020-11-12 DIAGNOSIS — I51.3 LEFT ATRIAL THROMBUS: ICD-10-CM

## 2020-11-12 DIAGNOSIS — Z86.79 H/O MOBITZ TYPE II BLOCK: Chronic | ICD-10-CM

## 2020-11-12 DIAGNOSIS — Z98.890 S/P ABLATION OF ATRIAL FIBRILLATION: Primary | ICD-10-CM

## 2020-11-12 DIAGNOSIS — I48.11 LONGSTANDING PERSISTENT ATRIAL FIBRILLATION (HCC): ICD-10-CM

## 2020-11-12 DIAGNOSIS — I10 ESSENTIAL HYPERTENSION: Chronic | ICD-10-CM

## 2020-11-12 DIAGNOSIS — I48.91 ATRIAL FIBRILLATION, UNSPECIFIED TYPE (HCC): ICD-10-CM

## 2020-11-12 DIAGNOSIS — I48.21 PERMANENT ATRIAL FIBRILLATION (HCC): ICD-10-CM

## 2020-11-12 DIAGNOSIS — Z86.79 S/P ABLATION OF ATRIAL FIBRILLATION: Primary | ICD-10-CM

## 2020-11-12 PROBLEM — I48.19 PERSISTENT ATRIAL FIBRILLATION (HCC): Status: ACTIVE | Noted: 2020-11-12

## 2020-11-12 LAB
ALBUMIN SERPL-MCNC: 3.5 G/DL (ref 3.5–5)
ALBUMIN/GLOB SERPL: 1 {RATIO} (ref 1.1–2.2)
ALP SERPL-CCNC: 87 U/L (ref 45–117)
ALT SERPL-CCNC: 14 U/L (ref 12–78)
ANION GAP SERPL CALC-SCNC: 5 MMOL/L (ref 5–15)
APTT PPP: 33.2 SEC (ref 22.1–31)
AST SERPL-CCNC: 24 U/L (ref 15–37)
ATRIAL RATE: 300 BPM
BASOPHILS # BLD: 0 K/UL (ref 0–0.1)
BASOPHILS NFR BLD: 1 % (ref 0–1)
BILIRUB SERPL-MCNC: 1 MG/DL (ref 0.2–1)
BUN SERPL-MCNC: 17 MG/DL (ref 6–20)
BUN/CREAT SERPL: 22 (ref 12–20)
CALCIUM SERPL-MCNC: 8.9 MG/DL (ref 8.5–10.1)
CALCULATED R AXIS, ECG10: -82 DEGREES
CALCULATED T AXIS, ECG11: 95 DEGREES
CHLORIDE SERPL-SCNC: 106 MMOL/L (ref 97–108)
CO2 SERPL-SCNC: 31 MMOL/L (ref 21–32)
COMMENT, HOLDF: NORMAL
CREAT SERPL-MCNC: 0.78 MG/DL (ref 0.7–1.3)
DIAGNOSIS, 93000: NORMAL
DIFFERENTIAL METHOD BLD: ABNORMAL
ECHO MV EROA PISA: 0.54 CM2
ECHO MV REGURGITANT RADIUS PISA: 0.77 CM
ECHO MV REGURGITANT VOLUME: 66.22 ML
ECHO MV REGURGITANT VTIA: 121.67 CM
EOSINOPHIL # BLD: 0.1 K/UL (ref 0–0.4)
EOSINOPHIL NFR BLD: 3 % (ref 0–7)
ERYTHROCYTE [DISTWIDTH] IN BLOOD BY AUTOMATED COUNT: 12.8 % (ref 11.5–14.5)
GLOBULIN SER CALC-MCNC: 3.5 G/DL (ref 2–4)
GLUCOSE SERPL-MCNC: 82 MG/DL (ref 65–100)
HCT VFR BLD AUTO: 38.2 % (ref 36.6–50.3)
HGB BLD-MCNC: 12.7 G/DL (ref 12.1–17)
HISTORY CHECKED?,CKHIST: NORMAL
IMM GRANULOCYTES # BLD AUTO: 0 K/UL (ref 0–0.04)
IMM GRANULOCYTES NFR BLD AUTO: 0 % (ref 0–0.5)
INR PPP: 1.1 (ref 0.9–1.1)
LYMPHOCYTES # BLD: 1.7 K/UL (ref 0.8–3.5)
LYMPHOCYTES NFR BLD: 46 % (ref 12–49)
MCH RBC QN AUTO: 32.4 PG (ref 26–34)
MCHC RBC AUTO-ENTMCNC: 33.2 G/DL (ref 30–36.5)
MCV RBC AUTO: 97.4 FL (ref 80–99)
MONOCYTES # BLD: 0.4 K/UL (ref 0–1)
MONOCYTES NFR BLD: 11 % (ref 5–13)
MR PISA PV: 445.04 CM/S
NEUTS SEG # BLD: 1.4 K/UL (ref 1.8–8)
NEUTS SEG NFR BLD: 40 % (ref 32–75)
NRBC # BLD: 0 K/UL (ref 0–0.01)
NRBC BLD-RTO: 0 PER 100 WBC
PLATELET # BLD AUTO: 120 K/UL (ref 150–400)
PMV BLD AUTO: 11 FL (ref 8.9–12.9)
POTASSIUM SERPL-SCNC: 3.4 MMOL/L (ref 3.5–5.1)
PROT SERPL-MCNC: 7 G/DL (ref 6.4–8.2)
PROTHROMBIN TIME: 11.4 SEC (ref 9–11.1)
Q-T INTERVAL, ECG07: 458 MS
QRS DURATION, ECG06: 204 MS
QTC CALCULATION (BEZET), ECG08: 494 MS
RBC # BLD AUTO: 3.92 M/UL (ref 4.1–5.7)
SAMPLES BEING HELD,HOLD: NORMAL
SODIUM SERPL-SCNC: 142 MMOL/L (ref 136–145)
THERAPEUTIC RANGE,PTTT: ABNORMAL SECS (ref 58–77)
VENTRICULAR RATE, ECG03: 70 BPM
WBC # BLD AUTO: 3.6 K/UL (ref 4.1–11.1)

## 2020-11-12 PROCEDURE — 93325 DOPPLER ECHO COLOR FLOW MAPG: CPT | Performed by: INTERNAL MEDICINE

## 2020-11-12 PROCEDURE — 86900 BLOOD TYPING SEROLOGIC ABO: CPT

## 2020-11-12 PROCEDURE — 74011250636 HC RX REV CODE- 250/636: Performed by: INTERNAL MEDICINE

## 2020-11-12 PROCEDURE — 85610 PROTHROMBIN TIME: CPT

## 2020-11-12 PROCEDURE — 74011250637 HC RX REV CODE- 250/637: Performed by: PHYSICIAN ASSISTANT

## 2020-11-12 PROCEDURE — 02584ZZ DESTRUCTION OF CONDUCTION MECHANISM, PERCUTANEOUS ENDOSCOPIC APPROACH: ICD-10-PCS | Performed by: THORACIC SURGERY (CARDIOTHORACIC VASCULAR SURGERY)

## 2020-11-12 PROCEDURE — 93320 DOPPLER ECHO COMPLETE: CPT | Performed by: INTERNAL MEDICINE

## 2020-11-12 PROCEDURE — 4A023FZ MEASUREMENT OF CARDIAC RHYTHM, PERCUTANEOUS APPROACH: ICD-10-PCS | Performed by: INTERNAL MEDICINE

## 2020-11-12 PROCEDURE — 85025 COMPLETE CBC W/AUTO DIFF WBC: CPT

## 2020-11-12 PROCEDURE — 65660000000 HC RM CCU STEPDOWN

## 2020-11-12 PROCEDURE — B246ZZ4 ULTRASONOGRAPHY OF RIGHT AND LEFT HEART, TRANSESOPHAGEAL: ICD-10-PCS | Performed by: ANESTHESIOLOGY

## 2020-11-12 PROCEDURE — 85730 THROMBOPLASTIN TIME PARTIAL: CPT

## 2020-11-12 PROCEDURE — 36415 COLL VENOUS BLD VENIPUNCTURE: CPT

## 2020-11-12 PROCEDURE — 86923 COMPATIBILITY TEST ELECTRIC: CPT

## 2020-11-12 PROCEDURE — 93325 DOPPLER ECHO COLOR FLOW MAPG: CPT

## 2020-11-12 PROCEDURE — 4A0234Z MEASUREMENT OF CARDIAC ELECTRICAL ACTIVITY, PERCUTANEOUS APPROACH: ICD-10-PCS | Performed by: INTERNAL MEDICINE

## 2020-11-12 PROCEDURE — 74011000250 HC RX REV CODE- 250: Performed by: INTERNAL MEDICINE

## 2020-11-12 PROCEDURE — 02K83ZZ MAP CONDUCTION MECHANISM, PERCUTANEOUS APPROACH: ICD-10-PCS | Performed by: INTERNAL MEDICINE

## 2020-11-12 PROCEDURE — 99152 MOD SED SAME PHYS/QHP 5/>YRS: CPT

## 2020-11-12 PROCEDURE — 93005 ELECTROCARDIOGRAM TRACING: CPT

## 2020-11-12 PROCEDURE — 02583ZZ DESTRUCTION OF CONDUCTION MECHANISM, PERCUTANEOUS APPROACH: ICD-10-PCS | Performed by: INTERNAL MEDICINE

## 2020-11-12 PROCEDURE — 80053 COMPREHEN METABOLIC PANEL: CPT

## 2020-11-12 PROCEDURE — 93312 ECHO TRANSESOPHAGEAL: CPT | Performed by: INTERNAL MEDICINE

## 2020-11-12 RX ORDER — FENTANYL CITRATE 50 UG/ML
25-50 INJECTION, SOLUTION INTRAMUSCULAR; INTRAVENOUS
Status: DISCONTINUED | OUTPATIENT
Start: 2020-11-12 | End: 2020-11-12

## 2020-11-12 RX ORDER — LACOSAMIDE 100 MG/1
100 TABLET ORAL EVERY 12 HOURS
Status: DISCONTINUED | OUTPATIENT
Start: 2020-11-12 | End: 2020-11-13

## 2020-11-12 RX ORDER — METOPROLOL TARTRATE 25 MG/1
25 TABLET, FILM COATED ORAL EVERY 12 HOURS
Status: DISCONTINUED | OUTPATIENT
Start: 2020-11-12 | End: 2020-11-15 | Stop reason: HOSPADM

## 2020-11-12 RX ORDER — ACETAMINOPHEN 325 MG/1
650 TABLET ORAL
Status: DISCONTINUED | OUTPATIENT
Start: 2020-11-12 | End: 2020-11-13

## 2020-11-12 RX ORDER — LEVETIRACETAM 500 MG/1
1000 TABLET ORAL 2 TIMES DAILY
Status: DISCONTINUED | OUTPATIENT
Start: 2020-11-12 | End: 2020-11-15 | Stop reason: HOSPADM

## 2020-11-12 RX ORDER — NITROGLYCERIN 20 MG/100ML
0-200 INJECTION INTRAVENOUS
Status: DISCONTINUED | OUTPATIENT
Start: 2020-11-13 | End: 2020-11-13

## 2020-11-12 RX ORDER — SODIUM CHLORIDE 0.9 % (FLUSH) 0.9 %
5-40 SYRINGE (ML) INJECTION EVERY 8 HOURS
Status: DISCONTINUED | OUTPATIENT
Start: 2020-11-12 | End: 2020-11-13

## 2020-11-12 RX ORDER — SODIUM CHLORIDE 0.9 % (FLUSH) 0.9 %
5-40 SYRINGE (ML) INJECTION AS NEEDED
Status: DISCONTINUED | OUTPATIENT
Start: 2020-11-12 | End: 2020-11-13

## 2020-11-12 RX ORDER — MIDAZOLAM HYDROCHLORIDE 1 MG/ML
.5-1 INJECTION, SOLUTION INTRAMUSCULAR; INTRAVENOUS
Status: DISCONTINUED | OUTPATIENT
Start: 2020-11-12 | End: 2020-11-12

## 2020-11-12 RX ORDER — LIDOCAINE HYDROCHLORIDE 20 MG/ML
15 SOLUTION OROPHARYNGEAL ONCE
Status: COMPLETED | OUTPATIENT
Start: 2020-11-12 | End: 2020-11-12

## 2020-11-12 RX ADMIN — LIDOCAINE HYDROCHLORIDE 15 ML: 20 SOLUTION ORAL; TOPICAL at 07:26

## 2020-11-12 RX ADMIN — BENZOCAINE, BUTAMBEN, AND TETRACAINE HYDROCHLORIDE 1 SPRAY: .028; .004; .004 AEROSOL, SPRAY TOPICAL at 07:27

## 2020-11-12 RX ADMIN — MIDAZOLAM 1 MG: 1 INJECTION INTRAMUSCULAR; INTRAVENOUS at 07:32

## 2020-11-12 RX ADMIN — Medication 10 ML: at 22:00

## 2020-11-12 RX ADMIN — Medication 20 ML: at 18:16

## 2020-11-12 RX ADMIN — METOPROLOL TARTRATE 25 MG: 25 TABLET, FILM COATED ORAL at 16:12

## 2020-11-12 RX ADMIN — FENTANYL CITRATE 25 MCG: 50 INJECTION, SOLUTION INTRAMUSCULAR; INTRAVENOUS at 07:33

## 2020-11-12 RX ADMIN — METOPROLOL TARTRATE 25 MG: 25 TABLET, FILM COATED ORAL at 20:38

## 2020-11-12 RX ADMIN — LACOSAMIDE 100 MG: 100 TABLET, FILM COATED ORAL at 20:38

## 2020-11-12 RX ADMIN — LEVETIRACETAM 1000 MG: 500 TABLET ORAL at 18:15

## 2020-11-12 NOTE — H&P
CSS   History and Physical    Subjective:      Taken from prior office consult - significant changes as follows    Original hybrid ablation scheduled for 10-2-2020, but intraop LILLIANA revealed left atrial appendage clot. Patient was sent home with anticoagulation and is back today to reevaluate clot.        Simin Quinn is a 70 y. o. male with a history of mild intellectual disability, CHB s/p [employer] Bi-V PPM (2015), persistent atrial fibrillation (on Eliquis), and seizure disorder (last prior to 2017) who was referred for cardiac surgery evaluation by Dr. Nelida Dance presents today with a care giver. About a year ago, he was found to be in persistent atrial fibrillation. Further, it was noted that his EF had decreased. He was started on Eliquis and referred for ablation that was ultimately cancelled due to cellulitis/LE edema on his legs. This has greatly improved since he has been followed by a lymphedema clinic. Otherwise he denies any issues with shortness of breath, dizziness, or pre-syncope.     Prior to March he was active and working as a . Currently he walks daily and is independent, but does live in a group home.      Cardiac Testing     ECHO:  · LV: Normal cavity size and wall thickness. Mild global systolic function. Estimated left ventricular ejection fraction is 45 - 50%. · LA: Mildly dilated left atrium. · RA: Mildly dilated right atrium. · MV: Mild mitral valve regurgitation is present. · PA: Pulmonary arterial systolic pressure is 17 mmHg.     Past Medical History:   Diagnosis Date    A-fib [facility]) 2015    pacemaker L chest     CAD (coronary artery disease) 2015    Pacemaker    Epilepsy ([facility] [address]     Heart disease     Hypertension     Incontinence     Leg swelling     Mental retardation, 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)     Other ill-defined conditions(799.89)     edema legs    S/P biventricular cardiac pacemaker procedure 10/23/2015    10/23/15 [facility] [employer] biventricular pacemaker inmplant    Screen for colon cancer 9/27/2012     Past Surgical History:   Procedure Laterality Date    HX COLONOSCOPY  04/05/2017    HX ORTHOPAEDIC Right 12/22/2017    ORIF right distal radius    HX PACEMAKER  2015    bi ventricular pacemaker       Social History     Tobacco Use    Smoking status: Never Smoker    Smokeless tobacco: Never Used   Substance Use Topics    Alcohol use: No      Family History   Problem Relation Age of Onset    Other Other         unable to obtain due to mental status    Cancer Mother         unsure type    Heart Attack Father     Heart Disease Father     Lung Disease Father     No Known Problems Sister      Prior to Admission medications    Medication Sig Start Date End Date Taking? Authorizing Provider   potassium chloride (K-DUR, KLOR-CON) 20 mEq tablet TAKE 1 TABLET BY MOUTH DAILY 11/6/20   Brando Bautista MD   [facility]) 2 mg tablet TAKE 1 TABLET BY MOUTH DAILY 11/6/20   Brando Bautista MD   enoxaparin (LOVENOX) 100 mg/mL 100 mg by SubCUTAneous route every twelve (12) hours for 3 days. 11/9/20 11/12/20  Kayden Johnson NP   warfarin (COUMADIN) 2.5 mg tablet  10/16/20   Provider, Historical   warfarin (COUMADIN) 5 mg tablet Take 1 Tab by mouth Daily (before dinner). 10/2/20   Alina Chavarria PA   lacosamide (Vimpat) 100 mg tab tablet Take 1 Tab by mouth two (2) times a day. Max Daily Amount: 200 mg. 9/4/20   Flynn Dang MD   metoprolol tartrate (LOPRESSOR) 25 mg tablet TAKE 1 TABLET BY MOUTH TWICE A DAY 9/2/20   Janay Estrada DO   levETIRAcetam 1,000 mg tablet Take 1 Tab by mouth two (2) times a day. 3/4/20   Flynn Dang MD   cholecalciferol (VITAMIN D3) (1000 Units /25 mcg) tablet TAKE 1 TABLET BY MOUTH DAILY 2/4/20   Nina Gomez DO       Allergies   Allergen Reactions    Sulfa (Sulfonamide Antibiotics) Rash     Review of Systems:   Consititutional: Denies fever or chills.   Eyes:  Denies use of glasses or vision problems(cataracts). ENT:  Denies hearing or swallowing difficulty. CV: Denies CP, claudication, HTN. Resp: Denies dyspnea, productive cough. : Denies dialysis or kidney problems. GI: Denies ulcers, esophageal strictures, liver problems. M/S: Denies joint or bone problems, or implanted artificial hardware. Skin: Denies varicose veins, edema. Neuro: Denies strokes, or TIAs. Psych: Denies anxiety or depression. Endocrine: Denies thyroid problems or diabetes. Heme/Lymphatic: Denies easy bruising or lymphedema. Objective:     VS:   There were no vitals taken for this visit. Physical Exam:    General appearance: alert, cooperative, no distress  Head: normocephalic, without obvious abnormality; atraumatic  Eyes: conjunctivae/corneas clear; EOM's intact. Nose: nares normal; no drainage. Neck: no carotid bruit and no JVD  Lungs: clear to auscultation bilaterally  Heart: regular rate and rhythm; no murmur  Abdomen: soft, non-tender; bowel sounds normal  Extremities: moves all extremities; no weakness. Skin: Skin color normal; No varicose veins or edema. Neurologic: Grossly normal      Labs: No results for input(s): WBC, HGB, HCT, PLT, NA, K, BUN, CREA, GLU, GLUCPOC, INR, HGBEXT, HCTEXT, PLTEXT, INREXT, HGBEXT, HCTEXT, PLTEXT, INREXT in the last 72 hours. No lab exists for component: GLPOC    Assessment:     Active Problems:    Atrial fibrillation ([address] (11/12/2020)        Plan:   The risk and benefit of surgery were reviewed with patient and family and all questions answered and the patient wishes to proceed. Risk include infection, bleeding, stroke, heart attack, irregular heart rhythm, kidney failure and death. Surgery is scheduled for 11-13-20. Treatment Plan:    1. Persistent Afib: On Eliquis. Plan for hybrid ablation 11- if LILLIANA shows no HARI clot. 2. Hx CHB s/p [employer] Bi-V PPM 2015: Followed by Dr. Ovidio Austin  3. Hx seizure disorder:  On lacosamide, levetiracetam  4. Lymphedema/LE edema: On bumex/compression stockings. Followed by lymphedema clinic  5. HTN: On BB  6. Intellectual disability: Mild, lives mostly independently in group home.       Signed By: MONISHA Horn     November 12, 2020

## 2020-11-12 NOTE — PROGRESS NOTES
DERRICK Plan  -discharge back to group home  -follow up appointments     Reason for Admission:   Atrial Fibrillation                   RUR Score:  7%                   Plan for utilizing home health:  N/A       PCP: First and Last name:  Dr. Jeremías Dougherty   Name of Practice:  [address] [place] [address]   Are you a current patient: Yes/No:  Yes   Approximate date of last visit: 10/29/2020   Can you participate in a virtual visit with your PCP:  Yes                    Current Advanced Directive/Advance Care Plan:  Patient a full code, no ACP documents on file at this time. Transition of Care Plan:                    CM attempted to contact patient's group home coordinator Chary Eller [phone] in order to conduct patient  assessment. CM left HIPPA compliant VM on coordinator's phone. Patient is a 70year old male w/ a PMH of mild intellectual disability, seizure, and Bi-V PPM. CM met patient at the bedside introduce self and explain CM role. CM verified patient name and . From chart review and from speaking with patient CM gathered information that verifies that  patient is independent at baseline and lives in group home. Patient states that he does not use any DME at baseline.         Care Management Interventions  Mode of Transport at Discharge: Other (see comment)(Patient's group home to provide transportation at discharge )  Transition of Care Consult (CM Consult): Discharge Planning  Current Support Network: Adult Group Home  Confirm Follow Up Transport: Other (see comment)(patient group home to transport at discharge)  Discharge Location  Discharge Placement: Group home    CM will continue to follow for discharge needs.     MORENO HaysN, RN, [facility]

## 2020-11-12 NOTE — PROGRESS NOTES
TRANSFER - OUT REPORT:    Verbal report given to Sterling Grant on Yobany Quinn being transferred to 2155 for routine progression of care       Report consisted of patient's Situation, Background, Assessment and   Recommendations(SBAR). Information from the following report(s) SBAR, Procedure Summary, Intake/Output, MAR, Recent Results and Cardiac Rhythm Paced was reviewed with the receiving nurse. Opportunity for questions and clarification was provided.       Patient transported with:   Registered Nurse

## 2020-11-12 NOTE — PROGRESS NOTES
Received report from [facility]. Pt transported to recovery area awaiting IVCU bed. Pt has belongings. No distress noted.

## 2020-11-12 NOTE — PROGRESS NOTES
Patient arrived to Non-Invasive Cardiology Lab for Out Patient LILLIANA Procedure. Staff introduced to patient. Patient identifiers verified with Name and Date of Birth. Procedure verified with patient. Consent forms reviewed and signed by patient or authorized representative and verified. Allergies verified. Patient informed of procedure and plan of care. Questions answered with review. Patient on cardiac monitor, non-invasive blood pressure, SPO2 monitor. On room air. Patient is A&Ox3. Patient reports no complaints. Patient on stretcher, in low position, with side rails up. Patient instructed to call for assistance as needed.     Caregiver in waiting room Luana [phone]

## 2020-11-12 NOTE — Clinical Note
TRANSFER - IN REPORT:     Verbal report received from: OR. Report consisted of patient's Situation, Background, Assessment and   Recommendations(SBAR). Opportunity for questions and clarification was provided. Assessment completed upon patient's arrival to unit and care assumed. Patient transported with a Registered Nurse, Monitor and Oxygen.

## 2020-11-12 NOTE — Clinical Note
Sheath #1: sheath exchanged for INTRO SWATZ SL1 8. [id number] -- . Hemostasis achieved.  8fr sheath RFV removed/ SL1 advanced over package wire/ aspirated and flushed

## 2020-11-12 NOTE — Clinical Note
Transseptal Cath Performed check box under hemodynamic and ICE and Fluoro, [place] 98cm via a guiding sheath.

## 2020-11-12 NOTE — Clinical Note
TRANSFER - OUT REPORT:     Verbal report given to: [facility]. Report consisted of patient's Situation, Background, Assessment and   Recommendations(SBAR). Opportunity for questions and clarification was provided. Patient transported with a Registered Nurse, Monitor and Oxygen. Oxygen used for patient = nasal cannula, @ 2 - 6 Liters. Patient transported to: CCU bed 2552.    transported with CRNA and EP Lab staff

## 2020-11-12 NOTE — PROGRESS NOTES
Offgoing report given to Toño Noe RN    315: RN spoke to pt's caregiver Daniele Fiore, updating her on pt's status, events of today and anticipated procedure tomorrow. 623: pt signed consent/consent on paper chart. 656: rn completed admission database with pt's caregiver Daniele Fiore over the phone.

## 2020-11-12 NOTE — PROGRESS NOTES
CM attempted to contact patient's group home coordinator Kranthi Shelton [phone] in order to conduct patient  assessment. CM left HIPPA compliant VM on coordinator's phone.      MORENO MillerN, RN, [facility]

## 2020-11-12 NOTE — PROGRESS NOTES
Pts caregiver Danie Argueta was updated, waiting on bed assignment, will notify her when it is confirmed.

## 2020-11-12 NOTE — Clinical Note
Transseptal Cath Performed check box under hemodynamic and ICE and Fluoro, [place] 98cm via a guiding sheath. Needle removed.

## 2020-11-13 ENCOUNTER — APPOINTMENT (OUTPATIENT)
Dept: GENERAL RADIOLOGY | Age: 75
DRG: 229 | End: 2020-11-13
Attending: PHYSICIAN ASSISTANT
Payer: MEDICARE

## 2020-11-13 ENCOUNTER — ANESTHESIA (OUTPATIENT)
Dept: CARDIOTHORACIC SURGERY | Age: 75
DRG: 229 | End: 2020-11-13
Payer: MEDICARE

## 2020-11-13 ENCOUNTER — APPOINTMENT (OUTPATIENT)
Dept: GENERAL RADIOLOGY | Age: 75
DRG: 229 | End: 2020-11-13
Attending: THORACIC SURGERY (CARDIOTHORACIC VASCULAR SURGERY)
Payer: MEDICARE

## 2020-11-13 PROBLEM — Z86.79 H/O MOBITZ TYPE II BLOCK: Chronic | Status: ACTIVE | Noted: 2018-10-15

## 2020-11-13 PROBLEM — Z86.79 S/P ABLATION OF ATRIAL FIBRILLATION: Status: ACTIVE | Noted: 2020-11-13

## 2020-11-13 PROBLEM — Z98.890 S/P ABLATION OF ATRIAL FIBRILLATION: Status: ACTIVE | Noted: 2020-11-13

## 2020-11-13 LAB
ACT BLD: 136 SECS (ref 79–138)
ACT BLD: 318 SECS (ref 79–138)
ALBUMIN SERPL-MCNC: 3.3 G/DL (ref 3.5–5)
ALBUMIN SERPL-MCNC: 3.5 G/DL (ref 3.5–5)
ALBUMIN/GLOB SERPL: 0.9 {RATIO} (ref 1.1–2.2)
ALBUMIN/GLOB SERPL: 1 {RATIO} (ref 1.1–2.2)
ALP SERPL-CCNC: 89 U/L (ref 45–117)
ALP SERPL-CCNC: 90 U/L (ref 45–117)
ALT SERPL-CCNC: 17 U/L (ref 12–78)
ALT SERPL-CCNC: 19 U/L (ref 12–78)
ANION GAP SERPL CALC-SCNC: 8 MMOL/L (ref 5–15)
ANION GAP SERPL CALC-SCNC: 8 MMOL/L (ref 5–15)
APTT PPP: 27.1 SEC (ref 22.1–31)
AST SERPL-CCNC: 29 U/L (ref 15–37)
AST SERPL-CCNC: 33 U/L (ref 15–37)
BASOPHILS # BLD: 0 K/UL (ref 0–0.1)
BASOPHILS NFR BLD: 0 % (ref 0–1)
BILIRUB SERPL-MCNC: 0.6 MG/DL (ref 0.2–1)
BILIRUB SERPL-MCNC: 0.9 MG/DL (ref 0.2–1)
BUN SERPL-MCNC: 16 MG/DL (ref 6–20)
BUN SERPL-MCNC: 16 MG/DL (ref 6–20)
BUN/CREAT SERPL: 16 (ref 12–20)
BUN/CREAT SERPL: 20 (ref 12–20)
CALCIUM SERPL-MCNC: 8.5 MG/DL (ref 8.5–10.1)
CALCIUM SERPL-MCNC: 8.9 MG/DL (ref 8.5–10.1)
CHLORIDE SERPL-SCNC: 103 MMOL/L (ref 97–108)
CHLORIDE SERPL-SCNC: 105 MMOL/L (ref 97–108)
CO2 SERPL-SCNC: 27 MMOL/L (ref 21–32)
CO2 SERPL-SCNC: 28 MMOL/L (ref 21–32)
CREAT SERPL-MCNC: 0.79 MG/DL (ref 0.7–1.3)
CREAT SERPL-MCNC: 1 MG/DL (ref 0.7–1.3)
DIFFERENTIAL METHOD BLD: ABNORMAL
EOSINOPHIL # BLD: 0 K/UL (ref 0–0.4)
EOSINOPHIL NFR BLD: 0 % (ref 0–7)
ERYTHROCYTE [DISTWIDTH] IN BLOOD BY AUTOMATED COUNT: 12.8 % (ref 11.5–14.5)
GLOBULIN SER CALC-MCNC: 3.4 G/DL (ref 2–4)
GLOBULIN SER CALC-MCNC: 3.7 G/DL (ref 2–4)
GLUCOSE BLD STRIP.AUTO-MCNC: 86 MG/DL (ref 65–100)
GLUCOSE SERPL-MCNC: 134 MG/DL (ref 65–100)
GLUCOSE SERPL-MCNC: 193 MG/DL (ref 65–100)
HCT VFR BLD AUTO: 38.4 % (ref 36.6–50.3)
HGB BLD-MCNC: 12.8 G/DL (ref 12.1–17)
IMM GRANULOCYTES # BLD AUTO: 0.1 K/UL (ref 0–0.04)
IMM GRANULOCYTES NFR BLD AUTO: 1 % (ref 0–0.5)
INR PPP: 1.1 (ref 0.9–1.1)
LYMPHOCYTES # BLD: 0.5 K/UL (ref 0.8–3.5)
LYMPHOCYTES NFR BLD: 5 % (ref 12–49)
MAGNESIUM SERPL-MCNC: 1.8 MG/DL (ref 1.6–2.4)
MAGNESIUM SERPL-MCNC: 1.8 MG/DL (ref 1.6–2.4)
MCH RBC QN AUTO: 32.9 PG (ref 26–34)
MCHC RBC AUTO-ENTMCNC: 33.3 G/DL (ref 30–36.5)
MCV RBC AUTO: 98.7 FL (ref 80–99)
MONOCYTES # BLD: 0.2 K/UL (ref 0–1)
MONOCYTES NFR BLD: 2 % (ref 5–13)
NEUTS SEG # BLD: 8.6 K/UL (ref 1.8–8)
NEUTS SEG NFR BLD: 92 % (ref 32–75)
NRBC # BLD: 0 K/UL (ref 0–0.01)
NRBC BLD-RTO: 0 PER 100 WBC
PLATELET # BLD AUTO: 124 K/UL (ref 150–400)
PMV BLD AUTO: 10.6 FL (ref 8.9–12.9)
POTASSIUM SERPL-SCNC: 3.5 MMOL/L (ref 3.5–5.1)
POTASSIUM SERPL-SCNC: 3.7 MMOL/L (ref 3.5–5.1)
PROT SERPL-MCNC: 6.9 G/DL (ref 6.4–8.2)
PROT SERPL-MCNC: 7 G/DL (ref 6.4–8.2)
PROTHROMBIN TIME: 11.4 SEC (ref 9–11.1)
RBC # BLD AUTO: 3.89 M/UL (ref 4.1–5.7)
RBC MORPH BLD: ABNORMAL
SERVICE CMNT-IMP: NORMAL
SODIUM SERPL-SCNC: 138 MMOL/L (ref 136–145)
SODIUM SERPL-SCNC: 141 MMOL/L (ref 136–145)
THERAPEUTIC RANGE,PTTT: NORMAL SECS (ref 58–77)
WBC # BLD AUTO: 9.4 K/UL (ref 4.1–11.1)

## 2020-11-13 PROCEDURE — 77030011220 HC DEV ELECSURG COVD -B: Performed by: THORACIC SURGERY (CARDIOTHORACIC VASCULAR SURGERY)

## 2020-11-13 PROCEDURE — 74011250636 HC RX REV CODE- 250/636: Performed by: THORACIC SURGERY (CARDIOTHORACIC VASCULAR SURGERY)

## 2020-11-13 PROCEDURE — 83735 ASSAY OF MAGNESIUM: CPT

## 2020-11-13 PROCEDURE — 74011250636 HC RX REV CODE- 250/636: Performed by: NURSE ANESTHETIST, CERTIFIED REGISTERED

## 2020-11-13 PROCEDURE — 77030016151 HC PROTCTR LNS DFOG COVD -B: Performed by: THORACIC SURGERY (CARDIOTHORACIC VASCULAR SURGERY)

## 2020-11-13 PROCEDURE — 74011000250 HC RX REV CODE- 250: Performed by: NURSE ANESTHETIST, CERTIFIED REGISTERED

## 2020-11-13 PROCEDURE — 77030011264 HC ELECTRD BLD EXT COVD -A: Performed by: THORACIC SURGERY (CARDIOTHORACIC VASCULAR SURGERY)

## 2020-11-13 PROCEDURE — 77030020704 HC DISECT ENDOSC BLNT J&J -B: Performed by: THORACIC SURGERY (CARDIOTHORACIC VASCULAR SURGERY)

## 2020-11-13 PROCEDURE — 85347 COAGULATION TIME ACTIVATED: CPT

## 2020-11-13 PROCEDURE — 77030005513 HC CATH URETH FOL11 MDII -B: Performed by: THORACIC SURGERY (CARDIOTHORACIC VASCULAR SURGERY)

## 2020-11-13 PROCEDURE — 65620000000 HC RM CCU GENERAL

## 2020-11-13 PROCEDURE — 93613 INTRACARDIAC EPHYS 3D MAPG: CPT

## 2020-11-13 PROCEDURE — 85025 COMPLETE CBC W/AUTO DIFF WBC: CPT

## 2020-11-13 PROCEDURE — 74011000250 HC RX REV CODE- 250: Performed by: PHYSICIAN ASSISTANT

## 2020-11-13 PROCEDURE — C1766 INTRO/SHEATH,STRBLE,NON-PEEL: HCPCS | Performed by: INTERNAL MEDICINE

## 2020-11-13 PROCEDURE — C1730 CATH, EP, 19 OR FEW ELECT: HCPCS | Performed by: INTERNAL MEDICINE

## 2020-11-13 PROCEDURE — 77030020506 HC NDL TRNSPTL NRG BAYL -F: Performed by: INTERNAL MEDICINE

## 2020-11-13 PROCEDURE — 76060000037 HC ANESTHESIA 3 TO 3.5 HR: Performed by: THORACIC SURGERY (CARDIOTHORACIC VASCULAR SURGERY)

## 2020-11-13 PROCEDURE — 82962 GLUCOSE BLOOD TEST: CPT

## 2020-11-13 PROCEDURE — 77030003029 HC SUT VCRL J&J -B: Performed by: THORACIC SURGERY (CARDIOTHORACIC VASCULAR SURGERY)

## 2020-11-13 PROCEDURE — 76010000110 HC CV SURG 3 TO 3.5 HR: Performed by: THORACIC SURGERY (CARDIOTHORACIC VASCULAR SURGERY)

## 2020-11-13 PROCEDURE — 77030019908 HC STETH ESOPH SIMS -A: Performed by: ANESTHESIOLOGY

## 2020-11-13 PROCEDURE — 85730 THROMBOPLASTIN TIME PARTIAL: CPT

## 2020-11-13 PROCEDURE — 77030029359 HC PRB ESOPH TEMP CATH ANTM -F: Performed by: INTERNAL MEDICINE

## 2020-11-13 PROCEDURE — 77030026438 HC STYL ET INTUB CARD -A: Performed by: ANESTHESIOLOGY

## 2020-11-13 PROCEDURE — 77030008684 HC TU ET CUF COVD -B: Performed by: ANESTHESIOLOGY

## 2020-11-13 PROCEDURE — 77030018673: Performed by: THORACIC SURGERY (CARDIOTHORACIC VASCULAR SURGERY)

## 2020-11-13 PROCEDURE — 76000 FLUOROSCOPY <1 HR PHYS/QHP: CPT

## 2020-11-13 PROCEDURE — 93662 INTRACARDIAC ECG (ICE): CPT | Performed by: INTERNAL MEDICINE

## 2020-11-13 PROCEDURE — 74011250637 HC RX REV CODE- 250/637: Performed by: NURSE PRACTITIONER

## 2020-11-13 PROCEDURE — 77030013797 HC KT TRNSDUC PRSSR EDWD -A: Performed by: INTERNAL MEDICINE

## 2020-11-13 PROCEDURE — C1732 CATH, EP, DIAG/ABL, 3D/VECT: HCPCS | Performed by: INTERNAL MEDICINE

## 2020-11-13 PROCEDURE — 80053 COMPREHEN METABOLIC PANEL: CPT

## 2020-11-13 PROCEDURE — 77030018843 HC SOL IRR SOD CL 9% SLSH BAXT -B: Performed by: THORACIC SURGERY (CARDIOTHORACIC VASCULAR SURGERY)

## 2020-11-13 PROCEDURE — C1751 CATH, INF, PER/CENT/MIDLINE: HCPCS | Performed by: ANESTHESIOLOGY

## 2020-11-13 PROCEDURE — 93657 TX L/R ATRIAL FIB ADDL: CPT | Performed by: INTERNAL MEDICINE

## 2020-11-13 PROCEDURE — 77030005401 HC CATH RAD ARRO -A: Performed by: ANESTHESIOLOGY

## 2020-11-13 PROCEDURE — 77010033678 HC OXYGEN DAILY

## 2020-11-13 PROCEDURE — 77030029065 HC DRSG HEMO QCLOT ZMED -B: Performed by: INTERNAL MEDICINE

## 2020-11-13 PROCEDURE — 93656 COMPRE EP EVAL ABLTJ ATR FIB: CPT | Performed by: INTERNAL MEDICINE

## 2020-11-13 PROCEDURE — C1893 INTRO/SHEATH, FIXED,NON-PEEL: HCPCS | Performed by: INTERNAL MEDICINE

## 2020-11-13 PROCEDURE — 77030037878 HC DRSG MEPILEX >48IN BORD MOLN -B: Performed by: THORACIC SURGERY (CARDIOTHORACIC VASCULAR SURGERY)

## 2020-11-13 PROCEDURE — 77030002996 HC SUT SLK J&J -A: Performed by: THORACIC SURGERY (CARDIOTHORACIC VASCULAR SURGERY)

## 2020-11-13 PROCEDURE — 93613 INTRACARDIAC EPHYS 3D MAPG: CPT | Performed by: INTERNAL MEDICINE

## 2020-11-13 PROCEDURE — 77030002933 HC SUT MCRYL J&J -A: Performed by: THORACIC SURGERY (CARDIOTHORACIC VASCULAR SURGERY)

## 2020-11-13 PROCEDURE — C1894 INTRO/SHEATH, NON-LASER: HCPCS | Performed by: INTERNAL MEDICINE

## 2020-11-13 PROCEDURE — 77030018835 HC SOL IRR LR ICUM -A: Performed by: THORACIC SURGERY (CARDIOTHORACIC VASCULAR SURGERY)

## 2020-11-13 PROCEDURE — 77030013797 HC KT TRNSDUC PRSSR EDWD -A: Performed by: ANESTHESIOLOGY

## 2020-11-13 PROCEDURE — 77030040504 HC DRN WND MDII -B: Performed by: THORACIC SURGERY (CARDIOTHORACIC VASCULAR SURGERY)

## 2020-11-13 PROCEDURE — 74011000258 HC RX REV CODE- 258: Performed by: PHYSICIAN ASSISTANT

## 2020-11-13 PROCEDURE — 71045 X-RAY EXAM CHEST 1 VIEW: CPT

## 2020-11-13 PROCEDURE — 74011250636 HC RX REV CODE- 250/636: Performed by: PHYSICIAN ASSISTANT

## 2020-11-13 PROCEDURE — 77030020256 HC SOL INJ NACL 0.9%  500ML: Performed by: THORACIC SURGERY (CARDIOTHORACIC VASCULAR SURGERY)

## 2020-11-13 PROCEDURE — 85610 PROTHROMBIN TIME: CPT

## 2020-11-13 PROCEDURE — 77030010880 HC CBL EP SUPRME STJU -C: Performed by: INTERNAL MEDICINE

## 2020-11-13 PROCEDURE — 74011000250 HC RX REV CODE- 250: Performed by: THORACIC SURGERY (CARDIOTHORACIC VASCULAR SURGERY)

## 2020-11-13 PROCEDURE — 77030034404 HC DEV ABLAT CARD EPISENSE ATRC -K3: Performed by: THORACIC SURGERY (CARDIOTHORACIC VASCULAR SURGERY)

## 2020-11-13 PROCEDURE — 77030035291 HC TBNG PMP SMARTABLATE J&J -B: Performed by: INTERNAL MEDICINE

## 2020-11-13 PROCEDURE — 74011250636 HC RX REV CODE- 250/636: Performed by: ANESTHESIOLOGY

## 2020-11-13 PROCEDURE — 77030020263 HC SOL INJ SOD CL0.9% LFCR 1000ML: Performed by: THORACIC SURGERY (CARDIOTHORACIC VASCULAR SURGERY)

## 2020-11-13 PROCEDURE — 77030027107 HC PTCH EXT REF CARTO3 J&J -F: Performed by: INTERNAL MEDICINE

## 2020-11-13 PROCEDURE — 77030010507 HC ADH SKN DERMBND J&J -B: Performed by: THORACIC SURGERY (CARDIOTHORACIC VASCULAR SURGERY)

## 2020-11-13 PROCEDURE — 36415 COLL VENOUS BLD VENIPUNCTURE: CPT

## 2020-11-13 PROCEDURE — 2709999900 HC NON-CHARGEABLE SUPPLY

## 2020-11-13 PROCEDURE — 74011250637 HC RX REV CODE- 250/637: Performed by: PHYSICIAN ASSISTANT

## 2020-11-13 PROCEDURE — 77030041076 HC DRSG AG OPTICELL MDII -A: Performed by: THORACIC SURGERY (CARDIOTHORACIC VASCULAR SURGERY)

## 2020-11-13 PROCEDURE — 2709999900 HC NON-CHARGEABLE SUPPLY: Performed by: THORACIC SURGERY (CARDIOTHORACIC VASCULAR SURGERY)

## 2020-11-13 PROCEDURE — 77030018729 HC ELECTRD DEFIB PAD CARD -B: Performed by: THORACIC SURGERY (CARDIOTHORACIC VASCULAR SURGERY)

## 2020-11-13 PROCEDURE — 77030013567 HC DRN WND RESERV BARD -A: Performed by: THORACIC SURGERY (CARDIOTHORACIC VASCULAR SURGERY)

## 2020-11-13 PROCEDURE — 33266 ABLATE ATRIA X10SV ENDO: CPT | Performed by: THORACIC SURGERY (CARDIOTHORACIC VASCULAR SURGERY)

## 2020-11-13 PROCEDURE — C1759 CATH, INTRA ECHOCARDIOGRAPHY: HCPCS | Performed by: INTERNAL MEDICINE

## 2020-11-13 PROCEDURE — 77030031139 HC SUT VCRL2 J&J -A: Performed by: THORACIC SURGERY (CARDIOTHORACIC VASCULAR SURGERY)

## 2020-11-13 PROCEDURE — 77030018846 HC SOL IRR STRL H20 ICUM -A: Performed by: THORACIC SURGERY (CARDIOTHORACIC VASCULAR SURGERY)

## 2020-11-13 RX ORDER — MIDAZOLAM HYDROCHLORIDE 1 MG/ML
1 INJECTION, SOLUTION INTRAMUSCULAR; INTRAVENOUS
Status: DISCONTINUED | OUTPATIENT
Start: 2020-11-13 | End: 2020-11-14

## 2020-11-13 RX ORDER — ALBUTEROL SULFATE 0.83 MG/ML
2.5 SOLUTION RESPIRATORY (INHALATION)
Status: DISCONTINUED | OUTPATIENT
Start: 2020-11-13 | End: 2020-11-15 | Stop reason: HOSPADM

## 2020-11-13 RX ORDER — SODIUM CHLORIDE 450 MG/100ML
10 INJECTION, SOLUTION INTRAVENOUS CONTINUOUS
Status: DISCONTINUED | OUTPATIENT
Start: 2020-11-13 | End: 2020-11-14

## 2020-11-13 RX ORDER — SUCCINYLCHOLINE CHLORIDE 20 MG/ML
INJECTION INTRAMUSCULAR; INTRAVENOUS AS NEEDED
Status: DISCONTINUED | OUTPATIENT
Start: 2020-11-13 | End: 2020-11-13 | Stop reason: HOSPADM

## 2020-11-13 RX ORDER — OXYCODONE HYDROCHLORIDE 5 MG/1
10 TABLET ORAL
Status: DISCONTINUED | OUTPATIENT
Start: 2020-11-13 | End: 2020-11-15 | Stop reason: HOSPADM

## 2020-11-13 RX ORDER — OXYCODONE HYDROCHLORIDE 5 MG/1
5 TABLET ORAL
Qty: 15 TAB | Refills: 0 | Status: SHIPPED | OUTPATIENT
Start: 2020-11-13 | End: 2020-11-16

## 2020-11-13 RX ORDER — LIDOCAINE HYDROCHLORIDE 10 MG/ML
0.1 INJECTION, SOLUTION EPIDURAL; INFILTRATION; INTRACAUDAL; PERINEURAL AS NEEDED
Status: DISCONTINUED | OUTPATIENT
Start: 2020-11-13 | End: 2020-11-13

## 2020-11-13 RX ORDER — MORPHINE SULFATE 4 MG/ML
4 INJECTION INTRAVENOUS
Status: DISCONTINUED | OUTPATIENT
Start: 2020-11-13 | End: 2020-11-14

## 2020-11-13 RX ORDER — BUMETANIDE 1 MG/1
2 TABLET ORAL DAILY
Status: DISCONTINUED | OUTPATIENT
Start: 2020-11-14 | End: 2020-11-15 | Stop reason: HOSPADM

## 2020-11-13 RX ORDER — MUPIROCIN 20 MG/G
OINTMENT TOPICAL 2 TIMES DAILY
Status: DISCONTINUED | OUTPATIENT
Start: 2020-11-13 | End: 2020-11-15 | Stop reason: HOSPADM

## 2020-11-13 RX ORDER — NEOSTIGMINE METHYLSULFATE 1 MG/ML
INJECTION, SOLUTION INTRAVENOUS AS NEEDED
Status: DISCONTINUED | OUTPATIENT
Start: 2020-11-13 | End: 2020-11-13 | Stop reason: HOSPADM

## 2020-11-13 RX ORDER — NALOXONE HYDROCHLORIDE 0.4 MG/ML
0.4 INJECTION, SOLUTION INTRAMUSCULAR; INTRAVENOUS; SUBCUTANEOUS AS NEEDED
Status: DISCONTINUED | OUTPATIENT
Start: 2020-11-13 | End: 2020-11-15 | Stop reason: HOSPADM

## 2020-11-13 RX ORDER — AMIODARONE HYDROCHLORIDE 200 MG/1
200 TABLET ORAL EVERY 12 HOURS
Status: DISCONTINUED | OUTPATIENT
Start: 2020-11-13 | End: 2020-11-15 | Stop reason: HOSPADM

## 2020-11-13 RX ORDER — LIDOCAINE HYDROCHLORIDE 20 MG/ML
INJECTION, SOLUTION EPIDURAL; INFILTRATION; INTRACAUDAL; PERINEURAL AS NEEDED
Status: DISCONTINUED | OUTPATIENT
Start: 2020-11-13 | End: 2020-11-13 | Stop reason: HOSPADM

## 2020-11-13 RX ORDER — FENTANYL CITRATE 50 UG/ML
25 INJECTION, SOLUTION INTRAMUSCULAR; INTRAVENOUS
Status: CANCELLED | OUTPATIENT
Start: 2020-11-13

## 2020-11-13 RX ORDER — SODIUM CHLORIDE, SODIUM LACTATE, POTASSIUM CHLORIDE, CALCIUM CHLORIDE 600; 310; 30; 20 MG/100ML; MG/100ML; MG/100ML; MG/100ML
25 INJECTION, SOLUTION INTRAVENOUS CONTINUOUS
Status: CANCELLED | OUTPATIENT
Start: 2020-11-13

## 2020-11-13 RX ORDER — ACETAMINOPHEN 325 MG/1
650 TABLET ORAL EVERY 4 HOURS
Status: COMPLETED | OUTPATIENT
Start: 2020-11-13 | End: 2020-11-14

## 2020-11-13 RX ORDER — SODIUM CHLORIDE, SODIUM LACTATE, POTASSIUM CHLORIDE, CALCIUM CHLORIDE 600; 310; 30; 20 MG/100ML; MG/100ML; MG/100ML; MG/100ML
25 INJECTION, SOLUTION INTRAVENOUS CONTINUOUS
Status: DISCONTINUED | OUTPATIENT
Start: 2020-11-13 | End: 2020-11-13

## 2020-11-13 RX ORDER — LANOLIN ALCOHOL/MO/W.PET/CERES
400 CREAM (GRAM) TOPICAL 2 TIMES DAILY
Status: DISCONTINUED | OUTPATIENT
Start: 2020-11-14 | End: 2020-11-15 | Stop reason: HOSPADM

## 2020-11-13 RX ORDER — HEPARIN SODIUM 1000 [USP'U]/ML
INJECTION, SOLUTION INTRAVENOUS; SUBCUTANEOUS AS NEEDED
Status: DISCONTINUED | OUTPATIENT
Start: 2020-11-13 | End: 2020-11-13 | Stop reason: HOSPADM

## 2020-11-13 RX ORDER — LACOSAMIDE 100 MG/1
100 TABLET ORAL 2 TIMES DAILY
Status: DISCONTINUED | OUTPATIENT
Start: 2020-11-13 | End: 2020-11-15 | Stop reason: HOSPADM

## 2020-11-13 RX ORDER — ENOXAPARIN SODIUM 100 MG/ML
100 INJECTION SUBCUTANEOUS EVERY 12 HOURS
Qty: 10 ML | Refills: 0 | Status: SHIPPED | OUTPATIENT
Start: 2020-11-13 | End: 2020-11-18

## 2020-11-13 RX ORDER — MIDAZOLAM HYDROCHLORIDE 1 MG/ML
1 INJECTION, SOLUTION INTRAMUSCULAR; INTRAVENOUS AS NEEDED
Status: DISCONTINUED | OUTPATIENT
Start: 2020-11-13 | End: 2020-11-13

## 2020-11-13 RX ORDER — SODIUM CHLORIDE 9 MG/ML
5-40 INJECTION INTRAMUSCULAR; INTRAVENOUS; SUBCUTANEOUS EVERY 8 HOURS
Status: DISCONTINUED | OUTPATIENT
Start: 2020-11-13 | End: 2020-11-14

## 2020-11-13 RX ORDER — PROPOFOL 10 MG/ML
INJECTION, EMULSION INTRAVENOUS
Status: DISCONTINUED | OUTPATIENT
Start: 2020-11-13 | End: 2020-11-13 | Stop reason: HOSPADM

## 2020-11-13 RX ORDER — AMIODARONE HYDROCHLORIDE 200 MG/1
200 TABLET ORAL EVERY 12 HOURS
Qty: 60 TAB | Refills: 1 | Status: SHIPPED | OUTPATIENT
Start: 2020-11-13 | End: 2020-11-25

## 2020-11-13 RX ORDER — SODIUM CHLORIDE 9 MG/ML
9 INJECTION, SOLUTION INTRAVENOUS CONTINUOUS
Status: DISCONTINUED | OUTPATIENT
Start: 2020-11-13 | End: 2020-11-14

## 2020-11-13 RX ORDER — BUPIVACAINE HYDROCHLORIDE 5 MG/ML
15 INJECTION, SOLUTION EPIDURAL; INTRACAUDAL ONCE
Status: COMPLETED | OUTPATIENT
Start: 2020-11-13 | End: 2020-11-13

## 2020-11-13 RX ORDER — ONDANSETRON 2 MG/ML
INJECTION INTRAMUSCULAR; INTRAVENOUS AS NEEDED
Status: DISCONTINUED | OUTPATIENT
Start: 2020-11-13 | End: 2020-11-13 | Stop reason: HOSPADM

## 2020-11-13 RX ORDER — ONDANSETRON 2 MG/ML
4 INJECTION INTRAMUSCULAR; INTRAVENOUS
Status: DISCONTINUED | OUTPATIENT
Start: 2020-11-13 | End: 2020-11-15 | Stop reason: HOSPADM

## 2020-11-13 RX ORDER — METHYLPREDNISOLONE 4 MG/1
TABLET ORAL
Qty: 1 DOSE PACK | Refills: 0 | Status: SHIPPED | OUTPATIENT
Start: 2020-11-13 | End: 2020-11-23 | Stop reason: ALTCHOICE

## 2020-11-13 RX ORDER — AMOXICILLIN 250 MG
1 CAPSULE ORAL 2 TIMES DAILY
Status: DISCONTINUED | OUTPATIENT
Start: 2020-11-14 | End: 2020-11-15 | Stop reason: HOSPADM

## 2020-11-13 RX ORDER — ONDANSETRON 2 MG/ML
4 INJECTION INTRAMUSCULAR; INTRAVENOUS AS NEEDED
Status: CANCELLED | OUTPATIENT
Start: 2020-11-13

## 2020-11-13 RX ORDER — POTASSIUM CHLORIDE 20 MEQ/1
20 TABLET, EXTENDED RELEASE ORAL DAILY
Status: DISCONTINUED | OUTPATIENT
Start: 2020-11-14 | End: 2020-11-15 | Stop reason: HOSPADM

## 2020-11-13 RX ORDER — MAGNESIUM SULFATE 1 G/100ML
1 INJECTION INTRAVENOUS AS NEEDED
Status: DISCONTINUED | OUTPATIENT
Start: 2020-11-13 | End: 2020-11-14

## 2020-11-13 RX ORDER — POLYETHYLENE GLYCOL 3350 17 G/17G
17 POWDER, FOR SOLUTION ORAL DAILY
Status: DISCONTINUED | OUTPATIENT
Start: 2020-11-14 | End: 2020-11-15 | Stop reason: HOSPADM

## 2020-11-13 RX ORDER — FENTANYL CITRATE 50 UG/ML
INJECTION, SOLUTION INTRAMUSCULAR; INTRAVENOUS AS NEEDED
Status: DISCONTINUED | OUTPATIENT
Start: 2020-11-13 | End: 2020-11-13 | Stop reason: HOSPADM

## 2020-11-13 RX ORDER — CHLORHEXIDINE GLUCONATE 1.2 MG/ML
10 RINSE ORAL EVERY 12 HOURS
Status: DISCONTINUED | OUTPATIENT
Start: 2020-11-13 | End: 2020-11-15 | Stop reason: HOSPADM

## 2020-11-13 RX ORDER — PROTAMINE SULFATE 10 MG/ML
INJECTION, SOLUTION INTRAVENOUS AS NEEDED
Status: DISCONTINUED | OUTPATIENT
Start: 2020-11-13 | End: 2020-11-13 | Stop reason: HOSPADM

## 2020-11-13 RX ORDER — FUROSEMIDE 10 MG/ML
INJECTION INTRAMUSCULAR; INTRAVENOUS AS NEEDED
Status: DISCONTINUED | OUTPATIENT
Start: 2020-11-13 | End: 2020-11-13 | Stop reason: HOSPADM

## 2020-11-13 RX ORDER — SODIUM CHLORIDE 9 MG/ML
5-40 INJECTION INTRAMUSCULAR; INTRAVENOUS; SUBCUTANEOUS AS NEEDED
Status: DISCONTINUED | OUTPATIENT
Start: 2020-11-13 | End: 2020-11-14

## 2020-11-13 RX ORDER — FAMOTIDINE 20 MG/1
20 TABLET, FILM COATED ORAL EVERY 12 HOURS
Status: DISCONTINUED | OUTPATIENT
Start: 2020-11-14 | End: 2020-11-15 | Stop reason: HOSPADM

## 2020-11-13 RX ORDER — DEXAMETHASONE SODIUM PHOSPHATE 4 MG/ML
INJECTION, SOLUTION INTRA-ARTICULAR; INTRALESIONAL; INTRAMUSCULAR; INTRAVENOUS; SOFT TISSUE AS NEEDED
Status: DISCONTINUED | OUTPATIENT
Start: 2020-11-13 | End: 2020-11-13 | Stop reason: HOSPADM

## 2020-11-13 RX ORDER — FENTANYL CITRATE 50 UG/ML
50 INJECTION, SOLUTION INTRAMUSCULAR; INTRAVENOUS AS NEEDED
Status: DISCONTINUED | OUTPATIENT
Start: 2020-11-13 | End: 2020-11-13

## 2020-11-13 RX ORDER — MORPHINE SULFATE 10 MG/ML
2 INJECTION, SOLUTION INTRAMUSCULAR; INTRAVENOUS
Status: CANCELLED | OUTPATIENT
Start: 2020-11-13 | End: 2020-11-13

## 2020-11-13 RX ORDER — PROPOFOL 10 MG/ML
INJECTION, EMULSION INTRAVENOUS AS NEEDED
Status: DISCONTINUED | OUTPATIENT
Start: 2020-11-13 | End: 2020-11-13 | Stop reason: HOSPADM

## 2020-11-13 RX ORDER — ALBUMIN HUMAN 50 G/1000ML
12.5 SOLUTION INTRAVENOUS
Status: DISCONTINUED | OUTPATIENT
Start: 2020-11-13 | End: 2020-11-14

## 2020-11-13 RX ORDER — GLYCOPYRROLATE 0.2 MG/ML
INJECTION INTRAMUSCULAR; INTRAVENOUS AS NEEDED
Status: DISCONTINUED | OUTPATIENT
Start: 2020-11-13 | End: 2020-11-13 | Stop reason: HOSPADM

## 2020-11-13 RX ORDER — WARFARIN SODIUM 5 MG/1
5 TABLET ORAL ONCE
Status: COMPLETED | OUTPATIENT
Start: 2020-11-13 | End: 2020-11-13

## 2020-11-13 RX ORDER — MIDAZOLAM HYDROCHLORIDE 1 MG/ML
INJECTION, SOLUTION INTRAMUSCULAR; INTRAVENOUS AS NEEDED
Status: DISCONTINUED | OUTPATIENT
Start: 2020-11-13 | End: 2020-11-13 | Stop reason: HOSPADM

## 2020-11-13 RX ORDER — OXYCODONE HYDROCHLORIDE 5 MG/1
5 TABLET ORAL
Status: DISCONTINUED | OUTPATIENT
Start: 2020-11-13 | End: 2020-11-15 | Stop reason: HOSPADM

## 2020-11-13 RX ORDER — ROCURONIUM BROMIDE 10 MG/ML
INJECTION, SOLUTION INTRAVENOUS AS NEEDED
Status: DISCONTINUED | OUTPATIENT
Start: 2020-11-13 | End: 2020-11-13 | Stop reason: HOSPADM

## 2020-11-13 RX ORDER — FACIAL-BODY WIPES
10 EACH TOPICAL DAILY PRN
Status: DISCONTINUED | OUTPATIENT
Start: 2020-11-13 | End: 2020-11-15 | Stop reason: HOSPADM

## 2020-11-13 RX ORDER — SODIUM CHLORIDE 9 MG/ML
INJECTION, SOLUTION INTRAVENOUS
Status: DISCONTINUED | OUTPATIENT
Start: 2020-11-13 | End: 2020-11-13 | Stop reason: HOSPADM

## 2020-11-13 RX ORDER — PHENYLEPHRINE HCL IN 0.9% NACL 0.4MG/10ML
SYRINGE (ML) INTRAVENOUS AS NEEDED
Status: DISCONTINUED | OUTPATIENT
Start: 2020-11-13 | End: 2020-11-13 | Stop reason: HOSPADM

## 2020-11-13 RX ADMIN — SODIUM CHLORIDE 10 ML/HR: 450 INJECTION, SOLUTION INTRAVENOUS at 14:58

## 2020-11-13 RX ADMIN — METHYLPREDNISOLONE SODIUM SUCCINATE 125 MG: 125 INJECTION, POWDER, FOR SOLUTION INTRAMUSCULAR; INTRAVENOUS at 14:36

## 2020-11-13 RX ADMIN — PROPOFOL 100 MG: 10 INJECTION, EMULSION INTRAVENOUS at 08:06

## 2020-11-13 RX ADMIN — METHYLPREDNISOLONE SODIUM SUCCINATE 125 MG: 125 INJECTION, POWDER, FOR SOLUTION INTRAMUSCULAR; INTRAVENOUS at 21:35

## 2020-11-13 RX ADMIN — Medication 80 MCG: at 08:33

## 2020-11-13 RX ADMIN — Medication 3 MG: at 12:59

## 2020-11-13 RX ADMIN — SODIUM CHLORIDE 9 ML/HR: 900 INJECTION, SOLUTION INTRAVENOUS at 14:35

## 2020-11-13 RX ADMIN — SODIUM CHLORIDE 50 MCG/MIN: 900 INJECTION, SOLUTION INTRAVENOUS at 08:13

## 2020-11-13 RX ADMIN — ACETAMINOPHEN 650 MG: 325 TABLET ORAL at 17:29

## 2020-11-13 RX ADMIN — LEVETIRACETAM 1000 MG: 500 TABLET ORAL at 17:30

## 2020-11-13 RX ADMIN — WATER 2 G: 1 INJECTION INTRAMUSCULAR; INTRAVENOUS; SUBCUTANEOUS at 08:22

## 2020-11-13 RX ADMIN — SODIUM CHLORIDE 40 ML: 9 INJECTION, SOLUTION INTRAMUSCULAR; INTRAVENOUS; SUBCUTANEOUS at 14:49

## 2020-11-13 RX ADMIN — PROTAMINE SULFATE 100 MG: 10 INJECTION, SOLUTION INTRAVENOUS at 12:39

## 2020-11-13 RX ADMIN — METOPROLOL TARTRATE 25 MG: 25 TABLET, FILM COATED ORAL at 21:37

## 2020-11-13 RX ADMIN — LACOSAMIDE 100 MG: 100 TABLET, FILM COATED ORAL at 17:36

## 2020-11-13 RX ADMIN — CEFAZOLIN SODIUM 2 G: 1 INJECTION, POWDER, FOR SOLUTION INTRAMUSCULAR; INTRAVENOUS at 14:36

## 2020-11-13 RX ADMIN — CEFAZOLIN SODIUM 2 G: 1 INJECTION, POWDER, FOR SOLUTION INTRAMUSCULAR; INTRAVENOUS at 20:30

## 2020-11-13 RX ADMIN — HEPARIN SODIUM 15000 UNITS: 1000 INJECTION, SOLUTION INTRAVENOUS; SUBCUTANEOUS at 11:55

## 2020-11-13 RX ADMIN — ACETAMINOPHEN 650 MG: 325 TABLET ORAL at 21:37

## 2020-11-13 RX ADMIN — FUROSEMIDE 20 MG: 10 INJECTION, SOLUTION INTRAMUSCULAR; INTRAVENOUS at 08:42

## 2020-11-13 RX ADMIN — FENTANYL CITRATE 50 MCG: 50 INJECTION INTRAMUSCULAR; INTRAVENOUS at 08:38

## 2020-11-13 RX ADMIN — SODIUM CHLORIDE, POTASSIUM CHLORIDE, SODIUM LACTATE AND CALCIUM CHLORIDE: 600; 310; 30; 20 INJECTION, SOLUTION INTRAVENOUS at 07:00

## 2020-11-13 RX ADMIN — LIDOCAINE HYDROCHLORIDE 100 MG: 20 INJECTION, SOLUTION INTRAVENOUS at 08:06

## 2020-11-13 RX ADMIN — ACETAMINOPHEN 650 MG: 325 TABLET ORAL at 14:36

## 2020-11-13 RX ADMIN — WARFARIN SODIUM 5 MG: 5 TABLET ORAL at 17:29

## 2020-11-13 RX ADMIN — DEXAMETHASONE SODIUM PHOSPHATE 8 MG: 4 INJECTION, SOLUTION INTRAMUSCULAR; INTRAVENOUS at 08:19

## 2020-11-13 RX ADMIN — FENTANYL CITRATE 50 MCG: 50 INJECTION INTRAMUSCULAR; INTRAVENOUS at 07:06

## 2020-11-13 RX ADMIN — AMIODARONE HYDROCHLORIDE 200 MG: 200 TABLET ORAL at 14:38

## 2020-11-13 RX ADMIN — MIDAZOLAM HYDROCHLORIDE 1 MG: 1 INJECTION, SOLUTION INTRAMUSCULAR; INTRAVENOUS at 07:10

## 2020-11-13 RX ADMIN — FAMOTIDINE 20 MG: 10 INJECTION, SOLUTION INTRAVENOUS at 21:32

## 2020-11-13 RX ADMIN — ROCURONIUM BROMIDE 10 MG: 10 INJECTION INTRAVENOUS at 08:06

## 2020-11-13 RX ADMIN — SODIUM CHLORIDE: 9 INJECTION, SOLUTION INTRAVENOUS at 08:05

## 2020-11-13 RX ADMIN — SODIUM CHLORIDE 10 ML: 9 INJECTION, SOLUTION INTRAMUSCULAR; INTRAVENOUS; SUBCUTANEOUS at 21:31

## 2020-11-13 RX ADMIN — MIDAZOLAM HYDROCHLORIDE 2 MG: 1 INJECTION, SOLUTION INTRAMUSCULAR; INTRAVENOUS at 07:06

## 2020-11-13 RX ADMIN — FENTANYL CITRATE 50 MCG: 50 INJECTION INTRAMUSCULAR; INTRAVENOUS at 11:41

## 2020-11-13 RX ADMIN — FENTANYL CITRATE 100 MCG: 50 INJECTION INTRAMUSCULAR; INTRAVENOUS at 08:06

## 2020-11-13 RX ADMIN — SUCCINYLCHOLINE CHLORIDE 160 MG: 20 INJECTION, SOLUTION INTRAMUSCULAR; INTRAVENOUS at 08:06

## 2020-11-13 RX ADMIN — METHYLPREDNISOLONE SODIUM SUCCINATE 125 MG: 125 INJECTION, POWDER, FOR SOLUTION INTRAMUSCULAR; INTRAVENOUS at 08:16

## 2020-11-13 RX ADMIN — GLYCOPYRROLATE 0.4 MG: 0.2 INJECTION, SOLUTION INTRAMUSCULAR; INTRAVENOUS at 12:59

## 2020-11-13 RX ADMIN — CHLORHEXIDINE GLUCONATE 10 ML: 1.2 RINSE ORAL at 21:36

## 2020-11-13 RX ADMIN — PROPOFOL 75 MCG/KG/MIN: 10 INJECTION, EMULSION INTRAVENOUS at 09:46

## 2020-11-13 RX ADMIN — ROCURONIUM BROMIDE 40 MG: 10 INJECTION INTRAVENOUS at 08:13

## 2020-11-13 RX ADMIN — FAMOTIDINE 20 MG: 10 INJECTION, SOLUTION INTRAVENOUS at 14:36

## 2020-11-13 RX ADMIN — AMIODARONE HYDROCHLORIDE 200 MG: 200 TABLET ORAL at 21:37

## 2020-11-13 RX ADMIN — MUPIROCIN: 20 OINTMENT TOPICAL at 21:30

## 2020-11-13 RX ADMIN — ONDANSETRON HYDROCHLORIDE 4 MG: 2 INJECTION, SOLUTION INTRAMUSCULAR; INTRAVENOUS at 12:59

## 2020-11-13 NOTE — DISCHARGE INSTRUCTIONS
[facility]    DominickFelicia Thapamanjeetsammy Mcgowan [address] [facility], [address] [facility]  Office- [phone]  Fax- [phone]  _____________________________________________________________  Dr. Jasmin Vickers NP          Annalise Verdugo PA-C           Name:Ken uQinn     Active Problems:    Hypertension (10/19/2015)      H/O Mobitz type II block (10/15/2018)      Atrial fibrillation ([facility] (11/12/2020)      Persistent atrial fibrillation ([facility] (11/12/2020)      S/P ablation of atrial fibrillation (11/13/2020)        Discharge Date: 11/13/2020     Discharge to: Home    Warfarin management:    INR TARGET- 2-3  INR LEVEL to be drawn- by cardiology  INR management per cardiology office    INSTRUCTIONS:  NO SMOKING OR TOBACCO PRODUCTS  Follow all the instructions in your discharge book  You may shower. Wash all incisions twice daily with mild soap and water. No lotions, ointments or powder. Call the office immediately for any redness, swelling, or drainage from your incision. Take your temperature daily and call for a temperature of 101 degrees or higher or for any symptoms that make you think you have and infection. Weigh yourself each morning. Call if you gain more than 5 pounds in 48 hours. Use the incentive spirometer 6-8 times a day-10 breaths each time. Use a pillow or your bear to splint your breastbone when coughing or sneezing. Walk several hundred feet several times daily. DIET  Eat an American Heart Association diet. If you are having trouble with your appetite, eat what you can. Try eating small, frequent meals throughout the day. Kcal ADA diet. Check your blood sugars  And keep a log of your results.     ACTIVITY  NO DRIVING--you will be evaluated to drive at your follow up visit. Increase your activity by walking several times a day. Stay out of bed most of the day. When sitting, keep your legs elevated. You may ride in a car, but you must get out every hour and walk around. FOLLOW UP           1. Your first follow up appointment with your surgeon's PA or NP will be on    11/23/20 at 2:30 pm                    2. Please call our office at [phone] if you are unable to make either one of these appointments. 3.  Your appointment with your cardiologist   will be arranged later  4. Consult you primary care physician regarding your influenza &   pneumovax vaccines. 5.   PLEASE bring your medication bottles to each appointment. 6. Please sign up for My Chart. CALL [phone] FOR ANY QUESTIONS OR PROBLEMS.     Signature:___________________________________________________

## 2020-11-13 NOTE — ANESTHESIA PROCEDURE NOTES
Arterial Line Placement    Start time: 11/13/2020 7:24 AM  End time: 11/13/2020 7:30 AM  Performed by: Claude Berliner, CRNA  Authorized by:  George Bhatti MD     Pre-Procedure  Indications:  Arterial pressure monitoring and blood sampling  Preanesthetic Checklist: patient identified, risks and benefits discussed, anesthesia consent, site marked, patient being monitored, timeout performed and patient being monitored    Timeout Time: 08:06        Procedure:   Prep:  Chlorhexidine  Seldinger Technique?: Yes    Orientation:  Left  Location:  Radial artery  Catheter size:  20 G  Number of attempts:  1    Assessment:   Post-procedure:  Line secured and sterile dressing applied  Patient Tolerance:  Patient tolerated the procedure well with no immediate complications  Comment:   Aspirated/flushed easily, no bubbles

## 2020-11-13 NOTE — PROGRESS NOTES
Verbal report received from LIDYA SALAZAR on [email or web address] being received from  for routine progression of care      Report consisted of patients Situation, Background, Assessment and   Recommendations(SBAR). Information from the following report(s) SBAR, Procedure Summary and MAR was reviewed with the receiving nurse. Opportunity for questions and clarification was provided. Assessment completed upon patients arrival to unit and care assumed.

## 2020-11-13 NOTE — ANESTHESIA PROCEDURE NOTES
Central Line Placement    Start time: 11/13/2020 7:07 AM  End time: 11/13/2020 7:28 AM  Performed by: Michelle Geiger CRNA  Authorized by: Michelle Geiger CRNA     Indications: vascular access, central pressure monitoring and need for vasopressors  Preanesthetic Checklist: patient identified, risks and benefits discussed, anesthesia consent, site marked, patient being monitored and timeout performed    Timeout Time: 07:06       Pre-procedure: All elements of maximal sterile barrier technique followed?  Yes    2% Chlorhexidine for cutaneous antisepsis, Hand hygiene performed prior to catheter insertion and Ultrasound guidance    Sterile Ultrasound Technique followed?: Yes            Procedure:   Prep:  Chlorhexidine  Location: other (comment) (EJ)    Patient position:  Trendelenburg  Catheter type:  Quad lumen  Catheter size:  8.5 Fr  Catheter length:  16 cm  Number of attempts:  2 (unable to thread wire into very small IJ)  Successful placement: Yes      Assessment:   Post-procedure:  Catheter secured and sterile dressing with CHG applied  Assessment:  Blood return through all ports, free fluid flow and guidewire removal verified  Insertion:  Uncomplicated  Patient tolerance:  Patient tolerated the procedure well with no immediate complications

## 2020-11-13 NOTE — PERIOP NOTES
12 - TRANSFER - IN REPORT:    Verbal report received from [address]  being received from [facility] [facility] for ordered procedure      Report consisted of patients Situation, Background, Assessment and   Recommendations(SBAR). Information from the following report(s) SBAR, Pre Procedure Checklist and Procedure Verification was reviewed with the receiving nurse. Opportunity for questions and clarification was provided. Assessment completed upon patients arrival to unit and care assumed.

## 2020-11-13 NOTE — PROGRESS NOTES
11/13/2020    INTENSIVIST PROGRESS NOTE:     Patient seen and evaluated, chart reviewed   69 yo male hx of AFIB  Today s/p hybrid ablation  Now pt in CCU awake, alert, in no distresss  No acute complaints    ROS: no sob, no cp    Visit Vitals  /71   Pulse 91   Temp 98.3 °F (36.8 °C)   Resp 9   Ht 6' (1.829 m)   Wt 101 kg (222 lb 10.6 oz)   SpO2 98%   BMI 30.20 kg/m²       General: no distress  Eyes: anicteric  HEENT: dry oral mucosa  Neck: FROM  CV: RRR  Lungs: clear  Abd: soft  : no flank pain  Ext: no edema  Skin: no rash  Musculoskeletal: normal inspection  Neuro: non focal    CXR: no ASDZ, cardiomegaly    Labs reviewed    A/P:  - AFIB: s/p hybrid ablation  - HTN: on BB  - DVT prophylaxis  - advance diet as tolerated  - Will assist on disposition planning when stable for transfer  Kanu Hunter MD

## 2020-11-13 NOTE — PROGRESS NOTES
1900-report received from [address] [place] [address] and care resumed for MR ANTHONY,introductions made, whiteboard updated, pt appears to be resting in bed at this time, denies pain, all needs met, will continue to monitor and act accordingly  1945-this nurse spoke with blood bank, stated type and screen was not received due to a glitch in system, per [facility] and blood back, okay to send without order form, this nurse to re-order type and screen and send with order form  2300-CHG bath completed  0000-pt assisted to bathroom, tolerated well, all needs met at this time,  0330-CHG bath completed all needs met at this time  0535-pt off with OR RN, all pt belongings sent to new room

## 2020-11-13 NOTE — PROGRESS NOTES
Problem: Falls - Risk of  Goal: *Absence of Falls  Description: Document Gene Patino Fall Risk and appropriate interventions in the flowsheet.   Outcome: Progressing Towards Goal  Note: Fall Risk Interventions:  Mobility Interventions: Communicate number of staff needed for ambulation/transfer    Mentation Interventions: Adequate sleep, hydration, pain control, Door open when patient unattended    Medication Interventions: Patient to call before getting OOB    Elimination Interventions: Call light in reach, Urinal in reach    History of Falls Interventions: Vital signs minimum Q4HRs X 24 hrs (comment for end date)

## 2020-11-13 NOTE — PROGRESS NOTES
TRANSFER - IN REPORT: 
 
Verbal report received from , RN on Matthew Quinn  being received from EP lab for routine progression of care Report consisted of patients Situation, Background, Assessment and  
Recommendations(SBAR). Information from the following report(s) SBAR, Procedure Summary, MAR, Recent Results and Cardiac Rhythm NSR s/p cardioversion was reviewed with the receiving nurse. Opportunity for questions and clarification was provided. Assessment completed upon patients arrival to unit and care assumed.

## 2020-11-13 NOTE — ANESTHESIA PREPROCEDURE EVALUATION
Relevant Problems   No relevant active problems       Anesthetic History   No history of anesthetic complications            Review of Systems / Medical History  Patient summary reviewed, nursing notes reviewed and pertinent labs reviewed    Pulmonary  Within defined limits                 Neuro/Psych     seizures: well controlled         Cardiovascular    Hypertension        Dysrhythmias : atrial fibrillation  Pacemaker and CAD    Exercise tolerance: >4 METS     GI/Hepatic/Renal  Within defined limits              Endo/Other  Within defined limits           Other Findings              Physical Exam    Airway  Mallampati: III  TM Distance: 4 - 6 cm  Neck ROM: normal range of motion   Mouth opening: Normal     Cardiovascular    Rhythm: irregular  Rate: normal         Dental    Dentition: Poor dentition     Pulmonary          Rales:RLF       Abdominal  GI exam deferred       Other Findings            Anesthetic Plan    ASA: 3  Anesthesia type: general    Monitoring Plan: Arterial line and CVP      Induction: Intravenous  Anesthetic plan and risks discussed with: Patient

## 2020-11-13 NOTE — BRIEF OP NOTE
BRIEF OP NOTE  Pre-Op Diagnosis: ATRIAL FIBRILLATION    Post-Op Diagnosis: ATRIAL FIBRILLATION      Procedure:  TRANSPERICARDIAL HYBRID ABLATIOn    Surgeon: Mahi Tee MD    Assistant(s): Kristy Pate PA-C    Anesthesia: General     Infusions: none    Estimated Blood Loss:  50ml    Drains and pacing wires: 1 LUIS drain  Complications: None    Findings: A Fib

## 2020-11-13 NOTE — PROGRESS NOTES
1345-Pt. transported to room 2552 at this time. Bedside report received from Lily Ramirez CRNA. Pt. Is initially drowsy, but awakens easily and is alert and oriented x3-able to make needs known. Denies pain or discomfort. Respirations even and unlabored on O2 @ 4LPM via NC. Skin is warm and dry. Ced drain to LUIS bulb present from midline incision with sanguinous drainage noted. B femoral sites remain clean, dry and intact-+2 pulses bilaterally. 1600-Reassessment completed-no changes noted. Pt. remains as paced on the monitor. Denies pain, but states his \"left side from the surgery is sore\". Routine Tylenol given per orders and tolerating well. O2 decreased to 3LPM via NC at this time. Lying in bed resting quietly at this time. 1800-Pt. consumed 100% of clear liquid dinner tray without difficulty. 1920-Bedside and Verbal shift change report given to LIDYA Rae (oncoming nurse) by Pennie Puckett RN (offgoing nurse). Report included the following information SBAR, Kardex, MAR and Recent Results. Pt. is off the unit at this time for procedure.

## 2020-11-13 NOTE — PROGRESS NOTES
Pharmacy Daily Dosing of Warfarin    Indication: AFib    Goal INR: 2-3    PTA Warfarin Dose: Warfarin 5mg vs 2.5mg (pt from group home and intellectually disabled)    Concurrent anticoagulants: -    Concurrent antiplatelet: -    Major Interacting Medications   Drugs that may increase INR: amiodarone  Drugs that may decrease INR: -    Conditions that may increase/decrease INR (CHF, C. diff, cirrhosis, thyroid disorder, hypoalbuminemia): -    Labs:  Recent Labs     11/12/20  1440   INR 1.1   HGB 12.7   *   TBILI 1.0   ALB 3.5     Impression/Plan:   Eliquis PTA with history of Warfarin use  Will initiate dosing and dose daily based on INR results  LILLIANA shows no HARI thrombus  S/p Transpericardial Hybrid Ablation 11/13  Warfarin 5mg 11/13  Daily INR  CBC w/o diff QODay     Pharmacy will follow daily and adjust the dose as appropriate.     Thank you,  Lacey Cortez, [facility]

## 2020-11-13 NOTE — PERIOP NOTES
4402 - PT ARRIVED INTO PRE-OP F. A&O X4. ELISE SPON AND TO COMMAND  RESP EVEN AND UNLABORED. POX ON RA > 94%. BSB - DIMINISHED IN BASES WITH CRACKLES IN RIGHT BASE - OTHERWISE CLEAR. RFA IV SITE BENIGN. PT DENIES DISCOMFORT 0/10 ON PAIN SCALE. RN AT BEDSIDE. PRE-OP TCHING DONE - PT VERBALIZES UNDERSTANDING.    0706 - TIMEOUT DONE.  DR. GOOD, [address] CRNA AT [address]. Hazel Faria ALL AWRE OF CRACKLES IN RIGHT BASE AND DONNIE LE EDEMA. PT CORBIN PROCEDURE WELL.  VSS.

## 2020-11-14 ENCOUNTER — APPOINTMENT (OUTPATIENT)
Dept: GENERAL RADIOLOGY | Age: 75
DRG: 229 | End: 2020-11-14
Attending: PHYSICIAN ASSISTANT
Payer: MEDICARE

## 2020-11-14 LAB
ANION GAP SERPL CALC-SCNC: 7 MMOL/L (ref 5–15)
APTT PPP: 29.1 SEC (ref 22.1–31)
ATRIAL RATE: 76 BPM
BUN SERPL-MCNC: 15 MG/DL (ref 6–20)
BUN/CREAT SERPL: 20 (ref 12–20)
CALCIUM SERPL-MCNC: 8.6 MG/DL (ref 8.5–10.1)
CALCULATED R AXIS, ECG10: -85 DEGREES
CALCULATED T AXIS, ECG11: 87 DEGREES
CHLORIDE SERPL-SCNC: 104 MMOL/L (ref 97–108)
CO2 SERPL-SCNC: 26 MMOL/L (ref 21–32)
CREAT SERPL-MCNC: 0.74 MG/DL (ref 0.7–1.3)
DIAGNOSIS, 93000: NORMAL
ERYTHROCYTE [DISTWIDTH] IN BLOOD BY AUTOMATED COUNT: 12.8 % (ref 11.5–14.5)
GLUCOSE SERPL-MCNC: 132 MG/DL (ref 65–100)
HCT VFR BLD AUTO: 37.9 % (ref 36.6–50.3)
HGB BLD-MCNC: 12.9 G/DL (ref 12.1–17)
INR PPP: 1.1 (ref 0.9–1.1)
MAGNESIUM SERPL-MCNC: 2 MG/DL (ref 1.6–2.4)
MCH RBC QN AUTO: 33 PG (ref 26–34)
MCHC RBC AUTO-ENTMCNC: 34 G/DL (ref 30–36.5)
MCV RBC AUTO: 96.9 FL (ref 80–99)
NRBC # BLD: 0 K/UL (ref 0–0.01)
NRBC BLD-RTO: 0 PER 100 WBC
P-R INTERVAL, ECG05: 306 MS
PLATELET # BLD AUTO: 127 K/UL (ref 150–400)
PMV BLD AUTO: 11 FL (ref 8.9–12.9)
POTASSIUM SERPL-SCNC: 3.8 MMOL/L (ref 3.5–5.1)
PROTHROMBIN TIME: 11.1 SEC (ref 9–11.1)
Q-T INTERVAL, ECG07: 456 MS
QRS DURATION, ECG06: 200 MS
QTC CALCULATION (BEZET), ECG08: 513 MS
RBC # BLD AUTO: 3.91 M/UL (ref 4.1–5.7)
SODIUM SERPL-SCNC: 137 MMOL/L (ref 136–145)
THERAPEUTIC RANGE,PTTT: NORMAL SECS (ref 58–77)
VENTRICULAR RATE, ECG03: 76 BPM
WBC # BLD AUTO: 6.8 K/UL (ref 4.1–11.1)

## 2020-11-14 PROCEDURE — 85730 THROMBOPLASTIN TIME PARTIAL: CPT

## 2020-11-14 PROCEDURE — 85610 PROTHROMBIN TIME: CPT

## 2020-11-14 PROCEDURE — 93005 ELECTROCARDIOGRAM TRACING: CPT

## 2020-11-14 PROCEDURE — 74011250637 HC RX REV CODE- 250/637: Performed by: THORACIC SURGERY (CARDIOTHORACIC VASCULAR SURGERY)

## 2020-11-14 PROCEDURE — 97165 OT EVAL LOW COMPLEX 30 MIN: CPT | Performed by: OCCUPATIONAL THERAPIST

## 2020-11-14 PROCEDURE — 36415 COLL VENOUS BLD VENIPUNCTURE: CPT

## 2020-11-14 PROCEDURE — 83735 ASSAY OF MAGNESIUM: CPT

## 2020-11-14 PROCEDURE — 74011250637 HC RX REV CODE- 250/637: Performed by: PHYSICIAN ASSISTANT

## 2020-11-14 PROCEDURE — 97161 PT EVAL LOW COMPLEX 20 MIN: CPT | Performed by: PHYSICAL THERAPIST

## 2020-11-14 PROCEDURE — 74011250637 HC RX REV CODE- 250/637: Performed by: NURSE PRACTITIONER

## 2020-11-14 PROCEDURE — 71045 X-RAY EXAM CHEST 1 VIEW: CPT

## 2020-11-14 PROCEDURE — 74011000250 HC RX REV CODE- 250: Performed by: PHYSICIAN ASSISTANT

## 2020-11-14 PROCEDURE — 85027 COMPLETE CBC AUTOMATED: CPT

## 2020-11-14 PROCEDURE — 74011000250 HC RX REV CODE- 250: Performed by: NURSE PRACTITIONER

## 2020-11-14 PROCEDURE — 65660000000 HC RM CCU STEPDOWN

## 2020-11-14 PROCEDURE — 97535 SELF CARE MNGMENT TRAINING: CPT | Performed by: OCCUPATIONAL THERAPIST

## 2020-11-14 PROCEDURE — 80048 BASIC METABOLIC PNL TOTAL CA: CPT

## 2020-11-14 PROCEDURE — 74011250636 HC RX REV CODE- 250/636: Performed by: PHYSICIAN ASSISTANT

## 2020-11-14 PROCEDURE — 74011250636 HC RX REV CODE- 250/636: Performed by: NURSE PRACTITIONER

## 2020-11-14 PROCEDURE — 97116 GAIT TRAINING THERAPY: CPT | Performed by: PHYSICAL THERAPIST

## 2020-11-14 RX ORDER — WARFARIN SODIUM 5 MG/1
5 TABLET ORAL ONCE
Status: COMPLETED | OUTPATIENT
Start: 2020-11-14 | End: 2020-11-14

## 2020-11-14 RX ORDER — SODIUM CHLORIDE 0.9 % (FLUSH) 0.9 %
5-40 SYRINGE (ML) INJECTION EVERY 8 HOURS
Status: DISCONTINUED | OUTPATIENT
Start: 2020-11-14 | End: 2020-11-15 | Stop reason: HOSPADM

## 2020-11-14 RX ORDER — SODIUM CHLORIDE 0.9 % (FLUSH) 0.9 %
5-40 SYRINGE (ML) INJECTION AS NEEDED
Status: DISCONTINUED | OUTPATIENT
Start: 2020-11-14 | End: 2020-11-15 | Stop reason: HOSPADM

## 2020-11-14 RX ADMIN — METHYLPREDNISOLONE SODIUM SUCCINATE 125 MG: 125 INJECTION, POWDER, FOR SOLUTION INTRAMUSCULAR; INTRAVENOUS at 14:25

## 2020-11-14 RX ADMIN — ACETAMINOPHEN 650 MG: 325 TABLET ORAL at 23:22

## 2020-11-14 RX ADMIN — CHLORHEXIDINE GLUCONATE 10 ML: 1.2 RINSE ORAL at 20:49

## 2020-11-14 RX ADMIN — ACETAMINOPHEN 650 MG: 325 TABLET ORAL at 02:34

## 2020-11-14 RX ADMIN — METOPROLOL TARTRATE 25 MG: 25 TABLET, FILM COATED ORAL at 08:35

## 2020-11-14 RX ADMIN — POTASSIUM CHLORIDE 20 MEQ: 20 TABLET, EXTENDED RELEASE ORAL at 08:35

## 2020-11-14 RX ADMIN — LACOSAMIDE 100 MG: 100 TABLET, FILM COATED ORAL at 08:35

## 2020-11-14 RX ADMIN — CEFAZOLIN SODIUM 2 G: 1 INJECTION, POWDER, FOR SOLUTION INTRAMUSCULAR; INTRAVENOUS at 14:26

## 2020-11-14 RX ADMIN — DOCUSATE SODIUM 50MG AND SENNOSIDES 8.6MG 1 TABLET: 8.6; 5 TABLET, FILM COATED ORAL at 08:35

## 2020-11-14 RX ADMIN — ACETAMINOPHEN 650 MG: 325 TABLET ORAL at 06:47

## 2020-11-14 RX ADMIN — FAMOTIDINE 20 MG: 20 TABLET, FILM COATED ORAL at 20:48

## 2020-11-14 RX ADMIN — MAGNESIUM OXIDE TAB 400 MG (241.3 MG ELEMENTAL MG) 400 MG: 400 (241.3 MG) TAB at 17:55

## 2020-11-14 RX ADMIN — LEVETIRACETAM 1000 MG: 500 TABLET ORAL at 17:55

## 2020-11-14 RX ADMIN — BUMETANIDE 2 MG: 1 TABLET ORAL at 08:34

## 2020-11-14 RX ADMIN — AMIODARONE HYDROCHLORIDE 200 MG: 200 TABLET ORAL at 20:48

## 2020-11-14 RX ADMIN — MUPIROCIN: 20 OINTMENT TOPICAL at 20:49

## 2020-11-14 RX ADMIN — MUPIROCIN: 20 OINTMENT TOPICAL at 08:52

## 2020-11-14 RX ADMIN — LACOSAMIDE 100 MG: 100 TABLET, FILM COATED ORAL at 17:55

## 2020-11-14 RX ADMIN — DOCUSATE SODIUM 50MG AND SENNOSIDES 8.6MG 1 TABLET: 8.6; 5 TABLET, FILM COATED ORAL at 17:55

## 2020-11-14 RX ADMIN — LEVETIRACETAM 1000 MG: 500 TABLET ORAL at 08:35

## 2020-11-14 RX ADMIN — ACETAMINOPHEN 650 MG: 325 TABLET ORAL at 11:15

## 2020-11-14 RX ADMIN — CHLORHEXIDINE GLUCONATE 10 ML: 1.2 RINSE ORAL at 08:52

## 2020-11-14 RX ADMIN — Medication 10 ML: at 14:37

## 2020-11-14 RX ADMIN — Medication 10 ML: at 23:22

## 2020-11-14 RX ADMIN — CEFAZOLIN SODIUM 2 G: 1 INJECTION, POWDER, FOR SOLUTION INTRAMUSCULAR; INTRAVENOUS at 02:25

## 2020-11-14 RX ADMIN — POLYETHYLENE GLYCOL 3350 17 G: 17 POWDER, FOR SOLUTION ORAL at 08:34

## 2020-11-14 RX ADMIN — Medication 10 ML: at 06:00

## 2020-11-14 RX ADMIN — METHYLPREDNISOLONE SODIUM SUCCINATE 125 MG: 125 INJECTION, POWDER, FOR SOLUTION INTRAMUSCULAR; INTRAVENOUS at 23:22

## 2020-11-14 RX ADMIN — ACETAMINOPHEN 650 MG: 325 TABLET ORAL at 17:55

## 2020-11-14 RX ADMIN — METHYLPREDNISOLONE SODIUM SUCCINATE 125 MG: 125 INJECTION, POWDER, FOR SOLUTION INTRAMUSCULAR; INTRAVENOUS at 06:48

## 2020-11-14 RX ADMIN — WARFARIN SODIUM 5 MG: 5 TABLET ORAL at 17:55

## 2020-11-14 RX ADMIN — ACETAMINOPHEN 650 MG: 325 TABLET ORAL at 14:25

## 2020-11-14 RX ADMIN — CEFAZOLIN SODIUM 2 G: 1 INJECTION, POWDER, FOR SOLUTION INTRAMUSCULAR; INTRAVENOUS at 19:31

## 2020-11-14 RX ADMIN — CEFAZOLIN SODIUM 2 G: 1 INJECTION, POWDER, FOR SOLUTION INTRAMUSCULAR; INTRAVENOUS at 08:35

## 2020-11-14 RX ADMIN — AMIODARONE HYDROCHLORIDE 200 MG: 200 TABLET ORAL at 08:35

## 2020-11-14 RX ADMIN — FAMOTIDINE 20 MG: 20 TABLET, FILM COATED ORAL at 07:15

## 2020-11-14 RX ADMIN — METOPROLOL TARTRATE 25 MG: 25 TABLET, FILM COATED ORAL at 20:49

## 2020-11-14 NOTE — PROGRESS NOTES
Problem: Mobility Impaired (Adult and Pediatric)  Goal: *Acute Goals and Plan of Care (Insert Text)  Description: FUNCTIONAL STATUS PRIOR TO ADMISSION: Patient was independent and active without use of DME.    HOME SUPPORT PRIOR TO ADMISSION: The patient lived in group home but did not require assist.    Physical Therapy Goals  Initiated 11/14/2020  1. Patient will move from supine to sit and sit to supine , scoot up and down, and roll side to side in bed with independence within 7 day(s). 2.  Patient will transfer from bed to chair and chair to bed with independence using the least restrictive device within 7 day(s). 3.  Patient will perform sit to stand with independence within 7 day(s). 4.  Patient will ambulate with independence for 200 feet with the least restrictive device within 7 day(s). Outcome: Not Met   PHYSICAL THERAPY EVALUATION  Patient: Diana Lorenzo (24 y.o. male)  Date: 11/14/2020  Primary Diagnosis: Atrial fibrillation, unspecified type ([address] [I48.91]  Persistent atrial fibrillation ([address] [I48.19]  Atrial fibrillation ([address] [I48.91]  Procedure(s) (LRB):  ABLATION A-FIB  W COMPLETE EP STUDY (N/A)  Ep 3d Mapping (N/A)  Intracardiac Echocardiogram (N/A)  Ablation Following A-Fib  Addl (N/A) 1 Day Post-Op   Precautions: falls       ASSESSMENT  Based on the objective data described below, the patient presents with generalized weakness and mildly impaired functional mobility, balance and endurance. Patient requiring CGA for overall mobility today. Patient was able to ambulate 75 feet using cardiac RW- gait is steady. Transitioned to no assistive device and increased instability noted with increased anterior weight shift and shuffling gait, but no overt LOB noted. Patient reports this is his baseline for ambulation. Patient has mild intellectual impairment and lives in a group home. He demonstrates a flat affect but is pleasant and cooperative; able to follow all commands.  Recommend HHPT following discharge to ensure safe mobility in home environment. Current Level of Function Impacting Discharge (mobility/balance): CGA    Functional Outcome Measure: The patient scored 80/100 on the Barthel outcome measure which is indicative of 20% functional impairment. Patient will benefit from skilled therapy intervention to address the above noted impairments. PLAN :  Recommendations and Planned Interventions: bed mobility training, transfer training, gait training, therapeutic exercises, patient and family training/education and therapeutic activities      Frequency/Duration: Patient will be followed by physical therapy:  daily to address goals. Recommendation for discharge: (in order for the patient to meet his/her long term goals)  Physical therapy at least 2 days/week in the home     This discharge recommendation:  Has not yet been discussed the attending provider and/or case management    IF patient discharges home will need the following DME: to be determined (TBD); may benefit from RW         SUBJECTIVE:   Patient stated I'm okay. 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)     Other ill-defined conditions(799.89)     edema legs    S/P ablation of atrial fibrillation 11/13/2020    S/P biventricular cardiac pacemaker procedure 10/23/2015    10/23/15 [employer] biventricular pacemaker inmplant    Screen for colon cancer 9/27/2012     Past Surgical History:   Procedure Laterality Date    HX COLONOSCOPY  04/05/2017    HX ORTHOPAEDIC Right 12/22/2017    ORIF right distal radius    HX PACEMAKER  2015    bi ventricular pacemaker          Home Situation  Home Environment: Other (comment)(group home)  Wheelchair Ramp: Yes  One/Two Story Residence: One story  Living Alone: No  Support Systems: Other (comments)(group home staff)  Patient Expects to be Discharged to[de-identified] Other (comment)(group home)  Current DME Used/Available at Home: Tub transfer bench, Grab bars  Tub or Shower Type: Tub/Shower combination    EXAMINATION/PRESENTATION/DECISION MAKING:   Critical Behavior:  Neurologic State: Alert  Orientation Level: Oriented X4(Mild intellectual disability )  Cognition: Follows commands     Hearing: Auditory  Auditory Impairment: None  Hearing Aids/Status: Does not own    Range Of Motion:  AROM: Generally decreased, functional                       Strength:    Strength: Generally decreased, functional                    Tone & Sensation:   Tone: Normal                              Coordination:  Coordination: Within functional limits  Functional Mobility:  Bed Mobility:      deferred; patient sitting in chair upon arrival        Transfers:  Sit to Stand: Contact guard assistance  Stand to Sit: Contact guard assistance                       Balance:   Sitting: Intact  Standing: Impaired  Standing - Static: Good; Unsupported  Standing - Dynamic : Good;Constant support(fair unsupported)  Ambulation/Gait Training:  Distance (ft): 150 Feet (ft)  Assistive Device: Gait belt(cardiac RW transitioning to no device)  Ambulation - Level of Assistance: Contact guard assistance        Gait Abnormalities: Decreased step clearance;Shuffling gait        Base of Support: Widened;Center of gravity altered(anterior lean)     Speed/Chantel: Pace decreased (<100 feet/min); Shuffled  Step Length: Left shortened;Right shortened        Patient was able to ambulate 75 feet using cardiac RW- gait is steady.  Transitioned to no assistive device and increased instability noted with increased anterior weight shift and shuffling gait, but no overt LOB noted            Functional Measure:  Barthel Index:    Bathin  Bladder: 10  Bowels: 10  Groomin  Dressing: 10  Feeding: 10  Mobility: 10  Stairs: 0  Toilet Use: 10  Transfer (Bed to Chair and Back): 10  Total: 80/100       The Barthel ADL Index: Guidelines  1. The index should be used as a record of what a patient does, not as a record of what a patient could do. 2. The main aim is to establish degree of independence from any help, physical or verbal, however minor and for whatever reason. 3. The need for supervision renders the patient not independent. 4. A patient's performance should be established using the best available evidence. Asking the patient, friends/relatives and nurses are the usual sources, but direct observation and common sense are also important. However direct testing is not needed. 5. Usually the patient's performance over the preceding 24-48 hours is important, but occasionally longer periods will be relevant. 6. Middle categories imply that the patient supplies over 50 per cent of the effort. 7. Use of aids to be independent is allowed. Marcie Woodard., Barthel, D.W. (0431). Functional evaluation: the Barthel Index. [address] [facility] (14)2. Kevin Rose virgil BLACK Smith, Mary Mendez., Kd Garay., [place], [address] [facility] (). Measuring the change indisability after inpatient rehabilitation; comparison of the responsiveness of the Barthel Index and Functional Wilkinson Measure. Journal of Neurology, Neurosurgery, and Psychiatry, 66(4), 961-835. Agustin Campbell NFeliciaJ.GENE, RADHA Cotton.GERALDO, & Kai Jansen MMATT. (2004.) Assessment of post-stroke quality of life in cost-effectiveness studies: The usefulness of the Barthel Index and the EuroQoL-5D. Quality of Life Research, 15, 112-71           Activity Tolerance:   Fair; HR to 104 during activity    After treatment patient left in no apparent distress:   Sitting in chair and Call bell within reach    COMMUNICATION/EDUCATION:   The patients plan of care was discussed with: Occupational therapist and Registered nurse.      Fall prevention education was provided and the patient/caregiver indicated understanding., Patient/family have participated as able in goal setting and plan of care. and Patient/family agree to work toward stated goals and plan of care.     Thank you for this referral.  Brisa Bonds, PT   Time Calculation: 20 mins

## 2020-11-14 NOTE — OP NOTES
[address]  OPERATIVE REPORT    Name:  Lisa Saavedra  MR#:  [id number]  :  1945  ACCOUNT #:  [de-identified]  DATE OF SERVICE:  2020    PREOPERATIVE DIAGNOSES:  1. Longstanding persistent atrial fibrillation. 2.  Left atrial appendage thrombus. 3.  Moderate mitral regurgitation. POSTOPERATIVE DIAGNOSES:  1. Longstanding persistent atrial fibrillation. 2.  Resolution of left atrial appendage thrombus. 3.  Moderate mitral regurgitation. PROCEDURE PERFORMED:  Transpericardial epicardial hybrid ablation. SURGEON:  Marcela Allan MD    ASSISTANT:  Becky Velarde PA-C. The assistance of a PA was required due to the complicated nature of the surgery and experience of the PA. ANESTHESIA:  General endotracheal.    ANESTHESIOLOGIST:  Allyson Jiménez MD    COMPLICATIONS:  None. SPECIMENS REMOVED:  None. IMPLANTS:  None. ESTIMATED BLOOD LOSS:  5 mL. DETAILS:  The patient is a 82-year-old gentleman, who was referred for hybrid ablation. He previously had a left atrial appendage thrombus seen on LILLIANA during the day of the procedure. He was placed on Coumadin and brought back yesterday for a preoperative LILLIANA, which showed resolution of his thrombus. He was therefore brought back to the operating room to proceed with transpericardial epicardial ablation. PROCEDURE:  He was prepped and draped in a sterile fashion. A time-out was performed. An incision was made over the xyphoid process. The distal tip of the xyphoid was resected. Hemostasis was achieved. We then opened the inferior portion of the pericardium. I then inserted the trocar into the inferior portion of the pericardium well and was able to insert a fiberoptic camera through this trocar and visualized the entire posterior left atrium from vein to vein. I was then able to perform approximately 20 ablations all the way across the back of the atrium and these had excellent burns.   We were very satisfied with the amount of ablations seen in the posterior atrium. We then withdrew the trocar. We placed a Ced drain in the posterior pericardial space and sutured this in place. We then closed the soft tissue in three layers. I used Marcaine to infiltrate the wound. The patient was then safely transported to the EP lab for the endocardial portion of the procedure.         MD MARITZA [id number]  D:  11/13/2020 9:55  T:  11/13/2020 18:43  JOB #:  [id number]

## 2020-11-14 NOTE — PROGRESS NOTES
OCCUPATIONAL THERAPY EVALUATION/DISCHARGE  Patient: Nathaniel Hawthorne (47 y.o. male)  Date: 11/14/2020  Primary Diagnosis: Atrial fibrillation, unspecified type ([address] [I48.91]  Persistent atrial fibrillation ([address] [I48.19]  Atrial fibrillation ([address] [I48.91]  Procedure(s) (LRB):  ABLATION A-FIB  W COMPLETE EP STUDY (N/A)  Ep 3d Mapping (N/A)  Intracardiac Echocardiogram (N/A)  Ablation Following A-Fib  Addl (N/A) 1 Day Post-Op   Precautions: None       ASSESSMENT  Based on the objective data described below, the patient presents with mild GW, decreased activity tolerance, and decreased standing balance s/p transpericardial epicardial hybrid ablation. The above deficits are mainly impacting his ambulation. He is otherwise at his independent baseline for ADLs despite the above deficits, and he is without further OT needs. Functional Outcome Measure: The patient scored 80/100 on the Barthel Index outcome measure which is indicative of a 20% decline in function. PLAN :  Recommendation for discharge: (in order for the patient to meet his/her long term goals)  No skilled occupational therapy/ follow up rehabilitation needs identified at this time.     Equipment recommendations for successful discharge: None       OBJECTIVE DATA SUMMARY:   HISTORY:   Past Medical History:   Diagnosis Date    A-fib [facility]) 2015    pacemaker L chest     CAD (coronary artery disease) 2015    Pacemaker    Epilepsy ([address]     Heart disease     Hypertension     Incontinence     Leg swelling     Mental retardation, 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)     Other ill-defined conditions(799.89)     edema legs    S/P ablation of atrial fibrillation 11/13/2020    S/P biventricular cardiac pacemaker procedure 10/23/2015    10/23/15 [employer] biventricular pacemaker inmplant    Screen for colon cancer 9/27/2012     Past Surgical History:   Procedure Laterality Date    HX COLONOSCOPY  04/05/2017    HX ORTHOPAEDIC Right 12/22/2017    ORIF right distal radius    HX PACEMAKER  2015    bi ventricular pacemaker        Prior Level of Function/Environment/Context: Fully independent  Expanded or extensive additional review of patient history:   Home Situation  Home Environment: Other (comment)(group home)  Wheelchair Ramp: Yes  One/Two Story Residence: One story  Living Alone: No  Support Systems: Other (comments)(group home staff)  Patient Expects to be Discharged to[de-identified] Other (comment)(group home)  Current DME Used/Available at Home: Tub transfer bench, Grab bars  Tub or Shower Type: Tub/Shower combination    Hand dominance: Right    EXAMINATION OF PERFORMANCE DEFICITS:  Cognitive/Behavioral Status:  Neurologic State: Alert  Orientation Level: Oriented X4  Cognition: Appropriate decision making; Appropriate for age attention/concentration; Appropriate safety awareness        Safety/Judgement: Awareness of environment;Good awareness of safety precautions    Hearing: Auditory  Auditory Impairment: None  Hearing Aids/Status: Does not own    Vision/Perceptual:    Acuity: Within Defined Limits         Range of Motion:  AROM: Generally decreased, functional    Strength:  Strength: Generally decreased, functional  Coordination:  Coordination: Within functional limits            Tone & Sensation:  Tone: Normal       Balance:  Sitting: Intact  Standing: Impaired  Standing - Static: Good; Unsupported  Standing - Dynamic : Good;Constant support(fair unsupported)    Functional Mobility and Transfers for ADLs:  Bed Mobility:       Transfers:  Sit to Stand: Supervision  Stand to Sit: Supervision  Bathroom Mobility: Supervision/set up    ADL Assessment:  Feeding: Independent    Oral Facial Hygiene/Grooming: Independent    Bathing: Independent    Upper Body Dressing: Independent    Lower Body Dressing: Independent    Toileting: Independent           Functional Measure:  Barthel Index:    Bathin  Bladder: 10  Bowels: 10  Groomin  Dressing: 10  Feeding: 10  Mobility: 10  Stairs: 0  Toilet Use: 10  Transfer (Bed to Chair and Back): 10  Total: 80/100        The Barthel ADL Index: Guidelines  1. The index should be used as a record of what a patient does, not as a record of what a patient could do. 2. The main aim is to establish degree of independence from any help, physical or verbal, however minor and for whatever reason. 3. The need for supervision renders the patient not independent. 4. A patient's performance should be established using the best available evidence. Asking the patient, friends/relatives and nurses are the usual sources, but direct observation and common sense are also important. However direct testing is not needed. 5. Usually the patient's performance over the preceding 24-48 hours is important, but occasionally longer periods will be relevant. 6. Middle categories imply that the patient supplies over 50 per cent of the effort. 7. Use of aids to be independent is allowed. Mariana Pandya., Barthel, DFeliciaW. (3865). Functional evaluation: the Barthel Index. [facility] (14)2. Cassia Olivia virgil BLACK Smith, Jefe Lentz., [facility], [address] [place] [address] (1999). Measuring the change indisability after inpatient rehabilitation; comparison of the responsiveness of the Barthel Index and Functional Warren Measure. Journal of Neurology, Neurosurgery, and Psychiatry, 66(4), 131-183. JULY Busby.GENE, STEFAN Cotton, & Shalonda Metz MFeliciaA. (2004.) Assessment of post-stroke quality of life in cost-effectiveness studies: The usefulness of the Barthel Index and the EuroQoL-5D. Quality of Life Research, 15, 243-78        Activity Tolerance:   Fair   VSS      After treatment patient left in no apparent distress:    Sitting in chair and Call bell within reach    COMMUNICATION/EDUCATION:   The patients plan of care was discussed with: Physical Therapist and Registered Nurse.     Thank you for this referral.  Alessandro Bond, OTR/L  Time Calculation: 20 mins

## 2020-11-14 NOTE — PROGRESS NOTES
Pharmacy Daily Dosing of Warfarin    Indication: AFib    Goal INR: 2-3    PTA Warfarin Dose: Warfarin 5mg vs 2.5mg (pt from group home and intellectually disabled)    Concurrent anticoagulants: -    Concurrent antiplatelet: -    Major Interacting Medications   Drugs that may increase INR: amiodarone  Drugs that may decrease INR: -    Conditions that may increase/decrease INR (CHF, C. diff, cirrhosis, thyroid disorder, hypoalbuminemia): -    Labs:  Recent Labs     11/14/20  0430 11/13/20  1928 11/13/20  1405 11/12/20  1440   INR 1.1  --  1.1 1.1   HGB 12.9  --  12.8 12.7   *  --  124* 120*   TBILI  --  0.6 0.9 1.0   ALB  --  3.3* 3.5 3.5     Impression/Plan:   Eliquis PTA with history of Warfarin use  Will initiate dosing and dose daily based on INR results  LILLIANA shows no HARI thrombus  S/p Transpericardial Hybrid Ablation 11/13  Warfarin 5 mg for tonight   Daily INR  CBC w/o diff QODay     Pharmacy will follow daily and adjust the dose as appropriate. Thank you,  Patricio Barber, PHARMD      [email or web address]/Pharmacy/[email or web address]/Warfarin%[email or web address]. pdf

## 2020-11-14 NOTE — ANESTHESIA POSTPROCEDURE EVALUATION
Post-Anesthesia Evaluation and Assessment    Patient: Aditya Gonzales MRN: [id number]  SSN: [id number]    YOB: 1945  Age: 70 y.o. Sex: male      I have evaluated the patient and they are stable and ready for discharge from the PACU. Cardiovascular Function/Vital Signs  Visit Vitals  /69   Pulse 76   Temp 36.9 °C (98.5 °F)   Resp 21   Ht 6' (1.829 m)   Wt 101 kg (222 lb 10.6 oz)   SpO2 96%   BMI 30.20 kg/m²       Patient is status post General anesthesia for Procedure(s):  TRANSPERICARDIAL HYBRID ABLATIOn. Nausea/Vomiting: None    Postoperative hydration reviewed and adequate. Pain:  Pain Scale 1: Numeric (0 - 10) (11/14/20 5515)  Pain Intensity 1: 0 (11/14/20 0415)   Managed    Neurological Status:   Neuro (WDL): Within Defined Limits (11/13/20 7533)  Neuro  Neurologic State: Alert (11/13/20 2000)  Orientation Level: Oriented X4 (11/13/20 2000)  Cognition: Follows commands (11/13/20 2000)  Speech: Clear (11/13/20 2000)  LUE Motor Response: Purposeful (11/13/20 2000)  LLE Motor Response: Purposeful;Weak (11/13/20 2000)  RUE Motor Response: Purposeful (11/13/20 2000)  RLE Motor Response: Purposeful;Weak (11/13/20 2000)   At baseline    Mental Status, Level of Consciousness: Alert and  oriented to person, place, and time    Pulmonary Status:   O2 Device: Room air (11/14/20 0430)   Adequate oxygenation and airway patent    Complications related to anesthesia: None    Post-anesthesia assessment completed. No concerns    Signed By: Chiquis Jasso MD     November 14, 2020              Procedure(s):  TRANSPERICARDIAL HYBRID ABLATIOn.    general    <BSHSIANPOST>    INITIAL Post-op Vital signs:   Vitals Value Taken Time   BP     Temp 36.8 °C (98.2 °F) 11/14/2020  7:47 AM   Pulse 88 11/14/2020  7:47 AM   Resp 31 11/14/2020  7:47 AM   SpO2 99 % 11/14/2020  7:47 AM   Vitals shown include unvalidated device data.

## 2020-11-14 NOTE — PROGRESS NOTES
1900 - Verbal shift change report given to LIDYA Rae (oncoming nurse) by Freedom Bradley (offgoing nurse). Report included the following information SBAR, Kardex, Intake/Output, MAR, Recent Results, and Cardiac Rhythm paced. 2000 - Assessment complete. Receiving oxygen via Nasal cannula 2L. Lines:  PIV: x1, R quad lumen CVC, L rad Art line,. draining LUIS drain,    Infusions: ;1/2 NS 10ml/hr,    0000 - Reassessment complete. VSS.    0415 - Reassessment complete. VSS. Labs sent. EKG complete.    0700 - Verbal shift change report given to Edwardo Abarca (oncoming nurse) by Missy Teresa (offgoing nurse). Report included the following information SBAR, Kardex, Intake/Output, MAR, Recent Results, and Cardiac Rhythm paced.

## 2020-11-14 NOTE — PROGRESS NOTES
Doing well POD1 s/p hybrid ablation    Up in chair.  No complaints of pain    /64    DC'd drain    Coumadin tonight    Transfer to floor when bed open  DC tmrw - discussed with Lorene

## 2020-11-14 NOTE — PROGRESS NOTES
11/14/2020    INTENSIVIST PROGRESS NOTE:     Patient seen and evaluated, chart reviewed   69 yo male hx of AFIB  Today s/p hybrid ablation  Now pt in CCU awake, alert, in no distresss  No acute complaints    ROS: no sob, no cp    Room air. Anxious to go home. No SOB.      MEDS:   Current Facility-Administered Medications   Medication    sodium chloride (NS) flush 5-40 mL    sodium chloride (NS) flush 5-40 mL    warfarin (COUMADIN) tablet 5 mg    acetaminophen (TYLENOL) tablet 650 mg    oxyCODONE IR (ROXICODONE) tablet 5 mg    oxyCODONE IR (ROXICODONE) tablet 10 mg    naloxone (NARCAN) injection 0.4 mg    mupirocin (BACTROBAN) 2 % ointment    ceFAZolin (ANCEF) 2 g in sterile water (preservative free) 20 mL IV syringe    ondansetron (ZOFRAN) injection 4 mg    albuterol (PROVENTIL VENTOLIN) nebulizer solution 2.5 mg    chlorhexidine (PERIDEX) 0.12 % mouthwash 10 mL    famotidine (PEPCID) tablet 20 mg    magnesium oxide (MAG-OX) tablet 400 mg    bisacodyL (DULCOLAX) suppository 10 mg    senna-docusate (PERICOLACE) 8.6-50 mg per tablet 1 Tab    polyethylene glycol (MIRALAX) packet 17 g    methylPREDNISolone (PF) (Solu-MEDROL) injection 125 mg    amiodarone (CORDARONE) tablet 200 mg    WARFARIN INFORMATION NOTE (COUMADIN)    bumetanide (BUMEX) tablet 2 mg    lacosamide (VIMPAT) tablet 100 mg    potassium chloride (K-DUR, KLOR-CON) SR tablet 20 mEq    levETIRAcetam (KEPPRA) tablet 1,000 mg    metoprolol tartrate (LOPRESSOR) tablet 25 mg          Visit Vitals  /67   Pulse 78   Temp 98.3 °F (36.8 °C)   Resp (!) 31   Ht 6' (1.829 m)   Wt 101 kg (222 lb 10.6 oz)   SpO2 97%   BMI 30.20 kg/m²       General: no distress  Eyes: anicteric  HEENT: dry oral mucosa  Neck: FROM  Chest bruised  CV: RRR  Lungs: clear  Abd: soft  : no flank pain  Ext: no edema  Skin: no rash  Musculoskeletal: normal inspection  Neuro: non focal    CXR: no ASDZ, cardiomegaly            Labs:    Recent Labs     11/14/20  3836 11/13/20  1405 11/12/20  1440   WBC 6.8 9.4 3.6*   HGB 12.9 12.8 12.7   * 124* 120*   INR 1.1 1.1 1.1   APTT 29.1 27.1 33.2*     Recent Labs     11/14/20  0430 11/13/20  1928 11/13/20  1405 11/12/20  1440    138 141 142   K 3.8 3.7 3.5 3.4*    103 105 106   CO2 26 27 28 31   * 193* 134* 82   BUN 15 16 16 17   CREA 0.74 1.00 0.79 0.78   CA 8.6 8.5 8.9 8.9   MG 2.0 1.8 1.8  --    ALB  --  3.3* 3.5 3.5   ALT  --  19 17 14     A/P:  - AFIB: s/p hybrid ablation  - HTN: on BB  - DVT prophylaxis  - advance diet as tolerated  - Will assist on disposition planning when stable for transfer      Sammy Augustin MD

## 2020-11-14 NOTE — PROGRESS NOTES
0715- Bedside and Verbal shift change report given to 145 Angela Abarca (oncoming nurse) by Tanya Ritchie RN (offgoing nurse). Report included the following information SBAR, Kardex, Intake/Output, Recent Results, Cardiac Rhythm Paced and Alarm Parameters . 0830- Shift assessment completed; see flow sheet for details. Pt A/V/Ox4; cooperative; following commands. Currently Paced; BP stable. Lungs are clear; diminished in the bases; remains on room air. ABD is semi-soft; BS active; pt reports a good appetite prior to admission. Lee catheter in place; adequate UOP. Skin is warm and dry. Small chest incision is CDI. LUIS site is also CDI. Pt denies pain/discomfort at this time. 3666- Central line and lee catheter removed at this time. 0935- Pt assisted up to the chair with one person assist. Tolerated transfer well. Consuming breakfast meal. Denying pain/discomfort      1125- LUIS drain removed by Dr. Angela Singh; dry dressing placed. 1230- Reassessment completed; see flow sheet for details. No acute changes in pt assessment. Resting in recliner chair at this time. Denies pain/discomfort     1446- Pt placed back into the bed at this time. 1630- Reassessment completed; see flow sheet for details. No acute changes in pt assessment. Repositioned for comfort    1710- Pt assisted back to the recliner chair at this time; tolerated transfer well. 1825- Consumed dinner meal without difficulty. Appetite great. Tolerated PO meds well. 1905- Assisted back to bed; bed alarm in place    1915- Bedside and Verbal shift change report given to 1300 Freeman Abarca (oncoming nurse) by Juwan Leos RN (offgoing nurse). Report included the following information SBAR, Kardex, Intake/Output, Recent Results, Cardiac Rhythm Paced and Alarm Parameters .

## 2020-11-15 ENCOUNTER — APPOINTMENT (OUTPATIENT)
Dept: GENERAL RADIOLOGY | Age: 75
DRG: 229 | End: 2020-11-15
Attending: NURSE PRACTITIONER
Payer: MEDICARE

## 2020-11-15 ENCOUNTER — HOME HEALTH ADMISSION (OUTPATIENT)
Dept: HOME HEALTH SERVICES | Facility: HOME HEALTH | Age: 75
End: 2020-11-15
Payer: MEDICARE

## 2020-11-15 VITALS
HEIGHT: 72 IN | SYSTOLIC BLOOD PRESSURE: 106 MMHG | RESPIRATION RATE: 18 BRPM | TEMPERATURE: 98.2 F | DIASTOLIC BLOOD PRESSURE: 68 MMHG | OXYGEN SATURATION: 96 % | HEART RATE: 91 BPM | BODY MASS INDEX: 30.88 KG/M2 | WEIGHT: 227.96 LBS

## 2020-11-15 LAB
ANION GAP SERPL CALC-SCNC: 4 MMOL/L (ref 5–15)
BUN SERPL-MCNC: 23 MG/DL (ref 6–20)
BUN/CREAT SERPL: 27 (ref 12–20)
CALCIUM SERPL-MCNC: 9.2 MG/DL (ref 8.5–10.1)
CHLORIDE SERPL-SCNC: 104 MMOL/L (ref 97–108)
CO2 SERPL-SCNC: 28 MMOL/L (ref 21–32)
CREAT SERPL-MCNC: 0.84 MG/DL (ref 0.7–1.3)
ERYTHROCYTE [DISTWIDTH] IN BLOOD BY AUTOMATED COUNT: 13 % (ref 11.5–14.5)
GLUCOSE SERPL-MCNC: 122 MG/DL (ref 65–100)
HCT VFR BLD AUTO: 40.3 % (ref 36.6–50.3)
HGB BLD-MCNC: 13.3 G/DL (ref 12.1–17)
INR PPP: 1.1 (ref 0.9–1.1)
MAGNESIUM SERPL-MCNC: 2.3 MG/DL (ref 1.6–2.4)
MCH RBC QN AUTO: 32.5 PG (ref 26–34)
MCHC RBC AUTO-ENTMCNC: 33 G/DL (ref 30–36.5)
MCV RBC AUTO: 98.5 FL (ref 80–99)
NRBC # BLD: 0 K/UL (ref 0–0.01)
NRBC BLD-RTO: 0 PER 100 WBC
PLATELET # BLD AUTO: 144 K/UL (ref 150–400)
PMV BLD AUTO: 11.1 FL (ref 8.9–12.9)
POTASSIUM SERPL-SCNC: 4.2 MMOL/L (ref 3.5–5.1)
PROTHROMBIN TIME: 11.3 SEC (ref 9–11.1)
RBC # BLD AUTO: 4.09 M/UL (ref 4.1–5.7)
SODIUM SERPL-SCNC: 136 MMOL/L (ref 136–145)
WBC # BLD AUTO: 11.9 K/UL (ref 4.1–11.1)

## 2020-11-15 PROCEDURE — 74011250637 HC RX REV CODE- 250/637: Performed by: NURSE PRACTITIONER

## 2020-11-15 PROCEDURE — 71045 X-RAY EXAM CHEST 1 VIEW: CPT

## 2020-11-15 PROCEDURE — 74011250636 HC RX REV CODE- 250/636: Performed by: NURSE PRACTITIONER

## 2020-11-15 PROCEDURE — 85610 PROTHROMBIN TIME: CPT

## 2020-11-15 PROCEDURE — 36415 COLL VENOUS BLD VENIPUNCTURE: CPT

## 2020-11-15 PROCEDURE — 97116 GAIT TRAINING THERAPY: CPT

## 2020-11-15 PROCEDURE — 80048 BASIC METABOLIC PNL TOTAL CA: CPT

## 2020-11-15 PROCEDURE — 85027 COMPLETE CBC AUTOMATED: CPT

## 2020-11-15 PROCEDURE — 74011000250 HC RX REV CODE- 250: Performed by: NURSE PRACTITIONER

## 2020-11-15 PROCEDURE — 83735 ASSAY OF MAGNESIUM: CPT

## 2020-11-15 RX ORDER — WARFARIN SODIUM 5 MG/1
5 TABLET ORAL ONCE
Status: DISCONTINUED | OUTPATIENT
Start: 2020-11-15 | End: 2020-11-15 | Stop reason: HOSPADM

## 2020-11-15 RX ADMIN — Medication 10 ML: at 06:33

## 2020-11-15 RX ADMIN — MAGNESIUM OXIDE TAB 400 MG (241.3 MG ELEMENTAL MG) 400 MG: 400 (241.3 MG) TAB at 09:00

## 2020-11-15 RX ADMIN — METOPROLOL TARTRATE 25 MG: 25 TABLET, FILM COATED ORAL at 08:21

## 2020-11-15 RX ADMIN — AMIODARONE HYDROCHLORIDE 200 MG: 200 TABLET ORAL at 08:21

## 2020-11-15 RX ADMIN — POLYETHYLENE GLYCOL 3350 17 G: 17 POWDER, FOR SOLUTION ORAL at 08:22

## 2020-11-15 RX ADMIN — CHLORHEXIDINE GLUCONATE 10 ML: 1.2 RINSE ORAL at 08:22

## 2020-11-15 RX ADMIN — LACOSAMIDE 100 MG: 100 TABLET, FILM COATED ORAL at 11:02

## 2020-11-15 RX ADMIN — METHYLPREDNISOLONE SODIUM SUCCINATE 125 MG: 125 INJECTION, POWDER, FOR SOLUTION INTRAMUSCULAR; INTRAVENOUS at 06:33

## 2020-11-15 RX ADMIN — LEVETIRACETAM 1000 MG: 500 TABLET ORAL at 08:21

## 2020-11-15 RX ADMIN — MUPIROCIN: 20 OINTMENT TOPICAL at 08:23

## 2020-11-15 RX ADMIN — BUMETANIDE 2 MG: 1 TABLET ORAL at 08:21

## 2020-11-15 RX ADMIN — FAMOTIDINE 20 MG: 20 TABLET, FILM COATED ORAL at 08:21

## 2020-11-15 RX ADMIN — DOCUSATE SODIUM 50MG AND SENNOSIDES 8.6MG 1 TABLET: 8.6; 5 TABLET, FILM COATED ORAL at 08:21

## 2020-11-15 RX ADMIN — POTASSIUM CHLORIDE 20 MEQ: 20 TABLET, EXTENDED RELEASE ORAL at 08:21

## 2020-11-15 NOTE — PROGRESS NOTES
POD2 s/p hybrid ablation    On the floor, sitting in chair. No complaints - says his pain is controlled by tylenol. Labs look good    Incision clean    Ready for dc to group home today. Lovenox + coumadin orders in. Spoke with Mona Suggs who will pick him up today. Will see in one week.

## 2020-11-15 NOTE — ROUTINE PROCESS
2300:   TRANSFER - IN REPORT:    Verbal report received from Antoinette  on Ken Quinn  being received from CCU for routine progression of care      Report consisted of patients Situation, Background, Assessment and   Recommendations(SBAR). Information from the following report(s) SBAR, Kardex, Procedure Summary, Intake/Output, MAR, Recent Results, Cardiac Rhythm NSpaced and Alarm Parameters  was reviewed with the receiving nurse. Opportunity for questions and clarification was provided. Assessment completed upon patients arrival to unit and care assumed. 1:53 AM  Resting in bed comfortably. No signs of distress. All night meds administered. O2 sat in low 90's.  Administered 2l oxygen via NC.

## 2020-11-15 NOTE — PROGRESS NOTES
HPI:    Patient ID: Tari Lanier is a 56year old male. Pain in left ant mid thigh x 4 days. Started after he was doing some hip flexor exercises. Pain was sudden and its worsening. Moderate pain with motion. No swelling. No CP/SOB.   Pain wor Problem: Mobility Impaired (Adult and Pediatric)  Goal: *Acute Goals and Plan of Care (Insert Text)  Description: FUNCTIONAL STATUS PRIOR TO ADMISSION: Patient was independent and active without use of DME.    HOME SUPPORT PRIOR TO ADMISSION: The patient lived in group home but did not require assist.    Physical Therapy Goals  Initiated 11/14/2020  1. Patient will move from supine to sit and sit to supine , scoot up and down, and roll side to side in bed with independence within 7 day(s). 2.  Patient will transfer from bed to chair and chair to bed with independence using the least restrictive device within 7 day(s). 3.  Patient will perform sit to stand with independence within 7 day(s). 4.  Patient will ambulate with independence for 200 feet with the least restrictive device within 7 day(s). Outcome: Resolved/Met   PHYSICAL THERAPY TREATMENT/DISCHARGE  Patient: Dennis Crawford (87 y.o. male)  Date: 11/15/2020  Diagnosis: Atrial fibrillation, unspecified type ([address] [I48.91]  Persistent atrial fibrillation ([address] [I48.19]  Atrial fibrillation ([address] [I48.91]   <principal problem not specified>  Procedure(s) (LRB):  ABLATION A-FIB  W COMPLETE EP STUDY (N/A)  Ep 3d Mapping (N/A)  Intracardiac Echocardiogram (N/A)  Ablation Following A-Fib  Addl (N/A) 2 Days Post-Op  Precautions:    Chart, physical therapy assessment, plan of care and goals were reviewed. ASSESSMENT  Patient continues with skilled PT services and has progressed towards goals. Patient received sitting in the chair, agreeable and cleared for therapy. Patient overall requires supervision-SBA for all mobility. Agreeable to ambulate without RW this date, requiring supervision. Patient reports his gait feels at his baseline. Noted slight flexed posture and shuffled steps, no LOB noted. VSS on RA with activity.       Other factors to consider for discharge: lives in group home, decreased endurance         PLAN :  Patient will be discharged Prescriptions Disp Refills    Cyclobenzaprine HCl 10 MG Oral Tab 30 tablet 0      Sig: Take 1 tablet (10 mg total) by mouth 3 (three) times daily as needed for Muscle spasms.            Imaging & Referrals:  None       FG#[id number] from acute skilled physical therapy at this time. Rationale for discharge:  Goals achieved    Recommendation for discharge: (in order for the patient to meet his/her long term goals)   PT at group home  This discharge recommendation:  Has been made in collaboration with the attending provider and/or case management    IF patient discharges home will need the following DME: none       SUBJECTIVE:   Patient stated I feel normal.    OBJECTIVE DATA SUMMARY:   Critical Behavior:  Neurologic State: Alert, Eyes open spontaneously  Orientation Level: Oriented X4  Cognition: Appropriate decision making, Appropriate safety awareness, Follows commands  Safety/Judgement: Awareness of environment, Good awareness of safety precautions  Functional Mobility Training:  Bed Mobility:                    Transfers:  Sit to Stand: Supervision  Stand to Sit: Supervision        Bed to Chair: Supervision                    Balance:  Sitting: Intact; Without support  Standing: Intact; Without support  Ambulation/Gait Training:  Distance (ft): 250 Feet (ft)  Assistive Device: Gait belt  Ambulation - Level of Assistance: Stand-by assistance        Gait Abnormalities: Decreased step clearance;Trunk sway increased        Base of Support: Widened     Speed/Chantel: Pace decreased (<100 feet/min)  Step Length: Right shortened;Left shortened                Stairs:     Stairs - Level of Assistance: (has ramp )    Activity Tolerance:   Good and SpO2 stable on RA    After treatment patient left in no apparent distress:   Sitting in chair, Call bell within reach and Bed / chair alarm activated    COMMUNICATION/COLLABORATION:   The patients plan of care was discussed with: Registered nurse and Case management.      Molly Hawk, PT, DPT   Time Calculation: 13 mins

## 2020-11-15 NOTE — PROGRESS NOTES
DERRICK PLAN    Weekend CM noted DC order and went to see Pt and Pt had already DC home. CM unable to provide Second IM letter prior to DC. CM noted that Pt needs HH Physical Therapy that was not set up prior to DC. PT DC home to Group Home: [facility]: [phone]. Esthela Torres came and picked up Pt and transported him home in car. CM called Selina and she was in agreement with Home PT. CM sending referral to [facility] to see if they can accept. If they can not accept will need to check with other providers and let Selina know via TPC who can accept. 2 PM   [facility] accepted the case. - CM notified Selina and she will follow up with them. Care Management Interventions  PCP Verified by CM: Yes  Palliative Care Criteria Met (RRAT>21 & CHF Dx)?: No  Mode of Transport at Discharge: Other (see comment)  Transition of Care Consult (CM Consult): Group Home, [address]: Yes  MyChart Signup: No  Discharge Durable Medical Equipment: No  Physical Therapy Consult: Yes  Occupational Therapy Consult: Yes  Speech Therapy Consult: No  Current Support Network:  Adult Group Home  Confirm Follow Up Transport: Other (see comment)  The Plan for Transition of Care is Related to the Following Treatment Goals : HH  The Patient and/or Patient Representative was Provided with a Choice of Provider and Agrees with the Discharge Plan?: Yes  Name of the Patient Representative Who was Provided with a Choice of Provider and Agrees with the Discharge Plan: [facility]un [facility]  Freedom of Choice List was Provided with Basic Dialogue that Supports the Patient's Individualized Plan of Care/Goals, Treatment Preferences and Shares the Quality Data Associated with the Providers?: Yes  Discharge Location  Discharge Placement: Group home    [address] [facility]

## 2020-11-15 NOTE — PROGRESS NOTES
Pharmacy Daily Dosing of Warfarin    Indication: AFib    Goal INR: 2-3    PTA Warfarin Dose: Warfarin 5mg vs 2.5mg (pt from group home and intellectually disabled)    Concurrent anticoagulants: -    Concurrent antiplatelet: -    Major Interacting Medications   Drugs that may increase INR: amiodarone  Drugs that may decrease INR: -    Conditions that may increase/decrease INR (CHF, C. diff, cirrhosis, thyroid disorder, hypoalbuminemia): -    Labs:  Recent Labs     11/15/20  0620 11/14/20  0430 11/13/20  1928 11/13/20  1405 11/12/20  1440   INR 1.1 1.1  --  1.1 1.1   HGB 13.3 12.9  --  12.8 12.7   * 127*  --  124* 120*   TBILI  --   --  0.6 0.9 1.0   ALB  --   --  3.3* 3.5 3.5     Impression/Plan:   Eliquis PTA with history of Warfarin use  Warfarin 5 mg for tonight   Daily INR  CBC w/o diff QODay     Pharmacy will follow daily and adjust the dose as appropriate. Thank you,  Patricio Barber PHARMD      [email or web address]. pdf

## 2020-11-15 NOTE — PROGRESS NOTES
Problem: Falls - Risk of  Goal: *Absence of Falls  Description: Document Parker Gomez Fall Risk and appropriate interventions in the flowsheet. Outcome: Resolved/Met  Note: Fall Risk Interventions:  Mobility Interventions: Patient to call before getting OOB    Mentation Interventions: Adequate sleep, hydration, pain control, Bed/chair exit alarm    Medication Interventions: Bed/chair exit alarm, Patient to call before getting OOB    Elimination Interventions: Patient to call for help with toileting needs, Call light in reach    History of Falls Interventions: Bed/chair exit alarm, Consult care management for discharge planning         Problem: Patient Education: Go to Patient Education Activity  Goal: Patient/Family Education  Outcome: Resolved/Met     Problem: Pressure Injury - Risk of  Goal: *Prevention of pressure injury  Description: Document Brian Scale and appropriate interventions in the flowsheet. Outcome: Resolved/Met  Note: Pressure Injury Interventions:  Sensory Interventions: Assess changes in LOC, Keep linens dry and wrinkle-free    Moisture Interventions: Minimize layers, Moisture barrier, Maintain skin hydration (lotion/cream)    Activity Interventions: Increase time out of bed    Mobility Interventions: Turn and reposition approx.  every two hours(pillow and wedges), PT/OT evaluation    Nutrition Interventions: Document food/fluid/supplement intake    Friction and Shear Interventions: Lift sheet                Problem: Patient Education: Go to Patient Education Activity  Goal: Patient/Family Education  Outcome: Resolved/Met     Problem: Patient Education: Go to Patient Education Activity  Goal: Patient/Family Education  Outcome: Resolved/Met

## 2020-11-16 ENCOUNTER — DOCUMENTATION ONLY (OUTPATIENT)
Dept: CASE MANAGEMENT | Age: 75
End: 2020-11-16

## 2020-11-16 ENCOUNTER — PATIENT OUTREACH (OUTPATIENT)
Dept: CASE MANAGEMENT | Age: 75
End: 2020-11-16

## 2020-11-16 LAB
ABO + RH BLD: NORMAL
BLD PROD TYP BPU: NORMAL
BLD PROD TYP BPU: NORMAL
BLOOD GROUP ANTIBODIES SERPL: NORMAL
BPU ID: NORMAL
BPU ID: NORMAL
CROSSMATCH RESULT,%XM: NORMAL
CROSSMATCH RESULT,%XM: NORMAL
SPECIMEN EXP DATE BLD: NORMAL
STATUS OF UNIT,%ST: NORMAL
STATUS OF UNIT,%ST: NORMAL
UNIT DIVISION, %UDIV: 0
UNIT DIVISION, %UDIV: 0

## 2020-11-16 NOTE — DISCHARGE SUMMARY
[facility] Discharge Summary     Patient ID:  Tony Smith  [id number]  89 y.o.  1945    Admit date: 11/12/2020    Discharge date: 11/15/20    Admitting Physician: Sanda Gitelman, MD     Referring Cardiologist:  Ana Maria Clark    PCP:  Sandie Sharpe MD    Admitting Diagnoses: Afib    Discharge Diagnoses:     Hospital Problems  Date Reviewed: 11/13/2020          Codes Class Noted POA    Hypertension (Chronic) ICD-10-CM: I10  ICD-9-CM: 401.9 Chronic 10/19/2015 Yes        S/P ablation of atrial fibrillation ICD-10-CM: Z98.890, Z86.79  ICD-9-CM: V45.89  11/13/2020 Unknown        Atrial fibrillation ([facility] [address] ICD-10-CM: I48.91  ICD-9-CM: 427.31  11/12/2020 Unknown        Persistent atrial fibrillation ([facility] [address] ICD-10-CM: I48.19  ICD-9-CM: 427.31  11/12/2020 Unknown        H/O Mobitz type II block (Chronic) ICD-10-CM: Z86.79  ICD-9-CM: V12.59  10/15/2018 Yes              Discharged Condition: good    Disposition: home, see patient instructions for treatment and plan    Procedures for this admission:  Procedure(s):  ABLATION A-FIB  W COMPLETE EP STUDY  Ep 3d Mapping  Intracardiac Echocardiogram  Ablation Following A-Fib  Addl    Discharge Medications:      My Medications      START taking these medications      Instructions Each Dose to Equal Morning Noon Evening Bedtime   amiodarone 200 mg tablet  Commonly known as:  CORDARONE  Your next dose is:  11/15/20 bedtime      Take 1 Tab by mouth every twelve (12) hours. 200 mg         methylPREDNISolone 4 mg tablet  Commonly known as:  MEDROL DOSEPACK      As directed. oxyCODONE IR 5 mg immediate release tablet  Commonly known as:  ROXICODONE      Take 1 Tab by mouth every four (4) hours as needed for Pain for up to 3 days.  Max Daily Amount: 30 mg.   5 mg            CONTINUE taking these medications      Instructions Each Dose to Equal Morning Noon Evening Bedtime   bumetanide 2 mg tablet  Commonly known as:  [address]  Your next dose is:  11/16/20      TAKE 1 TABLET BY MOUTH DAILY          cholecalciferol (1000 Units /25 mcg) tablet  Commonly known as:  VITAMIN D3  Your next dose is:  11/16/20      TAKE 1 TABLET BY MOUTH DAILY          enoxaparin 100 mg/mL  Commonly known as:  LOVENOX  Your next dose is:  11/16/20 bedtime      100 mg by SubCUTAneous route every twelve (12) hours for 5 days. 100 mg         lacosamide 100 mg Tab tablet  Commonly known as:  Vimpat  Your next dose is:  11/15/20 evening      Take 1 Tab by mouth two (2) times a day. Max Daily Amount: 200 mg.   100 mg         levETIRAcetam 1,000 mg tablet  Your next dose is:  11/15/20      Take 1 Tab by mouth two (2) times a day. 1,000 mg         metoprolol tartrate 25 mg tablet  Commonly known as:  LOPRESSOR  Your next dose is:  11/15/20 bedtime      TAKE 1 TABLET BY MOUTH TWICE A DAY          potassium chloride 20 mEq tablet  Commonly known as:  K-DUR, KLOR-CON  Your next dose is:  11/16/20      TAKE 1 TABLET BY MOUTH DAILY          * warfarin 5 mg tablet  Commonly known as:  COUMADIN  Your next dose is:  11/15/20      Take 1 Tab by mouth Daily (before dinner). 5 mg         * warfarin 2.5 mg tablet  Commonly known as:  COUMADIN                  * This list has 2 medication(s) that are the same as other medications prescribed for you. Read the directions carefully, and ask your doctor or other care provider to review them with you. Where to Get Your Medications      These medications were sent to [address], [address] [place] [address], [address] [place] [address]    Phone:  [phone]   · amiodarone 200 mg tablet  · enoxaparin 100 mg/mL  · methylPREDNISolone 4 mg tablet  · oxyCODONE IR 5 mg immediate release tablet         INR TARGET- 2-3  INR LEVEL to be drawn- by cardiology  INR management per cardiology        HPI: Original hybrid ablation scheduled for 10-2-2020, but intraop LILLIANA revealed left atrial appendage clot.   Patient was sent home with anticoagulation and is back today to reevaluate clot.        Kristi Quinn is a 70 y. o. male with a history of mild intellectual disability, CHB s/p [employer] Bi-V PPM (2015), persistent atrial fibrillation (on Eliquis), and seizure disorder (last prior to 2017) who was referred for cardiac surgery evaluation by Dr. Thomas Francis presents today with a care giver. About a year ago, he was found to be in persistent atrial fibrillation. Further, it was noted that his EF had decreased. He was started on Eliquis and referred for ablation that was ultimately cancelled due to cellulitis/LE edema on his legs. This has greatly improved since he has been followed by a lymphedema clinic. Otherwise he denies any issues with shortness of breath, dizziness, or pre-syncope.     Prior to March he was active and working as a . Currently he walks daily and is independent, but does live in a group home.      Cardiac Testing     ECHO:  · LV: Normal cavity size and wall thickness. Mild global systolic function. Estimated left ventricular ejection fraction is 45 - 50%. · LA: Mildly dilated left atrium. · RA: Mildly dilated right atrium. · MV: Mild mitral valve regurgitation is present. PA: Pulmonary arterial systolic pressure is 17 mmHg. Hospital Course: The patient underwent a hybrid ablation by Dr. Almaz Danielle on 11/13/20 -see op note for details. After medical optimization he was discharged home on POD2. POD2 s/p hybrid ablation     On the floor, sitting in chair. No complaints - says his pain is controlled by tylenol.     Labs look good     Incision clean     Ready for dc to group home today. Lovenox + coumadin orders in. Spoke with Jinny Landa who will pick him up today. Will see in one week. Referral to outpatient cardiac rehab made.      Discharge Vital Signs:   Visit Vitals  /68 (BP 1 Location: Right arm, BP Patient Position: At rest;Sitting)   Pulse 91   Temp 98.2 °F (36.8 °C)   Resp 18   Ht 6' (1.829 m) Wt 227 lb 15.3 oz (103.4 kg)   SpO2 96%   BMI 30.92 kg/m²       Labs:   Recent Labs     11/15/20  0620   WBC 11.9*   HGB 13.3   HCT 40.3   *      K 4.2   BUN 23*   CREA 0.84   *   INR 1.1       Diagnostics: CXR:   CXR Results  (Last 48 hours)               11/15/20 0609  XR CHEST PORT Final result    Impression:  IMPRESSION: Lungs remain clear except for minor right basilar atelectasis. Narrative:  EXAM:  XR CHEST PORT       INDICATION:  postop heart       COMPARISON:  11/14/2020       FINDINGS: A portable AP radiograph of the chest was obtained at 522 hours. Pacer   leads appear unchanged. There is minor right basilar atelectasis otherwise   lungs are clear. Heart size is stable. .  Bony structures are unchanged. Patient Instructions/Follow Up Care:  Discharge instructions were reviewed with the patient and family present. Questions were also answered at this time. Prescriptions and medications were reviewed. The patient has a follow up appointment with the Nurse Practitioner or [address] [place] [address] Assistant on 11/23/20. The patient was also instructed to follow up with his primary care physician as needed. The patient and family were encouraged to call with any questions or concerns.        Signed:  Salinas Ferro NP  11/16/2020  1:12 PM

## 2020-11-16 NOTE — PROGRESS NOTES
No transition of care outreach indicated due to patient being managed by [facility] Cardiothoracic Surgery Team for 30 days.

## 2020-11-16 NOTE — PROGRESS NOTES
Cardiac Surgery Discharge - Follow up call placed to [facility]. Spoke to his caregiver. Reviewed plan of care after discharge and encouraged her to verbalize. She stated that pt is doing well with no problems. Confirmed follow up appts. Caregiver is without questions or concerns.  Aria Hurtado RN

## 2020-11-17 ENCOUNTER — HOME CARE VISIT (OUTPATIENT)
Dept: SCHEDULING | Facility: HOME HEALTH | Age: 75
End: 2020-11-17
Payer: MEDICARE

## 2020-11-17 VITALS
TEMPERATURE: 97.8 F | SYSTOLIC BLOOD PRESSURE: 110 MMHG | HEART RATE: 92 BPM | RESPIRATION RATE: 16 BRPM | DIASTOLIC BLOOD PRESSURE: 70 MMHG | OXYGEN SATURATION: 95 %

## 2020-11-17 PROCEDURE — 3331090002 HH PPS REVENUE DEBIT

## 2020-11-17 PROCEDURE — 3331090001 HH PPS REVENUE CREDIT

## 2020-11-17 PROCEDURE — 400013 HH SOC

## 2020-11-17 PROCEDURE — G0299 HHS/HOSPICE OF RN EA 15 MIN: HCPCS

## 2020-11-18 PROCEDURE — 3331090001 HH PPS REVENUE CREDIT

## 2020-11-18 PROCEDURE — 3331090002 HH PPS REVENUE DEBIT

## 2020-11-19 ENCOUNTER — HOME CARE VISIT (OUTPATIENT)
Dept: SCHEDULING | Facility: HOME HEALTH | Age: 75
End: 2020-11-19
Payer: MEDICARE

## 2020-11-19 VITALS
RESPIRATION RATE: 16 BRPM | SYSTOLIC BLOOD PRESSURE: 122 MMHG | DIASTOLIC BLOOD PRESSURE: 68 MMHG | HEART RATE: 75 BPM | OXYGEN SATURATION: 96 % | TEMPERATURE: 98.5 F

## 2020-11-19 PROCEDURE — 3331090002 HH PPS REVENUE DEBIT

## 2020-11-19 PROCEDURE — G0299 HHS/HOSPICE OF RN EA 15 MIN: HCPCS

## 2020-11-19 PROCEDURE — 3331090001 HH PPS REVENUE CREDIT

## 2020-11-20 PROCEDURE — 3331090002 HH PPS REVENUE DEBIT

## 2020-11-20 PROCEDURE — 3331090001 HH PPS REVENUE CREDIT

## 2020-11-21 ENCOUNTER — HOME CARE VISIT (OUTPATIENT)
Dept: SCHEDULING | Facility: HOME HEALTH | Age: 75
End: 2020-11-21
Payer: MEDICARE

## 2020-11-21 VITALS
OXYGEN SATURATION: 96 % | TEMPERATURE: 98.9 F | DIASTOLIC BLOOD PRESSURE: 62 MMHG | HEART RATE: 73 BPM | SYSTOLIC BLOOD PRESSURE: 120 MMHG | RESPIRATION RATE: 18 BRPM

## 2020-11-21 PROCEDURE — 3331090002 HH PPS REVENUE DEBIT

## 2020-11-21 PROCEDURE — G0300 HHS/HOSPICE OF LPN EA 15 MIN: HCPCS

## 2020-11-21 PROCEDURE — 3331090001 HH PPS REVENUE CREDIT

## 2020-11-22 PROCEDURE — 3331090001 HH PPS REVENUE CREDIT

## 2020-11-22 PROCEDURE — 3331090002 HH PPS REVENUE DEBIT

## 2020-11-23 ENCOUNTER — OFFICE VISIT (OUTPATIENT)
Dept: CARDIOTHORACIC SURGERY | Age: 75
End: 2020-11-23
Payer: MEDICARE

## 2020-11-23 ENCOUNTER — HOME CARE VISIT (OUTPATIENT)
Dept: HOME HEALTH SERVICES | Facility: HOME HEALTH | Age: 75
End: 2020-11-23
Payer: MEDICARE

## 2020-11-23 VITALS
DIASTOLIC BLOOD PRESSURE: 70 MMHG | HEART RATE: 74 BPM | OXYGEN SATURATION: 98 % | SYSTOLIC BLOOD PRESSURE: 110 MMHG | RESPIRATION RATE: 14 BRPM | TEMPERATURE: 97 F

## 2020-11-23 DIAGNOSIS — Z98.890 S/P ABLATION OF ATRIAL FIBRILLATION: Primary | ICD-10-CM

## 2020-11-23 DIAGNOSIS — Z86.79 S/P ABLATION OF ATRIAL FIBRILLATION: Primary | ICD-10-CM

## 2020-11-23 PROCEDURE — 3331090001 HH PPS REVENUE CREDIT

## 2020-11-23 PROCEDURE — 3331090002 HH PPS REVENUE DEBIT

## 2020-11-23 PROCEDURE — 99024 POSTOP FOLLOW-UP VISIT: CPT | Performed by: NURSE PRACTITIONER

## 2020-11-23 RX ORDER — ACETAMINOPHEN 325 MG/1
650 TABLET ORAL
Qty: 84 TAB | Refills: 0 | Status: SHIPPED | OUTPATIENT
Start: 2020-11-23

## 2020-11-23 NOTE — PROGRESS NOTES
Patient: Heather Mccoy   Age: 70 y.o. Patient Care Team:  Kimberly Hurley MD as PCP - General (Family Medicine)  Kimberly Hurley MD as PCP - [facility]  Aaliyah Mejia MD as Physician (Cardiology)  RUSSEL Caballero as Nurse Practitioner (Nurse Practitioner)  Marisa Choi MD (Cardiothoracic Surgery)    Diagnosis: The encounter diagnosis was S/P ablation of atrial fibrillation. Problem List:   Patient Active Problem List   Diagnosis Code    Bradycardia R00.1    Seizure disorder ([facility] [address] G40.909    Bilateral lower extremity edema R60.0    Hypertension I10    Mobitz type II incomplete atrioventricular block I44.1    Intellectual disability F79    Advance care planning Z71.89    S/P biventricular cardiac pacemaker procedure Z95.0    Lymphedema of both lower extremities I89.0    Vitamin D deficiency E55.9    Vitamin B12 deficiency E53.8    History of closed Colles' fracture Z87.81    H/O Mobitz type II block Z86.79    Atrial fibrillation (HCC) I48.91    Persistent atrial fibrillation (HCC) I48.19    S/P ablation of atrial fibrillation Z98.890, Z86.79          Date of Surgery: 11/13/20    Surgery: Hybrid ablation    HPI:  Pt is here for post op follow up. Complaining of some discomfort along incision. Otherwise no complaints. Ambulating well without assistance. No edema. Current Medications:   Current Outpatient Medications   Medication Sig Dispense Refill    amiodarone (CORDARONE) 200 mg tablet Take 1 Tab by mouth every twelve (12) hours. 60 Tab 1    potassium chloride (K-DUR, KLOR-CON) 20 mEq tablet TAKE 1 TABLET BY MOUTH DAILY 31 Tab 11    bumetanide (BUMEX) 2 mg tablet TAKE 1 TABLET BY MOUTH DAILY 31 Tab 11    warfarin (COUMADIN) 2.5 mg tablet       warfarin (COUMADIN) 5 mg tablet Take 1 Tab by mouth Daily (before dinner). 30 Tab 2    lacosamide (Vimpat) 100 mg tab tablet Take 1 Tab by mouth two (2) times a day.  Max Daily Amount: 200 mg. 180 Tab 1  metoprolol tartrate (LOPRESSOR) 25 mg tablet TAKE 1 TABLET BY MOUTH TWICE A DAY 62 Tab 11    levETIRAcetam 1,000 mg tablet Take 1 Tab by mouth two (2) times a day. 58 Tab 3    cholecalciferol (VITAMIN D3) (1000 Units /25 mcg) tablet TAKE 1 TABLET BY MOUTH DAILY 31 Tab PRN    acetaminophen (TYLENOL) 325 mg tablet Take 2 Tabs by mouth every four (4) hours as needed for Pain. 84 Tab 0    oxyCODONE IR (ROXICODONE) 5 mg immediate release tablet Take 5 mg by mouth once as needed for Pain. Vitals: Blood pressure 110/70, pulse 74, temperature 97 °F (36.1 °C), resp. rate 14, SpO2 98 %. Allergies: is allergic to sulfa (sulfonamide antibiotics). Physical Exam:  Wounds: clean, dry, no drainage    Lungs: clear to auscultation bilaterally    Heart: regular rate and rhythm, S1, S2 normal, no murmur, click, rub or gallop    Extremities: normal strength, tone, and muscle mass    Assessment/Plan:   1. Persistent Afib s/p hybrid ablation: On coumadin, metoprolol  2. Hx CHB s/p [employer] Bi-V PPM 2015: Followed by Dr. Salina Sparks  3. Hx seizure disorder: On lacosamide, levetiracetam  4. Lymphedema/LE edema: On bumex/compression stockings. Followed by lymphedema clinic  5. HTN: On BB  6. Intellectual disability: Mild, lives mostly independently in group home.   7. Dispo: FU with cardiology and PCP

## 2020-11-24 PROCEDURE — 3331090002 HH PPS REVENUE DEBIT

## 2020-11-24 PROCEDURE — 3331090001 HH PPS REVENUE CREDIT

## 2020-11-25 ENCOUNTER — HOME CARE VISIT (OUTPATIENT)
Dept: HOME HEALTH SERVICES | Facility: HOME HEALTH | Age: 75
End: 2020-11-25
Payer: MEDICARE

## 2020-11-25 ENCOUNTER — CLINICAL SUPPORT (OUTPATIENT)
Dept: CARDIOLOGY CLINIC | Age: 75
End: 2020-11-25
Payer: MEDICARE

## 2020-11-25 ENCOUNTER — OFFICE VISIT (OUTPATIENT)
Dept: CARDIOLOGY CLINIC | Age: 75
End: 2020-11-25
Payer: MEDICARE

## 2020-11-25 ENCOUNTER — TELEPHONE (OUTPATIENT)
Dept: CARDIOLOGY CLINIC | Age: 75
End: 2020-11-25

## 2020-11-25 VITALS
RESPIRATION RATE: 16 BRPM | HEART RATE: 81 BPM | SYSTOLIC BLOOD PRESSURE: 98 MMHG | DIASTOLIC BLOOD PRESSURE: 62 MMHG | WEIGHT: 235 LBS | BODY MASS INDEX: 31.83 KG/M2 | OXYGEN SATURATION: 98 % | HEIGHT: 72 IN

## 2020-11-25 DIAGNOSIS — Z79.01 LONG TERM (CURRENT) USE OF ANTICOAGULANTS: ICD-10-CM

## 2020-11-25 DIAGNOSIS — I10 ESSENTIAL HYPERTENSION: ICD-10-CM

## 2020-11-25 DIAGNOSIS — I51.3 LEFT ATRIAL THROMBUS: ICD-10-CM

## 2020-11-25 DIAGNOSIS — I42.0 DILATED CARDIOMYOPATHY (HCC): ICD-10-CM

## 2020-11-25 DIAGNOSIS — Z95.0 CARDIAC PACEMAKER IN SITU: Primary | ICD-10-CM

## 2020-11-25 DIAGNOSIS — I44.2 CHB (COMPLETE HEART BLOCK) (HCC): ICD-10-CM

## 2020-11-25 DIAGNOSIS — I48.91 ATRIAL FIBRILLATION, UNSPECIFIED TYPE (HCC): Primary | ICD-10-CM

## 2020-11-25 PROCEDURE — 93005 ELECTROCARDIOGRAM TRACING: CPT | Performed by: NURSE PRACTITIONER

## 2020-11-25 PROCEDURE — G8427 DOCREV CUR MEDS BY ELIG CLIN: HCPCS | Performed by: NURSE PRACTITIONER

## 2020-11-25 PROCEDURE — 93281 PM DEVICE PROGR EVAL MULTI: CPT | Performed by: INTERNAL MEDICINE

## 2020-11-25 PROCEDURE — 3017F COLORECTAL CA SCREEN DOC REV: CPT | Performed by: NURSE PRACTITIONER

## 2020-11-25 PROCEDURE — G8536 NO DOC ELDER MAL SCRN: HCPCS | Performed by: NURSE PRACTITIONER

## 2020-11-25 PROCEDURE — G8754 DIAS BP LESS 90: HCPCS | Performed by: NURSE PRACTITIONER

## 2020-11-25 PROCEDURE — 1111F DSCHRG MED/CURRENT MED MERGE: CPT | Performed by: NURSE PRACTITIONER

## 2020-11-25 PROCEDURE — 93010 ELECTROCARDIOGRAM REPORT: CPT | Performed by: NURSE PRACTITIONER

## 2020-11-25 PROCEDURE — 99215 OFFICE O/P EST HI 40 MIN: CPT | Performed by: NURSE PRACTITIONER

## 2020-11-25 PROCEDURE — G8432 DEP SCR NOT DOC, RNG: HCPCS | Performed by: NURSE PRACTITIONER

## 2020-11-25 PROCEDURE — G8752 SYS BP LESS 140: HCPCS | Performed by: NURSE PRACTITIONER

## 2020-11-25 PROCEDURE — 3331090002 HH PPS REVENUE DEBIT

## 2020-11-25 PROCEDURE — G0463 HOSPITAL OUTPT CLINIC VISIT: HCPCS | Performed by: NURSE PRACTITIONER

## 2020-11-25 PROCEDURE — G8417 CALC BMI ABV UP PARAM F/U: HCPCS | Performed by: NURSE PRACTITIONER

## 2020-11-25 PROCEDURE — 3331090001 HH PPS REVENUE CREDIT

## 2020-11-25 PROCEDURE — 1101F PT FALLS ASSESS-DOCD LE1/YR: CPT | Performed by: NURSE PRACTITIONER

## 2020-11-25 RX ORDER — AMIODARONE HYDROCHLORIDE 200 MG/1
200 TABLET ORAL DAILY
Qty: 90 TAB | Refills: 1 | Status: SHIPPED | OUTPATIENT
Start: 2020-11-25 | End: 2021-05-29

## 2020-11-25 RX ORDER — AMIODARONE HYDROCHLORIDE 200 MG/1
200 TABLET ORAL DAILY
Qty: 90 TAB | Refills: 1 | Status: SHIPPED | OUTPATIENT
Start: 2020-11-25 | End: 2020-11-25 | Stop reason: SDUPTHER

## 2020-11-25 NOTE — PROGRESS NOTES
1. Have you been to the ER, urgent care clinic since your last visit? Hospitalized since your last visit? Yes When: 11/12/20    2. Have you seen or consulted any other health care providers outside of the [address] [place] [address] since your last visit? Include any pap smears or colon screening. No    Chief Complaint   Patient presents with    Pacemaker Check    Irregular Heart Beat     Patient denies cardiac symptoms.

## 2020-11-25 NOTE — PROGRESS NOTES
ELECTROPHYSIOLOGY        Subjective:      Mariaa Gleason is a 70 y.o. male is here for EP follow up. The patient denies chest pain/ shortness of breath, orthopnea, PND, LE edema, palpitations, syncope, presyncope or fatigue.        Patient Active Problem List    Diagnosis Date Noted    Hypertension 10/19/2015     Priority: 3 - Three     Class: Chronic    S/P ablation of atrial fibrillation 11/13/2020    Atrial fibrillation ([facility] [address] 11/12/2020    Persistent atrial fibrillation ([facility] [address] 11/12/2020    Lymphedema of both lower extremities 10/15/2018    Vitamin D deficiency 10/15/2018    Vitamin B12 deficiency 10/15/2018    History of closed Colles' fracture 10/15/2018    H/O Mobitz type II block 10/15/2018    S/P biventricular cardiac pacemaker procedure 10/23/2015    Seizure disorder ([facility] [address] 10/20/2015     Class: Chronic    Intellectual disability 10/20/2015     Class: Chronic    Advance care planning 10/20/2015    Mobitz type II incomplete atrioventricular block 10/19/2015    Bilateral lower extremity edema 10/17/2015    Bradycardia 08/27/2012      Betsy Bryant MD  Past Medical History:   Diagnosis Date   [facility]) 2015    pacemaker L chest     CAD (coronary artery disease) 2015    Pacemaker    Epilepsy ([facility] [address]     Heart disease     Hypertension     Incontinence     Leg swelling     Mental retardation, 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)     Other ill-defined conditions(799.89)     edema legs    S/P ablation of atrial fibrillation 11/13/2020    S/P biventricular cardiac pacemaker procedure 10/23/2015    10/23/15 [employer] biventricular pacemaker inmplant    Screen for colon cancer 9/27/2012      Past Surgical History:   Procedure Laterality Date    HX COLONOSCOPY  04/05/2017    HX ORTHOPAEDIC Right 12/22/2017    ORIF right distal radius    HX PACEMAKER  2015    bi ventricular pacemaker     INTRACARD ECHO, THER/DX INTERVENT N/A 11/13/2020    Intracardiac Echocardiogram performed by Stacey Muse MD at [facility] CARDIAC CATH LAB    [place] ABLATE L/R ATRIAL FIBRIL W/ISOLATED PULM VEIN N/A 11/13/2020    Ablation Following A-Fib  Addl performed by Stacey Muse MD at [facility] CARDIAC CATH LAB    WY EPHYS EVL TRNSPTL TX ATRIAL FIB ISOLAT PULM VEIN N/A 11/13/2020    ABLATION A-FIB  W COMPLETE EP STUDY performed by Stacey Muse MD at [facility] CARDIAC CATH LAB    [place] INTRACARDIAC ELECTROPHYSIOLOGIC 3D MAPPING N/A 11/13/2020    Ep 3d Mapping performed by Stacey Muse MD at [facility] CARDIAC CATH LAB     Allergies   Allergen Reactions    Sulfa (Sulfonamide Antibiotics) Rash      Family History   Problem Relation Age of Onset    Other Other         unable to obtain due to mental status    Cancer Mother         unsure type    Heart Attack Father     Heart Disease Father     Lung Disease Father     No Known Problems Sister     negative for cardiac disease  Social History     Socioeconomic History    Marital status: SINGLE     Spouse name: Not on file    Number of children: Not on file    Years of education: Not on file    Highest education level: Not on file   Tobacco Use    Smoking status: Never Smoker    Smokeless tobacco: Never Used   Substance and Sexual Activity    Alcohol use: No    Drug use: No    Sexual activity: Not Currently   Other Topics Concern     Current Outpatient Medications   Medication Sig    amiodarone (CORDARONE) 200 mg tablet Take 1 Tab by mouth daily.  acetaminophen (TYLENOL) 325 mg tablet Take 2 Tabs by mouth every four (4) hours as needed for Pain.  potassium chloride (K-DUR, KLOR-CON) 20 mEq tablet TAKE 1 TABLET BY MOUTH DAILY    bumetanide (BUMEX) 2 mg tablet TAKE 1 TABLET BY MOUTH DAILY    warfarin (COUMADIN) 2.5 mg tablet 10 mg. On Fri, Sat, Sund    warfarin (COUMADIN) 5 mg tablet Take 1 Tab by mouth Daily (before dinner).  lacosamide (Vimpat) 100 mg tab tablet Take 1 Tab by mouth two (2) times a day.  Max Daily Amount: 200 mg.    metoprolol tartrate (LOPRESSOR) 25 mg tablet TAKE 1 TABLET BY MOUTH TWICE A DAY    levETIRAcetam 1,000 mg tablet Take 1 Tab by mouth two (2) times a day.  cholecalciferol (VITAMIN D3) (1000 Units /25 mcg) tablet TAKE 1 TABLET BY MOUTH DAILY     No current facility-administered medications for this visit. Vitals:    11/25/20 1113   BP: 98/62   Pulse: 81   Resp: 16   SpO2: 98%   Weight: 235 lb (106.6 kg)   Height: 6' (1.829 m)       I have reviewed the nurses notes, vitals, problem list, allergy list, medical history, family, social history and medications. Review of Symptoms:    General: Pt denies excessive weight gain or loss. Pt is able to conduct ADL's  HEENT: Denies blurred vision, headaches, epistaxis and difficulty swallowing. Respiratory: Denies shortness of breath, SCHWARZ, wheezing or stridor. Cardiovascular: Denies precordial pain, palpitations, edema or PND  Gastrointestinal: Denies poor appetite, indigestion, abdominal pain or blood in stool  Urinary: Denies dysuria, pyuria  Musculoskeletal: Denies pain or swelling from muscles or joints  Neurologic: Denies tremor, paresthesias, or sensory motor disturbance  Skin: Denies rash, itching or texture change. Psych: Denies depression      Physical Exam:      General: Well developed, in no acute distress. HEENT: Eyes - PERRL, no jvd  Heart:  Normal S1/S2 negative S3 or S4. Regular, no murmur, gallop or rub. Respiratory: Clear bilaterally x 4, no wheezing or rales  Extremities:  No edema, normal cap refill, no cyanosis. Musculoskeletal: No clubbing  Neuro: A&Ox3, speech clear, gait stable. Skin: Skin color is normal. No rashes or lesions.  Non diaphoretic. no ulcers  Vascular: 2+ pulses symmetric in all extremities  Psych - judgement intact and orientation is wnl       Cardiographics    Ekg: V paced    Results for orders placed or performed during the hospital encounter of 11/12/20   EKG, 12 LEAD, INITIAL   Result Value Ref Range    Ventricular Rate 76 BPM Atrial Rate 76 BPM    P-R Interval 306 ms    QRS Duration 200 ms    Q-T Interval 456 ms    QTC Calculation (Bezet) 513 ms    Calculated R Axis -85 degrees    Calculated T Axis 87 degrees    Diagnosis       AV dual-paced rhythm with prolonged AV conduction  When compared with ECG of 12-NOV-2020 16:09,  No significant change    Confirmed by Tarun Lucas ([id number]) on 11/14/2020 11:00:26 AM           Lab Results   Component Value Date/Time    WBC 11.9 (H) 11/15/2020 06:20 AM    Hemoglobin (POC) 12.7 12/22/2017 11:23 AM    HGB 13.3 11/15/2020 06:20 AM    HCT 40.3 11/15/2020 06:20 AM    PLATELET 737 (L) 48/46/6536 06:20 AM    MCV 98.5 11/15/2020 06:20 AM      Lab Results   Component Value Date/Time    Sodium 136 11/15/2020 06:20 AM    Potassium 4.2 11/15/2020 06:20 AM    Chloride 104 11/15/2020 06:20 AM    CO2 28 11/15/2020 06:20 AM    Anion gap 4 (L) 11/15/2020 06:20 AM    Glucose 122 (H) 11/15/2020 06:20 AM    BUN 23 (H) 11/15/2020 06:20 AM    Creatinine 0.84 11/15/2020 06:20 AM    BUN/Creatinine ratio 27 (H) 11/15/2020 06:20 AM    GFR est AA >60 11/15/2020 06:20 AM    GFR est non-AA >60 11/15/2020 06:20 AM    Calcium 9.2 11/15/2020 06:20 AM    Bilirubin, total 0.6 11/13/2020 07:28 PM    Alk. phosphatase 89 11/13/2020 07:28 PM    Protein, total 7.0 11/13/2020 07:28 PM    Albumin 3.3 (L) 11/13/2020 07:28 PM    Globulin 3.7 11/13/2020 07:28 PM    A-G Ratio 0.9 (L) 11/13/2020 07:28 PM    ALT (SGPT) 19 11/13/2020 07:28 PM      Lab Results   Component Value Date/Time    TSH 1.21 09/24/2020 10:39 AM           Assessment:           ICD-10-CM ICD-9-CM    1. Atrial fibrillation, unspecified type (HCC)  I48.91 427.31 AMB POC EKG ROUTINE W/ 12 LEADS, INTER & REP   2. Long term (current) use of anticoagulants  Z79.01 V58.61    3. Left atrial thrombus  I51.3 429.89    4. Dilated cardiomyopathy (HCC)  I42.0 425.4    5.  Essential hypertension  I10 401.9      Orders Placed This Encounter    AMB POC EKG ROUTINE W/ 12 LEADS, INTER & REP     Order Specific Question:   Reason for Exam:     Answer:   routine    amiodarone (CORDARONE) 200 mg tablet     Sig: Take 1 Tab by mouth daily. Dispense:  90 Tab     Refill:  1        Plan:     Mr Jaxon Gaspar is s/p hybrid ablation 11/13/2020 with PVI of all 4 PVs,  Additional RFA of roof line and Additional RFA of anterior line. Device interrogation with no events since ablation. He is 68% AP and 97%RVP. He remains on amiodarone. Will lower to once a day. Continue with coumadin. Follow up in 3 mo with Dr Ines Blanchard.     Evelyn Dobbs for AF, BMI 31 and HTN. Thank you for allowing me to participate in Irlanda Armando 's care.       Venita Gr NP

## 2020-11-25 NOTE — TELEPHONE ENCOUNTER
Need to call and cancel the PM dose for amiodarone.  Ivet only want the pt to take it once in the AM, instead of twice a day and the pharmacy need for ivet walker to change the prescription and send them a new script      thanks

## 2020-11-26 PROCEDURE — 3331090001 HH PPS REVENUE CREDIT

## 2020-11-26 PROCEDURE — 3331090002 HH PPS REVENUE DEBIT

## 2020-11-27 PROCEDURE — 3331090002 HH PPS REVENUE DEBIT

## 2020-11-27 PROCEDURE — 3331090001 HH PPS REVENUE CREDIT

## 2020-11-28 ENCOUNTER — HOME CARE VISIT (OUTPATIENT)
Dept: SCHEDULING | Facility: HOME HEALTH | Age: 75
End: 2020-11-28
Payer: MEDICARE

## 2020-11-28 PROCEDURE — G0299 HHS/HOSPICE OF RN EA 15 MIN: HCPCS

## 2020-11-28 PROCEDURE — 3331090002 HH PPS REVENUE DEBIT

## 2020-11-28 PROCEDURE — 3331090001 HH PPS REVENUE CREDIT

## 2020-11-29 RX ORDER — WARFARIN SODIUM 5 MG/1
TABLET ORAL
Qty: 43 TAB | Refills: 0 | Status: SHIPPED | OUTPATIENT
Start: 2020-11-29 | End: 2021-05-12 | Stop reason: SDUPTHER

## 2020-11-30 ENCOUNTER — VIRTUAL VISIT (OUTPATIENT)
Dept: FAMILY MEDICINE CLINIC | Age: 75
End: 2020-11-30
Payer: MEDICARE

## 2020-11-30 DIAGNOSIS — Z13.31 SCREENING FOR DEPRESSION: ICD-10-CM

## 2020-11-30 DIAGNOSIS — Z13.5 GLAUCOMA SCREENING: Primary | ICD-10-CM

## 2020-11-30 DIAGNOSIS — Z00.00 MEDICARE ANNUAL WELLNESS VISIT, SUBSEQUENT: ICD-10-CM

## 2020-11-30 PROCEDURE — G0444 DEPRESSION SCREEN ANNUAL: HCPCS | Performed by: STUDENT IN AN ORGANIZED HEALTH CARE EDUCATION/TRAINING PROGRAM

## 2020-11-30 PROCEDURE — G0439 PPPS, SUBSEQ VISIT: HCPCS | Performed by: STUDENT IN AN ORGANIZED HEALTH CARE EDUCATION/TRAINING PROGRAM

## 2020-11-30 RX ORDER — MELATONIN
Qty: 31 TAB | Refills: 5 | Status: SHIPPED | OUTPATIENT
Start: 2020-11-30 | End: 2021-06-30

## 2020-11-30 NOTE — PATIENT INSTRUCTIONS
Medicare Wellness Visit, Male The best way to live healthy is to have a lifestyle where you eat a well-balanced diet, exercise regularly, limit alcohol use, and quit all forms of tobacco/nicotine, if applicable. Regular preventive services are another way to keep healthy. Preventive services (vaccines, screening tests, monitoring & exams) can help personalize your care plan, which helps you manage your own care. Screening tests can find health problems at the earliest stages, when they are easiest to treat. [facility] follows the current, evidence-based guidelines published by the [facility] Kenton Lora ([facility]) when recommending preventive services for our patients. Because we follow these guidelines, sometimes recommendations change over time as research supports it. (For example, a prostate screening blood test is no longer routinely recommended for men with no symptoms). Of course, you and your doctor may decide to screen more often for some diseases, based on your risk and co-morbidities (chronic disease you are already diagnosed with). Preventive services for you include: - Medicare offers their members a free annual wellness visit, which is time for you and your primary care provider to discuss and plan for your preventive service needs. Take advantage of this benefit every year! 
-All adults over age 72 should receive the recommended pneumonia vaccines. Current USPSTF guidelines recommend a series of two vaccines for the best pneumonia protection.  
-All adults should have a flu vaccine yearly and tetanus vaccine every 10 years. 
-All adults age 48 and older should receive the shingles vaccines (series of two vaccines).       
-All adults age 38-68 who are overweight should have a diabetes screening test once every three years.  
-Other screening tests & preventive services for persons with diabetes include: an eye exam to screen for diabetic retinopathy, a kidney function test, a foot exam, and stricter control over your cholesterol.  
-Cardiovascular screening for adults with routine risk involves an electrocardiogram (ECG) at intervals determined by the provider.  
-Colorectal cancer screening should be done for adults age 54-65 with no increased risk factors for colorectal cancer. There are a number of acceptable methods of screening for this type of cancer. Each test has its own benefits and drawbacks. Discuss with your provider what is most appropriate for you during your annual wellness visit. The different tests include: colonoscopy (considered the best screening method), a fecal occult blood test, a fecal DNA test, and sigmoidoscopy. 
-All adults born between [facility] should be screened once for Hepatitis C. 
-An Abdominal Aortic Aneurysm (AAA) Screening is recommended for men age 73-68 who has ever smoked in their lifetime. Here is a list of your current Health Maintenance items (your personalized list of preventive services) with a due date: 
Health Maintenance Due Topic Date Due  
 Hepatitis C Test  1945  Colorectal Screening  01/16/1997  Glaucoma Screening   01/16/2012  Yearly Flu Vaccine (1) 09/01/2020 [facility] Annual Well Visit  11/26/2020

## 2020-11-30 NOTE — PROGRESS NOTES
This is the Subsequent Medicare Annual Wellness Exam, performed 12 months or more after the Initial AWV or the last Subsequent AWV    I have reviewed the patient's medical history in detail and updated the computerized patient record. Shiloh Gonzalez is a 70 y.o. male   History obtained from: the patient and caregiver from group home     Concerns today   No acute concerns today. Seizures  - Seizure free for 2 years  - Followed by Dr. Stephon Gutierrez. Sees yearly. Last seen in Summer. Doing well from Neuro standpoint.     Afib   - 11/13/2020 had ablation for afib   - Takes warfarin, metoprolol, amiodarone   - Sees [facility]   Health Risk Assessment     Demographics   male  70 y.o. General Health Questions   -During the past 4 weeks:   -how would you rate your health in general? Good   -how often have you been bothered by feeling dizzy when standing up? never   -how much have you been bothered by bodily pain? not   -Have you noticed any hearing difficulties? no   -has your physical and emotional health limited your social activities with family or friends? no    Emotional Health Questions   -Do you have a history of depression, anxiety, or emotional problems? no  -Over the past 2 weeks, have you felt down, depressed or hopeless? no  -Over the past 2 weeks, have you felt little interest or pleasure in doing things? no    Health Habits   Please describe your diet habits: Everything, good variety   Do you get 5 servings of fruits or vegetables daily? yes  Do you exercise regularly? No, but is active helping around the house     Activities of Daily Living and Functional Status   -Do you need help with eating, walking, dressing, bathing, toileting, the phone, transportation, shopping, preparing meals, housework, laundry, medications or managing money?  No  -In the past four weeks, was someone available to help you if you needed and wanted help with anything? yes  -Are you confident are you that you can control and manage most of your health problems? no  -Have you been given information to help you keep track of your medications? yes  -How often do you have trouble taking your medications as prescribed? never    Fall Risk and Home Safety   Have you fallen 2 or more times in the past year? no  Does your home have rugs in the hallway, lack grab bars in the bathroom, lack handrails on the stairs or have poor lighting? no  Do you have smoke detectors and check them regularly? yes  Do you have difficulties driving a car? Yes, no longer drives    Cognitive Screening   Has your family/caregiver stated any concerns about your memory: no    Preventive Services     (Preventive care checklist to be included in patient instructions)  Discussed today Done Previously Not Needed     X  Pneumococcal vaccines    X  Flu vaccine     X Hepatitis B vaccine (if at risk)    X  Shingles vaccine    X  TDAP vaccine     X Digital rectal exam     X PSA    X, 2015  Colorectal cancer screening     X Low-dose CT for lung cancer screening     X Bone density test    X, 2020  Glaucoma screening    X  Cholesterol test    X  Diabetes screening test      X Diabetes self-management class     X Nutritionist referral for diabetes or renal disease         Assessment/Plan   Education and counseling provided:  Are appropriate based on today's review and evaluation    Diagnoses and all orders for this visit:    1. Glaucoma screening  -      GLAUCOMA SCREENING    2. Medicare annual wellness visit, subsequent  -     [facility]    3.  Screening for depression  -     DEPRESSION SCREEN ANNUAL    Other orders  -     cholecalciferol (VITAMIN D3) (1000 Units /25 mcg) tablet; TAKE 1 TABLET BY MOUTH DAILY      Health Maintenance Due     Health Maintenance Due   Topic Date Due    Hepatitis C Screening  1945    Colorectal Cancer Screening Combo  01/16/1997    GLAUCOMA SCREENING Q2Y  01/16/2012       Patient Care Team   Patient Care Team:  Carlo Garza MD as PCP - General (Family Medicine)  Carlo Garza MD as PCP - [facility]  Alanna Shelley MD as Physician (Cardiology)  RUSSEL Celestin as Nurse Practitioner (Nurse Practitioner)  Melina Zamarripa MD (Cardiothoracic Surgery)    History     Patient Active Problem List   Diagnosis Code    Bradycardia R00.1    Seizure disorder ([address] G40.909    Bilateral lower extremity edema R60.0    Hypertension I10    Mobitz type II incomplete atrioventricular block I44.1    Intellectual disability F79    Advance care planning Z71.89    S/P biventricular cardiac pacemaker procedure Z95.0    Lymphedema of both lower extremities I89.0    Vitamin D deficiency E55.9    Vitamin B12 deficiency E53.8    History of closed Colles' fracture Z87.81    H/O Mobitz type II block Z86.79    Atrial fibrillation ([address] I48.91    Persistent atrial fibrillation ([address] I48.19    S/P ablation of atrial fibrillation Z98.890, Z86.79     Past Medical History:   Diagnosis Date    A-fib [facility]) 2015    pacemaker L chest     CAD (coronary artery disease) 2015    Pacemaker    Epilepsy ([address]     Heart disease     Hypertension     Incontinence     Leg swelling     Mental retardation, 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)     Other ill-defined conditions(799.89)     edema legs    S/P ablation of atrial fibrillation 11/13/2020    S/P biventricular cardiac pacemaker procedure 10/23/2015    10/23/15 [employer] biventricular pacemaker inmplant    Screen for colon cancer 9/27/2012      Past Surgical History:   Procedure Laterality Date    HX COLONOSCOPY  04/05/2017    HX ORTHOPAEDIC Right 12/22/2017    ORIF right distal radius    HX PACEMAKER  2015    bi ventricular pacemaker     INTRACARD ECHO, THER/DX INTERVENT N/A 11/13/2020    Intracardiac Echocardiogram performed by Alanna Shelley MD at [address] CATH LAB    ND ABLATE L/R ATRIAL FIBRIL W/ISOLATED PULM VEIN N/A 11/13/2020 Ablation Following A-Fib  Addl performed by Ade Viera MD at [facility] CARDIAC CATH LAB    NV EPHYS EVL TRNSPTL TX ATRIAL FIB ISOLAT PULM VEIN N/A 11/13/2020    ABLATION A-FIB  W COMPLETE EP STUDY performed by Ade Viera MD at [facility] CARDIAC CATH LAB    NV INTRACARDIAC ELECTROPHYSIOLOGIC 3D MAPPING N/A 11/13/2020    Ep 3d Mapping performed by Ade Viera MD at [facility] CARDIAC CATH LAB     Current Outpatient Medications   Medication Sig Dispense Refill    cholecalciferol (VITAMIN D3) (1000 Units /25 mcg) tablet TAKE 1 TABLET BY MOUTH DAILY 31 Tab 5    warfarin (COUMADIN) 5 mg tablet TAKE 1 TABLET BY MOUTH IN THE EVENING ON MONDAY, TUESDAY, WEDNESDAY &THURSDAY TAKE 2 TABLETS BY MOUTH IN THE EVENING ON FRIDAY, SATURDAY AN 43 Tab 0    amiodarone (CORDARONE) 200 mg tablet Take 1 Tab by mouth daily. 90 Tab 1    acetaminophen (TYLENOL) 325 mg tablet Take 2 Tabs by mouth every four (4) hours as needed for Pain. 84 Tab 0    potassium chloride (K-DUR, KLOR-CON) 20 mEq tablet TAKE 1 TABLET BY MOUTH DAILY 31 Tab 11    bumetanide (BUMEX) 2 mg tablet TAKE 1 TABLET BY MOUTH DAILY 31 Tab 11    warfarin (COUMADIN) 2.5 mg tablet 10 mg. On Fri, Sat, Sund      lacosamide (Vimpat) 100 mg tab tablet Take 1 Tab by mouth two (2) times a day. Max Daily Amount: 200 mg. 180 Tab 1    metoprolol tartrate (LOPRESSOR) 25 mg tablet TAKE 1 TABLET BY MOUTH TWICE A DAY 62 Tab 11    levETIRAcetam 1,000 mg tablet Take 1 Tab by mouth two (2) times a day.  62 Tab 3     Allergies   Allergen Reactions    Sulfa (Sulfonamide Antibiotics) Rash       Family History   Problem Relation Age of Onset    Other Other         unable to obtain due to mental status    Cancer Mother         unsure type    Heart Attack Father     Heart Disease Father     Lung Disease Father     No Known Problems Sister      Social History     Tobacco Use    Smoking status: Never Smoker    Smokeless tobacco: Never Used   Substance Use Topics    Alcohol use: No       Shaina Abrams, who was evaluated through a synchronous (real-time) audio-video encounter, and/or his healthcare decision maker, is aware that it is a billable service, with coverage as determined by his insurance carrier. He provided verbal consent to proceed: Yes, and patient identification was verified. It was conducted pursuant to the emergency declaration under the [address] [place] [address] and the [employer] Act. A caregiver was present when appropriate. Ability to conduct physical exam was limited. I was at home. The patient was at home.     Kenia Montalvo, DO

## 2020-11-30 NOTE — PROGRESS NOTES
[address] Medicine Residency Attending Addendum:  Dr. Shelby Monique, DO,  the patient and I were not physically present during this encounter. The resident and I are concurrently monitoring the patient care through appropriate telecommunication technology. I discussed the findings, assessment and plan with the resident and agree with the resident's findings and plan as documented in the resident's note.       Indu Springer MD

## 2020-12-01 VITALS
OXYGEN SATURATION: 98 % | RESPIRATION RATE: 16 BRPM | HEART RATE: 78 BPM | SYSTOLIC BLOOD PRESSURE: 120 MMHG | DIASTOLIC BLOOD PRESSURE: 70 MMHG | TEMPERATURE: 98.2 F

## 2021-01-22 ENCOUNTER — TELEPHONE (OUTPATIENT)
Dept: FAMILY MEDICINE CLINIC | Age: 76
End: 2021-01-22

## 2021-01-22 NOTE — TELEPHONE ENCOUNTER
----- Message from Efraín Salomon sent at 1/18/2021 11:47 AM EST -----  Regarding: Dr Donald Uriostegui  Appointment not available    Caller's first and last name and relationship to patient (if not the patient): Juan Friedman, Caretaker  Best contact number: [phone]  Preferred date and time: first available  Scheduled appointment date and time: none available  Reason for appointment: [facility] appt either VV or in-office   Details to clarify the request: N/A

## 2021-01-29 ENCOUNTER — TELEPHONE (OUTPATIENT)
Dept: CARDIOLOGY CLINIC | Age: 76
End: 2021-01-29

## 2021-01-29 NOTE — TELEPHONE ENCOUNTER
Spoke with patients caregiver, Alexey Mcduffie, on HPI. Ana Luisa Rey states he has not had it checked since November. Ana Luisa Rey states patient has no signs of bruising. Explained that does not matter. His blood could also be to thick. Patient could be at risk for CVA or Stroke, Ana Luisa Rey understands and agrees to schedule appt for PT/INR check. Please call Ana Luisa Rey to schedule.

## 2021-01-29 NOTE — TELEPHONE ENCOUNTER
----- Message from Amanda Galdamez ANP sent at 1/29/2021  2:48 PM EST -----  Can you see who is following his INR? I have a coumadin refill and we haven't done an INR since nov.   Thanks

## 2021-01-30 RX ORDER — LEVETIRACETAM 1000 MG/1
TABLET ORAL
Qty: 62 TAB | Refills: 11 | Status: SHIPPED | OUTPATIENT
Start: 2021-01-30 | End: 2021-02-26 | Stop reason: SDUPTHER

## 2021-02-08 RX ORDER — WARFARIN SODIUM 5 MG/1
TABLET ORAL
Qty: 43 TAB | Refills: 0 | Status: CANCELLED | OUTPATIENT
Start: 2021-02-08

## 2021-02-09 ENCOUNTER — ANTI-COAG VISIT (OUTPATIENT)
Dept: CARDIOLOGY CLINIC | Age: 76
End: 2021-02-09
Payer: MEDICARE

## 2021-02-09 DIAGNOSIS — I48.91 ATRIAL FIBRILLATION, UNSPECIFIED TYPE (HCC): ICD-10-CM

## 2021-02-09 DIAGNOSIS — Z79.01 LONG TERM (CURRENT) USE OF ANTICOAGULANTS: Primary | ICD-10-CM

## 2021-02-09 LAB
INR BLD: 8 (ref 1–1.5)
PT POC: 96 SECONDS (ref 9.1–12)
VALID INTERNAL CONTROL?: YES

## 2021-02-09 PROCEDURE — 85610 PROTHROMBIN TIME: CPT | Performed by: INTERNAL MEDICINE

## 2021-02-12 ENCOUNTER — ANTI-COAG VISIT (OUTPATIENT)
Dept: CARDIOLOGY CLINIC | Age: 76
End: 2021-02-12
Payer: MEDICARE

## 2021-02-12 DIAGNOSIS — I48.91 ATRIAL FIBRILLATION, UNSPECIFIED TYPE (HCC): ICD-10-CM

## 2021-02-12 DIAGNOSIS — Z79.01 LONG TERM (CURRENT) USE OF ANTICOAGULANTS: Primary | ICD-10-CM

## 2021-02-12 LAB
INR BLD: 3.6 (ref 1–1.5)
PT POC: 43.2 SECONDS (ref 9.1–12)
VALID INTERNAL CONTROL?: YES

## 2021-02-12 PROCEDURE — 85610 PROTHROMBIN TIME: CPT | Performed by: INTERNAL MEDICINE

## 2021-02-16 ENCOUNTER — TELEPHONE (OUTPATIENT)
Dept: FAMILY MEDICINE CLINIC | Age: 76
End: 2021-02-16

## 2021-02-16 NOTE — TELEPHONE ENCOUNTER
Patient needs letter for updated seizure protocol for group home. Please give Eveline Richter a call at [phone] for any questions. Letter can be mailed or faxed.

## 2021-02-19 ENCOUNTER — ANTI-COAG VISIT (OUTPATIENT)
Dept: CARDIOLOGY CLINIC | Age: 76
End: 2021-02-19
Payer: MEDICARE

## 2021-02-19 DIAGNOSIS — Z79.01 LONG TERM (CURRENT) USE OF ANTICOAGULANTS: Primary | ICD-10-CM

## 2021-02-19 DIAGNOSIS — I48.91 ATRIAL FIBRILLATION, UNSPECIFIED TYPE (HCC): ICD-10-CM

## 2021-02-19 LAB
INR BLD: 3.2 (ref 1–1.5)
PT POC: 38.8 SECONDS (ref 9.1–12)
VALID INTERNAL CONTROL?: YES

## 2021-02-19 PROCEDURE — 85610 PROTHROMBIN TIME: CPT | Performed by: INTERNAL MEDICINE

## 2021-02-19 NOTE — PROGRESS NOTES
A full discussion of the nature of anticoagulants has been carried out. A benefit risk analysis has been presented to the patient, so that they understand the justification for choosing anticoagulation at this time. The need for frequent and regular monitoring, precise dosage adjustment and compliance is stressed. Side effects of potential bleeding are discussed. The patient should avoid any OTC items containing aspirin, naproxen or ibuprofen, and should avoid great swings in general diet. Avoid alcohol consumption. Advised to notify the office if antibiotic or steroid therapy is initiated. Call if any signs of abnormal bleeding are present. Next PT/INR test in 10 days.

## 2021-02-22 NOTE — TELEPHONE ENCOUNTER
Attempted to call number listed below for more information regarding letter that is needed for seizure protocol. It went straight to  and was under a different name than what was provided, so a message was not left.      Alpa Garcia, [facility] Resident

## 2021-02-26 ENCOUNTER — VIRTUAL VISIT (OUTPATIENT)
Dept: NEUROLOGY | Age: 76
End: 2021-02-26
Payer: MEDICARE

## 2021-02-26 DIAGNOSIS — R26.9 GAIT DISORDER: ICD-10-CM

## 2021-02-26 DIAGNOSIS — G40.909 SEIZURE DISORDER (HCC): Primary | ICD-10-CM

## 2021-02-26 DIAGNOSIS — G40.309 GENERALIZED NONCONVULSIVE EPILEPSY (HCC): ICD-10-CM

## 2021-02-26 DIAGNOSIS — R25.1 TREMOR: ICD-10-CM

## 2021-02-26 DIAGNOSIS — F79 INTELLECTUAL DISABILITY: ICD-10-CM

## 2021-02-26 DIAGNOSIS — G40.209 PARTIAL SYMPTOMATIC EPILEPSY WITH COMPLEX PARTIAL SEIZURES, NOT INTRACTABLE, WITHOUT STATUS EPILEPTICUS (HCC): ICD-10-CM

## 2021-02-26 PROCEDURE — G8432 DEP SCR NOT DOC, RNG: HCPCS | Performed by: PSYCHIATRY & NEUROLOGY

## 2021-02-26 PROCEDURE — G8417 CALC BMI ABV UP PARAM F/U: HCPCS | Performed by: PSYCHIATRY & NEUROLOGY

## 2021-02-26 PROCEDURE — G8427 DOCREV CUR MEDS BY ELIG CLIN: HCPCS | Performed by: PSYCHIATRY & NEUROLOGY

## 2021-02-26 PROCEDURE — 1101F PT FALLS ASSESS-DOCD LE1/YR: CPT | Performed by: PSYCHIATRY & NEUROLOGY

## 2021-02-26 PROCEDURE — G8756 NO BP MEASURE DOC: HCPCS | Performed by: PSYCHIATRY & NEUROLOGY

## 2021-02-26 PROCEDURE — 3017F COLORECTAL CA SCREEN DOC REV: CPT | Performed by: PSYCHIATRY & NEUROLOGY

## 2021-02-26 PROCEDURE — G8536 NO DOC ELDER MAL SCRN: HCPCS | Performed by: PSYCHIATRY & NEUROLOGY

## 2021-02-26 PROCEDURE — 99214 OFFICE O/P EST MOD 30 MIN: CPT | Performed by: PSYCHIATRY & NEUROLOGY

## 2021-02-26 RX ORDER — LEVETIRACETAM 1000 MG/1
TABLET ORAL
Qty: 62 TAB | Refills: 11 | Status: SHIPPED | OUTPATIENT
Start: 2021-02-26 | End: 2022-02-24

## 2021-02-26 NOTE — PROGRESS NOTES
Neurology Progress Note  Chin Tsang was seen by synchronous (real-time) audio-video technology on 21. Consent:  He/his caregiver is aware that this patient-initiated Telehealth encounter is a billable service, with coverage as determined by his insurance carrier. He is aware that he may receive a bill and has provided verbal consent to proceed: Yes    I was in the office while conducting this encounter. Pursuant to the emergency declaration under the [address] [place] [address] waiver authority and the [employer] Act, this Virtual  Visit was conducted, with patient's consent, to reduce the patient's risk of exposure to COVID-19 and provide continuity of care for an established patient. Services were provided through a video synchronous discussion virtually to substitute for in-person clinic visit. NAME:  Chin Tsang   :   1945   MRN:   [id number]     Date/Time:  2021  Subjective:      Chin Tsang is a 78 y.o. male here today for  follow up for seizure and neuropathy. Patient is a group home resident, care giver was by his side at the time of interview. No seizure has been reported since the last visit. He is said to be doing fairly well. .  No fall reported. Update for seizure protocol was requested, this was done and then put in the mail with after visit summary. No change in patient's medication was made.   Review of Systems - General ROS: negative for - fatigue or sleep disturbance  Psychological ROS: negative for - anxiety, depression or sleep disturbances  Ophthalmic ROS: negative for - blurry vision, double vision, loss of vision, photophobia or uses glasses  ENT ROS: positive for - epistaxis, headaches, vertigo and visual changes  negative for - tinnitus  Allergy and Immunology ROS: negative  Hematological and Lymphatic ROS: negative  Endocrine ROS: negative  Respiratory ROS: no cough, shortness of breath, or wheezing  Cardiovascular ROS: no chest pain or dyspnea on exertion  Gastrointestinal ROS: no abdominal pain, change in bowel habits, or black or bloody stools  Genito-Urinary ROS: no dysuria, trouble voiding, or hematuria  Musculoskeletal ROS: positive for - joint pain and joint stiffness  Neurological ROS: positive for - numbness/tingling and tremors  Dermatological ROS: negative     Medications reviewed:  Current Outpatient Medications   Medication Sig Dispense Refill    levETIRAcetam 1,000 mg tablet TAKE 1 TABLET BY MOUTH TWICE A DAY 62 Tab 11    cholecalciferol (VITAMIN D3) (1000 Units /25 mcg) tablet TAKE 1 TABLET BY MOUTH DAILY 31 Tab 5    warfarin (COUMADIN) 5 mg tablet TAKE 1 TABLET BY MOUTH IN THE EVENING ON MONDAY, TUESDAY, WEDNESDAY &THURSDAY TAKE 2 TABLETS BY MOUTH IN THE EVENING ON FRIDAY, SATURDAY AN 43 Tab 0    amiodarone (CORDARONE) 200 mg tablet Take 1 Tab by mouth daily. 90 Tab 1    acetaminophen (TYLENOL) 325 mg tablet Take 2 Tabs by mouth every four (4) hours as needed for Pain. 84 Tab 0    potassium chloride (K-DUR, KLOR-CON) 20 mEq tablet TAKE 1 TABLET BY MOUTH DAILY 31 Tab 11    bumetanide (BUMEX) 2 mg tablet TAKE 1 TABLET BY MOUTH DAILY 31 Tab 11    warfarin (COUMADIN) 2.5 mg tablet 10 mg. On Fri, Sat, Sund      lacosamide (Vimpat) 100 mg tab tablet Take 1 Tab by mouth two (2) times a day. Max Daily Amount: 200 mg. 180 Tab 1    metoprolol tartrate (LOPRESSOR) 25 mg tablet TAKE 1 TABLET BY MOUTH TWICE A DAY 62 Tab 11        Objective:   Vitals: There were no vitals filed for this visit.       Lab Data Reviewed:  Lab Results   Component Value Date/Time    WBC 11.9 (H) 11/15/2020 06:20 AM    HCT 40.3 11/15/2020 06:20 AM    HGB 13.3 11/15/2020 06:20 AM    PLATELET 038 (L) 54/25/4953 06:20 AM       Lab Results   Component Value Date/Time    Sodium 136 11/15/2020 06:20 AM    Potassium 4.2 11/15/2020 06:20 AM    Chloride 104 11/15/2020 06:20 AM    CO2 28 11/15/2020 06:20 AM    Glucose 122 (H) 11/15/2020 06:20 AM    BUN 23 (H) 11/15/2020 06:20 AM    Creatinine 0.84 11/15/2020 06:20 AM    Calcium 9.2 11/15/2020 06:20 AM       No components found for: TROPQUANT    No results found for: JOSE      Lab Results   Component Value Date/Time    Hemoglobin A1c 5.0 09/24/2020 10:39 AM        Lab Results   Component Value Date/Time    Vitamin B12 771 10/15/2018 03:48 PM       No results found for: JOSE, Caleblaura Morel, WILLARDANA    Lab Results   Component Value Date/Time    Cholesterol, total 158 12/06/2018 04:06 PM    HDL Cholesterol 66 12/06/2018 04:06 PM    LDL, calculated 76 12/06/2018 04:06 PM    VLDL, calculated 16 12/06/2018 04:06 PM    Triglyceride 82 12/06/2018 04:06 PM         CT Results (recent):  Results from Hospital Encounter encounter on 10/17/15   CT MAXILLOFACIAL WO CONT    Narrative **Final Report**       ICD Codes / Adm. Diagnosis: 97  276.1 / Seizure  Seizure  Examination:  CT MAXILLOFACIAL WO CON  - [id number] - Oct 17 2015 10:33AM  Accession No:  [id number]  Reason:  head trauma after seizure      REPORT:  INDICATION:  head trauma after seizure    EXAM: CT MAXILLOFACIAL STRUCTURES WITHOUT CONTRAST. Comparison: CT head 10/22/2008. PROCEDURE: Sequential axial images of the maxillofacial bones were   performed, using bone algorithm. Soft tissue and bone windows were examined. Post-processing for coronal reformatting was performed. Contrast was not   administered. FINDINGS: No fracture is obvious. The orbital rims and floors of orbits are   intact. The nasal bone is intact, with a flattened configuration which is   stable since older head CTs. . The bony structures of the mandible and   maxilla show no acute abnormality. There are no air-fluid levels in the   paranasal sinuses, and no soft tissue swelling is noted focally. Minimal   mucoperiosteal thickening. No obvious mass or abscess. Normal sized lymph   nodes scattered throughout the cervical chains. IMPRESSION: No acute bony abnormality of the maxillofacial structures. Signing/Reading Doctor: Monserrat Hoyt ([id number])    Approved: Monserrat Hoyt ([id number])  Oct 17 2015 10:53AM                                MRI Results (recent):  No results found for this or any previous visit. IR Results (recent):  No results found for this or any previous visit. VAS/US Results (recent):  No results found for this or any previous visit. PHYSICAL EXAM:  General:    Alert, cooperative, no distress, appears stated age. Head:   Normocephalic, without obvious abnormality, atraumatic. Eyes:   Conjunctivae/corneas clear. Nose:  Nares normal. .  Throat:    Lips and tongue normal.  No Thrush  Neck:  Symmetrical,  no adenopathy, thyroid     no JVD. Back:    Symmetric. Lungs:   Deferred. Chest wall:   No Accessory muscle use. Heart:   Deferred. Abdomen:    Not distended. Extremities: Extremities normal, atraumatic, No cyanosis. No edema. No clubbing  Skin:      No rashes or lesions. Not Jaundiced  Lymph nodes: Cervical, supraclavicular normal.  Psych:  Good insight. Not depressed. Not anxious or agitated. NEUROLOGICAL EXAM:  Appearance: The patient is well developed, well nourished, provides a coherent history and is in no acute distress. Mental Status: Oriented to time, place and person. Mood and affect appropriate. Cranial Nerves:   Intact visual fields. EOM's full, no nystagmus, no ptosis. Facial movement is symmetric. Hearing is normal bilaterally. Tongue is midline. Motor:   Moves all extremities. Normal bulk . No fasciculations. Reflexes:   Deferred. Sensory:   Deferred. Gait:   Not assessed. Tremor:    Mild tremor noted. Cerebellar:  No cerebellar signs present. Assesment  1. Seizure disorder ([facility]  Continue management    2. Partial symptomatic epilepsy with complex partial seizures, not intractable, without status epilepticus (HCC)  Stable    3. Gait disorder  Stable    4. Tremor  Stable    5. Intellectual disability  Stable    ___________________________________________________  PLAN: Medication and plan discussed with patient's caregiver. ICD-10-CM ICD-9-CM    1. Seizure disorder ([facility]  G40.909 345.90    2. Partial symptomatic epilepsy with complex partial seizures, not intractable, without status epilepticus ([facility]  G40.209 345.40    3. Gait disorder  R26.9 781.2    4. Tremor  R25.1 781.0    5. Intellectual disability  F79 319      Follow-up and Dispositions    · Return in about 1 year (around 2/26/2022).                ___________________________________________________    Attending Physician: Nydia Reina MD

## 2021-02-26 NOTE — LETTER
2/26/2021 12:16 PM 
 
Mr. Daisy Lomaxy [facility] [id number] Seizure protocol: If seizure happens: 
Roll patient onto his side to prevent him from choking on vomit or saliva Cushion his head Losing his collar so as to breath freely Maintain clear airway Do not attempt to restrict Do not put anything in the mouth Remove sharp or solid object that patient might come in contact with you Time the seizure If the seizure lasts more than 5 minutes or patient remain unconscious for more than 5 minutes or there is back-to-back seizure without regaining consciousness, stay calm and called the ambulance. Sincerely, Tony Howell MD

## 2021-03-01 ENCOUNTER — ANTI-COAG VISIT (OUTPATIENT)
Dept: CARDIOLOGY CLINIC | Age: 76
End: 2021-03-01
Payer: MEDICARE

## 2021-03-01 DIAGNOSIS — I48.91 ATRIAL FIBRILLATION, UNSPECIFIED TYPE (HCC): ICD-10-CM

## 2021-03-01 DIAGNOSIS — I51.3 LEFT ATRIAL THROMBUS: ICD-10-CM

## 2021-03-01 DIAGNOSIS — Z79.01 LONG TERM (CURRENT) USE OF ANTICOAGULANTS: Primary | ICD-10-CM

## 2021-03-01 LAB
INR BLD: 2.5 (ref 1–1.5)
PT POC: 30.3 SECONDS (ref 9.1–12)
VALID INTERNAL CONTROL?: YES

## 2021-03-01 PROCEDURE — 85610 PROTHROMBIN TIME: CPT | Performed by: INTERNAL MEDICINE

## 2021-03-01 NOTE — PROGRESS NOTES
A full discussion of the nature of anticoagulants has been carried out. A benefit risk analysis has been presented to the patient, so that they understand the justification for choosing anticoagulation at this time. The need for frequent and regular monitoring, precise dosage adjustment and compliance is stressed. Side effects of potential bleeding are discussed. The patient should avoid any OTC items containing aspirin, naproxen or ibuprofen, and should avoid great swings in general diet. Avoid alcohol consumption. Advised to notify the office if antibiotic or steroid therapy is initiated. Call if any signs of abnormal bleeding are present.   Next PT/INR test in 1 week

## 2021-03-02 DIAGNOSIS — G40.909 SEIZURE DISORDER (HCC): ICD-10-CM

## 2021-03-03 RX ORDER — LACOSAMIDE 100 MG/1
TABLET, FILM COATED ORAL
Qty: 56 TAB | Refills: 5 | Status: SHIPPED | OUTPATIENT
Start: 2021-03-03 | End: 2021-08-31

## 2021-03-09 ENCOUNTER — ANTI-COAG VISIT (OUTPATIENT)
Dept: CARDIOLOGY CLINIC | Age: 76
End: 2021-03-09
Payer: MEDICARE

## 2021-03-09 ENCOUNTER — CLINICAL SUPPORT (OUTPATIENT)
Dept: CARDIOLOGY CLINIC | Age: 76
End: 2021-03-09
Payer: MEDICARE

## 2021-03-09 ENCOUNTER — OFFICE VISIT (OUTPATIENT)
Dept: CARDIOLOGY CLINIC | Age: 76
End: 2021-03-09
Payer: MEDICARE

## 2021-03-09 VITALS
HEART RATE: 76 BPM | BODY MASS INDEX: 30.75 KG/M2 | RESPIRATION RATE: 16 BRPM | HEIGHT: 72 IN | WEIGHT: 227 LBS | DIASTOLIC BLOOD PRESSURE: 70 MMHG | SYSTOLIC BLOOD PRESSURE: 104 MMHG | OXYGEN SATURATION: 97 %

## 2021-03-09 DIAGNOSIS — Z98.890 S/P ABLATION OF ATRIAL FIBRILLATION: ICD-10-CM

## 2021-03-09 DIAGNOSIS — Z86.79 S/P ABLATION OF ATRIAL FIBRILLATION: ICD-10-CM

## 2021-03-09 DIAGNOSIS — I48.91 ATRIAL FIBRILLATION, UNSPECIFIED TYPE (HCC): ICD-10-CM

## 2021-03-09 DIAGNOSIS — I44.2 CHB (COMPLETE HEART BLOCK) (HCC): ICD-10-CM

## 2021-03-09 DIAGNOSIS — I10 ESSENTIAL HYPERTENSION: Chronic | ICD-10-CM

## 2021-03-09 DIAGNOSIS — I48.11 LONGSTANDING PERSISTENT ATRIAL FIBRILLATION (HCC): Primary | ICD-10-CM

## 2021-03-09 DIAGNOSIS — Z95.0 CARDIAC PACEMAKER IN SITU: ICD-10-CM

## 2021-03-09 DIAGNOSIS — Z79.01 LONG TERM (CURRENT) USE OF ANTICOAGULANTS: Primary | ICD-10-CM

## 2021-03-09 DIAGNOSIS — Z95.0 CARDIAC PACEMAKER IN SITU: Primary | ICD-10-CM

## 2021-03-09 LAB
INR BLD: 3 (ref 1–1.5)
PT POC: 36.5 SECONDS (ref 9.1–12)
VALID INTERNAL CONTROL?: YES

## 2021-03-09 PROCEDURE — G8752 SYS BP LESS 140: HCPCS | Performed by: INTERNAL MEDICINE

## 2021-03-09 PROCEDURE — 93288 INTERROG EVL PM/LDLS PM IP: CPT | Performed by: INTERNAL MEDICINE

## 2021-03-09 PROCEDURE — G8510 SCR DEP NEG, NO PLAN REQD: HCPCS | Performed by: INTERNAL MEDICINE

## 2021-03-09 PROCEDURE — 99215 OFFICE O/P EST HI 40 MIN: CPT | Performed by: INTERNAL MEDICINE

## 2021-03-09 PROCEDURE — 85610 PROTHROMBIN TIME: CPT | Performed by: INTERNAL MEDICINE

## 2021-03-09 PROCEDURE — G8754 DIAS BP LESS 90: HCPCS | Performed by: INTERNAL MEDICINE

## 2021-03-09 PROCEDURE — 3017F COLORECTAL CA SCREEN DOC REV: CPT | Performed by: INTERNAL MEDICINE

## 2021-03-09 PROCEDURE — 93005 ELECTROCARDIOGRAM TRACING: CPT | Performed by: INTERNAL MEDICINE

## 2021-03-09 PROCEDURE — G8427 DOCREV CUR MEDS BY ELIG CLIN: HCPCS | Performed by: INTERNAL MEDICINE

## 2021-03-09 PROCEDURE — G0463 HOSPITAL OUTPT CLINIC VISIT: HCPCS | Performed by: INTERNAL MEDICINE

## 2021-03-09 PROCEDURE — G8536 NO DOC ELDER MAL SCRN: HCPCS | Performed by: INTERNAL MEDICINE

## 2021-03-09 PROCEDURE — G8417 CALC BMI ABV UP PARAM F/U: HCPCS | Performed by: INTERNAL MEDICINE

## 2021-03-09 PROCEDURE — 1101F PT FALLS ASSESS-DOCD LE1/YR: CPT | Performed by: INTERNAL MEDICINE

## 2021-03-09 NOTE — PROGRESS NOTES
Chief Complaint   Patient presents with    Follow-up    Hypertension    Irregular Heart Beat     1. Have you been to the ER, urgent care clinic since your last visit? No Hospitalized since your last visit? No    2. Have you seen or consulted any other health care providers outside of the [address] [place] [address] since your last visit? No  Include any pap smears or colon screening. NO    Patient has received his covid shots doesn't have the card.

## 2021-03-09 NOTE — PROGRESS NOTES
ELECTROPHYSIOLOGY        Subjective:      Cecy Samayoa is a 78 y.o. male is here for EP follow up. He denies chest pain, pressure, dyspnea and lower extremity edema. Ayla Quinn  is on [facility], reports no melena, hematuria, or obvious signs of bleeding. No falls.        Patient Active Problem List    Diagnosis Date Noted    Hypertension 10/19/2015     Priority: 3 - Three     Class: Chronic    S/P ablation of atrial fibrillation 11/13/2020    Atrial fibrillation ([address] 11/12/2020    Persistent atrial fibrillation ([address] 11/12/2020    Lymphedema of both lower extremities 10/15/2018    Vitamin D deficiency 10/15/2018    Vitamin B12 deficiency 10/15/2018    History of closed Colles' fracture 10/15/2018    H/O Mobitz type II block 10/15/2018    S/P biventricular cardiac pacemaker procedure 10/23/2015    Seizure disorder ([address] 10/20/2015     Class: Chronic    Intellectual disability 10/20/2015     Class: Chronic    Advance care planning 10/20/2015    Mobitz type II incomplete atrioventricular block 10/19/2015    Bilateral lower extremity edema 10/17/2015    Bradycardia 08/27/2012      Jeffrey Del Rio MD  Past Medical History:   Diagnosis Date   [facility]) 2015    pacemaker L chest     CAD (coronary artery disease) 2015    Pacemaker    Epilepsy ([address]     Heart disease     Hypertension     Incontinence     Leg swelling     Mental retardation, 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)     Other ill-defined conditions(799.89)     edema legs    S/P ablation of atrial fibrillation 11/13/2020    S/P biventricular cardiac pacemaker procedure 10/23/2015    10/23/15 [employer] biventricular pacemaker inmplant    Screen for colon cancer 9/27/2012      Past Surgical History:   Procedure Laterality Date    HX COLONOSCOPY  04/05/2017    HX ORTHOPAEDIC Right 12/22/2017    ORIF right distal radius    HX PACEMAKER  2015    bi ventricular pacemaker     INTRACARD ECHO, THER/DX INTERVENT N/A 11/13/2020 Intracardiac Echocardiogram performed by Nicol Yu MD at [facility] CARD[facility]AC CATH LAB    TX ABLATE L/R ATRIAL FIBRIL W/ISOLATED PULM VEIN N/A 11/13/2020    Ablation Following A-Fib  Addl performed by Nicol Yu MD at [facility] CARDIAC CATH LAB    TX EPHYS EVL TRNSPTL TX ATRIAL FIB ISOLAT PULM VEIN N/A 11/13/2020    ABLATION A-FIB  W COMPLETE EP STUDY performed by Nicol Yu MD at [facility] CARDIAC CATH LAB    TX INTRACARDIAC ELECTROPHYSIOLOGIC 3D MAPPING N/A 11/13/2020    Ep 3d Mapping performed by Nicol Yu MD at [facility] CARDIAC CATH LAB     Allergies   Allergen Reactions    Sulfa (Sulfonamide Antibiotics) Rash     Patient denies      Family History   Problem Relation Age of Onset    Other Other         unable to obtain due to mental status    Cancer Mother         unsure type    Heart Attack Father     Heart Disease Father     Lung Disease Father     No Known Problems Sister     negative for cardiac disease  Social History     Socioeconomic History    Marital status: SINGLE     Spouse name: Not on file    Number of children: Not on file    Years of education: Not on file    Highest education level: Not on file   Tobacco Use    Smoking status: Never Smoker    Smokeless tobacco: Never Used   Substance and Sexual Activity    Alcohol use: No    Drug use: No    Sexual activity: Not Currently   Other Topics Concern     Current Outpatient Medications   Medication Sig    Vimpat 100 mg tab tablet TAKE 1 TABLET BY MOUTH TWICE A DAY    levETIRAcetam 1,000 mg tablet TAKE 1 TABLET BY MOUTH TWICE A DAY    cholecalciferol (VITAMIN D3) (1000 Units /25 mcg) tablet TAKE 1 TABLET BY MOUTH DAILY    warfarin (COUMADIN) 5 mg tablet TAKE 1 TABLET BY MOUTH IN THE EVENING ON MONDAY, TUESDAY, WEDNESDAY &THURSDAY TAKE 2 TABLETS BY MOUTH IN THE EVENING ON FRIDAY, SATURDAY AN    amiodarone (CORDARONE) 200 mg tablet Take 1 Tab by mouth daily.     acetaminophen (TYLENOL) 325 mg tablet Take 2 Tabs by mouth every four (4) hours as needed for Pain.  potassium chloride (K-DUR, KLOR-CON) 20 mEq tablet TAKE 1 TABLET BY MOUTH DAILY    bumetanide (BUMEX) 2 mg tablet TAKE 1 TABLET BY MOUTH DAILY    warfarin (COUMADIN) 2.5 mg tablet 10 mg. On Fri, Sat, Sund    metoprolol tartrate (LOPRESSOR) 25 mg tablet TAKE 1 TABLET BY MOUTH TWICE A DAY     No current facility-administered medications for this visit. Vitals:    21 1038   BP: 104/70   Pulse: 76   Resp: 16   SpO2: 97%   Weight: 227 lb (103 kg)   Height: 6' (1.829 m)       I have reviewed the nurses notes, vitals, problem list, allergy list, medical history, family, social history and medications. Review of Symptoms:    General: Pt denies excessive weight gain or loss. Pt is able to conduct ADL's  HEENT: Denies blurred vision, headaches, epistaxis and difficulty swallowing. Respiratory: Denies shortness of breath, SCHWARZ, wheezing or stridor. Cardiovascular: Denies precordial pain, palpitations, edema or PND  Gastrointestinal: Denies poor appetite, indigestion, abdominal pain or blood in stool  Urinary: Denies dysuria, pyuria  Musculoskeletal: Denies pain or swelling from muscles or joints  Neurologic: Denies tremor, paresthesias, or sensory motor disturbance  Skin: Denies rash, itching or texture change. Psych: Denies depression      Physical Exam:      General: Well developed, in no acute distress. HEENT: Eyes - PERRL  Heart:  Normal S1/S2 negative S3 or S4. Regular, no murmur  Respiratory: Clear bilaterally x 4, no wheezing or rales  Extremities:  Non-pitting edema, no cyanosis. Musculoskeletal: No clubbing  Neuro: A&Ox3, speech clear  Skin: No visible rashes or lesions. Non diaphoretic.  No visible ulcers  Vascular: 2+ pulses symmetric in all extremities  Psych - judgement intact and orientation is wnl       Cardiographics    Ek21  V paced    Results for orders placed or performed during the hospital encounter of 20   EKG, 12 LEAD, INITIAL   Result Value Ref Range    Ventricular Rate 76 BPM    Atrial Rate 76 BPM    P-R Interval 306 ms    QRS Duration 200 ms    Q-T Interval 456 ms    QTC Calculation (Bezet) 513 ms    Calculated R Axis -85 degrees    Calculated T Axis 87 degrees    Diagnosis       AV dual-paced rhythm with prolonged AV conduction  When compared with ECG of 12-NOV-2020 16:09,  No significant change    Confirmed by Ken Dumotn ([id number]) on 11/14/2020 11:00:26 AM           Lab Results   Component Value Date/Time    WBC 11.9 (H) 11/15/2020 06:20 AM    Hemoglobin (POC) 12.7 12/22/2017 11:23 AM    HGB 13.3 11/15/2020 06:20 AM    HCT 40.3 11/15/2020 06:20 AM    PLATELET 081 (L) 50/80/2189 06:20 AM    MCV 98.5 11/15/2020 06:20 AM      Lab Results   Component Value Date/Time    Sodium 136 11/15/2020 06:20 AM    Potassium 4.2 11/15/2020 06:20 AM    Chloride 104 11/15/2020 06:20 AM    CO2 28 11/15/2020 06:20 AM    Anion gap 4 (L) 11/15/2020 06:20 AM    Glucose 122 (H) 11/15/2020 06:20 AM    BUN 23 (H) 11/15/2020 06:20 AM    Creatinine 0.84 11/15/2020 06:20 AM    BUN/Creatinine ratio 27 (H) 11/15/2020 06:20 AM    GFR est AA >60 11/15/2020 06:20 AM    GFR est non-AA >60 11/15/2020 06:20 AM    Calcium 9.2 11/15/2020 06:20 AM    Bilirubin, total 0.6 11/13/2020 07:28 PM    Alk. phosphatase 89 11/13/2020 07:28 PM    Protein, total 7.0 11/13/2020 07:28 PM    Albumin 3.3 (L) 11/13/2020 07:28 PM    Globulin 3.7 11/13/2020 07:28 PM    A-G Ratio 0.9 (L) 11/13/2020 07:28 PM    ALT (SGPT) 19 11/13/2020 07:28 PM      Lab Results   Component Value Date/Time    TSH 1.21 09/24/2020 10:39 AM           Assessment:           ICD-10-CM ICD-9-CM    1. Longstanding persistent atrial fibrillation (HCC)  I48.11 427.31    2. Essential hypertension  I10 401.9 AMB POC EKG ROUTINE W/ 12 LEADS, INTER & REP   3. S/P ablation of atrial fibrillation  Z98.890 V45.89 AMB POC EKG ROUTINE W/ 12 LEADS, INTER & REP    Z86.79     4.  Cardiac pacemaker in situ  Z95.0 V45.01    5. CHB (complete heart block) (HCC)  I44.2 426.0      Orders Placed This Encounter    AMB POC EKG ROUTINE W/ 12 LEADS, INTER & REP     Order Specific Question:   Reason for Exam:     Answer:   routine        Plan:     Mr Vinicius Nicolas is s/p hybrid ablation 11/13/2020 with PVI of all 4 PVs,  Additional RFA of roof line and Additional RFA of anterior line. Device interrogation with no events since ablation. He is 68% AP and 97%RVP. No events since 11/25/20. He will follow up in 6 mo.      Continue medical management for AF, BMI 31 and HTN. Addressed all patient questions and concerns at this visit. Continue medical management for AF. Discussed side effects, efficacy and good medication compliance with pt re: warfarin. Followed in our coumadin clinic. Thank you for allowing me to participate in Wanda Sparks 's care. Josephine Mayo NP    Patient seen and examined by me with nurse practitioner. I personally performed all components of the history, physical, and medical decision making and agree with the assessment and plan with minor modifications as noted. V paced for chb. In sinus sp hybrid af abl. Cont oac. Cont med rx for htn. F/u in 6 months    During this visit,  the patient and I had a comprehensive discussion of device management using principles of shared decision making. This included a discussion of anti-arrhythmic medications including class III agents (e.g. amiodarone, sotalol etc) and both class II agents (beta-blockers). We reviewed the potentially life-threatening side effects of these medications, including (but not limited to) fatal tachyarrhythmias, pulmonary toxicity, liver toxicity and thyroid disorders. We also reviewed the requisite monitoring required of some of these medications. In addition, we reviewed device therapy, including the potential risks and benefits of device management.  These risks include death, myocardial infarction, stroke, cardiac perforation, vascular injury, injury, pacing induced cardiomyopathy, inappropriate shocks (defibrillator) and other less severe complications. The patient demonstrated a clear understanding of these issues during out discussion. Our plans, determined together after thorough consideration, are outlined else where in this note.         Ena Blandon MD, Juan Manuel Manning

## 2021-03-09 NOTE — LETTER
3/9/2021 Patient: Ovidio Quinn YOB: 1945 Date of Visit: 3/9/2021 Kaitlin Coughlin MD 
[address] [id number] [employer] Dear Kaitlin Coughlin MD, Thank you for referring Mr. Tamar Cain to [address] [place] [address] [facility]ima for evaluation. My notes for this consultation are attached. If you have questions, please do not hesitate to call me. I look forward to following your patient along with you. Sincerely, Yves Thomas MD

## 2021-03-22 ENCOUNTER — ANTI-COAG VISIT (OUTPATIENT)
Dept: CARDIOLOGY CLINIC | Age: 76
End: 2021-03-22
Payer: MEDICARE

## 2021-03-22 DIAGNOSIS — Z79.01 LONG TERM (CURRENT) USE OF ANTICOAGULANTS: Primary | ICD-10-CM

## 2021-03-22 DIAGNOSIS — I48.11 LONGSTANDING PERSISTENT ATRIAL FIBRILLATION (HCC): ICD-10-CM

## 2021-03-22 LAB
INR BLD: 2.2 (ref 1–1.5)
PT POC: 26.9 SECONDS (ref 9.1–12)
VALID INTERNAL CONTROL?: YES

## 2021-03-22 PROCEDURE — 85610 PROTHROMBIN TIME: CPT | Performed by: INTERNAL MEDICINE

## 2021-03-22 NOTE — PROGRESS NOTES
A full discussion of the nature of anticoagulants has been carried out. A benefit risk analysis has been presented to the patient, so that they understand the justification for choosing anticoagulation at this time. The need for frequent and regular monitoring, precise dosage adjustment and compliance is stressed. Side effects of potential bleeding are discussed. The patient should avoid any OTC items containing aspirin, naproxen or ibuprofen, and should avoid great swings in general diet. Avoid alcohol consumption. Advised to notify the office if antibiotic or steroid therapy is initiated. Call if any signs of abnormal bleeding are present. Next PT/INR test in 3 weeks. Spoke to Joanne at [facility] to inform them of pt's dose.

## 2021-04-12 ENCOUNTER — ANTI-COAG VISIT (OUTPATIENT)
Dept: CARDIOLOGY CLINIC | Age: 76
End: 2021-04-12
Payer: MEDICARE

## 2021-04-12 DIAGNOSIS — Z79.01 LONG TERM (CURRENT) USE OF ANTICOAGULANTS: Primary | ICD-10-CM

## 2021-04-12 DIAGNOSIS — I48.11 LONGSTANDING PERSISTENT ATRIAL FIBRILLATION (HCC): ICD-10-CM

## 2021-04-12 LAB
INR BLD: 2.2 (ref 1–1.5)
PT POC: 26.8 SECONDS (ref 9.1–12)
VALID INTERNAL CONTROL?: YES

## 2021-04-12 PROCEDURE — 85610 PROTHROMBIN TIME: CPT | Performed by: INTERNAL MEDICINE

## 2021-04-22 ENCOUNTER — TELEPHONE (OUTPATIENT)
Dept: FAMILY MEDICINE CLINIC | Age: 76
End: 2021-04-22

## 2021-04-22 NOTE — TELEPHONE ENCOUNTER
----- Message from Anitha Sanz sent at 4/21/2021  2:07 PM EDT -----  Regarding: Dr. Chance Juárez: [phone]  General Message/Vendor Calls    Caller's first and last name: Ms. Ventura Hands with [facility]       Reason for call: Need documents       Callback required yes/no and why: Yes, to confirm what she needs       Best contact number(s): [phone]      Details to clarify the request: Needing a copy of pt clinical notes from 11/30/20 and a copy of his PPD for their records. It can be faxed to [phone].       Tiara L Toussaint

## 2021-05-18 ENCOUNTER — ANTI-COAG VISIT (OUTPATIENT)
Dept: CARDIOLOGY CLINIC | Age: 76
End: 2021-05-18
Payer: MEDICARE

## 2021-05-18 DIAGNOSIS — I48.91 ATRIAL FIBRILLATION, UNSPECIFIED TYPE (HCC): ICD-10-CM

## 2021-05-18 DIAGNOSIS — Z79.01 LONG TERM (CURRENT) USE OF ANTICOAGULANTS: Primary | ICD-10-CM

## 2021-05-18 LAB
INR BLD: 2.4 (ref 1–1.5)
PT POC: 29.4 SECONDS (ref 9.1–12)
VALID INTERNAL CONTROL?: YES

## 2021-05-18 PROCEDURE — 85610 PROTHROMBIN TIME: CPT | Performed by: INTERNAL MEDICINE

## 2021-05-18 RX ORDER — WARFARIN SODIUM 5 MG/1
TABLET ORAL
Qty: 30 TAB | Refills: 3 | Status: SHIPPED | OUTPATIENT
Start: 2021-05-18 | End: 2021-10-01

## 2021-05-29 RX ORDER — AMIODARONE HYDROCHLORIDE 200 MG/1
TABLET ORAL
Qty: 31 TABLET | Refills: 12 | Status: SHIPPED | OUTPATIENT
Start: 2021-05-29 | End: 2021-09-14

## 2021-06-14 ENCOUNTER — ANTI-COAG VISIT (OUTPATIENT)
Dept: CARDIOLOGY CLINIC | Age: 76
End: 2021-06-14
Payer: MEDICARE

## 2021-06-14 DIAGNOSIS — I48.91 ATRIAL FIBRILLATION, UNSPECIFIED TYPE (HCC): ICD-10-CM

## 2021-06-14 DIAGNOSIS — Z79.01 LONG TERM (CURRENT) USE OF ANTICOAGULANTS: Primary | ICD-10-CM

## 2021-06-14 LAB
INR BLD: 2.1 (ref 1–1.5)
PT POC: 24.9 SECONDS (ref 9.1–12)
VALID INTERNAL CONTROL?: YES

## 2021-06-14 PROCEDURE — 85610 PROTHROMBIN TIME: CPT | Performed by: INTERNAL MEDICINE

## 2021-06-15 RX ORDER — WARFARIN SODIUM 5 MG/1
TABLET ORAL
Qty: 43 TABLET | Refills: 11 | OUTPATIENT
Start: 2021-06-15

## 2021-06-30 RX ORDER — MELATONIN
Qty: 30 TABLET | Status: SHIPPED | OUTPATIENT
Start: 2021-06-30 | End: 2022-06-29

## 2021-07-13 ENCOUNTER — ANTI-COAG VISIT (OUTPATIENT)
Dept: CARDIOLOGY CLINIC | Age: 76
End: 2021-07-13
Payer: MEDICAID

## 2021-07-13 DIAGNOSIS — I48.91 ATRIAL FIBRILLATION, UNSPECIFIED TYPE (HCC): ICD-10-CM

## 2021-07-13 DIAGNOSIS — Z79.01 LONG TERM (CURRENT) USE OF ANTICOAGULANTS: Primary | ICD-10-CM

## 2021-07-13 LAB
INR BLD: 2.5 (ref 1–1.5)
PT POC: 30 SECONDS (ref 9.1–12)
VALID INTERNAL CONTROL?: YES

## 2021-07-13 PROCEDURE — 85610 PROTHROMBIN TIME: CPT | Performed by: INTERNAL MEDICINE

## 2021-08-20 ENCOUNTER — ANTI-COAG VISIT (OUTPATIENT)
Dept: CARDIOLOGY CLINIC | Age: 76
End: 2021-08-20
Payer: MEDICARE

## 2021-08-20 DIAGNOSIS — Z79.01 LONG TERM (CURRENT) USE OF ANTICOAGULANTS: Primary | ICD-10-CM

## 2021-08-20 DIAGNOSIS — I48.91 ATRIAL FIBRILLATION, UNSPECIFIED TYPE (HCC): ICD-10-CM

## 2021-08-20 LAB
INR BLD: 3 (ref 1–1.5)
PT POC: 36.3 SECONDS (ref 9.1–12)
VALID INTERNAL CONTROL?: YES

## 2021-08-20 PROCEDURE — 85610 PROTHROMBIN TIME: CPT | Performed by: INTERNAL MEDICINE

## 2021-08-30 DIAGNOSIS — G40.909 SEIZURE DISORDER (HCC): ICD-10-CM

## 2021-08-30 RX ORDER — METOPROLOL TARTRATE 25 MG/1
TABLET, FILM COATED ORAL
Qty: 62 TABLET | Refills: 11 | Status: SHIPPED | OUTPATIENT
Start: 2021-08-30 | End: 2022-08-03

## 2021-08-31 RX ORDER — LACOSAMIDE 100 MG/1
TABLET, FILM COATED ORAL
Qty: 60 TABLET | Refills: 11 | Status: SHIPPED | OUTPATIENT
Start: 2021-08-31 | End: 2022-02-03

## 2021-09-01 ENCOUNTER — TELEPHONE (OUTPATIENT)
Dept: CARDIOLOGY CLINIC | Age: 76
End: 2021-09-01

## 2021-09-01 NOTE — TELEPHONE ENCOUNTER
Patient is scheduled to have a deep cleaning an will need local anesthetic. Procedure may cause some bleeding. Requesting cardiac clearance as patient is on eliquis. Patient takes warfarin.

## 2021-09-01 NOTE — TELEPHONE ENCOUNTER
Patients caregiver called and said patient needs cardiac clearance for dental work that needs to be done @ [facility]. Dr. Lupe Diaz.  Please call [phone]    Thanks,  Donna Bui

## 2021-09-01 NOTE — LETTER
2021 11:45 AM    Mr. [phone]      Patient referenced above -  1945- is considered low risk for cardiac complications for upcoming procedures. Ok to hold coumadin, if needed, prior.          Sincerely,      Ivet Hartley

## 2021-09-02 NOTE — TELEPHONE ENCOUNTER
Spoke with Veronica at Dr Estrellita Gomez. Advised per Sayra Jang - Pt is considered low risk for cardiac complications for upcoming procedures. Ok to hold coumadin if needed prior. He is NOT on eliquis. Thanks.

## 2021-09-08 ENCOUNTER — TELEPHONE (OUTPATIENT)
Dept: FAMILY MEDICINE CLINIC | Age: 76
End: 2021-09-08

## 2021-09-08 DIAGNOSIS — K59.00 CONSTIPATION, UNSPECIFIED CONSTIPATION TYPE: Primary | ICD-10-CM

## 2021-09-08 NOTE — TELEPHONE ENCOUNTER
----- Message from [facility] sent at 9/8/2021  3:53 PM EDT -----  Regarding: / Refill  Caller (if not patient): Yeimy David              Relationship of caller (if not patient): Caregiver              Best contact number(s): [phone]              Name of medication and dosage if known: PRN Laxative as needed              Is patient out of this medication (yes/no):              Pharmacy name: [address] [facility] listed in chart? (yes/no):    Pharmacy phone number: [phone]              Details to clarify the request:   Claudio Parnell [phone]  As needed to treat pt    [facility]

## 2021-09-10 RX ORDER — POLYETHYLENE GLYCOL 3350 17 G/17G
17 POWDER, FOR SOLUTION ORAL
Qty: 14 EACH | Refills: 5 | Status: SHIPPED | OUTPATIENT
Start: 2021-09-10

## 2021-09-14 ENCOUNTER — OFFICE VISIT (OUTPATIENT)
Dept: CARDIOLOGY CLINIC | Age: 76
End: 2021-09-14

## 2021-09-14 ENCOUNTER — ANTI-COAG VISIT (OUTPATIENT)
Dept: CARDIOLOGY CLINIC | Age: 76
End: 2021-09-14
Payer: MEDICARE

## 2021-09-14 VITALS
HEART RATE: 76 BPM | SYSTOLIC BLOOD PRESSURE: 104 MMHG | DIASTOLIC BLOOD PRESSURE: 66 MMHG | RESPIRATION RATE: 18 BRPM | BODY MASS INDEX: 31.11 KG/M2 | OXYGEN SATURATION: 99 % | HEIGHT: 72 IN | WEIGHT: 229.7 LBS

## 2021-09-14 DIAGNOSIS — Z98.890 S/P ABLATION OF ATRIAL FIBRILLATION: ICD-10-CM

## 2021-09-14 DIAGNOSIS — Z79.01 LONG TERM (CURRENT) USE OF ANTICOAGULANTS: Primary | ICD-10-CM

## 2021-09-14 DIAGNOSIS — I44.2 CHB (COMPLETE HEART BLOCK) (HCC): ICD-10-CM

## 2021-09-14 DIAGNOSIS — Z86.79 S/P ABLATION OF ATRIAL FIBRILLATION: ICD-10-CM

## 2021-09-14 DIAGNOSIS — Z95.0 S/P BIVENTRICULAR CARDIAC PACEMAKER PROCEDURE: ICD-10-CM

## 2021-09-14 DIAGNOSIS — I48.91 ATRIAL FIBRILLATION, UNSPECIFIED TYPE (HCC): ICD-10-CM

## 2021-09-14 DIAGNOSIS — I42.0 DILATED CARDIOMYOPATHY (HCC): ICD-10-CM

## 2021-09-14 DIAGNOSIS — Z95.0 CARDIAC PACEMAKER IN SITU: Primary | ICD-10-CM

## 2021-09-14 DIAGNOSIS — Z79.01 LONG TERM (CURRENT) USE OF ANTICOAGULANTS: ICD-10-CM

## 2021-09-14 DIAGNOSIS — I48.11 LONGSTANDING PERSISTENT ATRIAL FIBRILLATION (HCC): Primary | ICD-10-CM

## 2021-09-14 LAB
INR BLD: 2.9 (ref 1–1.5)
PT POC: 35.2 SECONDS (ref 9.1–12)
VALID INTERNAL CONTROL?: YES

## 2021-09-14 PROCEDURE — G8536 NO DOC ELDER MAL SCRN: HCPCS | Performed by: INTERNAL MEDICINE

## 2021-09-14 PROCEDURE — 85610 PROTHROMBIN TIME: CPT | Performed by: INTERNAL MEDICINE

## 2021-09-14 PROCEDURE — G8752 SYS BP LESS 140: HCPCS | Performed by: INTERNAL MEDICINE

## 2021-09-14 PROCEDURE — G8417 CALC BMI ABV UP PARAM F/U: HCPCS | Performed by: INTERNAL MEDICINE

## 2021-09-14 PROCEDURE — 99214 OFFICE O/P EST MOD 30 MIN: CPT | Performed by: INTERNAL MEDICINE

## 2021-09-14 PROCEDURE — G8432 DEP SCR NOT DOC, RNG: HCPCS | Performed by: INTERNAL MEDICINE

## 2021-09-14 PROCEDURE — 1101F PT FALLS ASSESS-DOCD LE1/YR: CPT | Performed by: INTERNAL MEDICINE

## 2021-09-14 PROCEDURE — 93000 ELECTROCARDIOGRAM COMPLETE: CPT | Performed by: INTERNAL MEDICINE

## 2021-09-14 PROCEDURE — G8427 DOCREV CUR MEDS BY ELIG CLIN: HCPCS | Performed by: INTERNAL MEDICINE

## 2021-09-14 PROCEDURE — 93280 PM DEVICE PROGR EVAL DUAL: CPT | Performed by: INTERNAL MEDICINE

## 2021-09-14 PROCEDURE — 3017F COLORECTAL CA SCREEN DOC REV: CPT | Performed by: INTERNAL MEDICINE

## 2021-09-14 PROCEDURE — G8754 DIAS BP LESS 90: HCPCS | Performed by: INTERNAL MEDICINE

## 2021-09-14 RX ORDER — AMIODARONE HYDROCHLORIDE 200 MG/1
100 TABLET ORAL DAILY
Qty: 45 TABLET | Refills: 0 | Status: SHIPPED | OUTPATIENT
Start: 2021-09-14 | End: 2021-11-29

## 2021-09-14 NOTE — PROGRESS NOTES
Chief Complaint   Patient presents with    Irregular Heart Beat     6 month follow up - denies cardiac sx      1. Have you been to the ER, urgent care clinic since your last visit? Hospitalized since your last visit? No     2. Have you seen or consulted any other health care providers outside of the [address] [place] [address] since your last visit? Include any pap smears or colon screening.   No

## 2021-09-14 NOTE — PROGRESS NOTES
ELECTROPHYSIOLOGY        Subjective:      Fozia Ireland is a 78 y.o. male is here for EP follow up. Ramana Quinn  is on [facility] [place] [place], reports no melena, hematuria, or obvious signs of bleeding. No falls.          Patient Active Problem List    Diagnosis Date Noted    Hypertension 10/19/2015    S/P ablation of atrial fibrillation 11/13/2020    Atrial fibrillation ([address] 11/12/2020    Persistent atrial fibrillation ([address] 11/12/2020    Lymphedema of both lower extremities 10/15/2018    Vitamin D deficiency 10/15/2018    Vitamin B12 deficiency 10/15/2018    History of closed Colles' fracture 10/15/2018    H/O Mobitz type II block 10/15/2018    S/P biventricular cardiac pacemaker procedure 10/23/2015    Seizure disorder ([address] 10/20/2015    Intellectual disability 10/20/2015    Advance care planning 10/20/2015    Mobitz type II incomplete atrioventricular block 10/19/2015    Bilateral lower extremity edema 10/17/2015    Bradycardia 08/27/2012      Serge Michelle MD  Past Medical History:   Diagnosis Date   [facility]) 2015    pacemaker L chest     CAD (coronary artery disease) 2015    Pacemaker    Epilepsy ([address]     Heart disease     Hypertension     Incontinence     Leg swelling     Mental retardation, 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)     Other ill-defined conditions(799.89)     edema legs    S/P ablation of atrial fibrillation 11/13/2020    S/P biventricular cardiac pacemaker procedure 10/23/2015    10/23/15 [employer] biventricular pacemaker inmplant    Screen for colon cancer 9/27/2012      Past Surgical History:   Procedure Laterality Date    HX COLONOSCOPY  04/05/2017    HX ORTHOPAEDIC Right 12/22/2017    ORIF right distal radius    HX PACEMAKER  2015    bi ventricular pacemaker     INTRACARD ECHO, THER/DX INTERVENT N/A 11/13/2020    Intracardiac Echocardiogram performed by Tyesha Santos MD at [facility] CARDIAC CATH LAB    MO ABLATE L/R ATRIAL FIBRIL W/ISOLATED PULM VEIN N/A 11/13/2020    Ablation Following A-Fib  Addl performed by Emilee Chau MD at [facility] CARDIAC CATH LAB    VA EPHYS EVL TRNSPTL TX ATRIAL FIB ISOLAT PULM VEIN N/A 11/13/2020    ABLATION A-FIB  W COMPLETE EP STUDY performed by Emilee Chau MD at [facility] CARDIAC CATH LAB    VA INTRACARDIAC ELECTROPHYSIOLOGIC 3D MAPPING N/A 11/13/2020    Ep 3d Mapping performed by Emilee Chau MD at [facility] CARDIAC CATH LAB     Allergies   Allergen Reactions    Sulfa (Sulfonamide Antibiotics) Rash     Patient denies      Family History   Problem Relation Age of Onset    Other Other         unable to obtain due to mental status    Cancer Mother         unsure type    Heart Attack Father     Heart Disease Father     Lung Disease Father     No Known Problems Sister     negative for cardiac disease  Social History     Socioeconomic History    Marital status: SINGLE     Spouse name: Not on file    Number of children: Not on file    Years of education: Not on file    Highest education level: Not on file   Tobacco Use    Smoking status: Never Smoker    Smokeless tobacco: Never Used   Vaping Use    Vaping Use: Never used   Substance and Sexual Activity    Alcohol use: No    Drug use: No    Sexual activity: Not Currently   Other Topics Concern     Social Determinants of Health     Financial Resource Strain:     Difficulty of Paying Living Expenses:    Food Insecurity:     Worried About Running Out of Food in the Last Year:     Ran Out of Food in the Last Year:    Transportation Needs:     Lack of Transportation (Medical):      Lack of Transportation (Non-Medical):    Physical Activity:     Days of Exercise per Week:     Minutes of Exercise per Session:    Stress:     Feeling of Stress :    Social Connections:     Frequency of Communication with Friends and Family:     Frequency of Social Gatherings with Friends and Family:     Attends Christian Services:     Active Member of Clubs or Organizations:     Attends Club or Organization Meetings:     Marital Status:      Current Outpatient Medications   Medication Sig    amiodarone (CORDARONE) 200 mg tablet Take 0.5 Tablets by mouth daily. Lowered 09/14/21    Vimpat 100 mg tab tablet TAKE 1 TABLET BY MOUTH TWICE A DAY    metoprolol tartrate (LOPRESSOR) 25 mg tablet TAKE 1 TABLET BY MOUTH TWICE A DAY    cholecalciferol (VITAMIN D3) (1000 Units /25 mcg) tablet TAKE 1 TABLET BY MOUTH DAILY    warfarin (COUMADIN) 5 mg tablet Take 1/2 pill on Fridays and 1 pill all other days. (Patient taking differently: Take 5 mg by mouth daily. Except Fridays)    levETIRAcetam 1,000 mg tablet TAKE 1 TABLET BY MOUTH TWICE A DAY    potassium chloride (K-DUR, KLOR-CON) 20 mEq tablet TAKE 1 TABLET BY MOUTH DAILY    bumetanide (BUMEX) 2 mg tablet TAKE 1 TABLET BY MOUTH DAILY    warfarin (COUMADIN) 2.5 mg tablet Take 2.5 mg by mouth. On Fri    polyethylene glycol (MIRALAX) 17 gram packet Take 1 Packet by mouth daily as needed for Constipation.  acetaminophen (TYLENOL) 325 mg tablet Take 2 Tabs by mouth every four (4) hours as needed for Pain. No current facility-administered medications for this visit. Vitals:    09/14/21 1105   BP: 104/66   Pulse: 76   Resp: 18   SpO2: 99%   Weight: 229 lb 11.2 oz (104.2 kg)   Height: 6' (1.829 m)       I have reviewed the nurses notes, vitals, problem list, allergy list, medical history, family, social history and medications. Review of Symptoms:  11 systems reviewed, negative other than as stated in the HPI      Physical Exam:      General: Well developed, in no acute distress. HEENT: Eyes - PERRL  Heart:  Normal S1/S2 negative S3 or S4. Regular, no murmur  Respiratory: Clear bilaterally x 4, no wheezing or rales  Extremities:  No edema, no cyanosis. Musculoskeletal: No clubbing  Neuro: A&Ox3, speech clear  Skin: No visible rashes or lesions. Non diaphoretic.  No visible ulcers  Vascular: 2+ pulses symmetric in all extremities  Psych - judgement intact and orientation is wnl       Cardiographics    Ek21  V paced. Results for orders placed or performed during the hospital encounter of 20   EKG, 12 LEAD, INITIAL   Result Value Ref Range    Ventricular Rate 76 BPM    Atrial Rate 76 BPM    P-R Interval 306 ms    QRS Duration 200 ms    Q-T Interval 456 ms    QTC Calculation (Bezet) 513 ms    Calculated R Axis -85 degrees    Calculated T Axis 87 degrees    Diagnosis       AV dual-paced rhythm with prolonged AV conduction  When compared with ECG of 2020 16:09,  No significant change    Confirmed by Federico Magana ([id number]) on 2020 11:00:26 AM           Lab Results   Component Value Date/Time    WBC 11.9 (H) 11/15/2020 06:20 AM    Hemoglobin (POC) 12.7 2017 11:23 AM    HGB 13.3 11/15/2020 06:20 AM    HCT 40.3 11/15/2020 06:20 AM    PLATELET 074 (L)  06:20 AM    MCV 98.5 11/15/2020 06:20 AM      Lab Results   Component Value Date/Time    Sodium 136 11/15/2020 06:20 AM    Potassium 4.2 11/15/2020 06:20 AM    Chloride 104 11/15/2020 06:20 AM    CO2 28 11/15/2020 06:20 AM    Anion gap 4 (L) 11/15/2020 06:20 AM    Glucose 122 (H) 11/15/2020 06:20 AM    BUN 23 (H) 11/15/2020 06:20 AM    Creatinine 0.84 11/15/2020 06:20 AM    BUN/Creatinine ratio 27 (H) 11/15/2020 06:20 AM    GFR est AA >60 11/15/2020 06:20 AM    GFR est non-AA >60 11/15/2020 06:20 AM    Calcium 9.2 11/15/2020 06:20 AM    Bilirubin, total 0.6 2020 07:28 PM    Alk. phosphatase 89 2020 07:28 PM    Protein, total 7.0 2020 07:28 PM    Albumin 3.3 (L) 2020 07:28 PM    Globulin 3.7 2020 07:28 PM    A-G Ratio 0.9 (L) 2020 07:28 PM    ALT (SGPT) 19 2020 07:28 PM      Lab Results   Component Value Date/Time    TSH 1.21 2020 10:39 AM           Assessment:           ICD-10-CM ICD-9-CM    1.  Longstanding persistent atrial fibrillation (HCC)  I48.11 427.31 AMB POC EKG ROUTINE W/ 12 LEADS, INTER & REP   2. Dilated cardiomyopathy (HCC)  I42.0 425.4    3. S/P ablation of atrial fibrillation  Z98.890 V45.89     Z86.79     4. S/P biventricular cardiac pacemaker procedure  Z95.0 V45.01    5. Long term (current) use of anticoagulants  Z79.01 V58.61    6. BMI 30.0-30.9,adult  Z68.30 V85.30      Orders Placed This Encounter    AMB POC EKG ROUTINE W/ 12 LEADS, INTER & REP     Order Specific Question:   Reason for Exam:     Answer:   routine    amiodarone (CORDARONE) 200 mg tablet     Sig: Take 0.5 Tablets by mouth daily. Lowered 09/14/21     Dispense:  45 Tablet     Refill:  0        Plan:     Mr Wilver Ballard is here for follow up Biv PPM  (10/23/15) and Hybrid ablation (PVI of all 4 PVs, Additional RFA of roof line, Additional RFA of anterior line) 11/13/20. He is 99%AP and ((% RVP (LV lead is off). No events noted. Battery remaining is  > 5 years. Will lower his amiodarone to 100mg. During this visit,  the patient and I had a comprehensive discussion of device management using principles of shared decision making. we reviewed device therapy, including the potential risks and benefits of device management. These risks include death, myocardial infarction, stroke, cardiac perforation, vascular injury, injury, pacing induced cardiomyopathy, inappropriate shocks (defibrillator) and other less severe complications. The patient demonstrated a clear understanding of these issues during out discussion. Our plans, determined together after thorough consideration, are outlined else where in this note. Follow up in 1 year. Addressed all patient questions and concerns at this visit. Thank you for allowing me to participate in [facility] 's care. Silke London NP    Patient seen and examined by me with nurse practitioner. I personally performed all components of the history, physical, and medical decision making and agree with the assessment and plan with minor modifications as noted.  Av paced for sick sinus and chb. No AF sp hybrid. Will reduce amio to 100 mg daily. Cont med rx for htn and f/u in one year. During this visit,  the patient and I had a comprehensive discussion of arrhythmia management using principles of shared decision making. This included a discussion of anti-arrhythmic medications including class I agents (e.g. flecainide) and class III agents (e.g. amiodarone, sotalol etc) and both class II and IV agents (beta-blockers and calcium channel blockers). We reviewed the potentially life-threatening side effects of these medications, including (but not limited to) fatal tachyarrhythmias, pulmonary toxicity, liver toxicity and thyroid disorders. We also reviewed the requisite monitoring required of some of these medications. In addition, we reviewed ablative therapy, including the potential benefits and risks of catheter ablation. These risks include death, myocardial infarction, stroke, cardiac perforation, vascular injury, and other less severe complications. The patient demonstrated a clear understanding of these issues during out discussion. Our plans, determined together after thorough consideration, are outlined else where in this note. Eleazar Blackburn MD, [facility] [place], [facility]          On this date 09/14/2021 I have spent 31 minutes reviewing previous notes, test results and face to face with the patient discussing the diagnosis and importance of compliance with the treatment plan as well as documenting on the day of the visit.

## 2021-09-14 NOTE — LETTER
9/14/2021    Patient: Margarito Nails Enroughty   YOB: 1945   Date of Visit: 9/14/2021     [address] [place]    Dear Lavonne Garza MD,      Thank you for referring Mr. Jamel Ch to [facility] for evaluation. My notes for this consultation are attached. If you have questions, please do not hesitate to call me. I look forward to following your patient along with you.       Sincerely,    Armen Thomas MD

## 2021-10-04 RX ORDER — WARFARIN SODIUM 5 MG/1
TABLET ORAL
Qty: 26 TABLET | Refills: 3 | Status: SHIPPED | OUTPATIENT
Start: 2021-10-04 | End: 2022-01-14

## 2021-10-04 RX ORDER — WARFARIN 2.5 MG/1
TABLET ORAL
Qty: 4 TABLET | Refills: 3 | Status: SHIPPED | OUTPATIENT
Start: 2021-10-04 | End: 2022-01-14

## 2021-10-18 ENCOUNTER — ANTI-COAG VISIT (OUTPATIENT)
Dept: CARDIOLOGY CLINIC | Age: 76
End: 2021-10-18
Payer: MEDICARE

## 2021-10-18 DIAGNOSIS — Z79.01 LONG TERM (CURRENT) USE OF ANTICOAGULANTS: Primary | ICD-10-CM

## 2021-10-18 DIAGNOSIS — I48.91 ATRIAL FIBRILLATION, UNSPECIFIED TYPE (HCC): ICD-10-CM

## 2021-10-18 LAB
INR BLD: 2.3 (ref 1–1.5)
PT POC: 27.6 SECONDS (ref 9.1–12)
VALID INTERNAL CONTROL?: YES

## 2021-10-18 PROCEDURE — 85610 PROTHROMBIN TIME: CPT | Performed by: INTERNAL MEDICINE

## 2021-11-18 ENCOUNTER — CLINICAL SUPPORT (OUTPATIENT)
Dept: FAMILY MEDICINE CLINIC | Age: 76
End: 2021-11-18
Payer: MEDICARE

## 2021-11-18 DIAGNOSIS — Z23 ENCOUNTER FOR IMMUNIZATION: Primary | ICD-10-CM

## 2021-11-18 PROCEDURE — G0008 ADMIN INFLUENZA VIRUS VAC: HCPCS | Performed by: FAMILY MEDICINE

## 2021-11-18 PROCEDURE — 90694 VACC AIIV4 NO PRSRV 0.5ML IM: CPT | Performed by: FAMILY MEDICINE

## 2021-11-29 RX ORDER — AMIODARONE HYDROCHLORIDE 200 MG/1
TABLET ORAL
Qty: 15 TABLET | Refills: 11 | Status: SHIPPED | OUTPATIENT
Start: 2021-11-29

## 2021-11-30 ENCOUNTER — LAB ONLY (OUTPATIENT)
Dept: FAMILY MEDICINE CLINIC | Age: 76
End: 2021-11-30

## 2021-11-30 ENCOUNTER — OFFICE VISIT (OUTPATIENT)
Dept: FAMILY MEDICINE CLINIC | Age: 76
End: 2021-11-30

## 2021-11-30 ENCOUNTER — TELEPHONE (OUTPATIENT)
Dept: FAMILY MEDICINE CLINIC | Age: 76
End: 2021-11-30

## 2021-11-30 DIAGNOSIS — E66.9 OBESITY, CLASS I, BMI 30-34.9: ICD-10-CM

## 2021-11-30 DIAGNOSIS — R73.9 HYPERGLYCEMIA: ICD-10-CM

## 2021-11-30 DIAGNOSIS — I10 PRIMARY HYPERTENSION: Chronic | ICD-10-CM

## 2021-11-30 DIAGNOSIS — Z79.899 ENCOUNTER FOR LONG-TERM (CURRENT) USE OF HIGH-RISK MEDICATION: ICD-10-CM

## 2021-11-30 DIAGNOSIS — E53.8 VITAMIN B12 DEFICIENCY: ICD-10-CM

## 2021-11-30 DIAGNOSIS — E55.9 VITAMIN D DEFICIENCY: ICD-10-CM

## 2021-11-30 DIAGNOSIS — Z11.59 ENCOUNTER FOR HEPATITIS C SCREENING TEST FOR LOW RISK PATIENT: ICD-10-CM

## 2021-11-30 DIAGNOSIS — I48.19 PERSISTENT ATRIAL FIBRILLATION (HCC): ICD-10-CM

## 2021-11-30 LAB
ERYTHROCYTE [DISTWIDTH] IN BLOOD BY AUTOMATED COUNT: 13.7 % (ref 11.5–14.5)
HCT VFR BLD AUTO: 40.8 % (ref 36.6–50.3)
HGB BLD-MCNC: 13.2 G/DL (ref 12.1–17)
MCH RBC QN AUTO: 32.7 PG (ref 26–34)
MCHC RBC AUTO-ENTMCNC: 32.4 G/DL (ref 30–36.5)
MCV RBC AUTO: 101 FL (ref 80–99)
NRBC # BLD: 0 K/UL (ref 0–0.01)
NRBC BLD-RTO: 0 PER 100 WBC
PLATELET # BLD AUTO: 151 K/UL (ref 150–400)
PMV BLD AUTO: 10.9 FL (ref 8.9–12.9)
RBC # BLD AUTO: 4.04 M/UL (ref 4.1–5.7)
WBC # BLD AUTO: 4.2 K/UL (ref 4.1–11.1)

## 2021-11-30 NOTE — TELEPHONE ENCOUNTER
Pt dropped off forms to be filled out. They can be filled out when he comes back for MW on 12/7.  I placed the paper copy in the black box also scanned into his chart under wellness forms

## 2021-12-01 LAB
25(OH)D3 SERPL-MCNC: 28.1 NG/ML (ref 30–100)
ALBUMIN SERPL-MCNC: 4 G/DL (ref 3.5–5)
ALBUMIN/GLOB SERPL: 1.1 {RATIO} (ref 1.1–2.2)
ALP SERPL-CCNC: 110 U/L (ref 45–117)
ALT SERPL-CCNC: 19 U/L (ref 12–78)
ANION GAP SERPL CALC-SCNC: 3 MMOL/L (ref 5–15)
AST SERPL-CCNC: 23 U/L (ref 15–37)
BILIRUB SERPL-MCNC: 0.7 MG/DL (ref 0.2–1)
BUN SERPL-MCNC: 23 MG/DL (ref 6–20)
BUN/CREAT SERPL: 25 (ref 12–20)
CALCIUM SERPL-MCNC: 9 MG/DL (ref 8.5–10.1)
CHLORIDE SERPL-SCNC: 104 MMOL/L (ref 97–108)
CHOLEST SERPL-MCNC: 174 MG/DL
CO2 SERPL-SCNC: 31 MMOL/L (ref 21–32)
CREAT SERPL-MCNC: 0.93 MG/DL (ref 0.7–1.3)
EST. AVERAGE GLUCOSE BLD GHB EST-MCNC: 103 MG/DL
GLOBULIN SER CALC-MCNC: 3.7 G/DL (ref 2–4)
GLUCOSE SERPL-MCNC: 90 MG/DL (ref 65–100)
HBA1C MFR BLD: 5.2 % (ref 4–5.6)
HCV AB SERPL QL IA: NONREACTIVE
HDLC SERPL-MCNC: 64 MG/DL
HDLC SERPL: 2.7 {RATIO} (ref 0–5)
LDLC SERPL CALC-MCNC: 95.6 MG/DL (ref 0–100)
POTASSIUM SERPL-SCNC: 4 MMOL/L (ref 3.5–5.1)
PROT SERPL-MCNC: 7.7 G/DL (ref 6.4–8.2)
SODIUM SERPL-SCNC: 138 MMOL/L (ref 136–145)
TRIGL SERPL-MCNC: 72 MG/DL (ref ?–150)
TSH SERPL DL<=0.05 MIU/L-ACNC: 1.77 UIU/ML (ref 0.36–3.74)
VIT B12 SERPL-MCNC: 295 PG/ML (ref 193–986)
VLDLC SERPL CALC-MCNC: 14.4 MG/DL

## 2021-12-07 ENCOUNTER — OFFICE VISIT (OUTPATIENT)
Dept: FAMILY MEDICINE CLINIC | Age: 76
End: 2021-12-07

## 2021-12-07 ENCOUNTER — ANTI-COAG VISIT (OUTPATIENT)
Dept: CARDIOLOGY CLINIC | Age: 76
End: 2021-12-07
Payer: MEDICARE

## 2021-12-07 VITALS
BODY MASS INDEX: 31.26 KG/M2 | TEMPERATURE: 98 F | HEART RATE: 75 BPM | OXYGEN SATURATION: 97 % | WEIGHT: 230.8 LBS | DIASTOLIC BLOOD PRESSURE: 58 MMHG | HEIGHT: 72 IN | SYSTOLIC BLOOD PRESSURE: 93 MMHG

## 2021-12-07 DIAGNOSIS — Z00.00 MEDICARE ANNUAL WELLNESS VISIT, SUBSEQUENT: ICD-10-CM

## 2021-12-07 DIAGNOSIS — Z12.11 SCREEN FOR COLON CANCER: ICD-10-CM

## 2021-12-07 DIAGNOSIS — Z12.5 SCREENING FOR MALIGNANT NEOPLASM OF PROSTATE: ICD-10-CM

## 2021-12-07 DIAGNOSIS — Z79.01 LONG TERM (CURRENT) USE OF ANTICOAGULANTS: Primary | ICD-10-CM

## 2021-12-07 DIAGNOSIS — I48.91 ATRIAL FIBRILLATION, UNSPECIFIED TYPE (HCC): ICD-10-CM

## 2021-12-07 DIAGNOSIS — R35.0 URINARY FREQUENCY: ICD-10-CM

## 2021-12-07 DIAGNOSIS — Z11.1 TUBERCULOSIS SCREENING: Primary | ICD-10-CM

## 2021-12-07 DIAGNOSIS — R94.120 FAILED HEARING SCREENING: ICD-10-CM

## 2021-12-07 LAB
INR BLD: 1.5 (ref 1–1.5)
PT POC: 17.4 SECONDS (ref 9.1–12)
VALID INTERNAL CONTROL?: YES

## 2021-12-07 PROCEDURE — G8427 DOCREV CUR MEDS BY ELIG CLIN: HCPCS | Performed by: STUDENT IN AN ORGANIZED HEALTH CARE EDUCATION/TRAINING PROGRAM

## 2021-12-07 PROCEDURE — 85610 PROTHROMBIN TIME: CPT | Performed by: INTERNAL MEDICINE

## 2021-12-07 PROCEDURE — G8510 SCR DEP NEG, NO PLAN REQD: HCPCS | Performed by: STUDENT IN AN ORGANIZED HEALTH CARE EDUCATION/TRAINING PROGRAM

## 2021-12-07 PROCEDURE — G8752 SYS BP LESS 140: HCPCS | Performed by: STUDENT IN AN ORGANIZED HEALTH CARE EDUCATION/TRAINING PROGRAM

## 2021-12-07 PROCEDURE — 3017F COLORECTAL CA SCREEN DOC REV: CPT | Performed by: STUDENT IN AN ORGANIZED HEALTH CARE EDUCATION/TRAINING PROGRAM

## 2021-12-07 PROCEDURE — G8754 DIAS BP LESS 90: HCPCS | Performed by: STUDENT IN AN ORGANIZED HEALTH CARE EDUCATION/TRAINING PROGRAM

## 2021-12-07 PROCEDURE — 1101F PT FALLS ASSESS-DOCD LE1/YR: CPT | Performed by: STUDENT IN AN ORGANIZED HEALTH CARE EDUCATION/TRAINING PROGRAM

## 2021-12-07 PROCEDURE — G8536 NO DOC ELDER MAL SCRN: HCPCS | Performed by: STUDENT IN AN ORGANIZED HEALTH CARE EDUCATION/TRAINING PROGRAM

## 2021-12-07 PROCEDURE — G0439 PPPS, SUBSEQ VISIT: HCPCS | Performed by: STUDENT IN AN ORGANIZED HEALTH CARE EDUCATION/TRAINING PROGRAM

## 2021-12-07 PROCEDURE — G8417 CALC BMI ABV UP PARAM F/U: HCPCS | Performed by: STUDENT IN AN ORGANIZED HEALTH CARE EDUCATION/TRAINING PROGRAM

## 2021-12-07 NOTE — LETTER
12/11/2021    [address]. [address] [id number]      Dear Sherri Quinn    I have reviewed your results and have found the results listed below to be within normal ranges. Tuberculosis screen: Negative    My recommendations are as follows: Work on diet and exercise. Follow up with referrals. Please call if you have any questions [phone] .     Sincerely,      Deloria Severance, MD

## 2021-12-07 NOTE — PATIENT INSTRUCTIONS
Medicare Wellness Visit, Male    The best way to live healthy is to have a lifestyle where you eat a well-balanced diet, exercise regularly, limit alcohol use, and quit all forms of tobacco/nicotine, if applicable. Regular preventive services are another way to keep healthy. Preventive services (vaccines, screening tests, monitoring & exams) can help personalize your care plan, which helps you manage your own care. Screening tests can find health problems at the earliest stages, when they are easiest to treat. [facility] follows the current, evidence-based guidelines published by the [facility] Lora ([facility]) when recommending preventive services for our patients. Because we follow these guidelines, sometimes recommendations change over time as research supports it. (For example, a prostate screening blood test is no longer routinely recommended for men with no symptoms). Of course, you and your doctor may decide to screen more often for some diseases, based on your risk and co-morbidities (chronic disease you are already diagnosed with). Preventive services for you include:  - Medicare offers their members a free annual wellness visit, which is time for you and your primary care provider to discuss and plan for your preventive service needs. Take advantage of this benefit every year!  -All adults over age 72 should receive the recommended pneumonia vaccines. Current USPSTF guidelines recommend a series of two vaccines for the best pneumonia protection.   -All adults should have a flu vaccine yearly and tetanus vaccine every 10 years.  -All adults age 48 and older should receive the shingles vaccines (series of two vaccines).        -All adults age 38-68 who are overweight should have a diabetes screening test once every three years.   -Other screening tests & preventive services for persons with diabetes include: an eye exam to screen for diabetic retinopathy, a kidney function test, a foot exam, and stricter control over your cholesterol.   -Cardiovascular screening for adults with routine risk involves an electrocardiogram (ECG) at intervals determined by the provider.   -Colorectal cancer screening should be done for adults age 54-65 with no increased risk factors for colorectal cancer. There are a number of acceptable methods of screening for this type of cancer. Each test has its own benefits and drawbacks. Discuss with your provider what is most appropriate for you during your annual wellness visit. The different tests include: colonoscopy (considered the best screening method), a fecal occult blood test, a fecal DNA test, and sigmoidoscopy.  -All adults born between [facility] should be screened once for Hepatitis C.  -An Abdominal Aortic Aneurysm (AAA) Screening is recommended for men age 73-68 who has ever smoked in their lifetime. Here is a list of your current Health Maintenance items (your personalized list of preventive services) with a due date:  Health Maintenance Due   Topic Date Due    Colorectal Screening  Never done    Annual Well Visit  12/01/2021          Colon Cancer Screening: Care Instructions  Your Care Instructions    Colorectal cancer occurs in the colon or rectum. That's the lower part of your digestive system. It is the second-leading cause of cancer deaths in the [place]. It often starts with small growths called polyps in the colon or rectum. Polyps are usually found with screening tests. Depending on the type of test, any polyps found may be removed during the tests. Colorectal cancer usually does not cause symptoms at first. But regular tests can help find it early, before it spreads and becomes harder to treat. Experts advise routine tests for colon cancer for people starting at age 48. And they advise people with a higher risk of colon cancer to get tested sooner.  Talk with your doctor about when you should start testing. Discuss which tests you need. Follow-up care is a key part of your treatment and safety. Be sure to make and go to all appointments, and call your doctor if you are having problems. It's also a good idea to know your test results and keep a list of the medicines you take. What are the main screening tests for colon cancer? · Stool tests. These include the fecal immunochemical test (FIT) and the fecal occult blood test (FOBT). These tests check stool samples for signs of cancer. If your test is positive, you will need to have a colonoscopy. · Sigmoidoscopy. This test lets your doctor look at the lining of your rectum and the lowest part of your colon. Your doctor uses a lighted tube called a sigmoidoscope. This test can't find cancers or polyps in the upper part of your colon. In some cases, polyps that are found can be removed. But if your doctor finds polyps, you will need to have a colonoscopy to check the upper part of your colon. · Colonoscopy. This test lets your doctor look at the lining of your rectum and your entire colon. The doctor uses a thin, flexible tool called a colonoscope. It can also be used to remove polyps or get a tissue sample (biopsy). What tests do you need? The following guidelines are for people age 48 and over who are not at high risk for colorectal cancer. You may have at least one of these tests as directed by your doctor. · Fecal immunochemical test (FIT) or fecal occult blood test (FOBT) every year  · Sigmoidoscopy every 5 years  · Colonoscopy every 10 years  If you are age 68 to 80, you can work with your doctor to decide if screening is a good option. If you are age 80 or older, your doctor will likely advise that screening is not helpful. Talk with your doctor about when you need to be tested. And discuss which tests are right for you. Your doctor may recommend earlier or more frequent testing if you:  · Have had colorectal cancer before.   · Have had colon polyps. · Have symptoms of colorectal cancer. These include blood in your stool and changes in your bowel habits. · Have a parent, brother or sister, or child with colon polyps or colorectal cancer. · Have a bowel disease. This includes ulcerative colitis and Crohn's disease. · Have a rare polyp syndrome that runs in families, such as familial adenomatous polyposis (FAP). · Have had radiation treatments to the belly or pelvis. When should you call for help? Watch closely for changes in your health, and be sure to contact your doctor if:    · You have any changes in your bowel habits.     · You have any problems. Where can you learn more? Go to http://www.gray.com/. Enter M541 in the search box to learn more about \"Colon Cancer Screening: Care Instructions. \"  Current as of: March 28, 2018  Content Version: 11.8  © 3926-9337 [employer]. Care instructions adapted under license by [employer] (which disclaims liability or warranty for this information). If you have questions about a medical condition or this instruction, always ask your healthcare professional. [place] [address] any warranty or liability for your use of this information. Medicare Wellness Visit, Male    The best way to live healthy is to have a lifestyle where you eat a well-balanced diet, exercise regularly, limit alcohol use, and quit all forms of tobacco/nicotine, if applicable. Regular preventive services are another way to keep healthy. Preventive services (vaccines, screening tests, monitoring & exams) can help personalize your care plan, which helps you manage your own care. Screening tests can find health problems at the earliest stages, when they are easiest to treat.    [facility] follows the current, evidence-based guidelines published by the [place] [facility]on Lora (USPSTF) when recommending preventive services for our patients. Because we follow these guidelines, sometimes recommendations change over time as research supports it. (For example, a prostate screening blood test is no longer routinely recommended for men with no symptoms). Of course, you and your doctor may decide to screen more often for some diseases, based on your risk and co-morbidities (chronic disease you are already diagnosed with). Preventive services for you include:  - Medicare offers their members a free annual wellness visit, which is time for you and your primary care provider to discuss and plan for your preventive service needs. Take advantage of this benefit every year!  -All adults over age 72 should receive the recommended pneumonia vaccines. Current USPSTF guidelines recommend a series of two vaccines for the best pneumonia protection.   -All adults should have a flu vaccine yearly and tetanus vaccine every 10 years.  -All adults age 48 and older should receive the shingles vaccines (series of two vaccines). -All adults age 38-68 who are overweight should have a diabetes screening test once every three years.   -Other screening tests & preventive services for persons with diabetes include: an eye exam to screen for diabetic retinopathy, a kidney function test, a foot exam, and stricter control over your cholesterol.   -Cardiovascular screening for adults with routine risk involves an electrocardiogram (ECG) at intervals determined by the provider.   -Colorectal cancer screening should be done for adults age 54-65 with no increased risk factors for colorectal cancer. There are a number of acceptable methods of screening for this type of cancer. Each test has its own benefits and drawbacks. Discuss with your provider what is most appropriate for you during your annual wellness visit.  The different tests include: colonoscopy (considered the best screening method), a fecal occult blood test, a fecal DNA test, and sigmoidoscopy.  -All adults born between [birth year] and 1963 should be screened once for Hepatitis C.  -An Abdominal Aortic Aneurysm (AAA) Screening is recommended for men age 73-68 who has ever smoked in their lifetime. Here is a list of your current Health Maintenance items (your personalized list of preventive services) with a due date:  Health Maintenance Due   Topic Date Due    Colorectal Screening  Never done     Medicare Wellness Visit, Male    The best way to live healthy is to have a lifestyle where you eat a well-balanced diet, exercise regularly, limit alcohol use, and quit all forms of tobacco/nicotine, if applicable. Regular preventive services are another way to keep healthy. Preventive services (vaccines, screening tests, monitoring & exams) can help personalize your care plan, which helps you manage your own care. Screening tests can find health problems at the earliest stages, when they are easiest to treat. [facility] follows the current, evidence-based guidelines published by the [facility]on Lora ([facility]) when recommending preventive services for our patients. Because we follow these guidelines, sometimes recommendations change over time as research supports it. (For example, a prostate screening blood test is no longer routinely recommended for men with no symptoms). Of course, you and your doctor may decide to screen more often for some diseases, based on your risk and co-morbidities (chronic disease you are already diagnosed with). Preventive services for you include:  - Medicare offers their members a free annual wellness visit, which is time for you and your primary care provider to discuss and plan for your preventive service needs. Take advantage of this benefit every year!  -All adults over age 72 should receive the recommended pneumonia vaccines.  Current USPSTF guidelines recommend a series of two vaccines for the best pneumonia protection.   -All adults should have a flu vaccine yearly and tetanus vaccine every 10 years.  -All adults age 48 and older should receive the shingles vaccines (series of two vaccines). -All adults age 38-68 who are overweight should have a diabetes screening test once every three years.   -Other screening tests & preventive services for persons with diabetes include: an eye exam to screen for diabetic retinopathy, a kidney function test, a foot exam, and stricter control over your cholesterol.   -Cardiovascular screening for adults with routine risk involves an electrocardiogram (ECG) at intervals determined by the provider.   -Colorectal cancer screening should be done for adults age 54-65 with no increased risk factors for colorectal cancer. There are a number of acceptable methods of screening for this type of cancer. Each test has its own benefits and drawbacks. Discuss with your provider what is most appropriate for you during your annual wellness visit. The different tests include: colonoscopy (considered the best screening method), a fecal occult blood test, a fecal DNA test, and sigmoidoscopy.  -All adults born between [facility] should be screened once for Hepatitis C.  -An Abdominal Aortic Aneurysm (AAA) Screening is recommended for men age 73-68 who has ever smoked in their lifetime.      Here is a list of your current Health Maintenance items (your personalized list of preventive services) with a due date:  Health Maintenance Due   Topic Date Due    Colorectal Screening  Never done

## 2021-12-07 NOTE — PROGRESS NOTES
Chief Complaint   Patient presents with   [facility] Annual Wellness Visit     Visit Vitals  BP (!) 93/58 (BP 1 Location: Left upper arm, BP Patient Position: Sitting)   Pulse 75   Temp 98 °F (36.7 °C) (Oral)   Ht 6' (1.829 m)   Wt 230 lb 12.8 oz (104.7 kg)   SpO2 97%   BMI 31.30 kg/m²      Visual Acuity Screening    Right eye Left eye Both eyes   Without correction: 20/40 20/40 20/50   With correction:         Hearing Screening    Method: Audiometry    125Hz 250Hz 500Hz 1000Hz 2000Hz 3000Hz 4000Hz 6000Hz 8000Hz   Right ear: Fail Fail Fail Fail Fail Fail Fail    Left ear: Fail Fail Fail Fail Fail Fail Pass       Visual Acuity Screening    Right eye Left eye Both eyes   Without correction: 20/40 20/40 20/50   With correction:          General Health Questions   -During the past 4 weeks:   -how would you rate your health in general? Fair   -how often have you been bothered by feeling dizzy when standing up? Some days    -how much have you been bothered by bodily pain? Moderatley   -Have you noticed any hearing difficulties? Yes   -has your physical and emotional health limited your social activities with family or friends? Yes    Emotional Health Questions   -Do you have a history of depression, anxiety, or emotional problems? Yes  -Over the past 2 weeks, have you felt down, depressed or hopeless? Yes  -Over the past 2 weeks, have you felt little interest or pleasure in doing things? Yes    Health Habits   Please describe your diet habits: Basic   Do you get 5 servings of fruits or vegetables daily? No  Do you exercise regularly? No    -Are you confident are you that you can control and manage most of your health problems? No  -Have you been given information to help you keep track of your medications? Yes  -How often do you have trouble taking your medications as prescribed? Never    Fall Risk and Home Safety   Have you fallen 2 or more times in the past year? No  Does your home have rugs in the hallways? No  , Do you have grab bars in the bathrooms? Yes  , Does your home have handrails on the stairs? Yes, Do you have adequate lighting in your home? Yes  Do you have smoke detectors and check them regularly? Yes  Do you have difficulties driving a car/vehicle?  Does not drive   Do you always fasten your seat belt when you are in a car? yes

## 2021-12-07 NOTE — PROGRESS NOTES
[address] [address] [facility]   [phone]            Date of visit: 12/8/2021     This is a Subsequent Medicare Annual Wellness Visit (AWV), (Performed more than 12 months after effective date of Medicare Part B enrollment and 12 months after last preventive visit, Once in a lifetime)    I have reviewed the patient's medical history in detail and updated the computerized patient record. Ebenezer Grace is a 78 y.o. male   History obtained from: Caregiver and the patient.     Concerns today   (Patient understands that medical problems addressed today may incur additional cost as this is a preventive visit)  -Urinary frequency/incontinence    History     Patient Active Problem List   Diagnosis Code    Bradycardia R00.1    Seizure disorder ([facility] G40.909    Bilateral lower extremity edema R60.0    Hypertension I10    Mobitz type II incomplete atrioventricular block I44.1    Intellectual disability F79    Advance care planning Z71.89    S/P biventricular cardiac pacemaker procedure Z95.0    Lymphedema of both lower extremities I89.0    Vitamin D deficiency E55.9    Vitamin B12 deficiency E53.8    History of closed Colles' fracture Z87.81    H/O Mobitz type II block Z86.79    Atrial fibrillation (HCC) I48.91    Persistent atrial fibrillation (HCC) I48.19    S/P ablation of atrial fibrillation Z98.890, Z86.79     Past Medical History:   Diagnosis Date    A-fib ([facility] 2015    pacemaker L chest     CAD (coronary artery disease) 2015    Pacemaker    Epilepsy ([facility]     Heart disease     Hypertension     Incontinence     Leg swelling     Mental retardation, 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)     Other ill-defined conditions(799.89)     edema legs    S/P ablation of atrial fibrillation 11/13/2020    S/P biventricular cardiac pacemaker procedure 10/23/2015    10/23/15 [employer] biventricular pacemaker inmplant    Screen for colon cancer 9/27/2012 Past Surgical History:   Procedure Laterality Date    HX COLONOSCOPY  04/05/2017    HX ORTHOPAEDIC Right 12/22/2017    ORIF right distal radius    HX PACEMAKER  2015    bi ventricular pacemaker     INTRACARD ECHO, THER/DX INTERVENT N/A 11/13/2020    Intracardiac Echocardiogram performed by Tania Jovel MD at [facility] CARDIAC CATH LAB    NC ABLATE L/R ATRIAL FIBRIL W/ISOLATED PULM VEIN N/A 11/13/2020    Ablation Following A-Fib  Addl performed by Tania Jovel MD at [facility] CARDIAC CATH LAB    NC EPHYS EVL TRNSPTL TX ATRIAL FIB ISOLAT PULM VEIN N/A 11/13/2020    ABLATION A-FIB  W COMPLETE EP STUDY performed by Tania Jovel MD at [facility] CARDIAC CATH LAB    NC INTRACARDIAC ELECTROPHYSIOLOGIC 3D MAPPING N/A 11/13/2020    Ep 3d Mapping performed by Tania Jovel MD at [facility] CARDIAC CATH LAB     Allergies   Allergen Reactions    Sulfa (Sulfonamide Antibiotics) Rash     Patient denies     Current Outpatient Medications   Medication Sig Dispense Refill    amiodarone (CORDARONE) 200 mg tablet TAKE 1/2 TABLET BY MOUTH DAILY. 15 Tablet 11    potassium chloride (K-DUR, KLOR-CON) 20 mEq tablet TAKE 1 TABLET BY MOUTH DAILY 31 Tablet 1    bumetanide (BUMEX) 2 mg tablet TAKE 1 TABLET BY MOUTH DAILY 31 Tablet 1    warfarin (COUMADIN) 2.5 mg tablet TAKE 1 TABLET BY MOUTH ON FRIDAYS 4 Tablet 3    warfarin (COUMADIN) 5 mg tablet TAKE 1 TABLET BY MOUTH DAILY EXCEPT ON FRIDAYS 26 Tablet 3    polyethylene glycol (MIRALAX) 17 gram packet Take 1 Packet by mouth daily as needed for Constipation.  14 Each 5    Vimpat 100 mg tab tablet TAKE 1 TABLET BY MOUTH TWICE A DAY 60 Tablet 11    metoprolol tartrate (LOPRESSOR) 25 mg tablet TAKE 1 TABLET BY MOUTH TWICE A DAY 62 Tablet 11    cholecalciferol (VITAMIN D3) (1000 Units /25 mcg) tablet TAKE 1 TABLET BY MOUTH DAILY 30 Tablet PRN    levETIRAcetam 1,000 mg tablet TAKE 1 TABLET BY MOUTH TWICE A DAY 62 Tab 11    acetaminophen (TYLENOL) 325 mg tablet Take 2 Tabs by mouth every four (4) hours as needed for Pain. 80 Tab 0     Family History   Problem Relation Age of Onset    Other Other         unable to obtain due to mental status    Cancer Mother         unsure type    Heart Attack Father     Heart Disease Father     Lung Disease Father     No Known Problems Sister      Social History     Tobacco Use    Smoking status: Never Smoker    Smokeless tobacco: Never Used   Substance Use Topics    Alcohol use: No       Specialists/Care Team   Girma Mckenzie has established care with the following healthcare providers:  Patient Care Team:  Brandy Don MD as PCP - General (Family Medicine)  Brandy Don MD as PCP - [facility]  Robert Nation MD as Physician (Cardiology)  RUSSEL Azevedo as Nurse Practitioner (Nurse Practitioner)  Luba Foster MD (Cardiothoracic Surgery)      Health Risk Assessment     Demographics   male  78 y.o. General Health Questions   -During the past 4 weeks:   -how would you rate your health in general? Fair   -how often have you been bothered by feeling dizzy when standing up? never   -how much have you been bothered by bodily pain? not   -Have you noticed any hearing difficulties? yes (only per caregiver)   -has your physical and emotional health limited your social activities with family or friends? no    Emotional Health Questions   -Do you have a history of depression, anxiety, or emotional problems? no  -Over the past 2 weeks, have you felt down, depressed or hopeless? yes  -Over the past 2 weeks, have you felt little interest or pleasure in doing things? yes    Health Habits   Please describe your diet habits: basic  Do you get 5 servings of fruits or vegetables daily? no  Do you exercise regularly?  no    Activities of Daily Living and Functional Status   -Do you need help with eating, walking, dressing, bathing, toileting, the phone, transportation, shopping, preparing meals, housework, laundry, medications or managing money? yes  -In the past four weeks, was someone available to help you if you needed and wanted help with anything? yes  -Are you confident are you that you can control and manage most of your health problems? no  -Have you been given information to help you keep track of your medications? yes  -How often do you have trouble taking your medications as prescribed? never    Fall Risk and Home Safety   Have you fallen 2 or more times in the past year? no  Does your home have rugs in the hallway, lack grab bars in the bathroom, lack handrails on the stairs or have poor lighting? no  Do you have smoke detectors and check them regularly? yes  Do you have difficulties driving a car? Doesn't drive  Do you always fasten your seat belt when you are in a car? yes    Review of Systems (if indicated for problems addressed today)   Denies dysuria, trouble emptying  Denies back pain  Denies suicidal ideation    Physical Examination     Vitals:    12/07/21 1333   BP: (!) 93/58   Pulse: 75   Temp: 98 °F (36.7 °C)   TempSrc: Oral   SpO2: 97%   Weight: 230 lb 12.8 oz (104.7 kg)   Height: 6' (1.829 m)     Body mass index is 31.3 kg/m². Hearing Screening    Method: Audiometry    125Hz 250Hz 500Hz 1000Hz 2000Hz 3000Hz 4000Hz 6000Hz 8000Hz   Right ear: Fail Fail Fail Fail Fail Fail Fail    Left ear:   Fail Fail Fail Fail Fail Fail Pass       Visual Acuity Screening    Right eye Left eye Both eyes   Without correction: 20/40 20/40 20/50   With correction:        Was the patient's timed Up & Go test unsteady or longer than 30 seconds? no    Evaluation of Cognitive Function   Mood/affect:  neutral  Orientation: Person, Place and Time  Appearance: age appropriate  Family member/caregiver input: No concerns about mood    Additional exam if indicated for problems addressed today:  Cardiac: RRR, no murmurs, gallops or rubs  Pulm: Clear to auscultation bilaterally  Abdomen: Soft, non-tender, no masses    Advice/Referrals/Counseling (as indicated)   Education and counseling provided for any problems identified above: Colonoscopy, Urology, Audiology    Preventive Services     (Preventive care checklist to be included in patient instructions)  Discussed today Done Previously Not Needed     x  Pneumococcal vaccines    x  Flu vaccine      Hepatitis B vaccine (if at risk)    x  Shingles vaccine    x  TDAP vaccine      Digital rectal exam   x   PSA   x   Colorectal cancer screening     x Low-dose CT for lung cancer screening     x Bone density test      Glaucoma screening    x  Cholesterol test    x  Diabetes screening test      x Diabetes self-management class     x Nutritionist referral for diabetes or renal disease     Discussion of Advance Directive   Discussed with Sonu Rivera his ability to prepare and advance directive in the case that an injury or illness causes him to be unable to make health care decisions. Assessment/Plan   Z00.00    ICD-10-CM ICD-9-CM    1. Tuberculosis screening  Z11.1 V74.1 QUANTIFERON-TB GOLD PLUS      CANCELED: T-SPOT TB TEST(PATIENT)   2. Medicare annual wellness visit, subsequent  Z00.00 V70.0    3. Screen for colon cancer  Z12.11 V76.51 REFERRAL FOR COLONOSCOPY   4. Urinary frequency  R35.0 788.41 PSA W/ REFLX FREE PSA      REFERRAL TO UROLOGY      PSA W/ REFLX FREE PSA   5. Screening for malignant neoplasm of prostate  Z12.5 V76.44 PSA W/ REFLX FREE PSA      REFERRAL TO UROLOGY      PSA W/ REFLX FREE PSA   6. Failed hearing screening  R94.120 794.15 REFERRAL TO AUDIOLOGY       Orders Placed This Encounter    QUANTIFERON-TB GOLD PLUS ([facility] Default)    PSA W/ REFLX FREE PSA    Referral for Colonoscopy (options for GI, Colon &  Rectal Surgery, & General Surgery)    [facility]    REFERRAL TO AUDIOLOGY     1. Medicare annual wellness visit, subsequent  Patient doing well, due for colonoscopy otherwise caught up on health screening.  Discussed PSA with caregiver and will check today as below. 2. Screen for colon cancer  Due for colonoscopy, referral sent.  - REFERRAL FOR COLONOSCOPY    3. Tuberculosis screening  Due to living in group home, patient is at higher risk. We will check blood test to see if patient from having to come back for skin test.  - QUANTIFERON-TB GOLD PLUS    4. Urinary frequency  Caregiver reports 2-3 episodes over the last week of urinary incontinence. Patient states that he knows when he has to urinate but he cannot get to the bathroom in time. He otherwise denies pain with urination or new back pain, numbness tingling or weakness down his legs. No difficulty urinating when he is able to make it to the bathroom. History is mildly limited by patient's condition, and I expect that he would benefit from a urology work-up to see if there is any reversible cause. We will check a PSA today  - PSA W/ REFLX FREE PSA; Future  - REFERRAL TO UROLOGY  - PSA W/ REFLX FREE PSA    5. Screening for malignant neoplasm of prostate  See above, given these are new symptoms over the last 2 weeks patient denies any symptoms of UTI will check PSA and send urology referral as requested. - PSA W/ REFLX FREE PSA; Future  - REFERRAL TO UROLOGY  - PSA W/ REFLX FREE PSA    6. Failed hearing screening  Patient's hearing is intact to loud voice, however he failed his hearing screen. I suspect that he could benefit from hearing aids if he qualifies.  - REFERRAL TO AUDIOLOGY    Follow-up and Dispositions    · Return in about 1 year (around 12/7/2022), or if symptoms worsen or fail to improve.          Carnella Rubinstein, MD

## 2021-12-07 NOTE — PROGRESS NOTES
History of Present Illness:     Chief Complaint   Patient presents with    Annual Wellness Visit       Rafael Pal is a 78 y.o. male who presents for a medicare wellness visit. He complains of subacute increase in urinary urgency which has resulted in incontainence 2-3x per week. He denies dysuria, hematurea, or polyuria. Denies changes in BM. PMH (REVIEWED):  Past Medical History:   Diagnosis Date    A-fib 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 15 Tablet 11    potassium chloride (K-DUR, KLOR-CON) 20 mEq tablet TAKE 1 TABLET BY MOUTH DAILY 31 Tablet 1    bumetanide (BUMEX) 2 mg tablet TAKE 1 TABLET BY MOUTH DAILY 31 Tablet 1    warfarin (COUMADIN) 2.5 mg tablet TAKE 1 TABLET BY MOUTH ON FRIDAYS 4 Tablet 3    warfarin (COUMADIN) 5 mg tablet TAKE 1 TABLET BY MOUTH DAILY EXCEPT ON FRIDAYS 26 Tablet 3    polyethylene glycol (MIRALAX) 17 gram packet Take 1 Packet by mouth daily as needed for Constipation.  14 Each 5    Vimpat 100 mg tab tablet TAKE 1 TABLET BY MOUTH TWICE A DAY 60 Tablet 11    metoprolol tartrate (LOPRESSOR) 25 mg tablet TAKE 1 TABLET BY MOUTH TWICE A DAY 62 Tablet 11    cholecalciferol (VITAMIN D3) (1000 Units /25 mcg) tablet TAKE 1 TABLET BY MOUTH DAILY 30 Tablet PRN    levETIRAcetam 1,000 mg tablet TAKE 1 TABLET BY MOUTH TWICE A DAY 62 Tab 11    acetaminophen (TYLENOL) 325 mg tablet Take 2 Tabs by mouth every four (4) hours as needed for Pain. 84 Tab 0     No current facility-administered medications on file prior to visit. Allergies   Allergen Reactions    Sulfa (Sulfonamide Antibiotics) Rash     Patient denies         Review of Systems:     Denies fever, chills, sweats  denies chest pain, SCHWARZ, palpitations, LE edema  denies cough, sputum production, SOB, pleuritic chest pain, wheezing  denies n/v/d, constipation, melena, blood in stool  denies numbness/ tingling/ weakness in extremities      Objective:     Vitals:    12/07/21 1333   BP: (!) 93/58   Pulse: 75   Temp: 98 °F (36.7 °C)   TempSrc: Oral   SpO2: 97%   Weight: 230 lb 12.8 oz (104.7 kg)   Height: 6' (1.829 m)       Physical Exam:  General appearance - alert, well appearing, and in no distress, oriented to person, place, and time and normal appearing weight  Lymphatics - no palpable lymphadenopathy, no hepatosplenomegaly  Chest - clear to auscultation, no wheezes, rales or rhonchi, symmetric air entry, no tachypnea, retractions or cyanosis  Heart - normal rate, regular rhythm, normal S1, S2, no murmurs, rubs, clicks or gallops  Abdomen - soft, nontender, nondistended, no masses or organomegaly    Recent Labs:  Recent Results (from the past 12 hour(s))   AMB POC PT/INR    Collection Time: 12/07/21  1:46 PM   Result Value Ref Range    VALID INTERNAL CONTROL POC Yes     Prothrombin time (POC) 17.4 (A) 9.1 - 12 seconds    INR POC 1.5 1 - 1.5       Assessment and Plan:       ICD-10-CM ICD-9-CM    1. Tuberculosis screening  Z11.1 V74.1    2. Medicare annual wellness visit, subsequent  Z00.00 V70.0    3.  Screen for colon cancer  Z12.11 V76.51 REFERRAL FOR COLONOSCOPY   4. Urinary frequency  R35.0 788.41      Afib  - s/p ablation and Biventricualr cardiac pacemaker  - on warfarin 5mg daily, 2.5mg on Fridays  - amiodarone 100mg daily, metoprolol tartrate 100mg daily  - Followed by Benigno Jaime    Urinary Incontinence  - 2-3x per week, over the past few weeks  - Obtain PSA screen    Health Maintainence:  - referral for colonoscopy  - Serum TB screen     Follow up: annually    Karen Monroy

## 2021-12-08 NOTE — ACP (ADVANCE CARE PLANNING)
Advance Care Planning     Advance Care Planning (ACP) Physician/NP/PA Conversation    Pt has advanced care plan at his group home. Will ask caregiver to fax this to us to have on file.

## 2021-12-10 LAB
GAMMA INTERFERON BACKGROUND BLD IA-ACNC: 0.04 IU/ML
M TB IFN-G BLD-IMP: NEGATIVE
M TB IFN-G CD4+ BCKGRND COR BLD-ACNC: 0.04 IU/ML
MITOGEN IGNF BLD-ACNC: >10 IU/ML
PSA SERPL-MCNC: 0.4 NG/ML (ref 0–4)
QUANTIFERON INCUBATION, QF1T: NORMAL
QUANTIFERON TB2 AG: 0.04 IU/ML
REFLEX CRITERIA: NORMAL
SERVICE CMNT-IMP: NORMAL
SPECIMEN STATUS REPORT, ROLRST: NORMAL

## 2021-12-13 NOTE — TELEPHONE ENCOUNTER
Hi! Good Afternoon,  Pt. Emergency contact person called to ask when they will gonna receive the tb test result.  If ever its ready, she wanted to speak with nurse or Dr to discuss about it.    Nick Rose

## 2021-12-20 ENCOUNTER — ANTI-COAG VISIT (OUTPATIENT)
Dept: CARDIOLOGY CLINIC | Age: 76
End: 2021-12-20
Payer: MEDICARE

## 2021-12-20 DIAGNOSIS — Z79.01 LONG TERM (CURRENT) USE OF ANTICOAGULANTS: Primary | ICD-10-CM

## 2021-12-20 DIAGNOSIS — I48.91 ATRIAL FIBRILLATION, UNSPECIFIED TYPE (HCC): ICD-10-CM

## 2021-12-20 LAB
INR BLD: 1.5 (ref 1–1.5)
PT POC: 17.6 SECONDS (ref 9.1–12)
VALID INTERNAL CONTROL?: YES

## 2021-12-20 PROCEDURE — 85610 PROTHROMBIN TIME: CPT | Performed by: INTERNAL MEDICINE

## 2021-12-22 NOTE — PROGRESS NOTES
2145 Transferred to 2262 for routine progression of care. Report included the following information SBAR, Kardex, Intake/Output, MAR, Recent Results and Cardiac Rhythm Paced. stabbing/tightness

## 2021-12-29 ENCOUNTER — ANTI-COAG VISIT (OUTPATIENT)
Dept: CARDIOLOGY CLINIC | Age: 76
End: 2021-12-29
Payer: MEDICARE

## 2021-12-29 DIAGNOSIS — I48.91 ATRIAL FIBRILLATION, UNSPECIFIED TYPE (HCC): ICD-10-CM

## 2021-12-29 DIAGNOSIS — Z79.01 LONG TERM (CURRENT) USE OF ANTICOAGULANTS: Primary | ICD-10-CM

## 2021-12-29 LAB
INR BLD: 1.6 (ref 1–1.5)
PT POC: 19.6 SECONDS (ref 9.1–12)
VALID INTERNAL CONTROL?: YES

## 2021-12-29 PROCEDURE — 85610 PROTHROMBIN TIME: CPT | Performed by: INTERNAL MEDICINE

## 2022-01-05 ENCOUNTER — ANTI-COAG VISIT (OUTPATIENT)
Dept: CARDIOLOGY CLINIC | Age: 77
End: 2022-01-05
Payer: MEDICARE

## 2022-01-05 DIAGNOSIS — Z79.01 LONG TERM (CURRENT) USE OF ANTICOAGULANTS: Primary | ICD-10-CM

## 2022-01-05 DIAGNOSIS — I48.91 ATRIAL FIBRILLATION, UNSPECIFIED TYPE (HCC): ICD-10-CM

## 2022-01-05 LAB
INR BLD: 1.6 (ref 1–1.5)
PT POC: 19.8 SECONDS (ref 9.1–12)
VALID INTERNAL CONTROL?: YES

## 2022-01-05 PROCEDURE — 85610 PROTHROMBIN TIME: CPT | Performed by: INTERNAL MEDICINE

## 2022-01-14 ENCOUNTER — ANTI-COAG VISIT (OUTPATIENT)
Dept: CARDIOLOGY CLINIC | Age: 77
End: 2022-01-14
Payer: MEDICARE

## 2022-01-14 DIAGNOSIS — I48.91 ATRIAL FIBRILLATION, UNSPECIFIED TYPE (HCC): ICD-10-CM

## 2022-01-14 DIAGNOSIS — Z79.01 LONG TERM (CURRENT) USE OF ANTICOAGULANTS: Primary | ICD-10-CM

## 2022-01-14 LAB
INR BLD: 3.4 (ref 1–1.5)
PT POC: 40.4 SECONDS (ref 9.1–12)
VALID INTERNAL CONTROL?: YES

## 2022-01-14 PROCEDURE — 85610 PROTHROMBIN TIME: CPT | Performed by: INTERNAL MEDICINE

## 2022-01-14 RX ORDER — WARFARIN 2.5 MG/1
TABLET ORAL
Qty: 3 TABLET | Refills: 0 | Status: SHIPPED | OUTPATIENT
Start: 2022-01-14 | End: 2022-02-08

## 2022-01-14 RX ORDER — WARFARIN SODIUM 5 MG/1
TABLET ORAL
Qty: 20 TABLET | Refills: 0 | Status: SHIPPED | OUTPATIENT
Start: 2022-01-14 | End: 2022-02-08

## 2022-01-26 ENCOUNTER — ANTI-COAG VISIT (OUTPATIENT)
Dept: CARDIOLOGY CLINIC | Age: 77
End: 2022-01-26
Payer: MEDICARE

## 2022-01-26 DIAGNOSIS — Z79.01 LONG TERM (CURRENT) USE OF ANTICOAGULANTS: Primary | ICD-10-CM

## 2022-01-26 DIAGNOSIS — I48.91 ATRIAL FIBRILLATION, UNSPECIFIED TYPE (HCC): ICD-10-CM

## 2022-01-26 LAB
INR BLD: 2.5 (ref 1–1.5)
PT POC: 29.8 SECONDS (ref 9.1–12)
VALID INTERNAL CONTROL?: YES

## 2022-01-26 PROCEDURE — 85610 PROTHROMBIN TIME: CPT | Performed by: INTERNAL MEDICINE

## 2022-01-28 DIAGNOSIS — E87.6 HYPOKALEMIA: ICD-10-CM

## 2022-01-28 DIAGNOSIS — R60.0 BILATERAL LOWER EXTREMITY EDEMA: ICD-10-CM

## 2022-02-03 DIAGNOSIS — G40.909 SEIZURE DISORDER (HCC): ICD-10-CM

## 2022-02-03 RX ORDER — POTASSIUM CHLORIDE 20 MEQ/1
TABLET, EXTENDED RELEASE ORAL
Qty: 31 TABLET | Refills: 11 | Status: SHIPPED | OUTPATIENT
Start: 2022-02-03

## 2022-02-03 RX ORDER — BUMETANIDE 2 MG/1
TABLET ORAL
Qty: 31 TABLET | Refills: 11 | Status: SHIPPED | OUTPATIENT
Start: 2022-02-03

## 2022-02-03 RX ORDER — LACOSAMIDE 100 MG/1
TABLET, FILM COATED ORAL
Qty: 60 TABLET | Refills: 5 | Status: SHIPPED | OUTPATIENT
Start: 2022-02-03

## 2022-02-08 RX ORDER — WARFARIN 2.5 MG/1
TABLET ORAL
Qty: 4 TABLET | Refills: 0 | Status: SHIPPED | OUTPATIENT
Start: 2022-02-08 | End: 2022-04-11 | Stop reason: SDUPTHER

## 2022-02-08 RX ORDER — WARFARIN SODIUM 5 MG/1
TABLET ORAL
Qty: 26 TABLET | Refills: 0 | Status: SHIPPED | OUTPATIENT
Start: 2022-02-08 | End: 2022-04-11 | Stop reason: SDUPTHER

## 2022-02-24 RX ORDER — LEVETIRACETAM 1000 MG/1
TABLET ORAL
Qty: 56 TABLET | Refills: 11 | Status: SHIPPED | OUTPATIENT
Start: 2022-02-24

## 2022-03-09 ENCOUNTER — ANTI-COAG VISIT (OUTPATIENT)
Dept: CARDIOLOGY CLINIC | Age: 77
End: 2022-03-09
Payer: MEDICARE

## 2022-03-09 DIAGNOSIS — Z79.01 LONG TERM (CURRENT) USE OF ANTICOAGULANTS: Primary | ICD-10-CM

## 2022-03-09 DIAGNOSIS — I48.91 ATRIAL FIBRILLATION, UNSPECIFIED TYPE (HCC): ICD-10-CM

## 2022-03-09 LAB
INR BLD: 2.8 (ref 1–1.5)
PT POC: 33.1 SECONDS (ref 9.1–12)
VALID INTERNAL CONTROL?: YES

## 2022-03-09 PROCEDURE — 85610 PROTHROMBIN TIME: CPT | Performed by: INTERNAL MEDICINE

## 2022-03-18 PROBLEM — Z98.890 S/P ABLATION OF ATRIAL FIBRILLATION: Status: ACTIVE | Noted: 2020-11-13

## 2022-03-18 PROBLEM — Z86.79 H/O MOBITZ TYPE II BLOCK: Status: ACTIVE | Noted: 2018-10-15

## 2022-03-18 PROBLEM — I48.19 PERSISTENT ATRIAL FIBRILLATION (HCC): Status: ACTIVE | Noted: 2020-11-12

## 2022-03-18 PROBLEM — Z86.79 S/P ABLATION OF ATRIAL FIBRILLATION: Status: ACTIVE | Noted: 2020-11-13

## 2022-03-19 PROBLEM — I48.91 ATRIAL FIBRILLATION (HCC): Status: ACTIVE | Noted: 2020-11-12

## 2022-03-19 PROBLEM — I89.0 LYMPHEDEMA OF BOTH LOWER EXTREMITIES: Status: ACTIVE | Noted: 2018-10-15

## 2022-03-20 PROBLEM — E55.9 VITAMIN D DEFICIENCY: Status: ACTIVE | Noted: 2018-10-15

## 2022-03-20 PROBLEM — E53.8 VITAMIN B12 DEFICIENCY: Status: ACTIVE | Noted: 2018-10-15

## 2022-03-20 PROBLEM — Z87.81 HISTORY OF CLOSED COLLES' FRACTURE: Status: ACTIVE | Noted: 2018-10-15

## 2022-04-06 ENCOUNTER — ANTI-COAG VISIT (OUTPATIENT)
Dept: CARDIOLOGY CLINIC | Age: 77
End: 2022-04-06
Payer: MEDICARE

## 2022-04-06 DIAGNOSIS — Z79.01 LONG TERM (CURRENT) USE OF ANTICOAGULANTS: Primary | ICD-10-CM

## 2022-04-06 DIAGNOSIS — I48.91 ATRIAL FIBRILLATION, UNSPECIFIED TYPE (HCC): ICD-10-CM

## 2022-04-06 LAB
INR BLD: 2.3 (ref 1–1.5)
PT POC: 27.2 SECONDS (ref 9.1–12)
VALID INTERNAL CONTROL?: YES

## 2022-04-06 PROCEDURE — 85610 PROTHROMBIN TIME: CPT | Performed by: INTERNAL MEDICINE

## 2022-04-11 RX ORDER — WARFARIN 2.5 MG/1
TABLET ORAL
Qty: 4 TABLET | Refills: 0 | Status: SHIPPED | OUTPATIENT
Start: 2022-04-11 | End: 2022-06-08 | Stop reason: DRUGHIGH

## 2022-04-11 RX ORDER — WARFARIN SODIUM 5 MG/1
TABLET ORAL
Qty: 26 TABLET | Refills: 0 | Status: SHIPPED | OUTPATIENT
Start: 2022-04-11 | End: 2022-06-08 | Stop reason: DRUGHIGH

## 2022-04-12 ENCOUNTER — TELEPHONE (OUTPATIENT)
Dept: CARDIOLOGY CLINIC | Age: 77
End: 2022-04-12

## 2022-04-13 ENCOUNTER — TELEPHONE (OUTPATIENT)
Dept: CARDIOLOGY CLINIC | Age: 77
End: 2022-04-13

## 2022-04-19 ENCOUNTER — OFFICE VISIT (OUTPATIENT)
Dept: CARDIOLOGY CLINIC | Age: 77
End: 2022-04-19
Payer: MEDICARE

## 2022-04-19 DIAGNOSIS — I44.2 CHB (COMPLETE HEART BLOCK) (HCC): ICD-10-CM

## 2022-04-19 DIAGNOSIS — Z95.0 CARDIAC PACEMAKER IN SITU: Primary | ICD-10-CM

## 2022-04-19 PROCEDURE — 93280 PM DEVICE PROGR EVAL DUAL: CPT | Performed by: INTERNAL MEDICINE

## 2022-04-22 PROBLEM — I48.19 PERSISTENT ATRIAL FIBRILLATION (HCC): Chronic | Status: ACTIVE | Noted: 2020-11-12

## 2022-04-29 DIAGNOSIS — I48.19 PERSISTENT ATRIAL FIBRILLATION (HCC): Primary | ICD-10-CM

## 2022-05-12 ENCOUNTER — ANTI-COAG VISIT (OUTPATIENT)
Dept: PHARMACY | Age: 77
End: 2022-05-12
Payer: MEDICARE

## 2022-05-12 VITALS
HEART RATE: 75 BPM | BODY MASS INDEX: 29.26 KG/M2 | SYSTOLIC BLOOD PRESSURE: 111 MMHG | HEIGHT: 72 IN | WEIGHT: 216 LBS | DIASTOLIC BLOOD PRESSURE: 67 MMHG

## 2022-05-12 DIAGNOSIS — I48.91 ATRIAL FIBRILLATION, UNSPECIFIED TYPE (HCC): Primary | ICD-10-CM

## 2022-05-12 LAB
INR BLD: 2
INR BLD: 2
PT POC: 23.7 SECONDS
PT POC: 23.7 SECONDS
VALID INTERNAL CONTROL?: YES
VALID INTERNAL CONTROL?: YES

## 2022-05-12 PROCEDURE — 85610 PROTHROMBIN TIME: CPT

## 2022-05-12 PROCEDURE — 99211 OFF/OP EST MAY X REQ PHY/QHP: CPT

## 2022-05-12 NOTE — PATIENT INSTRUCTIONS
Today your INR was 2.0 . Your goal INR is 2.0-3.0 . You have a 2.5 mg and 5 mg tablet of Coumadin (warfarin). Take Coumadin (warfarin) as follows: Take 2.5 mg every Friday and 5 mg daily rest of week. Come back in 4 week(s) for your next finger stick/INR blood test.        Avoid any over the counter items containing aspirin or ibuprofen, and avoid great swings in general diet. Avoid alcohol consumption. Please notify the INR nurse if you are started on any new medication including over the counter or herbal supplements. Also, please notify your INR nurse if any of your other prescription or over the counter medications have been discontinued. Call [facility] at [phone] if you have any signs of abnormal bleeding/blood clot.  ------------------------------------------------------------------------------------------------------------------  Taking Warfarin Safely: Care Instructions    Your Care Instructions  Warfarin is a medicine that you take to prevent blood clots. It is often called a blood thinner. Doctors give warfarin (such as Coumadin) to reduce the risk of blood clots. You may be at risk for blood clots if you have atrial fibrillation or deep vein thrombosis. Some other health problems may also put you at risk. Warfarin slows the amount of time it takes for your blood to clot. It can cause bleeding problems. Even if you've been taking warfarin for a while, it's important to know how to take it safely. Foods and other medicines can affect the way warfarin works. Some can make warfarin work too well. This can cause bleeding problems. And some can make it work poorly, so that it does not prevent blood clots very well. You will need regular blood tests to check how long it takes for your blood to form a clot. This test is called a PT or prothrombin time test. The result of the test is called an INR level.  Depending on the test results, your doctor or anticoagulation clinic may adjust your dose of warfarin. Follow-up care is a key part of your treatment and safety. Be sure to make and go to all appointments, and call your doctor if you are having problems. It's also a good idea to know your test results and keep a list of the medicines you take. How can you care for yourself at home? Take warfarin safely  · Take your warfarin at the same time each day. · If you miss a dose of warfarin, don't take an extra dose to make up for it. Your doctor can tell you exactly what to do so you don't take too much or too little. · Wear medical alert jewelry that lets others know that you take warfarin. You can buy this at most drugstores. · Don't take warfarin if you are pregnant or planning to get pregnant. Talk to your doctor about how you can prevent getting pregnant while you are taking it. · Don't change your dose or stop taking warfarin unless your doctor tells you to. Effects of medicines and food on warfarin  · Don't start or stop taking any medicines, vitamins, or natural remedies unless you first talk to your doctor. Many medicines can affect how warfarin works. These include aspirin and other pain relievers, over-the-counter medicines, multivitamins, dietary supplements, and herbal products. · Tell all of your doctors and pharmacists that you take warfarin. Some prescription medicines can affect how warfarin works. · Keep the amount of vitamin K in your diet about the same from day to day. Do not suddenly eat a lot more or a lot less food that is rich in vitamin K than you usually do. Vitamin K affects how warfarin works and how your blood clots. Talk with your doctor before making big changes in your diet. Vitamin K is in many foods, such as:  ¨ Leafy greens, such as kale, cabbage, spinach, Swiss chard, and lettuce. ¨ Canola and soybean oils. ¨ Green vegetables, such as asparagus, broccoli, and [place] sprouts.   ¨ Vegetable drinks, green tea leaves, and some dietary supplement drinks. · Avoid cranberry juice and other cranberry products. They can increase the effects of warfarin. · Limit your use of alcohol. Avoid bleeding by preventing falls and injuries  · Wear slippers or shoes with nonskid soles. · Remove throw rugs and clutter. · Rearrange furniture and electrical cords to keep them out of walking paths. · Keep stairways, porches, and outside walkways well lit. Use night-lights in hallways and bathrooms. · Be extra careful when you work with sharp tools or knives. When should you call for help? Call 911 anytime you think you may need emergency care. For example, call if:  · You have a sudden, severe headache that is different from past headaches. Call your doctor now or seek immediate medical care if:  · You have any abnormal bleeding, such as:  ¨ Nosebleeds. ¨ Vaginal bleeding that is different (heavier, more frequent, at a different time of the month) than what you are used to. ¨ Bloody or black stools, or rectal bleeding. ¨ Bloody or pink urine. Watch closely for changes in your health, and be sure to contact your doctor if you have any problems. Where can you learn more? Go to http://www.gray.com/. Enter B534 in the search box to learn more about \"Taking Warfarin Safely: Care Instructions. \"  Current as of: January 27, 2016  Content Version: 11.1  © 6024-0145 [employer]. Care instructions adapted under license by [employer] (which disclaims liability or warranty for this information). If you have questions about a medical condition or this instruction, always ask your healthcare professional. [place] [address] any warranty or liability for your use of this information.

## 2022-05-12 NOTE — PROGRESS NOTES
Pharmacy Progress Note - Anticoagulation Management    S/O:  Mr. Muna Smith  is a 78 y.o. male seen today for anticoagulation management for the diagnosis of Atrial Fibrillation. HPI: At last visit (4/6) INR was 2.3 and therapeutic per chart review. Interim History:    · Warfarin start date: Prior to 10/9/20 per chart review  · INR Goal:  2.0-3.0    · Current warfarin regimen: 2.5 mg Fri and 5 mg daily ROW                   · Warfarin tablet strength:   2.5 mg and 5 mg  · Duration of therapy: Indefinite    Today's pertinent positives includes:  No significant changes since last visit    Results for orders placed or performed in visit on 05/12/22   AMB POC PT/INR   Result Value Ref Range    VALID INTERNAL CONTROL POC Yes     Prothrombin time (POC) 23.7 seconds    INR POC 2.0        · Adherence:   · Able to recall regimen? N/A  · Miss/extra dose? NO  · Need refill? NO    Group home representative was unable to confirm patient's medications including daily dose of warfarin. Group home representative attempted to contact group home with no success.     Upcoming procedure(s):  N/A      Past Medical History:   Diagnosis Date    A-fib [facility]) 2015    pacemaker L chest     CAD (coronary artery disease) 2015    Pacemaker    Epilepsy ([facility] [address]     Heart disease     Hypertension     Incontinence     Leg swelling     Mental retardation, 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)     Other ill-defined conditions(799.89)     edema legs    S/P ablation of atrial fibrillation 11/13/2020    S/P biventricular cardiac pacemaker procedure 10/23/2015    10/23/15 [employer] biventricular pacemaker inmplant    Screen for colon cancer 9/27/2012     Allergies   Allergen Reactions    Sulfa (Sulfonamide Antibiotics) Rash     Patient denies     Current Outpatient Medications   Medication Sig    warfarin (COUMADIN) 2.5 mg tablet TAKE 1 TABLET BY MOUTH ONCE DAILY IN THE EVENING ON FRIDAY    warfarin (COUMADIN) 5 mg tablet TAKE 1 TABLET BY MOUTH DAILY EXCEPT ON FRIDAYS    levETIRAcetam 1,000 mg tablet TAKE 1 TABLET BY MOUTH TWICE A DAY    potassium chloride (K-DUR, KLOR-CON M20) 20 mEq tablet TAKE 1 TABLET BY MOUTH DAILY    bumetanide (BUMEX) 2 mg tablet TAKE 1 TABLET BY MOUTH DAILY    Vimpat 100 mg tab tablet TAKE 1 TABLET BY MOUTH TWICE A DAY    amiodarone (CORDARONE) 200 mg tablet TAKE 1/2 TABLET BY MOUTH DAILY.  polyethylene glycol (MIRALAX) 17 gram packet Take 1 Packet by mouth daily as needed for Constipation.  metoprolol tartrate (LOPRESSOR) 25 mg tablet TAKE 1 TABLET BY MOUTH TWICE A DAY    cholecalciferol (VITAMIN D3) (1000 Units /25 mcg) tablet TAKE 1 TABLET BY MOUTH DAILY    acetaminophen (TYLENOL) 325 mg tablet Take 2 Tabs by mouth every four (4) hours as needed for Pain. No current facility-administered medications for this visit.      Wt Readings from Last 3 Encounters:   05/12/22 216 lb (98 kg)   12/07/21 230 lb 12.8 oz (104.7 kg)   09/14/21 229 lb 11.2 oz (104.2 kg)     BP Readings from Last 3 Encounters:   05/12/22 111/67   12/07/21 (!) 93/58   09/14/21 104/66     Pulse Readings from Last 3 Encounters:   05/12/22 75   12/07/21 75   09/14/21 76     Lab Results   Component Value Date/Time    WBC 4.2 11/30/2021 11:30 AM    Hemoglobin (POC) 12.7 12/22/2017 11:23 AM    HGB 13.2 11/30/2021 11:30 AM    HCT 40.8 11/30/2021 11:30 AM    PLATELET 109 13/87/1736 11:30 AM    .0 (H) 11/30/2021 11:30 AM     Lab Results   Component Value Date/Time    Sodium 138 11/30/2021 11:30 AM    Potassium 4.0 11/30/2021 11:30 AM    Chloride 104 11/30/2021 11:30 AM    CO2 31 11/30/2021 11:30 AM    Anion gap 3 (L) 11/30/2021 11:30 AM    Glucose 90 11/30/2021 11:30 AM    BUN 23 (H) 11/30/2021 11:30 AM    Creatinine 0.93 11/30/2021 11:30 AM    BUN/Creatinine ratio 25 (H) 11/30/2021 11:30 AM    GFR est AA >60 11/30/2021 11:30 AM    GFR est non-AA >60 11/30/2021 11:30 AM    Calcium 9.0 11/30/2021 11:30 AM    Bilirubin, total 0.7 11/30/2021 11:30 AM    Alk. phosphatase 110 11/30/2021 11:30 AM    Protein, total 7.7 11/30/2021 11:30 AM    Albumin 4.0 11/30/2021 11:30 AM    Globulin 3.7 11/30/2021 11:30 AM    A-G Ratio 1.1 11/30/2021 11:30 AM    ALT (SGPT) 19 11/30/2021 11:30 AM     Estimated Creatinine Clearance: 83.3 mL/min (by C-G formula based on SCr of 0.93 mg/dL). INR History:   (normal INR range 0.8-1.2)     Date   INR   PT   Dose/Comments  05/12/22 2.0  23.7 2.5 mg Fri and 5 mg daily ROW  04/06/22 2.3  27.2 2.5 mg Fri and 5 mg daily ROW    · Medications that can increase  INR: amiodarone  · Medications that can decrease INR: N/A    A/P:       Anticoagulation:  Considering Mr. Kwaku Sheriff past history, todays findings, and per Anticoagulation Collaborative Practice Agreement/Protocol:    1. Fingerstick POC INR (2.0) is Therapeutic for INR goal today. 2.  Continue warfarin 2.5 mg Fri and 5 mg daily ROW  3. INR is 2.0 and therapeutic. Group home representative denied any changes to the patient's medications. Patient reported consistent intake of vitamin K foods. Patient will continue current regimen and recheck INR in 4 weeks. Patient was instructed to schedule an appointment in 4 week(s) prior to leaving the clinic. Medication reconciliation was completed during the visit. There are no discontinued medications. A full discussion of the nature of anticoagulants has been carried out. A full discussion of the need for frequent and regular monitoring, precise dosage adjustment and compliance was stressed. Side effects of potential bleeding were discussed and Mr. Angela Gudino was instructed to call [phone] if there are any signs of abnormal bleeding. Mr. Angela Gudino was instructed to avoid any OTC items containing aspirin or ibuprofen and prior to starting any new OTC products to consult with his physician or pharmacist to ensure no drug interactions are present.   Mr. Angela Gudino was instructed to avoid any major changes in his general diet and to avoid alcohol consumption. Group home representative was provided information in the AVS that includes topics on understanding coumadin therapy, drug interaction considerations, vitamin K and coumadin use, interactions with foods and supplements containing vitamin K, and the use of herbal products. Mr. Quinn/group home representative verbalized his understanding of all instructions and will call the office with any questions, concerns, or signs of abnormal bleeding or blood clot. Notifications of recommendations will be sent to Kirsten Fall NP for review.     Thank you,  CLINT Smith/ [address] Tracking Only     Intervention Detail: Adherence Monitorin and Lab(s) Ordered   Total # of Interventions Recommended: 2   Total # of Interventions Accepted: 2   Time Spent (min): 20

## 2022-06-08 ENCOUNTER — ANTI-COAG VISIT (OUTPATIENT)
Dept: PHARMACY | Age: 77
End: 2022-06-08
Payer: MEDICARE

## 2022-06-08 DIAGNOSIS — I48.91 ATRIAL FIBRILLATION, UNSPECIFIED TYPE (HCC): Primary | ICD-10-CM

## 2022-06-08 LAB
INR BLD: 1.8
INR BLD: 1.8
PT POC: 21.6 SECONDS
PT POC: 21.6 SECONDS
VALID INTERNAL CONTROL?: YES
VALID INTERNAL CONTROL?: YES

## 2022-06-08 PROCEDURE — 99213 OFFICE O/P EST LOW 20 MIN: CPT

## 2022-06-08 PROCEDURE — 85610 PROTHROMBIN TIME: CPT

## 2022-06-08 RX ORDER — WARFARIN SODIUM 5 MG/1
TABLET ORAL
Qty: 30 TABLET | Refills: 2 | Status: SHIPPED | OUTPATIENT
Start: 2022-06-08 | End: 2022-06-22 | Stop reason: SDUPTHER

## 2022-06-08 NOTE — PATIENT INSTRUCTIONS
Today your INR was 1.8 . Your goal INR is 2.0-3.0 . You have a 2.5 mg and 5 mg tablet of Coumadin (warfarin). Take Coumadin (warfarin) as follows: Take 5 mg daily. Come back in 2 week(s) for your next finger stick/INR blood test.        Avoid any over the counter items containing aspirin or ibuprofen, and avoid great swings in general diet. Avoid alcohol consumption. Please notify the INR nurse if you are started on any new medication including over the counter or herbal supplements. Also, please notify your INR nurse if any of your other prescription or over the counter medications have been discontinued. Call [facility] at [phone] if you have any signs of abnormal bleeding/blood clot.  ------------------------------------------------------------------------------------------------------------------  Taking Warfarin Safely: Care Instructions    Your Care Instructions  Warfarin is a medicine that you take to prevent blood clots. It is often called a blood thinner. Doctors give warfarin (such as Coumadin) to reduce the risk of blood clots. You may be at risk for blood clots if you have atrial fibrillation or deep vein thrombosis. Some other health problems may also put you at risk. Warfarin slows the amount of time it takes for your blood to clot. It can cause bleeding problems. Even if you've been taking warfarin for a while, it's important to know how to take it safely. Foods and other medicines can affect the way warfarin works. Some can make warfarin work too well. This can cause bleeding problems. And some can make it work poorly, so that it does not prevent blood clots very well. You will need regular blood tests to check how long it takes for your blood to form a clot. This test is called a PT or prothrombin time test. The result of the test is called an INR level.  Depending on the test results, your doctor or anticoagulation clinic may adjust your dose of warfarin. Follow-up care is a key part of your treatment and safety. Be sure to make and go to all appointments, and call your doctor if you are having problems. It's also a good idea to know your test results and keep a list of the medicines you take. How can you care for yourself at home? Take warfarin safely  · Take your warfarin at the same time each day. · If you miss a dose of warfarin, don't take an extra dose to make up for it. Your doctor can tell you exactly what to do so you don't take too much or too little. · Wear medical alert jewelry that lets others know that you take warfarin. You can buy this at most drugstores. · Don't take warfarin if you are pregnant or planning to get pregnant. Talk to your doctor about how you can prevent getting pregnant while you are taking it. · Don't change your dose or stop taking warfarin unless your doctor tells you to. Effects of medicines and food on warfarin  · Don't start or stop taking any medicines, vitamins, or natural remedies unless you first talk to your doctor. Many medicines can affect how warfarin works. These include aspirin and other pain relievers, over-the-counter medicines, multivitamins, dietary supplements, and herbal products. · Tell all of your doctors and pharmacists that you take warfarin. Some prescription medicines can affect how warfarin works. · Keep the amount of vitamin K in your diet about the same from day to day. Do not suddenly eat a lot more or a lot less food that is rich in vitamin K than you usually do. Vitamin K affects how warfarin works and how your blood clots. Talk with your doctor before making big changes in your diet. Vitamin K is in many foods, such as:  ¨ Leafy greens, such as kale, cabbage, spinach, Swiss chard, and lettuce. ¨ Canola and soybean oils. ¨ Green vegetables, such as asparagus, broccoli, and [place] sprouts. ¨ Vegetable drinks, green tea leaves, and some dietary supplement drinks.   · Avoid cranberry juice and other cranberry products. They can increase the effects of warfarin. · Limit your use of alcohol. Avoid bleeding by preventing falls and injuries  · Wear slippers or shoes with nonskid soles. · Remove throw rugs and clutter. · Rearrange furniture and electrical cords to keep them out of walking paths. · Keep stairways, porches, and outside walkways well lit. Use night-lights in hallways and bathrooms. · Be extra careful when you work with sharp tools or knives. When should you call for help? Call 911 anytime you think you may need emergency care. For example, call if:  · You have a sudden, severe headache that is different from past headaches. Call your doctor now or seek immediate medical care if:  · You have any abnormal bleeding, such as:  ¨ Nosebleeds. ¨ Vaginal bleeding that is different (heavier, more frequent, at a different time of the month) than what you are used to. ¨ Bloody or black stools, or rectal bleeding. ¨ Bloody or pink urine. Watch closely for changes in your health, and be sure to contact your doctor if you have any problems. Where can you learn more? Go to http://www.gray.com/. Enter O395 in the search box to learn more about \"Taking Warfarin Safely: Care Instructions. \"  Current as of: January 27, 2016  Content Version: 11.1  © 9990-0877 [employer]. Care instructions adapted under license by [employer] (which disclaims liability or warranty for this information). If you have questions about a medical condition or this instruction, always ask your healthcare professional. [place] [address] any warranty or liability for your use of this information.

## 2022-06-08 NOTE — PROGRESS NOTES
Pharmacy Progress Note - Anticoagulation Management    S/O:  Mr. George Brown  is a 78 y.o. male seen today for anticoagulation management for the diagnosis of Atrial Fibrillation. HPI: At last visit (5/12) INR was 2.0 and therapeutic. Group home representative denied any changes to the patient's medications. Patient reported consistent intake of vitamin K foods. Patient will continue current regimen. Interim History:    · Warfarin start date: Prior to 10/9/20 per chart review  · INR Goal:  2.0-3.0    · Current warfarin regimen: 2.5 mg Fri and 5 mg daily ROW  · Warfarin tablet strength:   5 mg  · Duration of therapy: Indefinite    Today's pertinent positives includes:  No significant changes since last visit    Results for orders placed or performed in visit on 06/08/22   AMB POC PT/INR   Result Value Ref Range    VALID INTERNAL CONTROL POC Yes     Prothrombin time (POC) 21.6 seconds    INR POC 1.8        · Adherence:   · Able to recall regimen? YES  · Miss/extra dose? NO  · Need refill?  YES       Upcoming procedure(s):  N/A      Past Medical History:   Diagnosis Date    A-fib [facility]) 2015    pacemaker L chest     CAD (coronary artery disease) 2015    Pacemaker    Epile[facility] ([facility] [address]     Heart disease     Hypertension     Incontinence     Leg swelling     Mental retardation, 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)     Other ill-defined conditions(799.89)     edema legs    S/P ablation of atrial fibrillation 11/13/2020    S/P biventricular cardiac pacemaker procedure 10/23/2015    10/23/15 [employer] biventricular pacemaker inmplant    Screen for colon cancer 9/27/2012     Allergies   Allergen Reactions    Sulfa (Sulfonamide Antibiotics) Rash     Patient denies     Current Outpatient Medications   Medication Sig    warfarin (COUMADIN) 2.5 mg tablet TAKE 1 TABLET BY MOUTH ONCE DAILY IN THE EVENING ON FRIDAY    warfarin (COUMADIN) 5 mg tablet TAKE 1 TABLET BY MOUTH DAILY EXCEPT ON FRIDAYS    levETIRAcetam 1,000 mg tablet TAKE 1 TABLET BY MOUTH TWICE A DAY    potassium chloride (K-DUR, KLOR-CON M20) 20 mEq tablet TAKE 1 TABLET BY MOUTH DAILY    bumetanide (BUMEX) 2 mg tablet TAKE 1 TABLET BY MOUTH DAILY    Vimpat 100 mg tab tablet TAKE 1 TABLET BY MOUTH TWICE A DAY    amiodarone (CORDARONE) 200 mg tablet TAKE 1/2 TABLET BY MOUTH DAILY.  polyethylene glycol (MIRALAX) 17 gram packet Take 1 Packet by mouth daily as needed for Constipation.  metoprolol tartrate (LOPRESSOR) 25 mg tablet TAKE 1 TABLET BY MOUTH TWICE A DAY    cholecalciferol (VITAMIN D3) (1000 Units /25 mcg) tablet TAKE 1 TABLET BY MOUTH DAILY    acetaminophen (TYLENOL) 325 mg tablet Take 2 Tabs by mouth every four (4) hours as needed for Pain. No current facility-administered medications for this visit. Wt Readings from Last 3 Encounters:   05/12/22 216 lb (98 kg)   12/07/21 230 lb 12.8 oz (104.7 kg)   09/14/21 229 lb 11.2 oz (104.2 kg)     BP Readings from Last 3 Encounters:   05/12/22 111/67   12/07/21 (!) 93/58   09/14/21 104/66     Pulse Readings from Last 3 Encounters:   05/12/22 75   12/07/21 75   09/14/21 76     Lab Results   Component Value Date/Time    WBC 4.2 11/30/2021 11:30 AM    Hemoglobin (POC) 12.7 12/22/2017 11:23 AM    HGB 13.2 11/30/2021 11:30 AM    HCT 40.8 11/30/2021 11:30 AM    PLATELET 281 86/54/6865 11:30 AM    .0 (H) 11/30/2021 11:30 AM     Lab Results   Component Value Date/Time    Sodium 138 11/30/2021 11:30 AM    Potassium 4.0 11/30/2021 11:30 AM    Chloride 104 11/30/2021 11:30 AM    CO2 31 11/30/2021 11:30 AM    Anion gap 3 (L) 11/30/2021 11:30 AM    Glucose 90 11/30/2021 11:30 AM    BUN 23 (H) 11/30/2021 11:30 AM    Creatinine 0.93 11/30/2021 11:30 AM    BUN/Creatinine ratio 25 (H) 11/30/2021 11:30 AM    GFR est AA >60 11/30/2021 11:30 AM    GFR est non-AA >60 11/30/2021 11:30 AM    Calcium 9.0 11/30/2021 11:30 AM    Bilirubin, total 0.7 11/30/2021 11:30 AM    Alk.  phosphatase 110 11/30/2021 11:30 AM Protein, total 7.7 11/30/2021 11:30 AM    Albumin 4.0 11/30/2021 11:30 AM    Globulin 3.7 11/30/2021 11:30 AM    A-G Ratio 1.1 11/30/2021 11:30 AM    ALT (SGPT) 19 11/30/2021 11:30 AM     CrCl cannot be calculated (Patient's most recent lab result is older than the maximum 180 days allowed.). INR History:   (normal INR range 0.8-1.2)     Date   INR   PT   Dose/Comments  06/08/22 1.8  21.6 2.5 mg Fri and 5 mg daily ROW  05/12/22 2.0  23.7 2.5 mg Fri and 5 mg daily ROW  04/06/22 2.3  27.2 2.5 mg Fri and 5 mg daily ROW    · Medications that can increase  INR: amiodarone  · Medications that can decrease INR: N/A    A/P:       Anticoagulation:  Considering Mr. Christian Matthews past history, todays findings, and per Anticoagulation Collaborative Practice Agreement/Protocol:    1. Fingerstick POC INR (1.8) is Subtherapeutic for INR goal today. 2.  Change warfarin to 5 mg daily  3. INR is 1.8 and subtherapeutic. Group home representative denied missed doses of warfarin or changes to his medications. Patient reports consistent intake of vitamin K foods. Over the past 7 days, patient has taken 32.5 mg of warfarin. Per protocol, patient will increase weekly dose from 32.5 mg to 35 mg (~8%) and patient will take 5 mg daily. Will recheck INR in 2 weeks. Patient was instructed to schedule an appointment in 2 week(s) prior to leaving the clinic. Medication reconciliation was completed during the visit. Medications Discontinued During This Encounter   Medication Reason    warfarin (COUMADIN) 2.5 mg tablet DOSE ADJUSTMENT    warfarin (COUMADIN) 5 mg tablet DOSE ADJUSTMENT       A full discussion of the nature of anticoagulants has been carried out. A full discussion of the need for frequent and regular monitoring, precise dosage adjustment and compliance was stressed. Side effects of potential bleeding were discussed and Mr. Anu Botello was instructed to call [phone] if there are any signs of abnormal bleeding.    Kai was instructed to avoid any OTC items containing aspirin or ibuprofen and prior to starting any new OTC products to consult with his physician or pharmacist to ensure no drug interactions are present. Mr. Alejandro Jacob was instructed to avoid any major changes in his general diet and to avoid alcohol consumption. Group home representative was provided information in the AVS that includes topics on understanding coumadin therapy, drug interaction considerations, vitamin K and coumadin use, interactions with foods and supplements containing vitamin K, and the use of herbal products. Mr. Quinn/group home representative verbalized his understanding of all instructions and will call the office with any questions, concerns, or signs of abnormal bleeding or blood clot. Notifications of recommendations will be sent to Irvin Carrasquillo NP for review.     Thank you,  Bryan Mccarthy, [id number] [facility] Tracking Only     Intervention Detail: Adherence Monitorin, Dose Adjustment: 1, reason: Therapy Optimization, Lab(s) Ordered and Refill(s) Provided    Total # of Interventions Recommended: 4   Total # of Interventions Accepted: 4   Time Spent (min): 20

## 2022-06-22 ENCOUNTER — ANTI-COAG VISIT (OUTPATIENT)
Dept: PHARMACY | Age: 77
End: 2022-06-22
Payer: MEDICARE

## 2022-06-22 DIAGNOSIS — I48.91 ATRIAL FIBRILLATION, UNSPECIFIED TYPE (HCC): Primary | ICD-10-CM

## 2022-06-22 LAB
INR BLD: 3.4
PT POC: 40.4 SECONDS
VALID INTERNAL CONTROL?: YES

## 2022-06-22 PROCEDURE — 85610 PROTHROMBIN TIME: CPT

## 2022-06-22 PROCEDURE — 99213 OFFICE O/P EST LOW 20 MIN: CPT

## 2022-06-22 RX ORDER — WARFARIN SODIUM 5 MG/1
TABLET ORAL
Qty: 28 TABLET | Refills: 2 | Status: SHIPPED | OUTPATIENT
Start: 2022-06-22 | End: 2022-07-05 | Stop reason: SDUPTHER

## 2022-06-22 NOTE — PROGRESS NOTES
Pharmacy Progress Note - Anticoagulation Management    S/O:  Mr. Tavo Damon  is a 78 y.o. male seen today for anticoagulation management for the diagnosis of Atrial Fibrillation. HPI: At last visit (6/8) INR was 1.8 and subtherapeutic. Group home representative denied missed doses of warfarin or changes to his medications. Patient reported consistent intake of vitamin K foods. Over the past 7 days, patient has taken 32.5 mg of warfarin. Per protocol, patient will increase weekly dose from 32.5 mg to 35 mg (~8%) and patient will take 5 mg daily. Will recheck INR in 2 weeks    Interim History:    · Warfarin start date: Prior to 10/9/20 per chart review  · INR Goal:  2.0-3.0    · Current warfarin regimen: 5 mg daily  · Warfarin tablet strength:   5 mg  · Duration of therapy: Indefinite    Today's pertinent positives includes:  Change in diet/appetite    Patient's group home representative [place] reports patient's intake of vitamin K foods is inconsistent. Results for orders placed or performed in visit on 06/22/22   POC PROTHROMBIN W/INR   Result Value Ref Range    VALID INTERNAL CONTROL POC Yes     Prothrombin time (POC) 40.4 seconds    INR POC 3.4        · Adherence:   · Able to recall regimen? YES  · Miss/extra dose? NO  · Need refill? YES     Patient requires a new prescription for any warfarin dose adjustments.     Upcoming procedure(s):  N/A      Past Medical History:   Diagnosis Date    A-fib [facility]) 2015    pacemaker L chest     CAD (coronary artery disease) 2015    Pacemaker    [facility]     Heart disease     Hypertension     Incontinence     Leg swelling     Mental retardation, 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)     Other ill-defined conditions(799.89)     edema legs    S/P ablation of atrial fibrillation 11/13/2020    S/P biventricular cardiac pacemaker procedure 10/23/2015    10/23/15 [employer] biventricular pacemaker inmplant    Screen for colon cancer 9/27/2012     Allergies Allergen Reactions    Sulfa (Sulfonamide Antibiotics) Rash     Patient denies     Current Outpatient Medications   Medication Sig    warfarin (COUMADIN) 5 mg tablet TAKE 1 TABLET BY MOUTH EVERY DAY.  levETIRAcetam 1,000 mg tablet TAKE 1 TABLET BY MOUTH TWICE A DAY    potassium chloride (K-DUR, KLOR-CON M20) 20 mEq tablet TAKE 1 TABLET BY MOUTH DAILY    bumetanide (BUMEX) 2 mg tablet TAKE 1 TABLET BY MOUTH DAILY    Vimpat 100 mg tab tablet TAKE 1 TABLET BY MOUTH TWICE A DAY    amiodarone (CORDARONE) 200 mg tablet TAKE 1/2 TABLET BY MOUTH DAILY.  polyethylene glycol (MIRALAX) 17 gram packet Take 1 Packet by mouth daily as needed for Constipation.  metoprolol tartrate (LOPRESSOR) 25 mg tablet TAKE 1 TABLET BY MOUTH TWICE A DAY    cholecalciferol (VITAMIN D3) (1000 Units /25 mcg) tablet TAKE 1 TABLET BY MOUTH DAILY    acetaminophen (TYLENOL) 325 mg tablet Take 2 Tabs by mouth every four (4) hours as needed for Pain. No current facility-administered medications for this visit.      Wt Readings from Last 3 Encounters:   05/12/22 216 lb (98 kg)   12/07/21 230 lb 12.8 oz (104.7 kg)   09/14/21 229 lb 11.2 oz (104.2 kg)     BP Readings from Last 3 Encounters:   05/12/22 111/67   12/07/21 (!) 93/58   09/14/21 104/66     Pulse Readings from Last 3 Encounters:   05/12/22 75   12/07/21 75   09/14/21 76     Lab Results   Component Value Date/Time    WBC 4.2 11/30/2021 11:30 AM    Hemoglobin (POC) 12.7 12/22/2017 11:23 AM    HGB 13.2 11/30/2021 11:30 AM    HCT 40.8 11/30/2021 11:30 AM    PLATELET 015 96/61/7493 11:30 AM    .0 (H) 11/30/2021 11:30 AM     Lab Results   Component Value Date/Time    Sodium 138 11/30/2021 11:30 AM    Potassium 4.0 11/30/2021 11:30 AM    Chloride 104 11/30/2021 11:30 AM    CO2 31 11/30/2021 11:30 AM    Anion gap 3 (L) 11/30/2021 11:30 AM    Glucose 90 11/30/2021 11:30 AM    BUN 23 (H) 11/30/2021 11:30 AM    Creatinine 0.93 11/30/2021 11:30 AM    BUN/Creatinine ratio 25 (H) 11/30/2021 11:30 AM    GFR est AA >60 11/30/2021 11:30 AM    GFR est non-AA >60 11/30/2021 11:30 AM    Calcium 9.0 11/30/2021 11:30 AM    Bilirubin, total 0.7 11/30/2021 11:30 AM    Alk. phosphatase 110 11/30/2021 11:30 AM    Protein, total 7.7 11/30/2021 11:30 AM    Albumin 4.0 11/30/2021 11:30 AM    Globulin 3.7 11/30/2021 11:30 AM    A-G Ratio 1.1 11/30/2021 11:30 AM    ALT (SGPT) 19 11/30/2021 11:30 AM     CrCl cannot be calculated (Patient's most recent lab result is older than the maximum 180 days allowed.). INR History:   (normal INR range 0.8-1.2)     Date   INR   PT   Dose/Comments  06/22/22 3.4  40.4 5 mg daily  06/08/22 1.8  21.6 2.5 mg Fri; 5 mg daily ROW  05/12/22 2.0  23.7 2.5 mg Fri; 5 mg daily ROW  04/06/22 2.3  27.2 2.5 mg Fri; 5 mg daily ROW    · Medications that can increase  INR: amiodarone  · Medications that can decrease INR: N/A    A/P:       Anticoagulation:  Considering Mr. Cherelle Aceves past history, todays findings, and per Anticoagulation Collaborative Practice Agreement/Protocol:    1. Fingerstick POC INR (3.4) is Supratherapeutic for INR goal today. 2.  Change warfarin to 2.5 mg Fri; 5 mg daily ROW  3. INR is 3.4 and supratherapeutic. Patient's MAR reflects warfarin administered as instructed. Patient's group home representative Tierra Gerber) denies changes to the patient's medications. Patient's group home representative reports patient's intake of vitamin K foods is inconsistent. Will reduce weekly dose from 35 mg to 32.5 mg (~ 7%) and patient will take 2.5 mg Fri; 5 mg daily ROW. Encouraged group home representative to request a consistent diet with vitamin K foods for patient. Patient to recheck INR in 2 weeks. Patient was instructed to schedule an appointment in 2 week(s) prior to leaving the clinic. Medication reconciliation was completed during the visit.     Medications Discontinued During This Encounter   Medication Reason    warfarin (COUMADIN) 5 mg tablet REORDER       A full discussion of the nature of anticoagulants has been carried out. A full discussion of the need for frequent and regular monitoring, precise dosage adjustment and compliance was stressed. Side effects of potential bleeding were discussed and Mr. Khloe Bojorquez was instructed to call [phone] if there are any signs of abnormal bleeding. Mr. Khloe Bojorquez was instructed to avoid any OTC items containing aspirin or ibuprofen and prior to starting any new OTC products to consult with his physician or pharmacist to ensure no drug interactions are present. Mr. Khloe Bojorquez was instructed to avoid any major changes in his general diet and to avoid alcohol consumption. Group home representative was provided information in the AVS that includes topics on understanding coumadin therapy, drug interaction considerations, vitamin K and coumadin use, interactions with foods and supplements containing vitamin K, and the use of herbal products. Mr. Quinn/group home representative verbalized his understanding of all instructions and will call the office with any questions, concerns, or signs of abnormal bleeding or blood clot. Notifications of recommendations will be sent to Kirt Ryan NP for review.     Thank you,  CLINT Ramirez/ Germán Regan [address] Tracking Only     Intervention Detail: Adherence Monitorin, Dose Adjustment: 1, reason: Therapy Optimization, Lab(s) Ordered and Refill(s) Provided    Total # of Interventions Recommended: 4   Total # of Interventions Accepted: 4   Time Spent (min): 20

## 2022-06-22 NOTE — PATIENT INSTRUCTIONS
Today your INR was 3.4 . Your goal INR is 2.0-3.0 . You have a 5 mg tablet of Coumadin (warfarin). Take Coumadin (warfarin) as follows: Take 2.5 mg (0.5 tablet) every Friday; and 5 mg (1 tablet) daily rest of week. Come back in 2 week(s) for your next finger stick/INR blood test.        Avoid any over the counter items containing aspirin or ibuprofen, and avoid great swings in general diet. Avoid alcohol consumption. Please notify the INR nurse if you are started on any new medication including over the counter or herbal supplements. Also, please notify your INR nurse if any of your other prescription or over the counter medications have been discontinued. Call [facility] at [phone] if you have any signs of abnormal bleeding/blood clot.  ------------------------------------------------------------------------------------------------------------------  Taking Warfarin Safely: Care Instructions    Your Care Instructions  Warfarin is a medicine that you take to prevent blood clots. It is often called a blood thinner. Doctors give warfarin (such as Coumadin) to reduce the risk of blood clots. You may be at risk for blood clots if you have atrial fibrillation or deep vein thrombosis. Some other health problems may also put you at risk. Warfarin slows the amount of time it takes for your blood to clot. It can cause bleeding problems. Even if you've been taking warfarin for a while, it's important to know how to take it safely. Foods and other medicines can affect the way warfarin works. Some can make warfarin work too well. This can cause bleeding problems. And some can make it work poorly, so that it does not prevent blood clots very well. You will need regular blood tests to check how long it takes for your blood to form a clot. This test is called a PT or prothrombin time test. The result of the test is called an INR level.  Depending on the test results, your doctor or anticoagulation clinic may adjust your dose of warfarin. Follow-up care is a key part of your treatment and safety. Be sure to make and go to all appointments, and call your doctor if you are having problems. It's also a good idea to know your test results and keep a list of the medicines you take. How can you care for yourself at home? Take warfarin safely  · Take your warfarin at the same time each day. · If you miss a dose of warfarin, don't take an extra dose to make up for it. Your doctor can tell you exactly what to do so you don't take too much or too little. · Wear medical alert jewelry that lets others know that you take warfarin. You can buy this at most drugstores. · Don't take warfarin if you are pregnant or planning to get pregnant. Talk to your doctor about how you can prevent getting pregnant while you are taking it. · Don't change your dose or stop taking warfarin unless your doctor tells you to. Effects of medicines and food on warfarin  · Don't start or stop taking any medicines, vitamins, or natural remedies unless you first talk to your doctor. Many medicines can affect how warfarin works. These include aspirin and other pain relievers, over-the-counter medicines, multivitamins, dietary supplements, and herbal products. · Tell all of your doctors and pharmacists that you take warfarin. Some prescription medicines can affect how warfarin works. · Keep the amount of vitamin K in your diet about the same from day to day. Do not suddenly eat a lot more or a lot less food that is rich in vitamin K than you usually do. Vitamin K affects how warfarin works and how your blood clots. Talk with your doctor before making big changes in your diet. Vitamin K is in many foods, such as:  ¨ Leafy greens, such as kale, cabbage, spinach, Swiss chard, and lettuce. ¨ Canola and soybean oils. ¨ Green vegetables, such as asparagus, broccoli, and [place] sprouts.   ¨ Vegetable drinks, green tea leaves, and some dietary supplement drinks. · Avoid cranberry juice and other cranberry products. They can increase the effects of warfarin. · Limit your use of alcohol. Avoid bleeding by preventing falls and injuries  · Wear slippers or shoes with nonskid soles. · Remove throw rugs and clutter. · Rearrange furniture and electrical cords to keep them out of walking paths. · Keep stairways, porches, and outside walkways well lit. Use night-lights in hallways and bathrooms. · Be extra careful when you work with sharp tools or knives. When should you call for help? Call 911 anytime you think you may need emergency care. For example, call if:  · You have a sudden, severe headache that is different from past headaches. Call your doctor now or seek immediate medical care if:  · You have any abnormal bleeding, such as:  ¨ Nosebleeds. ¨ Vaginal bleeding that is different (heavier, more frequent, at a different time of the month) than what you are used to. ¨ Bloody or black stools, or rectal bleeding. ¨ Bloody or pink urine. Watch closely for changes in your health, and be sure to contact your doctor if you have any problems. Where can you learn more? Go to http://www.gray.com/. Enter A898 in the search box to learn more about \"Taking Warfarin Safely: Care Instructions. \"  Current as of: January 27, 2016  Content Version: 11.1  © 9157-8825 [employer]. Care instructions adapted under license by [employer] (which disclaims liability or warranty for this information). If you have questions about a medical condition or this instruction, always ask your healthcare professional. [place] [address] any warranty or liability for your use of this information.

## 2022-06-29 RX ORDER — MELATONIN
Qty: 30 TABLET | Refills: 11 | Status: SHIPPED | OUTPATIENT
Start: 2022-06-29

## 2022-07-05 ENCOUNTER — ANTI-COAG VISIT (OUTPATIENT)
Dept: PHARMACY | Age: 77
End: 2022-07-05
Payer: MEDICARE

## 2022-07-05 DIAGNOSIS — I48.91 ATRIAL FIBRILLATION, UNSPECIFIED TYPE (HCC): Primary | ICD-10-CM

## 2022-07-05 LAB
INR BLD: 3.8
PT POC: 45.1 SECONDS
VALID INTERNAL CONTROL?: YES

## 2022-07-05 PROCEDURE — 99213 OFFICE O/P EST LOW 20 MIN: CPT

## 2022-07-05 PROCEDURE — 85610 PROTHROMBIN TIME: CPT

## 2022-07-05 RX ORDER — WARFARIN SODIUM 5 MG/1
TABLET ORAL
Qty: 24 TABLET | Refills: 2 | Status: SHIPPED | OUTPATIENT
Start: 2022-07-05 | End: 2022-09-28 | Stop reason: SDUPTHER

## 2022-07-05 NOTE — PATIENT INSTRUCTIONS
Today your INR was 3.8 . Your goal INR is 2.0-3.0 . You have a 5 mg tablet of Coumadin (warfarin). Take Coumadin (warfarin) as follows:    Hold dose today (7/5/22) and then take 2.5 mg (0.5 tablet) every Wednesday and Friday; and 5 mg (1 tablet) daily rest of week. Come back in 1 week(s) for your next finger stick/INR blood test.        Avoid any over the counter items containing aspirin or ibuprofen, and avoid great swings in general diet. Avoid alcohol consumption. Please notify the INR nurse if you are started on any new medication including over the counter or herbal supplements. Also, please notify your INR nurse if any of your other prescription or over the counter medications have been discontinued. Call [facility] at [phone] if you have any signs of abnormal bleeding/blood clot.  ------------------------------------------------------------------------------------------------------------------  Taking Warfarin Safely: Care Instructions    Your Care Instructions  Warfarin is a medicine that you take to prevent blood clots. It is often called a blood thinner. Doctors give warfarin (such as Coumadin) to reduce the risk of blood clots. You may be at risk for blood clots if you have atrial fibrillation or deep vein thrombosis. Some other health problems may also put you at risk. Warfarin slows the amount of time it takes for your blood to clot. It can cause bleeding problems. Even if you've been taking warfarin for a while, it's important to know how to take it safely. Foods and other medicines can affect the way warfarin works. Some can make warfarin work too well. This can cause bleeding problems. And some can make it work poorly, so that it does not prevent blood clots very well. You will need regular blood tests to check how long it takes for your blood to form a clot. This test is called a PT or prothrombin time test. The result of the test is called an INR level. Depending on the test results, your doctor or anticoagulation clinic may adjust your dose of warfarin. Follow-up care is a key part of your treatment and safety. Be sure to make and go to all appointments, and call your doctor if you are having problems. It's also a good idea to know your test results and keep a list of the medicines you take. How can you care for yourself at home? Take warfarin safely  · Take your warfarin at the same time each day. · If you miss a dose of warfarin, don't take an extra dose to make up for it. Your doctor can tell you exactly what to do so you don't take too much or too little. · Wear medical alert jewelry that lets others know that you take warfarin. You can buy this at most drugstores. · Don't take warfarin if you are pregnant or planning to get pregnant. Talk to your doctor about how you can prevent getting pregnant while you are taking it. · Don't change your dose or stop taking warfarin unless your doctor tells you to. Effects of medicines and food on warfarin  · Don't start or stop taking any medicines, vitamins, or natural remedies unless you first talk to your doctor. Many medicines can affect how warfarin works. These include aspirin and other pain relievers, over-the-counter medicines, multivitamins, dietary supplements, and herbal products. · Tell all of your doctors and pharmacists that you take warfarin. Some prescription medicines can affect how warfarin works. · Keep the amount of vitamin K in your diet about the same from day to day. Do not suddenly eat a lot more or a lot less food that is rich in vitamin K than you usually do. Vitamin K affects how warfarin works and how your blood clots. Talk with your doctor before making big changes in your diet. Vitamin K is in many foods, such as:  ¨ Leafy greens, such as kale, cabbage, spinach, Swiss chard, and lettuce. ¨ Canola and soybean oils.   ¨ Green vegetables, such as asparagus, broccoli, and [address] sprouts. ¨ Vegetable drinks, green tea leaves, and some dietary supplement drinks. · Avoid cranberry juice and other cranberry products. They can increase the effects of warfarin. · Limit your use of alcohol. Avoid bleeding by preventing falls and injuries  · Wear slippers or shoes with nonskid soles. · Remove throw rugs and clutter. · Rearrange furniture and electrical cords to keep them out of walking paths. · Keep stairways, porches, and outside walkways well lit. Use night-lights in hallways and bathrooms. · Be extra careful when you work with sharp tools or knives. When should you call for help? Call 911 anytime you think you may need emergency care. For example, call if:  · You have a sudden, severe headache that is different from past headaches. Call your doctor now or seek immediate medical care if:  · You have any abnormal bleeding, such as:  ¨ Nosebleeds. ¨ Vaginal bleeding that is different (heavier, more frequent, at a different time of the month) than what you are used to. ¨ Bloody or black stools, or rectal bleeding. ¨ Bloody or pink urine. Watch closely for changes in your health, and be sure to contact your doctor if you have any problems. Where can you learn more? Go to http://mandi-marylou.info/. Enter U702 in the search box to learn more about \"Taking Warfarin Safely: Care Instructions. \"  Current as of: January 27, 2016  Content Version: 11.1  © 3296-4114 [employer]. Care instructions adapted under license by [employer] (which disclaims liability or warranty for this information). If you have questions about a medical condition or this instruction, always ask your healthcare professional. [place] [address] any warranty or liability for your use of this information.

## 2022-07-05 NOTE — PROGRESS NOTES
Pharmacy Progress Note - Anticoagulation Management    S/O:  Mr. Kamari Valdes  is a 78 y.o. male seen today for anticoagulation management for the diagnosis of Atrial Fibrillation. HPI: At last visit (6/22), INR was INR is 3.4 and supratherapeutic. Patient's MAR reflected warfarin administered as instructed. Patient's group home representative Corirne Lees) denied changes to the patient's medications. Patient's group home representative reported patient's intake of vitamin K foods is inconsistent. Will reduce weekly dose from 35 mg to 32.5 mg (~ 7%) and patient will take 2.5 mg Fri; 5 mg daily ROW. Encouraged group home representative to request a consistent diet with vitamin K foods for patient. Patient to recheck INR in 2 weeks. Interim History:    · Warfarin start date: Prior to 10/9/20 per chart review  · INR Goal:  2.0-3.0    · Current warfarin regimen: 2.5 mg Fri; 5 mg daily ROW  · Warfarin tablet strength:   5 mg  · Duration of therapy: Indefinite    Today's pertinent positives includes:  No significant changes since last visit      Results for orders placed or performed in visit on 07/05/22   POC PROTHROMBIN W/INR   Result Value Ref Range    VALID INTERNAL CONTROL POC Yes     Prothrombin time (POC) 45.1 seconds    INR POC 3.8        · Adherence:   · Able to recall regimen? YES  · Miss/extra dose? NO  · Need refill? YES     Patient requires a new prescription for any warfarin dose adjustments.     Upcoming procedure(s):  N/A      Past Medical History:   Diagnosis Date    A-fib [facility]) 2015    pacemaker L chest     CAD (coronary artery disease) 2015    Pacemaker    [facility]     Heart disease     Hypertension     Incontinence     Leg swelling     Mental retardation, 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)     Other ill-defined conditions(799.89)     edema legs    S/P ablation of atrial fibrillation 11/13/2020    S/P biventricular cardiac pacemaker procedure 10/23/2015    10/23/15 [facility] biventricular pacemaker inmplant    Screen for colon cancer 9/27/2012     Allergies   Allergen Reactions    Sulfa (Sulfonamide Antibiotics) Rash     Patient denies     Current Outpatient Medications   Medication Sig    cholecalciferol (VITAMIN D3) (1000 Units /25 mcg) tablet TAKE 1 TABLET BY MOUTH DAILY    warfarin (COUMADIN) 5 mg tablet Take 0.5 tablet (2.5 mg) by mouth every Friday. Take 1 tablet (5 mg) every Monday, Tuesday, Wednesday, Thursday, Saturday and Sunday.  levETIRAcetam 1,000 mg tablet TAKE 1 TABLET BY MOUTH TWICE A DAY    potassium chloride (K-DUR, KLOR-CON M20) 20 mEq tablet TAKE 1 TABLET BY MOUTH DAILY    bumetanide (BUMEX) 2 mg tablet TAKE 1 TABLET BY MOUTH DAILY    Vimpat 100 mg tab tablet TAKE 1 TABLET BY MOUTH TWICE A DAY    amiodarone (CORDARONE) 200 mg tablet TAKE 1/2 TABLET BY MOUTH DAILY.  polyethylene glycol (MIRALAX) 17 gram packet Take 1 Packet by mouth daily as needed for Constipation.  metoprolol tartrate (LOPRESSOR) 25 mg tablet TAKE 1 TABLET BY MOUTH TWICE A DAY    acetaminophen (TYLENOL) 325 mg tablet Take 2 Tabs by mouth every four (4) hours as needed for Pain. No current facility-administered medications for this visit.      Wt Readings from Last 3 Encounters:   05/12/22 216 lb (98 kg)   12/07/21 230 lb 12.8 oz (104.7 kg)   09/14/21 229 lb 11.2 oz (104.2 kg)     BP Readings from Last 3 Encounters:   05/12/22 111/67   12/07/21 (!) 93/58   09/14/21 104/66     Pulse Readings from Last 3 Encounters:   05/12/22 75   12/07/21 75   09/14/21 76     Lab Results   Component Value Date/Time    WBC 4.2 11/30/2021 11:30 AM    Hemoglobin (POC) 12.7 12/22/2017 11:23 AM    HGB 13.2 11/30/2021 11:30 AM    HCT 40.8 11/30/2021 11:30 AM    PLATELET 223 72/51/0350 11:30 AM    .0 (H) 11/30/2021 11:30 AM     Lab Results   Component Value Date/Time    Sodium 138 11/30/2021 11:30 AM    Potassium 4.0 11/30/2021 11:30 AM    Chloride 104 11/30/2021 11:30 AM    CO2 31 11/30/2021 11:30 AM    Anion gap 3 (L) 11/30/2021 11:30 AM    Glucose 90 11/30/2021 11:30 AM    BUN 23 (H) 11/30/2021 11:30 AM    Creatinine 0.93 11/30/2021 11:30 AM    BUN/Creatinine ratio 25 (H) 11/30/2021 11:30 AM    GFR est AA >60 11/30/2021 11:30 AM    GFR est non-AA >60 11/30/2021 11:30 AM    Calcium 9.0 11/30/2021 11:30 AM    Bilirubin, total 0.7 11/30/2021 11:30 AM    Alk. phosphatase 110 11/30/2021 11:30 AM    Protein, total 7.7 11/30/2021 11:30 AM    Albumin 4.0 11/30/2021 11:30 AM    Globulin 3.7 11/30/2021 11:30 AM    A-G Ratio 1.1 11/30/2021 11:30 AM    ALT (SGPT) 19 11/30/2021 11:30 AM     CrCl cannot be calculated (Patient's most recent lab result is older than the maximum 180 days allowed.). INR History:   (normal INR range 0.8-1.2)     Date   INR   PT   Dose/Comments  07/05/22 3.8  45.1 2.5 mg Fri; 5 mg daily ROW  06/22/22 3.4  40.4 5 mg daily  06/08/22 1.8  21.6 2.5 mg Fri; 5 mg daily ROW  05/12/22 2.0  23.7 2.5 mg Fri; 5 mg daily ROW  04/06/22 2.3  27.2 2.5 mg Fri; 5 mg daily ROW    · Medications that can increase  INR: amiodarone  · Medications that can decrease INR: N/A    A/P:       Anticoagulation:  Considering Mr. Benitez Blackwell past history, todays findings, and per Anticoagulation Collaborative Practice Agreement/Protocol:    1. Fingerstick POC INR (3.8) is Supratherapeutic for INR goal today. 2.  Change warfarin to hold dose today (7/5) and then take 2.5 mg Wed, Fri; 5 mg daily ROW  3. INR is 3.8 and supratherapeutic. Patient's MAR reflects warfarin administered as instructed. Patient's group home representative Nicole Fall) denies changes to the patient's medications. Patient's group home representative reports patient's intake of vitamin K foods remain inconsistent. Will reduce weekly dose from 32.5 mg to 30 mg (~ 8%) and patient will hold dose today (7/5) and then take 2.5 mg Wed, Fri; 5 mg daily ROW.  Encouraged group home representative to request a weekly vitamin K food for patient to prevent fluctuations in patient's INR. Patient to recheck INR in 1 week. Patient was instructed to schedule an appointment in 1 week(s) prior to leaving the clinic. Medication reconciliation was completed during the visit. There are no discontinued medications. A full discussion of the nature of anticoagulants has been carried out. A full discussion of the need for frequent and regular monitoring, precise dosage adjustment and compliance was stressed. Side effects of potential bleeding were discussed and Mr. Tabatha Cooney was instructed to call [phone] if there are any signs of abnormal bleeding. Mr. Tabatha Cooney was instructed to avoid any OTC items containing aspirin or ibuprofen and prior to starting any new OTC products to consult with his physician or pharmacist to ensure no drug interactions are present. Mr. Tabatha Cooney was instructed to avoid any major changes in his general diet and to avoid alcohol consumption. Group home representative was provided information in the AVS that includes topics on understanding coumadin therapy, drug interaction considerations, vitamin K and coumadin use, interactions with foods and supplements containing vitamin K, and the use of herbal products. Mr. Quinn/group home representative verbalized his understanding of all instructions and will call the office with any questions, concerns, or signs of abnormal bleeding or blood clot. Notifications of recommendations will be sent to Gonzalo Whitman NP for review.     Thank you,  CLINT Mancini/ [address] Tracking Only     Intervention Detail: Adherence Monitorin, Dose Adjustment: 1, reason: Therapy Optimization, Lab(s) Ordered and Refill(s) Provided    Total # of Interventions Recommended: 4   Total # of Interventions Accepted: 4   Time Spent (min): 20

## 2022-07-07 ENCOUNTER — TELEPHONE (OUTPATIENT)
Dept: NEUROLOGY | Age: 77
End: 2022-07-07

## 2022-08-03 RX ORDER — METOPROLOL TARTRATE 25 MG/1
25 TABLET, FILM COATED ORAL 2 TIMES DAILY
Qty: 62 TABLET | Refills: 11 | OUTPATIENT
Start: 2022-08-03

## 2022-08-31 ENCOUNTER — ANTI-COAG VISIT (OUTPATIENT)
Dept: PHARMACY | Age: 77
End: 2022-08-31
Payer: MEDICARE

## 2022-08-31 DIAGNOSIS — I48.91 ATRIAL FIBRILLATION, UNSPECIFIED TYPE (HCC): Primary | ICD-10-CM

## 2022-08-31 LAB
INR BLD: 3
PT POC: 35.7 SECONDS
VALID INTERNAL CONTROL?: YES

## 2022-08-31 PROCEDURE — 85610 PROTHROMBIN TIME: CPT

## 2022-08-31 PROCEDURE — 99211 OFF/OP EST MAY X REQ PHY/QHP: CPT

## 2022-08-31 NOTE — PATIENT INSTRUCTIONS
Today your INR was 3.0. Your goal INR is 2.0-3.0 . You have a 5 mg tablet of Coumadin (warfarin). Take Coumadin (warfarin) as follows: Take 2.5 mg (0.5 tablet) every Wednesday and Friday; and 5 mg (1 tablet) daily rest of week. Come back in 4 week(s) for your next finger stick/INR blood test.        Avoid any over the counter items containing aspirin or ibuprofen, and avoid great swings in general diet. Avoid alcohol consumption. Please notify the INR nurse if you are started on any new medication including over the counter or herbal supplements. Also, please notify your INR nurse if any of your other prescription or over the counter medications have been discontinued. Call [facility] at [phone] if you have any signs of abnormal bleeding/blood clot.  ------------------------------------------------------------------------------------------------------------------  Taking Warfarin Safely: Care Instructions    Your Care Instructions  Warfarin is a medicine that you take to prevent blood clots. It is often called a blood thinner. Doctors give warfarin (such as Coumadin) to reduce the risk of blood clots. You may be at risk for blood clots if you have atrial fibrillation or deep vein thrombosis. Some other health problems may also put you at risk. Warfarin slows the amount of time it takes for your blood to clot. It can cause bleeding problems. Even if you've been taking warfarin for a while, it's important to know how to take it safely. Foods and other medicines can affect the way warfarin works. Some can make warfarin work too well. This can cause bleeding problems. And some can make it work poorly, so that it does not prevent blood clots very well. You will need regular blood tests to check how long it takes for your blood to form a clot. This test is called a PT or prothrombin time test. The result of the test is called an INR level.  Depending on the test results, your doctor or anticoagulation clinic may adjust your dose of warfarin. Follow-up care is a key part of your treatment and safety. Be sure to make and go to all appointments, and call your doctor if you are having problems. It's also a good idea to know your test results and keep a list of the medicines you take. How can you care for yourself at home? Take warfarin safely  Take your warfarin at the same time each day. If you miss a dose of warfarin, don't take an extra dose to make up for it. Your doctor can tell you exactly what to do so you don't take too much or too little. Wear medical alert jewelry that lets others know that you take warfarin. You can buy this at most drugstores. Don't take warfarin if you are pregnant or planning to get pregnant. Talk to your doctor about how you can prevent getting pregnant while you are taking it. Don't change your dose or stop taking warfarin unless your doctor tells you to. Effects of medicines and food on warfarin  Don't start or stop taking any medicines, vitamins, or natural remedies unless you first talk to your doctor. Many medicines can affect how warfarin works. These include aspirin and other pain relievers, over-the-counter medicines, multivitamins, dietary supplements, and herbal products. Tell all of your doctors and pharmacists that you take warfarin. Some prescription medicines can affect how warfarin works. Keep the amount of vitamin K in your diet about the same from day to day. Do not suddenly eat a lot more or a lot less food that is rich in vitamin K than you usually do. Vitamin K affects how warfarin works and how your blood clots. Talk with your doctor before making big changes in your diet. Vitamin K is in many foods, such as:  Leafy greens, such as kale, cabbage, spinach, Swiss chard, and lettuce. Canola and soybean oils. Green vegetables, such as asparagus, broccoli, and [place] sprouts.   Vegetable drinks, green tea leaves, and some dietary supplement drinks. Avoid cranberry juice and other cranberry products. They can increase the effects of warfarin. Limit your use of alcohol. Avoid bleeding by preventing falls and injuries  Wear slippers or shoes with nonskid soles. Remove throw rugs and clutter. Rearrange furniture and electrical cords to keep them out of walking paths. Keep stairways, porches, and outside walkways well lit. Use night-lights in hallways and bathrooms. Be extra careful when you work with sharp tools or knives. When should you call for help? Call 911 anytime you think you may need emergency care. For example, call if:  You have a sudden, severe headache that is different from past headaches. Call your doctor now or seek immediate medical care if:  You have any abnormal bleeding, such as:  Nosebleeds. Vaginal bleeding that is different (heavier, more frequent, at a different time of the month) than what you are used to. Bloody or black stools, or rectal bleeding. Bloody or pink urine. Watch closely for changes in your health, and be sure to contact your doctor if you have any problems. Where can you learn more? Go to http://www.gray.com/. Enter L878 in the search box to learn more about \"Taking Warfarin Safely: Care Instructions. \"  Current as of: January 27, 2016  Content Version: 11.1  © 4164-0518 [employer]. Care instructions adapted under license by [employer] (which disclaims liability or warranty for this information). If you have questions about a medical condition or this instruction, always ask your healthcare professional. [place] [address] any warranty or liability for your use of this information.

## 2022-08-31 NOTE — PROGRESS NOTES
Pharmacy Progress Note - Anticoagulation Management    S/O:  Mr. Ansley Mtz  is a 78 y.o. male seen today for anticoagulation management for the diagnosis of Atrial Fibrillation. HPI: At last visit (7/5), INR was 3.8 and supratherapeutic. Patient's MAR reflected warfarin administered as instructed. Patient's group home representative Blessing Zapata) denied changes to the patient's medications. Patient's group home representative reported patient's intake of vitamin K foods remain inconsistent. Will reduce weekly dose from 32.5 mg to 30 mg (~ 8%) and patient will hold dose today (7/5) and then take 2.5 mg Wed, Fri; 5 mg daily ROW. Encouraged group home representative to request a weekly vitamin K food for patient to prevent fluctuations in patient's INR. Patient to recheck INR in 1 week. Interim History:    Warfarin start date: Prior to 10/9/20 per chart review  INR Goal:  2.0-3.0    Current warfarin regimen: hold dose today (7/5) and then take 2.5 mg Wed, Fri; 5 mg daily ROW  Warfarin tablet strength:   2.5 mg, 5 mg  Duration of therapy: Indefinite    Today's pertinent positives includes:  No significant changes since last visit      Results for orders placed or performed in visit on 08/31/22   POC PROTHROMBIN W/INR   Result Value Ref Range    VALID INTERNAL CONTROL POC Yes     Prothrombin time (POC) 35.7 seconds    INR POC 3.0        Adherence:   Able to recall regimen? YES  Miss/extra dose? NO  Need refill?  NO       Upcoming procedure(s):  N/A      Past Medical History:   Diagnosis Date    A-fib [facility]) 2015    pacemaker L chest     CAD (coronary artery disease) 2015    Pacemaker    Epilepsy ([facility]     Heart disease     Hypertension     Incontinence     Leg swelling     Mental retardation, 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)     Other ill-defined conditions(799.89)     edema legs    S/P ablation of atrial fibrillation 11/13/2020    S/P biventricular cardiac pacemaker procedure 10/23/2015    10/23/15 [employer] biventricular pacemaker inmplant    Screen for colon cancer 9/27/2012     Allergies   Allergen Reactions    Sulfa (Sulfonamide Antibiotics) Rash     Patient denies     Current Outpatient Medications   Medication Sig    metoprolol tartrate (LOPRESSOR) 25 mg tablet TAKE 1 TABLET BY MOUTH TWICE A DAY    warfarin (COUMADIN) 5 mg tablet Hold dose ONLY on 7/5/22. Then take 0.5 tablet (2.5 mg) by mouth every Wednesday and Friday. Take 1 tablet (5 mg) every Monday, Tuesday, Thursday, Saturday and Sunday. cholecalciferol (VITAMIN D3) (1000 Units /25 mcg) tablet TAKE 1 TABLET BY MOUTH DAILY    levETIRAcetam 1,000 mg tablet TAKE 1 TABLET BY MOUTH TWICE A DAY    potassium chloride (K-DUR, KLOR-CON M20) 20 mEq tablet TAKE 1 TABLET BY MOUTH DAILY    bumetanide (BUMEX) 2 mg tablet TAKE 1 TABLET BY MOUTH DAILY    Vimpat 100 mg tab tablet TAKE 1 TABLET BY MOUTH TWICE A DAY    amiodarone (CORDARONE) 200 mg tablet TAKE 1/2 TABLET BY MOUTH DAILY. polyethylene glycol (MIRALAX) 17 gram packet Take 1 Packet by mouth daily as needed for Constipation. acetaminophen (TYLENOL) 325 mg tablet Take 2 Tabs by mouth every four (4) hours as needed for Pain. No current facility-administered medications for this visit.      Wt Readings from Last 3 Encounters:   05/12/22 216 lb (98 kg)   12/07/21 230 lb 12.8 oz (104.7 kg)   09/14/21 229 lb 11.2 oz (104.2 kg)     BP Readings from Last 3 Encounters:   05/12/22 111/67   12/07/21 (!) 93/58   09/14/21 104/66     Pulse Readings from Last 3 Encounters:   05/12/22 75   12/07/21 75   09/14/21 76     Lab Results   Component Value Date/Time    WBC 4.2 11/30/2021 11:30 AM    Hemoglobin (POC) 12.7 12/22/2017 11:23 AM    HGB 13.2 11/30/2021 11:30 AM    HCT 40.8 11/30/2021 11:30 AM    PLATELET 215 61/50/0619 11:30 AM    .0 (H) 11/30/2021 11:30 AM     Lab Results   Component Value Date/Time    Sodium 138 11/30/2021 11:30 AM    Potassium 4.0 11/30/2021 11:30 AM    Chloride 104 11/30/2021 11:30 AM CO2 31 11/30/2021 11:30 AM    Anion gap 3 (L) 11/30/2021 11:30 AM    Glucose 90 11/30/2021 11:30 AM    BUN 23 (H) 11/30/2021 11:30 AM    Creatinine 0.93 11/30/2021 11:30 AM    BUN/Creatinine ratio 25 (H) 11/30/2021 11:30 AM    GFR est AA >60 11/30/2021 11:30 AM    GFR est non-AA >60 11/30/2021 11:30 AM    Calcium 9.0 11/30/2021 11:30 AM    Bilirubin, total 0.7 11/30/2021 11:30 AM    Alk. phosphatase 110 11/30/2021 11:30 AM    Protein, total 7.7 11/30/2021 11:30 AM    Albumin 4.0 11/30/2021 11:30 AM    Globulin 3.7 11/30/2021 11:30 AM    A-G Ratio 1.1 11/30/2021 11:30 AM    ALT (SGPT) 19 11/30/2021 11:30 AM     CrCl cannot be calculated (Patient's most recent lab result is older than the maximum 180 days allowed.). INR History:   (normal INR range 0.8-1.2)     Date   INR   PT   Dose/Comments  08/31/22 3.0  35.7 hold dose today (7/5) and then take 2.5 mg Wed, Fri; 5 mg daily ROW  07/05/22 3.8  45.1 2.5 mg Fri; 5 mg daily ROW  06/22/22 3.4  40.4 5 mg daily  06/08/22 1.8  21.6 2.5 mg Fri; 5 mg daily ROW  05/12/22 2.0  23.7 2.5 mg Fri; 5 mg daily ROW  04/06/22 2.3  27.2 2.5 mg Fri; 5 mg daily ROW    Medications that can increase  INR: amiodarone  Medications that can decrease INR: N/A    A/P:       Anticoagulation:  Considering Mr. Quinn's past history, todays findings, and per Anticoagulation Collaborative Practice Agreement/Protocol:    Fingerstick POC INR (3.0) is Therapeutic for INR goal today. Continue warfarin 2.5 mg Wed, Fri; 5 mg daily ROW  INR is 3.0 and therapeutic. Patient's MAR reflects warfarin was administered as instructed. Patient's group home representative Jeremías Castro) reports consistent intake of vitamin K foods. Patient will continue current regimen and recheck INR in 4 weeks. Patient was instructed to schedule an appointment in 4 week(s) prior to leaving the clinic. Medication reconciliation was completed during the visit. There are no discontinued medications.     A full discussion of the nature of anticoagulants has been carried out. A full discussion of the need for frequent and regular monitoring, precise dosage adjustment and compliance was stressed. Side effects of potential bleeding were discussed and Mr. Ankita Blackman was instructed to call [phone] if there are any signs of abnormal bleeding. Mr. Ankita Blackman was instructed to avoid any OTC items containing aspirin or ibuprofen and prior to starting any new OTC products to consult with his physician or pharmacist to ensure no drug interactions are present. Mr. Ankita Blackman was instructed to avoid any major changes in his general diet and to avoid alcohol consumption. Group home representative was provided information in the AVS that includes topics on understanding coumadin therapy, drug interaction considerations, vitamin K and coumadin use, interactions with foods and supplements containing vitamin K, and the use of herbal products. Mr. Quinn/group home representative verbalized his understanding of all instructions and will call the office with any questions, concerns, or signs of abnormal bleeding or blood clot. Notifications of recommendations will be sent to Ivis Rodriguez NP for review.     Thank you,  Lakeisha Randle, [phone] [facility] Tracking Only    Intervention Detail: Adherence Monitorin and Lab(s) Ordered  Total # of Interventions Recommended: 2  Total # of Interventions Accepted: 2  Time Spent (min): 20

## 2022-09-26 NOTE — PATIENT INSTRUCTIONS
Today your INR was 2.8 . Your goal INR is 2.0-3.0 . You have a 5 mg tablet of Coumadin (warfarin). Take Coumadin (warfarin) as follows: Take 2.5 mg (0.5 tablet) every Wednesday and Friday; and 5 mg (1 tablet) daily rest of week. Come back in 4 week(s) for your next finger stick/INR blood test.        Avoid any over the counter items containing aspirin or ibuprofen, and avoid great swings in general diet. Avoid alcohol consumption. Please notify the INR nurse if you are started on any new medication including over the counter or herbal supplements. Also, please notify your INR nurse if any of your other prescription or over the counter medications have been discontinued. Call [facility] at [phone] if you have any signs of abnormal bleeding/blood clot.  ------------------------------------------------------------------------------------------------------------------  Taking Warfarin Safely: Care Instructions    Your Care Instructions  Warfarin is a medicine that you take to prevent blood clots. It is often called a blood thinner. Doctors give warfarin (such as Coumadin) to reduce the risk of blood clots. You may be at risk for blood clots if you have atrial fibrillation or deep vein thrombosis. Some other health problems may also put you at risk. Warfarin slows the amount of time it takes for your blood to clot. It can cause bleeding problems. Even if you've been taking warfarin for a while, it's important to know how to take it safely. Foods and other medicines can affect the way warfarin works. Some can make warfarin work too well. This can cause bleeding problems. And some can make it work poorly, so that it does not prevent blood clots very well. You will need regular blood tests to check how long it takes for your blood to form a clot. This test is called a PT or prothrombin time test. The result of the test is called an INR level.  Depending on the test results, your doctor or anticoagulation clinic may adjust your dose of warfarin. Follow-up care is a key part of your treatment and safety. Be sure to make and go to all appointments, and call your doctor if you are having problems. It's also a good idea to know your test results and keep a list of the medicines you take. How can you care for yourself at home? Take warfarin safely  Take your warfarin at the same time each day. If you miss a dose of warfarin, don't take an extra dose to make up for it. Your doctor can tell you exactly what to do so you don't take too much or too little. Wear medical alert jewelry that lets others know that you take warfarin. You can buy this at most drugstores. Don't take warfarin if you are pregnant or planning to get pregnant. Talk to your doctor about how you can prevent getting pregnant while you are taking it. Don't change your dose or stop taking warfarin unless your doctor tells you to. Effects of medicines and food on warfarin  Don't start or stop taking any medicines, vitamins, or natural remedies unless you first talk to your doctor. Many medicines can affect how warfarin works. These include aspirin and other pain relievers, over-the-counter medicines, multivitamins, dietary supplements, and herbal products. Tell all of your doctors and pharmacists that you take warfarin. Some prescription medicines can affect how warfarin works. Keep the amount of vitamin K in your diet about the same from day to day. Do not suddenly eat a lot more or a lot less food that is rich in vitamin K than you usually do. Vitamin K affects how warfarin works and how your blood clots. Talk with your doctor before making big changes in your diet. Vitamin K is in many foods, such as:  Leafy greens, such as kale, cabbage, spinach, Swiss chard, and lettuce. Canola and soybean oils. Green vegetables, such as asparagus, broccoli, and [place] sprouts.   Vegetable drinks, green tea leaves, and some dietary supplement drinks. Avoid cranberry juice and other cranberry products. They can increase the effects of warfarin. Limit your use of alcohol. Avoid bleeding by preventing falls and injuries  Wear slippers or shoes with nonskid soles. Remove throw rugs and clutter. Rearrange furniture and electrical cords to keep them out of walking paths. Keep stairways, porches, and outside walkways well lit. Use night-lights in hallways and bathrooms. Be extra careful when you work with sharp tools or knives. When should you call for help? Call 911 anytime you think you may need emergency care. For example, call if:  You have a sudden, severe headache that is different from past headaches. Call your doctor now or seek immediate medical care if:  You have any abnormal bleeding, such as:  Nosebleeds. Vaginal bleeding that is different (heavier, more frequent, at a different time of the month) than what you are used to. Bloody or black stools, or rectal bleeding. Bloody or pink urine. Watch closely for changes in your health, and be sure to contact your doctor if you have any problems. Where can you learn more? Go to http://www.gray.com/. Enter S867 in the search box to learn more about \"Taking Warfarin Safely: Care Instructions. \"  Current as of: January 27, 2016  Content Version: 11.1  © 7964-7628 [employer]. Care instructions adapted under license by [employer] (which disclaims liability or warranty for this information). If you have questions about a medical condition or this instruction, always ask your healthcare professional. [place] [address] any warranty or liability for your use of this information.

## 2022-09-26 NOTE — PROGRESS NOTES
Pharmacy Progress Note - Anticoagulation Management    S/O:  Mr. Rui Monahan  is a 78 y.o. male seen today for anticoagulation management for the diagnosis of Atrial Fibrillation. HPI: At last visit (8/31), INR was 3.0 and therapeutic. Patient's MAR reflected warfarin was administered as instructed. Patient's group home representative Tyrese Lara) reported consistent intake of vitamin K foods. Patient will continue current regimen and recheck INR in 4 weeks. Interim History:    Warfarin start date: Prior to 10/9/20 per chart review  INR Goal:  2.0-3.0    Current warfarin regimen: 2.5 mg Wed, Fri; 5 mg daily ROW  Warfarin tablet strength:   2.5 mg, 5 mg  Duration of therapy: Indefinite    Today's pertinent positives includes:  No significant changes since last visit      Results for orders placed or performed in visit on 10/04/22   POC PROTHROMBIN W/INR   Result Value Ref Range    VALID INTERNAL CONTROL POC Yes     Prothrombin time (POC) 33.5 seconds    INR POC 2.8        Adherence:   Able to recall regimen? YES  Miss/extra dose? NO  Need refill? YES       Upcoming procedure(s):  N/A      Past Medical History:   Diagnosis Date    A-fib [facility]) 2015    pacemaker L chest     CAD (coronary artery disease) 2015    Pacemaker    Epilepsy ([facility] [address]     Heart disease     Hypertension     Incontinence     Leg swelling     Mental retardation, 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)     Other ill-defined conditions(799.89)     edema legs    S/P ablation of atrial fibrillation 11/13/2020    S/P biventricular cardiac pacemaker procedure 10/23/2015    10/23/15 [employer] biventricular pacemaker inmplant    Screen for colon cancer 9/27/2012     Allergies   Allergen Reactions    Sulfa (Sulfonamide Antibiotics) Rash     Patient denies     Current Outpatient Medications   Medication Sig    warfarin (COUMADIN) 5 mg tablet Take 1 tablet (5 mg) by mouth every Monday, Tuesday, Thursday, Saturday and Sunday.     warfarin (COUMADIN) 2.5 mg tablet Take 1 tablet (2.5 mg) by mouth every Wednesday and Friday. metoprolol tartrate (LOPRESSOR) 25 mg tablet TAKE 1 TABLET BY MOUTH TWICE A DAY    cholecalciferol (VITAMIN D3) (1000 Units /25 mcg) tablet TAKE 1 TABLET BY MOUTH DAILY    levETIRAcetam 1,000 mg tablet TAKE 1 TABLET BY MOUTH TWICE A DAY    potassium chloride (K-DUR, KLOR-CON M20) 20 mEq tablet TAKE 1 TABLET BY MOUTH DAILY    bumetanide (BUMEX) 2 mg tablet TAKE 1 TABLET BY MOUTH DAILY    Vimpat 100 mg tab tablet TAKE 1 TABLET BY MOUTH TWICE A DAY    amiodarone (CORDARONE) 200 mg tablet TAKE 1/2 TABLET BY MOUTH DAILY. polyethylene glycol (MIRALAX) 17 gram packet Take 1 Packet by mouth daily as needed for Constipation. acetaminophen (TYLENOL) 325 mg tablet Take 2 Tabs by mouth every four (4) hours as needed for Pain. No current facility-administered medications for this visit.      Wt Readings from Last 3 Encounters:   05/12/22 216 lb (98 kg)   12/07/21 230 lb 12.8 oz (104.7 kg)   09/14/21 229 lb 11.2 oz (104.2 kg)     BP Readings from Last 3 Encounters:   05/12/22 111/67   12/07/21 (!) 93/58   09/14/21 104/66     Pulse Readings from Last 3 Encounters:   05/12/22 75   12/07/21 75   09/14/21 76     Lab Results   Component Value Date/Time    WBC 4.2 11/30/2021 11:30 AM    Hemoglobin (POC) 12.7 12/22/2017 11:23 AM    HGB 13.2 11/30/2021 11:30 AM    HCT 40.8 11/30/2021 11:30 AM    PLATELET 133 66/79/6167 11:30 AM    .0 (H) 11/30/2021 11:30 AM     Lab Results   Component Value Date/Time    Sodium 138 11/30/2021 11:30 AM    Potassium 4.0 11/30/2021 11:30 AM    Chloride 104 11/30/2021 11:30 AM    CO2 31 11/30/2021 11:30 AM    Anion gap 3 (L) 11/30/2021 11:30 AM    Glucose 90 11/30/2021 11:30 AM    BUN 23 (H) 11/30/2021 11:30 AM    Creatinine 0.93 11/30/2021 11:30 AM    BUN/Creatinine ratio 25 (H) 11/30/2021 11:30 AM    GFR est AA >60 11/30/2021 11:30 AM    GFR est non-AA >60 11/30/2021 11:30 AM    Calcium 9.0 11/30/2021 11:30 AM    Bilirubin, total 0.7 11/30/2021 11:30 AM    Alk. phosphatase 110 11/30/2021 11:30 AM    Protein, total 7.7 11/30/2021 11:30 AM    Albumin 4.0 11/30/2021 11:30 AM    Globulin 3.7 11/30/2021 11:30 AM    A-G Ratio 1.1 11/30/2021 11:30 AM    ALT (SGPT) 19 11/30/2021 11:30 AM     CrCl cannot be calculated (Patient's most recent lab result is older than the maximum 180 days allowed.). INR History:   (normal INR range 0.8-1.2)     Date   INR   PT   Dose/Comments  10/04/22 2.8  33.5 2.5 mg Wed, Fri; 5 mg daily ROW  08/31/22 3.0  35.7 hold dose today (7/5) and then take 2.5 mg Wed, Fri; 5 mg daily ROW  07/05/22 3.8  45.1 2.5 mg Fri; 5 mg daily ROW  06/22/22 3.4  40.4 5 mg daily  06/08/22 1.8  21.6 2.5 mg Fri; 5 mg daily ROW  05/12/22 2.0  23.7 2.5 mg Fri; 5 mg daily ROW  04/06/22 2.3  27.2 2.5 mg Fri; 5 mg daily ROW    Medications that can increase  INR: amiodarone  Medications that can decrease INR: N/A    A/P:       Anticoagulation:  Considering Mr. Quinn's past history, todays findings, and per Anticoagulation Collaborative Practice Agreement/Protocol:    Fingerstick POC INR (2.8) is Therapeutic for INR goal today. Continue warfarin 2.5 mg Wed, Fri; 5 mg daily ROW  INR is 2.8 and therapeutic. Patient's MAR reflects warfarin was administered as instructed. Patient's group home representative Christ Lantigua) reports consistent intake of vitamin K foods. Patient will continue current regimen and recheck INR in 4 weeks. Patient was instructed to schedule an appointment in 4 week(s) prior to leaving the clinic. Medication reconciliation was completed during the visit. Medications Discontinued During This Encounter   Medication Reason    warfarin (COUMADIN) 5 mg tablet REORDER    warfarin (COUMADIN) 2.5 mg tablet REORDER       A full discussion of the nature of anticoagulants has been carried out. A full discussion of the need for frequent and regular monitoring, precise dosage adjustment and compliance was stressed.   Side effects of potential bleeding were discussed and Mr. Be Armstrong was instructed to call [phone] if there are any signs of abnormal bleeding. Mr. Be Armstrong was instructed to avoid any OTC items containing aspirin or ibuprofen and prior to starting any new OTC products to consult with his physician or pharmacist to ensure no drug interactions are present. Mr. Be Armstrong was instructed to avoid any major changes in his general diet and to avoid alcohol consumption. Group home representative was provided information in the AVS that includes topics on understanding coumadin therapy, drug interaction considerations, vitamin K and coumadin use, interactions with foods and supplements containing vitamin K, and the use of herbal products. Mr. Quinn/group home representative verbalized his understanding of all instructions and will call the office with any questions, concerns, or signs of abnormal bleeding or blood clot. Notifications of recommendations will be sent to Vivek Polanco NP for review.     Thank you,  Jadyn Kurtz, [phone] [facility] Tracking Only    Intervention Detail: Adherence Monitorin, Lab(s) Ordered, and Refill(s) Provided   Total # of Interventions Recommended: 3  Total # of Interventions Accepted: 3  Time Spent (min): 20

## 2022-10-04 ENCOUNTER — ANTI-COAG VISIT (OUTPATIENT)
Dept: PHARMACY | Age: 77
End: 2022-10-04
Payer: MEDICAID

## 2022-10-04 DIAGNOSIS — I48.91 ATRIAL FIBRILLATION, UNSPECIFIED TYPE (HCC): Primary | ICD-10-CM

## 2022-10-04 LAB
INR BLD: 2.8
PT POC: 33.5 SECONDS
VALID INTERNAL CONTROL?: YES

## 2022-10-04 PROCEDURE — 99212 OFFICE O/P EST SF 10 MIN: CPT

## 2022-10-04 PROCEDURE — 85610 PROTHROMBIN TIME: CPT

## 2022-10-04 RX ORDER — WARFARIN 2.5 MG/1
TABLET ORAL
Qty: 24 TABLET | Refills: 0 | Status: SHIPPED | OUTPATIENT
Start: 2022-10-04

## 2022-10-04 RX ORDER — WARFARIN SODIUM 5 MG/1
TABLET ORAL
Qty: 60 TABLET | Refills: 0 | Status: SHIPPED | OUTPATIENT
Start: 2022-10-04

## 2022-10-26 NOTE — TELEPHONE ENCOUNTER
Need note to take to pharmacy stating which two medications were discontinued. Bilobed Transposition Flap Text: Because of the orientation and full-thickness nature of the defect, and in order to avoid distortion of the surrounding structures, a bilobed flap was planned.  After prep and local anesthesia, the flap was then outlined, incised and elevated.  The skin was widely undermined to allow for closure of the donor site defect.  After hemostasis obtained, the flaps were transposed into place and sutured in a layered fashion.

## 2022-11-01 ENCOUNTER — ANTI-COAG VISIT (OUTPATIENT)
Dept: PHARMACY | Age: 77
End: 2022-11-01
Payer: MEDICARE

## 2022-11-01 DIAGNOSIS — I48.91 ATRIAL FIBRILLATION, UNSPECIFIED TYPE (HCC): Primary | ICD-10-CM

## 2022-11-01 LAB
INR BLD: 3.1
PT POC: 36.6 SECONDS
VALID INTERNAL CONTROL?: YES

## 2022-11-01 PROCEDURE — 85610 PROTHROMBIN TIME: CPT

## 2022-11-01 PROCEDURE — 99211 OFF/OP EST MAY X REQ PHY/QHP: CPT

## 2022-11-01 NOTE — PATIENT INSTRUCTIONS
Today your INR was 3.1 . Your goal INR is 2.0-3.0 . You have a 5 mg tablet of Coumadin (warfarin). Take Coumadin (warfarin) as follows: Take 2.5 mg (0.5 tablet) every Wednesday and Friday; and 5 mg (1 tablet) daily rest of week. Come back in 4 week(s) for your next finger stick/INR blood test.        Avoid any over the counter items containing aspirin or ibuprofen, and avoid great swings in general diet. Avoid alcohol consumption. Please notify the INR nurse if you are started on any new medication including over the counter or herbal supplements. Also, please notify your INR nurse if any of your other prescription or over the counter medications have been discontinued. Call [facility] at [phone] if you have any signs of abnormal bleeding/blood clot.  ------------------------------------------------------------------------------------------------------------------  Taking Warfarin Safely: Care Instructions    Your Care Instructions  Warfarin is a medicine that you take to prevent blood clots. It is often called a blood thinner. Doctors give warfarin (such as Coumadin) to reduce the risk of blood clots. You may be at risk for blood clots if you have atrial fibrillation or deep vein thrombosis. Some other health problems may also put you at risk. Warfarin slows the amount of time it takes for your blood to clot. It can cause bleeding problems. Even if you've been taking warfarin for a while, it's important to know how to take it safely. Foods and other medicines can affect the way warfarin works. Some can make warfarin work too well. This can cause bleeding problems. And some can make it work poorly, so that it does not prevent blood clots very well. You will need regular blood tests to check how long it takes for your blood to form a clot. This test is called a PT or prothrombin time test. The result of the test is called an INR level.  Depending on the test results, your doctor or anticoagulation clinic may adjust your dose of warfarin. Follow-up care is a key part of your treatment and safety. Be sure to make and go to all appointments, and call your doctor if you are having problems. It's also a good idea to know your test results and keep a list of the medicines you take. How can you care for yourself at home? Take warfarin safely  Take your warfarin at the same time each day. If you miss a dose of warfarin, don't take an extra dose to make up for it. Your doctor can tell you exactly what to do so you don't take too much or too little. Wear medical alert jewelry that lets others know that you take warfarin. You can buy this at most drugstores. Don't take warfarin if you are pregnant or planning to get pregnant. Talk to your doctor about how you can prevent getting pregnant while you are taking it. Don't change your dose or stop taking warfarin unless your doctor tells you to. Effects of medicines and food on warfarin  Don't start or stop taking any medicines, vitamins, or natural remedies unless you first talk to your doctor. Many medicines can affect how warfarin works. These include aspirin and other pain relievers, over-the-counter medicines, multivitamins, dietary supplements, and herbal products. Tell all of your doctors and pharmacists that you take warfarin. Some prescription medicines can affect how warfarin works. Keep the amount of vitamin K in your diet about the same from day to day. Do not suddenly eat a lot more or a lot less food that is rich in vitamin K than you usually do. Vitamin K affects how warfarin works and how your blood clots. Talk with your doctor before making big changes in your diet. Vitamin K is in many foods, such as:  Leafy greens, such as kale, cabbage, spinach, Swiss chard, and lettuce. Canola and soybean oils. Green vegetables, such as asparagus, broccoli, and [place] sprouts.   Vegetable drinks, green tea leaves, and some dietary supplement drinks. Avoid cranberry juice and other cranberry products. They can increase the effects of warfarin. Limit your use of alcohol. Avoid bleeding by preventing falls and injuries  Wear slippers or shoes with nonskid soles. Remove throw rugs and clutter. Rearrange furniture and electrical cords to keep them out of walking paths. Keep stairways, porches, and outside walkways well lit. Use night-lights in hallways and bathrooms. Be extra careful when you work with sharp tools or knives. When should you call for help? Call 911 anytime you think you may need emergency care. For example, call if:  You have a sudden, severe headache that is different from past headaches. Call your doctor now or seek immediate medical care if:  You have any abnormal bleeding, such as:  Nosebleeds. Vaginal bleeding that is different (heavier, more frequent, at a different time of the month) than what you are used to. Bloody or black stools, or rectal bleeding. Bloody or pink urine. Watch closely for changes in your health, and be sure to contact your doctor if you have any problems. Where can you learn more? Go to http://www.gray.com/. Enter C671 in the search box to learn more about \"Taking Warfarin Safely: Care Instructions. \"  Current as of: January 27, 2016  Content Version: 11.1  © 1655-9038 [employer]. Care instructions adapted under license by [employer] (which disclaims liability or warranty for this information). If you have questions about a medical condition or this instruction, always ask your healthcare professional. [place] [address] any warranty or liability for your use of this information.

## 2022-11-01 NOTE — PROGRESS NOTES
Pharmacy Progress Note - Anticoagulation Management    S/O:  Mr. Lili Holland  is a 78 y.o. male seen today for anticoagulation management for the diagnosis of Atrial Fibrillation. HPI: At last visit (10/4), INR was 2.8 and therapeutic. Patient's MAR reflected warfarin was administered as instructed. Patient's group home representative Cynthia Cabezas) reported consistent intake of vitamin K foods. Patient will continue current regimen and recheck INR in 4 weeks. Interim History:    Warfarin start date: Prior to 10/9/20 per chart review  INR Goal:  2.0-3.0    Current warfarin regimen: 2.5 mg Wed, Fri; 5 mg daily ROW  Warfarin tablet strength:   2.5 mg, 5 mg  Duration of therapy: Indefinite    Today's pertinent positives includes:  No significant changes since last visit      Results for orders placed or performed in visit on 11/01/22   POC PROTHROMBIN W/INR   Result Value Ref Range    VALID INTERNAL CONTROL POC Yes     Prothrombin time (POC) 36.6 seconds    INR POC 3.1        Adherence:   Able to recall regimen? YES  Miss/extra dose? NO  Need refill? NO       Upcoming procedure(s):  N/A      Past Medical History:   Diagnosis Date    A-fib [facility]) 2015    pacemaker L chest     CAD (coronary artery disease) 2015    Pacemaker    Epilepsy ([facility] [address]     Heart disease     Hypertension     Incontinence     Leg swelling     Mental retardation, 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)     Other ill-defined conditions(799.89)     edema legs    S/P ablation of atrial fibrillation 11/13/2020    S/P biventricular cardiac pacemaker procedure 10/23/2015    10/23/15 [employer] biventricular pacemaker inmplant    Screen for colon cancer 9/27/2012     Allergies   Allergen Reactions    Sulfa (Sulfonamide Antibiotics) Rash     Patient denies     Current Outpatient Medications   Medication Sig    warfarin (COUMADIN) 5 mg tablet Take 1 tablet (5 mg) by mouth every Monday, Tuesday, Thursday, Saturday and Sunday.     warfarin (COUMADIN) 2.5 mg tablet Take 1 tablet (2.5 mg) by mouth every Wednesday and Friday. metoprolol tartrate (LOPRESSOR) 25 mg tablet TAKE 1 TABLET BY MOUTH TWICE A DAY    cholecalciferol (VITAMIN D3) (1000 Units /25 mcg) tablet TAKE 1 TABLET BY MOUTH DAILY    levETIRAcetam 1,000 mg tablet TAKE 1 TABLET BY MOUTH TWICE A DAY    potassium chloride (K-DUR, KLOR-CON M20) 20 mEq tablet TAKE 1 TABLET BY MOUTH DAILY    bumetanide (BUMEX) 2 mg tablet TAKE 1 TABLET BY MOUTH DAILY    Vimpat 100 mg tab tablet TAKE 1 TABLET BY MOUTH TWICE A DAY    amiodarone (CORDARONE) 200 mg tablet TAKE 1/2 TABLET BY MOUTH DAILY. polyethylene glycol (MIRALAX) 17 gram packet Take 1 Packet by mouth daily as needed for Constipation. acetaminophen (TYLENOL) 325 mg tablet Take 2 Tabs by mouth every four (4) hours as needed for Pain. No current facility-administered medications for this visit.      Wt Readings from Last 3 Encounters:   05/12/22 216 lb (98 kg)   12/07/21 230 lb 12.8 oz (104.7 kg)   09/14/21 229 lb 11.2 oz (104.2 kg)     BP Readings from Last 3 Encounters:   05/12/22 111/67   12/07/21 (!) 93/58   09/14/21 104/66     Pulse Readings from Last 3 Encounters:   05/12/22 75   12/07/21 75   09/14/21 76     Lab Results   Component Value Date/Time    WBC 4.2 11/30/2021 11:30 AM    Hemoglobin (POC) 12.7 12/22/2017 11:23 AM    HGB 13.2 11/30/2021 11:30 AM    HCT 40.8 11/30/2021 11:30 AM    PLATELET 757 73/06/7062 11:30 AM    .0 (H) 11/30/2021 11:30 AM     Lab Results   Component Value Date/Time    Sodium 138 11/30/2021 11:30 AM    Potassium 4.0 11/30/2021 11:30 AM    Chloride 104 11/30/2021 11:30 AM    CO2 31 11/30/2021 11:30 AM    Anion gap 3 (L) 11/30/2021 11:30 AM    Glucose 90 11/30/2021 11:30 AM    BUN 23 (H) 11/30/2021 11:30 AM    Creatinine 0.93 11/30/2021 11:30 AM    BUN/Creatinine ratio 25 (H) 11/30/2021 11:30 AM    GFR est AA >60 11/30/2021 11:30 AM    GFR est non-AA >60 11/30/2021 11:30 AM    Calcium 9.0 11/30/2021 11:30 AM    Bilirubin, total 0.7 11/30/2021 11:30 AM    Alk. phosphatase 110 11/30/2021 11:30 AM    Protein, total 7.7 11/30/2021 11:30 AM    Albumin 4.0 11/30/2021 11:30 AM    Globulin 3.7 11/30/2021 11:30 AM    A-G Ratio 1.1 11/30/2021 11:30 AM    ALT (SGPT) 19 11/30/2021 11:30 AM     CrCl cannot be calculated (Patient's most recent lab result is older than the maximum 180 days allowed.). INR History:   (normal INR range 0.8-1.2)     Date   INR   PT   Dose/Comments  11/1/22 3.1  36.6 2.5 mg Wed, Fri; 5 mg daily ROW  10/04/22 2.8  33.5 2.5 mg Wed, Fri; 5 mg daily ROW  08/31/22 3.0  35.7 hold dose today (7/5) and then take 2.5 mg Wed, Fri; 5 mg daily ROW  07/05/22 3.8  45.1 2.5 mg Fri; 5 mg daily ROW  06/22/22 3.4  40.4 5 mg daily  06/08/22 1.8  21.6 2.5 mg Fri; 5 mg daily ROW  05/12/22 2.0  23.7 2.5 mg Fri; 5 mg daily ROW  04/06/22 2.3  27.2 2.5 mg Fri; 5 mg daily ROW    Medications that can increase  INR: amiodarone  Medications that can decrease INR: N/A    A/P:       Anticoagulation:  Considering Mr. Quinn's past history, todays findings, and per Anticoagulation Collaborative Practice Agreement/Protocol:    Fingerstick POC INR (3.1) is Supratherapeutic for INR goal today. Continue warfarin 2.5 mg Wed, Fri; 5 mg daily ROW  INR is 3.1 and supratherapeutic. Patient's MAR reflected warfarin was administered as instructed and there were no medication changes. Patient reports consistent intake of vitamin K foods. Patient has been previously therapeutic on current warfarin regimen and will continue 2.5 mg Wed, Fri; 5 mg daily ROW. Encouraged group home representative Magali Justin) to have patient eat one serving of food with vitamin K. Group home representative scheduled next INR appointment for 11/29. Patient was instructed to schedule an appointment in 4 week(s) prior to leaving the clinic. Medication reconciliation was completed during the visit. There are no discontinued medications.       A full discussion of the nature of anticoagulants has been carried out. A full discussion of the need for frequent and regular monitoring, precise dosage adjustment and compliance was stressed. Side effects of potential bleeding were discussed and Mr. Jose J Medina was instructed to call [phone] if there are any signs of abnormal bleeding. Mr. Jose J eMdina was instructed to avoid any OTC items containing aspirin or ibuprofen and prior to starting any new OTC products to consult with his physician or pharmacist to ensure no drug interactions are present. Mr. Jose J Medina was instructed to avoid any major changes in his general diet and to avoid alcohol consumption. Group home representative was provided information in the AVS that includes topics on understanding coumadin therapy, drug interaction considerations, vitamin K and coumadin use, interactions with foods and supplements containing vitamin K, and the use of herbal products. Mr. Quinn/group home representative verbalized his understanding of all instructions and will call the office with any questions, concerns, or signs of abnormal bleeding or blood clot. Notifications of recommendations will be sent to Kyle Evans NP for review.     Thank you,  Reji Lopez, [phone] [facility] Tracking Only    Intervention Detail: Adherence Monitorin and Lab(s) Ordered  Total # of Interventions Recommended: 2  Total # of Interventions Accepted: 2  Time Spent (min): 20

## 2022-11-25 NOTE — PATIENT INSTRUCTIONS
Today your INR was 2.4 . Your goal INR is 2.0-3.0 . You have a 5 mg tablet of Coumadin (warfarin). Take Coumadin (warfarin) as follows: Take 2.5 mg (0.5 tablet) every Wednesday and Friday; and 5 mg (1 tablet) daily rest of week. Come back in 4 week(s) for your next finger stick/INR blood test.        Avoid any over the counter items containing aspirin or ibuprofen, and avoid great swings in general diet. Avoid alcohol consumption. Please notify the INR nurse if you are started on any new medication including over the counter or herbal supplements. Also, please notify your INR nurse if any of your other prescription or over the counter medications have been discontinued. Call [facility] at [phone] if you have any signs of abnormal bleeding/blood clot.  ------------------------------------------------------------------------------------------------------------------  Taking Warfarin Safely: Care Instructions    Your Care Instructions  Warfarin is a medicine that you take to prevent blood clots. It is often called a blood thinner. Doctors give warfarin (such as Coumadin) to reduce the risk of blood clots. You may be at risk for blood clots if you have atrial fibrillation or deep vein thrombosis. Some other health problems may also put you at risk. Warfarin slows the amount of time it takes for your blood to clot. It can cause bleeding problems. Even if you've been taking warfarin for a while, it's important to know how to take it safely. Foods and other medicines can affect the way warfarin works. Some can make warfarin work too well. This can cause bleeding problems. And some can make it work poorly, so that it does not prevent blood clots very well. You will need regular blood tests to check how long it takes for your blood to form a clot. This test is called a PT or prothrombin time test. The result of the test is called an INR level.  Depending on the test results, your doctor or anticoagulation clinic may adjust your dose of warfarin. Follow-up care is a key part of your treatment and safety. Be sure to make and go to all appointments, and call your doctor if you are having problems. It's also a good idea to know your test results and keep a list of the medicines you take. How can you care for yourself at home? Take warfarin safely  Take your warfarin at the same time each day. If you miss a dose of warfarin, don't take an extra dose to make up for it. Your doctor can tell you exactly what to do so you don't take too much or too little. Wear medical alert jewelry that lets others know that you take warfarin. You can buy this at most drugstores. Don't take warfarin if you are pregnant or planning to get pregnant. Talk to your doctor about how you can prevent getting pregnant while you are taking it. Don't change your dose or stop taking warfarin unless your doctor tells you to. Effects of medicines and food on warfarin  Don't start or stop taking any medicines, vitamins, or natural remedies unless you first talk to your doctor. Many medicines can affect how warfarin works. These include aspirin and other pain relievers, over-the-counter medicines, multivitamins, dietary supplements, and herbal products. Tell all of your doctors and pharmacists that you take warfarin. Some prescription medicines can affect how warfarin works. Keep the amount of vitamin K in your diet about the same from day to day. Do not suddenly eat a lot more or a lot less food that is rich in vitamin K than you usually do. Vitamin K affects how warfarin works and how your blood clots. Talk with your doctor before making big changes in your diet. Vitamin K is in many foods, such as:  Leafy greens, such as kale, cabbage, spinach, Swiss chard, and lettuce. Canola and soybean oils. Green vegetables, such as asparagus, broccoli, and [place] sprouts.   Vegetable drinks, green tea leaves, and some dietary supplement drinks. Avoid cranberry juice and other cranberry products. They can increase the effects of warfarin. Limit your use of alcohol. Avoid bleeding by preventing falls and injuries  Wear slippers or shoes with nonskid soles. Remove throw rugs and clutter. Rearrange furniture and electrical cords to keep them out of walking paths. Keep stairways, porches, and outside walkways well lit. Use night-lights in hallways and bathrooms. Be extra careful when you work with sharp tools or knives. When should you call for help? Call 911 anytime you think you may need emergency care. For example, call if:  You have a sudden, severe headache that is different from past headaches. Call your doctor now or seek immediate medical care if:  You have any abnormal bleeding, such as:  Nosebleeds. Vaginal bleeding that is different (heavier, more frequent, at a different time of the month) than what you are used to. Bloody or black stools, or rectal bleeding. Bloody or pink urine. Watch closely for changes in your health, and be sure to contact your doctor if you have any problems. Where can you learn more? Go to http://www.gray.com/. Enter M592 in the search box to learn more about \"Taking Warfarin Safely: Care Instructions. \"  Current as of: January 27, 2016  Content Version: 11.1  © 6019-5849 [employer]. Care instructions adapted under license by [employer] (which disclaims liability or warranty for this information). If you have questions about a medical condition or this instruction, always ask your healthcare professional. [place] [address] any warranty or liability for your use of this information.

## 2022-11-25 NOTE — PROGRESS NOTES
Pharmacy Progress Note - Anticoagulation Management    S/O:  Mr. Dar England  is a 78 y.o. male seen today for anticoagulation management for the diagnosis of Atrial Fibrillation. HPI: At last visit (11/1), INR was 3.1 and supratherapeutic. Patient's MAR reflected warfarin was administered as instructed and there were no medication changes. Patient reported consistent intake of vitamin K foods. Patient has been previously therapeutic on current warfarin regimen and will continue 2.5 mg Wed, Fri; 5 mg daily ROW. Encouraged group home representative Barrett Rehman) to have patient eat one serving of food with vitamin K. Group home representative scheduled next INR appointment for 11/29. Interim History:    Warfarin start date: Prior to 10/9/20 per chart review  INR Goal:  2.0-3.0    Current warfarin regimen: 2.5 mg Wed, Fri; 5 mg daily ROW  Warfarin tablet strength:   2.5 mg, 5 mg  Duration of therapy: Indefinite    Today's pertinent positives includes:  No significant changes since last visit      Results for orders placed or performed in visit on 11/29/22   POC PROTHROMBIN W/INR   Result Value Ref Range    VALID INTERNAL CONTROL POC Yes     Prothrombin time (POC) 29.3 seconds    INR POC 2.4        Adherence:   Able to recall regimen? YES  Miss/extra dose? NO  Need refill?  NO       Upcoming procedure(s):  N/A      Past Medical History:   Diagnosis Date    A-fib [facility]) 2015    pacemaker L chest     CAD (coronary artery disease) 2015    Pacemaker    Epilepsy ([facility] [address]     Heart disease     Hypertension     Incontinence     Leg swelling     Mental retardation, 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)     Other ill-defined conditions(799.89)     edema legs    S/P ablation of atrial fibrillation 11/13/2020    S/P biventricular cardiac pacemaker procedure 10/23/2015    10/23/15 [employer] biventricular pacemaker inmplant    Screen for colon cancer 9/27/2012     Allergies   Allergen Reactions    Sulfa (Sulfonamide Antibiotics) Rash Patient denies     Current Outpatient Medications   Medication Sig    warfarin (COUMADIN) 5 mg tablet Take 1 tablet (5 mg) by mouth every Monday, Tuesday, Thursday, Saturday and Sunday. warfarin (COUMADIN) 2.5 mg tablet Take 1 tablet (2.5 mg) by mouth every Wednesday and Friday. metoprolol tartrate (LOPRESSOR) 25 mg tablet TAKE 1 TABLET BY MOUTH TWICE A DAY    cholecalciferol (VITAMIN D3) (1000 Units /25 mcg) tablet TAKE 1 TABLET BY MOUTH DAILY    levETIRAcetam 1,000 mg tablet TAKE 1 TABLET BY MOUTH TWICE A DAY    potassium chloride (K-DUR, KLOR-CON M20) 20 mEq tablet TAKE 1 TABLET BY MOUTH DAILY    bumetanide (BUMEX) 2 mg tablet TAKE 1 TABLET BY MOUTH DAILY    Vimpat 100 mg tab tablet TAKE 1 TABLET BY MOUTH TWICE A DAY    amiodarone (CORDARONE) 200 mg tablet TAKE 1/2 TABLET BY MOUTH DAILY. polyethylene glycol (MIRALAX) 17 gram packet Take 1 Packet by mouth daily as needed for Constipation. acetaminophen (TYLENOL) 325 mg tablet Take 2 Tabs by mouth every four (4) hours as needed for Pain. No current facility-administered medications for this visit.      Wt Readings from Last 3 Encounters:   05/12/22 216 lb (98 kg)   12/07/21 230 lb 12.8 oz (104.7 kg)   09/14/21 229 lb 11.2 oz (104.2 kg)     BP Readings from Last 3 Encounters:   05/12/22 111/67   12/07/21 (!) 93/58   09/14/21 104/66     Pulse Readings from Last 3 Encounters:   05/12/22 75   12/07/21 75   09/14/21 76     Lab Results   Component Value Date/Time    WBC 4.2 11/30/2021 11:30 AM    Hemoglobin (POC) 12.7 12/22/2017 11:23 AM    HGB 13.2 11/30/2021 11:30 AM    HCT 40.8 11/30/2021 11:30 AM    PLATELET 599 79/30/8877 11:30 AM    .0 (H) 11/30/2021 11:30 AM     Lab Results   Component Value Date/Time    Sodium 138 11/30/2021 11:30 AM    Potassium 4.0 11/30/2021 11:30 AM    Chloride 104 11/30/2021 11:30 AM    CO2 31 11/30/2021 11:30 AM    Anion gap 3 (L) 11/30/2021 11:30 AM    Glucose 90 11/30/2021 11:30 AM    BUN 23 (H) 11/30/2021 11:30 AM Creatinine 0.93 11/30/2021 11:30 AM    BUN/Creatinine ratio 25 (H) 11/30/2021 11:30 AM    GFR est AA >60 11/30/2021 11:30 AM    GFR est non-AA >60 11/30/2021 11:30 AM    Calcium 9.0 11/30/2021 11:30 AM    Bilirubin, total 0.7 11/30/2021 11:30 AM    Alk. phosphatase 110 11/30/2021 11:30 AM    Protein, total 7.7 11/30/2021 11:30 AM    Albumin 4.0 11/30/2021 11:30 AM    Globulin 3.7 11/30/2021 11:30 AM    A-G Ratio 1.1 11/30/2021 11:30 AM    ALT (SGPT) 19 11/30/2021 11:30 AM     CrCl cannot be calculated (Patient's most recent lab result is older than the maximum 180 days allowed.). INR History:   (normal INR range 0.8-1.2)     Date   INR   PT   Dose/Comments  11/29/22 2.4  29.3 2.5 mg Wed, Fri; 5 mg daily ROW  11/01/22 3.1  36.6 2.5 mg Wed, Fri; 5 mg daily ROW  10/04/22 2.8  33.5 2.5 mg Wed, Fri; 5 mg daily ROW  08/31/22 3.0  35.7 hold dose today (7/5) and then take 2.5 mg Wed, Fri; 5 mg daily ROW  07/05/22 3.8  45.1 2.5 mg Fri; 5 mg daily ROW  06/22/22 3.4  40.4 5 mg daily  06/08/22 1.8  21.6 2.5 mg Fri; 5 mg daily ROW  05/12/22 2.0  23.7 2.5 mg Fri; 5 mg daily ROW  04/06/22 2.3  27.2 2.5 mg Fri; 5 mg daily ROW    Medications that can increase  INR: amiodarone  Medications that can decrease INR: N/A    A/P:       Anticoagulation:  Considering Mr. Quinn's past history, todays findings, and per Anticoagulation Collaborative Practice Agreement/Protocol:    Fingerstick POC INR (2.4) is Therapeutic for INR goal today. Continue warfarin 2.5 mg Wed, Fri; 5 mg daily ROW  INR was 2.4 and therapeutic. Patient's MAR reflected warfarin was administered as instructed. Patient reports consistent intake of vitamin K foods. Patient will continue current regimen and recheck INR in 4 weeks. Patient was instructed to schedule an appointment in 4 week(s) prior to leaving the clinic. Medication reconciliation was completed during the visit. There are no discontinued medications.       A full discussion of the nature of anticoagulants has been carried out. A full discussion of the need for frequent and regular monitoring, precise dosage adjustment and compliance was stressed. Side effects of potential bleeding were discussed and Mr. Sandhya Campos was instructed to call [phone] if there are any signs of abnormal bleeding. Mr. Sandhya Campos was instructed to avoid any OTC items containing aspirin or ibuprofen and prior to starting any new OTC products to consult with his physician or pharmacist to ensure no drug interactions are present. Mr. Sandhya Campos was instructed to avoid any major changes in his general diet and to avoid alcohol consumption. Group home representative was provided information in the AVS that includes topics on understanding coumadin therapy, drug interaction considerations, vitamin K and coumadin use, interactions with foods and supplements containing vitamin K, and the use of herbal products. Mr. Quinn/group home representative verbalized his understanding of all instructions and will call the office with any questions, concerns, or signs of abnormal bleeding or blood clot. Notifications of recommendations will be sent to Allyssa Butterfield NP for review.     Thank you,  Ashley Dorantes, [phone] [facility] Tracking Only    Intervention Detail: Adherence Monitorin and Lab(s) Ordered  Total # of Interventions Recommended: 2  Total # of Interventions Accepted: 2  Time Spent (min): 20

## 2022-11-29 ENCOUNTER — ANTI-COAG VISIT (OUTPATIENT)
Dept: PHARMACY | Age: 77
End: 2022-11-29
Payer: MEDICARE

## 2022-11-29 DIAGNOSIS — I48.91 ATRIAL FIBRILLATION, UNSPECIFIED TYPE (HCC): Primary | ICD-10-CM

## 2022-11-29 LAB
INR BLD: 2.4
PT POC: 29.3 SECONDS
VALID INTERNAL CONTROL?: YES

## 2022-11-29 PROCEDURE — 85610 PROTHROMBIN TIME: CPT

## 2022-11-29 PROCEDURE — 99211 OFF/OP EST MAY X REQ PHY/QHP: CPT

## 2022-12-08 ENCOUNTER — OFFICE VISIT (OUTPATIENT)
Dept: FAMILY MEDICINE CLINIC | Age: 77
End: 2022-12-08
Payer: MEDICARE

## 2022-12-08 VITALS
TEMPERATURE: 97.7 F | RESPIRATION RATE: 14 BRPM | OXYGEN SATURATION: 100 % | WEIGHT: 193 LBS | HEART RATE: 75 BPM | DIASTOLIC BLOOD PRESSURE: 64 MMHG | SYSTOLIC BLOOD PRESSURE: 96 MMHG | HEIGHT: 72 IN | BODY MASS INDEX: 26.14 KG/M2

## 2022-12-08 DIAGNOSIS — R60.0 BILATERAL LOWER EXTREMITY EDEMA: ICD-10-CM

## 2022-12-08 DIAGNOSIS — E55.9 VITAMIN D DEFICIENCY: ICD-10-CM

## 2022-12-08 DIAGNOSIS — E03.8 OTHER SPECIFIED HYPOTHYROIDISM: ICD-10-CM

## 2022-12-08 DIAGNOSIS — Z79.01 CHRONIC ANTICOAGULATION: ICD-10-CM

## 2022-12-08 DIAGNOSIS — E87.6 HYPOKALEMIA: ICD-10-CM

## 2022-12-08 DIAGNOSIS — E87.6 POTASSIUM (K) DEFICIENCY: ICD-10-CM

## 2022-12-08 DIAGNOSIS — Z91.89 TUBERCULOSIS HIGH RISK: Primary | ICD-10-CM

## 2022-12-08 DIAGNOSIS — I10 PRIMARY HYPERTENSION: ICD-10-CM

## 2022-12-08 RX ORDER — MELATONIN
1000 DAILY
Qty: 30 TABLET | Refills: 11 | Status: SHIPPED | OUTPATIENT
Start: 2022-12-08

## 2022-12-08 RX ORDER — METOPROLOL TARTRATE 25 MG/1
25 TABLET, FILM COATED ORAL 2 TIMES DAILY
Qty: 62 TABLET | Refills: 11 | Status: SHIPPED | OUTPATIENT
Start: 2022-12-08

## 2022-12-08 RX ORDER — BUMETANIDE 2 MG/1
2 TABLET ORAL DAILY
Qty: 31 TABLET | Refills: 11 | Status: SHIPPED | OUTPATIENT
Start: 2022-12-08

## 2022-12-08 RX ORDER — POTASSIUM CHLORIDE 20 MEQ/1
20 TABLET, EXTENDED RELEASE ORAL DAILY
Qty: 31 TABLET | Refills: 11 | Status: SHIPPED | OUTPATIENT
Start: 2022-12-08

## 2022-12-08 NOTE — PROGRESS NOTES
[address] [address] [place] [address] [facility]   [phone]            Date of visit: 12/10/2022     This is a Subsequent Medicare Annual Wellness Visit (AWV), (Performed more than 12 months after effective date of Medicare Part B enrollment and 12 months after last preventive visit, Once in a lifetime)    I have reviewed the patient's medical history in detail and updated the computerized patient record. Tiana Clark is a 78 y.o. male   History obtained from: Caregiver and the patient. Concerns today   (Patient understands that medical problems addressed today may incur additional cost as this is a preventive visit)  -Patient is doing well, without any complaints.      History     Patient Active Problem List   Diagnosis Code    Bradycardia R00.1    Seizure disorder ([facility] G40.909    Bilateral lower extremity edema R60.0    Hypertension I10    Mobitz type II incomplete atrioventricular block I44.1    Intellectual disability F79    Advance care planning Z71.89    S/P biventricular cardiac pacemaker procedure Z95.0    Lymphedema of both lower extremities I89.0    Vitamin D deficiency E55.9    Vitamin B12 deficiency E53.8    History of closed Colles' fracture Z87.81    H/O Mobitz type II block Z86.79    Persistent atrial fibrillation (HCC) I48.19    S/P ablation of atrial fibrillation Z98.890, Z86.79     Past Medical History:   Diagnosis Date    A-fib ([facility] 2015    pacemaker L chest     CAD (coronary artery disease) 2015    Pacemaker    Epilepsy ([facility]     Heart disease     Hypertension     Incontinence     Leg swelling     Mental retardation, 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)     Other ill-defined conditions(799.89)     edema legs    S/P ablation of atrial fibrillation 11/13/2020    S/P biventricular cardiac pacemaker procedure 10/23/2015    10/23/15 [employer] biventricular pacemaker inmplant    Screen for colon cancer 9/27/2012      Past Surgical History:   Procedure Laterality Date    HX COLONOSCOPY  04/05/2017    HX ORTHOPAEDIC Right 12/22/2017    ORIF right distal radius    HX PACEMAKER  2015    bi ventricular pacemaker     IL ABLATE L/R ATRIAL FIBRIL W/ISOLATED PULM VEIN N/A 11/13/2020    Ablation Following A-Fib  Addl performed by Siomara Posey MD at [facility]ARDIAC [facility]ATH LAB    IL EPHYS EVL TRNSPTL TX ATRIAL FIB ISOLAT PULM VEIN N/A 11/13/2020    ABLATION A-FIB  W COMPLETE EP STUDY performed by Siomara Posey MD at [facility] CARDIAC CATH LAB    IL INTRACARD ECHO, THER/DX INTERVENT N/A 11/13/2020    Intracardiac Echocardiogram performed by Siomara Posey MD at [facility] CARDIAC CATH LAB    IL INTRACARDIAC ELECTROPHYSIOLOGIC 3D MAPPING N/A 11/13/2020    Ep 3d Mapping performed by Siomara Posey MD at [facility] CARDIAC CATH LAB     Allergies   Allergen Reactions    Sulfa (Sulfonamide Antibiotics) Rash     Patient denies     Current Outpatient Medications   Medication Sig Dispense Refill    bumetanide (BUMEX) 2 mg tablet Take 1 Tablet by mouth daily. 31 Tablet 11    cholecalciferol (VITAMIN D3) (1000 Units /25 mcg) tablet Take 1 Tablet by mouth daily. 30 Tablet 11    metoprolol tartrate (LOPRESSOR) 25 mg tablet Take 1 Tablet by mouth two (2) times a day. 62 Tablet 11    potassium chloride (K-DUR, KLOR-CON M20) 20 mEq tablet Take 1 Tablet by mouth daily. 31 Tablet 11    amiodarone (CORDARONE) 200 mg tablet TAKE 1/2 TABLET BY MOUTH DAILY. 15 Tablet 0    warfarin (COUMADIN) 5 mg tablet Take 1 tablet (5 mg) by mouth every Monday, Tuesday, Thursday, Saturday and Sunday. 60 Tablet 0    warfarin (COUMADIN) 2.5 mg tablet Take 1 tablet (2.5 mg) by mouth every Wednesday and Friday.  24 Tablet 0    levETIRAcetam 1,000 mg tablet TAKE 1 TABLET BY MOUTH TWICE A DAY 56 Tablet 11    Vimpat 100 mg tab tablet TAKE 1 TABLET BY MOUTH TWICE A DAY (Patient not taking: Reported on 12/8/2022) 60 Tablet 5    polyethylene glycol (MIRALAX) 17 gram packet Take 1 Packet by mouth daily as needed for Constipation. (Patient not taking: Reported on 12/8/2022) 14 Each 5    acetaminophen (TYLENOL) 325 mg tablet Take 2 Tabs by mouth every four (4) hours as needed for Pain. (Patient not taking: Reported on 12/8/2022) 80 Tab 0     Family History   Problem Relation Age of Onset    Other Other         unable to obtain due to mental status    Cancer Mother         unsure type    Heart Attack Father     Heart Disease Father     Lung Disease Father     No Known Problems Sister      Social History     Tobacco Use    Smoking status: Never    Smokeless tobacco: Never   Substance Use Topics    Alcohol use: No       Specialists/Care Team   Angela Young has established care with the following healthcare providers:  Patient Care Team:  Cynthia Ramírez MD as PCP - General (Family Medicine)  Cynthia Ramírez MD as PCP - [facility]  Kathe Hein MD as Physician (Cardiovascular Disease Physician)  Mikey Meigs, ANP as Nurse Practitioner (Nurse Practitioner)  Kezia Adams MD (Cardiothoracic Surgery)      Health Risk Assessment     Demographics   male  78 y.o. General Health Questions   -During the past 4 weeks:   -how would you rate your health in general? Fair   -how often have you been bothered by feeling dizzy when standing up? 1 times a week   -how much have you been bothered by bodily pain? mildly   -Have you noticed any hearing difficulties? no   -has your physical and emotional health limited your social activities with family or friends? no    Emotional Health Questions   -Do you have a history of depression, anxiety, or emotional problems? no  -Over the past 2 weeks, have you felt down, depressed or hopeless? no  -Over the past 2 weeks, have you felt little interest or pleasure in doing things? yes    Health Habits     Do you get 5 servings of fruits or vegetables daily? no  Do you exercise regularly?  no    Activities of Daily Living and Functional Status   -Do you need help with eating, walking, dressing, bathing, toileting, the phone, transportation, shopping, preparing meals, housework, laundry, medications or managing money? yes  -In the past four weeks, was someone available to help you if you needed and wanted help with anything? yes  -Are you confident are you that you can control and manage most of your health problems? yes  -Have you been given information to help you keep track of your medications? yes  -How often do you have trouble taking your medications as prescribed? never    Fall Risk and Home Safety   Have you fallen 2 or more times in the past year? no  Does your home have rugs in the hallway, lack grab bars in the bathroom, lack handrails on the stairs or have poor lighting? no  Do you have smoke detectors and check them regularly? yes  Do you have difficulties driving a car? yes  Do you always fasten your seat belt when you are in a car? yes    Review of Systems (if indicated for problems addressed today)   - Denies any suicidal ideation    Physical Examination     Vitals:    12/08/22 1326   BP: 96/64   Pulse: 75   Resp: 14   Temp: 97.7 °F (36.5 °C)   SpO2: 100%   Weight: 193 lb (87.5 kg)   Height: 6' (1.829 m)     Body mass index is 26.18 kg/m². No results found. Was the patient's timed Up & Go test unsteady or longer than 30 seconds? no    Evaluation of Cognitive Function   Mood/affect:  neutral  Orientation: Person, Place, and Time  Appearance: age appropriate  Family member/caregiver input: No concerns    Additional exam if indicated for problems addressed today:  Physical Exam  HENT:      Head: Normocephalic. Right Ear: Tympanic membrane normal.      Left Ear: Tympanic membrane normal.      Nose: Nose normal.      Mouth/Throat:      Mouth: Mucous membranes are moist.      Pharynx: Oropharynx is clear. Cardiovascular:      Rate and Rhythm: Normal rate and regular rhythm. Pulses: Normal pulses.    Pulmonary:      Effort: Pulmonary effort is normal. Breath sounds: Normal breath sounds. Musculoskeletal:      Cervical back: Neck supple. Skin:     General: Skin is warm and dry. Neurological:      Mental Status: He is alert. Advice/Referrals/Counseling (as indicated)   Education and counseling provided for any problems identified above: Done    Preventive Services     (Preventive care checklist to be included in patient instructions)  Discussed today Done Previously Not Needed     X  Pneumococcal vaccines   X   Flu vaccine      Hepatitis B vaccine (if at risk)    X  Shingles vaccine    X  TDAP vaccine      Digital rectal exam    12/7/21 0.4  PSA   X   Colorectal cancer screening     X Low-dose CT for lung cancer screening     X Bone density test      Glaucoma screening    X  Cholesterol test    X  Diabetes screening test      X Diabetes self-management class     X Nutritionist referral for diabetes or renal disease     Discussion of Advance Directive   Discussed with Dawoodsrikanth Dav his ability to prepare and advance directive in the case that an injury or illness causes him to be unable to make health care decisions. Assessment/Plan   Z00.00    ICD-10-CM ICD-9-CM    1. Tuberculosis high risk  Z91.89 V49.89 QUANTIFERON-TB GOLD PLUS      2. Bilateral lower extremity edema  R60.0 782.3 bumetanide (BUMEX) 2 mg tablet      3. Hypokalemia  E87.6 276.8 potassium chloride (K-DUR, KLOR-CON M20) 20 mEq tablet      4. Primary hypertension  I10 401.9 metoprolol tartrate (LOPRESSOR) 25 mg tablet      5. Vitamin D deficiency  E55.9 268.9 cholecalciferol (VITAMIN D3) (1000 Units /25 mcg) tablet      6. Other specified hypothyroidism  E03.8 244.8 TSH 3RD GENERATION      TSH 3RD GENERATION      7. Potassium (K) deficiency  N09.7 174.4 METABOLIC PANEL, BASIC      METABOLIC PANEL, BASIC      8.  Chronic anticoagulation  Z79.01 V58.61 CBC W/O DIFF      CBC W/O DIFF          Orders Placed This Encounter    QUANTIFERON-TB GOLD PLUS ([facility] Default)    CBC W/O DIFF METABOLIC PANEL, BASIC    TSH 3RD GENERATION    bumetanide (BUMEX) 2 mg tablet    cholecalciferol (VITAMIN D3) (1000 Units /25 mcg) tablet    metoprolol tartrate (LOPRESSOR) 25 mg tablet    potassium chloride (K-DUR, KLOR-CON M20) 20 mEq tablet           Jesusita Tolentino MD

## 2022-12-08 NOTE — PROGRESS NOTES
Jerome Norris is a 78 y.o. male    Chief Complaint   Patient presents with    Annual Wellness Visit       1. Have you been to the ER, urgent care clinic since your last visit? Hospitalized since your last visit? No  2. Have you seen or consulted any other health care providers outside of the [address] [place] [address] since your last visit? Include any pap smears or colon screening. No    There were no vitals taken for this visit.   3 most recent PHQ Screens 12/8/2022   Little interest or pleasure in doing things Not at all   Feeling down, depressed, irritable, or hopeless Not at all   Total Score PHQ 2 0     Health Maintenance Due   Topic Date Due    Colorectal Cancer Screening Combo  Never done    COVID-19 Vaccine (4 - Booster for Pfizer series) 12/23/2021    Flu Vaccine (1) 08/01/2022    Medicare Yearly Exam  12/08/2022

## 2022-12-09 LAB
ANION GAP SERPL CALC-SCNC: 5 MMOL/L (ref 5–15)
BUN SERPL-MCNC: 19 MG/DL (ref 6–20)
BUN/CREAT SERPL: 21 (ref 12–20)
CALCIUM SERPL-MCNC: 9 MG/DL (ref 8.5–10.1)
CHLORIDE SERPL-SCNC: 103 MMOL/L (ref 97–108)
CO2 SERPL-SCNC: 32 MMOL/L (ref 21–32)
CREAT SERPL-MCNC: 0.89 MG/DL (ref 0.7–1.3)
ERYTHROCYTE [DISTWIDTH] IN BLOOD BY AUTOMATED COUNT: 12.9 % (ref 11.5–14.5)
GLUCOSE SERPL-MCNC: 89 MG/DL (ref 65–100)
HCT VFR BLD AUTO: 39.1 % (ref 36.6–50.3)
HGB BLD-MCNC: 13 G/DL (ref 12.1–17)
MCH RBC QN AUTO: 33.9 PG (ref 26–34)
MCHC RBC AUTO-ENTMCNC: 33.2 G/DL (ref 30–36.5)
MCV RBC AUTO: 102.1 FL (ref 80–99)
NRBC # BLD: 0 K/UL (ref 0–0.01)
NRBC BLD-RTO: 0 PER 100 WBC
PLATELET # BLD AUTO: 154 K/UL (ref 150–400)
PMV BLD AUTO: 11 FL (ref 8.9–12.9)
POTASSIUM SERPL-SCNC: 4.1 MMOL/L (ref 3.5–5.1)
RBC # BLD AUTO: 3.83 M/UL (ref 4.1–5.7)
SODIUM SERPL-SCNC: 140 MMOL/L (ref 136–145)
TSH SERPL DL<=0.05 MIU/L-ACNC: 2.48 UIU/ML (ref 0.36–3.74)
WBC # BLD AUTO: 4.2 K/UL (ref 4.1–11.1)

## 2022-12-27 NOTE — PROGRESS NOTES
Pharmacy Progress Note - Anticoagulation Management    S/O:  Mr. Schuyler Jacob  is a 78 y.o. male seen today for anticoagulation management for the diagnosis of Atrial Fibrillation. HPI: At last visit (11/29), INR was 2.4 and therapeutic. Patient's MAR reflected warfarin was administered as instructed. Patient reported consistent intake of vitamin K foods. Patient will continue current regimen and recheck INR in 4 weeks. Interim History:    Warfarin start date: Prior to 10/9/20 per chart review  INR Goal:  2.0-3.0    Current warfarin regimen: 2.5 mg Wed, Fri; 5 mg daily ROW  Warfarin tablet strength:   2.5 mg, 5 mg  Duration of therapy: Indefinite    Today's pertinent positives includes:  No significant changes since last visit      Results for orders placed or performed in visit on 12/28/22   POC PROTHROMBIN W/INR   Result Value Ref Range    VALID INTERNAL CONTROL POC Yes     Prothrombin time (POC) 27.9 seconds    INR POC 2.3        Adherence:   Able to recall regimen? YES  Miss/extra dose? NO  Need refill? NO       Upcoming procedure(s):  N/A      Past Medical History:   Diagnosis Date    A-fib [facility]) 2015    pacemaker L chest     CAD (coronary artery disease) 2015    Pacemaker    Epilepsy ([facility]     Heart disease     Hypertension     Incontinence     Leg swelling     Mental retardation, 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)     Other ill-defined conditions(799.89)     edema legs    S/P ablation of atrial fibrillation 11/13/2020    S/P biventricular cardiac pacemaker procedure 10/23/2015    10/23/15 [employer] biventricular pacemaker inmplant    Screen for colon cancer 9/27/2012     Allergies   Allergen Reactions    Sulfa (Sulfonamide Antibiotics) Rash     Patient denies     Current Outpatient Medications   Medication Sig    bumetanide (BUMEX) 2 mg tablet Take 1 Tablet by mouth daily. cholecalciferol (VITAMIN D3) (1000 Units /25 mcg) tablet Take 1 Tablet by mouth daily.     metoprolol tartrate (LOPRESSOR) 25 mg tablet Take 1 Tablet by mouth two (2) times a day. potassium chloride (K-DUR, KLOR-CON M20) 20 mEq tablet Take 1 Tablet by mouth daily. amiodarone (CORDARONE) 200 mg tablet TAKE 1/2 TABLET BY MOUTH DAILY. warfarin (COUMADIN) 5 mg tablet Take 1 tablet (5 mg) by mouth every Monday, Tuesday, Thursday, Saturday and Sunday. warfarin (COUMADIN) 2.5 mg tablet Take 1 tablet (2.5 mg) by mouth every Wednesday and Friday. levETIRAcetam 1,000 mg tablet TAKE 1 TABLET BY MOUTH TWICE A DAY    Vimpat 100 mg tab tablet TAKE 1 TABLET BY MOUTH TWICE A DAY (Patient not taking: Reported on 12/8/2022)    polyethylene glycol (MIRALAX) 17 gram packet Take 1 Packet by mouth daily as needed for Constipation. (Patient not taking: Reported on 12/8/2022)    acetaminophen (TYLENOL) 325 mg tablet Take 2 Tabs by mouth every four (4) hours as needed for Pain. (Patient not taking: Reported on 12/8/2022)     No current facility-administered medications for this visit.      Wt Readings from Last 3 Encounters:   12/08/22 193 lb (87.5 kg)   05/12/22 216 lb (98 kg)   12/07/21 230 lb 12.8 oz (104.7 kg)     BP Readings from Last 3 Encounters:   12/08/22 96/64   05/12/22 111/67   12/07/21 (!) 93/58     Pulse Readings from Last 3 Encounters:   12/08/22 75   05/12/22 75   12/07/21 75     Lab Results   Component Value Date/Time    WBC 4.2 12/08/2022 01:45 PM    Hemoglobin (POC) 12.7 12/22/2017 11:23 AM    HGB 13.0 12/08/2022 01:45 PM    HCT 39.1 12/08/2022 01:45 PM    PLATELET 752 63/98/2225 01:45 PM    .1 (H) 12/08/2022 01:45 PM     Lab Results   Component Value Date/Time    Sodium 140 12/08/2022 01:45 PM    Potassium 4.1 12/08/2022 01:45 PM    Chloride 103 12/08/2022 01:45 PM    CO2 32 12/08/2022 01:45 PM    Anion gap 5 12/08/2022 01:45 PM    Glucose 89 12/08/2022 01:45 PM    BUN 19 12/08/2022 01:45 PM    Creatinine 0.89 12/08/2022 01:45 PM    BUN/Creatinine ratio 21 (H) 12/08/2022 01:45 PM    GFR est AA >60 11/30/2021 11:30 AM    GFR est non-AA >60 11/30/2021 11:30 AM    Calcium 9.0 12/08/2022 01:45 PM    Bilirubin, total 0.7 11/30/2021 11:30 AM    Alk. phosphatase 110 11/30/2021 11:30 AM    Protein, total 7.7 11/30/2021 11:30 AM    Albumin 4.0 11/30/2021 11:30 AM    Globulin 3.7 11/30/2021 11:30 AM    A-G Ratio 1.1 11/30/2021 11:30 AM    ALT (SGPT) 19 11/30/2021 11:30 AM     CrCl cannot be calculated (Unknown ideal weight.). INR History:   (normal INR range 0.8-1.2)     Date   INR   PT   Dose/Comments  12/28/22 2.3  27.9 2.5 mg Wed, Fri; 5 mg daily ROW  11/29/22 2.4  29.3 2.5 mg Wed, Fri; 5 mg daily ROW  11/01/22 3.1  36.6 2.5 mg Wed, Fri; 5 mg daily ROW  10/04/22 2.8  33.5 2.5 mg Wed, Fri; 5 mg daily ROW  08/31/22 3.0  35.7 hold dose today (7/5) and then take 2.5 mg Wed, Fri; 5 mg daily ROW  07/05/22 3.8  45.1 2.5 mg Fri; 5 mg daily ROW  06/22/22 3.4  40.4 5 mg daily  06/08/22 1.8  21.6 2.5 mg Fri; 5 mg daily ROW  05/12/22 2.0  23.7 2.5 mg Fri; 5 mg daily ROW  04/06/22 2.3  27.2 2.5 mg Fri; 5 mg daily ROW    Medications that can increase  INR: amiodarone  Medications that can decrease INR: N/A    A/P:       Anticoagulation:  Considering Mr. Quinn's past history, todays findings, and per Anticoagulation Collaborative Practice Agreement/Protocol:    Fingerstick POC INR (2.3) is Therapeutic for INR goal today. Continue warfarin 2.5 mg Wed, Fri; 5 mg daily ROW  INR was 2.3 and therapeutic. Patient's MAR reflected warfarin was administered as instructed. Patient reports consistent intake of vitamin K foods. Patient will continue current regimen and recheck INR in 4 weeks. Patient was instructed to schedule an appointment in 4 week(s) prior to leaving the clinic. Medication reconciliation was completed during the visit. There are no discontinued medications. A full discussion of the nature of anticoagulants has been carried out.   A full discussion of the need for frequent and regular monitoring, precise dosage adjustment and compliance was stressed. Side effects of potential bleeding were discussed and Mr. Kyle Leggett was instructed to call [phone] if there are any signs of abnormal bleeding. Mr. Kyle Leggett was instructed to avoid any OTC items containing aspirin or ibuprofen and prior to starting any new OTC products to consult with his physician or pharmacist to ensure no drug interactions are present. Mr. Kyle Leggett was instructed to avoid any major changes in his general diet and to avoid alcohol consumption. Group home representative was provided information in the AVS that includes topics on understanding coumadin therapy, drug interaction considerations, vitamin K and coumadin use, interactions with foods and supplements containing vitamin K, and the use of herbal products. Mr. Quinn/group home representative verbalized his understanding of all instructions and will call the office with any questions, concerns, or signs of abnormal bleeding or blood clot. Notifications of recommendations will be sent to Xochitl Frost NP for review.     Thank you,  Eli Ibarra, [address] Tracking Only    Intervention Detail: Adherence Monitorin and Lab(s) Ordered  Total # of Interventions Recommended: 2  Total # of Interventions Accepted: 2  Time Spent (min): 15

## 2022-12-27 NOTE — PATIENT INSTRUCTIONS
Today your INR was 2.3 . Your goal INR is 2.0-3.0 . You have a 5 mg tablet of Coumadin (warfarin). Take Coumadin (warfarin) as follows: Take 2.5 mg (0.5 tablet) every Wednesday and Friday; and 5 mg (1 tablet) daily rest of week. Come back in 4 week(s) for your next finger stick/INR blood test.        Avoid any over the counter items containing aspirin or ibuprofen, and avoid great swings in general diet. Avoid alcohol consumption. Please notify the INR nurse if you are started on any new medication including over the counter or herbal supplements. Also, please notify your INR nurse if any of your other prescription or over the counter medications have been discontinued. Call [facility] at [phone] if you have any signs of abnormal bleeding/blood clot.  ------------------------------------------------------------------------------------------------------------------  Taking Warfarin Safely: Care Instructions    Your Care Instructions  Warfarin is a medicine that you take to prevent blood clots. It is often called a blood thinner. Doctors give warfarin (such as Coumadin) to reduce the risk of blood clots. You may be at risk for blood clots if you have atrial fibrillation or deep vein thrombosis. Some other health problems may also put you at risk. Warfarin slows the amount of time it takes for your blood to clot. It can cause bleeding problems. Even if you've been taking warfarin for a while, it's important to know how to take it safely. Foods and other medicines can affect the way warfarin works. Some can make warfarin work too well. This can cause bleeding problems. And some can make it work poorly, so that it does not prevent blood clots very well. You will need regular blood tests to check how long it takes for your blood to form a clot. This test is called a PT or prothrombin time test. The result of the test is called an INR level.  Depending on the test results, your doctor or anticoagulation clinic may adjust your dose of warfarin. Follow-up care is a key part of your treatment and safety. Be sure to make and go to all appointments, and call your doctor if you are having problems. It's also a good idea to know your test results and keep a list of the medicines you take. How can you care for yourself at home? Take warfarin safely  Take your warfarin at the same time each day. If you miss a dose of warfarin, don't take an extra dose to make up for it. Your doctor can tell you exactly what to do so you don't take too much or too little. Wear medical alert jewelry that lets others know that you take warfarin. You can buy this at most drugstores. Don't take warfarin if you are pregnant or planning to get pregnant. Talk to your doctor about how you can prevent getting pregnant while you are taking it. Don't change your dose or stop taking warfarin unless your doctor tells you to. Effects of medicines and food on warfarin  Don't start or stop taking any medicines, vitamins, or natural remedies unless you first talk to your doctor. Many medicines can affect how warfarin works. These include aspirin and other pain relievers, over-the-counter medicines, multivitamins, dietary supplements, and herbal products. Tell all of your doctors and pharmacists that you take warfarin. Some prescription medicines can affect how warfarin works. Keep the amount of vitamin K in your diet about the same from day to day. Do not suddenly eat a lot more or a lot less food that is rich in vitamin K than you usually do. Vitamin K affects how warfarin works and how your blood clots. Talk with your doctor before making big changes in your diet. Vitamin K is in many foods, such as:  Leafy greens, such as kale, cabbage, spinach, Swiss chard, and lettuce. Canola and soybean oils. Green vegetables, such as asparagus, broccoli, and Cassel sprouts.   Vegetable drinks, green tea leaves, and some dietary supplement drinks. Avoid cranberry juice and other cranberry products. They can increase the effects of warfarin. Limit your use of alcohol. Avoid bleeding by preventing falls and injuries  Wear slippers or shoes with nonskid soles. Remove throw rugs and clutter. Rearrange furniture and electrical cords to keep them out of walking paths. Keep stairways, porches, and outside walkways well lit. Use night-lights in hallways and bathrooms. Be extra careful when you work with sharp tools or knives. When should you call for help? Call 911 anytime you think you may need emergency care. For example, call if:  You have a sudden, severe headache that is different from past headaches. Call your doctor now or seek immediate medical care if:  You have any abnormal bleeding, such as:  Nosebleeds. Vaginal bleeding that is different (heavier, more frequent, at a different time of the month) than what you are used to. Bloody or black stools, or rectal bleeding. Bloody or pink urine. Watch closely for changes in your health, and be sure to contact your doctor if you have any problems. Where can you learn more? Go to http://www.gray.com/. Enter T022 in the search box to learn more about \"Taking Warfarin Safely: Care Instructions. \"  Current as of: January 27, 2016  Content Version: 11.1  © 5492-2157 [employer]. Care instructions adapted under license by [employer] (which disclaims liability or warranty for this information). If you have questions about a medical condition or this instruction, always ask your healthcare professional. [place] [address] any warranty or liability for your use of this information.

## 2022-12-28 ENCOUNTER — ANTI-COAG VISIT (OUTPATIENT)
Dept: PHARMACY | Age: 77
End: 2022-12-28
Payer: MEDICARE

## 2022-12-28 DIAGNOSIS — I48.91 ATRIAL FIBRILLATION, UNSPECIFIED TYPE (HCC): Primary | ICD-10-CM

## 2022-12-28 LAB
INR BLD: 2.3
PT POC: 27.9 SECONDS
VALID INTERNAL CONTROL?: YES

## 2022-12-28 PROCEDURE — 99211 OFF/OP EST MAY X REQ PHY/QHP: CPT

## 2022-12-28 PROCEDURE — 85610 PROTHROMBIN TIME: CPT

## 2023-01-01 RX ORDER — AMIODARONE HYDROCHLORIDE 200 MG/1
TABLET ORAL
Qty: 16 TABLET | Refills: 11 | Status: SHIPPED | OUTPATIENT
Start: 2023-01-01

## 2023-01-10 DIAGNOSIS — G40.909 SEIZURE DISORDER (HCC): ICD-10-CM

## 2023-01-10 RX ORDER — LACOSAMIDE 100 MG/1
100 TABLET ORAL 2 TIMES DAILY
Qty: 60 TABLET | Refills: 5 | Status: SHIPPED | OUTPATIENT
Start: 2023-01-10

## 2023-01-10 NOTE — TELEPHONE ENCOUNTER
Fernando Fernandez from [facility] called stating that the patient is currently out of this medication and needs it be refilled soon. Would like to be contacted when prescription is sent to the pharmacy. Thanks!

## 2023-01-11 NOTE — TELEPHONE ENCOUNTER
I have called Mr. Kait Conner to let him know that the medication for Mr. Jose Armando Ramirez has been refilled but he needs to be seen by Dr. Nadine Pham for follow-up appointment.

## 2023-01-24 NOTE — PATIENT INSTRUCTIONS
Today your INR was 2.5 . Your goal INR is 2.0-3.0 . You have 5 mg and 2.5mg tablets of Coumadin (warfarin). Take Coumadin (warfarin) as follows: Take 2.5 mg every Wednesday and Friday; and 5 mg daily rest of week. Come back in 4 week(s) for your next finger stick/INR blood test.        Avoid any over the counter items containing aspirin or ibuprofen, and avoid great swings in general diet. Avoid alcohol consumption. Please notify the INR nurse if you are started on any new medication including over the counter or herbal supplements. Also, please notify your INR nurse if any of your other prescription or over the counter medications have been discontinued. Call [facility] at [phone] if you have any signs of abnormal bleeding/blood clot.  ------------------------------------------------------------------------------------------------------------------  Taking Warfarin Safely: Care Instructions    Your Care Instructions  Warfarin is a medicine that you take to prevent blood clots. It is often called a blood thinner. Doctors give warfarin (such as Coumadin) to reduce the risk of blood clots. You may be at risk for blood clots if you have atrial fibrillation or deep vein thrombosis. Some other health problems may also put you at risk. Warfarin slows the amount of time it takes for your blood to clot. It can cause bleeding problems. Even if you've been taking warfarin for a while, it's important to know how to take it safely. Foods and other medicines can affect the way warfarin works. Some can make warfarin work too well. This can cause bleeding problems. And some can make it work poorly, so that it does not prevent blood clots very well. You will need regular blood tests to check how long it takes for your blood to form a clot. This test is called a PT or prothrombin time test. The result of the test is called an INR level.  Depending on the test results, your doctor or anticoagulation clinic may adjust your dose of warfarin. Follow-up care is a key part of your treatment and safety. Be sure to make and go to all appointments, and call your doctor if you are having problems. It's also a good idea to know your test results and keep a list of the medicines you take. How can you care for yourself at home? Take warfarin safely  Take your warfarin at the same time each day. If you miss a dose of warfarin, don't take an extra dose to make up for it. Your doctor can tell you exactly what to do so you don't take too much or too little. Wear medical alert jewelry that lets others know that you take warfarin. You can buy this at most drugstores. Don't take warfarin if you are pregnant or planning to get pregnant. Talk to your doctor about how you can prevent getting pregnant while you are taking it. Don't change your dose or stop taking warfarin unless your doctor tells you to. Effects of medicines and food on warfarin  Don't start or stop taking any medicines, vitamins, or natural remedies unless you first talk to your doctor. Many medicines can affect how warfarin works. These include aspirin and other pain relievers, over-the-counter medicines, multivitamins, dietary supplements, and herbal products. Tell all of your doctors and pharmacists that you take warfarin. Some prescription medicines can affect how warfarin works. Keep the amount of vitamin K in your diet about the same from day to day. Do not suddenly eat a lot more or a lot less food that is rich in vitamin K than you usually do. Vitamin K affects how warfarin works and how your blood clots. Talk with your doctor before making big changes in your diet. Vitamin K is in many foods, such as:  Leafy greens, such as kale, cabbage, spinach, Swiss chard, and lettuce. Canola and soybean oils. Green vegetables, such as asparagus, broccoli, and [place] sprouts.   Vegetable drinks, green tea leaves, and some dietary supplement drinks. Avoid cranberry juice and other cranberry products. They can increase the effects of warfarin. Limit your use of alcohol. Avoid bleeding by preventing falls and injuries  Wear slippers or shoes with nonskid soles. Remove throw rugs and clutter. Rearrange furniture and electrical cords to keep them out of walking paths. Keep stairways, porches, and outside walkways well lit. Use night-lights in hallways and bathrooms. Be extra careful when you work with sharp tools or knives. When should you call for help? Call 911 anytime you think you may need emergency care. For example, call if:  You have a sudden, severe headache that is different from past headaches. Call your doctor now or seek immediate medical care if:  You have any abnormal bleeding, such as:  Nosebleeds. Vaginal bleeding that is different (heavier, more frequent, at a different time of the month) than what you are used to. Bloody or black stools, or rectal bleeding. Bloody or pink urine. Watch closely for changes in your health, and be sure to contact your doctor if you have any problems. Where can you learn more? Go to http://www.gray.com/. Enter X152 in the search box to learn more about \"Taking Warfarin Safely: Care Instructions. \"  Current as of: January 27, 2016  Content Version: 11.1  © 5576-4188 [employer], [employer]. Care instructions adapted under license by [employer] (which disclaims liability or warranty for this information). If you have questions about a medical condition or this instruction, always ask your healthcare professional. [place] [address] any warranty or liability for your use of this information.

## 2023-01-24 NOTE — PROGRESS NOTES
Pharmacy Progress Note - Anticoagulation Management    S/O:  Mr. Dallin Quiroz  is a 70 y.o. male seen today for anticoagulation management for the diagnosis of Atrial Fibrillation. HPI: At last visit (12/28), INR was 2.3 and therapeutic. Patient's MAR reflected warfarin was administered as instructed. Patient reports consistent intake of vitamin K foods. Patient will continue current regimen and recheck INR in 4 weeks. Interim History:      Warfarin start date: Prior to 10/9/20 per chart review  INR Goal:  2.0-3.0    Current warfarin regimen: 2.5 mg Wed, Fri; 5 mg daily ROW  Warfarin tablet strength:   2.5 mg, 5 mg  Duration of therapy: Indefinite    Today's pertinent positives includes:  No significant changes since last visit      Results for orders placed or performed in visit on 01/25/23   POC PROTHROMBIN W/INR   Result Value Ref Range    VALID INTERNAL CONTROL POC Yes     Prothrombin time (POC) 30.0 seconds    INR POC 2.5        Adherence:   Able to recall regimen? YES  Miss/extra dose? NO  Need refill? YES       Upcoming procedure(s):  N/A      Past Medical History:   Diagnosis Date    A-fib [facility]) 2015    pacemaker L chest     CAD (coronary artery disease) 2015    Pacemaker    Epilepsy ([facility] [address]     Heart disease     Hypertension     Incontinence     Leg swelling     Mental retardation, 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)     Other ill-defined conditions(799.89)     edema legs    S/P ablation of atrial fibrillation 11/13/2020    S/P biventricular cardiac pacemaker procedure 10/23/2015    10/23/15 [employer] biventricular pacemaker inmplant    Screen for colon cancer 9/27/2012     Allergies   Allergen Reactions    Sulfa (Sulfonamide Antibiotics) Rash     Patient denies     Current Outpatient Medications   Medication Sig    warfarin (COUMADIN) 5 mg tablet Take 1 tablet (5 mg) by mouth every Monday, Tuesday, Thursday, Saturday and Sunday.     warfarin (COUMADIN) 2.5 mg tablet Take 1 tablet (2.5 mg) by mouth every Wednesday and Friday. lacosamide (Vimpat) 100 mg tab tablet Take 1 Tablet by mouth two (2) times a day. amiodarone (CORDARONE) 200 mg tablet TAKE 1/2 TABLET BY MOUTH DAILY. bumetanide (BUMEX) 2 mg tablet Take 1 Tablet by mouth daily. cholecalciferol (VITAMIN D3) (1000 Units /25 mcg) tablet Take 1 Tablet by mouth daily. metoprolol tartrate (LOPRESSOR) 25 mg tablet Take 1 Tablet by mouth two (2) times a day. potassium chloride (K-DUR, KLOR-CON M20) 20 mEq tablet Take 1 Tablet by mouth daily. levETIRAcetam 1,000 mg tablet TAKE 1 TABLET BY MOUTH TWICE A DAY    polyethylene glycol (MIRALAX) 17 gram packet Take 1 Packet by mouth daily as needed for Constipation. (Patient not taking: Reported on 12/8/2022)    acetaminophen (TYLENOL) 325 mg tablet Take 2 Tabs by mouth every four (4) hours as needed for Pain. (Patient not taking: Reported on 12/8/2022)     No current facility-administered medications for this visit.      Wt Readings from Last 3 Encounters:   12/08/22 193 lb (87.5 kg)   05/12/22 216 lb (98 kg)   12/07/21 230 lb 12.8 oz (104.7 kg)     BP Readings from Last 3 Encounters:   12/08/22 96/64   05/12/22 111/67   12/07/21 (!) 93/58     Pulse Readings from Last 3 Encounters:   12/08/22 75   05/12/22 75   12/07/21 75     Lab Results   Component Value Date/Time    WBC 4.2 12/08/2022 01:45 PM    Hemoglobin (POC) 12.7 12/22/2017 11:23 AM    HGB 13.0 12/08/2022 01:45 PM    HCT 39.1 12/08/2022 01:45 PM    PLATELET 550 49/96/0446 01:45 PM    .1 (H) 12/08/2022 01:45 PM     Lab Results   Component Value Date/Time    Sodium 140 12/08/2022 01:45 PM    Potassium 4.1 12/08/2022 01:45 PM    Chloride 103 12/08/2022 01:45 PM    CO2 32 12/08/2022 01:45 PM    Anion gap 5 12/08/2022 01:45 PM    Glucose 89 12/08/2022 01:45 PM    BUN 19 12/08/2022 01:45 PM    Creatinine 0.89 12/08/2022 01:45 PM    BUN/Creatinine ratio 21 (H) 12/08/2022 01:45 PM    GFR est AA >60 11/30/2021 11:30 AM    GFR est non-AA >60 11/30/2021 11:30 AM    Calcium 9.0 12/08/2022 01:45 PM    Bilirubin, total 0.7 11/30/2021 11:30 AM    Alk. phosphatase 110 11/30/2021 11:30 AM    Protein, total 7.7 11/30/2021 11:30 AM    Albumin 4.0 11/30/2021 11:30 AM    Globulin 3.7 11/30/2021 11:30 AM    A-G Ratio 1.1 11/30/2021 11:30 AM    ALT (SGPT) 19 11/30/2021 11:30 AM     CrCl cannot be calculated (Unknown ideal weight.). INR History:   (normal INR range 0.8-1.2)     Date   INR   PT   Dose/Comments  01/25/23 2.5  30 2.5 mg Wed, Fri; 5 mg daily ROW  12/28/22 2.3  27.9 2.5 mg Wed, Fri; 5 mg daily ROW  11/29/22 2.4  29.3 2.5 mg Wed, Fri; 5 mg daily ROW  11/01/22 3.1  36.6 2.5 mg Wed, Fri; 5 mg daily ROW  10/04/22 2.8  33.5 2.5 mg Wed, Fri; 5 mg daily ROW  08/31/22 3.0  35.7 hold dose today (7/5) and then take 2.5 mg Wed, Fri; 5 mg daily ROW  07/05/22 3.8  45.1 2.5 mg Fri; 5 mg daily ROW  06/22/22 3.4  40.4 5 mg daily  06/08/22 1.8  21.6 2.5 mg Fri; 5 mg daily ROW  05/12/22 2.0  23.7 2.5 mg Fri; 5 mg daily ROW  04/06/22 2.3  27.2 2.5 mg Fri; 5 mg daily ROW    Medications that can increase  INR: amiodarone  Medications that can decrease INR: N/A    A/P:       Anticoagulation:  Considering Mr. Quinn's past history, todays findings, and per Anticoagulation Collaborative Practice Agreement/Protocol:    Fingerstick POC INR (2.5) is Therapeutic for INR goal today. Continue warfarin 2.5 mg Wed, Fri; 5 mg daily ROW  INR was 2.5 and therapeutic. Patient's MAR reflected warfarin was administered as instructed. Patient reported consistent intake of Vitamin K foods. Refills for 2.5mg and 5mg tablets were sent to [address] [place] [address]. Patient will continue current regimen and recheck INR in 4 weeks. .    Patient was instructed to schedule an appointment in 4 week(s) prior to leaving the clinic. Medication reconciliation was completed during the visit.     Medications Discontinued During This Encounter   Medication Reason    warfarin (COUMADIN) 5 mg tablet REORDER warfarin (COUMADIN) 2.5 mg tablet REORDER         A full discussion of the nature of anticoagulants has been carried out. A full discussion of the need for frequent and regular monitoring, precise dosage adjustment and compliance was stressed. Side effects of potential bleeding were discussed and Mr. Mode Lomeli was instructed to call [phone] if there are any signs of abnormal bleeding. Mr. Mode Lomeli was instructed to avoid any OTC items containing aspirin or ibuprofen and prior to starting any new OTC products to consult with his physician or pharmacist to ensure no drug interactions are present. Mr. Mode Lomeli was instructed to avoid any major changes in his general diet and to avoid alcohol consumption. Group home representative was provided information in the AVS that includes topics on understanding coumadin therapy, drug interaction considerations, vitamin K and coumadin use, interactions with foods and supplements containing vitamin K, and the use of herbal products. Mr. Quinn/group home representative verbalized his understanding of all instructions and will call the office with any questions, concerns, or signs of abnormal bleeding or blood clot. Notifications of recommendations will be sent to Thao Abbasi NP for review.     Thank you,  Ster[address] Tracking Only    Intervention Detail: Adherence Monitorin, Lab(s) Ordered, and New Rx: 2, reason: Needs Additional Therapy  Total # of Interventions Recommended: 3  Total # of Interventions Accepted: 3  Time Spent (min): 20

## 2023-01-25 ENCOUNTER — ANTI-COAG VISIT (OUTPATIENT)
Dept: PHARMACY | Age: 78
End: 2023-01-25
Payer: MEDICARE

## 2023-01-25 DIAGNOSIS — I48.91 ATRIAL FIBRILLATION, UNSPECIFIED TYPE (HCC): Primary | ICD-10-CM

## 2023-01-25 LAB
INR BLD: 2.5
PT POC: 30 SECONDS
VALID INTERNAL CONTROL?: YES

## 2023-01-25 PROCEDURE — 85610 PROTHROMBIN TIME: CPT

## 2023-01-25 PROCEDURE — 99212 OFFICE O/P EST SF 10 MIN: CPT

## 2023-01-25 RX ORDER — WARFARIN SODIUM 5 MG/1
TABLET ORAL
Qty: 60 TABLET | Refills: 0 | Status: SHIPPED | OUTPATIENT
Start: 2023-01-25

## 2023-01-25 RX ORDER — WARFARIN 2.5 MG/1
TABLET ORAL
Qty: 24 TABLET | Refills: 0 | Status: SHIPPED | OUTPATIENT
Start: 2023-01-25

## 2023-02-22 ENCOUNTER — ANTI-COAG VISIT (OUTPATIENT)
Dept: PHARMACY | Age: 78
End: 2023-02-22
Payer: MEDICARE

## 2023-02-22 DIAGNOSIS — I48.91 ATRIAL FIBRILLATION, UNSPECIFIED TYPE (HCC): Primary | ICD-10-CM

## 2023-02-22 LAB
INR BLD: 2
PT POC: 24.5 SECONDS
VALID INTERNAL CONTROL?: YES

## 2023-02-22 PROCEDURE — 85610 PROTHROMBIN TIME: CPT

## 2023-02-22 PROCEDURE — 99211 OFF/OP EST MAY X REQ PHY/QHP: CPT

## 2023-02-22 NOTE — PROGRESS NOTES
Pharmacy Progress Note - Anticoagulation Management    S/O:  Mr. Ioana Jamil  is a 70 y.o. male seen today for anticoagulation management for the diagnosis of Atrial Fibrillation. HPI: At last visit (1/25), INR was 2.5 and therapeutic. Patient's MAR reflected warfarin was administered as instructed. Patient reported consistent intake of Vitamin K foods. Refills for 2.5mg and 5mg tablets were sent to [address] [place] [address]. Patient will continue current regimen and recheck INR in 4 weeks. .      Interim History:    Warfarin start date: Prior to 10/9/20 per chart review  INR Goal:  2.0-3.0    Current warfarin regimen: 2.5 mg Wed, Fri; 5 mg daily ROW  Warfarin tablet strength:   2.5 mg, 5 mg  Duration of therapy: Indefinite      Results for orders placed or performed in visit on 02/22/23   POC PROTHROMBIN W/INR   Result Value Ref Range    VALID INTERNAL CONTROL POC Yes     Prothrombin time (POC) 24.5 seconds    INR POC 2.0        Today's pertinent positives includes:  No significant changes since last visit      Adherence:   Able to recall regimen? YES  Miss/extra dose? NO  Need refill? NO       Upcoming procedure(s):  N/A      Past Medical History:   Diagnosis Date    A-fib [facility]) 2015    pacemaker L chest     CAD (coronary artery disease) 2015    Pacemaker    Epilepsy ([facility] [address]     Heart disease     Hypertension     Incontinence     Leg swelling     Mental retardation, 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)     Other ill-defined conditions(799.89)     edema legs    S/P ablation of atrial fibrillation 11/13/2020    S/P biventricular cardiac pacemaker procedure 10/23/2015    10/23/15 [employer] biventricular pacemaker inmplant    Screen for colon cancer 9/27/2012     Allergies   Allergen Reactions    Sulfa (Sulfonamide Antibiotics) Rash     Patient denies     Current Outpatient Medications   Medication Sig    warfarin (COUMADIN) 5 mg tablet Take 1 tablet (5 mg) by mouth every Monday, Tuesday, Thursday, Saturday and Sunday.     warfarin (COUMADIN) 2.5 mg tablet Take 1 tablet (2.5 mg) by mouth every Wednesday and Friday. lacosamide (Vimpat) 100 mg tab tablet Take 1 Tablet by mouth two (2) times a day. amiodarone (CORDARONE) 200 mg tablet TAKE 1/2 TABLET BY MOUTH DAILY. bumetanide (BUMEX) 2 mg tablet Take 1 Tablet by mouth daily. cholecalciferol (VITAMIN D3) (1000 Units /25 mcg) tablet Take 1 Tablet by mouth daily. metoprolol tartrate (LOPRESSOR) 25 mg tablet Take 1 Tablet by mouth two (2) times a day. potassium chloride (K-DUR, KLOR-CON M20) 20 mEq tablet Take 1 Tablet by mouth daily. levETIRAcetam 1,000 mg tablet TAKE 1 TABLET BY MOUTH TWICE A DAY    polyethylene glycol (MIRALAX) 17 gram packet Take 1 Packet by mouth daily as needed for Constipation. (Patient not taking: Reported on 12/8/2022)    acetaminophen (TYLENOL) 325 mg tablet Take 2 Tabs by mouth every four (4) hours as needed for Pain. (Patient not taking: Reported on 12/8/2022)     No current facility-administered medications for this visit.      Wt Readings from Last 3 Encounters:   12/08/22 193 lb (87.5 kg)   05/12/22 216 lb (98 kg)   12/07/21 230 lb 12.8 oz (104.7 kg)     BP Readings from Last 3 Encounters:   12/08/22 96/64   05/12/22 111/67   12/07/21 (!) 93/58     Pulse Readings from Last 3 Encounters:   12/08/22 75   05/12/22 75   12/07/21 75     Lab Results   Component Value Date/Time    WBC 4.2 12/08/2022 01:45 PM    Hemoglobin (POC) 12.7 12/22/2017 11:23 AM    HGB 13.0 12/08/2022 01:45 PM    HCT 39.1 12/08/2022 01:45 PM    PLATELET 194 46/08/3313 01:45 PM    .1 (H) 12/08/2022 01:45 PM     Lab Results   Component Value Date/Time    Sodium 140 12/08/2022 01:45 PM    Potassium 4.1 12/08/2022 01:45 PM    Chloride 103 12/08/2022 01:45 PM    CO2 32 12/08/2022 01:45 PM    Anion gap 5 12/08/2022 01:45 PM    Glucose 89 12/08/2022 01:45 PM    BUN 19 12/08/2022 01:45 PM    Creatinine 0.89 12/08/2022 01:45 PM    BUN/Creatinine ratio 21 (H) 12/08/2022 01:45 PM GFR est AA >60 11/30/2021 11:30 AM    GFR est non-AA >60 11/30/2021 11:30 AM    Calcium 9.0 12/08/2022 01:45 PM    Bilirubin, total 0.7 11/30/2021 11:30 AM    Alk. phosphatase 110 11/30/2021 11:30 AM    Protein, total 7.7 11/30/2021 11:30 AM    Albumin 4.0 11/30/2021 11:30 AM    Globulin 3.7 11/30/2021 11:30 AM    A-G Ratio 1.1 11/30/2021 11:30 AM    ALT (SGPT) 19 11/30/2021 11:30 AM     CrCl cannot be calculated (Unknown ideal weight.). INR History:   (normal INR range 0.8-1.2)     Date   INR   PT   Dose/Comments  02/22/23 2.0  24.5 2.5 mg Wed, Fri; 5 mg daily ROW  01/25/23 2.5  30.0 2.5 mg Wed, Fri; 5 mg daily ROW  12/28/22 2.3  27.9 2.5 mg Wed, Fri; 5 mg daily ROW  11/29/22 2.4  29.3 2.5 mg Wed, Fri; 5 mg daily ROW  11/01/22 3.1  36.6 2.5 mg Wed, Fri; 5 mg daily ROW  10/04/22 2.8  33.5 2.5 mg Wed, Fri; 5 mg daily ROW  08/31/22 3.0  35.7 hold dose today (7/5) and then take 2.5 mg Wed, Fri; 5 mg daily ROW  07/05/22 3.8  45.1 2.5 mg Fri; 5 mg daily ROW  06/22/22 3.4  40.4 5 mg daily  06/08/22 1.8  21.6 2.5 mg Fri; 5 mg daily ROW  05/12/22 2.0  23.7 2.5 mg Fri; 5 mg daily ROW  04/06/22 2.3  27.2 2.5 mg Fri; 5 mg daily ROW    Medications that can increase  INR: amiodarone  Medications that can decrease INR: N/A    A/P:       Anticoagulation:  Considering Mr. Quinn's past history, todays findings, and per Anticoagulation Collaborative Practice Agreement/Protocol:    Fingerstick POC INR (2.0) is Therapeutic for INR goal today. Continue warfarin 2.5 mg Wed, Fri; 5 mg daily ROW  INR is 2.0 and therapeutic. Patient denies changes to his medications and reports consistent intake of vitamin K foods. Patient will continue current regimen and recheck INR in 4 week(s). Patient was instructed to schedule an appointment in 4 week(s) prior to leaving the clinic. Medication reconciliation was completed during the visit. There are no discontinued medications.         A full discussion of the nature of anticoagulants has been carried out. A full discussion of the need for frequent and regular monitoring, precise dosage adjustment and compliance was stressed. Side effects of potential bleeding were discussed and Mr. Mckayla Hutson was instructed to call [phone] if there are any signs of abnormal bleeding. Mr. Mckayla Hutson was instructed to avoid any OTC items containing aspirin or ibuprofen and prior to starting any new OTC products to consult with his physician or pharmacist to ensure no drug interactions are present. Mr. Mckayla Hutson was instructed to avoid any major changes in his general diet and to avoid alcohol consumption. Group home representative was provided information in the AVS that includes topics on understanding coumadin therapy, drug interaction considerations, vitamin K and coumadin use, interactions with foods and supplements containing vitamin K, and the use of herbal products. Mr. Quinn/group home representative verbalized his understanding of all instructions and will call the office with any questions, concerns, or signs of abnormal bleeding or blood clot. Notifications of recommendations will be sent to Amilcar Clark NP for review.     Thank you,  Radha Rodriguez, [address] Tracking Only    Intervention Detail: Adherence Monitorin and Lab(s) Ordered  Total # of Interventions Recommended: 2  Total # of Interventions Accepted: 2  Time Spent (min): 15

## 2023-02-22 NOTE — PATIENT INSTRUCTIONS
Today your INR was 2.0 . Your goal INR is 2.0-3.0 . You have 5 mg and 2.5mg tablets of Coumadin (warfarin). Take Coumadin (warfarin) as follows: Take 2.5 mg every Wednesday and Friday; and 5 mg daily rest of week. Come back in 4 week(s) for your next finger stick/INR blood test.        Avoid any over the counter items containing aspirin or ibuprofen, and avoid great swings in general diet. Avoid alcohol consumption. Please notify the INR nurse if you are started on any new medication including over the counter or herbal supplements. Also, please notify your INR nurse if any of your other prescription or over the counter medications have been discontinued. Call [facility] at [phone] if you have any signs of abnormal bleeding/blood clot.  ------------------------------------------------------------------------------------------------------------------  Taking Warfarin Safely: Care Instructions    Your Care Instructions  Warfarin is a medicine that you take to prevent blood clots. It is often called a blood thinner. Doctors give warfarin (such as Coumadin) to reduce the risk of blood clots. You may be at risk for blood clots if you have atrial fibrillation or deep vein thrombosis. Some other health problems may also put you at risk. Warfarin slows the amount of time it takes for your blood to clot. It can cause bleeding problems. Even if you've been taking warfarin for a while, it's important to know how to take it safely. Foods and other medicines can affect the way warfarin works. Some can make warfarin work too well. This can cause bleeding problems. And some can make it work poorly, so that it does not prevent blood clots very well. You will need regular blood tests to check how long it takes for your blood to form a clot. This test is called a PT or prothrombin time test. The result of the test is called an INR level.  Depending on the test results, your doctor or anticoagulation clinic may adjust your dose of warfarin. Follow-up care is a key part of your treatment and safety. Be sure to make and go to all appointments, and call your doctor if you are having problems. It's also a good idea to know your test results and keep a list of the medicines you take. How can you care for yourself at home? Take warfarin safely  Take your warfarin at the same time each day. If you miss a dose of warfarin, don't take an extra dose to make up for it. Your doctor can tell you exactly what to do so you don't take too much or too little. Wear medical alert jewelry that lets others know that you take warfarin. You can buy this at most drugstores. Don't take warfarin if you are pregnant or planning to get pregnant. Talk to your doctor about how you can prevent getting pregnant while you are taking it. Don't change your dose or stop taking warfarin unless your doctor tells you to. Effects of medicines and food on warfarin  Don't start or stop taking any medicines, vitamins, or natural remedies unless you first talk to your doctor. Many medicines can affect how warfarin works. These include aspirin and other pain relievers, over-the-counter medicines, multivitamins, dietary supplements, and herbal products. Tell all of your doctors and pharmacists that you take warfarin. Some prescription medicines can affect how warfarin works. Keep the amount of vitamin K in your diet about the same from day to day. Do not suddenly eat a lot more or a lot less food that is rich in vitamin K than you usually do. Vitamin K affects how warfarin works and how your blood clots. Talk with your doctor before making big changes in your diet. Vitamin K is in many foods, such as:  Leafy greens, such as kale, cabbage, spinach, Swiss chard, and lettuce. Canola and soybean oils. Green vegetables, such as asparagus, broccoli, and [place] sprouts.   Vegetable drinks, green tea leaves, and some dietary supplement drinks. Avoid cranberry juice and other cranberry products. They can increase the effects of warfarin. Limit your use of alcohol. Avoid bleeding by preventing falls and injuries  Wear slippers or shoes with nonskid soles. Remove throw rugs and clutter. Rearrange furniture and electrical cords to keep them out of walking paths. Keep stairways, porches, and outside walkways well lit. Use night-lights in hallways and bathrooms. Be extra careful when you work with sharp tools or knives. When should you call for help? Call 911 anytime you think you may need emergency care. For example, call if:  You have a sudden, severe headache that is different from past headaches. Call your doctor now or seek immediate medical care if:  You have any abnormal bleeding, such as:  Nosebleeds. Vaginal bleeding that is different (heavier, more frequent, at a different time of the month) than what you are used to. Bloody or black stools, or rectal bleeding. Bloody or pink urine. Watch closely for changes in your health, and be sure to contact your doctor if you have any problems. Where can you learn more? Go to http://mandi-marylou.info/. Enter Z339 in the search box to learn more about \"Taking Warfarin Safely: Care Instructions. \"  Current as of: January 27, 2016  Content Version: 11.1  © 3981-0411 [employer]. Care instructions adapted under license by [employer] (which disclaims liability or warranty for this information). If you have questions about a medical condition or this instruction, always ask your healthcare professional. [place] [address] any warranty or liability for your use of this information.

## 2023-03-08 DIAGNOSIS — R60.0 BILATERAL LOWER EXTREMITY EDEMA: ICD-10-CM

## 2023-03-08 DIAGNOSIS — G40.909 SEIZURE DISORDER (HCC): ICD-10-CM

## 2023-03-08 DIAGNOSIS — I10 PRIMARY HYPERTENSION: ICD-10-CM

## 2023-03-08 RX ORDER — BUMETANIDE 2 MG/1
2 TABLET ORAL DAILY
Qty: 31 TABLET | Refills: 11 | Status: CANCELLED | OUTPATIENT
Start: 2023-03-08

## 2023-03-08 RX ORDER — METOPROLOL TARTRATE 25 MG/1
25 TABLET, FILM COATED ORAL 2 TIMES DAILY
Qty: 62 TABLET | Refills: 11 | Status: CANCELLED | OUTPATIENT
Start: 2023-03-08

## 2023-03-08 RX ORDER — LACOSAMIDE 100 MG/1
100 TABLET ORAL 2 TIMES DAILY
Qty: 60 TABLET | Refills: 5 | Status: CANCELLED | OUTPATIENT
Start: 2023-03-08

## 2023-03-08 NOTE — TELEPHONE ENCOUNTER
Pt's caregiver called for refill of:    Requested Prescriptions     Pending Prescriptions Disp Refills    lacosamide (Vimpat) 100 mg tab tablet 60 Tablet 5     Sig: Take 1 Tablet by mouth two (2) times a day. levETIRAcetam 1,000 mg tablet 56 Tablet 11     Sig: Take 1 Tablet by mouth two (2) times a day. bumetanide (BUMEX) 2 mg tablet 31 Tablet 11     Sig: Take 1 Tablet by mouth daily. metoprolol tartrate (LOPRESSOR) 25 mg tablet 62 Tablet 11     Sig: Take 1 Tablet by mouth two (2) times a day.      Please send to [facility] 05/25/23 alice Rehman

## 2023-03-09 RX ORDER — LEVETIRACETAM 1000 MG/1
1000 TABLET ORAL 2 TIMES DAILY
Qty: 120 TABLET | Refills: 2 | Status: SHIPPED | OUTPATIENT
Start: 2023-03-09

## 2023-03-09 NOTE — TELEPHONE ENCOUNTER
Vimpat has been sent by [facility] in January for 30 day with 5 refills. We would not refill Bumex , Metoprolol. Sent refill for Keppra to [facility] . Gave 120 and no refills.

## 2023-03-22 ENCOUNTER — ANTI-COAG VISIT (OUTPATIENT)
Dept: PHARMACY | Age: 78
End: 2023-03-22
Payer: MEDICARE

## 2023-03-22 DIAGNOSIS — I48.91 ATRIAL FIBRILLATION, UNSPECIFIED TYPE (HCC): Primary | ICD-10-CM

## 2023-03-22 LAB
INR BLD: 2.7
PT POC: 32.8 SECONDS
VALID INTERNAL CONTROL?: YES

## 2023-03-22 PROCEDURE — 99211 OFF/OP EST MAY X REQ PHY/QHP: CPT

## 2023-03-22 PROCEDURE — 85610 PROTHROMBIN TIME: CPT

## 2023-03-22 NOTE — PATIENT INSTRUCTIONS
Today your INR was 2.7 . Your goal INR is 2.0-3.0 . You have 5 mg and 2.5 mg tablets of Coumadin (warfarin). Take Coumadin (warfarin) as follows: Take 2.5 mg every Wednesday, Friday; and 5 mg daily rest of the week. Come back in 4 week(s) for your next finger stick/INR blood test.        Avoid any over the counter items containing aspirin or ibuprofen, and avoid great swings in general diet. Avoid alcohol consumption. Please notify the INR pharmacist if you are started on any new medication including over the counter or herbal supplements. Also, please notify your INR pharmacist if any of your other prescription or over the counter medications have been discontinued. Call [facility] at [phone] if you have any signs of abnormal bleeding/blood clot.  ------------------------------------------------------------------------------------------------------------------  Taking Warfarin Safely: Care Instructions    Your Care Instructions  Warfarin is a medicine that you take to prevent blood clots. It is often called a blood thinner. Doctors give warfarin (such as Coumadin) to reduce the risk of blood clots. You may be at risk for blood clots if you have atrial fibrillation or deep vein thrombosis. Some other health problems may also put you at risk. Warfarin slows the amount of time it takes for your blood to clot. It can cause bleeding problems. Even if you've been taking warfarin for a while, it's important to know how to take it safely. Foods and other medicines can affect the way warfarin works. Some can make warfarin work too well. This can cause bleeding problems. And some can make it work poorly, so that it does not prevent blood clots very well. You will need regular blood tests to check how long it takes for your blood to form a clot. This test is called a PT or prothrombin time test. The result of the test is called an INR level.  Depending on the test results, your doctor or anticoagulation clinic may adjust your dose of warfarin. Follow-up care is a key part of your treatment and safety. Be sure to make and go to all appointments, and call your doctor if you are having problems. It's also a good idea to know your test results and keep a list of the medicines you take. How can you care for yourself at home? Take warfarin safely  Take your warfarin at the same time each day. If you miss a dose of warfarin, don't take an extra dose to make up for it. Your doctor can tell you exactly what to do so you don't take too much or too little. Wear medical alert jewelry that lets others know that you take warfarin. You can buy this at most drugstores. Don't take warfarin if you are pregnant or planning to get pregnant. Talk to your doctor about how you can prevent getting pregnant while you are taking it. Don't change your dose or stop taking warfarin unless your doctor tells you to. Effects of medicines and food on warfarin  Don't start or stop taking any medicines, vitamins, or natural remedies unless you first talk to your doctor. Many medicines can affect how warfarin works. These include aspirin and other pain relievers, over-the-counter medicines, multivitamins, dietary supplements, and herbal products. Tell all of your doctors and pharmacists that you take warfarin. Some prescription medicines can affect how warfarin works. Keep the amount of vitamin K in your diet about the same from day to day. Do not suddenly eat a lot more or a lot less food that is rich in vitamin K than you usually do. Vitamin K affects how warfarin works and how your blood clots. Talk with your doctor before making big changes in your diet. Vitamin K is in many foods, such as:  Leafy greens, such as kale, cabbage, spinach, Swiss chard, and lettuce. Canola and soybean oils. Green vegetables, such as asparagus, broccoli, and [place] sprouts.   Vegetable drinks, green tea leaves, and some dietary supplement drinks. Avoid cranberry juice and other cranberry products. They can increase the effects of warfarin. Limit your use of alcohol. Avoid bleeding by preventing falls and injuries  Wear slippers or shoes with nonskid soles. Remove throw rugs and clutter. Rearrange furniture and electrical cords to keep them out of walking paths. Keep stairways, porches, and outside walkways well lit. Use night-lights in hallways and bathrooms. Be extra careful when you work with sharp tools or knives. When should you call for help? Call 911 anytime you think you may need emergency care. For example, call if:  You have a sudden, severe headache that is different from past headaches. Call your doctor now or seek immediate medical care if:  You have any abnormal bleeding, such as:  Nosebleeds. Vaginal bleeding that is different (heavier, more frequent, at a different time of the month) than what you are used to. Bloody or black stools, or rectal bleeding. Bloody or pink urine. Watch closely for changes in your health, and be sure to contact your doctor if you have any problems. Where can you learn more? Go to http://www.gray.com/. Enter Y714 in the search box to learn more about \"Taking Warfarin Safely: Care Instructions. \"  Current as of: January 27, 2016  Content Version: 11.1  © 0974-4502 [employer]. Care instructions adapted under license by [employer] (which disclaims liability or warranty for this information). If you have questions about a medical condition or this instruction, always ask your healthcare professional. [place] [address] any warranty or liability for your use of this information.

## 2023-03-22 NOTE — PROGRESS NOTES
Pharmacy Progress Note - Anticoagulation Management    S/O:  Mr. Juana Gray  is a 70 y.o. male seen today for anticoagulation management for the diagnosis of Atrial Fibrillation. HPI: At last visit (2/22), INR was 2.0 and therapeutic. Patient denied changes to his medications and reported consistent intake of vitamin K foods. Patient will continue current regimen and recheck INR in 4 week(s). Interim History:    Warfarin start date: Prior to 10/9/20 per chart review  INR Goal:  2.0-3.0    Current warfarin regimen: 2.5 mg Wed, Fri; 5 mg daily ROW  Warfarin tablet strength:   2.5 mg, 5 mg  Duration of therapy: Indefinite      Results for orders placed or performed in visit on 03/22/23   POC PROTHROMBIN W/INR   Result Value Ref Range    VALID INTERNAL CONTROL POC Yes     Prothrombin time (POC) 32.8 seconds    INR POC 2.7        Today's pertinent positives includes:  No significant changes since last visit      Adherence:   Able to recall regimen? YES  Miss/extra dose? NO  Need refill? NO       Upcoming procedure(s):  N/A      Past Medical History:   Diagnosis Date    A-fib [facility]) 2015    pacemaker L chest     CAD (coronary artery disease) 2015    Pacemaker    Epilepsy ([facility] [address]     Heart disease     Hypertension     Incontinence     Leg swelling     Mental retardation, 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)     Other ill-defined conditions(799.89)     edema legs    S/P ablation of atrial fibrillation 11/13/2020    S/P biventricular cardiac pacemaker procedure 10/23/2015    10/23/15 [employer] biventricular pacemaker inmplant    Screen for colon cancer 9/27/2012     Allergies   Allergen Reactions    Sulfa (Sulfonamide Antibiotics) Rash     Patient denies     Current Outpatient Medications   Medication Sig    levETIRAcetam 1,000 mg tablet Take 1 Tablet by mouth two (2) times a day. warfarin (COUMADIN) 5 mg tablet Take 1 tablet (5 mg) by mouth every Monday, Tuesday, Thursday, Saturday and Sunday.     warfarin (COUMADIN) 2.5 mg tablet Take 1 tablet (2.5 mg) by mouth every Wednesday and Friday. lacosamide (Vimpat) 100 mg tab tablet Take 1 Tablet by mouth two (2) times a day. amiodarone (CORDARONE) 200 mg tablet TAKE 1/2 TABLET BY MOUTH DAILY. bumetanide (BUMEX) 2 mg tablet Take 1 Tablet by mouth daily. cholecalciferol (VITAMIN D3) (1000 Units /25 mcg) tablet Take 1 Tablet by mouth daily. metoprolol tartrate (LOPRESSOR) 25 mg tablet Take 1 Tablet by mouth two (2) times a day. potassium chloride (K-DUR, KLOR-CON M20) 20 mEq tablet Take 1 Tablet by mouth daily. polyethylene glycol (MIRALAX) 17 gram packet Take 1 Packet by mouth daily as needed for Constipation. (Patient not taking: Reported on 12/8/2022)    acetaminophen (TYLENOL) 325 mg tablet Take 2 Tabs by mouth every four (4) hours as needed for Pain. (Patient not taking: Reported on 12/8/2022)     No current facility-administered medications for this visit.      Wt Readings from Last 3 Encounters:   12/08/22 193 lb (87.5 kg)   05/12/22 216 lb (98 kg)   12/07/21 230 lb 12.8 oz (104.7 kg)     BP Readings from Last 3 Encounters:   12/08/22 96/64   05/12/22 111/67   12/07/21 (!) 93/58     Pulse Readings from Last 3 Encounters:   12/08/22 75   05/12/22 75   12/07/21 75     Lab Results   Component Value Date/Time    WBC 4.2 12/08/2022 01:45 PM    Hemoglobin (POC) 12.7 12/22/2017 11:23 AM    HGB 13.0 12/08/2022 01:45 PM    HCT 39.1 12/08/2022 01:45 PM    PLATELET 429 76/72/6432 01:45 PM    .1 (H) 12/08/2022 01:45 PM     Lab Results   Component Value Date/Time    Sodium 140 12/08/2022 01:45 PM    Potassium 4.1 12/08/2022 01:45 PM    Chloride 103 12/08/2022 01:45 PM    CO2 32 12/08/2022 01:45 PM    Anion gap 5 12/08/2022 01:45 PM    Glucose 89 12/08/2022 01:45 PM    BUN 19 12/08/2022 01:45 PM    Creatinine 0.89 12/08/2022 01:45 PM    BUN/Creatinine ratio 21 (H) 12/08/2022 01:45 PM    GFR est AA >60 11/30/2021 11:30 AM    GFR est non-AA >60 11/30/2021 11:30 AM Calcium 9.0 12/08/2022 01:45 PM    Bilirubin, total 0.7 11/30/2021 11:30 AM    Alk. phosphatase 110 11/30/2021 11:30 AM    Protein, total 7.7 11/30/2021 11:30 AM    Albumin 4.0 11/30/2021 11:30 AM    Globulin 3.7 11/30/2021 11:30 AM    A-G Ratio 1.1 11/30/2021 11:30 AM    ALT (SGPT) 19 11/30/2021 11:30 AM     CrCl cannot be calculated (Unknown ideal weight.). INR History:   (normal INR range 0.8-1.2)     Date   INR   PT   Dose/Comments  03/22/23 2.7  32.8 2.5 mg Wed, Fri; 5 mg daily ROW  02/22/23 2.0  24.5 2.5 mg Wed, Fri; 5 mg daily ROW  01/25/23 2.5  30.0 2.5 mg Wed, Fri; 5 mg daily ROW  12/28/22 2.3  27.9 2.5 mg Wed, Fri; 5 mg daily ROW  11/29/22 2.4  29.3 2.5 mg Wed, Fri; 5 mg daily ROW  11/01/22 3.1  36.6 2.5 mg Wed, Fri; 5 mg daily ROW  10/04/22 2.8  33.5 2.5 mg Wed, Fri; 5 mg daily ROW  08/31/22 3.0  35.7 hold dose today (7/5) and then take 2.5 mg Wed, Fri; 5 mg daily ROW      Medications that can increase  INR: amiodarone  Medications that can decrease INR: N/A    A/P:       Anticoagulation:  Considering Mr. Jaya Kelly past history, todays findings, and per Anticoagulation Collaborative Practice Agreement/Protocol:    Fingerstick POC INR (2.7) is Therapeutic for INR goal today. Continue warfarin 2.5 mg Wed, Fri; 5 mg daily ROW  INR is 2.7 and therapeutic. Patient's group home representative Pa Resendiz) denies changes to the patient's medications. Patient reports consistent intake of vitamin K foods. Patient will continue current regimen and recheck INR in 4 week(s). Patient was instructed to schedule an appointment in 4 week(s) prior to leaving the clinic. Medication reconciliation was completed during the visit. There are no discontinued medications. A full discussion of the nature of anticoagulants has been carried out. A full discussion of the need for frequent and regular monitoring, precise dosage adjustment and compliance was stressed.   Side effects of potential bleeding were discussed and Mr. Richard Patel was instructed to call [phone] if there are any signs of abnormal bleeding. Mr. Richard Patel was instructed to avoid any OTC items containing aspirin or ibuprofen and prior to starting any new OTC products to consult with his physician or pharmacist to ensure no drug interactions are present. Mr. Richard Patel was instructed to avoid any major changes in his general diet and to avoid alcohol consumption. Group home representative was provided information in the AVS that includes topics on understanding coumadin therapy, drug interaction considerations, vitamin K and coumadin use, interactions with foods and supplements containing vitamin K, and the use of herbal products. Mr. Quinn/group home representative verbalized his understanding of all instructions and will call the office with any questions, concerns, or signs of abnormal bleeding or blood clot. Notifications of recommendations will be sent to Nathalie Brink NP for review.     Thank you,  Jessie Rockwell, [phone] Tracking Only    Intervention Detail: Adherence Monitorin and Lab(s) Ordered  Total # of Interventions Recommended: 2  Total # of Interventions Accepted: 2  Time Spent (min): 15

## 2023-04-19 ENCOUNTER — ANTI-COAG VISIT (OUTPATIENT)
Dept: PHARMACY | Age: 78
End: 2023-04-19
Payer: MEDICARE

## 2023-04-19 DIAGNOSIS — I48.91 ATRIAL FIBRILLATION, UNSPECIFIED TYPE (HCC): Primary | ICD-10-CM

## 2023-04-19 LAB
INR BLD: 2.2
PT POC: 26.5 SECONDS
VALID INTERNAL CONTROL?: YES

## 2023-04-19 PROCEDURE — 85610 PROTHROMBIN TIME: CPT

## 2023-04-19 PROCEDURE — 99212 OFFICE O/P EST SF 10 MIN: CPT

## 2023-04-19 RX ORDER — WARFARIN 2.5 MG/1
TABLET ORAL
Qty: 26 TABLET | Refills: 1 | Status: SHIPPED | OUTPATIENT
Start: 2023-04-19

## 2023-04-19 RX ORDER — WARFARIN SODIUM 5 MG/1
TABLET ORAL
Qty: 65 TABLET | Refills: 1 | Status: SHIPPED | OUTPATIENT
Start: 2023-04-19

## 2023-04-19 NOTE — PROGRESS NOTES
Pharmacy Progress Note - Anticoagulation Management    S/O:  Mr. Yelena Shields  is a 70 y.o. male seen today for anticoagulation management for the diagnosis of Atrial Fibrillation. HPI: At last visit (3/22), INR was 2.7 and therapeutic. Patient's group home representative Selma Sol) denied changes to the patient's medications. Patient reported consistent intake of vitamin K foods. Patient will continue current regimen and recheck INR in 4 week(s). Interim History:    Warfarin start date: Prior to 10/9/20 per chart review  INR Goal:  2.0-3.0    Current warfarin regimen: 2.5 mg Wed, Fri; 5 mg daily ROW  Warfarin tablet strength:   2.5 mg, 5 mg  Duration of therapy: Indefinite      Results for orders placed or performed in visit on 04/19/23   POC PROTHROMBIN W/INR   Result Value Ref Range    VALID INTERNAL CONTROL POC Yes     Prothrombin time (POC) 26.5 seconds    INR POC 2.2        Today's pertinent positives includes:  No significant changes since last visit      Adherence:   Able to recall regimen? YES  Miss/extra dose? NO  Need refill? YES       Upcoming procedure(s):  N/A      Past Medical History:   Diagnosis Date    A-fib [facility]) 2015    pacemaker L chest     CAD (coronary artery disease) 2015    Pacemaker    Epilepsy ([facility] [address]     Heart disease     Hypertension     Incontinence     Leg swelling     Mental retardation, 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)     Other ill-defined conditions(799.89)     edema legs    S/P ablation of atrial fibrillation 11/13/2020    S/P biventricular cardiac pacemaker procedure 10/23/2015    10/23/15 [employer] biventricular pacemaker inmplant    Screen for colon cancer 9/27/2012     Allergies   Allergen Reactions    Sulfa (Sulfonamide Antibiotics) Rash     Patient denies     Current Outpatient Medications   Medication Sig    warfarin (COUMADIN) 2.5 mg tablet Take 1 tablet (2.5 mg) by mouth every Wednesday and Friday.     warfarin (COUMADIN) 5 mg tablet Take 1 tablet (5 mg) by mouth every Monday, Tuesday, Thursday, Saturday and Sunday. levETIRAcetam 1,000 mg tablet Take 1 Tablet by mouth two (2) times a day. lacosamide (Vimpat) 100 mg tab tablet Take 1 Tablet by mouth two (2) times a day. amiodarone (CORDARONE) 200 mg tablet TAKE 1/2 TABLET BY MOUTH DAILY. bumetanide (BUMEX) 2 mg tablet Take 1 Tablet by mouth daily. cholecalciferol (VITAMIN D3) (1000 Units /25 mcg) tablet Take 1 Tablet by mouth daily. metoprolol tartrate (LOPRESSOR) 25 mg tablet Take 1 Tablet by mouth two (2) times a day. potassium chloride (K-DUR, KLOR-CON M20) 20 mEq tablet Take 1 Tablet by mouth daily. polyethylene glycol (MIRALAX) 17 gram packet Take 1 Packet by mouth daily as needed for Constipation. (Patient not taking: Reported on 12/8/2022)    acetaminophen (TYLENOL) 325 mg tablet Take 2 Tabs by mouth every four (4) hours as needed for Pain. (Patient not taking: Reported on 12/8/2022)     No current facility-administered medications for this visit.      Wt Readings from Last 3 Encounters:   12/08/22 193 lb (87.5 kg)   05/12/22 216 lb (98 kg)   12/07/21 230 lb 12.8 oz (104.7 kg)     BP Readings from Last 3 Encounters:   12/08/22 96/64   05/12/22 111/67   12/07/21 (!) 93/58     Pulse Readings from Last 3 Encounters:   12/08/22 75   05/12/22 75   12/07/21 75     Lab Results   Component Value Date/Time    WBC 4.2 12/08/2022 01:45 PM    Hemoglobin (POC) 12.7 12/22/2017 11:23 AM    HGB 13.0 12/08/2022 01:45 PM    HCT 39.1 12/08/2022 01:45 PM    PLATELET 715 57/38/3026 01:45 PM    .1 (H) 12/08/2022 01:45 PM     Lab Results   Component Value Date/Time    Sodium 140 12/08/2022 01:45 PM    Potassium 4.1 12/08/2022 01:45 PM    Chloride 103 12/08/2022 01:45 PM    CO2 32 12/08/2022 01:45 PM    Anion gap 5 12/08/2022 01:45 PM    Glucose 89 12/08/2022 01:45 PM    BUN 19 12/08/2022 01:45 PM    Creatinine 0.89 12/08/2022 01:45 PM    BUN/Creatinine ratio 21 (H) 12/08/2022 01:45 PM    GFR est AA >60 11/30/2021 11:30 AM    GFR est non-AA >60 11/30/2021 11:30 AM    Calcium 9.0 12/08/2022 01:45 PM    Bilirubin, total 0.7 11/30/2021 11:30 AM    Alk. phosphatase 110 11/30/2021 11:30 AM    Protein, total 7.7 11/30/2021 11:30 AM    Albumin 4.0 11/30/2021 11:30 AM    Globulin 3.7 11/30/2021 11:30 AM    A-G Ratio 1.1 11/30/2021 11:30 AM    ALT (SGPT) 19 11/30/2021 11:30 AM     CrCl cannot be calculated (Unknown ideal weight.). INR History:   (normal INR range 0.8-1.2)     Date   INR   PT   Dose/Comments  04/19/23 2.2  26.5 2.5 mg Wed, Fri; 5 mg daily ROW  03/22/23 2.7  32.8 2.5 mg Wed, Fri; 5 mg daily ROW  02/22/23 2.0  24.5 2.5 mg Wed, Fri; 5 mg daily ROW  01/25/23 2.5  30.0 2.5 mg Wed, Fri; 5 mg daily ROW  12/28/22 2.3  27.9 2.5 mg Wed, Fri; 5 mg daily ROW  11/29/22 2.4  29.3 2.5 mg Wed, Fri; 5 mg daily ROW  11/01/22 3.1  36.6 2.5 mg Wed, Fri; 5 mg daily ROW  10/04/22 2.8  33.5 2.5 mg Wed, Fri; 5 mg daily ROW  08/31/22 3.0  35.7 hold dose today (7/5) and then take 2.5 mg Wed, Fri; 5 mg daily ROW      Medications that can increase  INR: amiodarone  Medications that can decrease INR: N/A    A/P:       Anticoagulation:  Considering Mr. Carlos Manuel Tarango past history, todays findings, and per Anticoagulation Collaborative Practice Agreement/Protocol:    Fingerstick POC INR (2.2) is Therapeutic for INR goal today. Continue warfarin 2.5 mg Wed, Fri; 5 mg daily ROW  INR was 2.2 and therapeutic. Patient's MAR reflected warfarin was administered as instructed. Patient reports consistent intake of vitamin K foods. Patient will continue current regimen and recheck INR in 5 weeks       Patient was instructed to schedule an appointment in 5 week(s) prior to leaving the clinic. Medication reconciliation was completed during the visit.     Medications Discontinued During This Encounter   Medication Reason    warfarin (COUMADIN) 5 mg tablet REORDER    warfarin (COUMADIN) 2.5 mg tablet REORDER           A full discussion of the nature of anticoagulants has been carried out. A full discussion of the need for frequent and regular monitoring, precise dosage adjustment and compliance was stressed. Side effects of potential bleeding were discussed and Mr. Lissette Hull was instructed to call [phone] if there are any signs of abnormal bleeding. Mr. Lissette Hull was instructed to avoid any OTC items containing aspirin or ibuprofen and prior to starting any new OTC products to consult with his physician or pharmacist to ensure no drug interactions are present. Mr. Lissette Hull was instructed to avoid any major changes in his general diet and to avoid alcohol consumption. Group home representative was provided information in the AVS that includes topics on understanding coumadin therapy, drug interaction considerations, vitamin K and coumadin use, interactions with foods and supplements containing vitamin K, and the use of herbal products. Mr. Quinn/group home representative verbalized his understanding of all instructions and will call the office with any questions, concerns, or signs of abnormal bleeding or blood clot. Notifications of recommendations will be sent to Olita Canavan, NP for review.     Thank you,  Eren Bettencourt, [address] Tracking Only    Intervention Detail: Adherence Monitorin, Lab(s) Ordered, and Refill(s) Provided   Total # of Interventions Recommended: 3  Total # of Interventions Accepted: 3  Time Spent (min): 20

## 2023-04-19 NOTE — PATIENT INSTRUCTIONS
Today your INR was 2.2 . Your goal INR is 2.0-3.0 . You have 2.5 mg and 5 mg tablets of Coumadin (warfarin). Take Coumadin (warfarin) as follows: Take 2.5 mg every Wednesday, Friday; and 5 mg daily the rest of the week. Come back in 5 week(s) for your next finger stick/INR blood test.        Avoid any over the counter items containing aspirin or ibuprofen, and avoid great swings in general diet. Avoid alcohol consumption. Please notify the INR pharmacist if you are started on any new medication including over the counter or herbal supplements. Also, please notify your INR pharmacist if any of your other prescription or over the counter medications have been discontinued. Call [facility] at [phone] if you have any signs of abnormal bleeding/blood clot.  ------------------------------------------------------------------------------------------------------------------  Taking Warfarin Safely: Care Instructions    Your Care Instructions  Warfarin is a medicine that you take to prevent blood clots. It is often called a blood thinner. Doctors give warfarin (such as Coumadin) to reduce the risk of blood clots. You may be at risk for blood clots if you have atrial fibrillation or deep vein thrombosis. Some other health problems may also put you at risk. Warfarin slows the amount of time it takes for your blood to clot. It can cause bleeding problems. Even if you've been taking warfarin for a while, it's important to know how to take it safely. Foods and other medicines can affect the way warfarin works. Some can make warfarin work too well. This can cause bleeding problems. And some can make it work poorly, so that it does not prevent blood clots very well. You will need regular blood tests to check how long it takes for your blood to form a clot. This test is called a PT or prothrombin time test. The result of the test is called an INR level. Depending on the test results, your doctor or anticoagulation clinic may adjust your dose of warfarin. Follow-up care is a key part of your treatment and safety. Be sure to make and go to all appointments, and call your doctor if you are having problems. It's also a good idea to know your test results and keep a list of the medicines you take. How can you care for yourself at home? Take warfarin safely  Take your warfarin at the same time each day. If you miss a dose of warfarin, don't take an extra dose to make up for it. Your doctor can tell you exactly what to do so you don't take too much or too little. Wear medical alert jewelry that lets others know that you take warfarin. You can buy this at most drugstores. Don't take warfarin if you are pregnant or planning to get pregnant. Talk to your doctor about how you can prevent getting pregnant while you are taking it. Don't change your dose or stop taking warfarin unless your doctor tells you to. Effects of medicines and food on warfarin  Don't start or stop taking any medicines, vitamins, or natural remedies unless you first talk to your doctor. Many medicines can affect how warfarin works. These include aspirin and other pain relievers, over-the-counter medicines, multivitamins, dietary supplements, and herbal products. Tell all of your doctors and pharmacists that you take warfarin. Some prescription medicines can affect how warfarin works. Keep the amount of vitamin K in your diet about the same from day to day. Do not suddenly eat a lot more or a lot less food that is rich in vitamin K than you usually do. Vitamin K affects how warfarin works and how your blood clots. Talk with your doctor before making big changes in your diet. Vitamin K is in many foods, such as:  Leafy greens, such as kale, cabbage, spinach, Swiss chard, and lettuce. Canola and soybean oils. Green vegetables, such as asparagus, broccoli, and [place] sprouts.   Vegetable drinks, green tea leaves, and some dietary supplement drinks. Avoid cranberry juice and other cranberry products. They can increase the effects of warfarin. Limit your use of alcohol. Avoid bleeding by preventing falls and injuries  Wear slippers or shoes with nonskid soles. Remove throw rugs and clutter. Rearrange furniture and electrical cords to keep them out of walking paths. Keep stairways, porches, and outside walkways well lit. Use night-lights in hallways and bathrooms. Be extra careful when you work with sharp tools or knives. When should you call for help? Call 911 anytime you think you may need emergency care. For example, call if:  You have a sudden, severe headache that is different from past headaches. Call your doctor now or seek immediate medical care if:  You have any abnormal bleeding, such as:  Nosebleeds. Vaginal bleeding that is different (heavier, more frequent, at a different time of the month) than what you are used to. Bloody or black stools, or rectal bleeding. Bloody or pink urine. Watch closely for changes in your health, and be sure to contact your doctor if you have any problems. Where can you learn more? Go to http://mandi-marylou.info/. Enter H850 in the search box to learn more about \"Taking Warfarin Safely: Care Instructions. \"  Current as of: January 27, 2016  Content Version: 11.1  © 5535-4573 [employer]. Care instructions adapted under license by [employer] (which disclaims liability or warranty for this information). If you have questions about a medical condition or this instruction, always ask your healthcare professional. [place] [address] any warranty or liability for your use of this information.

## 2023-05-25 ENCOUNTER — OFFICE VISIT (OUTPATIENT)
Age: 78
End: 2023-05-25
Payer: MEDICARE

## 2023-05-25 ENCOUNTER — ANTI-COAG VISIT (OUTPATIENT)
Facility: HOSPITAL | Age: 78
End: 2023-05-25

## 2023-05-25 VITALS
SYSTOLIC BLOOD PRESSURE: 100 MMHG | HEART RATE: 68 BPM | HEIGHT: 72 IN | TEMPERATURE: 97.6 F | WEIGHT: 192.6 LBS | RESPIRATION RATE: 16 BRPM | OXYGEN SATURATION: 97 % | BODY MASS INDEX: 26.09 KG/M2 | DIASTOLIC BLOOD PRESSURE: 60 MMHG

## 2023-05-25 DIAGNOSIS — R26.9 GAIT DISORDER: ICD-10-CM

## 2023-05-25 DIAGNOSIS — I48.91 ATRIAL FIBRILLATION, UNSPECIFIED TYPE (HCC): Primary | ICD-10-CM

## 2023-05-25 DIAGNOSIS — R25.1 TREMOR: ICD-10-CM

## 2023-05-25 DIAGNOSIS — G40.909 SEIZURE DISORDER (HCC): Primary | ICD-10-CM

## 2023-05-25 DIAGNOSIS — F79 INTELLECTUAL DISABILITY: ICD-10-CM

## 2023-05-25 LAB — INTERNATIONAL NORMALIZATION RATIO, POC: 2.3 (ref 2–3)

## 2023-05-25 PROCEDURE — 1123F ACP DISCUSS/DSCN MKR DOCD: CPT

## 2023-05-25 PROCEDURE — 99214 OFFICE O/P EST MOD 30 MIN: CPT

## 2023-05-25 PROCEDURE — 3074F SYST BP LT 130 MM HG: CPT

## 2023-05-25 PROCEDURE — 3078F DIAST BP <80 MM HG: CPT

## 2023-05-25 RX ORDER — LEVETIRACETAM 1000 MG/1
1000 TABLET ORAL 2 TIMES DAILY
Qty: 180 TABLET | Refills: 3 | Status: SHIPPED | OUTPATIENT
Start: 2023-05-25

## 2023-05-25 ASSESSMENT — PATIENT HEALTH QUESTIONNAIRE - PHQ9
2. FEELING DOWN, DEPRESSED OR HOPELESS: 0
1. LITTLE INTEREST OR PLEASURE IN DOING THINGS: 0
SUM OF ALL RESPONSES TO PHQ QUESTIONS 1-9: 0
SUM OF ALL RESPONSES TO PHQ9 QUESTIONS 1 & 2: 0
SUM OF ALL RESPONSES TO PHQ QUESTIONS 1-9: 0

## 2023-05-25 ASSESSMENT — ENCOUNTER SYMPTOMS
GASTROINTESTINAL NEGATIVE: 1
ALLERGIC/IMMUNOLOGIC NEGATIVE: 1
RESPIRATORY NEGATIVE: 1

## 2023-05-25 NOTE — PROGRESS NOTES
tablet Take 1 tablet by mouth daily    lacosamide (VIMPAT) 100 MG TABS tablet Take 1 tablet by mouth 2 times daily. levETIRAcetam (KEPPRA) 1000 MG tablet Take 1 tablet by mouth 2 times daily    metoprolol tartrate (LOPRESSOR) 25 MG tablet Take 1 tablet by mouth 2 times daily    polyethylene glycol (GLYCOLAX) 17 GM/SCOOP powder Take 17 g by mouth daily as needed    potassium chloride (KLOR-CON M) 20 MEQ extended release tablet Take 1 tablet by mouth daily    warfarin (COUMADIN) 2.5 MG tablet Take 1 tablet (2.5 mg) by mouth every Wednesday and Friday. warfarin (COUMADIN) 5 MG tablet Take 1 tablet (5 mg) by mouth every Monday, Tuesday, Thursday, Saturday and Sunday. No current facility-administered medications for this visit. Wt Readings from Last 3 Encounters:   05/25/23 192 lb 9.6 oz (87.4 kg)   12/08/22 193 lb (87.5 kg)   05/12/22 216 lb (98 kg)     BP Readings from Last 3 Encounters:   05/25/23 100/60   12/08/22 96/64   05/12/22 111/67     Pulse Readings from Last 3 Encounters:   05/25/23 68   12/08/22 75   05/12/22 75     Lab Results   Component Value Date/Time    WBC 4.2 12/08/2022 01:45 PM    HGB 13.0 12/08/2022 01:45 PM    HCT 39.1 12/08/2022 01:45 PM     12/08/2022 01:45 PM    .1 12/08/2022 01:45 PM     Lab Results   Component Value Date/Time     12/08/2022 01:45 PM    K 4.1 12/08/2022 01:45 PM     12/08/2022 01:45 PM    CO2 32 12/08/2022 01:45 PM    BUN 19 12/08/2022 01:45 PM    GFRAA >60 11/30/2021 11:30 AM    GLOB 3.7 11/30/2021 11:30 AM    ALT 19 11/30/2021 11:30 AM     Estimated Creatinine Clearance: 78 mL/min (based on SCr of 0.89 mg/dL).       INR History:   (normal INR range 0.8-1.2)     Date   INR   PT   Dose/Comments  05/25/23 2.3  27.6 2.5 mg Wed, Fri; 5 mg daily ROW  04/19/23          2.2                   26.5     2.5 mg Wed, Fri; 5 mg daily ROW  03/22/23          2.7                   32.8     2.5 mg Wed, Fri; 5 mg daily ROW  02/22/23          2.0

## 2023-05-25 NOTE — ASSESSMENT & PLAN NOTE
Stable    Well-controlled on Keppra 1000 mg twice a day. He denies any side effects. Last seizure was over 2 years ago. Patient is also taking Vimpat 100 mg twice a day. Vimpat has been stopped multiple times by neurology but always restarted with patient's primary care provider. Patient was advised to reach out to primary care provider to see why he was put on Vimpat. Will not make any changes on Vimpat. Seizure safety was discussed to patient. He verbalized understanding and agreed with plan of care.

## 2023-05-25 NOTE — ASSESSMENT & PLAN NOTE
Tremors were noted mostly on the right hand and head. Not bothersome to patient. He also had some parkinsonism symptoms. Patient's helper from the group home verbalized he was diagnosed with tardive dyskinesia. Will continue to monitor patient for this.

## 2023-05-25 NOTE — PROGRESS NOTES
Chief Complaint   Patient presents with    Follow-up     Seizure and neuropathy     1. Have you been to the ER, urgent care clinic since your last visit? No  Hospitalized since your last visit? No     2. Have you seen or consulted any other health care providers outside of the [address] [place] [address] since your last visit? Yes PCP , dentist ,eye exam & Cardiology   Include any pap smears or colon screening.  No   Patient here with caregiver Ms Andre Mak with [facility] no seizure activity to report her for medication renewal.

## 2023-05-25 NOTE — PATIENT INSTRUCTIONS
Today your INR was 2.3. Your goal INR is  2.0-3.0 . You have a   2.5 mg and 5 mg tablet of Coumadin (warfarin). Take Coumadin (warfarin) as follows: Take 2.5 mg every Wednesday, Friday; and 5 mg daily the rest of the week. Come back in 6 week(s) for your next finger stick/INR blood test.        Avoid any over the counter items containing aspirin or ibuprofen, and avoid great swings in general diet. Avoid alcohol consumption. Please notify the INR pharmacist if you are started on any new medication including over the counter or herbal supplements. Also, please notify your INR pharmacist if any of your other prescription or over the counter medications have been discontinued. Call [facility] at [phone] if you have any signs of abnormal bleeding/blood clot.  ------------------------------------------------------------------------------------------------------------------  Taking Warfarin Safely: Care Instructions    Your Care Instructions  Warfarin is a medicine that you take to prevent blood clots. It is often called a blood thinner. Doctors give warfarin (such as Coumadin) to reduce the risk of blood clots. You may be at risk for blood clots if you have atrial fibrillation or deep vein thrombosis. Some other health problems may also put you at risk. Warfarin slows the amount of time it takes for your blood to clot. It can cause bleeding problems. Even if you've been taking warfarin for a while, it's important to know how to take it safely. Foods and other medicines can affect the way warfarin works. Some can make warfarin work too well. This can cause bleeding problems. And some can make it work poorly, so that it does not prevent blood clots very well. You will need regular blood tests to check how long it takes for your blood to form a clot.  This test is called a PT or prothrombin time test. The result of the test is called an INR

## 2023-05-25 NOTE — ASSESSMENT & PLAN NOTE
Stable    High level of functioning. Patient was alert oriented x4 and was able to remember his medical history without any difficulty.

## 2023-05-25 NOTE — PROGRESS NOTES
[address]  In Office FOLLOW-UP VISIT         Eve Davies is a 70 y.o. male who presents today for the following:  Chief Complaint   Patient presents with    Follow-up     Seizure and neuropathy         ASSESSMENT AND PLAN  Patient is known to this practice. This is my first time seeing the patient. Chart and history reviewed in detail at today's office visit. 1. Seizure disorder ([address]  Assessment & Plan:   Stable    Well-controlled on Keppra 1000 mg twice a day. He denies any side effects. Last seizure was over 2 years ago. Patient is also taking Vimpat 100 mg twice a day. Vimpat has been stopped multiple times by neurology but always restarted with patient's primary care provider. Patient was advised to reach out to primary care provider to see why he was put on Vimpat. Will not make any changes on Vimpat. Seizure safety was discussed to patient. He verbalized understanding and agreed with plan of care. 2. Tremor  Assessment & Plan:   Tremors were noted mostly on the right hand and head. Not bothersome to patient. He also had some parkinsonism symptoms. Patient's helper from the group home verbalized he was diagnosed with tardive dyskinesia. Will continue to monitor patient for this. 3. Gait disorder  Assessment & Plan:   Stable    He denied any falls. For precaution was discussed with patient. 4. Intellectual disability  Assessment & Plan:   Stable    High level of functioning. Patient was alert oriented x4 and was able to remember his medical history without any difficulty. Patient and/or family was given time to ask questions and voice concerns. I believe all questions concerns were adequately addressed at this  office visit.   Patient and/or family also verbalized agreement and understanding of the above-stated plan    Complex neurologic decision making secondary any or all of the following to include unclear etiology, and

## 2023-05-25 NOTE — PATIENT INSTRUCTIONS
As per discussion,    Overall, you are doing well. Since you did not have any seizures for the last 2 years, we will continue on the same dose of Keppra 1000 mg twice a day. I also noticed he also been taking Vimpat 100 mg. Vimpat was discontinued multiple times by neurology but for some reason, it has been reordered by PCP. Advise you to reach out to PCP to see the reason and with Vimpat, you may need to check labs. I sent a 90-day prescription for Keppra for you at the pharmacy with 3 refills. That should last you for the whole year. Again, advised to take a tab when sitting up from a sitting position to prevent falls. No driving, height/ladders, tub baths, pools, bodies of water, power tools until seizure free x 6 months  If you have another seizure and the shaking last under 5 minutes, you are not injured, and you return to yourself quickly, no need to call EMS, just call my office at the number above. If you have  A seizure and the shaking lasts longer than 5 minutes, you are hurt or you have multiple seizures back to back without returning to yourself then call EMS     It was a pleasure meeting you today. I will see you back in a year. Please do not hesitate to reach out for any questions or concerns.

## 2023-05-31 ENCOUNTER — TELEPHONE (OUTPATIENT)
Age: 78
End: 2023-05-31

## 2023-05-31 DIAGNOSIS — Z51.81 THERAPEUTIC DRUG MONITORING: ICD-10-CM

## 2023-05-31 DIAGNOSIS — G40.909 SEIZURE DISORDER (HCC): Primary | ICD-10-CM

## 2023-05-31 RX ORDER — LACOSAMIDE 100 MG/1
100 TABLET ORAL 2 TIMES DAILY
Qty: 180 TABLET | Refills: 3 | Status: SHIPPED | OUTPATIENT
Start: 2023-05-31 | End: 2024-05-25

## 2023-05-31 NOTE — TELEPHONE ENCOUNTER
Mailed to group home with lab slip----- Message from SCOOTER Maloney NP sent at 5/31/2023  9:08 AM EDT -----  Regarding: Labs  Ms. Mushtaq Burris,    I refill Vimpat 100 mg twice a day with additional refills for Mr. Santiago. I also get some labs for drug monitoring. Could you please let the group home know about his recent labs orders? Please advise patient get the labs done before the next dose of Vimpat is due. Please do not hesitate to reach out for any questions or concerns.   Thank you

## 2023-05-31 NOTE — PROGRESS NOTES
It is clarified that patient continues to take Vimpat 100 mg twice a day. Since his seizures have been stable with current regimen, and denies any side effects, will continue him on Keppra 1000 mg twice a day and Vimpat 100 mg twice a day. Prescription of Vimpat was sent to pharmacy with additional refills today. Will also obtain blood work for drug monitoring which included CBC, CMP, and lacosamide level.

## 2023-07-06 ENCOUNTER — ANTI-COAG VISIT (OUTPATIENT)
Facility: HOSPITAL | Age: 78
End: 2023-07-06
Payer: MEDICARE

## 2023-07-06 DIAGNOSIS — I48.91 ATRIAL FIBRILLATION, UNSPECIFIED TYPE (HCC): Primary | ICD-10-CM

## 2023-07-06 LAB — INTERNATIONAL NORMALIZATION RATIO, POC: 2 (ref 2–3)

## 2023-07-06 PROCEDURE — 99212 OFFICE O/P EST SF 10 MIN: CPT

## 2023-07-06 PROCEDURE — 85610 PROTHROMBIN TIME: CPT

## 2023-07-06 RX ORDER — WARFARIN SODIUM 2.5 MG/1
TABLET ORAL
Qty: 30 TABLET | Refills: 1 | Status: SHIPPED | OUTPATIENT
Start: 2023-07-06

## 2023-07-06 RX ORDER — WARFARIN SODIUM 5 MG/1
TABLET ORAL
Qty: 70 TABLET | Refills: 1 | Status: SHIPPED | OUTPATIENT
Start: 2023-07-06

## 2023-08-15 RX ORDER — POLYETHYLENE GLYCOL 3350 17 G/17G
POWDER, FOR SOLUTION ORAL
Qty: 238 G | Refills: 12 | Status: SHIPPED | OUTPATIENT
Start: 2023-08-15

## 2023-08-16 ENCOUNTER — ANTI-COAG VISIT (OUTPATIENT)
Facility: HOSPITAL | Age: 78
End: 2023-08-16
Payer: MEDICARE

## 2023-08-16 DIAGNOSIS — I48.91 ATRIAL FIBRILLATION, UNSPECIFIED TYPE (HCC): Primary | ICD-10-CM

## 2023-08-16 LAB — INTERNATIONAL NORMALIZATION RATIO, POC: 2.6 (ref 2–3)

## 2023-08-16 PROCEDURE — 99211 OFF/OP EST MAY X REQ PHY/QHP: CPT

## 2023-08-16 PROCEDURE — 85610 PROTHROMBIN TIME: CPT

## 2023-08-16 NOTE — PROGRESS NOTES
warfarin (COUMADIN) 5 MG tablet Take 1 tablet (5 mg) by mouth every Monday, Tuesday, Thursday, Saturday and Sunday. lacosamide (VIMPAT) 100 MG TABS tablet Take 1 tablet by mouth 2 times daily for 360 days. Max Daily Amount: 200 mg    levETIRAcetam (KEPPRA) 1000 MG tablet Take 1 tablet by mouth 2 times daily    acetaminophen (TYLENOL) 325 MG tablet Take 2 tablets by mouth every 4 hours as needed    amiodarone (CORDARONE) 200 MG tablet Take 0.5 tablets by mouth daily    bumetanide (BUMEX) 2 MG tablet Take 1 tablet by mouth daily    vitamin D (CHOLECALCIFEROL) 25 MCG (1000 UT) TABS tablet Take 1 tablet by mouth daily    metoprolol tartrate (LOPRESSOR) 25 MG tablet Take 1 tablet by mouth 2 times daily    potassium chloride (KLOR-CON M) 20 MEQ extended release tablet Take 1 tablet by mouth daily     No current facility-administered medications for this visit. Wt Readings from Last 3 Encounters:   05/25/23 192 lb 9.6 oz (87.4 kg)   12/08/22 193 lb (87.5 kg)   05/12/22 216 lb (98 kg)     BP Readings from Last 3 Encounters:   05/25/23 100/60   12/08/22 96/64   05/12/22 111/67     Pulse Readings from Last 3 Encounters:   05/25/23 68   12/08/22 75   05/12/22 75     Lab Results   Component Value Date/Time    WBC 4.2 12/08/2022 01:45 PM    HGB 13.0 12/08/2022 01:45 PM    HCT 39.1 12/08/2022 01:45 PM     12/08/2022 01:45 PM    .1 12/08/2022 01:45 PM     Lab Results   Component Value Date/Time     12/08/2022 01:45 PM    K 4.1 12/08/2022 01:45 PM     12/08/2022 01:45 PM    CO2 32 12/08/2022 01:45 PM    BUN 19 12/08/2022 01:45 PM    GFRAA >60 11/30/2021 11:30 AM    GLOB 3.7 11/30/2021 11:30 AM    ALT 19 11/30/2021 11:30 AM     CrCl cannot be calculated (Patient's most recent lab result is older than the maximum 180 days allowed.).       INR History:   (normal INR range 0.8-1.2)     Date   INR   PT   Dose/Comments  08/16/23 2.6  30.9 2.5 mg Wed, Fri; 5 mg daily ROW  07/06/23 2.0  23.5 2.5 mg Wed,

## 2023-09-26 NOTE — PROGRESS NOTES
that includes topics on understanding coumadin therapy, drug interaction considerations, vitamin K and coumadin use, interactions with foods and supplements containing vitamin K, and the use of herbal products. Mr. Beyer Shells verbalized his understanding of all instructions and will call the office with any questions, concerns, or signs of abnormal bleeding or blood clot. Notifications of recommendations will be sent to provider Giovani Wadsworth NP for review.     Thank you,  Raysa Trejo, [address] Tracking Only    Intervention Detail: Adherence Monitorin and Lab(s) Ordered  Total # of Interventions Recommended: 2  Total # of Interventions Accepted: 2  Time Spent (min): 15

## 2023-09-26 NOTE — PATIENT INSTRUCTIONS
Today your INR was 2.6 . Your goal INR is  2.0-3.0 . You have a   2.5 mg and 5 mg tablet of Coumadin (warfarin). Take Coumadin (warfarin) as follows: Take 2.5 mg every Wednesday, Friday; and 5 mg all other days of the week. Come back in 7 week(s) for your next finger stick/INR blood test.        Avoid any over the counter items containing aspirin or ibuprofen, and avoid great swings in general diet. Avoid alcohol consumption. Please notify the INR pharmacist if you are started on any new medication including over the counter or herbal supplements. Also, please notify your INR pharmacist if any of your other prescription or over the counter medications have been discontinued. Call [facility] at [phone] if you have any signs of abnormal bleeding/blood clot.  ------------------------------------------------------------------------------------------------------------------  Taking Warfarin Safely: Care Instructions    Your Care Instructions  Warfarin is a medicine that you take to prevent blood clots. It is often called a blood thinner. Doctors give warfarin (such as Coumadin) to reduce the risk of blood clots. You may be at risk for blood clots if you have atrial fibrillation or deep vein thrombosis. Some other health problems may also put you at risk. Warfarin slows the amount of time it takes for your blood to clot. It can cause bleeding problems. Even if you've been taking warfarin for a while, it's important to know how to take it safely. Foods and other medicines can affect the way warfarin works. Some can make warfarin work too well. This can cause bleeding problems. And some can make it work poorly, so that it does not prevent blood clots very well. You will need regular blood tests to check how long it takes for your blood to form a clot.  This test is called a PT or prothrombin time test. The result of the test is called an INR

## 2023-09-27 ENCOUNTER — ANTI-COAG VISIT (OUTPATIENT)
Facility: HOSPITAL | Age: 78
End: 2023-09-27
Payer: MEDICARE

## 2023-09-27 DIAGNOSIS — I48.91 ATRIAL FIBRILLATION, UNSPECIFIED TYPE (HCC): Primary | ICD-10-CM

## 2023-09-27 LAB — INTERNATIONAL NORMALIZATION RATIO, POC: 2.6 (ref 2–3)

## 2023-09-27 PROCEDURE — 85610 PROTHROMBIN TIME: CPT

## 2023-09-27 PROCEDURE — 99211 OFF/OP EST MAY X REQ PHY/QHP: CPT

## 2023-10-12 ENCOUNTER — TELEPHONE (OUTPATIENT)
Age: 78
End: 2023-10-12

## 2023-10-12 NOTE — TELEPHONE ENCOUNTER
Pt's DME order received for incontinence materials. This writer has scanned the incomplete form in and placed the physical form inside of Nadeen's folder inside of black box at nurse station 1.     Thank you

## 2023-11-15 ENCOUNTER — ANTI-COAG VISIT (OUTPATIENT)
Facility: HOSPITAL | Age: 78
End: 2023-11-15
Payer: MEDICARE

## 2023-11-15 DIAGNOSIS — I48.91 ATRIAL FIBRILLATION, UNSPECIFIED TYPE (HCC): Primary | ICD-10-CM

## 2023-11-15 LAB — INTERNATIONAL NORMALIZATION RATIO, POC: 2.6 (ref 2–3)

## 2023-11-15 PROCEDURE — 99211 OFF/OP EST MAY X REQ PHY/QHP: CPT

## 2023-11-15 PROCEDURE — 85610 PROTHROMBIN TIME: CPT

## 2023-11-15 NOTE — PATIENT INSTRUCTIONS
Today your INR was 2.6 . Your goal INR is  2.0-3.0 . You have a   2.5 mg and 5 mg tablet of Coumadin (warfarin). Take Coumadin (warfarin) as follows: Take 2.5 mg every Wednesday, Friday; and 5 mg all other days of the week. Come back in 8 week(s) for your next finger stick/INR blood test.        Avoid any over the counter items containing aspirin or ibuprofen, and avoid great swings in general diet. Avoid alcohol consumption. Please notify the INR pharmacist if you are started on any new medication including over the counter or herbal supplements. Also, please notify your INR pharmacist if any of your other prescription or over the counter medications have been discontinued. Call [facility] at [phone] if you have any signs of abnormal bleeding/blood clot.  ------------------------------------------------------------------------------------------------------------------  Taking Warfarin Safely: Care Instructions    Your Care Instructions  Warfarin is a medicine that you take to prevent blood clots. It is often called a blood thinner. Doctors give warfarin (such as Coumadin) to reduce the risk of blood clots. You may be at risk for blood clots if you have atrial fibrillation or deep vein thrombosis. Some other health problems may also put you at risk. Warfarin slows the amount of time it takes for your blood to clot. It can cause bleeding problems. Even if you've been taking warfarin for a while, it's important to know how to take it safely. Foods and other medicines can affect the way warfarin works. Some can make warfarin work too well. This can cause bleeding problems. And some can make it work poorly, so that it does not prevent blood clots very well. You will need regular blood tests to check how long it takes for your blood to form a clot.  This test is called a PT or prothrombin time test. The result of the test is called an INR

## 2023-11-15 NOTE — PROGRESS NOTES
Pharmacy Progress Note - Anticoagulation Management    S/O:  Mr. Jose Cervantes  is a 70 y.o. male seen today for anticoagulation management for the diagnosis of Atrial Fibrillation. HPI: At last visit (9/27), the patient's INR was 2.6 and therapeutic. Patient's MAR reflected warfarin was administered as instructed and the MAR showed no medication changes. Patient reported consistent intake of vitamin K foods. Patient will continue current warfarin regimen and recheck INR in 7 weeks. Interim History:    Warfarin start date: Prior to 10/9/20 per chart review  INR Goal:  2.0-3.0    Current warfarin regimen: 2.5 mg Wed, Fri; 5 mg daily ROW  Warfarin tablet strength:   2.5 mg, 5 mg  Duration of therapy: Indefinite      Results for orders placed or performed in visit on 11/15/23   POCT INR   Result Value Ref Range    INR,(POC) 2.6 2 - 3       Today's pertinent positives includes:  No significant changes since last visit      Adherence:   Able to recall regimen? N/A Patient's MAR is provided during visit. Miss/extra dose? NO  Need refill?  NO    Upcoming procedure(s):  N/A    Past Medical History:   Diagnosis Date    A-fib [facility]) 2015    pacemaker L chest     CAD (coronary artery disease) 2015    Pacemaker    Epilepsy ([address] [facility])     Heart disease     Hypertension     Incontinence     Leg swelling     Mental retardation, 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)     Other ill-defined conditions(799.89)     edema legs    S/P ablation of atrial fibrillation 11/13/2020    S/P biventricular cardiac pacemaker procedure 10/23/2015    10/23/15 [employer] biventricular pacemaker inmplant    Screen for colon cancer 9/27/2012     Allergies   Allergen Reactions    Sulfa Antibiotics Rash     Patient denies     Current Outpatient Medications   Medication Sig    polyethylene glycol (GLYCOLAX) 17 GM/SCOOP powder MIX 1 CAPFUL (17gm - UP TO LINE IN CAP) IN WATER AND DRINK ONCE DAILY AS NEEDED FOR CONSTIPATION    warfarin (COUMADIN) 2.5 MG tablet

## 2023-11-27 RX ORDER — BUMETANIDE 2 MG/1
2 TABLET ORAL DAILY
Qty: 30 TABLET | Refills: 11 | OUTPATIENT
Start: 2023-11-27

## 2023-11-27 RX ORDER — POTASSIUM CHLORIDE 20 MEQ/1
20 TABLET, EXTENDED RELEASE ORAL DAILY
Qty: 30 TABLET | Refills: 11 | OUTPATIENT
Start: 2023-11-27

## 2023-11-27 RX ORDER — MELATONIN
1 DAILY
Qty: 30 TABLET | Refills: 11 | OUTPATIENT
Start: 2023-11-27

## 2023-11-30 DIAGNOSIS — G40.909 SEIZURE DISORDER (HCC): ICD-10-CM

## 2023-11-30 RX ORDER — LACOSAMIDE 100 MG/1
100 TABLET ORAL 2 TIMES DAILY
Qty: 60 TABLET | Refills: 5 | Status: SHIPPED | OUTPATIENT
Start: 2023-11-30 | End: 2024-05-28

## 2023-12-11 ENCOUNTER — OFFICE VISIT (OUTPATIENT)
Age: 78
End: 2023-12-11
Payer: MEDICARE

## 2023-12-11 VITALS
OXYGEN SATURATION: 96 % | HEART RATE: 70 BPM | RESPIRATION RATE: 18 BRPM | DIASTOLIC BLOOD PRESSURE: 69 MMHG | SYSTOLIC BLOOD PRESSURE: 128 MMHG | TEMPERATURE: 97.1 F | BODY MASS INDEX: 26.68 KG/M2 | WEIGHT: 197 LBS | HEIGHT: 72 IN

## 2023-12-11 DIAGNOSIS — Z98.890 S/P ABLATION OF ATRIAL FIBRILLATION: ICD-10-CM

## 2023-12-11 DIAGNOSIS — R32 URINARY INCONTINENCE, UNSPECIFIED TYPE: ICD-10-CM

## 2023-12-11 DIAGNOSIS — K59.00 CONSTIPATION, UNSPECIFIED CONSTIPATION TYPE: ICD-10-CM

## 2023-12-11 DIAGNOSIS — Z12.5 PROSTATE CANCER SCREENING: ICD-10-CM

## 2023-12-11 DIAGNOSIS — E55.9 VITAMIN D DEFICIENCY, UNSPECIFIED: ICD-10-CM

## 2023-12-11 DIAGNOSIS — I10 PRIMARY HYPERTENSION: ICD-10-CM

## 2023-12-11 DIAGNOSIS — E87.6 HYPOKALEMIA: ICD-10-CM

## 2023-12-11 DIAGNOSIS — Z86.79 S/P ABLATION OF ATRIAL FIBRILLATION: ICD-10-CM

## 2023-12-11 DIAGNOSIS — R26.9 GAIT DISORDER: ICD-10-CM

## 2023-12-11 DIAGNOSIS — Z79.01 LONG TERM (CURRENT) USE OF ANTICOAGULANTS: ICD-10-CM

## 2023-12-11 DIAGNOSIS — I89.0 LYMPHEDEMA OF BOTH LOWER EXTREMITIES: ICD-10-CM

## 2023-12-11 DIAGNOSIS — I48.19 PERSISTENT ATRIAL FIBRILLATION (HCC): ICD-10-CM

## 2023-12-11 DIAGNOSIS — Z00.00 MEDICARE ANNUAL WELLNESS VISIT, SUBSEQUENT: Primary | ICD-10-CM

## 2023-12-11 DIAGNOSIS — E03.8 OTHER SPECIFIED HYPOTHYROIDISM: ICD-10-CM

## 2023-12-11 DIAGNOSIS — G40.909 SEIZURE DISORDER (HCC): ICD-10-CM

## 2023-12-11 DIAGNOSIS — E55.9 VITAMIN D DEFICIENCY: ICD-10-CM

## 2023-12-11 DIAGNOSIS — F79 INTELLECTUAL DISABILITY: ICD-10-CM

## 2023-12-11 DIAGNOSIS — E53.8 VITAMIN B12 DEFICIENCY: ICD-10-CM

## 2023-12-11 DIAGNOSIS — H91.93 BILATERAL HEARING LOSS, UNSPECIFIED HEARING LOSS TYPE: ICD-10-CM

## 2023-12-11 DIAGNOSIS — Z95.0 S/P BIVENTRICULAR CARDIAC PACEMAKER PROCEDURE: ICD-10-CM

## 2023-12-11 DIAGNOSIS — Z28.21 INFLUENZA VACCINATION DECLINED: ICD-10-CM

## 2023-12-11 DIAGNOSIS — I10 PRIMARY HYPERTENSION: Primary | ICD-10-CM

## 2023-12-11 PROCEDURE — 3078F DIAST BP <80 MM HG: CPT | Performed by: FAMILY MEDICINE

## 2023-12-11 PROCEDURE — 99214 OFFICE O/P EST MOD 30 MIN: CPT | Performed by: FAMILY MEDICINE

## 2023-12-11 PROCEDURE — 3074F SYST BP LT 130 MM HG: CPT | Performed by: FAMILY MEDICINE

## 2023-12-11 PROCEDURE — G0439 PPPS, SUBSEQ VISIT: HCPCS | Performed by: FAMILY MEDICINE

## 2023-12-11 PROCEDURE — 1123F ACP DISCUSS/DSCN MKR DOCD: CPT | Performed by: FAMILY MEDICINE

## 2023-12-11 RX ORDER — POLYETHYLENE GLYCOL 3350 17 G/17G
POWDER, FOR SOLUTION ORAL
Qty: 238 G | Refills: 12 | Status: SHIPPED | OUTPATIENT
Start: 2023-12-11

## 2023-12-11 RX ORDER — DIAPER,BRIEF,ADULT, DISPOSABLE
EACH MISCELLANEOUS
Qty: 96 EACH | Refills: 12 | Status: SHIPPED | OUTPATIENT
Start: 2023-12-11

## 2023-12-11 RX ORDER — ACETAMINOPHEN 325 MG/1
650 TABLET ORAL EVERY 4 HOURS PRN
Qty: 120 TABLET | Refills: 5 | Status: SHIPPED | OUTPATIENT
Start: 2023-12-11

## 2023-12-11 SDOH — ECONOMIC STABILITY: HOUSING INSECURITY
IN THE LAST 12 MONTHS, WAS THERE A TIME WHEN YOU DID NOT HAVE A STEADY PLACE TO SLEEP OR SLEPT IN A SHELTER (INCLUDING NOW)?: PATIENT REFUSED

## 2023-12-11 SDOH — ECONOMIC STABILITY: INCOME INSECURITY: HOW HARD IS IT FOR YOU TO PAY FOR THE VERY BASICS LIKE FOOD, HOUSING, MEDICAL CARE, AND HEATING?: PATIENT DECLINED

## 2023-12-11 SDOH — ECONOMIC STABILITY: FOOD INSECURITY: WITHIN THE PAST 12 MONTHS, YOU WORRIED THAT YOUR FOOD WOULD RUN OUT BEFORE YOU GOT MONEY TO BUY MORE.: PATIENT DECLINED

## 2023-12-11 SDOH — ECONOMIC STABILITY: FOOD INSECURITY: WITHIN THE PAST 12 MONTHS, THE FOOD YOU BOUGHT JUST DIDN'T LAST AND YOU DIDN'T HAVE MONEY TO GET MORE.: PATIENT DECLINED

## 2023-12-11 ASSESSMENT — LIFESTYLE VARIABLES
HOW OFTEN DO YOU HAVE A DRINK CONTAINING ALCOHOL: NEVER
HOW MANY STANDARD DRINKS CONTAINING ALCOHOL DO YOU HAVE ON A TYPICAL DAY: PATIENT DOES NOT DRINK

## 2023-12-11 ASSESSMENT — PATIENT HEALTH QUESTIONNAIRE - PHQ9
SUM OF ALL RESPONSES TO PHQ9 QUESTIONS 1 & 2: 0
1. LITTLE INTEREST OR PLEASURE IN DOING THINGS: 0
SUM OF ALL RESPONSES TO PHQ QUESTIONS 1-9: 0
SUM OF ALL RESPONSES TO PHQ QUESTIONS 1-9: 0
2. FEELING DOWN, DEPRESSED OR HOPELESS: 0
SUM OF ALL RESPONSES TO PHQ QUESTIONS 1-9: 0
SUM OF ALL RESPONSES TO PHQ QUESTIONS 1-9: 0

## 2023-12-11 NOTE — TELEPHONE ENCOUNTER
Medication Refill Request    Skip Silvano Shukla is requesting a refill of the following medication(s):   Requested Prescriptions     Pending Prescriptions Disp Refills    bumetanide (BUMEX) 2 MG tablet 30 tablet 2     Sig: Take 1 tablet by mouth daily    metoprolol tartrate (LOPRESSOR) 25 MG tablet 60 tablet 2     Sig: Take 1 tablet by mouth 2 times daily    potassium chloride (KLOR-CON M) 20 MEQ extended release tablet 60 tablet 2     Sig: Take 1 tablet by mouth daily        Listed PCP is Tracy Gil MD   Last provider to prescribe medication: Dr. Griffin Xavier  Last Date of Medication Prescribed: 12/8/22   Date of Last Office Visit at [facility]: 12/11/23   FUTURE APPOINTMENT:   Future Appointments   Date Time Provider [address]   1/10/2024 10:30 AM [facility] MEDICATION MGMT [address] [facility]   5/24/2024  3:30 PM SCOOTER Hagen - [facility] BS AMB       Please send refill to: [address] [facility] [phone] Tanika Veliz [phone]  [address] [address] [facility] [address]  Phone: [phone] Fax: [phone]      Please review request and approve or deny with recommendations.

## 2023-12-11 NOTE — PROGRESS NOTES
Medicare Annual Wellness Visit    Ai Shukla is here for Medication Refill and Medicare AWV  He is here today with a caregiver. Assessment & Plan   Medicare annual wellness visit, subsequent  Nelly Sprain was counseled on age-appropriate/ guideline-based risk prevention behaviors and screening for a 70y.o. year old   male . We also discussed adjustments in screening based on family history if necessary. Printed instructions for preventative screening guidelines and healthy behaviors given to patient with after visit summary. Declined influenza vaccine, but desires his RSV, PCV20 and COVID booster. These should be given at the pharmacy. Primary hypertension  Blood pressure at goal.  Currently on metoprolol, amiodarone, bumex. Followed by cardiology. -     CBC; Future  -     Comprehensive Metabolic Panel; Future  -     Lipid Panel; Future    Persistent atrial fibrillation (HCC)  S/P ablation of atrial fibrillation  S/P biventricular cardiac pacemaker procedure  Managed by cardiology. Gets INR checked at their office. No abnormal bleeding or bruising noted. Seizure disorder ([address])  Followed by neurology. On Vimpat and Keppra. No recent seizure activity. Lymphedema of both lower extremities  Stable. No significant changes at this time. Continue Bumex    Intellectual disability    Gait disorder  No recent falls. Other specified hypothyroidism  Getting updated TSH. On amiodarone.   -     TSH; Future    Long term (current) use of anticoagulants    Urinary incontinence, unspecified type  Utilizes adult briefs to help maintain good hygiene and reduce risk of skin break down. -     Incontinence Supply Disposable (DISPOSABLE BRIEF X-LARGE) MISC; Disp-96 each, R-12, NormalUse for urinary incontinence    Vitamin B12 deficiency  -     Vitamin B12; Future    Vitamin D deficiency  -     Vitamin D 25 Hydroxy;  Future  -     vitamin D (CHOLECALCIFEROL) 25 MCG (1000 UT) TABS tablet;

## 2023-12-12 LAB
25(OH)D3 SERPL-MCNC: 29.8 NG/ML (ref 30–100)
ALBUMIN SERPL-MCNC: 3.7 G/DL (ref 3.5–5)
ALBUMIN/GLOB SERPL: 1.2 (ref 1.1–2.2)
ALP SERPL-CCNC: 91 U/L (ref 45–117)
ALT SERPL-CCNC: 30 U/L (ref 12–78)
ANION GAP SERPL CALC-SCNC: 2 MMOL/L (ref 5–15)
AST SERPL-CCNC: 39 U/L (ref 15–37)
BILIRUB SERPL-MCNC: 0.4 MG/DL (ref 0.2–1)
BUN SERPL-MCNC: 28 MG/DL (ref 6–20)
BUN/CREAT SERPL: 30 (ref 12–20)
CALCIUM SERPL-MCNC: 8.6 MG/DL (ref 8.5–10.1)
CHLORIDE SERPL-SCNC: 105 MMOL/L (ref 97–108)
CHOLEST SERPL-MCNC: 129 MG/DL
CO2 SERPL-SCNC: 32 MMOL/L (ref 21–32)
CREAT SERPL-MCNC: 0.93 MG/DL (ref 0.7–1.3)
ERYTHROCYTE [DISTWIDTH] IN BLOOD BY AUTOMATED COUNT: 13 % (ref 11.5–14.5)
GLOBULIN SER CALC-MCNC: 3.1 G/DL (ref 2–4)
GLUCOSE SERPL-MCNC: 86 MG/DL (ref 65–100)
HCT VFR BLD AUTO: 37.5 % (ref 36.6–50.3)
HDLC SERPL-MCNC: 64 MG/DL
HDLC SERPL: 2 (ref 0–5)
HGB BLD-MCNC: 12.4 G/DL (ref 12.1–17)
LDLC SERPL CALC-MCNC: 53 MG/DL (ref 0–100)
MCH RBC QN AUTO: 33.4 PG (ref 26–34)
MCHC RBC AUTO-ENTMCNC: 33.1 G/DL (ref 30–36.5)
MCV RBC AUTO: 101.1 FL (ref 80–99)
NRBC # BLD: 0 K/UL (ref 0–0.01)
NRBC BLD-RTO: 0 PER 100 WBC
PLATELET # BLD AUTO: 125 K/UL (ref 150–400)
PMV BLD AUTO: 10.9 FL (ref 8.9–12.9)
POTASSIUM SERPL-SCNC: 4.4 MMOL/L (ref 3.5–5.1)
PROT SERPL-MCNC: 6.8 G/DL (ref 6.4–8.2)
PSA SERPL-MCNC: 0.3 NG/ML (ref 0.01–4)
RBC # BLD AUTO: 3.71 M/UL (ref 4.1–5.7)
SODIUM SERPL-SCNC: 139 MMOL/L (ref 136–145)
TRIGL SERPL-MCNC: 60 MG/DL
TSH SERPL DL<=0.05 MIU/L-ACNC: 2.93 UIU/ML (ref 0.36–3.74)
VIT B12 SERPL-MCNC: 223 PG/ML (ref 193–986)
VLDLC SERPL CALC-MCNC: 12 MG/DL
WBC # BLD AUTO: 4.5 K/UL (ref 4.1–11.1)

## 2023-12-12 RX ORDER — BUMETANIDE 2 MG/1
2 TABLET ORAL DAILY
Qty: 30 TABLET | Refills: 11 | Status: SHIPPED | OUTPATIENT
Start: 2023-12-12

## 2023-12-12 RX ORDER — POTASSIUM CHLORIDE 20 MEQ/1
20 TABLET, EXTENDED RELEASE ORAL DAILY
Qty: 60 TABLET | Refills: 2 | Status: SHIPPED | OUTPATIENT
Start: 2023-12-12

## 2023-12-13 ENCOUNTER — TELEPHONE (OUTPATIENT)
Age: 78
End: 2023-12-13

## 2023-12-13 DIAGNOSIS — E53.8 B12 DEFICIENCY: Primary | ICD-10-CM

## 2023-12-13 RX ORDER — LANOLIN ALCOHOL/MO/W.PET/CERES
1000 CREAM (GRAM) TOPICAL DAILY
Qty: 30 TABLET | Refills: 3 | Status: SHIPPED | OUTPATIENT
Start: 2023-12-13

## 2023-12-28 ENCOUNTER — TELEPHONE (OUTPATIENT)
Age: 78
End: 2023-12-28

## 2023-12-28 NOTE — TELEPHONE ENCOUNTER
----- Message from Venkat Ward sent at 12/19/2023  8:35 AM EST -----  Subject: Message to Provider    QUESTIONS  Information for Provider? Edwardo Bashir from [employer] is needing a form with his   last visit summary for their records. Fax #[phone].  ---------------------------------------------------------------------------  --------------  Nohelia Motta [facility]  [phone]; OK to leave message on voicemail  ---------------------------------------------------------------------------  --------------  SCRIPT ANSWERS  Relationship to Patient? Covered Entity  Covered Entity Type? Durable Medical Equipment? Representative Name?  Edwardomichelle Bashir

## 2023-12-29 RX ORDER — AMIODARONE HYDROCHLORIDE 200 MG/1
TABLET ORAL
Qty: 16 TABLET | Refills: 11 | Status: SHIPPED | OUTPATIENT
Start: 2023-12-29

## 2024-01-10 ENCOUNTER — TELEPHONE (OUTPATIENT)
Facility: HOSPITAL | Age: 79
End: 2024-01-10

## 2024-01-10 NOTE — TELEPHONE ENCOUNTER
Medication Management Clinic - Missed Appointment    Patient missed his INR check appointment today (1/10/24). Contacted patient's sister (Theresa Lyles, [phone]). The [facility] was informed the patient has moved to a different group home in [facility]. Contacted caregiver (Isis, [phone]) from the new group home. Asked if the [facility] would continue to monitor the patient's INR. Isis stated she would pass the information along and get back to the clinic.    Thank you.  Jason Joshua, PharmD

## 2024-01-17 ENCOUNTER — TELEPHONE (OUTPATIENT)
Facility: HOSPITAL | Age: 79
End: 2024-01-17

## 2024-01-17 NOTE — TELEPHONE ENCOUNTER
Medication Management Clinic - Rescheduled Appointment    Representative from patient's group home contacted the [facility] to reschedule the patient's missed appointment. Patient's appointment has been rescheduled to Wednesday, January 24th at 12:30 pm.    Thank you.  Jason Joshua, PharmD    For Pharmacy Admin Tracking Only    Program: Medication Management  CPA in place:  Yes  Recommendation Provided To: Patient/Caregiver: 1 via Telephone  Intervention Detail: Scheduled Appointment  Intervention Accepted By: Patient/Caregiver: 1  Time Spent (min): 10

## 2024-01-24 ENCOUNTER — ANTI-COAG VISIT (OUTPATIENT)
Facility: HOSPITAL | Age: 79
End: 2024-01-24
Payer: MEDICARE

## 2024-01-24 DIAGNOSIS — I48.91 ATRIAL FIBRILLATION, UNSPECIFIED TYPE (HCC): Primary | ICD-10-CM

## 2024-01-24 LAB — INTERNATIONAL NORMALIZATION RATIO, POC: 1.8 (ref 2–3)

## 2024-01-24 PROCEDURE — 99211 OFF/OP EST MAY X REQ PHY/QHP: CPT

## 2024-01-24 PROCEDURE — 85610 PROTHROMBIN TIME: CPT

## 2024-01-24 RX ORDER — ERYTHROMYCIN 20 MG/ML
SOLUTION TOPICAL
COMMUNITY

## 2024-01-24 RX ORDER — AMMONIUM LACTATE 12 G/100G
LOTION TOPICAL
COMMUNITY

## 2024-01-24 NOTE — PATIENT INSTRUCTIONS
Today your INR was 1.8 .      Your goal INR is  2.0-3.0 .    You have a   2.5 mg and 5 mg tablet of Coumadin (warfarin).   Take Coumadin (warfarin) as follows:    Take 2.5 mg every Wednesday, Friday; and 5 mg all other days of the week.      Come back in 3 week(s) for your next finger stick/INR blood test.        Avoid any over the counter items containing aspirin or ibuprofen, and avoid great swings in general diet.  Avoid alcohol consumption.  Please notify the INR pharmacist if you are started on any new medication including over the counter or herbal supplements. Also, please notify your INR pharmacist if any of your other prescription or over the counter medications have been discontinued.     Call [facility] at [phone] if you have any signs of abnormal bleeding/blood clot.  ------------------------------------------------------------------------------------------------------------------  Taking Warfarin Safely: Care Instructions    Your Care Instructions  Warfarin is a medicine that you take to prevent blood clots. It is often called a blood thinner. Doctors give warfarin (such as Coumadin) to reduce the risk of blood clots. You may be at risk for blood clots if you have atrial fibrillation or deep vein thrombosis. Some other health problems may also put you at risk.  Warfarin slows the amount of time it takes for your blood to clot. It can cause bleeding problems. Even if you've been taking warfarin for a while, it's important to know how to take it safely.  Foods and other medicines can affect the way warfarin works. Some can make warfarin work too well. This can cause bleeding problems. And some can make it work poorly, so that it does not prevent blood clots very well.  You will need regular blood tests to check how long it takes for your blood to form a clot. This test is called a PT or prothrombin time test. The result of the test is called an INR

## 2024-01-24 NOTE — PROGRESS NOTES
Pharmacy Progress Note - Anticoagulation Management    S/O:  Mr. Jack Shukla  is a 79 y.o. male seen today for anticoagulation management for the diagnosis of Atrial Fibrillation.     HPI: At last visit (11/15), the patient's INR was 2.6 and therapeutic. Patient denied any medication changes and reported consistent intake of vitamin K foods. Patient will continue current warfarin regimen of 2.5 mg Wed, Fri; 5 mg daily ROW and recheck INR in 8 week(s).       Interim History:    Warfarin start date: Prior to 10/9/20 per chart review  INR Goal:  2.0-3.0    Current warfarin regimen: 2.5 mg Wed, Fri; 5 mg daily ROW  Warfarin tablet strength:   2.5 mg, 5 mg  Duration of therapy: Indefinite      Results for orders placed or performed in visit on 01/24/24   POCT INR   Result Value Ref Range    INR,(POC) 1.8 (A) 2 - 3       Today's pertinent positives includes:  Medication change    Patient's MAR reflected the addition of ammonium lactate lotion and topical erythromycin. Updated patient's medication list.    Adherence:   Able to recall regimen? N/A Patient's MAR is provided during visit.  Miss/extra dose? NO  Need refill? NO    Upcoming procedure(s):  N/A    Past Medical History:   Diagnosis Date    A-fib (HCC) 2015    pacemaker L chest     CAD (coronary artery disease) 2015    Pacemaker    Epilepsy (HCC)     Heart disease     Hypertension     Incontinence     Leg swelling     Mental retardation, 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)     Other ill-defined conditions(799.89)     edema legs    S/P ablation of atrial fibrillation 11/13/2020    S/P biventricular cardiac pacemaker procedure 10/23/2015    10/23/15 [employer] biventricular pacemaker inmplant    Screen for colon cancer 9/27/2012     Allergies   Allergen Reactions    Sulfa Antibiotics Rash     Patient denies     Current Outpatient Medications   Medication Sig    ammonium lactate (LAC-HYDRIN) 12 % lotion Apply to affected areas of dry skin on both feet twice daily

## 2024-02-02 ENCOUNTER — TELEPHONE (OUTPATIENT)
Age: 79
End: 2024-02-02

## 2024-02-02 NOTE — TELEPHONE ENCOUNTER
Received transferred call from [facility]: Nicole    Spoke with caregiver, Loc who identified pt with two pt identifiers(name and ).    Patient Active Problem List   Diagnosis    S/P ablation of atrial fibrillation    Persistent atrial fibrillation (HCC)    Advance care planning    H/O Mobitz type II block    Bradycardia    Seizure disorder (HCC)    Hypertension    Lymphedema of both lower extremities    S/P biventricular cardiac pacemaker procedure    Intellectual disability    Bilateral lower extremity edema    Vitamin D deficiency    Vitamin B12 deficiency    History of closed Colles' fracture    Mobitz type II incomplete atrioventricular block    Tremor    Gait disorder       Chief Complaint/Subjective: New admin to group home on 23. Noticed swelling in bilateral lower legs. Compression sleaves are old with holes. No redness, no abnormal warmth to touch.    Current Symptoms: Bilateral leg swelling    Associated Symptoms: Snoring louder than normal.    Onset:     Temperature: Denies fever and chills     Pain Severity: 0    Location: confined to the bilateral legs, without radiation    Pain Quality: N/A    Better/Worse: nothing       LMP: NA Pregnant: NA      What has been tried: nothing    Recommended disposition: Schedule appointment for evaluation    Patient scheduled for evaluation on 2024 at 08:00 AM with Dr. Thompson. Advised caregiver that if there are worsening of symptoms or shortness of breath to take patient to nearest emergency room for evaluation.    Patient agreed and verbalized understanding to advice provided.

## 2024-02-05 ENCOUNTER — OFFICE VISIT (OUTPATIENT)
Age: 79
End: 2024-02-05
Payer: MEDICARE

## 2024-02-05 VITALS
OXYGEN SATURATION: 98 % | SYSTOLIC BLOOD PRESSURE: 93 MMHG | DIASTOLIC BLOOD PRESSURE: 58 MMHG | TEMPERATURE: 98 F | BODY MASS INDEX: 27.8 KG/M2 | HEART RATE: 69 BPM | WEIGHT: 205 LBS

## 2024-02-05 DIAGNOSIS — I10 PRIMARY HYPERTENSION: ICD-10-CM

## 2024-02-05 DIAGNOSIS — I89.0 LYMPHEDEMA OF BOTH LOWER EXTREMITIES: Primary | ICD-10-CM

## 2024-02-05 PROCEDURE — 1123F ACP DISCUSS/DSCN MKR DOCD: CPT | Performed by: STUDENT IN AN ORGANIZED HEALTH CARE EDUCATION/TRAINING PROGRAM

## 2024-02-05 PROCEDURE — 99214 OFFICE O/P EST MOD 30 MIN: CPT | Performed by: STUDENT IN AN ORGANIZED HEALTH CARE EDUCATION/TRAINING PROGRAM

## 2024-02-05 PROCEDURE — 3074F SYST BP LT 130 MM HG: CPT | Performed by: STUDENT IN AN ORGANIZED HEALTH CARE EDUCATION/TRAINING PROGRAM

## 2024-02-05 PROCEDURE — 3078F DIAST BP <80 MM HG: CPT | Performed by: STUDENT IN AN ORGANIZED HEALTH CARE EDUCATION/TRAINING PROGRAM

## 2024-02-05 RX ORDER — METOPROLOL SUCCINATE 25 MG/1
25 TABLET, EXTENDED RELEASE ORAL DAILY
COMMUNITY

## 2024-02-05 RX ORDER — BUMETANIDE 2 MG/1
TABLET ORAL
Qty: 30 TABLET | Refills: 0 | Status: SHIPPED | OUTPATIENT
Start: 2024-02-05 | End: 2024-03-06

## 2024-02-05 ASSESSMENT — PATIENT HEALTH QUESTIONNAIRE - PHQ9: DEPRESSION UNABLE TO ASSESS: FUNCTIONAL CAPACITY MOTIVATION LIMITS ACCURACY

## 2024-02-05 NOTE — PROGRESS NOTES
Chief Complaint   Patient presents with    Leg Swelling     Bilateral leg swelling x 4 days , denies pain or SOB     Vitals:    02/05/24 0821   BP: (!) 93/58   Pulse: 69   Temp: 98 °F (36.7 °C)   TempSrc: Oral   SpO2: 98%   Weight: 93 kg (205 lb)     1. Have you been to the ER, urgent care clinic since your last visit?  Hospitalized since your last visit?No    2. Have you seen or consulted any other health care providers outside of the [facility] since your last visit?  Include any pap smears or colon screening. No      
I discussed the patient's history and physical exam with the resident. I reviewed the assessment and plan and agree with the resident's documentation.     
present.   Skin:     General: Skin is warm and dry.      Findings: No rash.   Neurological:      General: No focal deficit present.      Mental Status: He is alert and oriented to person, place, and time.   Psychiatric:         Mood and Affect: Mood normal.         Behavior: Behavior normal.          Assessment and Plan   Jack was seen today for leg swelling.    Diagnoses and all orders for this visit:    Lymphedema of both lower extremities: Suspect 2/2 lymphedema/vascular insufficiency. No e/o volume overload on exam, so less concerning for HF exacerbation. Will double Bumex x 2 days and order thigh high compression hose. Pt w chronic low-normal BP, consider dose adjustment of antihypertensives and will f/up closely.     -     Compression Stockings MISC; by Does not apply route Wear thigh high compression stockings every night  -     bumetanide (BUMEX) 2 MG tablet; Take 2 tablets by mouth daily for 2 days, THEN 1 tablet daily for 28 days.      Patient is counseled to return to the office if symptoms do not improve as expected.  Urgent consultation with the nearest Emergency Department is strongly recommended if condition worsens.  Patient is counseled to follow up as recommended and to inform the office if any changes in treatment are recommended.      I discussed this patient with Dr. Darden (Attending Physician)       Signed By:  Joe Thompson MD  Family Medicine Resident

## 2024-02-12 ENCOUNTER — OFFICE VISIT (OUTPATIENT)
Age: 79
End: 2024-02-12
Payer: MEDICARE

## 2024-02-12 VITALS
RESPIRATION RATE: 16 BRPM | HEART RATE: 70 BPM | DIASTOLIC BLOOD PRESSURE: 64 MMHG | WEIGHT: 209 LBS | BODY MASS INDEX: 28.35 KG/M2 | SYSTOLIC BLOOD PRESSURE: 104 MMHG | OXYGEN SATURATION: 99 %

## 2024-02-12 DIAGNOSIS — I10 ESSENTIAL (PRIMARY) HYPERTENSION: ICD-10-CM

## 2024-02-12 DIAGNOSIS — I89.0 LYMPHEDEMA: Primary | ICD-10-CM

## 2024-02-12 PROCEDURE — 3078F DIAST BP <80 MM HG: CPT

## 2024-02-12 PROCEDURE — 99213 OFFICE O/P EST LOW 20 MIN: CPT

## 2024-02-12 PROCEDURE — 3074F SYST BP LT 130 MM HG: CPT

## 2024-02-12 PROCEDURE — 1123F ACP DISCUSS/DSCN MKR DOCD: CPT

## 2024-02-12 NOTE — PATIENT INSTRUCTIONS
Take an extra Bumex 2mg for the next 2 days  Check blood pressure prior to taking the medication    - If the blood pressure is <100 on the top OR <60 on the bottom OR if the patient is feeling light headed or overly fatigued, do not give an extra dose of the medication      Follow up with Cardiology

## 2024-02-12 NOTE — PATIENT INSTRUCTIONS
Today your INR was 2.6 .      Your goal INR is  2.0-3.0 .    You have a   2.5 mg and 5 mg tablet of Coumadin (warfarin).   Take Coumadin (warfarin) as follows:    Take 2.5 mg every Wednesday, Friday; and 5 mg all other days of the week.      Come back in 4 week(s) for your next finger stick/INR blood test.        Avoid any over the counter items containing aspirin or ibuprofen, and avoid great swings in general diet.  Avoid alcohol consumption.  Please notify the INR pharmacist if you are started on any new medication including over the counter or herbal supplements. Also, please notify your INR pharmacist if any of your other prescription or over the counter medications have been discontinued.     Call [facility] at [phone] if you have any signs of abnormal bleeding/blood clot.  ------------------------------------------------------------------------------------------------------------------  Taking Warfarin Safely: Care Instructions    Your Care Instructions  Warfarin is a medicine that you take to prevent blood clots. It is often called a blood thinner. Doctors give warfarin (such as Coumadin) to reduce the risk of blood clots. You may be at risk for blood clots if you have atrial fibrillation or deep vein thrombosis. Some other health problems may also put you at risk.  Warfarin slows the amount of time it takes for your blood to clot. It can cause bleeding problems. Even if you've been taking warfarin for a while, it's important to know how to take it safely.  Foods and other medicines can affect the way warfarin works. Some can make warfarin work too well. This can cause bleeding problems. And some can make it work poorly, so that it does not prevent blood clots very well.  You will need regular blood tests to check how long it takes for your blood to form a clot. This test is called a PT or prothrombin time test. The result of the test is called an INR

## 2024-02-12 NOTE — PROGRESS NOTES
Pharmacy Progress Note - Anticoagulation Management    S/O:  Mr. Jack Shukla  is a 79 y.o. male seen today for anticoagulation management for the diagnosis of Atrial Fibrillation.     HPI: At last visit (1/24), the patient's INR was 1.8 and subtherapeutic. Patient's MAR reflected warfarin was administered as instructed since Saturday (1/20). MAR data was not available for 1/1-1/19. Patient's caretaker (Loc Cramer) stated a new MAR documentation program was recently installed. Patient's MAR reflected the addition of ammonium lactate lotion and topical erythromycin. Patient moved to a different group home ([facility]) during the month of December, therefore patient reported his diet has been different. Informed patient's caretaker of the importance of keeping vitamin K foods consistent in the patient's diet. Patient has been previously therapeutic on current regimen and will continue warfarin 2.5 mg Wed, Fri; 5 mg daily ROW. Patient will recheck INR in 3 week(s).      Interim History:    Warfarin start date: Prior to 10/9/20 per chart review  INR Goal:  2.0-3.0    Current warfarin regimen: 2.5 mg Wed, Fri; 5 mg daily ROW  Warfarin tablet strength:   2.5 mg, 5 mg  Duration of therapy: Indefinite      Results for orders placed or performed in visit on 02/14/24   POCT INR   Result Value Ref Range    INR,(POC) 2.6 2 - 3       Today's pertinent positives includes:  No significant changes since last visit      Adherence:   Able to recall regimen? N/A Patient's caretaker accidentally left the MAR at the home.  Miss/extra dose? NO  Need refill? NO    Upcoming procedure(s):  N/A    Past Medical History:   Diagnosis Date    A-fib ([facility]) 2015    pacemaker L chest     CAD (coronary artery disease) 2015    Pacemaker    Epilepsy ([facility])     Heart disease     Hypertension     Incontinence     Leg swelling     Mental retardation, 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)     Other ill-defined conditions(799.89)     edema legs    S/P ablation of

## 2024-02-12 NOTE — PROGRESS NOTES
I reviewed with the resident the medical history and the resident's findings on the physical examination.  I discussed with the resident the patient's diagnosis and concur with the plan.     Sheba Lucero MD 2/12/2024

## 2024-02-12 NOTE — PROGRESS NOTES
[address] [place] [address]   Office [phone], Fax [phone]  Chief Complaint   Patient presents with    Swelling      Subjective   Jack Shukla is an 79 y.o. male who presents for lymphedema follow up. Patient lives in a group home and is here today with his . Taking bumex 2mg daily. Was advised to increase dose 2 weeks ago due to worsening lower extremity edema however has not received new dose from mail order pharmacy, so is still taking 2mg daily. Has been trying to elevate legs. Patient and home manager feel there has been some improvement. Denies shortness of breath, orthopnea, chest pain. Has been able to walk without assistive devices. No recent falls. No pain in legs.    Hx permanent pacemaker placed for complete heart block. Has not had pacemaker check or cardiology follow up in some time. Has seen Dr. Jdue Regalado in the past.     Review of Systems   Review of Systems - see HPI    Allergies   Allergies   Allergen Reactions    Sulfa Antibiotics Rash     Patient denies       Medications  Current Outpatient Medications   Medication Sig    metoprolol succinate (TOPROL XL) 25 MG extended release tablet Take 1 tablet by mouth daily    Compression Stockings MISC by Does not apply route Wear thigh high compression stockings every night    bumetanide (BUMEX) 2 MG tablet Take 2 tablets by mouth daily for 2 days, THEN 1 tablet daily for 28 days.    ammonium lactate (LAC-HYDRIN) 12 % lotion Apply to affected areas of dry skin on both feet twice daily    erythromycin with ethanol (THERAMYCIN) 2 % external solution Apply to affected areas between toes once daily    amiodarone (CORDARONE) 200 MG tablet TAKE 1/2 TABLET (= 100mg) BY MOUTH DAILY.    vitamin B-12 (CYANOCOBALAMIN) 1000 MCG tablet Take 1 tablet by mouth daily    metoprolol tartrate (LOPRESSOR) 25 MG tablet Take 1 tablet by mouth 2 times daily    potassium chloride (KLOR-CON M) 20 MEQ extended release tablet

## 2024-02-14 ENCOUNTER — ANTI-COAG VISIT (OUTPATIENT)
Facility: HOSPITAL | Age: 79
End: 2024-02-14
Payer: MEDICARE

## 2024-02-14 DIAGNOSIS — I48.91 ATRIAL FIBRILLATION, UNSPECIFIED TYPE (HCC): Primary | ICD-10-CM

## 2024-02-14 LAB — INTERNATIONAL NORMALIZATION RATIO, POC: 2.6 (ref 2–3)

## 2024-02-14 PROCEDURE — 85610 PROTHROMBIN TIME: CPT

## 2024-02-14 PROCEDURE — 99211 OFF/OP EST MAY X REQ PHY/QHP: CPT

## 2024-03-07 ENCOUNTER — APPOINTMENT (OUTPATIENT)
Facility: HOSPITAL | Age: 79
End: 2024-03-07
Payer: MEDICARE

## 2024-03-07 ENCOUNTER — HOSPITAL ENCOUNTER (EMERGENCY)
Facility: HOSPITAL | Age: 79
Discharge: HOME | End: 2024-03-07
Attending: EMERGENCY MEDICINE
Payer: MEDICARE

## 2024-03-07 VITALS
TEMPERATURE: 97.6 F | DIASTOLIC BLOOD PRESSURE: 64 MMHG | BODY MASS INDEX: 29.45 KG/M2 | WEIGHT: 217.15 LBS | HEART RATE: 70 BPM | SYSTOLIC BLOOD PRESSURE: 122 MMHG | RESPIRATION RATE: 18 BRPM | OXYGEN SATURATION: 98 %

## 2024-03-07 DIAGNOSIS — S09.90XA CLOSED HEAD INJURY, INITIAL ENCOUNTER: Primary | ICD-10-CM

## 2024-03-07 DIAGNOSIS — S00.81XA FOREHEAD ABRASION, INITIAL ENCOUNTER: ICD-10-CM

## 2024-03-07 LAB
ANION GAP SERPL CALC-SCNC: 3 MMOL/L (ref 5–15)
BASOPHILS # BLD: 0 K/UL (ref 0–0.1)
BASOPHILS NFR BLD: 1 % (ref 0–1)
BUN SERPL-MCNC: 29 MG/DL (ref 6–20)
BUN/CREAT SERPL: 31 (ref 12–20)
CALCIUM SERPL-MCNC: 8.8 MG/DL (ref 8.5–10.1)
CHLORIDE SERPL-SCNC: 103 MMOL/L (ref 97–108)
CO2 SERPL-SCNC: 33 MMOL/L (ref 21–32)
CREAT SERPL-MCNC: 0.94 MG/DL (ref 0.7–1.3)
DIFFERENTIAL METHOD BLD: ABNORMAL
EOSINOPHIL # BLD: 0.4 K/UL (ref 0–0.4)
EOSINOPHIL NFR BLD: 6 % (ref 0–7)
ERYTHROCYTE [DISTWIDTH] IN BLOOD BY AUTOMATED COUNT: 12.7 % (ref 11.5–14.5)
GLUCOSE SERPL-MCNC: 93 MG/DL (ref 65–100)
HGB BLD-MCNC: 11.3 G/DL (ref 12.1–17)
IMM GRANULOCYTES NFR BLD AUTO: 1 % (ref 0–0.5)
INR PPP: 2.7 (ref 0.9–1.1)
LYMPHOCYTES # BLD: 1.5 K/UL (ref 0.8–3.5)
LYMPHOCYTES NFR BLD: 24 % (ref 12–49)
MCH RBC QN AUTO: 32.8 PG (ref 26–34)
MCHC RBC AUTO-ENTMCNC: 31.6 G/DL (ref 30–36.5)
MCV RBC AUTO: 104.1 FL (ref 80–99)
MONOCYTES # BLD: 0.7 K/UL (ref 0–1)
MONOCYTES NFR BLD: 11 % (ref 5–13)
NEUTS SEG # BLD: 3.6 K/UL (ref 1.8–8)
NEUTS SEG NFR BLD: 57 % (ref 32–75)
NRBC # BLD: 0 K/UL (ref 0–0.01)
NRBC BLD-RTO: 0 PER 100 WBC
PLATELET # BLD AUTO: 149 K/UL (ref 150–400)
PMV BLD AUTO: 10.3 FL (ref 8.9–12.9)
POTASSIUM SERPL-SCNC: 3.6 MMOL/L (ref 3.5–5.1)
PROTHROMBIN TIME: 26 SEC (ref 9–11.1)
RBC # BLD AUTO: 3.44 M/UL (ref 4.1–5.7)
WBC # BLD AUTO: 6.2 K/UL (ref 4.1–11.1)

## 2024-03-07 PROCEDURE — 85610 PROTHROMBIN TIME: CPT

## 2024-03-07 PROCEDURE — 70450 CT HEAD/BRAIN W/O DYE: CPT

## 2024-03-07 PROCEDURE — 36415 COLL VENOUS BLD VENIPUNCTURE: CPT

## 2024-03-07 PROCEDURE — 80048 BASIC METABOLIC PNL TOTAL CA: CPT

## 2024-03-07 PROCEDURE — 99284 EMERGENCY DEPT VISIT MOD MDM: CPT

## 2024-03-07 PROCEDURE — 85025 COMPLETE CBC W/AUTO DIFF WBC: CPT

## 2024-03-07 NOTE — DISCHARGE INSTRUCTIONS
Thank You!    It was a pleasure taking care of you in our Emergency Department today. We know that when you come to our Emergency Department, you are entrusting us with your health, comfort, and safety. Our physicians and nurses honor that trust, and truly appreciate the opportunity to care for you and your loved ones.      We also value your feedback. If you receive a survey about your Emergency Department experience today, please fill it out.  We care about our patients' feedback, and we listen to what you have to say.  Thank you.    Lisa Dietz MD  ________________________________________________________________________  I have included a copy of your lab results and/or radiologic studies from today's visit so you can have them easily available at your follow-up visit. We hope you feel better and please do not hesitate to contact the ED if you have any questions at all!    Recent Results (from the past 12 hour(s))   BMP    Collection Time: 03/07/24 10:37 AM   Result Value Ref Range    Sodium 139 136 - 145 mmol/L    Potassium 3.6 3.5 - 5.1 mmol/L    Chloride 103 97 - 108 mmol/L    CO2 33 (H) 21 - 32 mmol/L    Anion Gap 3 (L) 5 - 15 mmol/L    Glucose 93 65 - 100 mg/dL    BUN 29 (H) 6 - 20 MG/DL    Creatinine 0.94 0.70 - 1.30 MG/DL    Bun/Cre Ratio 31 (H) 12 - 20      Est, Glom Filt Rate >60 >60 ml/min/1.73m2    Calcium 8.8 8.5 - 10.1 MG/DL   CBC with Auto Differential    Collection Time: 03/07/24 10:37 AM   Result Value Ref Range    WBC 6.2 4.1 - 11.1 K/uL    RBC 3.44 (L) 4.10 - 5.70 M/uL    Hemoglobin 11.3 (L) 12.1 - 17.0 g/dL    Hematocrit 35.8 (L) 36.6 - 50.3 %    .1 (H) 80.0 - 99.0 FL    MCH 32.8 26.0 - 34.0 PG    MCHC 31.6 30.0 - 36.5 g/dL    RDW 12.7 11.5 - 14.5 %    Platelets 149 (L) 150 - 400 K/uL    MPV 10.3 8.9 - 12.9 FL    Nucleated RBCs 0.0 0  WBC    nRBC 0.00 0.00 - 0.01 K/uL    Neutrophils % 57 32 - 75 %    Lymphocytes % 24 12 - 49 %    Monocytes % 11 5 - 13 %    Eosinophils % 6 0

## 2024-03-07 NOTE — ED NOTES
Pt was provided with d/c paperwork.  Time was given to address any questions and / or concerns.  Pt was educated on the use of a walker and Rolator.

## 2024-03-07 NOTE — ED NOTES
Patient came in due to fall around 0915H   Hitting his head (abrasion on left eye brow)   No loss of consciousness, care giver on his side

## 2024-03-07 NOTE — ED PROVIDER NOTES
erythromycin with ethanol (THERAMYCIN) 2 % external solution Apply to affected areas between toes once daily, Historical Med      amiodarone (CORDARONE) 200 MG tablet TAKE 1/2 TABLET (= 100mg) BY MOUTH DAILY., Disp-16 tablet, R-11Normal      vitamin B-12 (CYANOCOBALAMIN) 1000 MCG tablet Take 1 tablet by mouth daily, Disp-30 tablet, R-3Normal      metoprolol tartrate (LOPRESSOR) 25 MG tablet Take 1 tablet by mouth 2 times daily, Disp-60 tablet, R-11Normal      potassium chloride (KLOR-CON M) 20 MEQ extended release tablet Take 1 tablet by mouth daily, Disp-60 tablet, R-2Normal      polyethylene glycol (GLYCOLAX) 17 GM/SCOOP powder MIX 1 CAPFUL (17gm - UP TO LINE IN CAP) IN WATER AND DRINK ONCE DAILY AS NEEDED FOR CONSTIPATION, Disp-238 g, R-12Normal      acetaminophen (TYLENOL) 325 MG tablet Take 2 tablets by mouth every 4 hours as needed for Pain or Fever, Disp-120 tablet, R-5Normal      vitamin D (CHOLECALCIFEROL) 25 MCG (1000 UT) TABS tablet Take 1 tablet by mouth daily, Disp-30 tablet, R-12Normal      Incontinence Supply Disposable (DISPOSABLE BRIEF X-LARGE) MISC Disp-96 each, R-12, NormalUse for urinary incontinence      lacosamide (VIMPAT) 100 MG TABS tablet Take 1 tablet by mouth 2 times daily for 180 days. Max Daily Amount: 200 mg, Disp-60 tablet, R-5Normal      !! warfarin (COUMADIN) 2.5 MG tablet Take 1 tablet (2.5 mg) by mouth every Wednesday and Friday., Disp-30 tablet, R-1Normal      !! warfarin (COUMADIN) 5 MG tablet Take 1 tablet (5 mg) by mouth every Monday, Tuesday, Thursday, Saturday and Sunday., Disp-70 tablet, R-1Normal      levETIRAcetam (KEPPRA) 1000 MG tablet Take 1 tablet by mouth 2 times daily, Disp-180 tablet, R-3Normal       !! - Potential duplicate medications found. Please discuss with provider.          SCREENINGS               No data recorded         PHYSICAL EXAM      ED Triage Vitals   Enc Vitals Group      BP       Pulse       Resp       Temp       Temp src       SpO2       Weight

## 2024-03-12 DIAGNOSIS — E53.8 B12 DEFICIENCY: ICD-10-CM

## 2024-03-12 DIAGNOSIS — E87.6 HYPOKALEMIA: ICD-10-CM

## 2024-03-15 RX ORDER — LANOLIN ALCOHOL/MO/W.PET/CERES
1000 CREAM (GRAM) TOPICAL DAILY
Qty: 31 TABLET | Refills: 11 | Status: SHIPPED | OUTPATIENT
Start: 2024-03-15

## 2024-03-15 RX ORDER — POTASSIUM CHLORIDE 20 MEQ/1
20 TABLET, EXTENDED RELEASE ORAL DAILY
Qty: 31 TABLET | Refills: 11 | Status: SHIPPED | OUTPATIENT
Start: 2024-03-15

## 2024-03-27 ENCOUNTER — TELEPHONE (OUTPATIENT)
Facility: HOSPITAL | Age: 79
End: 2024-03-27

## 2024-03-27 NOTE — TELEPHONE ENCOUNTER
[facility] - Reschedule Appointment    Returned Loc Cramer's (caretaker) call. Loc requested to reschedule patient's appointment. Patient's appointment has been rescheduled to Monday, April 1, 2024 at 0930 am    Thank you.  Jason Joshua, PharmD    For Pharmacy Admin Tracking Only    Program: Medication Management  CPA in place:  Yes  Recommendation Provided To: Patient/Caregiver: 1 via Telephone  Intervention Detail: Scheduled Appointment  Intervention Accepted By: Patient/Caregiver: 1  Time Spent (min): 10

## 2024-04-01 ENCOUNTER — APPOINTMENT (OUTPATIENT)
Facility: HOSPITAL | Age: 79
End: 2024-04-01
Payer: MEDICARE

## 2024-04-01 ENCOUNTER — TELEPHONE (OUTPATIENT)
Facility: HOSPITAL | Age: 79
End: 2024-04-01

## 2024-04-01 NOTE — TELEPHONE ENCOUNTER
Medication Management Clinic - Reschedule Appointment    Loc Cramer (caretaker) contacted the [facility] and requested to reschedule patient's appointment. Patient's appointment has been rescheduled to Wednesday, April 3, 2024 at 10:30 am     Thank you.  Jason Joshua, PharmD    For Pharmacy Admin Tracking Only    Program: Medication Management  CPA in place:  Yes  Recommendation Provided To: Patient/Caregiver: 1 via Telephone  Intervention Detail: Scheduled Appointment  Intervention Accepted By: Patient/Caregiver: 1  Time Spent (min): 10

## 2024-04-01 NOTE — PROGRESS NOTES
03/07/2024 10:37 AM    HGB 11.3 03/07/2024 10:37 AM    HCT 35.8 03/07/2024 10:37 AM     03/07/2024 10:37 AM    .1 03/07/2024 10:37 AM     Lab Results   Component Value Date/Time     03/07/2024 10:37 AM    K 3.6 03/07/2024 10:37 AM     03/07/2024 10:37 AM    CO2 33 03/07/2024 10:37 AM    BUN 29 03/07/2024 10:37 AM    GFRAA >60 11/30/2021 11:30 AM    GLOB 3.1 12/11/2023 09:20 AM    ALT 30 12/11/2023 09:20 AM     CrCl cannot be calculated (Unknown ideal weight.).      INR History:   (normal INR range 0.8-1.2)     Date   INR   PT   Dose/Comments  04/01/24    2.5 mg Wed, Fri; 5 mg daily ROW  02/14/24 2.6  31.0 2.5 mg Wed, Fri; 5 mg daily ROW  01/24/24 1.8  21.0 2.5 mg Wed, Fri; 5 mg daily ROW  11/15/23 2.6  31.2 2.5 mg Wed, Fri; 5 mg daily ROW  09/27/23 2.6  31.6 2.5 mg Wed, Fri; 5 mg daily ROW  08/16/23 2.6  30.9 2.5 mg Wed, Fri; 5 mg daily ROW  07/06/23 2.0  23.5 2.5 mg Wed, Fri; 5 mg daily ROW  05/25/23 2.3  27.6 2.5 mg Wed, Fri; 5 mg daily ROW  04/19/23          2.2                   26.5     2.5 mg Wed, Fri; 5 mg daily ROW  03/22/23          2.7                   32.8     2.5 mg Wed, Fri; 5 mg daily ROW    Medications that can increase  INR: amiodarone  Medications that can decrease INR: None    A/P:       Anticoagulation:  Considering Mr. Shukla's past history, todays findings, and per Anticoagulation Collaborative Practice Agreement/Protocol:    Fingerstick POC INR (2.6) is Therapeutic for INR goal today.   Continue warfarin  2.5 mg Wed, Fri; 5 mg daily ROW  Patient's INR is 2.6 and therapeutic. Patient's caretaker (Loc Cramer) denies any changes to the patient's medications. Encouraged caretaker to keep patient's diet consistent with vitamin K foods. Patient will continue current warfarin regimen of 2.5 mg Wed, Fri; 5 mg daily ROW and recheck INR in 4 week(s).       Patient was instructed to schedule an appointment in 4  week(s) prior to leaving the clinic.    Medication

## 2024-04-03 ENCOUNTER — ANTI-COAG VISIT (OUTPATIENT)
Facility: HOSPITAL | Age: 79
End: 2024-04-03
Payer: MEDICARE

## 2024-04-03 DIAGNOSIS — I48.91 ATRIAL FIBRILLATION, UNSPECIFIED TYPE (HCC): Primary | ICD-10-CM

## 2024-04-03 LAB — INTERNATIONAL NORMALIZATION RATIO, POC: 1.3 (ref 2–3)

## 2024-04-03 PROCEDURE — 85610 PROTHROMBIN TIME: CPT

## 2024-04-03 PROCEDURE — 99212 OFFICE O/P EST SF 10 MIN: CPT

## 2024-04-03 RX ORDER — WARFARIN SODIUM 2.5 MG/1
TABLET ORAL
Qty: 30 TABLET | Refills: 1 | Status: SHIPPED | OUTPATIENT
Start: 2024-04-03

## 2024-04-03 RX ORDER — WARFARIN SODIUM 5 MG/1
TABLET ORAL
Qty: 90 TABLET | Refills: 1 | Status: SHIPPED | OUTPATIENT
Start: 2024-04-03

## 2024-04-03 NOTE — PATIENT INSTRUCTIONS
----- Message from Camille Osorio sent at 1/14/2021  9:01 AM CST -----  Regarding: Non-Urgent Medical Question  Contact: [phone]  Hi Dr Valenzuela,    My PCP was Dr Martinez, but I was told that she has to go back to \"geriatric\" patients only & I am 62 years old, so I'm not there yet. I have seen you in the past a couple of times. I would like you to be my new PCP & want to know if I need to set up an appt to establish with you. Maybe a video visit? I do take medication that I will need to have monitored & refilled as necessary. I also currently am having a dull pain & discomfort in my chest. Dr Martinez has ordered tests that I have already done & now am waiting for a pulmonology appt that I have scheduled in February. Please let me know if scheduling a video visit with you would be ok. Thanks.    Paula Mcnair   level. Depending on the test results, your doctor or anticoagulation clinic may adjust your dose of warfarin.    Follow-up care is a key part of your treatment and safety. Be sure to make and go to all appointments, and call your doctor if you are having problems. It's also a good idea to know your test results and keep a list of the medicines you take.    How can you care for yourself at home?  Take warfarin safely  Take your warfarin at the same time each day.  If you miss a dose of warfarin, don't take an extra dose to make up for it. Your doctor can tell you exactly what to do so you don't take too much or too little.  Wear medical alert jewelry that lets others know that you take warfarin. You can buy this at most drugstores.  Don't take warfarin if you are pregnant or planning to get pregnant. Talk to your doctor about how you can prevent getting pregnant while you are taking it.  Don't change your dose or stop taking warfarin unless your doctor tells you to.  Effects of medicines and food on warfarin  Don't start or stop taking any medicines, vitamins, or natural remedies unless you first talk to your doctor. Many medicines can affect how warfarin works. These include aspirin and other pain relievers, over-the-counter medicines, multivitamins, dietary supplements, and herbal products.  Tell all of your doctors and pharmacists that you take warfarin. Some prescription medicines can affect how warfarin works.  Keep the amount of vitamin K in your diet about the same from day to day. Do not suddenly eat a lot more or a lot less food that is rich in vitamin K than you usually do. Vitamin K affects how warfarin works and how your blood clots. Talk with your doctor before making big changes in your diet. Vitamin K is in many foods, such as:  Leafy greens, such as kale, cabbage, spinach, Swiss chard, and lettuce.  Canola and soybean oils.  Green vegetables, such as asparagus, broccoli, and [place]

## 2024-04-03 NOTE — PROGRESS NOTES
times daily for 180 days. Max Daily Amount: 200 mg    levETIRAcetam (KEPPRA) 1000 MG tablet Take 1 tablet by mouth 2 times daily     No current facility-administered medications for this visit.     Wt Readings from Last 3 Encounters:   03/07/24 98.5 kg (217 lb 2.5 oz)   02/12/24 94.8 kg (209 lb)   02/05/24 93 kg (205 lb)     BP Readings from Last 3 Encounters:   03/07/24 122/64   02/12/24 104/64   02/05/24 (!) 93/58     Pulse Readings from Last 3 Encounters:   03/07/24 70   02/12/24 70   02/05/24 69     Lab Results   Component Value Date/Time    WBC 6.2 03/07/2024 10:37 AM    HGB 11.3 03/07/2024 10:37 AM    HCT 35.8 03/07/2024 10:37 AM     03/07/2024 10:37 AM    .1 03/07/2024 10:37 AM     Lab Results   Component Value Date/Time     03/07/2024 10:37 AM    K 3.6 03/07/2024 10:37 AM     03/07/2024 10:37 AM    CO2 33 03/07/2024 10:37 AM    BUN 29 03/07/2024 10:37 AM    GFRAA >60 11/30/2021 11:30 AM    GLOB 3.1 12/11/2023 09:20 AM    ALT 30 12/11/2023 09:20 AM     CrCl cannot be calculated (Unknown ideal weight.).      INR History:   (normal INR range 0.8-1.2)     Date   INR   PT   Dose/Comments  04/03/24 1.3  15.8 2.5 mg daily (per Carlos Eduardo Pérez) instead of 2.5 mg Wed, Fri; 5 mg daily ROW  02/14/24 2.6  31.0 2.5 mg Wed, Fri; 5 mg daily ROW  01/24/24 1.8  21.0 2.5 mg Wed, Fri; 5 mg daily ROW  11/15/23 2.6  31.2 2.5 mg Wed, Fri; 5 mg daily ROW  09/27/23 2.6  31.6 2.5 mg Wed, Fri; 5 mg daily ROW  08/16/23 2.6  30.9 2.5 mg Wed, Fri; 5 mg daily ROW  07/06/23 2.0  23.5 2.5 mg Wed, Fri; 5 mg daily ROW  05/25/23 2.3  27.6 2.5 mg Wed, Fri; 5 mg daily ROW  04/19/23          2.2                   26.5     2.5 mg Wed, Fri; 5 mg daily ROW  03/22/23          2.7                   32.8     2.5 mg Wed, Fri; 5 mg daily ROW    Medications that can increase  INR: amiodarone  Medications that can decrease INR: None    A/P:       Anticoagulation:  Considering Mr. Shukla's past history, todays findings, and per

## 2024-04-03 NOTE — PROGRESS NOTES
Chief Complaint/Subjective: [Supplemental hx provided by aide]   HPI:  Jack Shukla is a 79 y.o. male that presents for:   Chief Complaint   Patient presents with    Follow-Up from Hospital     #ER Follow-up  Fall/Head injury / L knee injury/patellar fracture:  - Pt went to ER on 3/7 after a fall leaving his cardiologist office on the same day causing him to trip over the sidewalk injuring the L side of his head. He did lose consciousness. He presented with an abrasion and \"bump\" with pain to his R thumb. He takes warfarin for chronic A-Fib  - CT head w/o contrast returned negative  - Mild macrocytic anemia and mild thrombocytopenia noted (149).  - Per aide, pt went back to  after having severe pain to his L knee -- he was evaluated at  ER where he was noted to have hemarthrosis of the L knee with a patellar fx. He required 1u pRBC transfusion for severe anemia related to the hemarthrosis. Pt was managed conservatively -- advised to remain in a brace for 6 weeks, expected to return to  in about 2 weeks.  - Currently feeling well and without any concerns  - Uses a rollator to ambulate  - Pt is about to start home health PT twice a week on 4/5    #Anemia:  - Noted incidentally per ER blood work  - At , required 1u pRBC due to severe anemia  - No EtOH consumption    ROS:   Elements of ROS in HPI above    Health Maintenance:  Health Maintenance Due   Topic Date Due    Respiratory Syncytial Virus (RSV) Pregnant or age 60 yrs+ (1 - 1-dose 60+ series) Never done    Pneumococcal 65+ years Vaccine (2 of 2 - PCV) 10/18/2016    COVID-19 Vaccine (4 - 2023-24 season) 09/01/2023    Annual Wellness Visit (Medicare Advantage)  01/01/2024        Past medical history, social history, and medications personally reviewed.  Past Medical History:   Diagnosis Date    A-fib ([facility]) 2015    pacemaker L chest     CAD (coronary artery disease) 2015    Pacemaker    Epilepsy ([facility])     Heart disease     Hypertension

## 2024-04-04 ENCOUNTER — OFFICE VISIT (OUTPATIENT)
Age: 79
End: 2024-04-04
Payer: MEDICARE

## 2024-04-04 VITALS
RESPIRATION RATE: 16 BRPM | BODY MASS INDEX: 24.65 KG/M2 | DIASTOLIC BLOOD PRESSURE: 60 MMHG | HEIGHT: 72 IN | TEMPERATURE: 98.3 F | WEIGHT: 182 LBS | SYSTOLIC BLOOD PRESSURE: 96 MMHG | HEART RATE: 75 BPM | OXYGEN SATURATION: 98 %

## 2024-04-04 DIAGNOSIS — Z91.81 AT HIGH RISK FOR FALLS: ICD-10-CM

## 2024-04-04 DIAGNOSIS — D53.9 MACROCYTIC ANEMIA: ICD-10-CM

## 2024-04-04 DIAGNOSIS — S89.92XA LEFT KNEE INJURY, INITIAL ENCOUNTER: Primary | ICD-10-CM

## 2024-04-04 DIAGNOSIS — Z09 HOSPITAL DISCHARGE FOLLOW-UP: ICD-10-CM

## 2024-04-04 PROCEDURE — 3074F SYST BP LT 130 MM HG: CPT | Performed by: FAMILY MEDICINE

## 2024-04-04 PROCEDURE — 3078F DIAST BP <80 MM HG: CPT | Performed by: FAMILY MEDICINE

## 2024-04-04 PROCEDURE — 99214 OFFICE O/P EST MOD 30 MIN: CPT

## 2024-04-04 PROCEDURE — 1123F ACP DISCUSS/DSCN MKR DOCD: CPT | Performed by: FAMILY MEDICINE

## 2024-04-04 ASSESSMENT — PATIENT HEALTH QUESTIONNAIRE - PHQ9
2. FEELING DOWN, DEPRESSED OR HOPELESS: NOT AT ALL
SUM OF ALL RESPONSES TO PHQ QUESTIONS 1-9: 0
SUM OF ALL RESPONSES TO PHQ QUESTIONS 1-9: 0
SUM OF ALL RESPONSES TO PHQ9 QUESTIONS 1 & 2: 0
SUM OF ALL RESPONSES TO PHQ QUESTIONS 1-9: 0
1. LITTLE INTEREST OR PLEASURE IN DOING THINGS: NOT AT ALL
SUM OF ALL RESPONSES TO PHQ QUESTIONS 1-9: 0

## 2024-04-04 NOTE — PROGRESS NOTES
Room 20    Patient is accompanied by  caregiver . I have received verbal consent from Jack Shukla to discuss any/all medical information while they are present in the room.    Identified pt with two pt identifiers(name and ). Reviewed record in preparation for visit and have obtained necessary documentation.  Chief Complaint   Patient presents with    Follow-Up from Hospital        Health Maintenance Due   Topic    Respiratory Syncytial Virus (RSV) Pregnant or age 60 yrs+ (1 - 1-dose 60+ series)    Pneumococcal 65+ years Vaccine (2 of 2 - PCV)    COVID-19 Vaccine ( season)    Annual Wellness Visit (Medicare Advantage)        Vitals:    24 0825   BP: 96/60   Site: Left Upper Arm   Position: Sitting   Cuff Size: Large Adult   Pulse: 75   Resp: 16   Temp: 98.3 °F (36.8 °C)   TempSrc: Temporal   SpO2: 98%   Weight: 82.6 kg (182 lb)   Height: 1.829 m (6')       Social Determinants Of Health:       SDOH screening not required at visit.  Resources Declined.   See AVS for attached resources, if requested.    Coordination of Care Questionnaire:       \"Have you been to the ER, urgent care clinic since your last visit?  Hospitalized since your last visit?\"    YES - When: approximately 2024 ago.  Where and Why: [facility] / leg pain and edema ( fx left patella).    “Have you seen or consulted any other health care providers outside of [facility] since your last visit?”    NO            Click Here for Release of Records Request

## 2024-04-05 LAB
BASOPHILS # BLD: 0 K/UL (ref 0–0.1)
BASOPHILS NFR BLD: 1 % (ref 0–1)
DIFFERENTIAL METHOD BLD: ABNORMAL
EOSINOPHIL # BLD: 0.1 K/UL (ref 0–0.4)
EOSINOPHIL NFR BLD: 3 % (ref 0–7)
ERYTHROCYTE [DISTWIDTH] IN BLOOD BY AUTOMATED COUNT: 16.7 % (ref 11.5–14.5)
FOLATE SERPL-MCNC: 7.6 NG/ML (ref 5–21)
HCT VFR BLD AUTO: 37.2 % (ref 36.6–50.3)
HGB BLD-MCNC: 11.7 G/DL (ref 12.1–17)
IMM GRANULOCYTES # BLD AUTO: 0 K/UL (ref 0–0.04)
IMM GRANULOCYTES NFR BLD AUTO: 0 % (ref 0–0.5)
LYMPHOCYTES # BLD: 1.6 K/UL (ref 0.8–3.5)
LYMPHOCYTES NFR BLD: 29 % (ref 12–49)
MCH RBC QN AUTO: 33.1 PG (ref 26–34)
MCHC RBC AUTO-ENTMCNC: 31.5 G/DL (ref 30–36.5)
MCV RBC AUTO: 105.4 FL (ref 80–99)
MONOCYTES # BLD: 0.5 K/UL (ref 0–1)
MONOCYTES NFR BLD: 9 % (ref 5–13)
NEUTS SEG # BLD: 3.2 K/UL (ref 1.8–8)
NEUTS SEG NFR BLD: 58 % (ref 32–75)
NRBC # BLD: 0 K/UL (ref 0–0.01)
NRBC BLD-RTO: 0 PER 100 WBC
PERIPHERAL SMEAR, MD REVIEW: NORMAL
PLATELET # BLD AUTO: 233 K/UL (ref 150–400)
PMV BLD AUTO: 9.9 FL (ref 8.9–12.9)
RBC # BLD AUTO: 3.53 M/UL (ref 4.1–5.7)
VIT B12 SERPL-MCNC: 981 PG/ML (ref 193–986)
WBC # BLD AUTO: 5.5 K/UL (ref 4.1–11.1)

## 2024-04-17 ENCOUNTER — ANTI-COAG VISIT (OUTPATIENT)
Facility: HOSPITAL | Age: 79
End: 2024-04-17
Payer: MEDICARE

## 2024-04-17 DIAGNOSIS — I48.91 ATRIAL FIBRILLATION, UNSPECIFIED TYPE (HCC): Primary | ICD-10-CM

## 2024-04-17 LAB — INTERNATIONAL NORMALIZATION RATIO, POC: 1.3 (ref 2–3)

## 2024-04-17 PROCEDURE — 99213 OFFICE O/P EST LOW 20 MIN: CPT

## 2024-04-17 PROCEDURE — 85610 PROTHROMBIN TIME: CPT

## 2024-04-17 RX ORDER — WARFARIN SODIUM 5 MG/1
TABLET ORAL
Qty: 30 TABLET | Refills: 0 | Status: SHIPPED | OUTPATIENT
Start: 2024-04-17

## 2024-04-17 NOTE — PATIENT INSTRUCTIONS
supplement drinks.  Avoid cranberry juice and other cranberry products. They can increase the effects of warfarin.  Limit your use of alcohol.    Avoid bleeding by preventing falls and injuries  Wear slippers or shoes with nonskid soles.  Remove throw rugs and clutter.  Rearrange furniture and electrical cords to keep them out of walking paths.  Keep stairways, porches, and outside walkways well lit. Use night-lights in hallways and bathrooms.  Be extra careful when you work with sharp tools or knives.    When should you call for help?  Call 911 anytime you think you may need emergency care. For example, call if:  You have a sudden, severe headache that is different from past headaches.  Call your doctor now or seek immediate medical care if:  You have any abnormal bleeding, such as:  Nosebleeds.  Vaginal bleeding that is different (heavier, more frequent, at a different time of the month) than what you are used to.  Bloody or black stools, or rectal bleeding.  Bloody or pink urine.  Watch closely for changes in your health, and be sure to contact your doctor if you have any problems.    Where can you learn more?  Go to http://www.[employer].net/[employer].  Enter N655 in the search box to learn more about \"Taking Warfarin Safely: Care Instructions.\"  Current as of: January 27, 2016  Content Version: 11.1  © 6807-9381 [employer]. Care instructions adapted under license by [employer] (which disclaims liability or warranty for this information). If you have questions about a medical condition or this instruction, always ask your healthcare professional. [employer] disclaims any warranty or liability for your use of this information.

## 2024-04-17 NOTE — PROGRESS NOTES
carried out.  A full discussion of the need for frequent and regular monitoring, precise dosage adjustment and compliance was stressed.  Side effects of potential bleeding were discussed and Mr. Shukla was instructed to call [phone] if there are any signs of abnormal bleeding.  Mr. Shukla was instructed to avoid any OTC items containing aspirin or ibuprofen and prior to starting any new OTC products to consult with his physician or pharmacist to ensure no drug interactions are present.  Mr. Shukla was instructed to avoid any major changes in his general diet and to avoid alcohol consumption.    Mr. Shukla was provided information in the AVS that includes topics on understanding coumadin therapy, drug interaction considerations, vitamin K and coumadin use, interactions with foods and supplements containing vitamin K, and the use of herbal products.    Mr. Shukla verbalized his understanding of all instructions and will call the office with any questions, concerns, or signs of abnormal bleeding or blood clot.  Notifications of recommendations will be sent to provider Mckayla Alfred NP for review.    Thank you,  Jason Joshua [facility]      For Pharmacy Admin Tracking Only    Intervention Detail: Adherence Monitorin, Dose Adjustment: 1, reason: Therapy Optimization, Lab(s) Ordered, and Refill(s) Provided  Total # of Interventions Recommended: 4  Total # of Interventions Accepted: 4  Time Spent (min): 30

## 2024-04-19 ENCOUNTER — OFFICE VISIT (OUTPATIENT)
Age: 79
End: 2024-04-19
Payer: MEDICARE

## 2024-04-19 VITALS
HEIGHT: 72 IN | RESPIRATION RATE: 16 BRPM | BODY MASS INDEX: 24.65 KG/M2 | SYSTOLIC BLOOD PRESSURE: 118 MMHG | DIASTOLIC BLOOD PRESSURE: 60 MMHG | WEIGHT: 182 LBS | OXYGEN SATURATION: 98 % | HEART RATE: 74 BPM

## 2024-04-19 DIAGNOSIS — H61.23 BILATERAL IMPACTED CERUMEN: ICD-10-CM

## 2024-04-19 DIAGNOSIS — H91.93 DECREASED HEARING, BILATERAL: Primary | ICD-10-CM

## 2024-04-19 DIAGNOSIS — F79 INTELLECTUAL DISABILITY: ICD-10-CM

## 2024-04-19 PROCEDURE — 99203 OFFICE O/P NEW LOW 30 MIN: CPT | Performed by: OTOLARYNGOLOGY

## 2024-04-19 PROCEDURE — 3074F SYST BP LT 130 MM HG: CPT | Performed by: OTOLARYNGOLOGY

## 2024-04-19 PROCEDURE — 69210 REMOVE IMPACTED EAR WAX UNI: CPT | Performed by: OTOLARYNGOLOGY

## 2024-04-19 PROCEDURE — 3078F DIAST BP <80 MM HG: CPT | Performed by: OTOLARYNGOLOGY

## 2024-04-19 PROCEDURE — 1123F ACP DISCUSS/DSCN MKR DOCD: CPT | Performed by: OTOLARYNGOLOGY

## 2024-04-19 RX ORDER — LANOLIN ALCOHOL/MO/W.PET/CERES
400 CREAM (GRAM) TOPICAL DAILY
Qty: 30 TABLET | Refills: 12 | Status: SHIPPED | OUTPATIENT
Start: 2024-04-19

## 2024-04-19 NOTE — PROGRESS NOTES
Relation Age of Onset    Cancer Mother         unsure type    Heart Disease Father     Lung Disease Father     No Known Problems Sister     Other Other         unable to obtain due to mental status    Heart Attack Father        Review of Systems:    Consitutional: denies fever, excessive weight gain or loss.  Eyes: denies diplopia, eye pain.  Integumentary: denies new concerning skin lesions.  Ears, Nose, Mouth, Throat: denies except as per HPI.  Endocrine: denies hot or cold intolerance, increased thirst.  Respiratory: denies cough, hemoptysis, wheezing  Gastrointestinal: denies trouble swallowing, nausea, emesis, regurgitation  Musculoskeletal: denies muscle weakness or wasting  Cardiovascular: denies chest pain, shortness of breath  Neurologic: denies seizures, numbness or tingling, syncope  Hematologic: denies easy bleeding or bruising    Physical Examination  There were no vitals taken for this visit.    General: Comfortable, pleasant, appears stated age  Voice: Strong, speaking in full sentences, no stridor    Face: No masses or lesions, facial strength symmetric   Ears: External ears unremarkable. Bilateral moderate non obstructive skin and cerumen, following debridement, Tympanic membrane clear and intact, with visible landmarks. Clear middle ear space  Nose: External nose unremarkable. Dorsum midline. Anterior rhinoscopy demonstrates no lesions. Septum midline. Turbinates without hypertrophy.  Oral Cavity / Oropharynx: No trismus. Mucosa pink and moist. No lesions. Tongue is midline and mobile. Palate elevates symmetrically. Uvula midline. Tonsils unremarkable. Base of tongue soft. Floor of mouth soft.   Neck: Supple. No adenopathy. Thyroid unremarkable. Palpable laryngeal landmarks. Full neck range of motion   Neurologic: CN II - XI intact. Normal gait    PROCEDURE: BILATERAL MICROSCOPY WITH CERUMEN DEBRIDEMENT    Using the microscope, both ears were examined. A  5 Fr suction and alligator were used to

## 2024-04-23 ENCOUNTER — OFFICE VISIT (OUTPATIENT)
Age: 79
End: 2024-04-23
Payer: MEDICARE

## 2024-04-23 VITALS
OXYGEN SATURATION: 96 % | SYSTOLIC BLOOD PRESSURE: 92 MMHG | TEMPERATURE: 98.3 F | DIASTOLIC BLOOD PRESSURE: 53 MMHG | HEART RATE: 75 BPM | WEIGHT: 191 LBS | BODY MASS INDEX: 25.87 KG/M2 | RESPIRATION RATE: 15 BRPM | HEIGHT: 72 IN

## 2024-04-23 DIAGNOSIS — S89.92XD INJURY OF LEFT KNEE, SUBSEQUENT ENCOUNTER: ICD-10-CM

## 2024-04-23 DIAGNOSIS — L89.892 PRESSURE INJURY OF LEFT KNEE, STAGE 2 (HCC): Primary | ICD-10-CM

## 2024-04-23 PROCEDURE — 99213 OFFICE O/P EST LOW 20 MIN: CPT

## 2024-04-23 PROCEDURE — 3074F SYST BP LT 130 MM HG: CPT

## 2024-04-23 PROCEDURE — 1123F ACP DISCUSS/DSCN MKR DOCD: CPT

## 2024-04-23 PROCEDURE — 3078F DIAST BP <80 MM HG: CPT

## 2024-04-23 NOTE — PROGRESS NOTES
I reviewed with the resident the medical history and the resident's findings on the physical examination.  I discussed with the resident the patient's diagnosis and concur with the plan.    
Jack Shukla is a 79 y.o. male    Chief Complaint   Patient presents with    Follow-Up from Hospital     And wound care       \"Have you been to the ER, urgent care clinic since your last visit?  Hospitalized since your last visit?\"    YES - When: approximately 2 days ago.  Where and Why: Would on left leg.    “Have you seen or consulted any other health care providers outside of [facility] since your last visit?”    NO              Vitals:    04/23/24 1015   BP: (!) 92/53   Pulse: 75   Resp: 15   Temp: 98.3 °F (36.8 °C)   SpO2: 96%          No data to display              Health Maintenance Due   Topic Date Due    Respiratory Syncytial Virus (RSV) Pregnant or age 60 yrs+ (1 - 1-dose 60+ series) Never done    Pneumococcal 65+ years Vaccine (2 of 2 - PCV) 10/18/2016    COVID-19 Vaccine (4 - 2023-24 season) 09/01/2023    Annual Wellness Visit (Medicare Advantage)  01/01/2024       
procedure 10/23/2015    10/23/15 [employer] biventricular pacemaker inmplant    Screen for colon cancer 9/27/2012       Current Medications  Current Outpatient Medications   Medication Sig Dispense Refill    magnesium oxide (MAG-OX) 400 (240 Mg) MG tablet TAKE 1 TABLET BY MOUTH DAILY 30 tablet 12    warfarin (COUMADIN) 5 MG tablet Take 1 tablet (5 mg) by mouth every day. 30 tablet 0    vitamin B-12 (CYANOCOBALAMIN) 1000 MCG tablet TAKE ONE TABLET BY MOUTH EVERY DAY 31 tablet 11    potassium chloride (KLOR-CON M) 20 MEQ extended release tablet TAKE 1 TABLET BY MOUTH DAILY 31 tablet 11    Compression Stockings MISC by Does not apply route Wear thigh high compression stockings every night 2 each 0    ammonium lactate (LAC-HYDRIN) 12 % lotion Apply to affected areas of dry skin on both feet twice daily      erythromycin with ethanol (THERAMYCIN) 2 % external solution Apply to affected areas between toes once daily      metoprolol tartrate (LOPRESSOR) 25 MG tablet Take 1 tablet by mouth 2 times daily 60 tablet 11    polyethylene glycol (GLYCOLAX) 17 GM/SCOOP powder MIX 1 CAPFUL (17gm - UP TO LINE IN CAP) IN WATER AND DRINK ONCE DAILY AS NEEDED FOR CONSTIPATION 238 g 12    acetaminophen (TYLENOL) 325 MG tablet Take 2 tablets by mouth every 4 hours as needed for Pain or Fever 120 tablet 5    vitamin D (CHOLECALCIFEROL) 25 MCG (1000 UT) TABS tablet Take 1 tablet by mouth daily 30 tablet 12    Incontinence Supply Disposable (DISPOSABLE BRIEF X-LARGE) MISC Use for urinary incontinence 96 each 12    lacosamide (VIMPAT) 100 MG TABS tablet Take 1 tablet by mouth 2 times daily for 180 days. Max Daily Amount: 200 mg 60 tablet 5    levETIRAcetam (KEPPRA) 1000 MG tablet Take 1 tablet by mouth 2 times daily 180 tablet 3    bumetanide (BUMEX) 2 MG tablet Take 2 tablets by mouth daily for 2 days, THEN 1 tablet daily for 28 days. 30 tablet 0    amiodarone (CORDARONE) 200 MG tablet TAKE 1/2 TABLET (= 100mg) BY MOUTH DAILY.

## 2024-04-23 NOTE — PATIENT INSTRUCTIONS
- Cleaned wound with saline, dressed wound with non-stick pad, wrapped in gauze  - Leave in place until visit tomorrow with wound care  - I did not debride this wound although with obvious eschar that needs debridement, I will defer to wound specialists  - This patient should not walk extensively for at least the next 1-2 months. Please follow up with ortho as soon as possible  - Please follow up with myself within the next week or so    Any signs of fever, chills, worsening infection: please go to ER

## 2024-04-26 ENCOUNTER — TELEPHONE (OUTPATIENT)
Facility: HOSPITAL | Age: 79
End: 2024-04-26

## 2024-04-26 NOTE — TELEPHONE ENCOUNTER
Medication Management Clinic - Reschedule Appointment    [facility] pharmacy left a message for the [facility] stating the patient was started on doxycyline and ciprofloxacin. Contacted the group home representative (Loc Cramer) and she confirmed the initiation of antibiotics. Requested to have patient's appointment moved to Monday, April 29, 2024. Loc Cramer agreed with the change.     Also, inquired if patient was taking his warfarin therapy correctly prior to the last INR check. Loc Cramer reported the patient was taking the medication correctly and it was documented as given on the patient's paper MAR.     Thank you.  Jason Joshua, PharmD    For Pharmacy Admin Tracking Only    Program: Medication Management  CPA in place:  Yes  Recommendation Provided To: Patient/Caregiver: 1 via Telephone  Intervention Detail: Scheduled Appointment  Intervention Accepted By: Patient/Caregiver: 1  Time Spent (min): 15

## 2024-04-26 NOTE — PATIENT INSTRUCTIONS
Today your INR was 1.4 .      Your goal INR is  2.0-3.0 .    You have a   2.5 mg and 5 mg tablet of Coumadin (warfarin).   Take Coumadin (warfarin) as follows:    Take 5 mg every day.      Come back in 2 week(s) for your next finger stick/INR blood test.        Avoid any over the counter items containing aspirin or ibuprofen, and avoid great swings in general diet.  Avoid alcohol consumption.  Please notify the INR pharmacist if you are started on any new medication including over the counter or herbal supplements. Also, please notify your INR pharmacist if any of your other prescription or over the counter medications have been discontinued.     Call [facility]ication Management [facility] at [phone] if you have any signs of abnormal bleeding/blood clot.  ------------------------------------------------------------------------------------------------------------------  Taking Warfarin Safely: Care Instructions    Your Care Instructions  Warfarin is a medicine that you take to prevent blood clots. It is often called a blood thinner. Doctors give warfarin (such as Coumadin) to reduce the risk of blood clots. You may be at risk for blood clots if you have atrial fibrillation or deep vein thrombosis. Some other health problems may also put you at risk.  Warfarin slows the amount of time it takes for your blood to clot. It can cause bleeding problems. Even if you've been taking warfarin for a while, it's important to know how to take it safely.  Foods and other medicines can affect the way warfarin works. Some can make warfarin work too well. This can cause bleeding problems. And some can make it work poorly, so that it does not prevent blood clots very well.  You will need regular blood tests to check how long it takes for your blood to form a clot. This test is called a PT or prothrombin time test. The result of the test is called an INR level. Depending on the test results, your doctor or

## 2024-04-26 NOTE — PROGRESS NOTES
Pharmacy Progress Note - Anticoagulation Management    S/O:  Mr. Jack Shukla  is a 79 y.o. male seen today for anticoagulation management for the diagnosis of Atrial Fibrillation.     HPI: At last visit (4/17), the patient's INR was 1.3 and subtherapeutic. Patient reported consistent intake of vitamin K foods. During the patient's visit, Loc Cramer (group home representative) was contacted via phone. Loc reported patient had received all warfarin doses appropriately and she would email a copy of the MAR to the pharmacist. Over the past 7 days, patient has taken 30 mg of warfarin. Patient will increase weekly dose from 30 mg to 35 mg (~17%) and patient will take warfarin 5 mg daily. Patient will recheck INR in 2 week(s). During the visit, the importance of reporting/stating if patient's warfarin dose has been administered correctly was emphasized.      Interim History:    Warfarin start date: Prior to 10/9/20 per chart review  INR Goal:  2.0-3.0    Current warfarin regimen: 5 mg daily  Warfarin tablet strength:   2.5 mg, 5 mg  Duration of therapy: Indefinite      Results for orders placed or performed in visit on 04/29/24   POCT INR   Result Value Ref Range    INR,(POC) 1.4 (A) 2 - 3       Today's pertinent positives includes:  No significant changes since last visit      Adherence:   Able to recall regimen? YES - Warfarin regimen was entered correctly on the patient's MAR   Miss/extra dose? NO  Need refill? NO    Upcoming procedure(s):  N/A    Past Medical History:   Diagnosis Date    A-fib ([facility]) 2015    pacemaker L chest     CAD (coronary artery disease) 2015    Pacemaker    Epilepsy ([facility])     Heart disease     Hypertension     Incontinence     Leg swelling     Mental retardation, 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)     Other ill-defined conditions(799.89)     edema legs    S/P ablation of atrial fibrillation 11/13/2020    S/P biventricular cardiac pacemaker procedure 10/23/2015    10/23/15 [employer] biventricular

## 2024-04-29 ENCOUNTER — ANTI-COAG VISIT (OUTPATIENT)
Facility: HOSPITAL | Age: 79
End: 2024-04-29
Payer: MEDICARE

## 2024-04-29 LAB — INTERNATIONAL NORMALIZATION RATIO, POC: 1.4 (ref 2–3)

## 2024-05-14 NOTE — PATIENT INSTRUCTIONS
Today your INR was 2.4 .      Your goal INR is  2.0-3.0 .    You have a   2.5 mg and 5 mg tablet of Coumadin (warfarin).   Take Coumadin (warfarin) as follows:    Take 5 mg every day.      Come back in 2 week(s) for your next finger stick/INR blood test.        Avoid any over the counter items containing aspirin or ibuprofen, and avoid great swings in general diet.  Avoid alcohol consumption.  Please notify the INR pharmacist if you are started on any new medication including over the counter or herbal supplements. Also, please notify your INR pharmacist if any of your other prescription or over the counter medications have been discontinued.     Call [facility] Medication Management [facility] at [phone] if you have any signs of abnormal bleeding/blood clot.  ------------------------------------------------------------------------------------------------------------------  Taking Warfarin Safely: Care Instructions    Your Care Instructions  Warfarin is a medicine that you take to prevent blood clots. It is often called a blood thinner. Doctors give warfarin (such as Coumadin) to reduce the risk of blood clots. You may be at risk for blood clots if you have atrial fibrillation or deep vein thrombosis. Some other health problems may also put you at risk.  Warfarin slows the amount of time it takes for your blood to clot. It can cause bleeding problems. Even if you've been taking warfarin for a while, it's important to know how to take it safely.  Foods and other medicines can affect the way warfarin works. Some can make warfarin work too well. This can cause bleeding problems. And some can make it work poorly, so that it does not prevent blood clots very well.  You will need regular blood tests to check how long it takes for your blood to form a clot. This test is called a PT or prothrombin time test. The result of the test is called an INR level. Depending on the test results, your doctor or

## 2024-05-14 NOTE — PROGRESS NOTES
decrease INR: None    A/P:       Anticoagulation:  Considering Mr. Shukla's past history, todays findings, and per Anticoagulation Collaborative Practice Agreement/Protocol:    Fingerstick POC INR (2.4) is Therapeutic for INR goal today.   Continue warfarin 5 mg daily  Patient's INR is 2.4 and therapeutic. Patient's MAR reflected warfarin was administered as instructed and the MAR showed no medication changes. Group home representative (Loc Cramer) reports the patient's diet was consistent with vitamin K foods. Patient will continue current warfarin regimen of 5 mg daily and recheck INR in 2 weeks.       Patient was instructed to schedule an appointment in 2  week(s) prior to leaving the clinic.    Medication reconciliation was completed during the visit.    There are no discontinued medications.          A full discussion of the nature of anticoagulants has been carried out.  A full discussion of the need for frequent and regular monitoring, precise dosage adjustment and compliance was stressed.  Side effects of potential bleeding were discussed and Mr. Shukla was instructed to call [phone] if there are any signs of abnormal bleeding.  Mr. Shukla was instructed to avoid any OTC items containing aspirin or ibuprofen and prior to starting any new OTC products to consult with his physician or pharmacist to ensure no drug interactions are present.  Mr. Shukla was instructed to avoid any major changes in his general diet and to avoid alcohol consumption.    Mr. Shukla was provided information in the AVS that includes topics on understanding coumadin therapy, drug interaction considerations, vitamin K and coumadin use, interactions with foods and supplements containing vitamin K, and the use of herbal products.    Mr. Shukla verbalized his understanding of all instructions and will call the office with any questions, concerns, or signs of abnormal bleeding or blood clot.  Notifications of

## 2024-05-15 ENCOUNTER — ANTI-COAG VISIT (OUTPATIENT)
Facility: HOSPITAL | Age: 79
End: 2024-05-15
Payer: MEDICARE

## 2024-05-15 DIAGNOSIS — I48.91 ATRIAL FIBRILLATION, UNSPECIFIED TYPE (HCC): Primary | ICD-10-CM

## 2024-05-15 DIAGNOSIS — G40.909 SEIZURE DISORDER (HCC): ICD-10-CM

## 2024-05-15 LAB — INTERNATIONAL NORMALIZATION RATIO, POC: 2.4 (ref 2–3)

## 2024-05-15 PROCEDURE — 85610 PROTHROMBIN TIME: CPT

## 2024-05-15 PROCEDURE — 99211 OFF/OP EST MAY X REQ PHY/QHP: CPT

## 2024-05-15 RX ORDER — LACOSAMIDE 100 MG/1
100 TABLET ORAL 2 TIMES DAILY
Qty: 60 TABLET | Refills: 5 | Status: SHIPPED | OUTPATIENT
Start: 2024-05-15 | End: 2024-11-11

## 2024-05-20 RX ORDER — LEVETIRACETAM 1000 MG/1
1000 TABLET ORAL 2 TIMES DAILY
Qty: 180 TABLET | Refills: 0 | Status: SHIPPED | OUTPATIENT
Start: 2024-05-20 | End: 2024-05-24 | Stop reason: SDUPTHER

## 2024-05-23 ASSESSMENT — ENCOUNTER SYMPTOMS
ALLERGIC/IMMUNOLOGIC NEGATIVE: 1
GASTROINTESTINAL NEGATIVE: 1
RESPIRATORY NEGATIVE: 1

## 2024-05-23 NOTE — TELEPHONE ENCOUNTER
Pharmacist from [facility] called requesting a refill on Warfarin 5 mg. Would like for prescription to be sent at your earliest convenience.    Thanks!

## 2024-05-24 ENCOUNTER — OFFICE VISIT (OUTPATIENT)
Age: 79
End: 2024-05-24
Payer: MEDICARE

## 2024-05-24 VITALS
OXYGEN SATURATION: 99 % | BODY MASS INDEX: 25.79 KG/M2 | RESPIRATION RATE: 15 BRPM | DIASTOLIC BLOOD PRESSURE: 60 MMHG | HEART RATE: 75 BPM | SYSTOLIC BLOOD PRESSURE: 100 MMHG | WEIGHT: 190.4 LBS | HEIGHT: 72 IN | TEMPERATURE: 97.5 F

## 2024-05-24 DIAGNOSIS — G40.909 SEIZURE DISORDER (HCC): Primary | ICD-10-CM

## 2024-05-24 DIAGNOSIS — R26.9 GAIT DISORDER: ICD-10-CM

## 2024-05-24 DIAGNOSIS — R25.1 TREMOR: ICD-10-CM

## 2024-05-24 DIAGNOSIS — F79 INTELLECTUAL DISABILITY: ICD-10-CM

## 2024-05-24 PROCEDURE — 3074F SYST BP LT 130 MM HG: CPT

## 2024-05-24 PROCEDURE — 99215 OFFICE O/P EST HI 40 MIN: CPT

## 2024-05-24 PROCEDURE — 1123F ACP DISCUSS/DSCN MKR DOCD: CPT

## 2024-05-24 PROCEDURE — 3078F DIAST BP <80 MM HG: CPT

## 2024-05-24 RX ORDER — LEVETIRACETAM 1000 MG/1
1000 TABLET ORAL 2 TIMES DAILY
Qty: 180 TABLET | Refills: 4 | Status: SHIPPED | OUTPATIENT
Start: 2024-05-24

## 2024-05-24 ASSESSMENT — PATIENT HEALTH QUESTIONNAIRE - PHQ9
SUM OF ALL RESPONSES TO PHQ QUESTIONS 1-9: 0
SUM OF ALL RESPONSES TO PHQ QUESTIONS 1-9: 0
SUM OF ALL RESPONSES TO PHQ9 QUESTIONS 1 & 2: 0
SUM OF ALL RESPONSES TO PHQ QUESTIONS 1-9: 0
SUM OF ALL RESPONSES TO PHQ QUESTIONS 1-9: 0
1. LITTLE INTEREST OR PLEASURE IN DOING THINGS: NOT AT ALL
2. FEELING DOWN, DEPRESSED OR HOPELESS: NOT AT ALL

## 2024-05-24 NOTE — PATIENT INSTRUCTIONS
As per our discussion,    Overall, you seem stable.    I will make any changes on your medication at this time.  I encourage you to continue Keppra 1000 mg daily and Vimpat 100 mg twice a day.  You have enough prescription for Vimpat and I just went ahead and sent addition of prescription for Keppra to your pharmacy.    I also encourage you to continue to follow-up with your primary care provider and your other specialist for the comorbid conditions as appropriate.    Continue to work with physical therapy and make sure to use your rollator to walk to prevent future falls.    For seizure safety:  - No driving, height/ladders, tub baths, pools, bodies of water, power tools until seizure free x 6 months  - If you have another seizure and the shaking last under 5 minutes, you are not injured, and you return to yourself quickly, no need to call EMS, just call my office at the number above.    - If you have  A seizure and the shaking lasts longer than 5 minutes, you are hurt or you have multiple seizures back to back without returning to yourself then call EMS.    The [facility]'s Seizure Policy states that a person must be seizure-free or blackout-free for at least six months before driving.     If a person is currently licensed and [facility] is notified that the person has experienced a seizure, loss of consciousness or blackout, [facility] will suspend the person's driving privilege for a period of six months from the date of the last episode.     It was a pleasure to see you and meet your  today    Will see you back in a year or sooner if needed    Please do not hesitate to reach out for any questions or concerns

## 2024-05-24 NOTE — PROGRESS NOTES
[facility] NEUROLOGY CLINIC  In Office FOLLOW-UP VISIT         Jack Shukla is a 79 y.o. male who presents today for the following:  Chief Complaint   Patient presents with    Follow-up     Patient present for seizures and has not had any in the past year.          ASSESSMENT AND PLAN  Patient is known to this practice.  This is my first time seeing the patient.  Chart and history reviewed in detail at today's office visit.    1. Seizure disorder (HCC)  Assessment & Plan:    Stable    Well-controlled on Keppra 1000 mg twice a day.  He denies any side effects.    Last seizure was over 3 years ago ( 2021).     Patient continued to take Vimpat 100 mg twice a day although it has been stopped multiple times by neurology, but ,has been restarted by primary care provider. Patient was to reach out to primary care provider to see why he was put on Vimpat, but he came here today and was still on it.     Given his seizures have been stable and patient denied any side effects from his medications,  we will not make any changes on his medications at this time.  He is to continue Keppra 1000 mg twice a day and Vimpat 100 mg twice a day.     Prescription for Keppra and refill was sent to pharmacy. Vimpat has been managed by PCP.    For seizure safety:   - No driving, height/ladders, tub baths, pools, bodies of water, power tools until seizure free x 6 months  - If you have another seizure and the shaking last under 5 minutes, you are not injured, and you return to yourself quickly, no need to call EMS, just call my office at the number above.    - If you have  A seizure and the shaking lasts longer than 5 minutes, you are hurt or you have multiple seizures back to back without returning to yourself then call EMS.  Orders:  -     levETIRAcetam (KEPPRA) 1000 MG tablet; Take 1 tablet by mouth 2 times daily, Disp-180 tablet, R-4Normal  2. Gait disorder  Assessment & Plan:   Stable    He denied any falls since

## 2024-05-25 NOTE — ASSESSMENT & PLAN NOTE
Stable    He denied any falls since his last one on March 2024.    He is to continue to follow-up with home physical therapy as prescribed and he is to continue to use his rollator to prevent future falls.    Fall safety was discussed with patient.

## 2024-05-25 NOTE — ASSESSMENT & PLAN NOTE
Stable    Well-controlled on Keppra 1000 mg twice a day.  He denies any side effects.    Last seizure was over 3 years ago ( 2021).     Patient continued to take Vimpat 100 mg twice a day although it has been stopped multiple times by neurology, but ,has been restarted by primary care provider. Patient was to reach out to primary care provider to see why he was put on Vimpat, but he came here today and was still on it.     Given his seizures have been stable and patient denied any side effects from his medications,  we will not make any changes on his medications at this time.  He is to continue Keppra 1000 mg twice a day and Vimpat 100 mg twice a day.     Prescription for Keppra and refill was sent to pharmacy. Vimpat has been managed by PCP.    For seizure safety:   - No driving, height/ladders, tub baths, pools, bodies of water, power tools until seizure free x 6 months  - If you have another seizure and the shaking last under 5 minutes, you are not injured, and you return to yourself quickly, no need to call EMS, just call my office at the number above.    - If you have  A seizure and the shaking lasts longer than 5 minutes, you are hurt or you have multiple seizures back to back without returning to yourself then call EMS.

## 2024-05-25 NOTE — ASSESSMENT & PLAN NOTE
Tremors were noted mostly on the right hand and head.    Not bothersome to patient.    He also had some parkinsonism symptoms. Per previous report, patient was diagnosed with tardive dyskinesia.    No interventions at this time from neurological standpoint, we will continue to monitor patient for this.

## 2024-05-28 RX ORDER — WARFARIN SODIUM 5 MG/1
TABLET ORAL
Qty: 31 TABLET | Refills: 12 | Status: SHIPPED | OUTPATIENT
Start: 2024-05-28

## 2024-05-30 ENCOUNTER — ANTI-COAG VISIT (OUTPATIENT)
Facility: HOSPITAL | Age: 79
End: 2024-05-30
Payer: MEDICARE

## 2024-05-30 DIAGNOSIS — I48.91 ATRIAL FIBRILLATION, UNSPECIFIED TYPE (HCC): Primary | ICD-10-CM

## 2024-05-30 LAB — INTERNATIONAL NORMALIZATION RATIO, POC: 1.9 (ref 2–3)

## 2024-05-30 PROCEDURE — 99211 OFF/OP EST MAY X REQ PHY/QHP: CPT

## 2024-05-30 PROCEDURE — 85610 PROTHROMBIN TIME: CPT

## 2024-05-30 NOTE — PATIENT INSTRUCTIONS
Today your INR was 1.9 .      Your goal INR is  2.0-3.0 .    You have a   2.5 mg and 5 mg tablet of Coumadin (warfarin).   Take Coumadin (warfarin) as follows:    Take 5 mg every day.      Come back in 3 week(s) for your next finger stick/INR blood test.        Avoid any over the counter items containing aspirin or ibuprofen, and avoid great swings in general diet.  Avoid alcohol consumption.  Please notify the INR pharmacist if you are started on any new medication including over the counter or herbal supplements. Also, please notify your INR pharmacist if any of your other prescription or over the counter medications have been discontinued.     Call [facility] Medication Management [facility] at [phone] if you have any signs of abnormal bleeding/blood clot.  ------------------------------------------------------------------------------------------------------------------  Taking Warfarin Safely: Care Instructions    Your Care Instructions  Warfarin is a medicine that you take to prevent blood clots. It is often called a blood thinner. Doctors give warfarin (such as Coumadin) to reduce the risk of blood clots. You may be at risk for blood clots if you have atrial fibrillation or deep vein thrombosis. Some other health problems may also put you at risk.  Warfarin slows the amount of time it takes for your blood to clot. It can cause bleeding problems. Even if you've been taking warfarin for a while, it's important to know how to take it safely.  Foods and other medicines can affect the way warfarin works. Some can make warfarin work too well. This can cause bleeding problems. And some can make it work poorly, so that it does not prevent blood clots very well.  You will need regular blood tests to check how long it takes for your blood to form a clot. This test is called a PT or prothrombin time test. The result of the test is called an INR level. Depending on the test results, your doctor or

## 2024-05-30 NOTE — PROGRESS NOTES
Pharmacy Progress Note - Anticoagulation Management    S/O:  Mr. Jack Shukla  is a 79 y.o. male seen today for anticoagulation management for the diagnosis of Atrial Fibrillation.     HPI: At last visit (5/15), the patient's INR was 2.4 and therapeutic. Patient's MAR reflected warfarin was administered as instructed and the MAR showed no medication changes. Group home representative (Loc Cramer) reported the patient's diet was consistent with vitamin K foods. Patient will continue current warfarin regimen of 5 mg daily and recheck INR in 2 weeks.      Interim History:    Warfarin start date: Prior to 10/9/20 per chart review  INR Goal:  2.0-3.0    Current warfarin regimen: 5 mg daily  Warfarin tablet strength:   2.5 mg, 5 mg  Duration of therapy: Indefinite      Results for orders placed or performed in visit on 05/30/24   POCT INR   Result Value Ref Range    INR,(POC) 1.9 (A) 2 - 3       Today's pertinent positives includes:  Medication change    Patient's MAR did not reflect amiodarone as an active order (confirmed with [facility] pharmacy that amiodarone was discontinued). Updated patient's medication list.    Adherence:   Able to recall regimen? YES - Warfarin regimen was entered correctly on the patient's MAR   Miss/extra dose? YES  Need refill? NO    Patient's MAR reflected one dose of warfarin was missed on Sunday (5/26).    Upcoming procedure(s):  N/A    Past Medical History:   Diagnosis Date    A-fib (HCC) 2015    pacemaker L chest     CAD (coronary artery disease) 2015    Pacemaker    Epilepsy (HCC)     Heart disease     Hypertension     Incontinence     Leg swelling     Mental retardation, 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)     Other ill-defined conditions(799.89)     edema legs    S/P ablation of atrial fibrillation 11/13/2020    S/P biventricular cardiac pacemaker procedure 10/23/2015    10/23/15 [employer] biventricular pacemaker inmplant    Screen for colon cancer 9/27/2012     Allergies   Allergen Reactions

## 2024-06-03 ENCOUNTER — OFFICE VISIT (OUTPATIENT)
Age: 79
End: 2024-06-03
Payer: MEDICARE

## 2024-06-03 VITALS
DIASTOLIC BLOOD PRESSURE: 66 MMHG | TEMPERATURE: 97.7 F | SYSTOLIC BLOOD PRESSURE: 103 MMHG | WEIGHT: 195 LBS | BODY MASS INDEX: 26.41 KG/M2 | HEART RATE: 73 BPM | HEIGHT: 72 IN | RESPIRATION RATE: 14 BRPM | OXYGEN SATURATION: 98 %

## 2024-06-03 DIAGNOSIS — S40.021A CONTUSION OF RIGHT UPPER EXTREMITY, INITIAL ENCOUNTER: Primary | ICD-10-CM

## 2024-06-03 PROCEDURE — 3074F SYST BP LT 130 MM HG: CPT | Performed by: STUDENT IN AN ORGANIZED HEALTH CARE EDUCATION/TRAINING PROGRAM

## 2024-06-03 PROCEDURE — 99213 OFFICE O/P EST LOW 20 MIN: CPT | Performed by: STUDENT IN AN ORGANIZED HEALTH CARE EDUCATION/TRAINING PROGRAM

## 2024-06-03 PROCEDURE — 1123F ACP DISCUSS/DSCN MKR DOCD: CPT | Performed by: STUDENT IN AN ORGANIZED HEALTH CARE EDUCATION/TRAINING PROGRAM

## 2024-06-03 PROCEDURE — 3078F DIAST BP <80 MM HG: CPT | Performed by: STUDENT IN AN ORGANIZED HEALTH CARE EDUCATION/TRAINING PROGRAM

## 2024-06-03 NOTE — PROGRESS NOTES
Jack Shukla is a 79 y.o. male    Chief Complaint   Patient presents with    Bleeding/Bruising     On left arm staff had noticed it last night patient declined any pain per patient.       \"Have you been to the ER, urgent care clinic since your last visit?  Hospitalized since your last visit?\"    NO    “Have you seen or consulted any other health care providers outside of [facility] since your last visit?”    NO              Vitals:    06/03/24 1457   BP: 103/66   Pulse: 73   Resp: 14   Temp: 97.7 °F (36.5 °C)   SpO2: 98%          No data to display              Health Maintenance Due   Topic Date Due    Respiratory Syncytial Virus (RSV) Pregnant or age 60 yrs+ (1 - 1-dose 60+ series) Never done    Pneumococcal 65+ years Vaccine (2 of 2 - PCV) 10/18/2016    COVID-19 Vaccine (4 - 2023-24 season) 09/01/2023    Annual Wellness Visit (Medicare Advantage)  01/01/2024

## 2024-06-03 NOTE — PROGRESS NOTES
[facility] Medicine Residency       Chief Complaint:     Chief Complaint   Patient presents with    Bleeding/Bruising     On left arm staff had noticed it last night patient declined any pain per patient.       Subjective:   HPI:  Jack Shukla is a 79 y.o. male that presents for:    R arm bruise:  Lives in group home   Staff noticed that he had bruising on his R arm  Denies any falls or trauma  Is on Warfarin   Due for an INR recheck on 6/19 (checked on Friday it was at 1.9, goal 2-3)  No pain, no other bruising    ROS: See HPI for pertinent ROS.     Past medical history, social history, medications, and allergies personally reviewed.  Past Medical History:   Diagnosis Date    A-fib (HCC) 2015    pacemaker L chest     CAD (coronary artery disease) 2015    Pacemaker    Epilepsy (HCC)     Heart disease     Hypertension     Incontinence     Leg swelling     Mental retardation, 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)     Other ill-defined conditions(799.89)     edema legs    S/P ablation of atrial fibrillation 11/13/2020    S/P biventricular cardiac pacemaker procedure 10/23/2015    10/23/15 [employer] biventricular pacemaker inmplant    Screen for colon cancer 9/27/2012       Social Hx:   Social Determinants of Health     Tobacco Use: Low Risk  (6/3/2024)    Patient History     Smoking Tobacco Use: Never     Smokeless Tobacco Use: Never     Passive Exposure: Never   Alcohol Use: Not At Risk (12/11/2023)    AUDIT-C     Frequency of Alcohol Consumption: Never     Average Number of Drinks: Patient does not drink     Frequency of Binge Drinking: Never   Financial Resource Strain: Patient Declined (12/11/2023)    Overall Financial Resource Strain (CARDIA)     Difficulty of Paying Living Expenses: Patient declined   Food Insecurity: Patient Declined (12/11/2023)    Hunger Vital Sign     Worried About Running Out of Food in the Last Year: Patient declined     Ran Out of Food in the Last

## 2024-06-04 LAB
BASOPHILS # BLD: 0 K/UL (ref 0–0.1)
BASOPHILS NFR BLD: 1 % (ref 0–1)
DIFFERENTIAL METHOD BLD: ABNORMAL
EOSINOPHIL # BLD: 0.2 K/UL (ref 0–0.4)
EOSINOPHIL NFR BLD: 3 % (ref 0–7)
ERYTHROCYTE [DISTWIDTH] IN BLOOD BY AUTOMATED COUNT: 14.6 % (ref 11.5–14.5)
HCT VFR BLD AUTO: 37.2 % (ref 36.6–50.3)
HGB BLD-MCNC: 12 G/DL (ref 12.1–17)
IMM GRANULOCYTES # BLD AUTO: 0 K/UL (ref 0–0.04)
IMM GRANULOCYTES NFR BLD AUTO: 0 % (ref 0–0.5)
LYMPHOCYTES # BLD: 1.7 K/UL (ref 0.8–3.5)
LYMPHOCYTES NFR BLD: 38 % (ref 12–49)
MCH RBC QN AUTO: 31.9 PG (ref 26–34)
MCHC RBC AUTO-ENTMCNC: 32.3 G/DL (ref 30–36.5)
MCV RBC AUTO: 98.9 FL (ref 80–99)
MONOCYTES # BLD: 0.5 K/UL (ref 0–1)
MONOCYTES NFR BLD: 11 % (ref 5–13)
NEUTS SEG # BLD: 2.2 K/UL (ref 1.8–8)
NEUTS SEG NFR BLD: 47 % (ref 32–75)
NRBC # BLD: 0 K/UL (ref 0–0.01)
NRBC BLD-RTO: 0 PER 100 WBC
PLATELET # BLD AUTO: 156 K/UL (ref 150–400)
PMV BLD AUTO: 11.2 FL (ref 8.9–12.9)
RBC # BLD AUTO: 3.76 M/UL (ref 4.1–5.7)
WBC # BLD AUTO: 4.6 K/UL (ref 4.1–11.1)

## 2024-06-05 NOTE — PROGRESS NOTES
I reviewed with the resident the medical history and the resident's findings on the physical examination.  I discussed with the resident the patient's diagnosis and concur with the plan.     Sheba Lucero MD 6/5/2024

## 2024-06-18 ENCOUNTER — TELEPHONE (OUTPATIENT)
Facility: HOSPITAL | Age: 79
End: 2024-06-18

## 2024-06-18 DIAGNOSIS — D64.9 ANEMIA, UNSPECIFIED TYPE: Primary | ICD-10-CM

## 2024-06-18 NOTE — TELEPHONE ENCOUNTER
Medication Management Clinic - Reschedule Appointment     Contacted patient's group home representative (Loc Cramer). Provider requested to reschedule patient's appointment. Patient's appointment has been rescheduled to Wednesday, June 26, 2024 at 11:30 am.     Thank you.  Jason Joshua, PharmD

## 2024-06-19 ENCOUNTER — TELEPHONE (OUTPATIENT)
Age: 79
End: 2024-06-19

## 2024-06-19 NOTE — TELEPHONE ENCOUNTER
----- Message from Hayley Lopez DO sent at 6/18/2024  7:06 PM EDT -----  Hi! Please call this patient's group home again. He is mildly anemic but looks like they haven't checked for iron deficiency recently so he would need to come in to get that lab drawn. Please confirm no blood in stools. Thank you!  ----- Message -----  From: Kathrine Garzon Incoming Juan RIVERO[employer]  Sent: 6/4/2024   1:58 AM EDT  To: Hayley Lopez DO

## 2024-06-19 NOTE — TELEPHONE ENCOUNTER
Called group home, informed caregiver that he needed a lab appointment and they will call back to set that appointment up when they have availability to take him.

## 2024-06-25 ENCOUNTER — NURSE ONLY (OUTPATIENT)
Age: 79
End: 2024-06-25

## 2024-06-25 DIAGNOSIS — D64.9 ANEMIA, UNSPECIFIED TYPE: ICD-10-CM

## 2024-06-25 LAB
FERRITIN SERPL-MCNC: 159 NG/ML (ref 26–388)
IRON SATN MFR SERPL: 56 % (ref 20–50)
IRON SERPL-MCNC: 148 UG/DL (ref 35–150)
TIBC SERPL-MCNC: 264 UG/DL (ref 250–450)

## 2024-06-26 ENCOUNTER — ANTI-COAG VISIT (OUTPATIENT)
Facility: HOSPITAL | Age: 79
End: 2024-06-26
Payer: MEDICARE

## 2024-06-26 DIAGNOSIS — I48.91 ATRIAL FIBRILLATION, UNSPECIFIED TYPE (HCC): Primary | ICD-10-CM

## 2024-06-26 LAB
INTERNATIONAL NORMALIZATION RATIO, POC: 1.7 (ref 2–3)
PROTHROMBIN TIME, POC: ABNORMAL

## 2024-06-26 PROCEDURE — 85610 PROTHROMBIN TIME: CPT

## 2024-06-26 PROCEDURE — 99213 OFFICE O/P EST LOW 20 MIN: CPT

## 2024-06-26 RX ORDER — WARFARIN SODIUM 5 MG/1
TABLET ORAL
Qty: 30 TABLET | Refills: 2 | Status: SHIPPED | OUTPATIENT
Start: 2024-06-26

## 2024-06-26 RX ORDER — WARFARIN SODIUM 7.5 MG/1
TABLET ORAL
Qty: 4 TABLET | Refills: 2 | Status: SHIPPED | OUTPATIENT
Start: 2024-06-26

## 2024-06-26 NOTE — PROGRESS NOTES
Pharmacy Progress Note - Anticoagulation Management    S/O:  Mr. Jack Shukla  is a 79 y.o. male seen today for anticoagulation management for the diagnosis of Atrial Fibrillation.     HPI: At last visit (5/30), the patient's INR was 1.9 and subtherapeutic. Patient's MAR reflected one dose of warfarin was missed on Sunday (5/26) and the MAR did not reflect amiodarone as an active order (confirmed with [facility] pharmacy that amiodarone was discontinued). Group home representative (Gris Machado) reported the patient's diet was consistent with vitamin K foods. Patient has been previously therapeutic on current regimen and will continue warfarin 5 mg daily. Patient will recheck INR in 3 week(s).     Interim History:    Warfarin start date: Prior to 10/9/20 per chart review  INR Goal:  2.0-3.0    Current warfarin regimen: 5 mg daily  Warfarin tablet strength:   2.5 mg, 5 mg  Duration of therapy: Indefinite      Results for orders placed or performed in visit on 06/26/24   POCT INR   Result Value Ref Range    Prothrombin time,(POC)      INR,(POC) 1.7 (A) 2 - 3       Today's pertinent positives includes:  No significant changes since last visit      Adherence:   Able to recall regimen? YES - Warfarin regimen was entered correctly on the patient's MAR, doses were initialed except one day and group home representative (Loc Cramer) confirmed the dose.   Miss/extra dose? NO  Need refill? YES    Upcoming procedure(s):  N/A    Past Medical History:   Diagnosis Date    A-fib (HCC) 2015    pacemaker L chest     CAD (coronary artery disease) 2015    Pacemaker    Epilepsy ([facility])     Heart disease     Hypertension     Incontinence     Leg swelling     Mental retardation, 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)     Other ill-defined conditions(799.89)     edema legs    S/P ablation of atrial fibrillation 11/13/2020    S/P biventricular cardiac pacemaker procedure 10/23/2015    10/23/15 [employer] biventricular pacemaker inmplant    Screen

## 2024-06-26 NOTE — PATIENT INSTRUCTIONS
Today your INR was 1.7 .      Your goal INR is  2.0-3.0 .    You have a   2.5 mg and 5 mg tablet of Coumadin (warfarin).   Take Coumadin (warfarin) as follows:    Take 7.5 mg every Wednesday; and 5 mg all other days of the week.      Come back in 3 week(s) for your next finger stick/INR blood test.        Avoid any over the counter items containing aspirin or ibuprofen, and avoid great swings in general diet.  Avoid alcohol consumption.  Please notify the INR pharmacist if you are started on any new medication including over the counter or herbal supplements. Also, please notify your INR pharmacist if any of your other prescription or over the counter medications have been discontinued.     Call [facility] at [phone] if you have any signs of abnormal bleeding/blood clot.  ------------------------------------------------------------------------------------------------------------------  Taking Warfarin Safely: Care Instructions    Your Care Instructions  Warfarin is a medicine that you take to prevent blood clots. It is often called a blood thinner. Doctors give warfarin (such as Coumadin) to reduce the risk of blood clots. You may be at risk for blood clots if you have atrial fibrillation or deep vein thrombosis. Some other health problems may also put you at risk.  Warfarin slows the amount of time it takes for your blood to clot. It can cause bleeding problems. Even if you've been taking warfarin for a while, it's important to know how to take it safely.  Foods and other medicines can affect the way warfarin works. Some can make warfarin work too well. This can cause bleeding problems. And some can make it work poorly, so that it does not prevent blood clots very well.  You will need regular blood tests to check how long it takes for your blood to form a clot. This test is called a PT or prothrombin time test. The result of the test is called an INR level.

## 2024-06-27 DIAGNOSIS — D64.9 ANEMIA, UNSPECIFIED TYPE: Primary | ICD-10-CM

## 2024-07-06 ENCOUNTER — HOSPITAL ENCOUNTER (INPATIENT)
Facility: HOSPITAL | Age: 79
LOS: 2 days | Discharge: HOME HEALTH | DRG: 871 | End: 2024-07-08
Attending: FAMILY MEDICINE | Admitting: STUDENT IN AN ORGANIZED HEALTH CARE EDUCATION/TRAINING PROGRAM
Payer: MEDICARE

## 2024-07-06 ENCOUNTER — HOSPITAL ENCOUNTER (EMERGENCY)
Facility: HOSPITAL | Age: 79
Discharge: ANOTHER HOSPITAL | End: 2024-07-06
Attending: STUDENT IN AN ORGANIZED HEALTH CARE EDUCATION/TRAINING PROGRAM
Payer: MEDICARE

## 2024-07-06 ENCOUNTER — APPOINTMENT (OUTPATIENT)
Facility: HOSPITAL | Age: 79
End: 2024-07-06
Payer: MEDICARE

## 2024-07-06 VITALS
HEART RATE: 88 BPM | HEIGHT: 72 IN | WEIGHT: 200.5 LBS | RESPIRATION RATE: 17 BRPM | OXYGEN SATURATION: 98 % | SYSTOLIC BLOOD PRESSURE: 115 MMHG | TEMPERATURE: 97.8 F | SYSTOLIC BLOOD PRESSURE: 115 MMHG | HEART RATE: 88 BPM | RESPIRATION RATE: 17 BRPM | WEIGHT: 200.5 LBS | HEIGHT: 72 IN | OXYGEN SATURATION: 98 % | TEMPERATURE: 97.8 F | BODY MASS INDEX: 27.16 KG/M2 | DIASTOLIC BLOOD PRESSURE: 62 MMHG | BODY MASS INDEX: 27.16 KG/M2 | DIASTOLIC BLOOD PRESSURE: 62 MMHG

## 2024-07-06 DIAGNOSIS — J69.0 ASPIRATION PNEUMONIA OF LEFT LOWER LOBE, UNSPECIFIED ASPIRATION PNEUMONIA TYPE (HCC): Primary | ICD-10-CM

## 2024-07-06 PROBLEM — J96.01 ACUTE HYPOXIC RESPIRATORY FAILURE (HCC): Status: ACTIVE | Noted: 2024-07-06

## 2024-07-06 LAB
ALBUMIN SERPL-MCNC: 3.6 G/DL (ref 3.5–5)
ALBUMIN SERPL-MCNC: 3.6 G/DL (ref 3.5–5)
ALBUMIN/GLOB SERPL: 0.9 (ref 1.1–2.2)
ALBUMIN/GLOB SERPL: 0.9 (ref 1.1–2.2)
ALP SERPL-CCNC: 131 U/L (ref 45–117)
ALP SERPL-CCNC: 131 U/L (ref 45–117)
ALT SERPL-CCNC: 16 U/L (ref 12–78)
ALT SERPL-CCNC: 16 U/L (ref 12–78)
ANION GAP BLD CALC-SCNC: 6 (ref 10–20)
ANION GAP BLD CALC-SCNC: 6 (ref 10–20)
ANION GAP SERPL CALC-SCNC: 8 MMOL/L (ref 5–15)
ANION GAP SERPL CALC-SCNC: 8 MMOL/L (ref 5–15)
AST SERPL-CCNC: 19 U/L (ref 15–37)
AST SERPL-CCNC: 19 U/L (ref 15–37)
BASOPHILS # BLD: 0 K/UL (ref 0–0.1)
BASOPHILS # BLD: 0 K/UL (ref 0–0.1)
BASOPHILS NFR BLD: 1 % (ref 0–1)
BASOPHILS NFR BLD: 1 % (ref 0–1)
BILIRUB SERPL-MCNC: 0.5 MG/DL (ref 0.2–1)
BILIRUB SERPL-MCNC: 0.5 MG/DL (ref 0.2–1)
BUN SERPL-MCNC: 30 MG/DL (ref 6–20)
BUN SERPL-MCNC: 30 MG/DL (ref 6–20)
BUN/CREAT SERPL: 33 (ref 12–20)
BUN/CREAT SERPL: 33 (ref 12–20)
CA-I BLD-MCNC: 1.13 MMOL/L (ref 1.12–1.32)
CA-I BLD-MCNC: 1.13 MMOL/L (ref 1.12–1.32)
CALCIUM SERPL-MCNC: 8.8 MG/DL (ref 8.5–10.1)
CALCIUM SERPL-MCNC: 8.8 MG/DL (ref 8.5–10.1)
CHLORIDE BLD-SCNC: 108 MMOL/L (ref 100–108)
CHLORIDE BLD-SCNC: 108 MMOL/L (ref 100–108)
CHLORIDE SERPL-SCNC: 103 MMOL/L (ref 97–108)
CHLORIDE SERPL-SCNC: 103 MMOL/L (ref 97–108)
CO2 BLD-SCNC: 33 MMOL/L (ref 19–24)
CO2 BLD-SCNC: 33 MMOL/L (ref 19–24)
CO2 SERPL-SCNC: 33 MMOL/L (ref 21–32)
CO2 SERPL-SCNC: 33 MMOL/L (ref 21–32)
CREAT SERPL-MCNC: 0.9 MG/DL (ref 0.7–1.3)
CREAT SERPL-MCNC: 0.9 MG/DL (ref 0.7–1.3)
CREAT UR-MCNC: 0.8 MG/DL (ref 0.6–1.3)
CREAT UR-MCNC: 0.8 MG/DL (ref 0.6–1.3)
DIFFERENTIAL METHOD BLD: ABNORMAL
DIFFERENTIAL METHOD BLD: ABNORMAL
EKG ATRIAL RATE: 78 BPM
EKG ATRIAL RATE: 78 BPM
EKG DIAGNOSIS: NORMAL
EKG DIAGNOSIS: NORMAL
EKG P-R INTERVAL: 176 MS
EKG P-R INTERVAL: 176 MS
EKG Q-T INTERVAL: 404 MS
EKG Q-T INTERVAL: 404 MS
EKG QRS DURATION: 102 MS
EKG QRS DURATION: 102 MS
EKG QTC CALCULATION (BAZETT): 460 MS
EKG QTC CALCULATION (BAZETT): 460 MS
EKG R AXIS: 57 DEGREES
EKG R AXIS: 57 DEGREES
EKG T AXIS: -75 DEGREES
EKG T AXIS: -75 DEGREES
EKG VENTRICULAR RATE: 78 BPM
EKG VENTRICULAR RATE: 78 BPM
EOSINOPHIL # BLD: 0.1 K/UL (ref 0–0.4)
EOSINOPHIL # BLD: 0.1 K/UL (ref 0–0.4)
EOSINOPHIL NFR BLD: 3 % (ref 0–7)
EOSINOPHIL NFR BLD: 3 % (ref 0–7)
ERYTHROCYTE [DISTWIDTH] IN BLOOD BY AUTOMATED COUNT: 14.4 % (ref 11.5–14.5)
ERYTHROCYTE [DISTWIDTH] IN BLOOD BY AUTOMATED COUNT: 14.4 % (ref 11.5–14.5)
GLOBULIN SER CALC-MCNC: 3.8 G/DL (ref 2–4)
GLOBULIN SER CALC-MCNC: 3.8 G/DL (ref 2–4)
GLUCOSE BLD STRIP.AUTO-MCNC: 74 MG/DL (ref 74–99)
GLUCOSE BLD STRIP.AUTO-MCNC: 74 MG/DL (ref 74–99)
GLUCOSE SERPL-MCNC: 78 MG/DL (ref 65–100)
GLUCOSE SERPL-MCNC: 78 MG/DL (ref 65–100)
HCT VFR BLD AUTO: 36.5 % (ref 36.6–50.3)
HCT VFR BLD AUTO: 36.5 % (ref 36.6–50.3)
HGB BLD-MCNC: 11.7 G/DL (ref 12.1–17)
HGB BLD-MCNC: 11.7 G/DL (ref 12.1–17)
IMM GRANULOCYTES # BLD AUTO: 0 K/UL (ref 0–0.04)
IMM GRANULOCYTES # BLD AUTO: 0 K/UL (ref 0–0.04)
IMM GRANULOCYTES NFR BLD AUTO: 1 % (ref 0–0.5)
IMM GRANULOCYTES NFR BLD AUTO: 1 % (ref 0–0.5)
INR PPP: 1.4 (ref 0.9–1.1)
INR PPP: 1.4 (ref 0.9–1.1)
LACTATE BLD-SCNC: 2.29 MMOL/L (ref 0.4–2)
LACTATE BLD-SCNC: 2.29 MMOL/L (ref 0.4–2)
LYMPHOCYTES # BLD: 1.4 K/UL (ref 0.8–3.5)
LYMPHOCYTES # BLD: 1.4 K/UL (ref 0.8–3.5)
LYMPHOCYTES NFR BLD: 37 % (ref 12–49)
LYMPHOCYTES NFR BLD: 37 % (ref 12–49)
MCH RBC QN AUTO: 32 PG (ref 26–34)
MCH RBC QN AUTO: 32 PG (ref 26–34)
MCHC RBC AUTO-ENTMCNC: 32.1 G/DL (ref 30–36.5)
MCHC RBC AUTO-ENTMCNC: 32.1 G/DL (ref 30–36.5)
MCV RBC AUTO: 99.7 FL (ref 80–99)
MCV RBC AUTO: 99.7 FL (ref 80–99)
MONOCYTES # BLD: 0.4 K/UL (ref 0–1)
MONOCYTES # BLD: 0.4 K/UL (ref 0–1)
MONOCYTES NFR BLD: 10 % (ref 5–13)
MONOCYTES NFR BLD: 10 % (ref 5–13)
NEUTS SEG # BLD: 1.9 K/UL (ref 1.8–8)
NEUTS SEG # BLD: 1.9 K/UL (ref 1.8–8)
NEUTS SEG NFR BLD: 48 % (ref 32–75)
NEUTS SEG NFR BLD: 48 % (ref 32–75)
NRBC # BLD: 0 K/UL (ref 0–0.01)
NRBC # BLD: 0 K/UL (ref 0–0.01)
NRBC BLD-RTO: 0 PER 100 WBC
NRBC BLD-RTO: 0 PER 100 WBC
NT PRO BNP: 604 PG/ML (ref 0–450)
NT PRO BNP: 604 PG/ML (ref 0–450)
PLATELET # BLD AUTO: 139 K/UL (ref 150–400)
PLATELET # BLD AUTO: 139 K/UL (ref 150–400)
PMV BLD AUTO: 10.1 FL (ref 8.9–12.9)
PMV BLD AUTO: 10.1 FL (ref 8.9–12.9)
POTASSIUM BLD-SCNC: 3.7 MMOL/L (ref 3.5–5.5)
POTASSIUM BLD-SCNC: 3.7 MMOL/L (ref 3.5–5.5)
POTASSIUM SERPL-SCNC: 3.6 MMOL/L (ref 3.5–5.1)
POTASSIUM SERPL-SCNC: 3.6 MMOL/L (ref 3.5–5.1)
PROT SERPL-MCNC: 7.4 G/DL (ref 6.4–8.2)
PROT SERPL-MCNC: 7.4 G/DL (ref 6.4–8.2)
PROTHROMBIN TIME: 13.9 SEC (ref 9–11.1)
PROTHROMBIN TIME: 13.9 SEC (ref 9–11.1)
RBC # BLD AUTO: 3.66 M/UL (ref 4.1–5.7)
RBC # BLD AUTO: 3.66 M/UL (ref 4.1–5.7)
SERVICE CMNT-IMP: ABNORMAL
SERVICE CMNT-IMP: ABNORMAL
SODIUM BLD-SCNC: 147 MMOL/L (ref 136–145)
SODIUM BLD-SCNC: 147 MMOL/L (ref 136–145)
SODIUM SERPL-SCNC: 144 MMOL/L (ref 136–145)
SODIUM SERPL-SCNC: 144 MMOL/L (ref 136–145)
SPECIMEN SITE: ABNORMAL
SPECIMEN SITE: ABNORMAL
TROPONIN I SERPL HS-MCNC: 6 NG/L (ref 0–76)
TROPONIN I SERPL HS-MCNC: 6 NG/L (ref 0–76)
WBC # BLD AUTO: 3.9 K/UL (ref 4.1–11.1)
WBC # BLD AUTO: 3.9 K/UL (ref 4.1–11.1)

## 2024-07-06 PROCEDURE — 85610 PROTHROMBIN TIME: CPT

## 2024-07-06 PROCEDURE — 71275 CT ANGIOGRAPHY CHEST: CPT

## 2024-07-06 PROCEDURE — 6360000002 HC RX W HCPCS: Performed by: STUDENT IN AN ORGANIZED HEALTH CARE EDUCATION/TRAINING PROGRAM

## 2024-07-06 PROCEDURE — 2500000003 HC RX 250 WO HCPCS: Performed by: STUDENT IN AN ORGANIZED HEALTH CARE EDUCATION/TRAINING PROGRAM

## 2024-07-06 PROCEDURE — 83605 ASSAY OF LACTIC ACID: CPT

## 2024-07-06 PROCEDURE — 83880 ASSAY OF NATRIURETIC PEPTIDE: CPT

## 2024-07-06 PROCEDURE — 36415 COLL VENOUS BLD VENIPUNCTURE: CPT

## 2024-07-06 PROCEDURE — 2580000003 HC RX 258: Performed by: STUDENT IN AN ORGANIZED HEALTH CARE EDUCATION/TRAINING PROGRAM

## 2024-07-06 PROCEDURE — 85025 COMPLETE CBC W/AUTO DIFF WBC: CPT

## 2024-07-06 PROCEDURE — 6360000004 HC RX CONTRAST MEDICATION: Performed by: STUDENT IN AN ORGANIZED HEALTH CARE EDUCATION/TRAINING PROGRAM

## 2024-07-06 PROCEDURE — 80047 BASIC METABLC PNL IONIZED CA: CPT

## 2024-07-06 PROCEDURE — 2060000000 HC ICU INTERMEDIATE R&B

## 2024-07-06 PROCEDURE — 71045 X-RAY EXAM CHEST 1 VIEW: CPT

## 2024-07-06 PROCEDURE — G0378 HOSPITAL OBSERVATION PER HR: HCPCS

## 2024-07-06 PROCEDURE — 80053 COMPREHEN METABOLIC PANEL: CPT

## 2024-07-06 PROCEDURE — G0379 DIRECT REFER HOSPITAL OBSERV: HCPCS

## 2024-07-06 PROCEDURE — 87040 BLOOD CULTURE FOR BACTERIA: CPT

## 2024-07-06 PROCEDURE — 96365 THER/PROPH/DIAG IV INF INIT: CPT

## 2024-07-06 PROCEDURE — 84484 ASSAY OF TROPONIN QUANT: CPT

## 2024-07-06 PROCEDURE — 99285 EMERGENCY DEPT VISIT HI MDM: CPT

## 2024-07-06 PROCEDURE — 96375 TX/PRO/DX INJ NEW DRUG ADDON: CPT

## 2024-07-06 PROCEDURE — 6370000000 HC RX 637 (ALT 250 FOR IP): Performed by: STUDENT IN AN ORGANIZED HEALTH CARE EDUCATION/TRAINING PROGRAM

## 2024-07-06 RX ORDER — POTASSIUM CHLORIDE 750 MG/1
20 TABLET, FILM COATED, EXTENDED RELEASE ORAL DAILY
COMMUNITY

## 2024-07-06 RX ORDER — SODIUM CHLORIDE 0.9 % (FLUSH) 0.9 %
5-40 SYRINGE (ML) INJECTION EVERY 12 HOURS SCHEDULED
Status: DISCONTINUED | OUTPATIENT
Start: 2024-07-06 | End: 2024-07-08 | Stop reason: HOSPADM

## 2024-07-06 RX ORDER — LACOSAMIDE 50 MG/1
100 TABLET ORAL 2 TIMES DAILY
Status: DISCONTINUED | OUTPATIENT
Start: 2024-07-06 | End: 2024-07-08 | Stop reason: HOSPADM

## 2024-07-06 RX ORDER — ENOXAPARIN SODIUM 100 MG/ML
40 INJECTION SUBCUTANEOUS DAILY
Status: DISCONTINUED | OUTPATIENT
Start: 2024-07-07 | End: 2024-07-06

## 2024-07-06 RX ORDER — POTASSIUM CHLORIDE 7.45 MG/ML
10 INJECTION INTRAVENOUS PRN
Status: DISCONTINUED | OUTPATIENT
Start: 2024-07-06 | End: 2024-07-08 | Stop reason: HOSPADM

## 2024-07-06 RX ORDER — LEVETIRACETAM 1000 MG/1
1000 TABLET ORAL 2 TIMES DAILY
COMMUNITY

## 2024-07-06 RX ORDER — SODIUM CHLORIDE 9 MG/ML
INJECTION, SOLUTION INTRAVENOUS PRN
Status: DISCONTINUED | OUTPATIENT
Start: 2024-07-06 | End: 2024-07-08 | Stop reason: HOSPADM

## 2024-07-06 RX ORDER — LANOLIN ALCOHOL/MO/W.PET/CERES
1000 CREAM (GRAM) TOPICAL DAILY
COMMUNITY

## 2024-07-06 RX ORDER — ONDANSETRON 2 MG/ML
4 INJECTION INTRAMUSCULAR; INTRAVENOUS EVERY 6 HOURS PRN
Status: DISCONTINUED | OUTPATIENT
Start: 2024-07-06 | End: 2024-07-08 | Stop reason: HOSPADM

## 2024-07-06 RX ORDER — WARFARIN SODIUM 2.5 MG/1
2.5 TABLET ORAL WEEKLY
COMMUNITY
Start: 2024-07-10

## 2024-07-06 RX ORDER — WARFARIN SODIUM 5 MG/1
5 TABLET ORAL DAILY
COMMUNITY

## 2024-07-06 RX ORDER — LANOLIN ALCOHOL/MO/W.PET/CERES
400 CREAM (GRAM) TOPICAL DAILY
COMMUNITY

## 2024-07-06 RX ORDER — MAGNESIUM SULFATE IN WATER 40 MG/ML
2000 INJECTION, SOLUTION INTRAVENOUS PRN
Status: DISCONTINUED | OUTPATIENT
Start: 2024-07-06 | End: 2024-07-08 | Stop reason: HOSPADM

## 2024-07-06 RX ORDER — SODIUM CHLORIDE 9 MG/ML
INJECTION, SOLUTION INTRAVENOUS CONTINUOUS
Status: DISCONTINUED | OUTPATIENT
Start: 2024-07-06 | End: 2024-07-08

## 2024-07-06 RX ORDER — LACOSAMIDE 100 MG/1
100 TABLET ORAL 2 TIMES DAILY
COMMUNITY

## 2024-07-06 RX ORDER — POTASSIUM CHLORIDE 750 MG/1
40 TABLET, FILM COATED, EXTENDED RELEASE ORAL PRN
Status: DISCONTINUED | OUTPATIENT
Start: 2024-07-06 | End: 2024-07-08 | Stop reason: HOSPADM

## 2024-07-06 RX ORDER — POLYETHYLENE GLYCOL 3350 17 G/17G
17 POWDER, FOR SOLUTION ORAL DAILY PRN
Status: DISCONTINUED | OUTPATIENT
Start: 2024-07-06 | End: 2024-07-08 | Stop reason: HOSPADM

## 2024-07-06 RX ORDER — LEVETIRACETAM 500 MG/1
1000 TABLET ORAL 2 TIMES DAILY
Status: DISCONTINUED | OUTPATIENT
Start: 2024-07-06 | End: 2024-07-08 | Stop reason: HOSPADM

## 2024-07-06 RX ORDER — ACETAMINOPHEN 325 MG/1
650 TABLET ORAL EVERY 6 HOURS PRN
Status: DISCONTINUED | OUTPATIENT
Start: 2024-07-06 | End: 2024-07-08 | Stop reason: HOSPADM

## 2024-07-06 RX ORDER — ERYTHROMYCIN 20 MG/ML
SOLUTION TOPICAL DAILY
COMMUNITY

## 2024-07-06 RX ORDER — CHOLECALCIFEROL (VITAMIN D3) 25 MCG
TABLET ORAL DAILY
COMMUNITY

## 2024-07-06 RX ORDER — BUMETANIDE 0.25 MG/ML
2 INJECTION INTRAMUSCULAR; INTRAVENOUS
Status: COMPLETED | OUTPATIENT
Start: 2024-07-06 | End: 2024-07-06

## 2024-07-06 RX ORDER — SODIUM CHLORIDE 0.9 % (FLUSH) 0.9 %
5-40 SYRINGE (ML) INJECTION PRN
Status: DISCONTINUED | OUTPATIENT
Start: 2024-07-06 | End: 2024-07-08 | Stop reason: HOSPADM

## 2024-07-06 RX ORDER — BUMETANIDE 1 MG/1
2 TABLET ORAL DAILY
COMMUNITY

## 2024-07-06 RX ORDER — ONDANSETRON 4 MG/1
4 TABLET, ORALLY DISINTEGRATING ORAL EVERY 8 HOURS PRN
Status: DISCONTINUED | OUTPATIENT
Start: 2024-07-06 | End: 2024-07-08 | Stop reason: HOSPADM

## 2024-07-06 RX ORDER — 0.9 % SODIUM CHLORIDE 0.9 %
1000 INTRAVENOUS SOLUTION INTRAVENOUS ONCE
Status: DISCONTINUED | OUTPATIENT
Start: 2024-07-06 | End: 2024-07-06

## 2024-07-06 RX ORDER — ACETAMINOPHEN 650 MG/1
650 SUPPOSITORY RECTAL EVERY 6 HOURS PRN
Status: DISCONTINUED | OUTPATIENT
Start: 2024-07-06 | End: 2024-07-08 | Stop reason: HOSPADM

## 2024-07-06 RX ADMIN — DOXYCYCLINE 100 MG: 100 INJECTION, POWDER, LYOPHILIZED, FOR SOLUTION INTRAVENOUS at 23:48

## 2024-07-06 RX ADMIN — PIPERACILLIN AND TAZOBACTAM 4500 MG: 4; .5 INJECTION, POWDER, LYOPHILIZED, FOR SOLUTION INTRAVENOUS at 18:26

## 2024-07-06 RX ADMIN — LACOSAMIDE 100 MG: 50 TABLET, FILM COATED ORAL at 23:45

## 2024-07-06 RX ADMIN — BUMETANIDE 2 MG: 0.25 INJECTION INTRAMUSCULAR; INTRAVENOUS at 13:47

## 2024-07-06 RX ADMIN — IOPAMIDOL 100 ML: 755 INJECTION, SOLUTION INTRAVENOUS at 17:12

## 2024-07-06 RX ADMIN — LEVETIRACETAM 1000 MG: 500 TABLET, FILM COATED ORAL at 23:45

## 2024-07-06 RX ADMIN — METOPROLOL TARTRATE 25 MG: 25 TABLET, FILM COATED ORAL at 23:45

## 2024-07-06 ASSESSMENT — PAIN SCALES - GENERAL
PAINLEVEL_OUTOF10: 0

## 2024-07-06 ASSESSMENT — PAIN - FUNCTIONAL ASSESSMENT: PAIN_FUNCTIONAL_ASSESSMENT: 0-10

## 2024-07-06 NOTE — ED NOTES
Pt noted to be incontinent of urine. Pt had not let staff member at bedside or ED staff know he had to urinate. Pt cleaned up, placed in new gown, bed linens changed. Pt accompanied to CT by this RN

## 2024-07-06 NOTE — ED PROVIDER NOTES
[facility] EMERGENCY DEPT  EMERGENCY DEPARTMENT ENCOUNTER       Pt Name: Jack Shukla  MRN: [id number]  Birthdate 1945  Date of evaluation: 7/6/2024  Provider: Robinson Hightower MD   PCP: No primary care provider on file.  Note Started: 6:45 PM 7/6/24     CHIEF COMPLAINT       Chief Complaint   Patient presents with    Choking        HISTORY OF PRESENT ILLNESS: 1 or more elements      History From: EMS  None     Jack Shukla is a 79 y.o. male who presents to the ED from [facility] following an episode of choking. Hx if provided by staff at the [facility] and by EMS. Patient had episode of choking after taking a bite of hamburger. Staff performed heimlich maneuver and patient ultimately spit out piece of hamburger.  Patient denies shortness of breath but does report some ongoing irritation in his throat from hamburger.  Patient noted to be hypoxic on arrival, placed on nonrebreather.  Staff at [facility] report patient has a history of seizures, CHF, and has pacemaker.  They are unsure about the rest of patient's medical history.     Nursing Notes were all reviewed and agreed with or any disagreements were addressed in the HPI.     REVIEW OF SYSTEMS      Review of Systems     Positives and Pertinent negatives as per HPI.    PAST HISTORY     Past Medical History:  No past medical history on file.      Past Surgical History:  No past surgical history on file.    Family History:  No family history on file.    Social History:       Allergies:  No Known Allergies    CURRENT MEDICATIONS      Previous Medications    No medications on file         PHYSICAL EXAM      ED Triage Vitals   Enc Vitals Group      BP 07/06/24 1314 (!) 102/58      Pulse 07/06/24 1314 78      Respirations 07/06/24 1314 24      Temp 07/06/24 1314 97.8 °F (36.6 °C)      Temp Source 07/06/24 1314 Oral      SpO2 07/06/24 1313 (!) 83 %      Weight - Scale 07/06/24 1314 90.9 kg (200 lb 8 oz)      Height --       Head Circumference --   syntax, homophones, and other interpretive errors are inadvertently transcribed by the computer software. Please disregards these errors. Please excuse any errors that have escaped final proofreading.)         Robinson Hightower MD  07/06/24 2529

## 2024-07-06 NOTE — ED NOTES
Pt presents to ED complaining of choking while eating a hamburger. Pt presents via EMS with  from [facility].  reports he helped pt clear stuck bolus. Per EMS, pt O2 was not going above 87-88% on room air so he was started on a nasal cannula. Upon arrival pt is on 6L non rebreather. Trial of room air showed hypoxia. Pt reports feeling \"fine.\"  Pt has bilateral pitting edema, reportedly at baseline. Pt currently on 8L non rebreather.  Pt is alert and oriented x 3, skin is warm and dry. Assessment completed and pt updated on plan of care.  Seizure pads in place due to reported hx of seizures.     Pt is from [facility], supervisor is Gris Machado, [phone].    The Nursing Plan of Care is developed from the Nursing assessment and Emergency Department Attending provider initial evaluation.  The plan of care may be reviewed in the “ED Provider note”.    The Plan of Care was developed with the following considerations:  Patient / Family readiness to learn indicated by: Pt is from group home  Persons(s) to be included in education:  Pt is from group home  Barriers to Learning/Limitations:yes;  other Pt is from group home    Signed    HOMA GONZALES RN    7/6/2024   1:57 PM

## 2024-07-07 LAB
ALBUMIN SERPL-MCNC: 3.2 G/DL (ref 3.5–5)
ALBUMIN/GLOB SERPL: 1 (ref 1.1–2.2)
ALP SERPL-CCNC: 108 U/L (ref 45–117)
ALT SERPL-CCNC: 15 U/L (ref 12–78)
ANION GAP SERPL CALC-SCNC: 4 MMOL/L (ref 5–15)
AST SERPL-CCNC: 17 U/L (ref 15–37)
BACTERIA SPEC CULT: NORMAL
BACTERIA SPEC CULT: NORMAL
BASOPHILS # BLD: 0 K/UL (ref 0–0.1)
BASOPHILS NFR BLD: 0 % (ref 0–1)
BILIRUB SERPL-MCNC: 0.6 MG/DL (ref 0.2–1)
BUN SERPL-MCNC: 27 MG/DL (ref 6–20)
BUN/CREAT SERPL: 28 (ref 12–20)
CALCIUM SERPL-MCNC: 8.8 MG/DL (ref 8.5–10.1)
CHLORIDE SERPL-SCNC: 108 MMOL/L (ref 97–108)
CO2 SERPL-SCNC: 32 MMOL/L (ref 21–32)
CREAT SERPL-MCNC: 0.96 MG/DL (ref 0.7–1.3)
DIFFERENTIAL METHOD BLD: ABNORMAL
EOSINOPHIL # BLD: 0 K/UL (ref 0–0.4)
EOSINOPHIL NFR BLD: 0 % (ref 0–7)
ERYTHROCYTE [DISTWIDTH] IN BLOOD BY AUTOMATED COUNT: 14.6 % (ref 11.5–14.5)
GLOBULIN SER CALC-MCNC: 3.3 G/DL (ref 2–4)
GLUCOSE SERPL-MCNC: 133 MG/DL (ref 65–100)
HCT VFR BLD AUTO: 35.4 % (ref 36.6–50.3)
HGB BLD-MCNC: 11.6 G/DL (ref 12.1–17)
IMM GRANULOCYTES # BLD AUTO: 0 K/UL (ref 0–0.04)
IMM GRANULOCYTES NFR BLD AUTO: 0 % (ref 0–0.5)
INR PPP: 1.5 (ref 0.9–1.1)
LYMPHOCYTES # BLD: 0.9 K/UL (ref 0.8–3.5)
LYMPHOCYTES NFR BLD: 7 % (ref 12–49)
MCH RBC QN AUTO: 32.2 PG (ref 26–34)
MCHC RBC AUTO-ENTMCNC: 32.8 G/DL (ref 30–36.5)
MCV RBC AUTO: 98.3 FL (ref 80–99)
MONOCYTES # BLD: 0.7 K/UL (ref 0–1)
MONOCYTES NFR BLD: 6 % (ref 5–13)
NEUTS SEG # BLD: 10.3 K/UL (ref 1.8–8)
NEUTS SEG NFR BLD: 87 % (ref 32–75)
NRBC # BLD: 0 K/UL (ref 0–0.01)
NRBC BLD-RTO: 0 PER 100 WBC
PLATELET # BLD AUTO: 126 K/UL (ref 150–400)
PMV BLD AUTO: 10.1 FL (ref 8.9–12.9)
POTASSIUM SERPL-SCNC: 3.8 MMOL/L (ref 3.5–5.1)
PROT SERPL-MCNC: 6.5 G/DL (ref 6.4–8.2)
PROTHROMBIN TIME: 15.1 SEC (ref 9–11.1)
RBC # BLD AUTO: 3.6 M/UL (ref 4.1–5.7)
SERVICE CMNT-IMP: NORMAL
SERVICE CMNT-IMP: NORMAL
SODIUM SERPL-SCNC: 144 MMOL/L (ref 136–145)
WBC # BLD AUTO: 11.9 K/UL (ref 4.1–11.1)

## 2024-07-07 PROCEDURE — 97530 THERAPEUTIC ACTIVITIES: CPT

## 2024-07-07 PROCEDURE — 80053 COMPREHEN METABOLIC PANEL: CPT

## 2024-07-07 PROCEDURE — 2500000003 HC RX 250 WO HCPCS: Performed by: STUDENT IN AN ORGANIZED HEALTH CARE EDUCATION/TRAINING PROGRAM

## 2024-07-07 PROCEDURE — 85025 COMPLETE CBC W/AUTO DIFF WBC: CPT

## 2024-07-07 PROCEDURE — 2580000003 HC RX 258: Performed by: HOSPITALIST

## 2024-07-07 PROCEDURE — 2580000003 HC RX 258: Performed by: STUDENT IN AN ORGANIZED HEALTH CARE EDUCATION/TRAINING PROGRAM

## 2024-07-07 PROCEDURE — 36415 COLL VENOUS BLD VENIPUNCTURE: CPT

## 2024-07-07 PROCEDURE — 1100000000 HC RM PRIVATE

## 2024-07-07 PROCEDURE — 85610 PROTHROMBIN TIME: CPT

## 2024-07-07 PROCEDURE — G0378 HOSPITAL OBSERVATION PER HR: HCPCS

## 2024-07-07 PROCEDURE — 2700000000 HC OXYGEN THERAPY PER DAY

## 2024-07-07 PROCEDURE — 97165 OT EVAL LOW COMPLEX 30 MIN: CPT

## 2024-07-07 PROCEDURE — 6370000000 HC RX 637 (ALT 250 FOR IP): Performed by: STUDENT IN AN ORGANIZED HEALTH CARE EDUCATION/TRAINING PROGRAM

## 2024-07-07 PROCEDURE — 97161 PT EVAL LOW COMPLEX 20 MIN: CPT

## 2024-07-07 PROCEDURE — 96366 THER/PROPH/DIAG IV INF ADDON: CPT

## 2024-07-07 PROCEDURE — 96368 THER/DIAG CONCURRENT INF: CPT

## 2024-07-07 PROCEDURE — 96367 TX/PROPH/DG ADDL SEQ IV INF: CPT

## 2024-07-07 PROCEDURE — 6370000000 HC RX 637 (ALT 250 FOR IP): Performed by: HOSPITALIST

## 2024-07-07 PROCEDURE — 6360000002 HC RX W HCPCS: Performed by: STUDENT IN AN ORGANIZED HEALTH CARE EDUCATION/TRAINING PROGRAM

## 2024-07-07 RX ORDER — WARFARIN SODIUM 7.5 MG/1
7.5 TABLET ORAL
Status: COMPLETED | OUTPATIENT
Start: 2024-07-07 | End: 2024-07-07

## 2024-07-07 RX ADMIN — LEVETIRACETAM 1000 MG: 500 TABLET, FILM COATED ORAL at 21:13

## 2024-07-07 RX ADMIN — WARFARIN SODIUM 7.5 MG: 7.5 TABLET ORAL at 17:49

## 2024-07-07 RX ADMIN — DOXYCYCLINE 100 MG: 100 INJECTION, POWDER, LYOPHILIZED, FOR SOLUTION INTRAVENOUS at 22:37

## 2024-07-07 RX ADMIN — LACOSAMIDE 100 MG: 50 TABLET, FILM COATED ORAL at 08:50

## 2024-07-07 RX ADMIN — SODIUM CHLORIDE: 9 INJECTION, SOLUTION INTRAVENOUS at 13:19

## 2024-07-07 RX ADMIN — PIPERACILLIN AND TAZOBACTAM 3375 MG: 3; .375 INJECTION, POWDER, LYOPHILIZED, FOR SOLUTION INTRAVENOUS at 09:03

## 2024-07-07 RX ADMIN — PIPERACILLIN AND TAZOBACTAM 3375 MG: 3; .375 INJECTION, POWDER, LYOPHILIZED, FOR SOLUTION INTRAVENOUS at 01:54

## 2024-07-07 RX ADMIN — LEVETIRACETAM 1000 MG: 500 TABLET, FILM COATED ORAL at 08:50

## 2024-07-07 RX ADMIN — SODIUM CHLORIDE, PRESERVATIVE FREE 10 ML: 5 INJECTION INTRAVENOUS at 08:50

## 2024-07-07 RX ADMIN — SODIUM CHLORIDE: 9 INJECTION, SOLUTION INTRAVENOUS at 16:24

## 2024-07-07 RX ADMIN — SODIUM CHLORIDE, PRESERVATIVE FREE 10 ML: 5 INJECTION INTRAVENOUS at 21:13

## 2024-07-07 RX ADMIN — METOPROLOL TARTRATE 25 MG: 25 TABLET, FILM COATED ORAL at 21:14

## 2024-07-07 RX ADMIN — PIPERACILLIN AND TAZOBACTAM 3375 MG: 3; .375 INJECTION, POWDER, LYOPHILIZED, FOR SOLUTION INTRAVENOUS at 17:51

## 2024-07-07 RX ADMIN — DOXYCYCLINE 100 MG: 100 INJECTION, POWDER, LYOPHILIZED, FOR SOLUTION INTRAVENOUS at 10:54

## 2024-07-07 RX ADMIN — LACOSAMIDE 100 MG: 50 TABLET, FILM COATED ORAL at 21:13

## 2024-07-07 RX ADMIN — METOPROLOL TARTRATE 25 MG: 25 TABLET, FILM COATED ORAL at 08:50

## 2024-07-07 ASSESSMENT — PAIN SCALES - GENERAL
PAINLEVEL_OUTOF10: 0
PAINLEVEL_OUTOF10: 0

## 2024-07-07 NOTE — H&P
History & Physical    Primary Care Provider: No primary care provider on file.  Source of Information: Patient and chart review    History of Presenting Illness:   Jack Shukla is a 79 y.o. male with history of developmental delay, seizure disorder, CHF, pacemaker who presented to [facility] for evaluation after choking episode.  Patient has history of mental delay and lives at a group home.  Reportedly had a witnessed choking episode while eating a hamburger and this was dislodged with a Heimlich maneuver.  His aeration improved but he still complained of some throat discomfort.  He was brought to the ER for evaluation.  At time of my evaluation upon transfer, patient states he feels well and has no acute complaints or concerns.  The patient denies any fever, chills, chest or abdominal pain, nausea, vomiting, cough, congestion, recent illness, palpitations, or dysuria.    Remarkable vitals on ER Presentation: SpO2 83% on room air  Labs Remarkable for: INR 1.5, WBC 11.9, lactic acid 2.29  ER Images: Multifocal pneumonia most pronounced in left lower lobe noted on CTA chest  ER Rx: Zosyn, Bumex     Review of Systems:  Pertinent items are noted in the History of Present Illness.     No past medical history on file.   No past surgical history on file.  Prior to Admission medications    Not on File     No Known Allergies   No family history on file.     SOCIAL HISTORY:  Patient resides:  Independently x   Assisted Living    SNF    With family care       Smoking history:   None x   Former    Chronic      Alcohol history:   None x   Social    Chronic      Ambulates:   Independently x   w/cane    w/walker    w/wc    CODE STATUS:  DNR    Full x   Other      Objective:     Physical Exam:     /76   Pulse 88   Resp 21   SpO2 96%         General:  Alert, cooperative, no distress, appears stated age.   Head:  Normocephalic, without obvious abnormality, atraumatic.   Eyes:  Conjunctivae/corneas

## 2024-07-07 NOTE — PLAN OF CARE
Problem: Occupational Therapy - Adult  Goal: By Discharge: Performs self-care activities at highest level of function for planned discharge setting.  See evaluation for individualized goals.  Description: FUNCTIONAL STATUS PRIOR TO ADMISSION:  Patient lives in group home- has been there since December 2023. Has 24/7 supervision and can have some additional ADL support if needed. Typically he can dress himself and wash up (does not shower). Staff assists with IADL including cooking/cleaning and medication management. Uses a RW or a rollator, per staff at bedside during eval, staff encourages safe transfer techniques but patient requires cueing     HOME SUPPORT: Patient lived in group home with staff.    Occupational Therapy Goals:  Initiated 7/7/2024  1.  Patient will perform seated grooming with Supervision within 7 day(s).  2.  Patient will perform LE dressing with Contact Guard Assist within 7 day(s).  3.  Patient will perform UE dressing with Contact Guard Assist within 7 day(s).  4.  Patient will perform toilet transfers with Minimal Assist  within 7 day(s).  5.  Patient will perform all aspects of toileting with Contact Guard Assist within 7 day(s).  6.  Patient will participate in upper extremity therapeutic exercise/activities with Contact Guard Assist for 10 minutes within 7 day(s).    7.  Patient will utilize energy conservation techniques during functional activities with verbal cues within 7 day(s).  Outcome: Progressing   OCCUPATIONAL THERAPY EVALUATION    Patient: Jack Shukla (79 y.o. male)  Date: 7/7/2024  Primary Diagnosis: Acute hypoxic respiratory failure (HCC) [J96.01]         Precautions: Fall Risk                    ASSESSMENT :  The patient is limited by decreased functional mobility, independence in ADLs, high-level IADLs, ROM, strength, sensation, body mechanics, activity tolerance, endurance, coordination, balance, posture, fine-motor control.    Patient received reclined in bed,

## 2024-07-07 NOTE — PROGRESS NOTES
Stability: Not on file   Interpersonal Safety: Not At Risk (7/6/2024)    Interpersonal Safety Domain Source: IP Abuse Screening     Physical abuse: Denies     Verbal abuse: Denies     Emotional abuse: Denies     Financial abuse: Denies     Sexual abuse: Denies   Utilities: Not on file       Review of Systems:   A comprehensive review of systems was negative.       Vital Signs:    Last 24hrs VS reviewed since prior progress note. Most recent are:  Vitals:    07/07/24 1200   BP:    Pulse: 75   Resp:    Temp:    SpO2:          Intake/Output Summary (Last 24 hours) at 7/7/2024 1244  Last data filed at 7/7/2024 0600  Gross per 24 hour   Intake 1348.91 ml   Output 300 ml   Net 1048.91 ml        Physical Examination:     I had a face to face encounter with this patient and independently examined them on 7/7/2024 as outlined below:          General : alert x 3, awake, no acute distress,   HEENT: PEERL, EOMI, moist mucus membrane  Neck: supple, no JVD, no meningeal signs  Chest: mild b/l rhonchi   CVS: S1 S2 heard, Capillary refill less than 2 seconds  Abd: soft/ non tender, non distended, BS physiological,   Ext: no clubbing, no cyanosis, no edema, brisk 2+ DP pulses  Neuro/Psych: grossly non-focal  Skin: warm     Data Review:    Review and/or order of clinical lab test  Review and/or order of tests in the radiology section of CPT  Review and/or order of tests in the medicine section of CPT      I have personally and independently reviewed all pertinent labs, diagnostic studies, imaging, and have provided independent interpretation of the same.     Labs:     Recent Labs     07/06/24 1332 07/07/24  0206   WBC 3.9* 11.9*   HGB 11.7* 11.6*   HCT 36.5* 35.4*   * 126*     Recent Labs     07/06/24 1332 07/07/24  0206    144   K 3.6 3.8    108   CO2 33* 32   BUN 30* 27*     Recent Labs     07/06/24 1332 07/07/24  0206   ALT 16 15   GLOB 3.8 3.3     Recent Labs     07/06/24  1330 07/07/24  0206   INR 1.4* 1.5*  tablet 4 mg  4 mg Oral Q8H PRN    Or    ondansetron (ZOFRAN) injection 4 mg  4 mg IntraVENous Q6H PRN    polyethylene glycol (GLYCOLAX) packet 17 g  17 g Oral Daily PRN    acetaminophen (TYLENOL) tablet 650 mg  650 mg Oral Q6H PRN    Or    acetaminophen (TYLENOL) suppository 650 mg  650 mg Rectal Q6H PRN    0.9 % sodium chloride infusion   IntraVENous Continuous    lacosamide (VIMPAT) tablet 100 mg  100 mg Oral BID    levETIRAcetam (KEPPRA) tablet 1,000 mg  1,000 mg Oral BID    metoprolol tartrate (LOPRESSOR) tablet 25 mg  25 mg Oral BID    warfarin placeholder: dosing by pharmacy   Other RX Placeholder     ______________________________________________________________________  EXPECTED LENGTH OF STAY: 3  ACTUAL LENGTH OF STAY:          1                 Leida Davenport MD

## 2024-07-07 NOTE — PLAN OF CARE
Problem: Discharge Planning  Goal: Discharge to home or other facility with appropriate resources  7/7/2024 1705 by Bang Gunn RN  Outcome: Progressing  7/7/2024 0757 by Miriam Garvin RN  Outcome: Progressing  Flowsheets (Taken 7/6/2024 2000)  Discharge to home or other facility with appropriate resources:   Identify barriers to discharge with patient and caregiver   Arrange for needed discharge resources and transportation as appropriate   Identify discharge learning needs (meds, wound care, etc)     Problem: Safety - Adult  Goal: Free from fall injury  7/7/2024 1705 by Bang Gunn RN  Outcome: Progressing  7/7/2024 0757 by Miriam Garvin RN  Outcome: Progressing  Flowsheets (Taken 7/6/2024 2000)  Free From Fall Injury:   Instruct family/caregiver on patient safety   Based on caregiver fall risk screen, instruct family/caregiver to ask for assistance with transferring infant if caregiver noted to have fall risk factors     Problem: Skin/Tissue Integrity  Goal: Absence of new skin breakdown  Description: 1.  Monitor for areas of redness and/or skin breakdown  2.  Assess vascular access sites hourly  3.  Every 4-6 hours minimum:  Change oxygen saturation probe site  4.  Every 4-6 hours:  If on nasal continuous positive airway pressure, respiratory therapy assess nares and determine need for appliance change or resting period.  7/7/2024 1705 by Bang Gunn RN  Outcome: Progressing  7/7/2024 0757 by Miriam Garvin RN  Outcome: Progressing

## 2024-07-07 NOTE — PROGRESS NOTES
TRANSFER - OUT REPORT:    Verbal report given to Tenisha on Jack Enpriscilay  being transferred to [facility] for routine progression of patient care       Report consisted of patient's Situation, Background, Assessment and   Recommendations(SBAR).     Information from the following report(s) Nurse Handoff Report, Index, Intake/Output, MAR, and Cardiac Rhythm V paced  was reviewed with the receiving nurse.           Lines:   Peripheral IV 07/06/24 Left;Dorsal Forearm (Active)   Site Assessment Clean, dry & intact 07/07/24 1559   Line Status Infusing 07/07/24 1559   Line Care Connections checked and tightened 07/07/24 0900   Phlebitis Assessment No symptoms 07/07/24 0900   Infiltration Assessment 0 07/07/24 0900   Alcohol Cap Used Yes 07/07/24 0900   Dressing Status Clean, dry & intact 07/07/24 0900   Dressing Type Transparent 07/07/24 0900       Peripheral IV 07/06/24 Proximal;Right;Anterior Forearm (Active)   Site Assessment Clean, dry & intact 07/07/24 1559   Line Status Capped 07/07/24 1559   Line Care Cap changed 07/07/24 0900   Phlebitis Assessment No symptoms 07/07/24 0900   Infiltration Assessment 0 07/07/24 0900   Alcohol Cap Used Yes 07/07/24 0900   Dressing Status Clean, dry & intact 07/07/24 0900   Dressing Type Transparent 07/07/24 0900        Opportunity for questions and clarification was provided.      Patient transported with:  O2 @ 2lpm and Tech

## 2024-07-07 NOTE — PROGRESS NOTES
Aosyn dose adjustment  Indication:  pneumonia  Current regimen:  3.375gm q6h  Abx regimen: doxycycline, 4.5gm loading dose given  Recent Labs     24  1332 24  1341   WBC 3.9*  --    CREATININE 0.90 0.8   BUN 30*  --      Est CrCl: 85 ml/min  Temp (24hrs), Av.8 °F (36.6 °C), Min:97.8 °F (36.6 °C), Max:97.8 °F (36.6 °C)    Cultures: pending    Plan: Change to 3.375gm q8h with extended infusion

## 2024-07-07 NOTE — PROGRESS NOTES
1945-Bedside shift change report given to MODE Pineda (oncoming nurse) by MODE Russell (offgoing nurse). Report included the following information Nurse Handoff Report, Index, Adult Overview, Intake/Output, MAR, Recent Results, and Cardiac Rhythm V paced .     2000-Pt admitted, bathed, weaned O2  from 6 L NC to 4 L NC.    2021-MD notified of pt admission. Message read at this time.    2040-Pt on 2 L NC now. Caregiver from group home at bedside, medications from group home given to RN. Caregiver, [facility], phone number [phone]. Per caregiver, pt is his own decision maker with a living sister and niece, no phone numbers known at this time. Group home able to notify (?). Per the pt and caregiver, his sister is older than the pt and is living in a nursing home. Pt resting comfortably.  Group home , [facility], can be reached at [phone].    2343-Fluids held per MD as pt passed bedside swallow test. Drinking liquids well, no need for continuous fluids in CHF patient at this time per MD. Last known EF 45-50% (echo in 2020 per chart review).  Medication education given. Per pt, group home doesn't teach him regarding medications he is on or side effects. New education given, pt in agreement to take medications at this time.    0100-Dr. 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 RN requested MRSA swab for this admission.     0730-Bedside shift change report given to MODE Pinzon (oncoming nurse) by MODE Song (offgoing nurse). Report included the following information Nurse Handoff Report, Index, Adult Overview, Intake/Output, MAR, Recent Results, and Cardiac Rhythm V paced .

## 2024-07-07 NOTE — PLAN OF CARE
Problem: Physical Therapy - Adult  Goal: By Discharge: Performs mobility at highest level of function for planned discharge setting.  See evaluation for individualized goals.  Description: FUNCTIONAL STATUS PRIOR TO ADMISSION: Ambulated limited community distances Mod I with rollator; required some A with ADLs and IADLs; multiple recent falls    HOME SUPPORT PRIOR TO ADMISSION: Pt lives at a group home [facility] with 24hr s/a available    Physical Therapy Goals  Initiated 7/7/2024  1.  Patient will move from supine to sit and sit to supine in bed with independence within 7 day(s).    2.  Patient will perform sit to stand with modified independence within 7 day(s).  3.  Patient will transfer from bed to chair and chair to bed with modified independence using the least restrictive device within 7 day(s).  4.  Patient will ambulate with supervision/set-up for 150 feet with the least restrictive device within 7 day(s).     Outcome: Progressing  PHYSICAL THERAPY EVALUATION    Patient: Jack Shukla (79 y.o. male)  Date: 7/7/2024  Primary Diagnosis: Acute hypoxic respiratory failure (HCC) [J96.01]       Precautions: Restrictions/Precautions: Fall Risk                      ASSESSMENT :   Pt is a 78 y/o male with h/o developmental delay, seizure disorder, CHF, pacemaker, now admitted 7/6 for severe sepsis and acute hypoxic respiratory failure secondary to aspiration PNA following choking episode.  Pt seen for PT evaluation this date with fair tolerance to PT intervention and tolerated transition to upright positioning and OOBTC activity while remaining hemodynamically stable on RA with activity.  Pt does present at a mild decline from his functional baseline, now requiring overall CGA-Kayley for all mobility and presents with generalized endurance deficits, balance deficits, and generalized weakness.  Pt will benefit from ongoing acute PT intervention to further address functional deficits, maximize functioning,  10.1093/ptj/[id number]. PMID: 57861171.  3. Grant GALICIA, Anthony D, Sangeeta S, Jalen K, Smiley S. Activity Measure for Post-Acute Care \"6-Clicks\" Basic Mobility Scores Predict Discharge Destination After Acute Care Hospitalization in Select Patient Groups: A Retrospective, Observational Study. Arch Rehabil Res Clin Transl. 2022 Jul 16;4(3):882144. doi: 10.1016/j.arrct.2022.663041. PMID: 21020451; PMCID: [id number].  4. Chuck WOO, Elle S, Wendy W, Tanvi BANKS. AM-PAC Short Forms Manual 4.0. Revised 2/2020.                                                                                                                                                                                                                               Pain Rating:  No c/o pain    Activity Tolerance:   Fair ; O2: >93% on RA with all activity    After treatment:   Patient left in no apparent distress sitting up in chair, Call bell within reach, Bed/ chair alarm activated, and Caregiver / family present    COMMUNICATION/EDUCATION:   The patient's plan of care was discussed with: occupational therapist and registered nurse    Patient Education  Education Given To: Patient  Education Provided: Role of Therapy;Plan of Care  Education Method: Verbal  Barriers to Learning: Cognition  Education Outcome: Continued education needed    Thank you for this referral.  Jillian Wilson, PT  Minutes: 23      Physical Therapy Evaluation Charge Determination   History Examination Presentation Decision-Making   LOW Complexity : Zero comorbidities / personal factors that will impact the outcome / POC LOW Complexity : 1-2 Standardized tests and measures addressing body structure, function, activity limitation and / or participation in recreation  LOW Complexity : Stable, uncomplicated  AM-PAC  LOW    Based on the above components, the patient evaluation is determined to be of the following complexity level: Low

## 2024-07-07 NOTE — PROGRESS NOTES
4 Eyes Skin Assessment     NAME:  Jack Shukla  YOB: 1945  MEDICAL RECORD NUMBER:  [id number]    The patient is being assessed for  Admission    I agree that at least one RN has performed a thorough Head to Toe Skin Assessment on the patient. ALL assessment sites listed below have been assessed.      Areas assessed by both nurses:    Head, Face, Ears, Shoulders, Back, Chest, Arms, Elbows, Hands, Sacrum. Buttock, Coccyx, Ischium, Legs. Feet and Heels, and Under Medical Devices         Does the Patient have a Wound? Yes wound(s) were present on assessment. LDA wound assessment was Initiated and completed by RN       Wilton Prevention initiated by RN: Yes  Wound Care Orders initiated by RN: Yes    Pressure Injury (Stage 3,4, Unstageable, DTI, NWPT, and Complex wounds) if present, place Wound referral order by RN under : Yes  Nonblanching redness on sacrum, red scratch on L inner knee, red blotchy areas on bilat. Groin sites    New Ostomies, if present place, Ostomy referral order under : No     Nurse 1 eSignature: Electronically signed by Miriam Garvin RN on 7/6/24 at 9:11 PM EDT    **SHARE this note so that the co-signing nurse can place an eSignature**    Nurse 2 eSignature: Electronically signed by Kaia Chavez RN on 7/7/24 at 11:01 AM EDT

## 2024-07-07 NOTE — PROGRESS NOTES
Pharmacist Note - Warfarin Dosing  Consult provided for this 79 y.o.male to manage warfarin for Atrial Fibrillation    INR Goal: 2 - 3    Home regimen/ tablet size: 5mg daily except for 7.5mg on Wednesday    Drugs that may increase INR: None  Drugs that may decrease INR: None  Other current anticoagulants/ drugs that may increase bleeding risk: None  Risk factors: Age > 65  Daily INR ordered through: 7/13    Recent Labs     07/06/24  1330 07/06/24  1332 07/07/24  0206   HGB  --  11.7* 11.6*   INR 1.4*  --  1.5*     Date               INR                  Dose  7/6  1.4  5mg per RN med rec  7/7                   1.5                  7.5 mg                                                                               Assessment/ Plan:  Will order warfarin 7.5 mg PO x 1 for sub-therapeutic INR.    Pharmacy will continue to monitor daily and adjust therapy as indicated.  Please contact the pharmacist at [phone] for outpatient recommendations if needed.

## 2024-07-08 VITALS
WEIGHT: 186.73 LBS | OXYGEN SATURATION: 92 % | HEIGHT: 72 IN | HEART RATE: 82 BPM | RESPIRATION RATE: 20 BRPM | SYSTOLIC BLOOD PRESSURE: 109 MMHG | BODY MASS INDEX: 25.29 KG/M2 | DIASTOLIC BLOOD PRESSURE: 62 MMHG | TEMPERATURE: 99 F

## 2024-07-08 LAB
ANION GAP SERPL CALC-SCNC: 6 MMOL/L (ref 5–15)
BACTERIA SPEC CULT: NORMAL
BACTERIA SPEC CULT: NORMAL
BASOPHILS # BLD: 0 K/UL (ref 0–0.1)
BASOPHILS NFR BLD: 1 % (ref 0–1)
BUN SERPL-MCNC: 21 MG/DL (ref 6–20)
BUN/CREAT SERPL: 33 (ref 12–20)
CALCIUM SERPL-MCNC: 8.4 MG/DL (ref 8.5–10.1)
CHLORIDE SERPL-SCNC: 105 MMOL/L (ref 97–108)
CO2 SERPL-SCNC: 30 MMOL/L (ref 21–32)
CREAT SERPL-MCNC: 0.64 MG/DL (ref 0.7–1.3)
DIFFERENTIAL METHOD BLD: ABNORMAL
EOSINOPHIL # BLD: 0.2 K/UL (ref 0–0.4)
EOSINOPHIL NFR BLD: 2 % (ref 0–7)
ERYTHROCYTE [DISTWIDTH] IN BLOOD BY AUTOMATED COUNT: 14.6 % (ref 11.5–14.5)
GLUCOSE SERPL-MCNC: 89 MG/DL (ref 65–100)
HCT VFR BLD AUTO: 33.2 % (ref 36.6–50.3)
HGB BLD-MCNC: 10.7 G/DL (ref 12.1–17)
IMM GRANULOCYTES # BLD AUTO: 0 K/UL (ref 0–0.04)
IMM GRANULOCYTES NFR BLD AUTO: 0 % (ref 0–0.5)
INR PPP: 1.4 (ref 0.9–1.1)
LYMPHOCYTES # BLD: 1 K/UL (ref 0.8–3.5)
LYMPHOCYTES NFR BLD: 14 % (ref 12–49)
MAGNESIUM SERPL-MCNC: 2 MG/DL (ref 1.6–2.4)
MCH RBC QN AUTO: 31.9 PG (ref 26–34)
MCHC RBC AUTO-ENTMCNC: 32.2 G/DL (ref 30–36.5)
MCV RBC AUTO: 99.1 FL (ref 80–99)
MONOCYTES # BLD: 0.6 K/UL (ref 0–1)
MONOCYTES NFR BLD: 8 % (ref 5–13)
NEUTS SEG # BLD: 5.5 K/UL (ref 1.8–8)
NEUTS SEG NFR BLD: 75 % (ref 32–75)
NRBC # BLD: 0 K/UL (ref 0–0.01)
NRBC BLD-RTO: 0 PER 100 WBC
PLATELET # BLD AUTO: 113 K/UL (ref 150–400)
PMV BLD AUTO: 10.9 FL (ref 8.9–12.9)
POTASSIUM SERPL-SCNC: 3.2 MMOL/L (ref 3.5–5.1)
PROTHROMBIN TIME: 14.2 SEC (ref 9–11.1)
RBC # BLD AUTO: 3.35 M/UL (ref 4.1–5.7)
SERVICE CMNT-IMP: NORMAL
SODIUM SERPL-SCNC: 141 MMOL/L (ref 136–145)
WBC # BLD AUTO: 7.3 K/UL (ref 4.1–11.1)

## 2024-07-08 PROCEDURE — 83735 ASSAY OF MAGNESIUM: CPT

## 2024-07-08 PROCEDURE — 2500000003 HC RX 250 WO HCPCS: Performed by: STUDENT IN AN ORGANIZED HEALTH CARE EDUCATION/TRAINING PROGRAM

## 2024-07-08 PROCEDURE — G0378 HOSPITAL OBSERVATION PER HR: HCPCS

## 2024-07-08 PROCEDURE — 6370000000 HC RX 637 (ALT 250 FOR IP): Performed by: HOSPITALIST

## 2024-07-08 PROCEDURE — 6370000000 HC RX 637 (ALT 250 FOR IP): Performed by: STUDENT IN AN ORGANIZED HEALTH CARE EDUCATION/TRAINING PROGRAM

## 2024-07-08 PROCEDURE — 97530 THERAPEUTIC ACTIVITIES: CPT

## 2024-07-08 PROCEDURE — 97535 SELF CARE MNGMENT TRAINING: CPT

## 2024-07-08 PROCEDURE — 85025 COMPLETE CBC W/AUTO DIFF WBC: CPT

## 2024-07-08 PROCEDURE — 6360000002 HC RX W HCPCS: Performed by: STUDENT IN AN ORGANIZED HEALTH CARE EDUCATION/TRAINING PROGRAM

## 2024-07-08 PROCEDURE — 2580000003 HC RX 258: Performed by: HOSPITALIST

## 2024-07-08 PROCEDURE — 85610 PROTHROMBIN TIME: CPT

## 2024-07-08 PROCEDURE — 96366 THER/PROPH/DIAG IV INF ADDON: CPT

## 2024-07-08 PROCEDURE — 80048 BASIC METABOLIC PNL TOTAL CA: CPT

## 2024-07-08 PROCEDURE — 36415 COLL VENOUS BLD VENIPUNCTURE: CPT

## 2024-07-08 PROCEDURE — 2580000003 HC RX 258: Performed by: STUDENT IN AN ORGANIZED HEALTH CARE EDUCATION/TRAINING PROGRAM

## 2024-07-08 RX ORDER — WARFARIN SODIUM 5 MG/1
10 TABLET ORAL
Status: COMPLETED | OUTPATIENT
Start: 2024-07-08 | End: 2024-07-08

## 2024-07-08 RX ORDER — AMOXICILLIN AND CLAVULANATE POTASSIUM 875; 125 MG/1; MG/1
1 TABLET, FILM COATED ORAL EVERY 12 HOURS SCHEDULED
Status: DISCONTINUED | OUTPATIENT
Start: 2024-07-08 | End: 2024-07-08 | Stop reason: HOSPADM

## 2024-07-08 RX ORDER — AMOXICILLIN AND CLAVULANATE POTASSIUM 875; 125 MG/1; MG/1
1 TABLET, FILM COATED ORAL EVERY 12 HOURS SCHEDULED
Qty: 12 TABLET | Refills: 0 | Status: SHIPPED | OUTPATIENT
Start: 2024-07-09 | End: 2024-07-15

## 2024-07-08 RX ADMIN — AMOXICILLIN AND CLAVULANATE POTASSIUM 1 TABLET: 875; 125 TABLET, FILM COATED ORAL at 17:00

## 2024-07-08 RX ADMIN — DOXYCYCLINE 100 MG: 100 INJECTION, POWDER, LYOPHILIZED, FOR SOLUTION INTRAVENOUS at 10:05

## 2024-07-08 RX ADMIN — SODIUM CHLORIDE: 9 INJECTION, SOLUTION INTRAVENOUS at 07:13

## 2024-07-08 RX ADMIN — SODIUM CHLORIDE: 9 INJECTION, SOLUTION INTRAVENOUS at 01:39

## 2024-07-08 RX ADMIN — WARFARIN SODIUM 10 MG: 5 TABLET ORAL at 13:01

## 2024-07-08 RX ADMIN — PIPERACILLIN AND TAZOBACTAM 3375 MG: 3; .375 INJECTION, POWDER, LYOPHILIZED, FOR SOLUTION INTRAVENOUS at 01:40

## 2024-07-08 RX ADMIN — PIPERACILLIN AND TAZOBACTAM 3375 MG: 3; .375 INJECTION, POWDER, LYOPHILIZED, FOR SOLUTION INTRAVENOUS at 10:01

## 2024-07-08 RX ADMIN — LEVETIRACETAM 1000 MG: 500 TABLET, FILM COATED ORAL at 09:51

## 2024-07-08 RX ADMIN — LACOSAMIDE 100 MG: 50 TABLET, FILM COATED ORAL at 09:51

## 2024-07-08 RX ADMIN — POTASSIUM BICARBONATE 40 MEQ: 782 TABLET, EFFERVESCENT ORAL at 10:35

## 2024-07-08 NOTE — PROGRESS NOTES
17 g  17 g Oral Daily PRN    acetaminophen (TYLENOL) tablet 650 mg  650 mg Oral Q6H PRN    Or    acetaminophen (TYLENOL) suppository 650 mg  650 mg Rectal Q6H PRN    0.9 % sodium chloride infusion   IntraVENous Continuous    lacosamide (VIMPAT) tablet 100 mg  100 mg Oral BID    levETIRAcetam (KEPPRA) tablet 1,000 mg  1,000 mg Oral BID    metoprolol tartrate (LOPRESSOR) tablet 25 mg  25 mg Oral BID    warfarin placeholder: dosing by pharmacy   Other RX Placeholder     ______________________________________________________________________  EXPECTED LENGTH OF STAY: 2  ACTUAL LENGTH OF STAY:          2                 Leida Davenport MD

## 2024-07-08 NOTE — DISCHARGE SUMMARY
Discharge Summary       PATIENT ID: Jack Shukla  MRN: [id number]   YOB: 1945    DATE OF ADMISSION: 7/6/2024  7:58 PM    DATE OF DISCHARGE: ***   PRIMARY CARE PROVIDER: No primary care provider on file.     ATTENDING PHYSICIAN: ***  DISCHARGING PROVIDER: Leida Davenport MD    To contact this individual call [phone] and ask the  to page.  If unavailable ask to be transferred the Adult Hospitalist Department.    CONSULTATIONS: IP WOUND CARE NURSE CONSULT TO EVAL  IP CONSULT TO PHARMACY    PROCEDURES/SURGERIES: * No surgery found *     ADMITTING DIAGNOSES & HOSPITAL COURSE:   ***        DISCHARGE DIAGNOSES / PLAN:       ***       PENDING TEST RESULTS:   At the time of discharge the following test results are still pending: ***    FOLLOW UP APPOINTMENTS:    Follow-up Information    None           ADDITIONAL CARE RECOMMENDATIONS: ***    DIET: {diet:95558}  Oral Nutritional Supplements: {NUTRITION SUPPLEMENTS:502879}{Frequencies; times a day:93609}    ACTIVITY: {discharge activity:84164}    WOUND CARE: ***    EQUIPMENT needed: ***      DISCHARGE MEDICATIONS:     Medication List        ASK your doctor about these medications      bumetanide 1 MG tablet  Commonly known as: BUMEX     collagenase 250 UNIT/GM ointment     erythromycin with ethanol 2 % external solution  Commonly known as: THERAMYCIN     lacosamide 100 MG Tabs tablet  Commonly known as: VIMPAT     levETIRAcetam 1000 MG tablet  Commonly known as: KEPPRA     magnesium oxide 400 (240 Mg) MG tablet  Commonly known as: MAG-OX     metoprolol tartrate 25 MG tablet  Commonly known as: LOPRESSOR     potassium chloride 10 MEQ extended release tablet  Commonly known as: KLOR-CON     vitamin B-12 1000 MCG tablet  Commonly known as: CYANOCOBALAMIN     Vitamin D3 25 MCG Tabs     * warfarin 5 MG tablet  Commonly known as: COUMADIN     * warfarin 2.5 MG tablet  Commonly known as: COUMADIN  Start taking on: July 10, 2024           * This list

## 2024-07-08 NOTE — PROGRESS NOTES
Reviewed discharge instructions with pt, given AVS. He has no questions at this time. Discharge meds delivered to room from pharmacy     0621 - pt discharged back to [facility] home

## 2024-07-08 NOTE — WOUND CARE
WOCN Note:     New consult placed for assessment of sacrum and left posterior knee.    Chart reviewed.  Assessed in 556/01.    Jack Shukla is a 79 y.o. y/o male who presented for Acute hypoxic respiratory failure   Admitted on 7/6/2024    Lab Results   Component Value Date/Time    WBC 7.3 07/08/2024 06:40 AM    POCGLU 74 07/06/2024 01:41 PM    HGB 10.7 (L) 07/08/2024 06:40 AM    HCT 33.2 (L) 07/08/2024 06:40 AM     (L) 07/08/2024 06:40 AM        Tobacco Use      Smoking status: Not on file      Smokeless tobacco: Not on file     ADULT DIET; Regular     Assessment:   Patient is A&O x 3, communicative and sitting up in recliner.    Bed: foam mattress  GI/: brief    Patient reports no pain.   Heels offloaded with pillows.   Heels intact without erythema.      Wound Assessment  POA Left posterior knee scar.  Left open to air.  POA Sacrum, blanching red erythema.  Covered with foam dressing.    Wound, Pressure Prevention & Skin Care Recommendations:    Minimize layers of linen/pads under patient to optimize support surface.    2.  Turn/reposition approximately every 2 hours and offload heels.   3.  Manage moisture/ Keep skin folds clean and dry/minimize brief usage.  4. Sacrum:  Protect with foam dressing.  Change as needed.      Discussed above plan with patient and RN.    Transition of Care:   Plan to sign off, reconsult if needed.    RYAN LeeN RN [facility] Inpatient [facility]  Available on [employer]  Office [phone]

## 2024-07-08 NOTE — PLAN OF CARE
Problem: Discharge Planning  Goal: Discharge to home or other facility with appropriate resources  7/8/2024 1631 by Sherine Turcios RN  Outcome: Completed  Flowsheets (Taken 7/8/2024 1615)  Discharge to home or other facility with appropriate resources: Identify barriers to discharge with patient and caregiver  7/8/2024 1529 by Aneta Miller RN  Outcome: Progressing  Flowsheets (Taken 7/8/2024 0952)  Discharge to home or other facility with appropriate resources:   Identify barriers to discharge with patient and caregiver   Arrange for needed discharge resources and transportation as appropriate   Identify discharge learning needs (meds, wound care, etc)     Problem: Safety - Adult  Goal: Free from fall injury  7/8/2024 1631 by Sherine Turcios RN  Outcome: Completed  7/8/2024 1529 by Aneta Miller RN  Outcome: Progressing     Problem: Skin/Tissue Integrity  Goal: Absence of new skin breakdown  Description: 1.  Monitor for areas of redness and/or skin breakdown  2.  Assess vascular access sites hourly  3.  Every 4-6 hours minimum:  Change oxygen saturation probe site  4.  Every 4-6 hours:  If on nasal continuous positive airway pressure, respiratory therapy assess nares and determine need for appliance change or resting period.  7/8/2024 1631 by Sherine Turcios, RN  Outcome: Completed  7/8/2024 1529 by Aneta Miller RN  Outcome: Progressing     Problem: ABCDS Injury Assessment  Goal: Absence of physical injury  7/8/2024 1631 by Sherine Turcios, RN  Outcome: Completed  7/8/2024 1529 by Aneta Miller RN  Outcome: Progressing

## 2024-07-08 NOTE — DISCHARGE INSTRUCTIONS
Treated for aspiration pneumonia due to choking, which resolved.  Complete antibiotics as prescribed.   [Time Spent: ___ minutes] : I have spent [unfilled] minutes of time on the encounter.

## 2024-07-08 NOTE — PLAN OF CARE
Problem: Physical Therapy - Adult  Goal: By Discharge: Performs mobility at highest level of function for planned discharge setting.  See evaluation for individualized goals.  Description: FUNCTIONAL STATUS PRIOR TO ADMISSION: Ambulated limited community distances Mod I with rollator; required some A with ADLs and IADLs; multiple recent falls    HOME SUPPORT PRIOR TO ADMISSION: Pt lives at a group home [facility] with 24hr s/a available    Physical Therapy Goals  Initiated 7/7/2024  1.  Patient will move from supine to sit and sit to supine in bed with independence within 7 day(s).    2.  Patient will perform sit to stand with modified independence within 7 day(s).  3.  Patient will transfer from bed to chair and chair to bed with modified independence using the least restrictive device within 7 day(s).  4.  Patient will ambulate with supervision/set-up for 150 feet with the least restrictive device within 7 day(s).     Outcome: Progressing       PHYSICAL THERAPY TREATMENT    Patient: Jack Shukla (79 y.o. male)  Date: 7/8/2024  Diagnosis: Acute hypoxic respiratory failure (HCC) [J96.01] Acute hypoxic respiratory failure (HCC)      Precautions: Fall Risk                      ASSESSMENT:  Patient continues to benefit from skilled PT services and is progressing towards goals. Patient demonstrated improved transfers and gait with use of rollator. Patient ambulated with flexed trunk but did not need assist with walker management.   Patient stated that he only ambulates for limited distance with rollator at home.         PLAN:  Patient continues to benefit from skilled intervention to address the above impairments.  Continue treatment per established plan of care.    Recommend with staff: therapy recommendations for staff: Recommend mobility with staff assist x1 using rolling walker.      Recommendation for discharge: (in order for the patient to meet his/her long term goals): Therapy 2x a week in the

## 2024-07-08 NOTE — PROGRESS NOTES
Pharmacist Note - Warfarin Dosing  Consult provided for this 79 y.o.male to manage warfarin for Atrial Fibrillation    INR Goal: 2 - 3    Home regimen/ tablet size: 5mg daily except for 7.5mg on Wednesday    Drugs that may increase INR: None  Drugs that may decrease INR: None  Other current anticoagulants/ drugs that may increase bleeding risk: None  Risk factors: Age > 65  Daily INR ordered through: 7/13    Recent Labs     07/06/24  1330 07/06/24  1332 07/07/24  0206 07/08/24  0532 07/08/24  0640   HGB  --  11.7* 11.6*  --  10.7*   INR 1.4*  --  1.5* 1.4*  --      Date               INR                  Dose  7/6  1.4  5mg per RN med rec  7/7                   1.5                  7.5 mg  7/8  1.4  10 mg                                                                                Assessment/ Plan:  Will order warfarin 10 mg PO x 1 for sub-therapeutic INR.    Pharmacy will continue to monitor daily and adjust therapy as indicated.  Please contact the pharmacist at [phone] for outpatient recommendations if needed.

## 2024-07-08 NOTE — CARE COORDINATION
CM discussed patient with attending and in IDRs. Patient is medically ready for discharge.     CM attempted to call [facility], supervisor is [facility] Machado, [phone], CM left voicemail requesting a return call to discuss HH and transport back to the group home.     1:35PM: CM attempted to call [facility], supervisor is Gr[facility] Machado, [phone], MARLY left voicemail.    Patient can not return to Group Home until CM has reached someone from the Group Home and confirmed they can accept patient back.     There are no other contact names or numbers listed in this patients chart.     3:10PM: patient states noone is at the group home until 3:30PM or after as the employees work multiple jobs. CM will continue to attempt to call [facility] after 3:30PM.    3:40PM: CM called [facility], she is able to accept patient back today, the group home will provide transportation this afternoon.     Nursing and attending updated.     [facility] offered for HH, CM sent a referral to [facility].     [facility] accepted patient, CM added to AVS.     Mariluz Nixon, MODE/CRM

## 2024-07-08 NOTE — PLAN OF CARE
Problem: Occupational Therapy - Adult  Goal: By Discharge: Performs self-care activities at highest level of function for planned discharge setting.  See evaluation for individualized goals.  Description: FUNCTIONAL STATUS PRIOR TO ADMISSION:  Patient lives in group home- has been there since December 2023. Has 24/7 supervision and can have some additional ADL support if needed. Typically he can dress himself and wash up (does not shower). Staff assists with IADL including cooking/cleaning and medication management. Uses a RW or a rollator, per staff at bedside during eval, staff encourages safe transfer techniques but patient requires cueing     HOME SUPPORT: Patient lived in group home with staff.    Occupational Therapy Goals:  Initiated 7/7/2024  1.  Patient will perform seated grooming with Supervision within 7 day(s).  2.  Patient will perform LE dressing with Contact Guard Assist within 7 day(s).  3.  Patient will perform UE dressing with Contact Guard Assist within 7 day(s).  4.  Patient will perform toilet transfers with Minimal Assist  within 7 day(s).  5.  Patient will perform all aspects of toileting with Contact Guard Assist within 7 day(s).  6.  Patient will participate in upper extremity therapeutic exercise/activities with Contact Guard Assist for 10 minutes within 7 day(s).    7.  Patient will utilize energy conservation techniques during functional activities with verbal cues within 7 day(s).  Outcome: Progressing   OCCUPATIONAL THERAPY TREATMENT  Patient: Jack Shukla (79 y.o. male)  Date: 7/8/2024  Primary Diagnosis: Acute hypoxic respiratory failure (HCC) [J96.01]       Precautions: Fall Risk                Chart, occupational therapy assessment, plan of care, and goals were reviewed.    ASSESSMENT  Patient continues to benefit from skilled OT services and is progressing towards goals. Pt progressing with functional reach to LE, with more success in supported sitting versus sitting EOB. Pt

## 2024-07-10 ENCOUNTER — TELEPHONE (OUTPATIENT)
Age: 79
End: 2024-07-10

## 2024-07-10 NOTE — TELEPHONE ENCOUNTER
Nahid with [facility] is calling to receive confirmation that you are willing to follow care. Nahid can be contacted at [phone], Ext [phone].    Thank you

## 2024-07-11 ENCOUNTER — TELEPHONE (OUTPATIENT)
Age: 79
End: 2024-07-11

## 2024-07-11 LAB
BACTERIA SPEC CULT: NORMAL
BACTERIA SPEC CULT: NORMAL
SERVICE CMNT-IMP: NORMAL
SERVICE CMNT-IMP: NORMAL

## 2024-07-11 NOTE — TELEPHONE ENCOUNTER
Hi Loc Leyva from [facility] group home asked if you can place a referral for SLP she stated that this was recommend after his most recent hospital visit. He is scheduled for a hospital follow up on July 23rd but she is requesting this referral prior to the appointment.

## 2024-07-11 NOTE — TELEPHONE ENCOUNTER
Care Transitions Initial Follow Up Call    Outreach made within 2 business days of discharge: Yes    Patient: Jack Shukla Patient : 1945   MRN: [id number]  Reason for Admission: There are no discharge diagnoses documented for the most recent discharge.  Discharge Date: 24       Spoke with: [facility]    Discharge department/facility: [facility]    TCM Interactive Patient Contact:  Was patient able to fill all prescriptions: Yes  Was patient instructed to bring all medications to the follow-up visit: Yes  Is patient taking all medications as directed in the discharge summary? Yes  Does patient understand their discharge instructions: Yes  Does patient have questions or concerns that need addressed prior to 7-14 day follow up office visit: no    Scheduled appointment with PCP within 7-14 days    Follow Up  Future Appointments   Date Time Provider Department Center   2024 11:00 AM MRM MEDICATION MGMT [facility]   2024 10:00 AM Viviane Bassett MD [facility] AMB   2025  1:30 PM Lexa Carrillo APRN - NP [facility] AMB       Matias Gustafson RN

## 2024-07-17 ENCOUNTER — ANTI-COAG VISIT (OUTPATIENT)
Facility: HOSPITAL | Age: 79
End: 2024-07-17
Payer: MEDICARE

## 2024-07-17 DIAGNOSIS — I48.91 ATRIAL FIBRILLATION, UNSPECIFIED TYPE (HCC): Primary | ICD-10-CM

## 2024-07-17 LAB
INTERNATIONAL NORMALIZATION RATIO, POC: 1.6 (ref 2–3)
PROTHROMBIN TIME, POC: 19.3

## 2024-07-17 PROCEDURE — 85610 PROTHROMBIN TIME: CPT

## 2024-07-17 PROCEDURE — 99211 OFF/OP EST MAY X REQ PHY/QHP: CPT

## 2024-07-17 NOTE — PATIENT INSTRUCTIONS
Depending on the test results, your doctor or anticoagulation clinic may adjust your dose of warfarin.    Follow-up care is a key part of your treatment and safety. Be sure to make and go to all appointments, and call your doctor if you are having problems. It's also a good idea to know your test results and keep a list of the medicines you take.    How can you care for yourself at home?  Take warfarin safely  Take your warfarin at the same time each day.  If you miss a dose of warfarin, don't take an extra dose to make up for it. Your doctor can tell you exactly what to do so you don't take too much or too little.  Wear medical alert jewelry that lets others know that you take warfarin. You can buy this at most drugstores.  Don't take warfarin if you are pregnant or planning to get pregnant. Talk to your doctor about how you can prevent getting pregnant while you are taking it.  Don't change your dose or stop taking warfarin unless your doctor tells you to.  Effects of medicines and food on warfarin  Don't start or stop taking any medicines, vitamins, or natural remedies unless you first talk to your doctor. Many medicines can affect how warfarin works. These include aspirin and other pain relievers, over-the-counter medicines, multivitamins, dietary supplements, and herbal products.  Tell all of your doctors and pharmacists that you take warfarin. Some prescription medicines can affect how warfarin works.  Keep the amount of vitamin K in your diet about the same from day to day. Do not suddenly eat a lot more or a lot less food that is rich in vitamin K than you usually do. Vitamin K affects how warfarin works and how your blood clots. Talk with your doctor before making big changes in your diet. Vitamin K is in many foods, such as:  Leafy greens, such as kale, cabbage, spinach, Swiss chard, and lettuce.  Canola and soybean oils.  Green vegetables, such as asparagus, broccoli, and [place] sprouts.  Vegetable

## 2024-07-17 NOTE — PROGRESS NOTES
Jason barker [facility]      For Pharmacy Admin Tracking Only    Intervention Detail: Adherence Monitorin and Lab(s) Ordered  Total # of Interventions Recommended: 2  Total # of Interventions Accepted: 2  Time Spent (min): 20

## 2024-07-23 ENCOUNTER — OFFICE VISIT (OUTPATIENT)
Age: 79
End: 2024-07-23
Payer: MEDICARE

## 2024-07-23 VITALS
RESPIRATION RATE: 18 BRPM | HEART RATE: 75 BPM | BODY MASS INDEX: 26.04 KG/M2 | DIASTOLIC BLOOD PRESSURE: 71 MMHG | SYSTOLIC BLOOD PRESSURE: 110 MMHG | OXYGEN SATURATION: 98 % | WEIGHT: 192.02 LBS

## 2024-07-23 DIAGNOSIS — Z09 HOSPITAL DISCHARGE FOLLOW-UP: Primary | ICD-10-CM

## 2024-07-23 DIAGNOSIS — J69.0 ASPIRATION PNEUMONIA OF LEFT LOWER LOBE DUE TO REGURGITATED FOOD (HCC): ICD-10-CM

## 2024-07-23 DIAGNOSIS — R13.10 DYSPHAGIA, UNSPECIFIED TYPE: ICD-10-CM

## 2024-07-23 DIAGNOSIS — T17.308D: ICD-10-CM

## 2024-07-23 PROCEDURE — 3074F SYST BP LT 130 MM HG: CPT

## 2024-07-23 PROCEDURE — 99214 OFFICE O/P EST MOD 30 MIN: CPT

## 2024-07-23 PROCEDURE — 99212 OFFICE O/P EST SF 10 MIN: CPT

## 2024-07-23 PROCEDURE — 1123F ACP DISCUSS/DSCN MKR DOCD: CPT

## 2024-07-23 PROCEDURE — 3078F DIAST BP <80 MM HG: CPT

## 2024-07-24 ENCOUNTER — ANTI-COAG VISIT (OUTPATIENT)
Facility: HOSPITAL | Age: 79
End: 2024-07-24
Payer: MEDICARE

## 2024-07-24 DIAGNOSIS — I48.91 ATRIAL FIBRILLATION, UNSPECIFIED TYPE (HCC): Primary | ICD-10-CM

## 2024-07-24 LAB
INTERNATIONAL NORMALIZATION RATIO, POC: 1.7 (ref 2–3)
PROTHROMBIN TIME, POC: 20.1

## 2024-07-24 PROCEDURE — 85610 PROTHROMBIN TIME: CPT

## 2024-07-24 PROCEDURE — 99212 OFFICE O/P EST SF 10 MIN: CPT

## 2024-07-24 RX ORDER — WARFARIN SODIUM 2.5 MG/1
TABLET ORAL
Qty: 10 TABLET | Refills: 2 | Status: SHIPPED | OUTPATIENT
Start: 2024-07-24

## 2024-07-24 RX ORDER — WARFARIN SODIUM 5 MG/1
TABLET ORAL
Qty: 30 TABLET | Refills: 2 | Status: SHIPPED | OUTPATIENT
Start: 2024-07-24

## 2024-07-24 NOTE — PROGRESS NOTES
Pharmacy Progress Note - Anticoagulation Management    S/O:  Mr. Jack Shukla  is a 79 y.o. male seen today for anticoagulation management for the diagnosis of Atrial Fibrillation.     HPI: At last visit (7/17), the patient's INR was 1.6 and subtherapeutic for an INR goal of 2.0-3.0. Group home representative (Loc Cramer) reported the patient's diet was consistent with vitamin K foods. Patient's MAR reflected patient was ordered Augmentin 875/125 mg po BID for 6 days (ended 7/16 per group home representative). Patient's MAR reflected patient received warfarin 2.5 mg instead of 7.5 mg on 7/3. Per chart review, patient was hospitalized 7/6-7/7 for pneumonia and he did not receive warfarin doses during his hospitalization. Since patient has some missed warfarin doses, patient will continue warfarin 7.5 mg Wed; 5 mg daily ROW. Patient will recheck INR in 1 week(s). Patient was not given an extra dose of warfarin today (7/17) since patient lives in a group and receives his medications via a blister pack. If patient's INR remains subtherapeutic at next INR check, the patient's warfarin regimen will be adjusted.     Interim History:    Warfarin start date: Prior to 10/9/20 per chart review  INR Goal:  2.0-3.0    Current warfarin regimen: 7.5 mg Wed; 5 mg daily ROW  Warfarin tablet strength:   2.5 mg, 5 mg  Duration of therapy: Indefinite      Results for orders placed or performed in visit on 07/24/24   POCT INR   Result Value Ref Range    Prothrombin time,(POC) 20.1     INR,(POC) 1.7 (A) 2 - 3       Today's pertinent positives includes:  No significant changes since last visit      Adherence:   Able to recall regimen? YES - Warfarin regimen was entered correctly on the patient's MAR  Miss/extra dose? YES  Need refill? NO    Unclear if patient missed any doses of warfarin therapy. The group home representative reports she was certain patient received warfarin 7.5 mg last Wednesday, since she or her supervisor

## 2024-07-24 NOTE — PATIENT INSTRUCTIONS
Today your INR was 1.6 .      Your goal INR is  2.0-3.0 .    You have a   2.5 mg and 5 mg tablet of Coumadin (warfarin).   Take Coumadin (warfarin) as follows:    Take 7.5 mg every Wednesday, Friday; and 5 mg all other days of the week.      Come back in 2 week(s) for your next finger stick/INR blood test.        Avoid any over the counter items containing aspirin or ibuprofen, and avoid great swings in general diet.  Avoid alcohol consumption.  Please notify the INR pharmacist if you are started on any new medication including over the counter or herbal supplements. Also, please notify your INR pharmacist if any of your other prescription or over the counter medications have been discontinued.     Call [facility] at [phone] if you have any signs of abnormal bleeding/blood clot.  ------------------------------------------------------------------------------------------------------------------  Taking Warfarin Safely: Care Instructions    Your Care Instructions  Warfarin is a medicine that you take to prevent blood clots. It is often called a blood thinner. Doctors give warfarin (such as Coumadin) to reduce the risk of blood clots. You may be at risk for blood clots if you have atrial fibrillation or deep vein thrombosis. Some other health problems may also put you at risk.  Warfarin slows the amount of time it takes for your blood to clot. It can cause bleeding problems. Even if you've been taking warfarin for a while, it's important to know how to take it safely.  Foods and other medicines can affect the way warfarin works. Some can make warfarin work too well. This can cause bleeding problems. And some can make it work poorly, so that it does not prevent blood clots very well.  You will need regular blood tests to check how long it takes for your blood to form a clot. This test is called a PT or prothrombin time test. The result of the test is called an INR

## 2024-07-25 NOTE — PROGRESS NOTES
[facility]   [address] [place] [address]   Office [phone], Fax [phone]      Chief Complaint:     Chief Complaint   Patient presents with    Follow-up       Subjective:   HPI:  Jack Shukla is a 79 y.o. male with past medical history of developmental delay, seizure disorder, CHF, on pacemaker that presents for: hospital discharge follow up.   Patient was admitted for Severe sepsis, Aspiration pneumonia Left LL, Acute hypoxic respiratory failure on 07/06/24 after a witnessed choking episode and was discharged on 07/11/2024.      Current concerns: difficulty in speech and swallowing. The nurse accompanying the patient reports that she has had to make his food softer and into smaller pieces to help him swallow better. She has also noticed that he has more drooling from his mouth since his hospitalization. Patient reports difficulty in speaking since his hospitalization.   Patient does not report of SOB, chest pain, fever, nausea or vomiting.     ROS: reviewed and were negative except as described in HPI.     Past medical history, social history, medications, and allergies personally reviewed.      Medications:   Current Outpatient Medications   Medication Sig    bumetanide (BUMEX) 1 MG tablet Take 2 tablets by mouth daily    magnesium oxide (MAG-OX) 400 (240 Mg) MG tablet Take 1 tablet by mouth daily    metoprolol tartrate (LOPRESSOR) 25 MG tablet Take 1 tablet by mouth 2 times daily    collagenase 250 UNIT/GM ointment Apply topically daily Apply topically to L posterior knee daily.    warfarin (COUMADIN) 2.5 MG tablet Take 1 tablet by mouth Once a week at 5 PM Per pt records, receives one 2.5mg tablet on Wednesday evenings    erythromycin with ethanol (THERAMYCIN) 2 % external solution Apply topically daily Apply topically to toes daily.    warfarin (COUMADIN) 5 MG tablet TAKE 1 TABLET (5 MG) BY MOUTH EVERY MONDAY, 
I saw and evaluated the patient, performing the key elements of the service on the date of service.  I discussed the findings, assessment and plan with the resident and agree with the resident's findings and plan as documented in the resident's note.     Sheba Lucero MD 7/25/2024   
I saw and evaluated the patient, performing the key elements of the service on the date of service.  I discussed the findings, assessment and plan with the resident and agree with the resident's findings and plan as documented in the resident's note.     Sheba Lucero MD 7/25/2024   
[facility] and transferred to [facility]  07/6 to 07/11-choking incident and had an abreaction with acute pneumonia. He was giving an antibiotic. He states its just hard to talk.    Chief Complaint   Patient presents with    Follow-up       
years Vaccine (2 of 2 - PCV) 10/18/2016    COVID-19 Vaccine (4 - 2023-24 season) 09/01/2023    Annual Wellness Visit (Medicare Advantage)  01/01/2024          Objective:   Vitals reviewed.  There were no vitals taken for this visit.     Physical Exam:  General Alert. No distress. Not diaphoretic. No jaundice, cyanosis, pallor.    HENT Head Normocephalic and atraumatic.   Eyes Conjunctivae pink, no discharge. No scleral icterus.     Cardio Normal rate, regular rhythm. No murmur, gallop, or friction rub. No chest wall tenderness.    Pulmonary Effort normal. Breath sounds normal. No respiratory distress.   No wheezes, rales, or rhonchi. No Stridor.    Abdominal Soft. Bowel sounds normal. No distension. No tenderness.        Extremities No edema of lower extremities. No tenderness.   Neurological No focal deficits.   Skin Skin is warm and dry. No rash noted. No erythema.    Psychiatric Mood, affect, and judgment normal.        Assessment/Plan:   There are no diagnoses linked to this encounter.        Follow up:   Follow up in *** for ***    Pt was seen and discussed with  ***  (attending physician).    I have reviewed pertinent labs results and other data. I have discussed the diagnosis with the patient and the intended plan as seen in the above orders. The patient has received an after-visit summary and questions were answered concerning future plans. I have discussed medication side effects and warnings with the patient as well.    Viviane Bassett MD  Resident [facility]  07/22/24

## 2024-07-29 ENCOUNTER — TELEPHONE (OUTPATIENT)
Age: 79
End: 2024-07-29

## 2024-07-29 NOTE — TELEPHONE ENCOUNTER
Attempted to return call.  Left generic voice mail advising her to call back.   We have no record of Diabetes Diagnosed by our office.  I did identify where Type 2 diabetes was used during a hospitalization diagnosis at either [facility] or [facility] ([facility] facility) from between March and May 2024, but do not have the hospital discharge summary to verify the diagnosis.  This may have been noted as Hx of Diabetes, without objective data for confirmation.  Most recent A1c and glucoses noted below  would not be consistent with diagnosis of Dibates. .     Hemoglobin A1C   Date Value Ref Range Status   11/30/2021 5.2 4.0 - 5.6 % Final     Comment:     NEW METHOD PLEASE NOTE NEW REFERENCE RANGE  (NOTE)  HbA1C Interpretive Ranges  <5.7              Normal  5.7 - 6.4         Consider Prediabetes  >6.5              Consider Diabetes

## 2024-07-29 NOTE — TELEPHONE ENCOUNTER
Loc from [facility] [place] called wanting to know if the paperwork that was faxed by patient's dentist was completed. She stated that the forms should have been sent on last Thursday or Friday before patient's appointment. The dentist facility is [facility] and they are needing the forms prior to his appointment with them on 8/1.    Thanks!

## 2024-07-29 NOTE — TELEPHONE ENCOUNTER
Rissa Toribio with [facility], pt's , called to inquire about pt's diagnosis. She stated that the group home reports that pt is a diabetic, but she has not seen any document or medications to support the information that the group home provided. She is requesting a returned phone call to inform her if pt is a diabetic. Mrs. Toribio's contact number is [phone].    Thank you

## 2024-07-30 NOTE — TELEPHONE ENCOUNTER
Mrs. Toribio apologized for missing your phone call yesterday. She asked that you return phone call again.    Thank you

## 2024-07-31 ENCOUNTER — HOSPITAL ENCOUNTER (OUTPATIENT)
Facility: HOSPITAL | Age: 79
Discharge: HOME | End: 2024-08-03
Payer: MEDICARE

## 2024-07-31 DIAGNOSIS — T17.308D: ICD-10-CM

## 2024-07-31 DIAGNOSIS — R13.10 DYSPHAGIA, UNSPECIFIED TYPE: ICD-10-CM

## 2024-07-31 PROCEDURE — 74230 X-RAY XM SWLNG FUNCJ C+: CPT

## 2024-07-31 PROCEDURE — 92611 MOTION FLUOROSCOPY/SWALLOW: CPT

## 2024-07-31 NOTE — PROGRESS NOTES
[facility]  SPEECH PATHOLOGY MODIFIED BARIUM SWALLOW STUDY  Patient: Jack Shukla (79 y.o. male)  Date: 7/31/2024  Referring Provider:  Lois    SUBJECTIVE:   Patient lives at a group home due to Developmental Delay.   She reports that the staff was driving the patient in [place], when he choked on a hamburger.  He needed the Heimlich to clear the burger.  The closest hospital was [facility].    He was hypoxic and on 8L nonrebreather.  Due to his severity of respiratory failure, he was transferred to [facility].    Chest CT: multi-focal PNA, mostly lower lobe.   He was discharged 7/8/24.    Caregiver reports increased drooling at group home since that time and difficulty speaking. She has been chopping his food up.    OBJECTIVE:   Past Medical History:   Past Medical History:   Diagnosis Date    A-fib (HCC) 2015    pacemaker L chest     CAD (coronary artery disease) 2015    Pacemaker    Epilepsy ([facility])     Heart disease     Hypertension     Incontinence     Leg swelling     Mental retardation, 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)     Other ill-defined conditions(799.89)     edema legs    S/P ablation of atrial fibrillation 11/13/2020    S/P biventricular cardiac pacemaker procedure 10/23/2015    10/23/15 [employer] biventricular pacemaker inmplant    Screen for colon cancer 9/27/2012     Past Surgical History:   Procedure Laterality Date    ABLATE L/R ATRIAL FIBRIL W/ISOLATED PULM VEIN N/A 11/13/2020    Ablation Following A-Fib  Addl performed by Jude Regalado MD at [facility] CARDIAC CATH LAB    COLONOSCOPY  04/05/2017    EPHYS EVL TRNSPTL TX ATRIAL FIB ISOLAT PULM VEIN N/A 11/13/2020    ABLATION A-FIB  W COMPLETE EP STUDY performed by Jude Regalado MD at [facility] CARDIAC CATH LAB    INTRACARD ECHO, THER/DX INTERVENT N/A 11/13/2020    Intracardiac Echocardiogram performed by Jude Regalado MD at [facility] CARDIAC CATH LAB    INTRACARDIAC ELECTROPHYSIOLOGIC 3D MAPPING N/A 11/13/2020    Ep 3d Mapping

## 2024-08-01 NOTE — TELEPHONE ENCOUNTER
Paperwork completed and fax returned.  Given his cardiologist is managing the patients anticoagulation, I deferred recommendation to them.

## 2024-08-07 ENCOUNTER — ANTI-COAG VISIT (OUTPATIENT)
Facility: HOSPITAL | Age: 79
End: 2024-08-07
Payer: MEDICARE

## 2024-08-07 DIAGNOSIS — I48.91 ATRIAL FIBRILLATION, UNSPECIFIED TYPE (HCC): Primary | ICD-10-CM

## 2024-08-07 LAB
INTERNATIONAL NORMALIZATION RATIO, POC: 1.6 (ref 2–3)
PROTHROMBIN TIME, POC: 19.4

## 2024-08-07 PROCEDURE — 99213 OFFICE O/P EST LOW 20 MIN: CPT

## 2024-08-07 PROCEDURE — 85610 PROTHROMBIN TIME: CPT

## 2024-08-07 RX ORDER — WARFARIN SODIUM 2.5 MG/1
TABLET ORAL
Qty: 12 TABLET | Refills: 0 | Status: SHIPPED | OUTPATIENT
Start: 2024-08-07

## 2024-08-07 NOTE — PATIENT INSTRUCTIONS
an INR level. Depending on the test results, your doctor or anticoagulation clinic may adjust your dose of warfarin.    Follow-up care is a key part of your treatment and safety. Be sure to make and go to all appointments, and call your doctor if you are having problems. It's also a good idea to know your test results and keep a list of the medicines you take.    How can you care for yourself at home?  Take warfarin safely  Take your warfarin at the same time each day.  If you miss a dose of warfarin, don't take an extra dose to make up for it. Your doctor can tell you exactly what to do so you don't take too much or too little.  Wear medical alert jewelry that lets others know that you take warfarin. You can buy this at most drugstores.  Don't take warfarin if you are pregnant or planning to get pregnant. Talk to your doctor about how you can prevent getting pregnant while you are taking it.  Don't change your dose or stop taking warfarin unless your doctor tells you to.  Effects of medicines and food on warfarin  Don't start or stop taking any medicines, vitamins, or natural remedies unless you first talk to your doctor. Many medicines can affect how warfarin works. These include aspirin and other pain relievers, over-the-counter medicines, multivitamins, dietary supplements, and herbal products.  Tell all of your doctors and pharmacists that you take warfarin. Some prescription medicines can affect how warfarin works.  Keep the amount of vitamin K in your diet about the same from day to day. Do not suddenly eat a lot more or a lot less food that is rich in vitamin K than you usually do. Vitamin K affects how warfarin works and how your blood clots. Talk with your doctor before making big changes in your diet. Vitamin K is in many foods, such as:  Leafy greens, such as kale, cabbage, spinach, Swiss chard, and lettuce.  Canola and soybean oils.  Green vegetables, such as asparagus, broccoli, and [place]

## 2024-08-07 NOTE — PROGRESS NOTES
Pharmacy Progress Note - Anticoagulation Management    S/O:  Mr. Jack Shukla  is a 79 y.o. male seen today for anticoagulation management for the diagnosis of Atrial Fibrillation.     HPI: At last visit (7/24), the patient's INR was 1.7 and subtherapeutic for an INR goal of 2.0-3.0. The group home representative (Loc Cramer) reported the patient's diet was consistent with vitamin K foods. Patient's MAR did not reflect any medication changes. The group home representative reported she was certain patient received warfarin 7.5 mg last Wednesday, since she or her supervisor contacted [facility] pharmacy to have the warfarin 2.5 mg dose sent to the group home. The pharmacist (Srinivas) from [facility] pharmacy stated [facility] did not send warfarin 2.5 mg for the month of July thus far (no dose for 7/3, 7/10, 7/17). Over the past 7 days, patient has taken either 35 mg or 37.5 mg of warfarin. Patient will increase weekly warfarin dose by 7% or 14%. Patient will take warfarin 7.5 mg Wed, Fri; 5 mg daily ROW. Patient will recheck INR in 2 week(s). Informed group representative of the importance of patient receiving warfarin doses as ordered and clear documentation for any warfarin doses not received. Informed group home representative, when patient's INR is subtherapeutic, it places the patient at risk for a clot and when patient's INR is supratherapeutic, it places patient at risk for bleeding. Informed group home representative, in order for the Med Management Clinic to effectively manage patient's warfarin therapy, the clinic requires an accurate account of all warfarin doses administered versus not administered. Recommended group home use a paper MAR to better track warfarin administration.     Interim History:    Warfarin start date: Prior to 10/9/20 per chart review  INR Goal:  2.0-3.0    Current warfarin regimen: 7.5 mg Wed, Fri; 5 mg daily ROW  Warfarin tablet strength:   2.5 mg, 5 mg  Duration of therapy:

## 2024-08-12 LAB
EKG ATRIAL RATE: 78 BPM
EKG DIAGNOSIS: NORMAL
EKG P-R INTERVAL: 176 MS
EKG Q-T INTERVAL: 404 MS
EKG QRS DURATION: 102 MS
EKG QTC CALCULATION (BAZETT): 460 MS
EKG R AXIS: 57 DEGREES
EKG T AXIS: -75 DEGREES
EKG VENTRICULAR RATE: 78 BPM

## 2024-08-20 DIAGNOSIS — I48.91 ATRIAL FIBRILLATION, UNSPECIFIED TYPE (HCC): ICD-10-CM

## 2024-08-20 DIAGNOSIS — J69.0 ASPIRATION PNEUMONITIS (HCC): Primary | ICD-10-CM

## 2024-08-20 DIAGNOSIS — G40.89 OTHER SEIZURES (HCC): ICD-10-CM

## 2024-08-20 DIAGNOSIS — I50.9 HEART FAILURE, UNSPECIFIED HF CHRONICITY, UNSPECIFIED HEART FAILURE TYPE (HCC): ICD-10-CM

## 2024-08-20 NOTE — PROGRESS NOTES
Reviewed and Signed Home Health Certification/Recertification and Care Plan (see attached)    Jack Shukla  1945     Agency: [employer]  Medicare #: [id number]  Medical Record #: [id number]  Provider #: [id number]    SOC Date: 7/12/24  Certification Period: 7/12/2024-9/9/2024  Last F2F: 7/23/2024    Services: PT/OT    Bryan Abreu MD  8/20/2024 8:20 AM

## 2024-08-21 ENCOUNTER — ANTI-COAG VISIT (OUTPATIENT)
Facility: HOSPITAL | Age: 79
End: 2024-08-21
Payer: MEDICARE

## 2024-08-21 DIAGNOSIS — I48.91 ATRIAL FIBRILLATION, UNSPECIFIED TYPE (HCC): Primary | ICD-10-CM

## 2024-08-21 LAB
INTERNATIONAL NORMALIZATION RATIO, POC: 1.8 (ref 2–3)
PROTHROMBIN TIME, POC: 21.3

## 2024-08-21 PROCEDURE — 85610 PROTHROMBIN TIME: CPT

## 2024-08-21 PROCEDURE — 99213 OFFICE O/P EST LOW 20 MIN: CPT

## 2024-08-21 RX ORDER — WARFARIN SODIUM 2.5 MG/1
TABLET ORAL
Qty: 20 TABLET | Refills: 0 | Status: SHIPPED | OUTPATIENT
Start: 2024-08-21

## 2024-08-21 NOTE — PATIENT INSTRUCTIONS
Today your INR was 1.8 .      Your goal INR is  2.0-3.0 .    You have a   2.5 mg and 5 mg tablet of Coumadin (warfarin).   Take Coumadin (warfarin) as follows:    Take 5 mg every Tuesday, Thursday; and 7.5 mg all other days of the week.      Come back in 3 week(s) for your next finger stick/INR blood test.        Avoid any over the counter items containing aspirin or ibuprofen, and avoid great swings in general diet.  Avoid alcohol consumption.  Please notify the INR pharmacist if you are started on any new medication including over the counter or herbal supplements. Also, please notify your INR pharmacist if any of your other prescription or over the counter medications have been discontinued.     Call [facility] at [phone] if you have any signs of abnormal bleeding/blood clot.  ------------------------------------------------------------------------------------------------------------------  Taking Warfarin Safely: Care Instructions    Your Care Instructions  Warfarin is a medicine that you take to prevent blood clots. It is often called a blood thinner. Doctors give warfarin (such as Coumadin) to reduce the risk of blood clots. You may be at risk for blood clots if you have atrial fibrillation or deep vein thrombosis. Some other health problems may also put you at risk.  Warfarin slows the amount of time it takes for your blood to clot. It can cause bleeding problems. Even if you've been taking warfarin for a while, it's important to know how to take it safely.  Foods and other medicines can affect the way warfarin works. Some can make warfarin work too well. This can cause bleeding problems. And some can make it work poorly, so that it does not prevent blood clots very well.  You will need regular blood tests to check how long it takes for your blood to form a clot. This test is called a PT or prothrombin time test. The result of the test is called an INR

## 2024-08-21 NOTE — PROGRESS NOTES
Pharmacy Progress Note - Anticoagulation Management    S/O:  Mr. Jack Shukla  is a 79 y.o. male seen today for anticoagulation management for the diagnosis of Atrial Fibrillation.     HPI: At last visit (8/7), the patient's INR was 1.6 and subtherapeutic for an INR goal of 2.0-3.0. The group home representative (Loc Cramer) reported the patient's diet was consistent with vitamin K foods. Patient's MAR did not reflect any medication changes. The group home representative reported patient received all warfarin doses as instructed (group home representative reported she confirmed prior to patient's INR check today (8/7)). Over the past 7 days, patient has taken 40 mg of warfarin. Patient will increase weekly warfarin dose by ~6%. Patient will take warfarin 7.5 mg Mon, Wed, Fri; 5 mg daily ROW. Patient will recheck INR in 2 week(s).    Interim History:    Warfarin start date: Prior to 10/9/20 per chart review  INR Goal:  2.0-3.0    Current warfarin regimen: 7.5 mg Mon, Wed, Fri; 5 mg daily ROW  Warfarin tablet strength:   2.5 mg, 5 mg  Duration of therapy: Indefinite      Results for orders placed or performed in visit on 08/21/24   POCT INR   Result Value Ref Range    Prothrombin time,(POC) 21.3     INR,(POC) 1.8 (A) 2.0 - 3.0       Today's pertinent positives includes:  No significant changes since last visit      Adherence:   Able to recall regimen? YES - Warfarin regimen was entered correctly on the patient's MAR  Miss/extra dose? NO  Need refill? YES    Upcoming procedure(s):  N/A    Past Medical History:   Diagnosis Date    A-fib ([facility]) 2015    pacemaker L chest     CAD (coronary artery disease) 2015    Pacemaker    Epilepsy ([facility])     Heart disease     Hypertension     Incontinence     Leg swelling     Mental retardation, 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)     Other ill-defined conditions(799.89)     edema legs    S/P ablation of atrial fibrillation 11/13/2020    S/P biventricular cardiac pacemaker procedure 10/23/2015

## 2024-09-11 ENCOUNTER — TELEPHONE (OUTPATIENT)
Facility: HOSPITAL | Age: 79
End: 2024-09-11

## 2024-09-12 ENCOUNTER — TELEPHONE (OUTPATIENT)
Facility: HOSPITAL | Age: 79
End: 2024-09-12

## 2024-09-18 ENCOUNTER — ANTI-COAG VISIT (OUTPATIENT)
Facility: HOSPITAL | Age: 79
End: 2024-09-18
Payer: MEDICARE

## 2024-09-18 DIAGNOSIS — I48.91 ATRIAL FIBRILLATION, UNSPECIFIED TYPE (HCC): Primary | ICD-10-CM

## 2024-09-18 LAB
INTERNATIONAL NORMALIZATION RATIO, POC: 5.2 (ref 2–3)
PROTHROMBIN TIME, POC: 62.6

## 2024-09-18 PROCEDURE — 99213 OFFICE O/P EST LOW 20 MIN: CPT

## 2024-09-18 PROCEDURE — 85610 PROTHROMBIN TIME: CPT

## 2024-09-18 RX ORDER — WARFARIN SODIUM 2.5 MG/1
TABLET ORAL
Qty: 8 TABLET | Refills: 0 | Status: SHIPPED | OUTPATIENT
Start: 2024-09-18

## 2024-09-18 RX ORDER — WARFARIN SODIUM 5 MG/1
TABLET ORAL
Qty: 28 TABLET | Refills: 0 | Status: SHIPPED | OUTPATIENT
Start: 2024-09-18

## 2024-10-03 ENCOUNTER — ANTI-COAG VISIT (OUTPATIENT)
Facility: HOSPITAL | Age: 79
End: 2024-10-03
Payer: MEDICARE

## 2024-10-03 DIAGNOSIS — I48.91 ATRIAL FIBRILLATION, UNSPECIFIED TYPE (HCC): Primary | ICD-10-CM

## 2024-10-03 LAB
INTERNATIONAL NORMALIZATION RATIO, POC: 2.3 (ref 2–3)
PROTHROMBIN TIME, POC: 27.3

## 2024-10-03 PROCEDURE — 85610 PROTHROMBIN TIME: CPT

## 2024-10-03 PROCEDURE — 99211 OFF/OP EST MAY X REQ PHY/QHP: CPT

## 2024-10-03 NOTE — PATIENT INSTRUCTIONS
Today your INR was 2.3 .      Your goal INR is  2.0-3.0 .    You have a   2.5 mg and 5 mg tablet of Coumadin (warfarin).   Take Coumadin (warfarin) as follows:    Take 7.5 mg every Monday, Wednesday; and 5 mg all other days of the week.      Come back in 2 week(s) for your next finger stick/INR blood test.        Avoid any over the counter items containing aspirin or ibuprofen, and avoid great swings in general diet.  Avoid alcohol consumption.  Please notify the INR pharmacist if you are started on any new medication including over the counter or herbal supplements. Also, please notify your INR pharmacist if any of your other prescription or over the counter medications have been discontinued.     Call [facility] at [phone] if you have any signs of abnormal bleeding/blood clot.  ------------------------------------------------------------------------------------------------------------------  Taking Warfarin Safely: Care Instructions    Your Care Instructions  Warfarin is a medicine that you take to prevent blood clots. It is often called a blood thinner. Doctors give warfarin (such as Coumadin) to reduce the risk of blood clots. You may be at risk for blood clots if you have atrial fibrillation or deep vein thrombosis. Some other health problems may also put you at risk.  Warfarin slows the amount of time it takes for your blood to clot. It can cause bleeding problems. Even if you've been taking warfarin for a while, it's important to know how to take it safely.  Foods and other medicines can affect the way warfarin works. Some can make warfarin work too well. This can cause bleeding problems. And some can make it work poorly, so that it does not prevent blood clots very well.  You will need regular blood tests to check how long it takes for your blood to form a clot. This test is called a PT or prothrombin time test. The result of the test is called an INR

## 2024-10-03 NOTE — PROGRESS NOTES
Pharmacy Progress Note - Anticoagulation Management    S/O:  Mr. Jack Shukla  is a 79 y.o. male seen today for anticoagulation management for the diagnosis of Atrial Fibrillation.     HPI: At last visit (9/18), the patient's INR was 5.2 and supratherapeutic for an INR goal of 2.0-3.0. The group home representative (Loc Cramer) reported the patient's diet was consistent with vitamin K foods. Patient's MAR did not reflect any medication changes. The group home representative reported patient received all warfarin doses as instructed. Will reduce weekly warfarin dose from 47.5 mg to 40 mg (~ 16%) and patient will hold warfarin doses today (9/18/24) and tomorrow (9/19/24). Starting Friday (9/20/24) patient will take warfarin 7.5 mg Mon, Wed; 5 mg daily ROW. Patient will recheck INR in 2 week(s).    Interim History:    Warfarin start date: Prior to 10/9/20 per chart review  INR Goal:  2.0-3.0    Current warfarin regimen: hold warfarin doses today (9/18/24) and tomorrow (9/19/24). Starting Friday (9/20/24) patient will take warfarin 7.5 mg Mon, Wed; 5 mg daily ROW.  Warfarin tablet strength:   2.5 mg, 5 mg  Duration of therapy: Indefinite      Results for orders placed or performed in visit on 10/03/24   POCT INR   Result Value Ref Range    Prothrombin time,(POC) 27.3     INR,(POC) 2.3 2.0 - 3.0       Today's pertinent positives includes:  No significant changes since last visit      Adherence:   Able to recall regimen? YES - Warfarin regimen was entered correctly on the patient's MAR  Miss/extra dose? NO  Need refill? NO - Always send new prescription to [facility] with dose change    Upcoming procedure(s):  N/A    Past Medical History:   Diagnosis Date    A-fib (HCC) 2015    pacemaker L chest     CAD (coronary artery disease) 2015    Pacemaker    Epilepsy (HCC)     Heart disease     Hypertension     Incontinence     Leg swelling     Mental retardation, 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)     Other ill-defined conditions(139.89)

## 2024-10-10 ENCOUNTER — TELEPHONE (OUTPATIENT)
Facility: HOSPITAL | Age: 79
End: 2024-10-10

## 2024-10-10 NOTE — TELEPHONE ENCOUNTER
Medication Management Clinic - Medication Refill    Sita with [facility] contacted the [facility]. Sita reported based on patient's current prescription he does not have enough warfarin 2.5 mg tablets to get him through Thursday, October 17th (patient's next INR check appointment). Sita took a verbal prescription to refill warfarin 2.5 mg every Monday and Wednesday with a quantity of 8 tablets for 1 month.     Thank you.  Jason Joshua, PharmD    For Pharmacy Admin Tracking Only    Intervention Detail: Refill(s) Provided  Total # of Interventions Recommended: 1  Total # of Interventions Accepted: 1  Time Spent (min): 15

## 2024-10-17 ENCOUNTER — TELEPHONE (OUTPATIENT)
Facility: HOSPITAL | Age: 79
End: 2024-10-17

## 2024-10-17 NOTE — TELEPHONE ENCOUNTER
Medication Management Clinic - Reschedule Appointment     Patient missed his INR check appointment today (10/17). Contacted patient's group home representative (Marlene Neely). Patient's INR check appointment was rescheduled to Monday, October 21, 2024 at 10:00 am.     Thank you.  Jason Joshua, PharmD

## 2024-10-18 NOTE — PROGRESS NOTES
Pharmacy Progress Note - Anticoagulation Management    S/O:  Mr. Jack Shukla  is a 79 y.o. male seen today for anticoagulation management for the diagnosis of Atrial Fibrillation.     HPI: At last visit (10/3), the patient's INR was 2.3 and therapeutic for an INR goal of 2.0-3.0. The group home representative (Loc Cramer) reported the patient's diet was consistent with vitamin K foods. Patient's MAR reflected the discontinuation of Santyl ointment. Patient's MAR reflected patient receiving warfarin 5 mg yesterday (10/2) instead of warfarin 7.5 mg. The group home representative confirmed patient receiving warfarin 5 mg yesterday (10/2). Since, patient's INR is therapeutic today (10/3) and per the MAR patient received all other doses of warfarin as instructed, patient will continue warfarin 7.5 mg Mon, Wed; 5 mg daily ROW and recheck INR in 2 week(s).    Interim History:    Warfarin start date: Prior to 10/9/20 per chart review  INR Goal:  2.0-3.0    Current warfarin regimen: 7.5 mg Mon, Wed; 5 mg daily ROW  Warfarin tablet strength:   2.5 mg, 5 mg  Duration of therapy: Indefinite      Results for orders placed or performed in visit on 10/21/24   POCT INR   Result Value Ref Range    Prothrombin time,(POC) 20.0     INR,(POC) 1.7 (A) 2.0 - 3.0       Today's pertinent positives includes:  No significant changes since last visit      Adherence:   Able to recall regimen? YES - Warfarin regimen was entered correctly on the patient's MAR  Miss/extra dose? NO  Need refill? YES - Always send [facility] prescription to [facility] with dose change    Upcoming procedure(s):  N/A    Past Medical History:   Diagnosis Date    A-fib (HCC) 2015    pacemaker L chest     CAD (coronary artery disease) 2015    Pacemaker    Epilepsy (HCC)     Heart disease     Hypertension     Incontinence     Leg swelling     Mental retardation, 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)     Other ill-defined conditions(799.89)     edema legs    S/P ablation of atrial fibrillation

## 2024-10-18 NOTE — PATIENT INSTRUCTIONS
Today your INR was 1.7 .      Your goal INR is  2.0-3.0 .    You have a   2.5 mg and 5 mg tablet of Coumadin (warfarin).   Take Coumadin (warfarin) as follows:    Take 7.5 mg every Monday, Wednesday, Friday; and 5 mg all other days of the week.      Come back in 2 week(s) for your next finger stick/INR blood test.        Avoid any over the counter items containing aspirin or ibuprofen, and avoid great swings in general diet.  Avoid alcohol consumption.  Please notify the INR pharmacist if you are started on any new medication including over the counter or herbal supplements. Also, please notify your INR pharmacist if any of your other prescription or over the counter medications have been discontinued.     Call [facility] at [phone] if you have any signs of abnormal bleeding/blood clot.  ------------------------------------------------------------------------------------------------------------------  Taking Warfarin Safely: Care Instructions    Your Care Instructions  Warfarin is a medicine that you take to prevent blood clots. It is often called a blood thinner. Doctors give warfarin (such as Coumadin) to reduce the risk of blood clots. You may be at risk for blood clots if you have atrial fibrillation or deep vein thrombosis. Some other health problems may also put you at risk.  Warfarin slows the amount of time it takes for your blood to clot. It can cause bleeding problems. Even if you've been taking warfarin for a while, it's important to know how to take it safely.  Foods and other medicines can affect the way warfarin works. Some can make warfarin work too well. This can cause bleeding problems. And some can make it work poorly, so that it does not prevent blood clots very well.  You will need regular blood tests to check how long it takes for your blood to form a clot. This test is called a PT or prothrombin time test. The result of the test is called

## 2024-10-21 ENCOUNTER — ANTI-COAG VISIT (OUTPATIENT)
Facility: HOSPITAL | Age: 79
End: 2024-10-21
Payer: MEDICARE

## 2024-10-21 DIAGNOSIS — I48.91 ATRIAL FIBRILLATION, UNSPECIFIED TYPE (HCC): Primary | ICD-10-CM

## 2024-10-21 LAB
INTERNATIONAL NORMALIZATION RATIO, POC: 1.7 (ref 2–3)
PROTHROMBIN TIME, POC: 20

## 2024-10-21 PROCEDURE — 99213 OFFICE O/P EST LOW 20 MIN: CPT

## 2024-10-21 PROCEDURE — 85610 PROTHROMBIN TIME: CPT

## 2024-10-21 RX ORDER — WARFARIN SODIUM 5 MG/1
TABLET ORAL
Qty: 28 TABLET | Refills: 0 | Status: SHIPPED | OUTPATIENT
Start: 2024-10-21

## 2024-10-21 RX ORDER — WARFARIN SODIUM 2.5 MG/1
TABLET ORAL
Qty: 12 TABLET | Refills: 0 | Status: SHIPPED | OUTPATIENT
Start: 2024-10-21

## 2024-10-31 ENCOUNTER — TELEPHONE (OUTPATIENT)
Facility: HOSPITAL | Age: 79
End: 2024-10-31

## 2024-10-31 DIAGNOSIS — I48.91 ATRIAL FIBRILLATION, UNSPECIFIED TYPE (HCC): Primary | ICD-10-CM

## 2024-10-31 NOTE — TELEPHONE ENCOUNTER
Medication Management Clinic      Received updated anticoagulation referral. Referral scanned in chart and order entered in EPIC.     Thank you.  Jason Joshua, ValdoD

## 2024-11-01 NOTE — PATIENT INSTRUCTIONS
sprouts.  Vegetable drinks, green tea leaves, and some dietary supplement drinks.  Avoid cranberry juice and other cranberry products. They can increase the effects of warfarin.  Limit your use of alcohol.    Avoid bleeding by preventing falls and injuries  Wear slippers or shoes with nonskid soles.  Remove throw rugs and clutter.  Rearrange furniture and electrical cords to keep them out of walking paths.  Keep stairways, porches, and outside walkways well lit. Use night-lights in hallways and bathrooms.  Be extra careful when you work with sharp tools or knives.    When should you call for help?  Call 911 anytime you think you may need emergency care. For example, call if:  You have a sudden, severe headache that is different from past headaches.  Call your doctor now or seek immediate medical care if:  You have any abnormal bleeding, such as:  Nosebleeds.  Vaginal bleeding that is different (heavier, more frequent, at a different time of the month) than what you are used to.  Bloody or black stools, or rectal bleeding.  Bloody or pink urine.  Watch closely for changes in your health, and be sure to contact your doctor if you have any problems.    Where can you learn more?  Go to http://www.MongoSluice.net/[employer].  Enter N655 in the search box to learn more about \"Taking Warfarin Safely: Care Instructions.\"  Current as of: January 27, 2016  Content Version: 11.1  © 0733-6297 [employer]. Care instructions adapted under license by [employer] (which disclaims liability or warranty for this information). If you have questions about a medical condition or this instruction, always ask your healthcare professional. [employer] disclaims any warranty or liability for your use of this information.

## 2024-11-04 ENCOUNTER — ANTI-COAG VISIT (OUTPATIENT)
Facility: HOSPITAL | Age: 79
End: 2024-11-04
Payer: MEDICARE

## 2024-11-04 DIAGNOSIS — I48.91 ATRIAL FIBRILLATION, UNSPECIFIED TYPE (HCC): Primary | ICD-10-CM

## 2024-11-04 LAB
INTERNATIONAL NORMALIZATION RATIO, POC: 2.1 (ref 2–3)
PROTHROMBIN TIME, POC: 25.3

## 2024-11-04 PROCEDURE — 99211 OFF/OP EST MAY X REQ PHY/QHP: CPT

## 2024-11-04 PROCEDURE — 85610 PROTHROMBIN TIME: CPT

## 2024-11-12 DIAGNOSIS — I10 PRIMARY HYPERTENSION: ICD-10-CM

## 2024-11-12 DIAGNOSIS — I89.0 LYMPHEDEMA OF BOTH LOWER EXTREMITIES: ICD-10-CM

## 2024-11-13 RX ORDER — BUMETANIDE 2 MG/1
2 TABLET ORAL DAILY
Qty: 30 TABLET | Status: SHIPPED | OUTPATIENT
Start: 2024-11-13

## 2024-11-13 RX ORDER — METOPROLOL TARTRATE 25 MG/1
25 TABLET, FILM COATED ORAL 2 TIMES DAILY
Qty: 60 TABLET | Status: SHIPPED | OUTPATIENT
Start: 2024-11-13

## 2024-11-18 ENCOUNTER — TELEPHONE (OUTPATIENT)
Facility: HOSPITAL | Age: 79
End: 2024-11-18

## 2024-11-18 DIAGNOSIS — G40.909 SEIZURE DISORDER (HCC): ICD-10-CM

## 2024-11-18 RX ORDER — LACOSAMIDE 100 MG/1
100 TABLET ORAL 2 TIMES DAILY
Qty: 60 TABLET | Refills: 5 | Status: SHIPPED | OUTPATIENT
Start: 2024-11-18 | End: 2025-05-17

## 2024-11-18 NOTE — TELEPHONE ENCOUNTER
Medication Management Clinic - Reschedule Appointment     Returned patient's group home representative (Loc Cramer) call. Group home representative requested to reschedule patient's INR check appointment. Patient's appointment has been rescheduled to Wednesday, November 20, 2024 at 8:45 am.     Thank you.  Jason Joshua, PharmD    For Pharmacy Admin Tracking Only    Program: Medication Management  CPA in place:  Yes  Recommendation Provided To: Patient/Caregiver: 1 via Telephone  Intervention Detail: Scheduled Appointment  Intervention Accepted By: Patient/Caregiver: 1  Time Spent (min): 10

## 2024-11-20 ENCOUNTER — ANTI-COAG VISIT (OUTPATIENT)
Facility: HOSPITAL | Age: 79
End: 2024-11-20
Payer: MEDICARE

## 2024-11-20 DIAGNOSIS — I48.91 ATRIAL FIBRILLATION, UNSPECIFIED TYPE (HCC): Primary | ICD-10-CM

## 2024-11-20 LAB
INTERNATIONAL NORMALIZATION RATIO, POC: 1.8 (ref 2–3)
PROTHROMBIN TIME, POC: 21.9

## 2024-11-20 PROCEDURE — 99212 OFFICE O/P EST SF 10 MIN: CPT

## 2024-11-20 PROCEDURE — 85610 PROTHROMBIN TIME: CPT

## 2024-11-20 RX ORDER — WARFARIN SODIUM 5 MG/1
TABLET ORAL
Qty: 28 TABLET | Refills: 0 | Status: SHIPPED | OUTPATIENT
Start: 2024-11-20

## 2024-11-20 RX ORDER — WARFARIN SODIUM 2.5 MG/1
TABLET ORAL
Qty: 12 TABLET | Refills: 0 | Status: SHIPPED | OUTPATIENT
Start: 2024-11-20

## 2024-11-20 NOTE — PROGRESS NOTES
Pharmacy Progress Note - Anticoagulation Management    S/O:  Mr. Jack Shukla  is a 79 y.o. male seen today for anticoagulation management for the diagnosis of Atrial Fibrillation.     HPI: At last visit (11/4), the patient's INR was 2.1 and therapeutic for an INR goal of 2.0 - 3.0.The group home representative (Loc Cramer) reported the patient's diet was consistent with vitamin K foods. Patient's MAR reflected no medication changes and received all doses appropriately. Loc also confirmed patient received all doses appropriately. Patient will continue current warfarin regimen of 7.5 mg every Mon, Wed, Fri; 5 mg all other days and recheck INR in 2 week(s).    Interim History:    Warfarin start date: Prior to 10/9/20 per chart review  INR Goal:  2.0-3.0    Current warfarin regimen: 7.5 mg Mon, Wed, Fri; 5 mg daily ROW  Warfarin tablet strength:   2.5 mg, 5 mg  Duration of therapy: Indefinite      Results for orders placed or performed in visit on 11/20/24   POCT INR   Result Value Ref Range    Prothrombin time,(POC) 21.9     INR,(POC) 1.8 (A) 2.0 - 3.0       Today's pertinent positives includes:  Medication change    Patient's MAR reflects the following medication changes: amoxicillin 500 mg TID for 7 days (started 11/13/24), ciclopirox solution - apply once daily to affected nail, ketoconazole cream - apply to feet once daily, and ibuprofen 400 mg TID prn pain or inflammation.    Adherence:   Able to recall regimen? YES - Warfarin regimen was entered correctly on the patient's MAR  Miss/extra dose? YES, the group home representative (Loc Cramer) reports patient missed one warfarin dose on 11/14  Need refill? NO - Always send new prescription to [facility] with dose change    Upcoming procedure(s):  N/A    Past Medical History:   Diagnosis Date    A-fib (HCC) 2015    pacemaker L chest     CAD (coronary artery disease) 2015    Pacemaker    Epilepsy (HCC)     Heart disease     Hypertension     Incontinence     Leg

## 2024-12-05 ENCOUNTER — ANTI-COAG VISIT (OUTPATIENT)
Facility: HOSPITAL | Age: 79
End: 2024-12-05
Payer: MEDICARE

## 2024-12-05 DIAGNOSIS — I48.91 ATRIAL FIBRILLATION, UNSPECIFIED TYPE (HCC): Primary | ICD-10-CM

## 2024-12-05 LAB
INTERNATIONAL NORMALIZATION RATIO, POC: 1.5 (ref 2–3)
PROTHROMBIN TIME, POC: 17.7

## 2024-12-05 PROCEDURE — 99212 OFFICE O/P EST SF 10 MIN: CPT

## 2024-12-05 PROCEDURE — 85610 PROTHROMBIN TIME: CPT

## 2024-12-05 NOTE — PROGRESS NOTES
05:32 AM    K 3.2 07/08/2024 05:32 AM     07/08/2024 05:32 AM    CO2 30 07/08/2024 05:32 AM    BUN 21 07/08/2024 05:32 AM    GFRAA >60 11/30/2021 11:30 AM    GLOB 3.3 07/07/2024 02:06 AM    ALT 15 07/07/2024 02:06 AM     CrCl cannot be calculated (Unknown ideal weight.).      INR History:   (normal INR range 0.8-1.2)     Date   INR   PT   Dose/Comments  12/05/24 1.5  17.7 7.5 mg Mon, Wed, Fri; 5 mg daily ROW  11/20/24 1.8  21.9 7.5 mg Mon, Wed, Fri; 5 mg daily ROW  11/04/24 2.1  25.3 7.5 mg 10/21 then resume 7.5 mg Mon, Wed, Fri; 5 mg daily ROW  10/21/24 1.7  20.0 7.5 mg Mon, Wed; 5 mg daily ROW  10/03/24 2.3  27.3 hold doses 9/18, 9/19. Starting 9/20 take 7.5 mg Mon, Wed; 5 mg daily ROW  09/18/24 5.2  62.6 5 mg Tue, Thur; 7.5 mg daily ROW  08/21/24 1.8  21.3 7.5 mg Mon, Wed, Fri; 5 mg daily ROW  08/07/24 1.6  19.4 7.5 mg Wed, Fri; 5 mg daily ROW  07/24/24 1.7  20.1 7.5 mg Wed; 5 mg daily ROW  07/17/24 1.6  19.3 7.5 mg Wed; 5 mg daily ROW  06/26/24 1.7  21.0 5 mg daily    Medications that can increase  INR: amiodarone  Medications that can decrease INR: None    A/P:       Anticoagulation:  Considering Mr. Shukla's past history, todays findings, and per Anticoagulation Collaborative Practice Agreement/Protocol:    Fingerstick POC INR (1.5) is Subtherapeutic for INR goal today.   Change warfarin to 10 mg today (12/5) and then starting tomorrow (12/6) resume 7.5 mg Mon, Wed, Fri; 5 mg daily ROW  Patient's INR is 1.5 and subtherapeutic for an INR goal of 2.0-3.0. The group home representative (Loc Cramer) reports patient's diet was consistent with vitamin K foods. Patient's November MAR reflects the discontinuation of ibuprofen. The group home representative reports patient missed one warfarin dose on Tuesday (12/3). Since patient missed his warfarin dose on Tuesday, patient will take warfarin 10 mg today (12/5) and then starting tomorrow (12/6) resume warfarin 7.5 mg Mon, Wed, Fri; 5 mg daily ROW. Patient

## 2024-12-05 NOTE — PATIENT INSTRUCTIONS
oils.  Green vegetables, such as asparagus, broccoli, and [place] sprouts.  Vegetable drinks, green tea leaves, and some dietary supplement drinks.  Avoid cranberry juice and other cranberry products. They can increase the effects of warfarin.  Limit your use of alcohol.    Avoid bleeding by preventing falls and injuries  Wear slippers or shoes with nonskid soles.  Remove throw rugs and clutter.  Rearrange furniture and electrical cords to keep them out of walking paths.  Keep stairways, porches, and outside walkways well lit. Use night-lights in hallways and bathrooms.  Be extra careful when you work with sharp tools or knives.    When should you call for help?  Call 911 anytime you think you may need emergency care. For example, call if:  You have a sudden, severe headache that is different from past headaches.  Call your doctor now or seek immediate medical care if:  You have any abnormal bleeding, such as:  Nosebleeds.  Vaginal bleeding that is different (heavier, more frequent, at a different time of the month) than what you are used to.  Bloody or black stools, or rectal bleeding.  Bloody or pink urine.  Watch closely for changes in your health, and be sure to contact your doctor if you have any problems.    Where can you learn more?  Go to http://www.Jildy.net/Surround [employer].  Enter N655 in the search box to learn more about \"Taking Warfarin Safely: Care Instructions.\"  Current as of: January 27, 2016  Content Version: 11.1  © 5271-0358 [employer]. Care instructions adapted under license by [employer] (which disclaims liability or warranty for this information). If you have questions about a medical condition or this instruction, always ask your healthcare professional. [employer] disclaims any warranty or liability for your use of this information.

## 2024-12-12 NOTE — TELEPHONE ENCOUNTER
Medication Refill Request    Jack Shukla is requesting a refill of the following medication(s):   Requested Prescriptions     Pending Prescriptions Disp Refills    vitamin D3 (CHOLECALCIFEROL) 25 MCG (1000 UT) TABS tablet [Pharmacy Med Name: VITAMIN D3 25 MCG TABLET] 30 tablet PRN     Sig: TAKE 1 TABLET BY MOUTH DAILY        Listed PCP is Bryan Abreu MD   Last provider to prescribe medication:  ProviderCookie MD    Last Date of Medication Prescribed:  Provider, MD Cookie     Date of Last Office Visit at [facility]: 07/23/24  FUTURE APPOINTMENT:   Future Appointments   Date Time Provider Department Center   12/16/2024  8:30 AM [facility] MEDICATION MGMT [facility]   6/2/2025  1:30 PM Lexa Carrillo, SCOOTER - NP [facility]       Please send refill to:    [facility] - [place], [place] - [address] - P [phone] - F [phone]  [address] [address]  Phone: [phone] Fax: [phone]    Please review request and approve or deny with recommendations.

## 2024-12-16 ENCOUNTER — TELEPHONE (OUTPATIENT)
Facility: HOSPITAL | Age: 79
End: 2024-12-16

## 2024-12-16 ENCOUNTER — ANTI-COAG VISIT (OUTPATIENT)
Facility: HOSPITAL | Age: 79
End: 2024-12-16
Payer: MEDICARE

## 2024-12-16 DIAGNOSIS — I48.91 ATRIAL FIBRILLATION, UNSPECIFIED TYPE (HCC): Primary | ICD-10-CM

## 2024-12-16 LAB
INTERNATIONAL NORMALIZATION RATIO, POC: 2.8 (ref 2–3)
PROTHROMBIN TIME, POC: 33.6

## 2024-12-16 PROCEDURE — 99212 OFFICE O/P EST SF 10 MIN: CPT

## 2024-12-16 PROCEDURE — 85610 PROTHROMBIN TIME: CPT

## 2024-12-16 RX ORDER — WARFARIN SODIUM 2.5 MG/1
TABLET ORAL
Qty: 12 TABLET | Refills: 0 | Status: SHIPPED | OUTPATIENT
Start: 2024-12-16

## 2024-12-16 RX ORDER — WARFARIN SODIUM 5 MG/1
TABLET ORAL
Qty: 28 TABLET | Refills: 0 | Status: SHIPPED | OUTPATIENT
Start: 2024-12-16

## 2024-12-16 NOTE — PATIENT INSTRUCTIONS
Today your INR was 2.8 .      Your goal INR is  2.0-3.0 .    You have a   2.5 mg and 5 mg tablet of Coumadin (warfarin).   Take Coumadin (warfarin) as follows:    Take 7.5 mg every Monday, Wednesday, Friday; and 5 mg all other days of the week.      Come back in 3 week(s) for your next finger stick/INR blood test.        Avoid any over the counter items containing aspirin or ibuprofen, and avoid great swings in general diet.  Avoid alcohol consumption.  Please notify the INR pharmacist if you are started on any new medication including over the counter or herbal supplements. Also, please notify your INR pharmacist if any of your other prescription or over the counter medications have been discontinued.     Call [facility] at [phone] if you have any signs of abnormal bleeding/blood clot.  ------------------------------------------------------------------------------------------------------------------  Taking Warfarin Safely: Care Instructions    Your Care Instructions  Warfarin is a medicine that you take to prevent blood clots. It is often called a blood thinner. Doctors give warfarin (such as Coumadin) to reduce the risk of blood clots. You may be at risk for blood clots if you have atrial fibrillation or deep vein thrombosis. Some other health problems may also put you at risk.  Warfarin slows the amount of time it takes for your blood to clot. It can cause bleeding problems. Even if you've been taking warfarin for a while, it's important to know how to take it safely.  Foods and other medicines can affect the way warfarin works. Some can make warfarin work too well. This can cause bleeding problems. And some can make it work poorly, so that it does not prevent blood clots very well.  You will need regular blood tests to check how long it takes for your blood to form a clot. This test is called a PT or prothrombin time test. The result of the test is called

## 2024-12-16 NOTE — TELEPHONE ENCOUNTER
Medication Management Clinic - Appointment Time Adjustment     Group home representative (Loc Cramer) contacted the [facility] and requested to change patient's INR check appointment time to 12:30 pm today (12/16/24).    Thank you.  Jason Joshua, ValdoD

## 2024-12-18 DIAGNOSIS — E55.9 VITAMIN D DEFICIENCY: ICD-10-CM

## 2024-12-18 RX ORDER — CHOLECALCIFEROL (VITAMIN D3) 25 MCG
1 TABLET ORAL DAILY
Qty: 30 TABLET | Status: SHIPPED | OUTPATIENT
Start: 2024-12-18

## 2025-01-03 ENCOUNTER — ANTI-COAG VISIT (OUTPATIENT)
Facility: HOSPITAL | Age: 80
End: 2025-01-03
Payer: MEDICARE

## 2025-01-03 DIAGNOSIS — I48.91 ATRIAL FIBRILLATION, UNSPECIFIED TYPE (HCC): Primary | ICD-10-CM

## 2025-01-03 LAB
INTERNATIONAL NORMALIZATION RATIO, POC: 2.3 (ref 2–3)
PROTHROMBIN TIME, POC: 28

## 2025-01-03 PROCEDURE — 99212 OFFICE O/P EST SF 10 MIN: CPT

## 2025-01-03 PROCEDURE — 85610 PROTHROMBIN TIME: CPT

## 2025-01-03 RX ORDER — WARFARIN SODIUM 5 MG/1
TABLET ORAL
Qty: 28 TABLET | Refills: 0 | Status: SHIPPED | OUTPATIENT
Start: 2025-01-03

## 2025-01-03 RX ORDER — WARFARIN SODIUM 2.5 MG/1
TABLET ORAL
Qty: 12 TABLET | Refills: 0 | Status: SHIPPED | OUTPATIENT
Start: 2025-01-03

## 2025-01-03 NOTE — PROGRESS NOTES
Pharmacy Progress Note - Anticoagulation Management    S/O:  Mr. Jack Shukla  is a 79 y.o. male seen today for anticoagulation management for the diagnosis of Atrial Fibrillation.     HPI: At last visit (12/16), the patient's INR was 2.8 and therapeutic for an INR goal of 2.0-3.0. The group home representative (Loc Cramer) reported the patient's diet may not have been consistent since the group home is using a new food prep company. Patient's MAR reflected no medication changes and patient received all doses appropriately. Loc also confirmed patient received all doses appropriately. Patient will continue current warfarin regimen of 7.5 mg every Mon, Wed, Fri; 5 mg daily ROW and recheck INR in 3 week(s) due to an upcoming holiday in 2 weeks. Encouraged group home representative to keep patient's diet consistent with vitamin K foods. Group representative stated she would speak to the new food prep company about special dietary considerations for patient.    Interim History:    Warfarin start date: Prior to 10/9/20 per chart review  INR Goal:  2.0-3.0    Current warfarin regimen: 7.5 mg Mon, Wed, Fri; 5 mg daily ROW  Warfarin tablet strength:   2.5 mg, 5 mg  Duration of therapy: Indefinite      Results for orders placed or performed in visit on 01/03/25   POCT INR   Result Value Ref Range    Prothrombin time,(POC) 28.0     INR,(POC) 2.3 2.0 - 3.0       Today's pertinent positives includes:  No significant changes since last visit      Adherence:   Able to recall regimen? YES - Rocshanikaa confirmed patient's warfarin regimen  Miss/extra dose? NO  Need refill? YES - Always send [facility] prescription to [facility] with dose change    Upcoming procedure(s):  N/A    Past Medical History:   Diagnosis Date    A-fib (HCC) 2015    pacemaker L chest     CAD (coronary artery disease) 2015    Pacemaker    Epilepsy (HCC)     Heart disease     Hypertension     Incontinence     Leg swelling     Mental retardation, 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)

## 2025-01-03 NOTE — PATIENT INSTRUCTIONS
Today your INR was 2.3 .      Your goal INR is  2.0-3.0 .    You have a   2.5 mg and 5 mg tablet of Coumadin (warfarin).   Take Coumadin (warfarin) as follows:    Take 7.5 mg every Monday, Wednesday, Friday; and 5 mg all other days of the week.      Come back in 2 week(s) for your next finger stick/INR blood test.        Avoid any over the counter items containing aspirin or ibuprofen, and avoid great swings in general diet.  Avoid alcohol consumption.  Please notify the INR pharmacist if you are started on any new medication including over the counter or herbal supplements. Also, please notify your INR pharmacist if any of your other prescription or over the counter medications have been discontinued.     Call [facility] at [phone] if you have any signs of abnormal bleeding/blood clot.  ------------------------------------------------------------------------------------------------------------------  Taking Warfarin Safely: Care Instructions    Your Care Instructions  Warfarin is a medicine that you take to prevent blood clots. It is often called a blood thinner. Doctors give warfarin (such as Coumadin) to reduce the risk of blood clots. You may be at risk for blood clots if you have atrial fibrillation or deep vein thrombosis. Some other health problems may also put you at risk.  Warfarin slows the amount of time it takes for your blood to clot. It can cause bleeding problems. Even if you've been taking warfarin for a while, it's important to know how to take it safely.  Foods and other medicines can affect the way warfarin works. Some can make warfarin work too well. This can cause bleeding problems. And some can make it work poorly, so that it does not prevent blood clots very well.  You will need regular blood tests to check how long it takes for your blood to form a clot. This test is called a PT or prothrombin time test. The result of the test is called

## 2025-01-16 ENCOUNTER — TELEPHONE (OUTPATIENT)
Facility: HOSPITAL | Age: 80
End: 2025-01-16

## 2025-01-16 NOTE — TELEPHONE ENCOUNTER
Medication Management Clinic - Reschedule Appointment     Patient missed his INR check appointment today (1/15/25). Contacted patient's group home representative (Loc Cramer). Patient's INR check appointment was rescheduled to Monday, January 20, 2024 at 8:30 am.     Thank you.  Jason Joshua, PharmD

## 2025-01-20 ENCOUNTER — ANTI-COAG VISIT (OUTPATIENT)
Facility: HOSPITAL | Age: 80
End: 2025-01-20
Payer: MEDICARE

## 2025-01-20 DIAGNOSIS — I48.91 ATRIAL FIBRILLATION, UNSPECIFIED TYPE (HCC): Primary | ICD-10-CM

## 2025-01-20 LAB
INTERNATIONAL NORMALIZATION RATIO, POC: 2.3 (ref 2–3)
PROTHROMBIN TIME, POC: 27.4

## 2025-01-20 PROCEDURE — 99212 OFFICE O/P EST SF 10 MIN: CPT

## 2025-01-20 PROCEDURE — 85610 PROTHROMBIN TIME: CPT

## 2025-01-20 RX ORDER — WARFARIN SODIUM 5 MG/1
TABLET ORAL
Qty: 42 TABLET | Refills: 0 | Status: SHIPPED | OUTPATIENT
Start: 2025-01-20

## 2025-01-20 RX ORDER — WARFARIN SODIUM 2.5 MG/1
TABLET ORAL
Qty: 18 TABLET | Refills: 0 | Status: SHIPPED | OUTPATIENT
Start: 2025-01-20

## 2025-01-20 NOTE — PATIENT INSTRUCTIONS
Today your INR was 2.3 .      Your goal INR is  2.0-3.0 .    You have a   2.5 mg and 5 mg tablet of Coumadin (warfarin).   Take Coumadin (warfarin) as follows:    Take 7.5 mg every Monday, Wednesday, Friday; and 5 mg all other days of the week.      Come back in 4 week(s) for your next finger stick/INR blood test.        Avoid any over the counter items containing aspirin or ibuprofen, and avoid great swings in general diet.  Avoid alcohol consumption.  Please notify the INR pharmacist if you are started on any new medication including over the counter or herbal supplements. Also, please notify your INR pharmacist if any of your other prescription or over the counter medications have been discontinued.     Call [facility] at [phone] if you have any signs of abnormal bleeding/blood clot.  ------------------------------------------------------------------------------------------------------------------  Taking Warfarin Safely: Care Instructions    Your Care Instructions  Warfarin is a medicine that you take to prevent blood clots. It is often called a blood thinner. Doctors give warfarin (such as Coumadin) to reduce the risk of blood clots. You may be at risk for blood clots if you have atrial fibrillation or deep vein thrombosis. Some other health problems may also put you at risk.  Warfarin slows the amount of time it takes for your blood to clot. It can cause bleeding problems. Even if you've been taking warfarin for a while, it's important to know how to take it safely.  Foods and other medicines can affect the way warfarin works. Some can make warfarin work too well. This can cause bleeding problems. And some can make it work poorly, so that it does not prevent blood clots very well.  You will need regular blood tests to check how long it takes for your blood to form a clot. This test is called a PT or prothrombin time test. The result of the test is called

## 2025-01-20 NOTE — PROGRESS NOTES
Pharmacy Progress Note - Anticoagulation Management    S/O:  Mr. Jack Shukla  is a 80 y.o. male seen today for anticoagulation management for the diagnosis of Atrial Fibrillation.     HPI: At last visit (1/3), the patient's INR was 2.3 and therapeutic for an INR goal of 2.0-3.0. Patient's group home representative (Nicolas) denied any changes to the patient's medications and reported the group home is trying to keep patient's vitamin K intake consistent. Patient's group home representative denied patient missed any doses of warfarin. Patient will continue current warfarin regimen of 7.5 mg Mon, Wed, Fri; 5 mg daily ROW and recheck INR in 2 week(s).    Interim History:    Warfarin start date: Prior to 10/9/20 per chart review  INR Goal:  2.0-3.0    Current warfarin regimen: 7.5 mg Mon, Wed, Fri; 5 mg daily ROW  Warfarin tablet strength:   2.5 mg, 5 mg  Duration of therapy: Indefinite      Results for orders placed or performed in visit on 01/20/25   POCT INR   Result Value Ref Range    Prothrombin time,(POC) 27.4     INR,(POC) 2.3 2.0 - 3.0       Today's pertinent positives includes:  No significant changes since last visit      Adherence:   Able to recall regimen? YES - Nicolas confirmed patient's warfarin regimen  Miss/extra dose? NO  Need refill? YES - Always send new prescription to [facility] with dose change    Upcoming procedure(s):  N/A    Past Medical History:   Diagnosis Date    A-fib (HCC) 2015    pacemaker L chest     CAD (coronary artery disease) 2015    Pacemaker    Epilepsy (HCC)     Heart disease     Hypertension     Incontinence     Leg swelling     Mental retardation, 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)     Other ill-defined conditions(799.89)     edema legs    S/P ablation of atrial fibrillation 11/13/2020    S/P biventricular cardiac pacemaker procedure 10/23/2015    10/23/15 [employer] biventricular pacemaker inmplant    Screen for colon cancer 9/27/2012     Allergies   Allergen Reactions    Sulfa

## 2025-01-30 ENCOUNTER — TRANSCRIBE ORDERS (OUTPATIENT)
Facility: HOSPITAL | Age: 80
End: 2025-01-30

## 2025-01-30 ENCOUNTER — HOSPITAL ENCOUNTER (OUTPATIENT)
Facility: HOSPITAL | Age: 80
Discharge: HOME | End: 2025-01-30
Payer: MEDICARE

## 2025-01-30 DIAGNOSIS — Z95.0 PRESENCE OF CARDIAC PACEMAKER: Primary | ICD-10-CM

## 2025-01-30 DIAGNOSIS — Z95.0 PRESENCE OF CARDIAC PACEMAKER: ICD-10-CM

## 2025-01-30 PROCEDURE — 71046 X-RAY EXAM CHEST 2 VIEWS: CPT

## 2025-01-31 ENCOUNTER — ANESTHESIA (OUTPATIENT)
Facility: HOSPITAL | Age: 80
End: 2025-01-31
Payer: MEDICARE

## 2025-01-31 ENCOUNTER — HOSPITAL ENCOUNTER (OUTPATIENT)
Facility: HOSPITAL | Age: 80
Setting detail: OUTPATIENT SURGERY
Discharge: HOME | End: 2025-01-31
Attending: INTERNAL MEDICINE | Admitting: INTERNAL MEDICINE
Payer: MEDICARE

## 2025-01-31 ENCOUNTER — ANESTHESIA EVENT (OUTPATIENT)
Facility: HOSPITAL | Age: 80
End: 2025-01-31
Payer: MEDICARE

## 2025-01-31 VITALS
BODY MASS INDEX: 27.49 KG/M2 | TEMPERATURE: 96.9 F | RESPIRATION RATE: 13 BRPM | WEIGHT: 192 LBS | SYSTOLIC BLOOD PRESSURE: 114 MMHG | DIASTOLIC BLOOD PRESSURE: 69 MMHG | HEIGHT: 70 IN | HEART RATE: 75 BPM | OXYGEN SATURATION: 98 %

## 2025-01-31 DIAGNOSIS — I48.19 PERSISTENT ATRIAL FIBRILLATION (HCC): ICD-10-CM

## 2025-01-31 DIAGNOSIS — I10 PRIMARY HYPERTENSION: ICD-10-CM

## 2025-01-31 DIAGNOSIS — Z45.010 PACEMAKER AT END OF BATTERY LIFE: ICD-10-CM

## 2025-01-31 LAB — ECHO BSA: 2.07 M2

## 2025-01-31 PROCEDURE — 2709999900 HC NON-CHARGEABLE SUPPLY: Performed by: INTERNAL MEDICINE

## 2025-01-31 PROCEDURE — 2580000003 HC RX 258

## 2025-01-31 PROCEDURE — 33229 REMV&REPLC PM GEN MULT LEADS: CPT | Performed by: INTERNAL MEDICINE

## 2025-01-31 PROCEDURE — C1898 LEAD, PMKR, OTHER THAN TRANS: HCPCS | Performed by: INTERNAL MEDICINE

## 2025-01-31 PROCEDURE — 33229 REMV&REPLC PM GEN MULT LEADS: CPT

## 2025-01-31 PROCEDURE — C2621 PMKR, OTHER THAN SING/DUAL: HCPCS | Performed by: INTERNAL MEDICINE

## 2025-01-31 PROCEDURE — 3700000001 HC ADD 15 MINUTES (ANESTHESIA): Performed by: INTERNAL MEDICINE

## 2025-01-31 PROCEDURE — 6360000002 HC RX W HCPCS

## 2025-01-31 PROCEDURE — 3700000000 HC ANESTHESIA ATTENDED CARE: Performed by: INTERNAL MEDICINE

## 2025-01-31 PROCEDURE — 6360000002 HC RX W HCPCS: Performed by: INTERNAL MEDICINE

## 2025-01-31 PROCEDURE — 2500000003 HC RX 250 WO HCPCS: Performed by: INTERNAL MEDICINE

## 2025-01-31 DEVICE — CARDIAC RESYNCHRONIZATION THERAPY PACEMAKER
Type: IMPLANTABLE DEVICE | Status: FUNCTIONAL
Brand: VISIONIST™ X4 CRT-P

## 2025-01-31 RX ORDER — LISINOPRIL 2.5 MG/1
2.5 TABLET ORAL DAILY
Qty: 30 TABLET | Refills: 3 | Status: SHIPPED | OUTPATIENT
Start: 2025-01-31

## 2025-01-31 RX ORDER — HEPARIN SODIUM 10000 [USP'U]/ML
INJECTION, SOLUTION INTRAVENOUS; SUBCUTANEOUS PRN
Status: DISCONTINUED | OUTPATIENT
Start: 2025-01-31 | End: 2025-01-31 | Stop reason: HOSPADM

## 2025-01-31 RX ORDER — CEFAZOLIN SODIUM 1 G/3ML
INJECTION, POWDER, FOR SOLUTION INTRAMUSCULAR; INTRAVENOUS
Status: DISCONTINUED | OUTPATIENT
Start: 2025-01-31 | End: 2025-01-31 | Stop reason: SDUPTHER

## 2025-01-31 RX ORDER — LIDOCAINE HYDROCHLORIDE 20 MG/ML
INJECTION, SOLUTION EPIDURAL; INFILTRATION; INTRACAUDAL; PERINEURAL
Status: DISCONTINUED | OUTPATIENT
Start: 2025-01-31 | End: 2025-01-31 | Stop reason: SDUPTHER

## 2025-01-31 RX ORDER — METOPROLOL TARTRATE 25 MG/1
12.5 TABLET, FILM COATED ORAL 2 TIMES DAILY
Qty: 60 TABLET | Refills: 11 | Status: SHIPPED | OUTPATIENT
Start: 2025-01-31

## 2025-01-31 RX ORDER — ASPIRIN 81 MG/1
81 TABLET, CHEWABLE ORAL DAILY
COMMUNITY

## 2025-01-31 RX ORDER — FENTANYL CITRATE 50 UG/ML
INJECTION, SOLUTION INTRAMUSCULAR; INTRAVENOUS
Status: DISCONTINUED | OUTPATIENT
Start: 2025-01-31 | End: 2025-01-31 | Stop reason: SDUPTHER

## 2025-01-31 RX ORDER — LIDOCAINE HYDROCHLORIDE 10 MG/ML
INJECTION, SOLUTION INFILTRATION; PERINEURAL PRN
Status: DISCONTINUED | OUTPATIENT
Start: 2025-01-31 | End: 2025-01-31 | Stop reason: HOSPADM

## 2025-01-31 RX ORDER — SODIUM CHLORIDE, SODIUM LACTATE, POTASSIUM CHLORIDE, CALCIUM CHLORIDE 600; 310; 30; 20 MG/100ML; MG/100ML; MG/100ML; MG/100ML
INJECTION, SOLUTION INTRAVENOUS
Status: DISCONTINUED | OUTPATIENT
Start: 2025-01-31 | End: 2025-01-31 | Stop reason: SDUPTHER

## 2025-01-31 RX ADMIN — FENTANYL CITRATE 25 MCG: 50 INJECTION, SOLUTION INTRAMUSCULAR; INTRAVENOUS at 11:16

## 2025-01-31 RX ADMIN — SODIUM CHLORIDE, SODIUM LACTATE, POTASSIUM CHLORIDE, CALCIUM CHLORIDE: 600; 310; 30; 20 INJECTION, SOLUTION INTRAVENOUS at 10:47

## 2025-01-31 RX ADMIN — CEFAZOLIN SODIUM 2 G: 1 INJECTION, POWDER, FOR SOLUTION INTRAMUSCULAR; INTRAVENOUS at 11:04

## 2025-01-31 RX ADMIN — LIDOCAINE HYDROCHLORIDE 40 MG: 20 INJECTION, SOLUTION EPIDURAL; INFILTRATION; INTRACAUDAL; PERINEURAL at 11:00

## 2025-01-31 NOTE — PROGRESS NOTES
Patent walked the hwang of recovery area. No signs or symptoms of SOB or distress.    I have reviewed discharge instructions with the patient.  The patient verbalized understanding.    Discharge medications reviewed with patient and appropriate educational materials regarding medications and side effects teaching were provided.    Site care instructions reviewed with patient. Pain management teaching completed.    Patient instructed to make follow up appointments per discharge instructions.     Patient belongings packed up and accounted for with patient and family. All patient belongings sent home with patient.    Telemetry monitor and wires removed      Patient signed discharge instructions after reviewing them, and duplicate copy placed in chart.     Pt discharged home with caregiver from group home

## 2025-01-31 NOTE — DISCHARGE INSTRUCTIONS
[facility]  [address] [place] [address]  [phone]  [email or web address]     NEW PACEMAKER IMPLANT DISCHARGE INSTRUCTIONS    Patient ID:  Jack Shukla  [id number]  80 y.o.  1945    Admit Date: 1/31/2025    Discharge Date: 1/31/2025     Admitting Physician: [unfilled]     Discharge Physician: [unfilled]    Admission Diagnoses:   Pacemaker at end of battery life [Z45.010]    Discharge Diagnoses:   [unfilled]    Discharge Condition: Good    Cardiology Procedures this Admission:  Pacemaker insertion.     Disposition: home    Reference discharge instructions provided by nursing for diet and activity.    Follow-up with device clinic in three weeks. Call [phone] to make an appointment.    Signed:  Jude Regalado MD  1/31/2025  11:11 AM      DISCHARGE INSTRUCTIONS FOR PATIENTS WITH PACEMAKERS    1. Remember to call for an appointment for 3 weeks [phone] to check healing and implant programming.  2. Medic Alert Bracelets are available from your pharmacist to wear at all times if you choose to wear one.  3. Carry your ID card for pacemaker with you at all times.  This card will be given to you in the hospital or mailed to you.  4. The pacemaker will bulge slightly under your skin.  The bulge will decrease in size over the next few weeks.  Please notify the doctor's office if you notice any of the following around your site:   A.  A bruise that does not go away.   B.  Soreness or yellow, green, or brown drainage from the site.   C.  Any swelling from the site.   D.  If you have a fever of 100 degrees or higher that lasts for a few days.    INCISION CARE       1.  Leave the dressing over your site until your follow up visit.  2.  You may not shower until after follow up visit.   3.  For comfort, wear loose fitting clothing.  4.  Ice pack to affected shoulder for first 24 hours, wear your sling for 2 days.  5.  Report any signs of infection, fever, pain,

## 2025-01-31 NOTE — PROGRESS NOTES
Cardiac Cath Lab Recovery Arrival Note:      Jack Shukla arrived to Cardiac Cath Lab, Recovery Area. Staff introduced to patient. Patient identifiers verified with NAME and DATE OF BIRTH. Procedure verified with patient. Consent forms reviewed and signed by patient or authorized representative and verified. Allergies verified.     Patient and family oriented to department. Patient and family informed of procedure and plan of care.     Questions answered with review. Patient prepped for procedure, per orders from physician, prior to arrival.    Patient on cardiac monitor, non-invasive blood pressure, SPO2 monitor. On RA. Patient is A&Ox 4. Patient reports no pain.     Patient in stretcher, in low position, with side rails up, call bell within reach, patient instructed to call if assistance as needed.    Patient prep in: [facility] Recovery Area, [place] [address].     Family in: Loc.   Prep by: Wilmar

## 2025-01-31 NOTE — ANESTHESIA PRE PROCEDURE
Department of Anesthesiology  Preprocedure Note       Name:  Jack Shukla   Age:  80 y.o.  :  1945                                          MRN:  [id number]         Date:  2025      Surgeon: Surgeon(s):  Jude Regalado MD    Procedure: Procedure(s):  Remove & replace PPM gen biv multi leads  Lead reposition    Medications prior to admission:   Prior to Admission medications    Medication Sig Start Date End Date Taking? Authorizing Provider   aspirin 81 MG chewable tablet Take 1 tablet by mouth daily   Yes Cookie Pan MD   vitamin D3 (CHOLECALCIFEROL) 25 MCG (1000 UT) TABS tablet TAKE 1 TABLET BY MOUTH DAILY 24  Yes Bryan Abreu MD   lacosamide (VIMPAT) 100 MG TABS tablet Take 1 tablet by mouth 2 times daily for 180 days. Max Daily Amount: 200 mg 24 Yes Lexa Carrillo APRN - NP   bumetanide (BUMEX) 2 MG tablet TAKE 1 TABLET BY MOUTH DAILY 24  Yes Viviane Bassett MD   levETIRAcetam (KEPPRA) 1000 MG tablet Take 1 tablet by mouth 2 times daily   Yes Cookie Pan MD   magnesium oxide (MAG-OX) 400 (240 Mg) MG tablet Take 1 tablet by mouth daily   Yes Cookie Pan MD   vitamin B-12 (CYANOCOBALAMIN) 1000 MCG tablet Take 1 tablet by mouth daily   Yes Cookie Pan MD   erythromycin with ethanol (THERAMYCIN) 2 % external solution Apply topically daily Apply topically to toes daily.   Yes Cookie Pan MD   levETIRAcetam (KEPPRA) 1000 MG tablet Take 1 tablet by mouth 2 times daily 24  Yes Lexa Carrillo APRN - NP   magnesium oxide (MAG-OX) 400 (240 Mg) MG tablet TAKE 1 TABLET BY MOUTH DAILY 24  Yes Bryan Abreu MD   vitamin B-12 (CYANOCOBALAMIN) 1000 MCG tablet TAKE ONE TABLET BY MOUTH EVERY DAY 3/15/24  Yes Bryan Abreu MD   potassium chloride (KLOR-CON M) 20 MEQ extended release tablet TAKE 1 TABLET BY MOUTH DAILY 3/15/24  Yes Bryan Abreu MD   ammonium lactate (LAC-HYDRIN) 12 % lotion Apply to affected  02:06 AM    ALKPHOS 108 07/07/2024 02:06 AM    ALKPHOS 110 11/30/2021 11:30 AM    AST 17 07/07/2024 02:06 AM    ALT 15 07/07/2024 02:06 AM       POC Tests: No results for input(s): \"POCGLU\", \"POCNA\", \"POCK\", \"POCCL\", \"POCBUN\", \"POCHEMO\", \"POCHCT\" in the last 72 hours.    Coags:   Lab Results   Component Value Date/Time    PROTIME 27.4 01/20/2025 08:32 AM    INR 2.3 01/20/2025 08:32 AM    INR 1.4 07/08/2024 05:32 AM    INR 1.8 06/08/2022 01:10 PM    APTT 29.1 11/14/2020 04:30 AM       HCG (If Applicable): No results found for: \"PREGTESTUR\", \"PREGSERUM\", \"HCG\", \"HCGQUANT\"     ABGs: No results found for: \"PHART\", \"PO2ART\", \"LYA2UOA\", \"APW9CFQ\", \"BEART\", \"L8QKPXDW\"     Type & Screen (If Applicable):  Lab Results   Component Value Date    ABORH O POSITIVE 11/12/2020    LABANTI NEG 11/12/2020       Drug/Infectious Status (If Applicable):  Lab Results   Component Value Date/Time    HEPCAB NONREACTIVE 11/30/2021 11:30 AM       COVID-19 Screening (If Applicable):   Lab Results   Component Value Date/Time    COVID19 Not Detected 11/08/2020 06:29 AM           Anesthesia Evaluation  Patient summary reviewed and Nursing notes reviewed   no history of anesthetic complications:   Airway: Mallampati: III  TM distance: >3 FB   Neck ROM: full  Mouth opening: > = 3 FB   Dental: normal exam         Pulmonary:normal exam                              ROS comment: H/O respiratory failure    Cardiovascular:  Exercise tolerance: good (>4 METS)  (+) hypertension:, valvular problems/murmurs: MR, pacemaker: pacemaker, dysrhythmias: atrial fibrillation    (-) CAD      Rhythm: regular  Rate: normal           Beta Blocker:  Dose within 24 Hrs         Neuro/Psych:   (+) seizures:             ROS comment: Intellectual disability   Tremor  GI/Hepatic/Renal: Neg GI/Hepatic/Renal ROS            Endo/Other:    (+) : arthritis:..                  ROS comment: Lymphedema  Abdominal:             Vascular:          Other Findings:             Anesthesia

## 2025-01-31 NOTE — PROGRESS NOTES
Venogram demonstrated patency of the left venous system with the LV lead hanging in the main body of the CS    In light of his using a walker, his baseline ability to follow commands and his LVEF 45-50% (was normal 2024), I have elected to proceed with a gen change only, decrease bb therapy and increase low dose ace inh.    Thank you for allowing me to participate in this patients care.    Jude Regalado MD, FACC, RS

## 2025-02-03 NOTE — ANESTHESIA POSTPROCEDURE EVALUATION
Department of Anesthesiology  Postprocedure Note    Patient: Jack Shukla  MRN: [id number]  YOB: 1945  Date of evaluation: 2/3/2025    Procedure Summary       Date: 01/31/25 Room / Location: [facility] CARD[facility] CATH LAB    Anesthesia Start: 1047 Anesthesia Stop: 1145    Procedures:       Remove & replace PPM gen biv multi leads      Lead reposition Diagnosis: Pacemaker at end of battery life    Providers: Jude Regalado MD Responsible Provider: Shun Upton DO    Anesthesia Type: MAC ASA Status: 3            Anesthesia Type: MAC    Brielle Phase I: Brielle Score: 10    Brielle Phase II:      Anesthesia Post Evaluation    Patient location during evaluation: PACU  Patient participation: complete - patient participated  Level of consciousness: awake  Pain score: 0  Airway patency: patent  Nausea & Vomiting: no nausea  Cardiovascular status: hemodynamically stable  Respiratory status: acceptable  Hydration status: euvolemic  Pain management: adequate    There were no known notable events for this encounter.

## 2025-02-05 ENCOUNTER — TELEPHONE (OUTPATIENT)
Age: 80
End: 2025-02-05

## 2025-02-05 NOTE — TELEPHONE ENCOUNTER
Group home provider [facility] requesting new DME order for chair exercise bike. Current one is old and outdated. Will call us back with model and type.

## 2025-02-15 NOTE — PATIENT INSTRUCTIONS
Today your INR was 1.5 .      Your goal INR is  2.0-3.0 .    You have a   2.5 mg and 5 mg tablet of Coumadin (warfarin).   Take Coumadin (warfarin) as follows:    Take 10 mg today (2/17/25) and then starting tomorrow (2/18/25) resume 7.5 mg every Monday, Wednesday, Friday; and 5 mg all other days of the week.      Come back in 2 week(s) for your next finger stick/INR blood test.        Avoid any over the counter items containing aspirin or ibuprofen, and avoid great swings in general diet.  Avoid alcohol consumption.  Please notify the INR pharmacist if you are started on any new medication including over the counter or herbal supplements. Also, please notify your INR pharmacist if any of your other prescription or over the counter medications have been discontinued.     Call [facility] Medication Management Clinic at [phone] if you have any signs of abnormal bleeding/blood clot.  ------------------------------------------------------------------------------------------------------------------  Taking Warfarin Safely: Care Instructions    Your Care Instructions  Warfarin is a medicine that you take to prevent blood clots. It is often called a blood thinner. Doctors give warfarin (such as Coumadin) to reduce the risk of blood clots. You may be at risk for blood clots if you have atrial fibrillation or deep vein thrombosis. Some other health problems may also put you at risk.  Warfarin slows the amount of time it takes for your blood to clot. It can cause bleeding problems. Even if you've been taking warfarin for a while, it's important to know how to take it safely.  Foods and other medicines can affect the way warfarin works. Some can make warfarin work too well. This can cause bleeding problems. And some can make it work poorly, so that it does not prevent blood clots very well.  You will need regular blood tests to check how long it takes for your blood to form a clot. This test is

## 2025-02-15 NOTE — PROGRESS NOTES
Pharmacy Progress Note - Anticoagulation Management    S/O:  Mr. Jack Shukla  is a 80 y.o. male seen today for anticoagulation management for the diagnosis of Atrial Fibrillation.     HPI: At last visit (1/20), the patient's INR was 2.3 and therapeutic for an INR goal of 2.0-3.0. Patient's group home representative (Nicolas) denied any changes to the patient's medications and reported the group home is trying to keep patient's vitamin K intake consistent. Patient's group home representative denied patient missed any doses of warfarin. Patient will continue current warfarin regimen of 7.5 mg Mon, Wed, Fri; 5 mg daily ROW and recheck INR in 4 week(s).     Interim History:    Warfarin start date: Prior to 10/9/20 per chart review  INR Goal:  2.0-3.0    Current warfarin regimen: 7.5 mg Mon, Wed, Fri; 5 mg daily ROW  Warfarin tablet strength:   2.5 mg, 5 mg  Duration of therapy: Indefinite      Results for orders placed or performed in visit on 02/17/25   POCT INR   Result Value Ref Range    Prothrombin time,(POC) 18.1     INR,(POC) 1.5 (A) 2.0 - 3.0       Today's pertinent positives includes:  Medication change    Per chart review, patient had a pacemaker procedure on 1/31/25.   Per chart review, patient's metoprolol dose was decreased to 12.5 mg BID and lisinopril 2.5 mg daily was added.    Adherence:   Able to recall regimen? N/A - Warfarin regimen was printed correctly on the MAR  Miss/extra dose? YES - Per patient's MAR the following warfarin 5 mg doses were missed: 1/23/25, 1/24/25, 1/30/25, 2/8/25. Per patient's MAR the following warfarin 2.5 mg doses were missed: 1/24/25 and 1/30/25.  Need refill? YES - Always send [facility] prescription to [facility] with dose change    Upcoming procedure(s):  N/A    Past Medical History:   Diagnosis Date    A-fib (HCC) 2015    pacemaker L chest     CAD (coronary artery disease) 2015    Pacemaker    Epilepsy (HCC)     Heart disease     Hypertension     Incontinence     Leg swelling

## 2025-02-17 ENCOUNTER — ANTI-COAG VISIT (OUTPATIENT)
Facility: HOSPITAL | Age: 80
End: 2025-02-17
Payer: MEDICARE

## 2025-02-17 DIAGNOSIS — I48.91 ATRIAL FIBRILLATION, UNSPECIFIED TYPE (HCC): Primary | ICD-10-CM

## 2025-02-17 LAB
INTERNATIONAL NORMALIZATION RATIO, POC: 1.5 (ref 2–3)
PROTHROMBIN TIME, POC: 18.1

## 2025-02-17 PROCEDURE — 85610 PROTHROMBIN TIME: CPT

## 2025-02-17 PROCEDURE — 99213 OFFICE O/P EST LOW 20 MIN: CPT

## 2025-02-17 RX ORDER — WARFARIN SODIUM 5 MG/1
TABLET ORAL
Qty: 30 TABLET | Refills: 0 | Status: SHIPPED | OUTPATIENT
Start: 2025-02-17

## 2025-02-17 RX ORDER — WARFARIN SODIUM 2.5 MG/1
TABLET ORAL
Qty: 12 TABLET | Refills: 0 | Status: SHIPPED | OUTPATIENT
Start: 2025-02-17

## 2025-02-17 NOTE — TELEPHONE ENCOUNTER
[facility] to call me regarding  Appt. Areatha Muck was filled, sent by fax to [facility], ph [phone], fax [phone]. Rx was scanned in under media. Per Dr. Fernando Murray, pt needs appt in office to continue filling meds. Statement Selected

## 2025-02-28 NOTE — PATIENT INSTRUCTIONS
Today your INR was 1.4 .      Your goal INR is  2.0-3.0 .    You have a   2.5 mg and 5 mg tablet of Coumadin (warfarin).   Take Coumadin (warfarin) as follows:    Take 10 mg today (3/3/25) and then starting tomorrow (3/4/25) resume 7.5 mg every Monday, Wednesday, Friday; and 5 mg all other days of the week.      Come back in 2 week(s) for your next finger stick/INR blood test.        Avoid any over the counter items containing aspirin or ibuprofen, and avoid great swings in general diet.  Avoid alcohol consumption.  Please notify the INR pharmacist if you are started on any new medication including over the counter or herbal supplements. Also, please notify your INR pharmacist if any of your other prescription or over the counter medications have been discontinued.     Call [facility] Medication Management [facility] at [phone] if you have any signs of abnormal bleeding/blood clot.  ------------------------------------------------------------------------------------------------------------------  Taking Warfarin Safely: Care Instructions    Your Care Instructions  Warfarin is a medicine that you take to prevent blood clots. It is often called a blood thinner. Doctors give warfarin (such as Coumadin) to reduce the risk of blood clots. You may be at risk for blood clots if you have atrial fibrillation or deep vein thrombosis. Some other health problems may also put you at risk.  Warfarin slows the amount of time it takes for your blood to clot. It can cause bleeding problems. Even if you've been taking warfarin for a while, it's important to know how to take it safely.  Foods and other medicines can affect the way warfarin works. Some can make warfarin work too well. This can cause bleeding problems. And some can make it work poorly, so that it does not prevent blood clots very well.  You will need regular blood tests to check how long it takes for your blood to form a clot. This test is called

## 2025-02-28 NOTE — PROGRESS NOTES
Pharmacy Progress Note - Anticoagulation Management    S/O:  Mr. Jack Shukla  is a 80 y.o. male seen today for anticoagulation management for the diagnosis of Atrial Fibrillation.     HPI: At last visit (2/17), the patient's INR was 1.5 and subtherapeutic for an INR goal of 2.0-3.0. Patient's caretaker reported patient's diet is consistent with vitamin K foods. Per chart review, patient had a pacemaker procedure on 1/31/25. Per chart review, patient's metoprolol dose was decreased to 12.5 mg BID and lisinopril 2.5 mg daily was added. Per patient's MAR the following warfarin 5 mg doses were missed: 1/23/25, 1/24/25, 1/30/25, 2/8/25. Per patient's MAR the following warfarin 2.5 mg doses were missed: 1/24/25 and 1/30/25. Patient's subtherapeutic INR is most likely related to the missed doses of warfarin over the last 2 to 3 weeks. Therefore, patient will take warfarin 10 mg today (2/17/25) and then starting tomorrow (2/18/25) resume warfarin 7.5 mg Mon, Wed, Fri; 5 mg daily ROW. Patient will recheck INR in 2 week(s). Discussed with caretaker the importance of keeping patient's INR therapeutic.     Interim History:    Warfarin start date: Prior to 10/9/20 per chart review  INR Goal:  2.0-3.0    Current warfarin regimen: 10 mg today (2/17/25) and then starting tomorrow (2/18/25) resume 7.5 mg Mon, Wed, Fri; 5 mg daily ROW  Warfarin tablet strength:   2.5 mg, 5 mg  Duration of therapy: Indefinite      Results for orders placed or performed in visit on 03/03/25   POCT INR   Result Value Ref Range    Prothrombin time,(POC) 17.2     INR,(POC) 1.4 (A) 2.0 - 3.0       Today's pertinent positives includes:  Change in diet/appetite    Patient's group home representative (Loc Cramer) reports patient ate broccoli yesterday (3/2/25).  Patient's group home representative reports patient's meals are being supplied by a new vendor and the serving sizes are smaller.    Adherence:   Able to recall regimen? N/A - Warfarin regimen

## 2025-03-03 ENCOUNTER — ANTI-COAG VISIT (OUTPATIENT)
Facility: HOSPITAL | Age: 80
End: 2025-03-03
Payer: MEDICARE

## 2025-03-03 DIAGNOSIS — I48.91 ATRIAL FIBRILLATION, UNSPECIFIED TYPE (HCC): Primary | ICD-10-CM

## 2025-03-03 LAB
INTERNATIONAL NORMALIZATION RATIO, POC: 1.4 (ref 2–3)
PROTHROMBIN TIME, POC: 17.2

## 2025-03-03 PROCEDURE — 99212 OFFICE O/P EST SF 10 MIN: CPT

## 2025-03-03 PROCEDURE — 85610 PROTHROMBIN TIME: CPT

## 2025-03-14 RX ORDER — LANOLIN ALCOHOL/MO/W.PET/CERES
400 CREAM (GRAM) TOPICAL DAILY
Qty: 30 TABLET | Refills: 2 | Status: SHIPPED | OUTPATIENT
Start: 2025-03-14

## 2025-03-14 RX ORDER — LANOLIN ALCOHOL/MO/W.PET/CERES
1000 CREAM (GRAM) TOPICAL DAILY
Qty: 30 TABLET | Refills: 2 | Status: SHIPPED | OUTPATIENT
Start: 2025-03-14

## 2025-03-17 ENCOUNTER — ANTI-COAG VISIT (OUTPATIENT)
Facility: HOSPITAL | Age: 80
End: 2025-03-17
Payer: MEDICARE

## 2025-03-17 DIAGNOSIS — I48.91 ATRIAL FIBRILLATION, UNSPECIFIED TYPE (HCC): Primary | ICD-10-CM

## 2025-03-17 LAB
INTERNATIONAL NORMALIZATION RATIO, POC: 1.5 (ref 2–3)
PROTHROMBIN TIME, POC: 17.4

## 2025-03-17 PROCEDURE — 85610 PROTHROMBIN TIME: CPT

## 2025-03-17 PROCEDURE — 99212 OFFICE O/P EST SF 10 MIN: CPT

## 2025-03-17 RX ORDER — WARFARIN SODIUM 5 MG/1
TABLET ORAL
Qty: 30 TABLET | Refills: 0 | Status: SHIPPED | OUTPATIENT
Start: 2025-03-17

## 2025-03-17 RX ORDER — WARFARIN SODIUM 2.5 MG/1
TABLET ORAL
Qty: 16 TABLET | Refills: 0 | Status: SHIPPED | OUTPATIENT
Start: 2025-03-17

## 2025-03-17 NOTE — PROGRESS NOTES
Gayla was instructed to call [phone] if there are any signs of abnormal bleeding.  Mr. Shukla was instructed to avoid any OTC items containing aspirin or ibuprofen and prior to starting any new OTC products to consult with his physician or pharmacist to ensure no drug interactions are present.  Mr. Shukla was instructed to avoid any major changes in his general diet and to avoid alcohol consumption.    Mr. Shukla was provided information in the AVS that includes topics on understanding coumadin therapy, drug interaction considerations, vitamin K and coumadin use, interactions with foods and supplements containing vitamin K, and the use of herbal products.    Mr. Shukla verbalized his understanding of all instructions and will call the office with any questions, concerns, or signs of abnormal bleeding or blood clot.  Notifications of recommendations will be sent to provider Krishna Villa NP for review.    Thank you,  Jason Joshua [facility]      For Pharmacy Admin Tracking Only    Intervention Detail: Adherence Monitorin, Dose Adjustment: 1, reason: Therapy Optimization, and Lab(s) Ordered  Total # of Interventions Recommended: 3  Total # of Interventions Accepted: 3  Time Spent (min): 20

## 2025-03-17 NOTE — PATIENT INSTRUCTIONS
Today your INR was 1.5 .      Your goal INR is  2.0-3.0 .    You have a   2.5 mg and 5 mg tablet of Coumadin (warfarin).   Take Coumadin (warfarin) as follows:    Take 5 mg every Sunday, Tuesday, Thursday; and 7.5 mg all other days of the week.      Come back in 2 week(s) for your next finger stick/INR blood test.        Avoid any over the counter items containing aspirin or ibuprofen, and avoid great swings in general diet.  Avoid alcohol consumption.  Please notify the INR pharmacist if you are started on any new medication including over the counter or herbal supplements. Also, please notify your INR pharmacist if any of your other prescription or over the counter medications have been discontinued.     Call [facility] at [phone] if you have any signs of abnormal bleeding/blood clot.  ------------------------------------------------------------------------------------------------------------------  Taking Warfarin Safely: Care Instructions    Your Care Instructions  Warfarin is a medicine that you take to prevent blood clots. It is often called a blood thinner. Doctors give warfarin (such as Coumadin) to reduce the risk of blood clots. You may be at risk for blood clots if you have atrial fibrillation or deep vein thrombosis. Some other health problems may also put you at risk.  Warfarin slows the amount of time it takes for your blood to clot. It can cause bleeding problems. Even if you've been taking warfarin for a while, it's important to know how to take it safely.  Foods and other medicines can affect the way warfarin works. Some can make warfarin work too well. This can cause bleeding problems. And some can make it work poorly, so that it does not prevent blood clots very well.  You will need regular blood tests to check how long it takes for your blood to form a clot. This test is called a PT or prothrombin time test. The result of the test is called

## 2025-03-28 NOTE — PATIENT INSTRUCTIONS
Today your INR was 2.1 .      Your goal INR is  2.0-3.0 .    You have a   2.5 mg and 5 mg tablet of Coumadin (warfarin).   Take Coumadin (warfarin) as follows:    Take 5 mg every Sunday, Tuesday, Thursday; and 7.5 mg all other days of the week.      Come back in 2 week(s) for your next finger stick/INR blood test.        Avoid any over the counter items containing aspirin or ibuprofen, and avoid great swings in general diet.  Avoid alcohol consumption.  Please notify the INR pharmacist if you are started on any new medication including over the counter or herbal supplements. Also, please notify your INR pharmacist if any of your other prescription or over the counter medications have been discontinued.     Call [facility] at [phone] if you have any signs of abnormal bleeding/blood clot.  ------------------------------------------------------------------------------------------------------------------  Taking Warfarin Safely: Care Instructions    Your Care Instructions  Warfarin is a medicine that you take to prevent blood clots. It is often called a blood thinner. Doctors give warfarin (such as Coumadin) to reduce the risk of blood clots. You may be at risk for blood clots if you have atrial fibrillation or deep vein thrombosis. Some other health problems may also put you at risk.  Warfarin slows the amount of time it takes for your blood to clot. It can cause bleeding problems. Even if you've been taking warfarin for a while, it's important to know how to take it safely.  Foods and other medicines can affect the way warfarin works. Some can make warfarin work too well. This can cause bleeding problems. And some can make it work poorly, so that it does not prevent blood clots very well.  You will need regular blood tests to check how long it takes for your blood to form a clot. This test is called a PT or prothrombin time test. The result of the test is called

## 2025-03-28 NOTE — PROGRESS NOTES
Pharmacy Progress Note - Anticoagulation Management    S/O:  Mr. Jack Shukla  is a 80 y.o. male seen today for anticoagulation management for the diagnosis of Atrial Fibrillation.     HPI: At last visit (3/17), the patient's INR was 1.5 and subtherapeutic for an INR goal of 2.0-3.0. Patient's MAR reflected all warfarin doses have been administered as instructed. Patient's MAR does not reflect any medication changes. Patient's group home representative (Loc Cramer) reported patient's meals are being supplied by a new vendor and the serving sizes are smaller. Discussed with group home representative giving patient foods with vitamin K three times weekly regardless of meals prepared by new vendor. Patient's group home representative verbalized understanding. Patient's subtherapeutic INR is most likely related to the change in patient's diet. Patient will increase weekly warfarin dose from 42.5 mg to 45 mg (~6%) and patient will take warfarin 5 mg Sun, Tue, Thur; 7.5 mg daily ROW. Patient will recheck INR in 2 week(s).     Interim History:    Warfarin start date: Prior to 10/9/20 per chart review  INR Goal:  2.0-3.0    Current warfarin regimen: 5 mg Sun, Tue, Thur; 7.5 mg daily ROW  Warfarin tablet strength:   2.5 mg, 5 mg  Duration of therapy: Indefinite      Results for orders placed or performed in visit on 03/31/25   POCT INR   Result Value Ref Range    Prothrombin time,(POC) 25.1     INR,(POC) 2.1 2.0 - 3.0       Today's pertinent positives includes:  No significant changes since last visit      Adherence:   Able to recall regimen? N/A - Warfarin regimen was printed correctly on the MAR  Miss/extra dose? NO  Need refill? NO - Always send new prescription to [facility] with dose change    Upcoming procedure(s):  N/A    Past Medical History:   Diagnosis Date    A-fib (HCC) 2015    pacemaker L chest     CAD (coronary artery disease) 2015    Pacemaker    Epilepsy (HCC)     Heart disease     Hypertension

## 2025-03-31 ENCOUNTER — ANTI-COAG VISIT (OUTPATIENT)
Facility: HOSPITAL | Age: 80
End: 2025-03-31
Payer: MEDICARE

## 2025-03-31 DIAGNOSIS — I48.91 ATRIAL FIBRILLATION, UNSPECIFIED TYPE (HCC): Primary | ICD-10-CM

## 2025-03-31 LAB
INTERNATIONAL NORMALIZATION RATIO, POC: 2.1 (ref 2–3)
PROTHROMBIN TIME, POC: 25.1

## 2025-03-31 PROCEDURE — 85610 PROTHROMBIN TIME: CPT

## 2025-03-31 PROCEDURE — 99211 OFF/OP EST MAY X REQ PHY/QHP: CPT

## 2025-04-15 DIAGNOSIS — G40.909 SEIZURE DISORDER (HCC): ICD-10-CM

## 2025-04-15 RX ORDER — LACOSAMIDE 100 MG/1
100 TABLET ORAL 2 TIMES DAILY
Qty: 60 TABLET | Refills: 5 | Status: SHIPPED | OUTPATIENT
Start: 2025-04-15 | End: 2025-10-12

## 2025-04-16 NOTE — PATIENT INSTRUCTIONS
Today your INR was 2.5 .      Your goal INR is  2.0-3.0 .    You have a   2.5 mg and 5 mg tablet of Coumadin (warfarin).   Take Coumadin (warfarin) as follows:    Take 5 mg every Sunday, Tuesday, Thursday; and 7.5 mg all other days of the week.      Come back in 3 week(s) for your next finger stick/INR blood test.        Avoid any over the counter items containing aspirin or ibuprofen, and avoid great swings in general diet.  Avoid alcohol consumption.  Please notify the INR pharmacist if you are started on any new medication including over the counter or herbal supplements. Also, please notify your INR pharmacist if any of your other prescription or over the counter medications have been discontinued.     Call [facility] at [phone] if you have any signs of abnormal bleeding/blood clot.  ------------------------------------------------------------------------------------------------------------------  Taking Warfarin Safely: Care Instructions    Your Care Instructions  Warfarin is a medicine that you take to prevent blood clots. It is often called a blood thinner. Doctors give warfarin (such as Coumadin) to reduce the risk of blood clots. You may be at risk for blood clots if you have atrial fibrillation or deep vein thrombosis. Some other health problems may also put you at risk.  Warfarin slows the amount of time it takes for your blood to clot. It can cause bleeding problems. Even if you've been taking warfarin for a while, it's important to know how to take it safely.  Foods and other medicines can affect the way warfarin works. Some can make warfarin work too well. This can cause bleeding problems. And some can make it work poorly, so that it does not prevent blood clots very well.  You will need regular blood tests to check how long it takes for your blood to form a clot. This test is called a PT or prothrombin time test. The result of the test is called

## 2025-04-16 NOTE — PROGRESS NOTES
Pharmacy Progress Note - Anticoagulation Management    S/O:  Mr. Jack Shukla  is a 80 y.o. male seen today for anticoagulation management for the diagnosis of Atrial Fibrillation.     HPI: At last visit (3/31), the patient's INR was 2.1 and therapeutic for an INR goal of 2.0 - 3.0. The group home representative (Loc Cramer) reported the patient's diet has been consistent with vitamin K foods. Patient's MAR reflected no medication changes and patient received all doses appropriately. Loc also confirms patient received all doses appropriately. Patient will continue current warfarin regimen of 5 mg Sun, Tue, Thur; 7.5 mg daily ROW and recheck INR in 2 week(s).     Interim History:    Warfarin start date: Prior to 10/9/20 per chart review  INR Goal:  2.0-3.0    Current warfarin regimen: 5 mg Sun, Tue, Thur; 7.5 mg daily ROW  Warfarin tablet strength:   2.5 mg, 5 mg  Duration of therapy: Indefinite      Results for orders placed or performed in visit on 04/17/25   POCT INR   Result Value Ref Range    Prothrombin time,(POC) 29.4     INR,(POC) 2.5 2.0 - 3.0     Today's pertinent positives includes:  No significant changes since last visit      Adherence:   Able to recall regimen? N/A - Warfarin regimen was printed correctly on the MAR  Miss/extra dose? NO  Need refill? YES - Always send new prescription to [facility] with dose change    Upcoming procedure(s):  N/A    Past Medical History:   Diagnosis Date    A-fib (HCC) 2015    pacemaker L chest     CAD (coronary artery disease) 2015    Pacemaker    Epilepsy (HCC)     Heart disease     Hypertension     Incontinence     Leg swelling     Mental retardation, 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)     Other ill-defined conditions(799.89)     edema legs    S/P ablation of atrial fibrillation 11/13/2020    S/P biventricular cardiac pacemaker procedure 10/23/2015    10/23/15 [employer] biventricular pacemaker inmplant    Screen for colon cancer 9/27/2012     Allergies   Allergen Reactions

## 2025-04-17 ENCOUNTER — ANTI-COAG VISIT (OUTPATIENT)
Facility: HOSPITAL | Age: 80
End: 2025-04-17
Payer: MEDICARE

## 2025-04-17 DIAGNOSIS — I48.91 ATRIAL FIBRILLATION, UNSPECIFIED TYPE (HCC): Primary | ICD-10-CM

## 2025-04-17 LAB
INTERNATIONAL NORMALIZATION RATIO, POC: 2.5 (ref 2–3)
PROTHROMBIN TIME, POC: 29.4

## 2025-04-17 PROCEDURE — 99212 OFFICE O/P EST SF 10 MIN: CPT

## 2025-04-17 PROCEDURE — 85610 PROTHROMBIN TIME: CPT

## 2025-04-17 RX ORDER — WARFARIN SODIUM 5 MG/1
TABLET ORAL
Qty: 30 TABLET | Refills: 0 | Status: SHIPPED | OUTPATIENT
Start: 2025-04-17

## 2025-04-17 RX ORDER — WARFARIN SODIUM 2.5 MG/1
TABLET ORAL
Qty: 16 TABLET | Refills: 0 | Status: SHIPPED | OUTPATIENT
Start: 2025-04-17

## 2025-05-07 ENCOUNTER — TELEPHONE (OUTPATIENT)
Facility: HOSPITAL | Age: 80
End: 2025-05-07

## 2025-05-07 NOTE — TELEPHONE ENCOUNTER
Medication Management Clinic - Reschedule Appointment     Returned patient's group home representative (Loc Cramer) call. Group home representative requested to cancel patient's INR check appointment today (5/7/25). Patient's appointment has been rescheduled to Thursday, May 8th.     Thank you.  Jason Joshua, ValdoD

## 2025-05-08 ENCOUNTER — ANTI-COAG VISIT (OUTPATIENT)
Facility: HOSPITAL | Age: 80
End: 2025-05-08
Payer: MEDICARE

## 2025-05-08 DIAGNOSIS — I48.91 ATRIAL FIBRILLATION, UNSPECIFIED TYPE (HCC): Primary | ICD-10-CM

## 2025-05-08 LAB
INTERNATIONAL NORMALIZATION RATIO, POC: 2.5 (ref 2–3)
PROTHROMBIN TIME, POC: 29.5

## 2025-05-08 PROCEDURE — 85610 PROTHROMBIN TIME: CPT

## 2025-05-08 PROCEDURE — 99212 OFFICE O/P EST SF 10 MIN: CPT

## 2025-05-08 RX ORDER — WARFARIN SODIUM 2.5 MG/1
TABLET ORAL
Qty: 16 TABLET | Refills: 0 | Status: SHIPPED | OUTPATIENT
Start: 2025-05-08

## 2025-05-08 RX ORDER — WARFARIN SODIUM 5 MG/1
TABLET ORAL
Qty: 30 TABLET | Refills: 0 | Status: SHIPPED | OUTPATIENT
Start: 2025-05-08

## 2025-05-08 NOTE — PATIENT INSTRUCTIONS
Today your INR was 2.5 .      Your goal INR is  2.0-3.0 .    You have a   2.5 mg and 5 mg tablet of Coumadin (warfarin).   Take Coumadin (warfarin) as follows:    Take 5 mg every Sunday, Tuesday, Thursday; and 7.5 mg all other days of the week.      Come back in 3 week(s) for your next finger stick/INR blood test.        Avoid any over the counter items containing aspirin or ibuprofen, and avoid great swings in general diet.  Avoid alcohol consumption.  Please notify the INR pharmacist if you are started on any new medication including over the counter or herbal supplements. Also, please notify your INR pharmacist if any of your other prescription or over the counter medications have been discontinued.     Call [facility] at [phone] if you have any signs of abnormal bleeding/blood clot.  ------------------------------------------------------------------------------------------------------------------  Taking Warfarin Safely: Care Instructions    Your Care Instructions  Warfarin is a medicine that you take to prevent blood clots. It is often called a blood thinner. Doctors give warfarin (such as Coumadin) to reduce the risk of blood clots. You may be at risk for blood clots if you have atrial fibrillation or deep vein thrombosis. Some other health problems may also put you at risk.  Warfarin slows the amount of time it takes for your blood to clot. It can cause bleeding problems. Even if you've been taking warfarin for a while, it's important to know how to take it safely.  Foods and other medicines can affect the way warfarin works. Some can make warfarin work too well. This can cause bleeding problems. And some can make it work poorly, so that it does not prevent blood clots very well.  You will need regular blood tests to check how long it takes for your blood to form a clot. This test is called a PT or prothrombin time test. The result of the test is called

## 2025-05-08 NOTE — PROGRESS NOTES
Pharmacy Progress Note - Anticoagulation Management    S/O:  Mr. Jack Shukla  is a 80 y.o. male seen today for anticoagulation management for the diagnosis of Atrial Fibrillation.     HPI: At last visit (4/17), the patient's INR was 2.5 and therapeutic for an INR goal of 2.0-3.0. The group home representative (Loc Cramer) reported the patient's diet has been consistent with vitamin K foods. Patient's MAR reflected no medication changes and patient received all doses appropriately. Loc also confirmed patient received all doses appropriately. Patient will continue current warfarin regimen of 5 mg Sun, Tue, Thur; 7.5 mg daily ROW and recheck INR in 3 week(s).    Interim History:    Warfarin start date: Prior to 10/9/20 per chart review  INR Goal:  2.0-3.0    Current warfarin regimen: 5 mg Sun, Tue, Thur; 7.5 mg daily ROW  Warfarin tablet strength:   2.5 mg, 5 mg  Duration of therapy: Indefinite      Results for orders placed or performed in visit on 05/08/25   POCT INR   Result Value Ref Range    Prothrombin time,(POC) 29.5     INR,(POC) 2.5 2.0 - 3.0       Today's pertinent positives includes:  No significant changes since last visit      Adherence:   Able to recall regimen? N/A - Warfarin regimen was printed correctly on the MAR  Miss/extra dose? NO  Need refill? YES - Always send new prescription to [facility] with dose change    Upcoming procedure(s):  N/A    Past Medical History:   Diagnosis Date    A-fib (HCC) 2015    pacemaker L chest     CAD (coronary artery disease) 2015    Pacemaker    Epilepsy (HCC)     Heart disease     Hypertension     Incontinence     Leg swelling     Mental retardation, 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)     Other ill-defined conditions(799.89)     edema legs    S/P ablation of atrial fibrillation 11/13/2020    S/P biventricular cardiac pacemaker procedure 10/23/2015    10/23/15 [employer] biventricular pacemaker inmplant    Screen for colon cancer 9/27/2012     Allergies   Allergen Reactions

## 2025-05-14 ENCOUNTER — OFFICE VISIT (OUTPATIENT)
Age: 80
End: 2025-05-14
Payer: MEDICARE

## 2025-05-14 VITALS
BODY MASS INDEX: 27.06 KG/M2 | TEMPERATURE: 97.9 F | SYSTOLIC BLOOD PRESSURE: 84 MMHG | RESPIRATION RATE: 18 BRPM | WEIGHT: 189 LBS | HEART RATE: 77 BPM | HEIGHT: 70 IN | OXYGEN SATURATION: 96 % | DIASTOLIC BLOOD PRESSURE: 48 MMHG

## 2025-05-14 DIAGNOSIS — I10 PRIMARY HYPERTENSION: ICD-10-CM

## 2025-05-14 DIAGNOSIS — Z23 NEED FOR PROPHYLACTIC VACCINATION AGAINST STREPTOCOCCUS PNEUMONIAE (PNEUMOCOCCUS): ICD-10-CM

## 2025-05-14 DIAGNOSIS — F79 INTELLECTUAL DISABILITY: ICD-10-CM

## 2025-05-14 DIAGNOSIS — G40.909 SEIZURE DISORDER (HCC): ICD-10-CM

## 2025-05-14 DIAGNOSIS — Z29.11 NEED FOR RSV IMMUNIZATION: ICD-10-CM

## 2025-05-14 DIAGNOSIS — I89.0 LYMPHEDEMA OF BOTH LOWER EXTREMITIES: ICD-10-CM

## 2025-05-14 DIAGNOSIS — Z00.00 MEDICARE ANNUAL WELLNESS VISIT, SUBSEQUENT: Primary | ICD-10-CM

## 2025-05-14 DIAGNOSIS — I48.19 PERSISTENT ATRIAL FIBRILLATION (HCC): ICD-10-CM

## 2025-05-14 DIAGNOSIS — D50.9 IRON DEFICIENCY ANEMIA, UNSPECIFIED IRON DEFICIENCY ANEMIA TYPE: ICD-10-CM

## 2025-05-14 PROBLEM — J96.01 ACUTE HYPOXIC RESPIRATORY FAILURE (HCC): Status: RESOLVED | Noted: 2024-07-06 | Resolved: 2025-05-14

## 2025-05-14 PROCEDURE — 99214 OFFICE O/P EST MOD 30 MIN: CPT | Performed by: FAMILY MEDICINE

## 2025-05-14 PROCEDURE — 3078F DIAST BP <80 MM HG: CPT | Performed by: FAMILY MEDICINE

## 2025-05-14 PROCEDURE — 3074F SYST BP LT 130 MM HG: CPT | Performed by: FAMILY MEDICINE

## 2025-05-14 PROCEDURE — 1123F ACP DISCUSS/DSCN MKR DOCD: CPT | Performed by: FAMILY MEDICINE

## 2025-05-14 PROCEDURE — G0439 PPPS, SUBSEQ VISIT: HCPCS | Performed by: FAMILY MEDICINE

## 2025-05-14 RX ORDER — RESPIRATORY SYNCYTIAL VIRUS VACCINE 120MCG/0.5
0.5 KIT INTRAMUSCULAR ONCE
Qty: 0.5 ML | Refills: 0 | Status: SHIPPED | OUTPATIENT
Start: 2025-05-14 | End: 2025-05-14

## 2025-05-14 RX ORDER — CICLOPIROX 80 MG/ML
SOLUTION TOPICAL
COMMUNITY
Start: 2025-04-11

## 2025-05-14 SDOH — ECONOMIC STABILITY: FOOD INSECURITY: WITHIN THE PAST 12 MONTHS, YOU WORRIED THAT YOUR FOOD WOULD RUN OUT BEFORE YOU GOT MONEY TO BUY MORE.: NEVER TRUE

## 2025-05-14 SDOH — ECONOMIC STABILITY: FOOD INSECURITY: WITHIN THE PAST 12 MONTHS, THE FOOD YOU BOUGHT JUST DIDN'T LAST AND YOU DIDN'T HAVE MONEY TO GET MORE.: NEVER TRUE

## 2025-05-14 ASSESSMENT — PATIENT HEALTH QUESTIONNAIRE - PHQ9
SUM OF ALL RESPONSES TO PHQ QUESTIONS 1-9: 1
2. FEELING DOWN, DEPRESSED OR HOPELESS: NOT AT ALL
SUM OF ALL RESPONSES TO PHQ QUESTIONS 1-9: 1
1. LITTLE INTEREST OR PLEASURE IN DOING THINGS: SEVERAL DAYS
SUM OF ALL RESPONSES TO PHQ QUESTIONS 1-9: 1
SUM OF ALL RESPONSES TO PHQ QUESTIONS 1-9: 1

## 2025-05-14 NOTE — PROGRESS NOTES
Identified pt with two pt identifiers(name and ). Reviewed record in preparation for visit and have obtained necessary documentation.  Chief Complaint   Patient presents with    Medicare AWV        Health Maintenance Due   Topic    Pneumococcal 50+ years Vaccine (2 of 2 - PCV)    Respiratory Syncytial Virus (RSV) Pregnant or age 60 yrs+ (1 - 1-dose 75+ series)    COVID-19 Vaccine ( season)       Vitals:    25 0905 25 0940   BP: (!) 90/54 (!) 84/48   BP Site: Right Upper Arm Right Upper Arm   Patient Position: Sitting Sitting   BP Cuff Size: Large Adult Medium Adult   Pulse: 75 77   Resp: 18    Temp: 97.9 °F (36.6 °C)    TempSrc: Oral    SpO2: 96%    Weight: 85.7 kg (189 lb)    Height: 1.778 m (5' 10\")          \"Have you been to the ER, urgent care clinic since your last visit?  Hospitalized since your last visit?\"    NO    “Have you seen or consulted any other health care providers outside of [facility] since your last visit?”    NO            Click Here for Release of Records Request     This patient is accompanied in the office by his care giver.  I have received verbal consent from Jack Shukla to discuss any/all medical information while they are present in the room.  
areas of dry skin on both feet twice daily Yes Cookie Pan MD   erythromycin with ethanol (THERAMYCIN) 2 % external solution Apply to affected areas between toes once daily Yes Cookie Pan MD   polyethylene glycol (GLYCOLAX) 17 GM/SCOOP powder MIX 1 CAPFUL (17gm - UP TO LINE IN CAP) IN WATER AND DRINK ONCE DAILY AS NEEDED FOR CONSTIPATION Yes Bryan Abreu MD   acetaminophen (TYLENOL) 325 MG tablet Take 2 tablets by mouth every 4 hours as needed for Pain or Fever Yes Bryan Abreu MD   Incontinence Supply Disposable (DISPOSABLE BRIEF X-LARGE) MISC Use for urinary incontinence Yes Bryan Abreu MD   warfarin (COUMADIN) 2.5 MG tablet Take 1 tablet (2.5 mg) by mouth every Monday, Wednesday, Friday, and Saturday. Give with warfarin 5 mg tablet to make a 7.5 mg dose.  Krishna Villa APRN - NP   warfarin (COUMADIN) 5 MG tablet Take 1 tablet (5 mg) by mouth every day.  Krishna Villa APRN - NP   levETIRAcetam (KEPPRA) 1000 MG tablet Take 1 tablet by mouth 2 times daily  Lexa Carrillo APRN - NP       CareTeam (Including outside providers/suppliers regularly involved in providing care):   Patient Care Team:  Bryan Abreu MD as PCP - General  Bryan Abreu MD as PCP - Empaneled Provider  Jude Regalado MD as Physician (Cardiology)  Adriana Estrella ANP as Nurse Practitioner (Cardiology)  Lexa Carrillo APRN - NP as Nurse Practitioner (Neurology)     Recommendations for Preventive Services Due: see orders and patient instructions/AVS.  Recommended screening schedule for the next 5-10 years is provided to the patient in written form: see Patient Instructions/AVS.     Reviewed and updated this visit:  Tobacco  Allergies  Meds  Problems  Med Hx  Surg Hx  Fam Hx  Sexual   Hx                  The patient (or guardian, if applicable) and other individuals in attendance with the patient were advised that Artificial Intelligence will be utilized during this visit to record and

## 2025-05-16 DIAGNOSIS — G40.909 SEIZURE DISORDER (HCC): ICD-10-CM

## 2025-05-16 RX ORDER — LEVETIRACETAM 1000 MG/1
1000 TABLET ORAL 2 TIMES DAILY
Qty: 180 TABLET | Refills: 1 | Status: SHIPPED | OUTPATIENT
Start: 2025-05-16

## 2025-05-26 NOTE — PROGRESS NOTES
Pharmacy Progress Note - Anticoagulation Management    S/O:  Mr. Jack Shukla  is a 80 y.o. male seen today for anticoagulation management for the diagnosis of Atrial Fibrillation.     HPI: At last visit (5/8), the patient's INR was 2.5 and therapeutic for an INR goal of 2.0-3.0. The group home representative (Loc Cramer) reported the patient's diet has been consistent with vitamin K foods. Patient's MAR reflected no medication changes and patient received all doses appropriately. Loc also confirmed patient received all doses appropriately. Patient will continue current warfarin regimen of 5 mg Sun, Tue, Thur; 7.5 mg daily ROW and recheck INR in 3 week(s).    Interim History:    Warfarin start date: Prior to 10/9/20 per chart review  INR Goal:  2.0-3.0    Current warfarin regimen: 7.5 mg Fri; 5 mg daily ROW (adjusted on 5/16/25)  Warfarin tablet strength:   2.5 mg, 5 mg  Duration of therapy: Indefinite      Results for orders placed or performed in visit on 05/28/25   POCT INR   Result Value Ref Range    Prothrombin time,(POC) 15.1     INR,(POC) 1.3 (A) 2.0 - 3.0       Today's pertinent positives includes:  Medication change    Patient's MAR reflects patient's warfarin regimen was adjusted on 5/16/25 to 7.5 mg Fri; 5 mg daily ROW.    Adherence:   Able to recall regimen? N/A - Patient's MAR reflects patient's warfarin regimen was adjusted on 5/16/25 to 7.5 mg Fri; 5 mg daily ROW.  Miss/extra dose? NO  Need refill? YES - Always send new prescription to [facility] with dose change    Upcoming procedure(s):  N/A    Past Medical History:   Diagnosis Date    A-fib (HCC) 2015    pacemaker L chest     CAD (coronary artery disease) 2015    Pacemaker    Epilepsy (HCC)     Heart disease     Hypertension     Incontinence     Leg swelling     Mental retardation, 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)     Other ill-defined conditions(799.89)     edema legs    S/P ablation of atrial fibrillation 11/13/2020    S/P biventricular cardiac pacemaker

## 2025-05-26 NOTE — PATIENT INSTRUCTIONS
Today your INR was 1.3 .      Your goal INR is  2.0-3.0 .    You have a   2.5 mg and 5 mg tablet of Coumadin (warfarin).   Take Coumadin (warfarin) as follows:    Take 5 mg every Sunday, Tuesday, Thursday; and 7.5 mg all other days of the week.      Come back in 2 week(s) for your next finger stick/INR blood test.        Avoid any over the counter items containing aspirin or ibuprofen, and avoid great swings in general diet.  Avoid alcohol consumption.  Please notify the INR pharmacist if you are started on any new medication including over the counter or herbal supplements. Also, please notify your INR pharmacist if any of your other prescription or over the counter medications have been discontinued.     Call [facility] at [phone] if you have any signs of abnormal bleeding/blood clot.  ------------------------------------------------------------------------------------------------------------------  Taking Warfarin Safely: Care Instructions    Your Care Instructions  Warfarin is a medicine that you take to prevent blood clots. It is often called a blood thinner. Doctors give warfarin (such as Coumadin) to reduce the risk of blood clots. You may be at risk for blood clots if you have atrial fibrillation or deep vein thrombosis. Some other health problems may also put you at risk.  Warfarin slows the amount of time it takes for your blood to clot. It can cause bleeding problems. Even if you've been taking warfarin for a while, it's important to know how to take it safely.  Foods and other medicines can affect the way warfarin works. Some can make warfarin work too well. This can cause bleeding problems. And some can make it work poorly, so that it does not prevent blood clots very well.  You will need regular blood tests to check how long it takes for your blood to form a clot. This test is called a PT or prothrombin time test. The result of the test is called

## 2025-05-28 ENCOUNTER — ANTI-COAG VISIT (OUTPATIENT)
Facility: HOSPITAL | Age: 80
End: 2025-05-28
Payer: MEDICARE

## 2025-05-28 DIAGNOSIS — I48.91 ATRIAL FIBRILLATION, UNSPECIFIED TYPE (HCC): Primary | ICD-10-CM

## 2025-05-28 LAB
INTERNATIONAL NORMALIZATION RATIO, POC: 1.3 (ref 2–3)
PROTHROMBIN TIME, POC: 15.1

## 2025-05-28 PROCEDURE — 99213 OFFICE O/P EST LOW 20 MIN: CPT

## 2025-05-28 PROCEDURE — 85610 PROTHROMBIN TIME: CPT

## 2025-05-28 RX ORDER — WARFARIN SODIUM 5 MG/1
TABLET ORAL
Qty: 30 TABLET | Refills: 0 | Status: SHIPPED | OUTPATIENT
Start: 2025-05-28

## 2025-05-28 RX ORDER — WARFARIN SODIUM 2.5 MG/1
TABLET ORAL
Qty: 16 TABLET | Refills: 0 | Status: SHIPPED | OUTPATIENT
Start: 2025-05-28

## 2025-05-29 PROBLEM — I89.0 LYMPHEDEMA OF BOTH LOWER EXTREMITIES: Chronic | Status: ACTIVE | Noted: 2018-10-15

## 2025-05-29 PROBLEM — Z87.81 HISTORY OF CLOSED COLLES' FRACTURE: Status: RESOLVED | Noted: 2018-10-15 | Resolved: 2025-05-29

## 2025-05-29 NOTE — ASSESSMENT & PLAN NOTE
Monitored by specialist- no acute findings meriting change in the plan  - Patient has a history of Mobitz type II block status post atrial ablation and biventricular cardiac pacemaker.  - No new symptoms reported; patient remains stable.  - Pacemaker battery was recently changed.  - Continue current management and monitor for any changes.  - On Warfarin for anticoagulation.  Getting routine INR w/ coumadin clinc

## 2025-05-29 NOTE — ASSESSMENT & PLAN NOTE
Monitored by specialist- no acute findings meriting change in the plan  - No recent seizure activity reported.  - Patient is compliant with seizure medications.  - Review of medication effectiveness and adherence.  - Continue current antiepileptic therapy (Keppra)and monitor for any changes.

## 2025-05-29 NOTE — ASSESSMENT & PLAN NOTE
Chronic, at goal (stable), continue Bumex.  Consider referral to Lymphedema clinic if sx worsen.

## 2025-05-29 NOTE — ASSESSMENT & PLAN NOTE
Chronic, at goal (stable), continue current plan pending work up below  - Blood pressure readings are stable.  - No new symptoms or complications reported.  - Continue current antihypertensive medications.  - Monitor blood pressure regularly and adjust treatment as necessary.  Orders:    CBC with Auto Differential; Future    Comprehensive Metabolic Panel; Future    Lipid Panel; Future    Hypertension Medications       ACE Inhibitors       lisinopril (ZESTRIL) 2.5 MG tablet Take 1 tablet by mouth daily       Beta Blockers Cardio-Selective       metoprolol tartrate (LOPRESSOR) 25 MG tablet Take 1 tablet by mouth 2 times daily       Loop Diuretics       bumetanide (BUMEX) 2 MG tablet TAKE 1 TABLET BY MOUTH DAILY

## 2025-06-09 ENCOUNTER — CLINICAL DOCUMENTATION (OUTPATIENT)
Age: 80
End: 2025-06-09

## 2025-06-09 NOTE — PROGRESS NOTES
Received refill confirmation on Lacosamide. Faxed note back to [facility] pharmacy stating \"Please contact pt's PCP office. They have been refilling this medication, not us. Faxed to [phone] and received fax confirmation. Placed confirmation in fast scan.

## 2025-06-11 ENCOUNTER — TELEPHONE (OUTPATIENT)
Age: 80
End: 2025-06-11

## 2025-06-11 ENCOUNTER — TELEPHONE (OUTPATIENT)
Facility: HOSPITAL | Age: 80
End: 2025-06-11

## 2025-06-11 NOTE — TELEPHONE ENCOUNTER
Medication Management Clinic - Reschedule Appointment    Marlene Estrada returned Clinic's call to reschedule patient's missed appointment. Patient's appointment has been rescheduled to Monday, June 16th.    Note: Marlene is taking over for Loc as the new .    Thank you.  Alana López, ValdoD

## 2025-06-11 NOTE — TELEPHONE ENCOUNTER
----- Message from Leonel BROOKS sent at 6/11/2025 12:03 PM EDT -----  Regarding: ECC Message to Provider  ECC Message to Provider    Relationship to Patient: Covered Entity - group home - [facility]     Additional Information Caller wanted to know what kind of shots patient needs to be done wit the primary care.   --------------------------------------------------------------------------------------------------------------------------    Call Back Information: OK to leave message on voicemail  Preferred Call Back Number: Phone  [phone]

## 2025-06-12 NOTE — PATIENT INSTRUCTIONS
Today your INR was 1.9 .      Your goal INR is  2.0-3.0 .    You have a   2.5 mg and 5 mg tablet of Coumadin (warfarin).   Take Coumadin (warfarin) as follows:    Take 5 mg every Sunday, Thursday; and 7.5 mg all other days of the week.      Come back in 2 week(s) for your next finger stick/INR blood test.        Avoid any over the counter items containing aspirin or ibuprofen, and avoid great swings in general diet.  Avoid alcohol consumption.  Please notify the INR pharmacist if you are started on any new medication including over the counter or herbal supplements. Also, please notify your INR pharmacist if any of your other prescription or over the counter medications have been discontinued.     Call [facility] at [phone] if you have any signs of abnormal bleeding/blood clot.  ------------------------------------------------------------------------------------------------------------------  Taking Warfarin Safely: Care Instructions    Your Care Instructions  Warfarin is a medicine that you take to prevent blood clots. It is often called a blood thinner. Doctors give warfarin (such as Coumadin) to reduce the risk of blood clots. You may be at risk for blood clots if you have atrial fibrillation or deep vein thrombosis. Some other health problems may also put you at risk.  Warfarin slows the amount of time it takes for your blood to clot. It can cause bleeding problems. Even if you've been taking warfarin for a while, it's important to know how to take it safely.  Foods and other medicines can affect the way warfarin works. Some can make warfarin work too well. This can cause bleeding problems. And some can make it work poorly, so that it does not prevent blood clots very well.  You will need regular blood tests to check how long it takes for your blood to form a clot. This test is called a PT or prothrombin time test. The result of the test is called an INR

## 2025-06-12 NOTE — PROGRESS NOTES
Pharmacy Progress Note - Anticoagulation Management    S/O:  Mr. Jack Shukla  is a 80 y.o. male seen today for anticoagulation management for the diagnosis of Atrial Fibrillation.     HPI: At last visit (), the patient's INR was 1.3 and subtherapeutic for an INR goal of 2.0-3.0. The group home representative (Dawson) reported the patient's diet has been consistent with vitamin K foods. Patient's MAR reflected patient's warfarin regimen was adjusted on 25 to 7.5 mg Fri; 5 mg daily ROW. The Medication Management Clinic was unaware of the warfarin dose adjustment. Patient's INR was subtherapeutic due to the ~17%  in patient's weekly warfarin dose. Patient restarted warfarin 5 mg Sun, Tue, Thur; 7.5 mg daily ROW. Patient will recheck INR in 2 week(s).     Interim History:    Warfarin start date: Prior to 10/9/20 per chart review  INR Goal:  2.0-3.0    Current warfarin regimen: 5 mg Sun, Tue, Thur; 7.5 mg daily ROW  Warfarin tablet strength:   2.5 mg, 5 mg  Duration of therapy: Indefinite      Results for orders placed or performed in visit on 25   POCT INR   Result Value Ref Range    Prothrombin time,(POC) 22.4     INR,(POC) 1.9 (A) 2.0 - 3.0       Today's pertinent positives includes:  Medication change    Patient's MAR reflects the dose of metoprolol was increased to 25 mg BID.    Adherence:   Able to recall regimen? N/A - Warfarin regimen was printed correctly on the MAR.  Miss/extra dose? NO  Need refill? YES - Always send new prescription to [facility] with dose change    Upcoming procedure(s):  N/A    Past Medical History:   Diagnosis Date    A-fib (HCC) 2015    pacemaker L chest     CAD (coronary artery disease) 2015    Pacemaker    Epilepsy (HCC)     Heart disease     History of closed Colles' fracture 10/15/2018    Hypertension     Incontinence     Leg swelling     Mental retardation, 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)     Other ill-defined conditions(799.89)     edema legs    S/P ablation of atrial

## 2025-06-16 ENCOUNTER — ANTI-COAG VISIT (OUTPATIENT)
Facility: HOSPITAL | Age: 80
End: 2025-06-16
Payer: MEDICARE

## 2025-06-16 DIAGNOSIS — I48.91 ATRIAL FIBRILLATION, UNSPECIFIED TYPE (HCC): Primary | ICD-10-CM

## 2025-06-16 LAB
INTERNATIONAL NORMALIZATION RATIO, POC: 1.9 (ref 2–3)
PROTHROMBIN TIME, POC: 22.4

## 2025-06-16 PROCEDURE — 85610 PROTHROMBIN TIME: CPT

## 2025-06-16 PROCEDURE — 99213 OFFICE O/P EST LOW 20 MIN: CPT

## 2025-06-16 RX ORDER — WARFARIN SODIUM 2.5 MG/1
TABLET ORAL
Qty: 20 TABLET | Refills: 0 | Status: SHIPPED | OUTPATIENT
Start: 2025-06-16

## 2025-06-16 RX ORDER — WARFARIN SODIUM 5 MG/1
TABLET ORAL
Qty: 30 TABLET | Refills: 0 | Status: SHIPPED | OUTPATIENT
Start: 2025-06-16

## 2025-06-17 NOTE — TELEPHONE ENCOUNTER
Medication Refill Request    Jack GALICIA Gayla is requesting a refill of the following medication(s):   Requested Prescriptions     Pending Prescriptions Disp Refills    magnesium oxide (MAG-OX) 400 (240 Mg) MG tablet 30 tablet 2     Sig: Take 1 tablet by mouth daily    vitamin B-12 (CYANOCOBALAMIN) 1000 MCG tablet 30 tablet 2     Sig: Take 1 tablet by mouth daily        Listed PCP is Bryan Abreu MD   Last provider to prescribe medication: DR. Abreu  Last Date of Medication Prescribed: 03/14/2025   [facility] 05/14/2025     FUTURE APPOINTMENT:   Future Appointments   Date Time Provider Department Center   6/30/2025  9:00 AM [facility] MEDICATION MGMT [facility]       Please send refill to:    [facility] - [place], [place] - [address] - P [phone] - F [phone]  [address] [address]  Phone: [phone] Fax: [phone]      Please review request and approve or deny with recommendations.

## 2025-06-20 RX ORDER — LANOLIN ALCOHOL/MO/W.PET/CERES
1000 CREAM (GRAM) TOPICAL DAILY
Qty: 30 TABLET | Refills: 2 | Status: SHIPPED | OUTPATIENT
Start: 2025-06-20

## 2025-06-20 RX ORDER — LANOLIN ALCOHOL/MO/W.PET/CERES
400 CREAM (GRAM) TOPICAL DAILY
Qty: 30 TABLET | Refills: 2 | Status: SHIPPED | OUTPATIENT
Start: 2025-06-20

## 2025-07-02 ENCOUNTER — ANTI-COAG VISIT (OUTPATIENT)
Facility: HOSPITAL | Age: 80
End: 2025-07-02
Payer: MEDICARE

## 2025-07-02 DIAGNOSIS — I48.91 ATRIAL FIBRILLATION, UNSPECIFIED TYPE (HCC): Primary | ICD-10-CM

## 2025-07-02 LAB
INTERNATIONAL NORMALIZATION RATIO, POC: 1.7 (ref 2–3)
PROTHROMBIN TIME, POC: 20.8

## 2025-07-02 PROCEDURE — 85610 PROTHROMBIN TIME: CPT

## 2025-07-02 PROCEDURE — 99212 OFFICE O/P EST SF 10 MIN: CPT

## 2025-07-02 RX ORDER — WARFARIN SODIUM 2.5 MG/1
TABLET ORAL
Qty: 28 TABLET | Refills: 0 | Status: SHIPPED | OUTPATIENT
Start: 2025-07-02

## 2025-07-02 NOTE — PROGRESS NOTES
Pharmacy Progress Note - Anticoagulation Management    S/O:  Mr. Jack Shukla  is a 80 y.o. male seen today for anticoagulation management for the diagnosis of Atrial Fibrillation.     HPI: At last visit (6/16), the patient's INR was 1.9 and subtherapeutic for an INR goal of 2.0-3.0. Patient's MAR reflected all warfarin doses have been administered as instructed. The group home representative (Dawson) reported the patient's diet has been consistent with vitamin K foods. Patient's MAR reflected the dose of metoprolol was increased to 25 mg BID. Patient's INR was subtherapeutic due to an unidentifiable cause. Patient will increase his weekly warfarin dose from 45 mg to 47.5 mg (~6%) and patient will take warfarin 5 mg Sun, Thur; 7.5 mg daily ROW. Patient will recheck INR in 2 week(s).    Interim History:    Warfarin start date: Prior to 10/9/20 per chart review  INR Goal:  2.0-3.0    Current warfarin regimen: 5 mg Sun, Thur; 7.5 mg daily ROW  Warfarin tablet strength:   2.5 mg, 5 mg  Duration of therapy: Indefinite      Results for orders placed or performed in visit on 07/02/25   POCT INR   Result Value Ref Range    Prothrombin time,(POC) 20.8     INR,(POC) 1.7 (A) 2.0 - 3.0         Today's pertinent positives includes:  No significant changes since last visit        Adherence:   Able to recall regimen? N/A - Warfarin regimen was printed correctly on the MAR.  Miss/extra dose? NO  Need refill? YES - Always send new prescription to [facility] with dose change    Upcoming procedure(s):  N/A    Past Medical History:   Diagnosis Date    A-fib (HCC) 2015    pacemaker L chest     CAD (coronary artery disease) 2015    Pacemaker    Epilepsy ([facility])     Heart disease     History of closed Colles' fracture 10/15/2018    Hypertension     Incontinence     Leg swelling     Mental retardation, 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)     Other ill-defined conditions(799.89)     edema legs    S/P ablation of atrial fibrillation 11/13/2020    S/P

## 2025-07-02 NOTE — PATIENT INSTRUCTIONS
Approved and signed.     Today your INR was 1.7 .      Your goal INR is  2.0-3.0 .    You have a   2.5 mg and 5 mg tablet of Coumadin (warfarin).   Take Coumadin (warfarin) as follows:    Take 5 mg every Sunday and 7.5 mg daily all other days of the week.      Come back in 2 week(s) for your next finger stick/INR blood test.        Avoid any over the counter items containing aspirin or ibuprofen, and avoid great swings in general diet.  Avoid alcohol consumption.  Please notify the INR pharmacist if you are started on any new medication including over the counter or herbal supplements. Also, please notify your INR pharmacist if any of your other prescription or over the counter medications have been discontinued.     Call [facility] at [phone] if you have any signs of abnormal bleeding/blood clot.  ------------------------------------------------------------------------------------------------------------------  Taking Warfarin Safely: Care Instructions    Your Care Instructions  Warfarin is a medicine that you take to prevent blood clots. It is often called a blood thinner. Doctors give warfarin (such as Coumadin) to reduce the risk of blood clots. You may be at risk for blood clots if you have atrial fibrillation or deep vein thrombosis. Some other health problems may also put you at risk.  Warfarin slows the amount of time it takes for your blood to clot. It can cause bleeding problems. Even if you've been taking warfarin for a while, it's important to know how to take it safely.  Foods and other medicines can affect the way warfarin works. Some can make warfarin work too well. This can cause bleeding problems. And some can make it work poorly, so that it does not prevent blood clots very well.  You will need regular blood tests to check how long it takes for your blood to form a clot. This test is called a PT or prothrombin time test. The result of the test is called an INR level.

## 2025-07-14 NOTE — PATIENT INSTRUCTIONS
sprouts.  Vegetable drinks, green tea leaves, and some dietary supplement drinks.  Avoid cranberry juice and other cranberry products. They can increase the effects of warfarin.  Limit your use of alcohol.    Avoid bleeding by preventing falls and injuries  Wear slippers or shoes with nonskid soles.  Remove throw rugs and clutter.  Rearrange furniture and electrical cords to keep them out of walking paths.  Keep stairways, porches, and outside walkways well lit. Use night-lights in hallways and bathrooms.  Be extra careful when you work with sharp tools or knives.    When should you call for help?  Call 911 anytime you think you may need emergency care. For example, call if:  You have a sudden, severe headache that is different from past headaches.  Call your doctor now or seek immediate medical care if:  You have any abnormal bleeding, such as:  Nosebleeds.  Vaginal bleeding that is different (heavier, more frequent, at a different time of the month) than what you are used to.  Bloody or black stools, or rectal bleeding.  Bloody or pink urine.  Watch closely for changes in your health, and be sure to contact your doctor if you have any problems.    Where can you learn more?  Go to http://www.[employer]net/[employer].  Enter N655 in the search box to learn more about \"Taking Warfarin Safely: Care Instructions.\"  Current as of: January 27, 2016  Content Version: 11.1  © 7879-4823 [employer]. Care instructions adapted under license by [employer] (which disclaims liability or warranty for this information). If you have questions about a medical condition or this instruction, always ask your healthcare professional. [employer] disclaims any warranty or liability for your use of this information.

## 2025-07-14 NOTE — PROGRESS NOTES
No current facility-administered medications for this visit.       INR History:   (normal INR range 0.8-1.2)     Date   INR   PT   Dose/Comments  07/16/25 1.9  23.1 5 mg Sunday; 7.5 mg daily ROW  07/02/25 1.7  20.8 5 mg Sun, Thur; 7.5 mg daily ROW    06/16/25 1.9  22.4 5 mg Sun, Tue, Thur; 7.5 mg daily ROW  05/28/25 1.3  15.1 7.5 mg Fri; 5 mg daily ROW (adjusted on 5/16/25)  05/08/25 2.5  29.5 5 mg Sun, Tue, Thur; 7.5 mg daily ROW  04/17/25 2.5  29.4 5 mg Sun, Tue, Thur; 7.5 mg daily ROW  03/31/25 2.1  25.1 5 mg Sun, Tue, Thur; 7.5 mg daily ROW  03/17/25 1.5  17.4 10 mg 3/3/25 then resume 7.5 mg Mon, Wed, Fri; 5 mg daily ROW  03/03/25 1.4  17.2 10 mg 2/17 then resume 7.5 mg Mon, Wed, Fri; 5 mg daily ROW  02/17/25 1.5  18.1 7.5 mg Mon, Wed, Fri; 5 mg daily ROW  01/20/25 2.3  27.4 7.5 mg Mon, Wed, Fri; 5 mg daily ROW  01/03/25 2.3  28.0 7.5 mg Mon, Wed, Fri; 5 mg daily ROW  12/16/24 2.8  33.6 10 mg 12/5 then resume 7.5 mg Mon, Wed, Fri; 5 mg daily ROW  12/05/24 1.5  17.7 7.5 mg Mon, Wed, Fri; 5 mg daily ROW    Medications that can increase  INR: none  Medications that can decrease INR: none    A/P:       Anticoagulation:  Considering Mr. Shukla's past history, todays findings, and per Anticoagulation Collaborative Practice Agreement/Protocol:    Fingerstick POC INR (1.9) is Subtherapeutic for INR goal today.  Change warfarin to 5 mg Sun; 7.5 mg daily ROW  Patient's INR is 1.9 and subtherapeutic for an INR goal of 2.0-3.0. Patient's MAR reflects that the dose increase that was prescribed at his last visit on 7/2 did not occur. Patient is receiving warfarin 47.5mg weekly instead of the recommended 50mg weekly. The group home representative (Dawson Tam)([phone])  reports the patient's diet has been consistent with vitamin K foods. Patient's other medications have not changed. Patient will continue warfarin 5 mg Sunday,Thursday ; 7.5 mg daily ROW until the pill pack can be adjusted with [facility] pharmacy and his

## 2025-07-16 ENCOUNTER — ANTI-COAG VISIT (OUTPATIENT)
Facility: HOSPITAL | Age: 80
End: 2025-07-16
Payer: MEDICARE

## 2025-07-16 DIAGNOSIS — I48.91 ATRIAL FIBRILLATION, UNSPECIFIED TYPE (HCC): Primary | ICD-10-CM

## 2025-07-16 LAB
INTERNATIONAL NORMALIZATION RATIO, POC: 1.9 (ref 2–3)
PROTHROMBIN TIME, POC: 23.1

## 2025-07-16 PROCEDURE — 99211 OFF/OP EST MAY X REQ PHY/QHP: CPT

## 2025-07-16 PROCEDURE — 85610 PROTHROMBIN TIME: CPT

## 2025-07-23 RX ORDER — WARFARIN SODIUM 2.5 MG/1
TABLET ORAL
Qty: 28 TABLET | Refills: 0 | Status: SHIPPED | OUTPATIENT
Start: 2025-07-23

## 2025-07-23 NOTE — TELEPHONE ENCOUNTER
Medication Management Clinic - Medication Refill    [facility] [phone] (Anni at [phone])   contacted the [facility] to refill his warfarin therapy. E-prescribed warfarin 2.5mg tablets to [facility].    Thank you.  MELITA BRIGGS [facility]      For Pharmacy Admin Tracking Only    Intervention Detail: Refill(s) Provided  Total # of Interventions Recommended: 1  Total # of Interventions Accepted: 1  Time Spent (min): 15

## 2025-07-31 ENCOUNTER — ANTI-COAG VISIT (OUTPATIENT)
Facility: HOSPITAL | Age: 80
End: 2025-07-31
Payer: MEDICARE

## 2025-07-31 DIAGNOSIS — I48.91 ATRIAL FIBRILLATION, UNSPECIFIED TYPE (HCC): Primary | ICD-10-CM

## 2025-07-31 LAB
INTERNATIONAL NORMALIZATION RATIO, POC: 2 (ref 2–3)
PROTHROMBIN TIME, POC: 23.6

## 2025-07-31 PROCEDURE — 99213 OFFICE O/P EST LOW 20 MIN: CPT

## 2025-07-31 PROCEDURE — 85610 PROTHROMBIN TIME: CPT

## 2025-07-31 RX ORDER — WARFARIN SODIUM 2.5 MG/1
TABLET ORAL
Qty: 30 TABLET | Refills: 1 | Status: SHIPPED | OUTPATIENT
Start: 2025-07-31

## 2025-07-31 RX ORDER — WARFARIN SODIUM 5 MG/1
TABLET ORAL
Qty: 30 TABLET | Refills: 1 | Status: SHIPPED | OUTPATIENT
Start: 2025-07-31

## 2025-07-31 NOTE — PATIENT INSTRUCTIONS
Today your INR was 2.0 .      Your goal INR is  2.0-3.0 .    You have a   2.5 mg and 5 mg tablet of Coumadin (warfarin).   Take Coumadin (warfarin) as follows:      Take 7.5mg daily (2.5mg + 5mg tablet taken together each day).      Come back in 2 week(s) for your next finger stick/INR blood test.        Avoid any over the counter items containing aspirin or ibuprofen, and avoid great swings in general diet.  Avoid alcohol consumption.  Please notify the INR pharmacist if you are started on any new medication including over the counter or herbal supplements. Also, please notify your INR pharmacist if any of your other prescription or over the counter medications have been discontinued.     Call [facility] at [phone] if you have any signs of abnormal bleeding/blood clot.  ------------------------------------------------------------------------------------------------------------------  Taking Warfarin Safely: Care Instructions    Your Care Instructions  Warfarin is a medicine that you take to prevent blood clots. It is often called a blood thinner. Doctors give warfarin (such as Coumadin) to reduce the risk of blood clots. You may be at risk for blood clots if you have atrial fibrillation or deep vein thrombosis. Some other health problems may also put you at risk.  Warfarin slows the amount of time it takes for your blood to clot. It can cause bleeding problems. Even if you've been taking warfarin for a while, it's important to know how to take it safely.  Foods and other medicines can affect the way warfarin works. Some can make warfarin work too well. This can cause bleeding problems. And some can make it work poorly, so that it does not prevent blood clots very well.  You will need regular blood tests to check how long it takes for your blood to form a clot. This test is called a PT or prothrombin time test. The result of the test is called an INR level.

## 2025-07-31 NOTE — PROGRESS NOTES
Pharmacy Progress Note - Anticoagulation Management    S/O:  Mr. Jack Shukla  is a 80 y.o. male seen today for anticoagulation management for the diagnosis of Atrial Fibrillation.     HPI: At last visit (7/16), the patient's INR was 1.9 and subtherapeutic for an INR goal of 2.0-3.0. Patient's MAR reflected that the dose increase that was prescribed at his last visit on 7/2 did not occur. Patient was receiving warfarin 47.5mg weekly instead of the recommended 50mg weekly. The group home representative (Dawson Turcios)([phone])  reported the patient's diet has been consistent with vitamin K foods. Patient's other medications have not changed. Patient will continue warfarin 5 mg Sunday,Thursday ; 7.5 mg daily ROW until the pill pack can be adjusted with [facility] pharmacy and his group home. Patient will recheck INR in 2 week(s)    Interim History:    Warfarin start date: Prior to 10/9/20 per chart review  INR Goal:  2.0-3.0    Current warfarin regimen: 5 mg Sunday; 7.5 mg daily ROW  Warfarin tablet strength:   2.5 mg, 5 mg  Duration of therapy: Indefinite      Results for orders placed or performed in visit on 07/31/25   POCT INR   Result Value Ref Range    Prothrombin time,(POC) 23.6     INR,(POC) 2.0 2.0 - 3.0             Today's pertinent positives includes:  No significant changes since last visit        Adherence:   Able to recall regimen? N/A - Warfarin regimen was not printed correctly on the MAR, but Dawson Turcios, the group home representative confirmed that the patient is receiving and taking the correct dose in his pill package strips.  Miss/extra dose? NO  - Despite the group home MAR being incorrect, the patient has been receiving the prescribed dose of 50mg  weekly. Need refill? YES -  Send new prescription to [facility] with dose change  OR for dose adjustments, FAX [facility]  FAX ([phone])  a Dose Calendar AND Include WHEN the next INR will be due.         New Rxs for 2.5mg and 5mg tablets

## 2025-08-11 ENCOUNTER — TELEPHONE (OUTPATIENT)
Age: 80
End: 2025-08-11

## 2025-08-14 ENCOUNTER — OFFICE VISIT (OUTPATIENT)
Age: 80
End: 2025-08-14
Payer: MEDICARE

## 2025-08-14 ENCOUNTER — TELEPHONE (OUTPATIENT)
Facility: HOSPITAL | Age: 80
End: 2025-08-14

## 2025-08-14 VITALS
HEART RATE: 75 BPM | WEIGHT: 192.4 LBS | HEIGHT: 70 IN | BODY MASS INDEX: 27.54 KG/M2 | OXYGEN SATURATION: 95 % | DIASTOLIC BLOOD PRESSURE: 58 MMHG | TEMPERATURE: 98.2 F | RESPIRATION RATE: 18 BRPM | SYSTOLIC BLOOD PRESSURE: 100 MMHG

## 2025-08-14 DIAGNOSIS — I89.0 LYMPHEDEMA: Primary | ICD-10-CM

## 2025-08-14 PROCEDURE — 3074F SYST BP LT 130 MM HG: CPT | Performed by: FAMILY MEDICINE

## 2025-08-14 PROCEDURE — 1123F ACP DISCUSS/DSCN MKR DOCD: CPT | Performed by: FAMILY MEDICINE

## 2025-08-14 PROCEDURE — 99213 OFFICE O/P EST LOW 20 MIN: CPT | Performed by: FAMILY MEDICINE

## 2025-08-14 PROCEDURE — 1159F MED LIST DOCD IN RCRD: CPT | Performed by: FAMILY MEDICINE

## 2025-08-14 PROCEDURE — 3078F DIAST BP <80 MM HG: CPT | Performed by: FAMILY MEDICINE

## 2025-08-14 RX ORDER — LACOSAMIDE 100 MG/1
100 TABLET ORAL 2 TIMES DAILY
COMMUNITY
Start: 2025-08-01

## 2025-08-18 ENCOUNTER — ANTI-COAG VISIT (OUTPATIENT)
Facility: HOSPITAL | Age: 80
End: 2025-08-18
Payer: MEDICARE

## 2025-08-18 DIAGNOSIS — I48.91 ATRIAL FIBRILLATION, UNSPECIFIED TYPE (HCC): Primary | ICD-10-CM

## 2025-08-18 LAB
INTERNATIONAL NORMALIZATION RATIO, POC: 3.6 (ref 2–3)
PROTHROMBIN TIME, POC: 42.8

## 2025-08-18 PROCEDURE — 85610 PROTHROMBIN TIME: CPT

## 2025-08-18 PROCEDURE — 99213 OFFICE O/P EST LOW 20 MIN: CPT

## 2025-08-18 RX ORDER — WARFARIN SODIUM 2.5 MG/1
TABLET ORAL
Qty: 20 TABLET | Refills: 0 | Status: SHIPPED | OUTPATIENT
Start: 2025-08-18

## 2025-08-18 RX ORDER — WARFARIN SODIUM 5 MG/1
TABLET ORAL
Qty: 30 TABLET | Refills: 0 | Status: SHIPPED | OUTPATIENT
Start: 2025-08-18

## 2025-09-03 ENCOUNTER — ANTI-COAG VISIT (OUTPATIENT)
Facility: HOSPITAL | Age: 80
End: 2025-09-03
Payer: MEDICARE

## 2025-09-03 DIAGNOSIS — I48.91 ATRIAL FIBRILLATION, UNSPECIFIED TYPE (HCC): Primary | ICD-10-CM

## 2025-09-03 LAB
INTERNATIONAL NORMALIZATION RATIO, POC: 2 (ref 2–3)
PROTHROMBIN TIME, POC: 24.2

## 2025-09-03 PROCEDURE — 99212 OFFICE O/P EST SF 10 MIN: CPT

## 2025-09-03 PROCEDURE — 85610 PROTHROMBIN TIME: CPT

## 2025-09-03 RX ORDER — WARFARIN SODIUM 5 MG/1
TABLET ORAL
Qty: 30 TABLET | Refills: 0 | Status: SHIPPED | OUTPATIENT
Start: 2025-09-03

## 2025-09-03 RX ORDER — WARFARIN SODIUM 2.5 MG/1
TABLET ORAL
Qty: 20 TABLET | Refills: 0 | Status: SHIPPED | OUTPATIENT
Start: 2025-09-03

## (undated) DEVICE — Device

## (undated) DEVICE — 1010 S-DRAPE TOWEL DRAPE 10/BX: Brand: STERI-DRAPE™

## (undated) DEVICE — HANDLE LT SNAP ON ULT DURABLE LENS FOR TRUMPF ALC DISPOSABLE

## (undated) DEVICE — STERILE POLYISOPRENE POWDER-FREE SURGICAL GLOVES: Brand: PROTEXIS

## (undated) DEVICE — 3M™ MEDIPORE™ H SOFT CLOTH SURGICAL TAPE 2864, 4 INCH X 10 YARD (10CM X 9,14M), 12 ROLLS/CASE: Brand: 3M™ MEDIPORE™

## (undated) DEVICE — CATHETER ABLAT 8FR L115CM 1-6-2MM SPC TIP 3.5MM FJ CRV

## (undated) DEVICE — SUT SLK 0 30IN CT1 BLK --

## (undated) DEVICE — MEDI-TRACE CADENCE ADULT, DEFIBRILLATION ELECTRODE -RTS  (10 PR/PK) - PHYSIO-CONTROL: Brand: MEDI-TRACE CADENCE

## (undated) DEVICE — TUBING, SUCTION, 1/4" X 12', STRAIGHT: Brand: MEDLINE

## (undated) DEVICE — CATH EP MAP 2-6-2 7FR F CRV -- PENTARAY

## (undated) DEVICE — 72" ARTERIAL PRESSURE TUBING: Brand: ICU MEDICAL

## (undated) DEVICE — LABEL MED CARD MRMC STRL

## (undated) DEVICE — SOLUTION IV 1000ML PH 7.4 INJ NRMSOL R

## (undated) DEVICE — SET ADMIN L104IN 18ML GRAV CK VLV RLER CLMP 2 SMRTSITE NDL

## (undated) DEVICE — DEVICE COAG 3CM GUID 6130 FOR EPICARD ABLAT EPI-SENSE

## (undated) DEVICE — PROBE ES TEMP HOT AND CLD FAST ACCURATE SFT FLX CIRCA S CATH

## (undated) DEVICE — ANTI-FOG SOLUTION WITH FOAM PAD: Brand: DEVON

## (undated) DEVICE — BIT DRL SLD SD CUT 2.5X40MM -- DISP

## (undated) DEVICE — CABLE RMFG EXT CATH --

## (undated) DEVICE — SOLUTION IRRIG 1000ML 0.9% SOD CHL CONT

## (undated) DEVICE — INTRO SHEATH TRANSSEPTAL 8.5FRX71CM

## (undated) DEVICE — PATCH CARTO 3 EXT REF --

## (undated) DEVICE — CRADLE BOOT: Brand: DEROYAL

## (undated) DEVICE — SPONGE GZ W4XL4IN COT RADPQ HIGHLY ABSRB STERILE

## (undated) DEVICE — DRAIN SURG 19FR 0.25IN SIL RND W/ TRCR INDIC DOT RADPQ FULL

## (undated) DEVICE — ELECTROSURGICAL DEVICE HOLSTER;FOR USE WITH MAXIMUM PEAK VOLTAGE OF 4000 V: Brand: FORCE TRIVERSE

## (undated) DEVICE — SUTURE ABSORBABLE WND CLOSURE 4-0 P-12 12 IN UD V-LOC

## (undated) DEVICE — CABLE CATH L10FT RED PIN CONN 34-34 FOR THERMOCOOL

## (undated) DEVICE — DERMABOND SKIN ADH 0.7ML -- DERMABOND ADVANCED 12/BX

## (undated) DEVICE — TRAY,IRRIGATION,PISTON SYRINGE,60ML,STRL: Brand: MEDLINE

## (undated) DEVICE — DRAIN SURG L3/8-1/2IN DIA3/16IN SIL CARD CONN 1:1 BLAK

## (undated) DEVICE — INFECTION CONTROL KIT SYS

## (undated) DEVICE — 3M™ IOBAN™ 2 ANTIMICROBIAL INCISE DRAPE 6648EZ: Brand: IOBAN™ 2

## (undated) DEVICE — SYR 10ML LUER LOK 1/5ML GRAD --

## (undated) DEVICE — SUTURE VCRL SZ 0 L18IN ABSRB VLT L40MM CT 1/2 CIR J752D

## (undated) DEVICE — DRAPE C ARM W54XL84IN MINI FOR OEC 6800

## (undated) DEVICE — BLADE ELECTRODE: Brand: EDGE

## (undated) DEVICE — DBD-PACK,LAPAROTOMY,2 REINFORCED GOWNS: Brand: MEDLINE

## (undated) DEVICE — SUTURE V-LOC 180 SZ 2-0 L12IN ABSRB VLT GS-21 L37MM 1/2 CIR VLOCM0315

## (undated) DEVICE — TUBE SET IRR PUMP THERMALCOOL -- SMARTABLATE

## (undated) DEVICE — KIT,1200CC CANISTER,3/16"X6' TUBING: Brand: MEDLINE INDUSTRIES, INC.

## (undated) DEVICE — MEDI-VAC NON-CONDUCTIVE SUCTION TUBING: Brand: CARDINAL HEALTH

## (undated) DEVICE — BIPOLAR FORCEPS CORD,BANANA LEADS: Brand: VALLEYLAB

## (undated) DEVICE — ZIMMER® STERILE DISPOSABLE TOURNIQUET CUFF WITH PROTECTIVE SLEEVE AND PLC, DUAL PORT, SINGLE BLADDER, 18 IN. (46 CM)

## (undated) DEVICE — DRESSING SIL W4XL5IN ANTIBACT GELLING FBR CYTOFORM

## (undated) DEVICE — SUTURE MCRYL SZ 4-0 L27IN ABSRB UD L19MM PS-2 1/2 CIR PRIM Y426H

## (undated) DEVICE — PACK PROCEDURE SURG HRT CATH

## (undated) DEVICE — ARGYLE FRAZIER SURGICAL SUCTION INSTRUMENT 10 FR/CH (3.3 MM): Brand: ARGYLE

## (undated) DEVICE — CATHETER ULTRASOUND 10 FRX90 CM FOR CARTO 3 SOUNDSTAR ECO

## (undated) DEVICE — PRESSURE MONITORING SET: Brand: TRUWAVE

## (undated) DEVICE — SUTURE SZ 0 27IN 5/8 CIR UR-6  TAPER PT VIOLET ABSRB VICRYL J603H

## (undated) DEVICE — LIGHT HANDLE: Brand: DEVON

## (undated) DEVICE — SURGICAL PROCEDURE PACK BASIN MAJ SET CUST NO CAUT

## (undated) DEVICE — DRSG BORDR MPLX HEEL 8.7X9.1IN --

## (undated) DEVICE — CONNECTOR DRNGE W3/8-0.5XH3/16XL3/16IN 2:1 SIL CARD STR

## (undated) DEVICE — SUTURE PERMAHAND SZ 0 L30IN NONABSORBABLE BLK L26MM SH 1/2 K834H

## (undated) DEVICE — DEVON™ KNEE AND BODY STRAP 60" X 3" (1.5 M X 7.6 CM): Brand: DEVON

## (undated) DEVICE — BLANKET WRM W25XL64IN NONWOVEN SFT LTWT PLIABLE HYPR

## (undated) DEVICE — SPLINT CAST W4XL15IN GRN STRENGTH PLSTR OF PARIS FAST SET

## (undated) DEVICE — SPONGE GZ W4XL4IN COT 12 PLY TYP VII WVN C FLD DSGN

## (undated) DEVICE — BIT DRL SLD SD CUT 2X40MM DISP --

## (undated) DEVICE — REM POLYHESIVE ADULT PATIENT RETURN ELECTRODE: Brand: VALLEYLAB

## (undated) DEVICE — SOLUTION IV 500ML 0.9% SOD CHL FLX CONT

## (undated) DEVICE — GOWN,SIRUS,NONRNF,SETINSLV,XL,20/CS: Brand: MEDLINE

## (undated) DEVICE — SUTURE MCRYL SZ 3-0 L27IN ABSRB UD L24MM PS-1 3/8 CIR PRIM Y936H

## (undated) DEVICE — AMD ANTIMICROBIAL I.V. DRAIN SPONGES 6 PLY, 0.2% POLYHEXAMETHYLENE BIGUANIDE HCI (PHMB): Brand: EXCILON

## (undated) DEVICE — SUTURE VCRL SZ 3-0 L27IN ABSRB UD L26MM SH 1/2 CIR J416H

## (undated) DEVICE — PADDING CST CRMPD 3INX4YD NS --

## (undated) DEVICE — TOWEL,OR,DSP,ST,BLUE,STD,2/PK,40PK/CS: Brand: MEDLINE

## (undated) DEVICE — ELECTRODE PT RET AD L9FT HI MOIST COND ADH HYDRGEL CORDED

## (undated) DEVICE — STERILE POLYISOPRENE POWDER-FREE SURGICAL GLOVES WITH EMOLLIENT COATING: Brand: PROTEXIS

## (undated) DEVICE — APPLICATOR MEDICATED 10.5 CC SOLUTION CLR STRL CHLORAPREP

## (undated) DEVICE — STRAP,POSITIONING,KNEE/BODY,FOAM,4X60": Brand: MEDLINE

## (undated) DEVICE — VISUALIZATION SYSTEM: Brand: CLEARIFY

## (undated) DEVICE — PREP SKN CHLRAPRP APL 26ML STR --

## (undated) DEVICE — MEDI-TRACE CADENCE ADULT, DEFIBRILLATION ELECTRODE -RTS  (10 PR/PK) - PHILIPS: Brand: MEDI-TRACE CADENCE

## (undated) DEVICE — TTL1LYR 16FR10ML 100%SIL TMPST TR: Brand: MEDLINE

## (undated) DEVICE — GOWN,SIRUS,NONRNF,SETINSLV,2XL,18/CS: Brand: MEDLINE

## (undated) DEVICE — SPONGE LAP 18X18IN STRL -- 5/PK

## (undated) DEVICE — HAND I-LF: Brand: MEDLINE INDUSTRIES, INC.

## (undated) DEVICE — PINNACLE INTRODUCER SHEATH: Brand: PINNACLE

## (undated) DEVICE — DISSECTOR LAP DIA5MM BLNT TIP ENDOPATH

## (undated) DEVICE — IMPLANTABLE DEVICE
Type: IMPLANTABLE DEVICE | Site: WRIST | Status: NON-FUNCTIONAL
Removed: 2017-12-22

## (undated) DEVICE — OCCLUSIVE GAUZE STRIP,3% BISMUTH TRIBROMOPHENATE IN PETROLATUM BLEND: Brand: XEROFORM

## (undated) DEVICE — (D)STRIP SKN CLSR 0.5X4IN WHT --

## (undated) DEVICE — GUIDE AIM 1.5MM --

## (undated) DEVICE — DRESSING HEMOSTATIC SFT INTVENT W/O SLT DBL WRP QUIKCLOT LF

## (undated) DEVICE — SOLUTION IRRIG 1000ML H2O STRL BLT

## (undated) DEVICE — CABLE CATH L2.7M CONNECTS TO CARTO 3 SYS PIU FOR LASSO ECO

## (undated) DEVICE — CATH EP CRV 7F DUO 2/8 2M LG -- LIVEWIRE STRL

## (undated) DEVICE — DRAPE,REIN 53X77,STERILE: Brand: MEDLINE

## (undated) DEVICE — ADHESIVE SKIN CLSR 1ML TISS HI VISC EXOFIN

## (undated) DEVICE — NEEDLE HYPO 22GA L1.5IN BLK S STL HUB POLYPR SHLD REG BVL

## (undated) DEVICE — DRIVER SURG UNIV QUIK CONN T10 FOR VOLAR DST RAD PLATING

## (undated) DEVICE — 3M™ IOBAN™ 2 ANTIMICROBIAL INCISE DRAPE 6650EZ: Brand: IOBAN™ 2

## (undated) DEVICE — NON-REM POLYHESIVE PATIENT RETURN ELECTRODE: Brand: VALLEYLAB

## (undated) DEVICE — DRAPE,U/ SHT,SPLIT,PLAS,STERIL: Brand: MEDLINE

## (undated) DEVICE — Device: Brand: NRG TRANSSEPTAL NEEDLE

## (undated) DEVICE — SKIN PREP TRAY W/CHG: Brand: MEDLINE INDUSTRIES, INC.

## (undated) DEVICE — INTRO SWATZ SL1 8.5FRX63CM --

## (undated) DEVICE — STERILE (15.2 TAPERED TO 7.6 X 183CM) POLYETHYLENE ACCORDION-FOLDED COVER FOR USE WITH SIEMENS ACUNAV ULTRASOUND CATHETER FAMILY CONNECTOR: Brand: SWIFTLINK TRANSDUCER COVER